# Patient Record
Sex: MALE | Race: WHITE | NOT HISPANIC OR LATINO
[De-identification: names, ages, dates, MRNs, and addresses within clinical notes are randomized per-mention and may not be internally consistent; named-entity substitution may affect disease eponyms.]

---

## 2022-04-18 ENCOUNTER — APPOINTMENT (OUTPATIENT)
Dept: NEPHROLOGY | Facility: CLINIC | Age: 67
End: 2022-04-18
Payer: COMMERCIAL

## 2022-04-18 ENCOUNTER — APPOINTMENT (OUTPATIENT)
Dept: TRANSPLANT | Facility: CLINIC | Age: 67
End: 2022-04-18
Payer: COMMERCIAL

## 2022-04-18 ENCOUNTER — NON-APPOINTMENT (OUTPATIENT)
Age: 67
End: 2022-04-18

## 2022-04-18 VITALS
OXYGEN SATURATION: 95 % | RESPIRATION RATE: 14 BRPM | HEIGHT: 71 IN | BODY MASS INDEX: 23.52 KG/M2 | HEART RATE: 62 BPM | DIASTOLIC BLOOD PRESSURE: 65 MMHG | WEIGHT: 168 LBS | TEMPERATURE: 97.4 F | SYSTOLIC BLOOD PRESSURE: 137 MMHG

## 2022-04-18 DIAGNOSIS — Z00.00 ENCOUNTER FOR GENERAL ADULT MEDICAL EXAMINATION W/OUT ABNORMAL FINDINGS: ICD-10-CM

## 2022-04-18 PROCEDURE — 99213 OFFICE O/P EST LOW 20 MIN: CPT

## 2022-04-18 PROCEDURE — 99072 ADDL SUPL MATRL&STAF TM PHE: CPT

## 2022-04-18 PROCEDURE — 99215 OFFICE O/P EST HI 40 MIN: CPT

## 2022-04-18 NOTE — REVIEW OF SYSTEMS
[Sclera anicteric] : sclera anicteric [Blurred Vision] : blurred vision [Can Walk (___ Blocks)] : can walk [unfilled] blocks [Negative] : Musculoskeletal [Fever] : no fever [Chills] : no chills [Fatigue] : no fatigue [Night Sweats] : no night sweats [Recent Weight Loss (___ Lbs)] : no recent weight loss [Chest Pain] : no chest pain [Palpitations] : no palpitations [SOB] : no shortness of breath [Wheezing] : no wheezing [Cough] : no cough

## 2022-04-18 NOTE — PHYSICAL EXAM
[No Acute Distress] : no acute distress [Sclera Anicteric] : sclera anicteric [Clear to Auscultation] : clear to auscultation [Breathing Comfortably on RA] : breathing comfortably on room air [Normal Rate] : normal rate [Regular Rhythm] : regular rhythm [Systolic Murmur] : systolic murmur [In Right Arm] : fistula/graft in right arm [] : right dorsalis pedis palpable [Responds to Questions Appropriately] : responds to questions appropriately [Oriented] : oriented [Appropriate] : appropriate [FreeTextEntry1] : No oral thrush [TextBox_25] : Soft, nontender, nondistended

## 2022-04-18 NOTE — PLAN
[FreeTextEntry1] : 1. Advanced CKD:  Mr. ELLIOT HARGROVE  Is a  good candidate for kidney transplantation . no potential donors. \par 2.Cardiac risk: needs an echo, stress test and cardiac evaluation \par 3. Cancer screening: colonoscopy, PSA \par 4. ID: Serology for acute and chronic viral infections. Screening for latent TB\par 5. Imaging: obtain CT chest and CT A/P \par 6.Diabetes Mellitus: check HbA1c \par \par \par I have personally discussed the risks and benefits of transplantation and patient attended transplant education class where the following was disclosed:\par  \par Reviewed factors affecting survival and morbidity while on dialysis, the transplant wait list and reviewed danny-operative and long-term risk factors affecting outcome in kidney transplantation. \par  \par One year SRTR outcomes for national and [facility] were discussed in regards to patient survival and graft survival after transplantation. \par  \par Details of transplant surgery, including complications were discussed.\par Immunosuppression and complications including infection including life threatening sepsis and opportunistic infections, malignancy and new onset diabetes were discussed. \par  \par Benefits of live donor transplantation as well as variability in wait times across regions and multiple listing were discussed. \par KDPI >85% and PHS high risk criteria donors were discussed. \par HCV kidney transplantation was discussed.\par  \par Will proceed with completing/ updating work up and listing for transplant/ live donor transplant once work up is reviewed and found to be acceptable by multidisciplinary listing committee.\par

## 2022-04-18 NOTE — ASSESSMENT
[Good candidate] : a good candidate. We should proceed with our protocol for evaluation for kidney transplantation. [FreeTextEntry1] : longstanding DM. Needs cardiac eval, including Echo, stress test\par noncontrast CT chest, abd/pel\par Standard workup for transplantation

## 2022-04-18 NOTE — HISTORY OF PRESENT ILLNESS
[Diabetes Mellitus] : Diabetes Mellitus [Hypertension] : Hypertension [Cardiac History] : cardiac history [Diabetes] : diabetes [Retinopathy] : retinopathy [Previous Kidney Transplant] : no previous kidney transplant [Blood Transfusion] : no prior blood transfusion [Claudication/Angina] : no claudication/angina [Hx of DVT/Thrombosis/Miscarriage] : no history of dvt/thrombosis/miscarriage [Neuropathy] : no neuropathy [Lower Extremity Ulcers] : no lower extremity ulcers [Insulin] : no insulin treatment [TextBox_16] : n/a [TextBox_28] : 1990 [TextBox_30] : 1-2 blocks [de-identified] : 66M with longstanding DM, recently taken off of all antiglycemic meds, here for evaluation for kidney transplantation.\par Patient has not started HD yet. GFR 10 on recent labs, with Cr 6\par He denies any cp, sob, nausea, diarrhea, hematuria or dysuria.\par He makes a normal amount of urine.\par \par PMH: HTN, DM, CAD/MI\par PSH: RUE AVF\par Medications: See list\par Allergies: NKDA\par Social: Quit marijuana last year. Denies tob, etoh, drugs\par Lives with wife and 46yo son. Wife may be interested in donation\par FMH: No reported malignancy. Multiple family members with DM\par Brother d. 2 years after kidney transplant

## 2022-04-18 NOTE — REASON FOR VISIT
[Initial] : an initial visit for [End-Stage Renal Disease] : end-stage renal disease [Kidney Transplant Evaluation] : kidney transplant evaluation [Spouse] : spouse [FreeTextEntry2] : Dr Munguia - nephrology

## 2022-04-18 NOTE — HISTORY OF PRESENT ILLNESS
[TextBox_42] : ELLIOT HARGROVE is a 66 year old male who presents for kidney transplant evaluation. \par Cause of ESRD : Long standing h/o DM ( > 34 years) . Known CKD for ~ 10 years. Rapid decline in eGFR since last year.\par Nephrologist: Dr Carranza. \par Preemptive candidate. Has AVF placed in August 2021, not used so far. \par Makes good amounts of urine\par  \par Other PMH :\par Cardiac -has HTN for ~ 20 years, Had MI  s/p PCI X 3 in 2010. \par Pulmonary-  no h/o COPD, Asthma \par Infections-h/o  TB, HIV, Hepatitis  or covid. vaccinated for covid\par Heme- no h/o malignancy or bleeding disorders.No DVT/PE\par Endo-  diabetes > 34 years, was taken off insulin or OHA 1 year ago.  .Has retinopathy .  no Thyroid disease\par Neuro- no h/o CVA or seizure \par Psych- no suicidal ideation, depression or anxiety. \par Sensitization events-  no previous blood transfusions or previous transplant\par No infections or hospitalizations in the last 6 months \par \par Surgical h/o : none\par Functional status: can walk 2-3 blocks but gets tired easily. \par Social history: lives with wife ( has a son who is 45 years, healthy) . quit smoking 1 year ago. used to smoke marijuana. no alcohol or any illicit drug. retired, used to be a .\par Family history:  His brother has ESRD s/p DDRT in [facility] , lasted only for 2 years. No family h/o malignancy. Many family members have DM.  \par \par \par \par

## 2022-04-21 LAB
ABO + RH PNL BLD: NORMAL
ABO + RH PNL BLD: NORMAL
ALBUMIN SERPL ELPH-MCNC: 4.6 G/DL
ALP BLD-CCNC: 141 U/L
ALT SERPL-CCNC: 35 U/L
ANION GAP SERPL CALC-SCNC: 15 MMOL/L
APPEARANCE: CLEAR
AST SERPL-CCNC: 21 U/L
BACTERIA: NEGATIVE
BASOPHILS # BLD AUTO: 0.04 K/UL
BASOPHILS NFR BLD AUTO: 0.5 %
BILIRUB SERPL-MCNC: 0.2 MG/DL
BILIRUBIN URINE: NEGATIVE
BLOOD URINE: NORMAL
BUN SERPL-MCNC: 81 MG/DL
C PEPTIDE SERPL-MCNC: 6 NG/ML
CALCIUM SERPL-MCNC: 9.9 MG/DL
CHLORIDE SERPL-SCNC: 108 MMOL/L
CMV IGG SERPL QL: >10 U/ML
CMV IGG SERPL-IMP: POSITIVE
CO2 SERPL-SCNC: 17 MMOL/L
COLOR: NORMAL
COVID-19 SPIKE DOMAIN ANTIBODY INTERPRETATION: POSITIVE
CREAT SERPL-MCNC: 6.89 MG/DL
CREAT SPEC-SCNC: 103 MG/DL
CREAT/PROT UR: 2.5 RATIO
EBV DNA SERPL NAA+PROBE-ACNC: NOT DETECTED IU/ML
EBV EA AB SER IA-ACNC: 14 U/ML
EBV EA AB TITR SER IF: POSITIVE
EBV EA IGG SER QL IA: >600 U/ML
EBV EA IGG SER-ACNC: POSITIVE
EBV EA IGM SER IA-ACNC: NEGATIVE
EBV PATRN SPEC IB-IMP: NORMAL
EBV VCA IGG SER IA-ACNC: 261 U/ML
EBV VCA IGM SER QL IA: <10 U/ML
EGFR: 8 ML/MIN/1.73M2
EOSINOPHIL # BLD AUTO: 0.59 K/UL
EOSINOPHIL NFR BLD AUTO: 7.5 %
EPSTEIN-BARR VIRUS CAPSID ANTIGEN IGG: POSITIVE
ESTIMATED AVERAGE GLUCOSE: 117 MG/DL
GLUCOSE QUALITATIVE U: NEGATIVE
GLUCOSE SERPL-MCNC: 189 MG/DL
HAV IGM SER QL: NONREACTIVE
HBA1C MFR BLD HPLC: 5.7 %
HBV CORE IGG+IGM SER QL: NONREACTIVE
HBV SURFACE AB SER QL: NONREACTIVE
HBV SURFACE AB SERPL IA-ACNC: <3 MIU/ML
HBV SURFACE AG SER QL: NONREACTIVE
HCT VFR BLD CALC: 34.7 %
HCV AB SER QL: NONREACTIVE
HCV S/CO RATIO: 0.21 S/CO
HGB BLD-MCNC: 10.5 G/DL
HIV1+2 AB SPEC QL IA.RAPID: NONREACTIVE
HSV 1+2 IGG SER IA-IMP: NEGATIVE
HSV 1+2 IGG SER IA-IMP: POSITIVE
HSV1 IGG SER QL: 34.9 INDEX
HSV2 IGG SER QL: 0.43 INDEX
HYALINE CASTS: 4 /LPF
IMM GRANULOCYTES NFR BLD AUTO: 0.3 %
KETONES URINE: NEGATIVE
LEUKOCYTE ESTERASE URINE: NEGATIVE
LYMPHOCYTES # BLD AUTO: 1.56 K/UL
LYMPHOCYTES NFR BLD AUTO: 19.8 %
M TB IFN-G BLD-IMP: NEGATIVE
MAGNESIUM SERPL-MCNC: 2.3 MG/DL
MAN DIFF?: NORMAL
MCHC RBC-ENTMCNC: 25.7 PG
MCHC RBC-ENTMCNC: 30.3 GM/DL
MCV RBC AUTO: 84.8 FL
MICROSCOPIC-UA: NORMAL
MONOCYTES # BLD AUTO: 0.72 K/UL
MONOCYTES NFR BLD AUTO: 9.1 %
NEUTROPHILS # BLD AUTO: 4.96 K/UL
NEUTROPHILS NFR BLD AUTO: 62.8 %
NITRITE URINE: NEGATIVE
PH URINE: 6
PHOSPHATE SERPL-MCNC: 5.8 MG/DL
PLATELET # BLD AUTO: 182 K/UL
POTASSIUM SERPL-SCNC: 5.2 MMOL/L
PROT SERPL-MCNC: 7.3 G/DL
PROT UR-MCNC: 255 MG/DL
PROTEIN URINE: ABNORMAL
PSA SERPL-MCNC: 0.52 NG/ML
QUANTIFERON TB PLUS MITOGEN MINUS NIL: 10 IU/ML
QUANTIFERON TB PLUS NIL: 0 IU/ML
QUANTIFERON TB PLUS TB1 MINUS NIL: 0 IU/ML
QUANTIFERON TB PLUS TB2 MINUS NIL: 0.01 IU/ML
RBC # BLD: 4.09 M/UL
RBC # FLD: 17.4 %
RED BLOOD CELLS URINE: 3 /HPF
RUBV IGG FLD-ACNC: 13.9 INDEX
RUBV IGG SER-IMP: POSITIVE
SARS-COV-2 AB SERPL IA-ACNC: >250 U/ML
SODIUM SERPL-SCNC: 141 MMOL/L
SPECIFIC GRAVITY URINE: 1.01
SQUAMOUS EPITHELIAL CELLS: 1 /HPF
T GONDII AB SER-IMP: NEGATIVE
T GONDII IGG SER QL: <3 IU/ML
T PALLIDUM AB SER QL IA: NEGATIVE
URATE SERPL-MCNC: 6.6 MG/DL
UROBILINOGEN URINE: NORMAL
VZV AB TITR SER: POSITIVE
VZV IGG SER IF-ACNC: 2014 INDEX
WBC # FLD AUTO: 7.89 K/UL
WHITE BLOOD CELLS URINE: 2 /HPF

## 2022-05-09 ENCOUNTER — APPOINTMENT (OUTPATIENT)
Dept: CT IMAGING | Facility: CLINIC | Age: 67
End: 2022-05-09
Payer: COMMERCIAL

## 2022-05-09 ENCOUNTER — OUTPATIENT (OUTPATIENT)
Dept: OUTPATIENT SERVICES | Facility: HOSPITAL | Age: 67
LOS: 1 days | End: 2022-05-09
Payer: COMMERCIAL

## 2022-05-09 DIAGNOSIS — Z01.818 ENCOUNTER FOR OTHER PREPROCEDURAL EXAMINATION: ICD-10-CM

## 2022-05-09 PROCEDURE — 74176 CT ABD & PELVIS W/O CONTRAST: CPT

## 2022-05-09 PROCEDURE — 74176 CT ABD & PELVIS W/O CONTRAST: CPT | Mod: 26

## 2022-06-06 ENCOUNTER — APPOINTMENT (OUTPATIENT)
Dept: CT IMAGING | Facility: CLINIC | Age: 67
End: 2022-06-06

## 2022-06-14 ENCOUNTER — APPOINTMENT (OUTPATIENT)
Dept: CARDIOLOGY | Facility: CLINIC | Age: 67
End: 2022-06-14
Payer: COMMERCIAL

## 2022-06-14 ENCOUNTER — NON-APPOINTMENT (OUTPATIENT)
Age: 67
End: 2022-06-14

## 2022-06-14 VITALS
HEIGHT: 71 IN | HEART RATE: 82 BPM | WEIGHT: 176 LBS | BODY MASS INDEX: 24.64 KG/M2 | DIASTOLIC BLOOD PRESSURE: 70 MMHG | OXYGEN SATURATION: 100 % | SYSTOLIC BLOOD PRESSURE: 160 MMHG

## 2022-06-14 DIAGNOSIS — Z79.4 TYPE 2 DIABETES MELLITUS W/OUT COMPLICATIONS: ICD-10-CM

## 2022-06-14 DIAGNOSIS — E11.9 TYPE 2 DIABETES MELLITUS W/OUT COMPLICATIONS: ICD-10-CM

## 2022-06-14 DIAGNOSIS — Z95.5 PRESENCE OF CORONARY ANGIOPLASTY IMPLANT AND GRAFT: ICD-10-CM

## 2022-06-14 DIAGNOSIS — Z87.891 PERSONAL HISTORY OF NICOTINE DEPENDENCE: ICD-10-CM

## 2022-06-14 PROCEDURE — 99205 OFFICE O/P NEW HI 60 MIN: CPT

## 2022-06-14 PROCEDURE — 99072 ADDL SUPL MATRL&STAF TM PHE: CPT

## 2022-06-14 PROCEDURE — 93000 ELECTROCARDIOGRAM COMPLETE: CPT | Mod: NC

## 2022-06-18 ENCOUNTER — APPOINTMENT (OUTPATIENT)
Dept: CT IMAGING | Facility: CLINIC | Age: 67
End: 2022-06-18

## 2022-06-23 NOTE — HISTORY OF PRESENT ILLNESS
[FreeTextEntry1] : Patient is a 67 year-old Black gentleman, with known past medical history of hypertension, dyslipidemia, insulin dependent type II diabetes (off insulin after losing weight), coronary artery disease status post MI treated with PCI x3 stents, CKD stage V, who presents as a preemptive candidate for renal transplant. \par He has not been following regularly with a cardiologist, but he reports that his nephrologist (Vish Avitia MD) checked an echocardiogram approximately one month ago.\par \par He used to smoke marijuana, but he has not smoked in over a year, and he never smoked cigarettes. \par He does not exercise because of back discomfort. He does not report any chest pain or shortness of breath.\par \par He has not been sick with Covid-19. He had two doses of Pfizer's Covid-19 vaccine and then a Moderna booster on 1/2/2022.

## 2022-06-23 NOTE — PHYSICAL EXAM
[Well Developed] : well developed [Well Nourished] : well nourished [No Acute Distress] : no acute distress [Normal Conjunctiva] : normal conjunctiva [Normal Venous Pressure] : normal venous pressure [No Carotid Bruit] : no carotid bruit [Normal S1, S2] : normal S1, S2 [No Rub] : no rub [No Gallop] : no gallop [Normal Rate] : normal [Rhythm Regular] : regular [Normal S1] : normal S1 [Normal S2] : normal S2 [I] : a grade 1 [No Pitting Edema] : no pitting edema present [Clear Lung Fields] : clear lung fields [Good Air Entry] : good air entry [No Respiratory Distress] : no respiratory distress  [Soft] : abdomen soft [Non Tender] : non-tender [No Masses/organomegaly] : no masses/organomegaly [Normal Bowel Sounds] : normal bowel sounds [Normal Gait] : normal gait [No Edema] : no edema [No Cyanosis] : no cyanosis [No Clubbing] : no clubbing [No Varicosities] : no varicosities [No Rash] : no rash [No Skin Lesions] : no skin lesions [Moves all extremities] : moves all extremities [No Focal Deficits] : no focal deficits [Normal Speech] : normal speech [Alert and Oriented] : alert and oriented [Normal memory] : normal memory [de-identified] : right brachial AV fistula, +thrill

## 2022-06-23 NOTE — CARDIOLOGY SUMMARY
[de-identified] : 6/14/2022, sinus 73 bpm, LVH with left axis and QRS widening, poor R-wave progression

## 2022-07-07 ENCOUNTER — APPOINTMENT (OUTPATIENT)
Dept: CARDIOLOGY | Facility: CLINIC | Age: 67
End: 2022-07-07

## 2022-07-07 PROCEDURE — 93015 CV STRESS TEST SUPVJ I&R: CPT

## 2022-07-07 PROCEDURE — 78452 HT MUSCLE IMAGE SPECT MULT: CPT

## 2022-07-07 PROCEDURE — A9500: CPT

## 2022-07-07 PROCEDURE — 99072 ADDL SUPL MATRL&STAF TM PHE: CPT

## 2022-07-31 ENCOUNTER — OUTPATIENT (OUTPATIENT)
Dept: OUTPATIENT SERVICES | Facility: HOSPITAL | Age: 67
LOS: 1 days | End: 2022-07-31
Payer: MEDICARE

## 2022-07-31 DIAGNOSIS — Z11.52 ENCOUNTER FOR SCREENING FOR COVID-19: ICD-10-CM

## 2022-07-31 LAB — SARS-COV-2 RNA SPEC QL NAA+PROBE: SIGNIFICANT CHANGE UP

## 2022-07-31 PROCEDURE — U0005: CPT

## 2022-07-31 PROCEDURE — U0003: CPT

## 2022-07-31 PROCEDURE — C9803: CPT

## 2022-08-04 ENCOUNTER — OUTPATIENT (OUTPATIENT)
Dept: OUTPATIENT SERVICES | Facility: HOSPITAL | Age: 67
LOS: 1 days | End: 2022-08-04
Payer: MEDICARE

## 2022-08-04 ENCOUNTER — TRANSCRIPTION ENCOUNTER (OUTPATIENT)
Age: 67
End: 2022-08-04

## 2022-08-04 VITALS
DIASTOLIC BLOOD PRESSURE: 67 MMHG | HEART RATE: 70 BPM | OXYGEN SATURATION: 97 % | RESPIRATION RATE: 16 BRPM | TEMPERATURE: 98 F | WEIGHT: 179.02 LBS | HEIGHT: 71 IN | SYSTOLIC BLOOD PRESSURE: 145 MMHG

## 2022-08-04 VITALS
RESPIRATION RATE: 14 BRPM | SYSTOLIC BLOOD PRESSURE: 128 MMHG | OXYGEN SATURATION: 96 % | DIASTOLIC BLOOD PRESSURE: 60 MMHG | HEART RATE: 68 BPM

## 2022-08-04 DIAGNOSIS — I77.0 ARTERIOVENOUS FISTULA, ACQUIRED: Chronic | ICD-10-CM

## 2022-08-04 DIAGNOSIS — R94.39 ABNORMAL RESULT OF OTHER CARDIOVASCULAR FUNCTION STUDY: ICD-10-CM

## 2022-08-04 LAB
ALBUMIN SERPL ELPH-MCNC: 4.6 G/DL — SIGNIFICANT CHANGE UP (ref 3.3–5)
ALP SERPL-CCNC: 157 U/L — HIGH (ref 40–120)
ALT FLD-CCNC: 18 U/L — SIGNIFICANT CHANGE UP (ref 10–45)
ANION GAP SERPL CALC-SCNC: 14 MMOL/L — SIGNIFICANT CHANGE UP (ref 5–17)
AST SERPL-CCNC: 25 U/L — SIGNIFICANT CHANGE UP (ref 10–40)
BILIRUB SERPL-MCNC: 0.3 MG/DL — SIGNIFICANT CHANGE UP (ref 0.2–1.2)
BUN SERPL-MCNC: 38 MG/DL — HIGH (ref 7–23)
CALCIUM SERPL-MCNC: 10.5 MG/DL — SIGNIFICANT CHANGE UP (ref 8.4–10.5)
CHLORIDE SERPL-SCNC: 95 MMOL/L — LOW (ref 96–108)
CO2 SERPL-SCNC: 27 MMOL/L — SIGNIFICANT CHANGE UP (ref 22–31)
CREAT SERPL-MCNC: 6.28 MG/DL — HIGH (ref 0.5–1.3)
EGFR: 9 ML/MIN/1.73M2 — LOW
GLUCOSE SERPL-MCNC: 229 MG/DL — HIGH (ref 70–99)
HCT VFR BLD CALC: 37.7 % — LOW (ref 39–50)
HGB BLD-MCNC: 11.8 G/DL — LOW (ref 13–17)
MAGNESIUM SERPL-MCNC: 2.3 MG/DL — SIGNIFICANT CHANGE UP (ref 1.6–2.6)
MCHC RBC-ENTMCNC: 27.3 PG — SIGNIFICANT CHANGE UP (ref 27–34)
MCHC RBC-ENTMCNC: 31.3 GM/DL — LOW (ref 32–36)
MCV RBC AUTO: 87.3 FL — SIGNIFICANT CHANGE UP (ref 80–100)
NRBC # BLD: 0 /100 WBCS — SIGNIFICANT CHANGE UP (ref 0–0)
PLATELET # BLD AUTO: 220 K/UL — SIGNIFICANT CHANGE UP (ref 150–400)
POTASSIUM SERPL-MCNC: 4.2 MMOL/L — SIGNIFICANT CHANGE UP (ref 3.5–5.3)
POTASSIUM SERPL-SCNC: 4.2 MMOL/L — SIGNIFICANT CHANGE UP (ref 3.5–5.3)
PROT SERPL-MCNC: 8 G/DL — SIGNIFICANT CHANGE UP (ref 6–8.3)
RBC # BLD: 4.32 M/UL — SIGNIFICANT CHANGE UP (ref 4.2–5.8)
RBC # FLD: 16.4 % — HIGH (ref 10.3–14.5)
SODIUM SERPL-SCNC: 136 MMOL/L — SIGNIFICANT CHANGE UP (ref 135–145)
WBC # BLD: 8.92 K/UL — SIGNIFICANT CHANGE UP (ref 3.8–10.5)
WBC # FLD AUTO: 8.92 K/UL — SIGNIFICANT CHANGE UP (ref 3.8–10.5)

## 2022-08-04 PROCEDURE — 93010 ELECTROCARDIOGRAM REPORT: CPT

## 2022-08-04 PROCEDURE — 93458 L HRT ARTERY/VENTRICLE ANGIO: CPT

## 2022-08-04 PROCEDURE — 93458 L HRT ARTERY/VENTRICLE ANGIO: CPT | Mod: 26

## 2022-08-04 PROCEDURE — 80053 COMPREHEN METABOLIC PANEL: CPT

## 2022-08-04 PROCEDURE — C1887: CPT

## 2022-08-04 PROCEDURE — 83735 ASSAY OF MAGNESIUM: CPT

## 2022-08-04 PROCEDURE — 99223 1ST HOSP IP/OBS HIGH 75: CPT

## 2022-08-04 PROCEDURE — 85027 COMPLETE CBC AUTOMATED: CPT

## 2022-08-04 PROCEDURE — C1894: CPT

## 2022-08-04 PROCEDURE — 99152 MOD SED SAME PHYS/QHP 5/>YRS: CPT

## 2022-08-04 PROCEDURE — 93005 ELECTROCARDIOGRAM TRACING: CPT

## 2022-08-04 PROCEDURE — C1769: CPT

## 2022-08-04 PROCEDURE — 36415 COLL VENOUS BLD VENIPUNCTURE: CPT

## 2022-08-04 NOTE — ASU DISCHARGE PLAN (ADULT/PEDIATRIC) - ASU DC SPECIAL INSTRUCTIONSFT
Wound Care:   the day AFTER your procedure remove bandage GENTLY, and clean using  mild soap and gentle warm, water stream, pat dry. leave OPEN to air. YOU MAY SHOWER   DO NOT apply lotions, creams, ointments, powder, perfumes to your incision site  DO NOT SOAK your site for 1 week ( no baths, no pools, no tubs, etc...)  Check  your groin and /or wrist daily.A small amount of bruising, and soarness are normal    ACTIVITY: for 24 hours   - DO NOT DRIVE  - DO NOT make any important decisions or sign legal documents   - DO NOT operate heavy machineries   - you may resume sexual activity in 48 hours, unless otherwise instructed by your cardiologist     If your procedure was done through the WRIST: for the NEXT 3DAYS:  - avoid pushing, pulling, with that affected wrist   - avoid repeated movement of that hand and wrist ( eg: typing, hammering)  - DO NOT LIFT anything more than 5 lbs     If your procedure was done through the GROIN: for the NEXT 5 DAYS  - Limit climbing stairs, DO NOT soak in bathtub or pool  - no strenuous activities, pushing, pulling, straining  - Do not lift anything 10lbs or heavier     MEDICATION:   take your medications as explained ( see discharge paperwork)   If you received a STENT, you will be taking antiplatelet medications to KEEP YOUR STENT OPEN ( eg: Aspirin, Plavix, Brilinta, Effient, etc).  Take as prescribed DO NOT STOP taking them without consulting with your cardiologist first.     Follow heart healthy diet recommended by your doctor, , if you smoke STOP SMOKING ( may call [phone] for center of tobacco control if you need assistance)     CALL your doctor to make appointment in 2 WEEKS     ***CALL YOUR DOCTOR***  if you experience: fever, chills, body aches, or severe pain, swelling, redness, heat or yellow discharge at incision site  If you experience Bleeding or excruciating pain at the procedural site, swelling ( golf ball size) at your procedural site  If you experience CHEST PAIN  If you experience extremity numbness, tingling, temperature change ( of your procedural site)   If you are unable to reach your doctor, you may contact:   -Cardiology Office at [facility] at [phone] or   - [facility] [phone]  - [facility] [phone]

## 2022-08-04 NOTE — ASU DISCHARGE PLAN (ADULT/PEDIATRIC) - NS MD DC FALL RISK RISK
For information on Fall & Injury Prevention, visit: [email or web address] OR  [email or web address] OR  https://www.cdc.gov/steadi/patient.html

## 2022-08-04 NOTE — ASU PATIENT PROFILE, ADULT - FALL HARM RISK - UNIVERSAL INTERVENTIONS
Bed in lowest position, wheels locked, appropriate side rails in place/Call bell, personal items and telephone in reach/Instruct patient to call for assistance before getting out of bed or chair/Non-slip footwear when patient is out of bed/Scott to call system/Physically safe environment - no spills, clutter or unnecessary equipment/Purposeful Proactive Rounding/Room/bathroom lighting operational, light cord in reach

## 2022-08-04 NOTE — CONSULT NOTE ADULT - ASSESSMENT
67 year-old gentleman with known coronary artery disease presents today for left heart catheterization prior to consideration for renal transplant.  Cath shows LM, LAD, and RCA lesions that are each amenable to PCI.  After discussion about the risks/benefits/alternatives, including CABG vs. multivessel PCI, the decision is made to proceed with multivessel PCI.    Carroll Garza MD will schedule patient for cath and PCI next week.  Continue ASA 81 mg daily and atorvastatin 80 mg daily. Also continue carvedilol 25 mg BID.

## 2022-08-04 NOTE — CONSULT NOTE ADULT - SUBJECTIVE AND OBJECTIVE BOX
Patient seen and evaluated @ 9:30 am in cath recovery suite  Chief Complaint: coronary artery disease    HPI:  This is a 68 yo male PMH HTN, HLD, DM2 A1C 5.7 (), CAD s/p SCOTT x 3, former smoker, ESRD on HD x 2 months via Right AV fistula (managed by Dr. Vish Munguia-[facility] last session 8/3/22) who presented to cardiology, Dr. AMADOR Rain, for evaluation to proceed with listing for renal transplant.  Denies chest pain/pressure, SOB/BETANCOURT, dizziness, diaphoresis, palpitations, nausea, vomiting, peripheral edema, recent weight gain, or syncope.  No implanted monitoring devices.  NST 22 EF 53% hypokinesis inferoseptal, distal anterior and ateroapical, distal anteroseptal , and inferior walls. Pt. presents asymptomatic for further evaluation and cardiac cath.  (04 Aug 2022 07:03)    PMH:   HTN (hypertension)    HLD (hyperlipidemia)    CAD (coronary artery disease)    Diabetes mellitus    Former smoker    ESRD on dialysis    Gout      PSH:   AV fistula      Medications:     Allergies:  No Known Allergies    FAMILY HISTORY:  No pertinent family history in first degree relatives     Social History: , lives with his wife  Smoking: nonsmoker  Alcohol: no EtOH abuse  Drugs: no illicit drug use    Review of Systems:    Constitutional: No fever, chills, fatigue, or changes in weight  HEENT: No blurry vision, eye pain, headache, runny nose, or sore throat  Respiratory: No shortness of breath, cough, or wheezing  Cardiovascular: No chest pain or palpitations  Gastrointestinal: No nausea, vomiting, diarrhea, or abdominal pain  Genitourinary: No dysuria or incontinence  Extremities: No lower extremity swelling  Neurologic: No focal findings  Lymphatic: No lymphadenopathy   Skin: No rash  Psychiatry: No anxiety or depression  10 point review of systems is otherwise negative except as mentioned above            Physical Exam:  T(C): 36.8 (22 @ 07:03), Max: 36.8 (22 @ 06:43)  HR: 68 (22 @ 10:50) (62 - 72)  BP: 128/60 (22 @ 10:50) (114/56 - 145/67)  RR: 14 (22 @ 10:50) (12 - 18)  SpO2: 96% (22 @ 10:50) (92% - 98%)  Wt(kg): --    Daily Height in cm: 180.34 (04 Aug 2022 07:03)    Daily Weight in k.2 (04 Aug 2022 07:03)    Appearance: Normal, well groomed, NAD  Eyes: PERRLA, EOMI, pink conjunctiva, no scleral icterus   HENT: Normal oral mucosa  Cardiovascular: RRR, S1, S2, no murmur, rub, or gallop; no edema; no JVD  Procedural Access Site: Clean, dry, intact, without hematoma  Respiratory: Clear to auscultation bilaterally  Gastrointestinal: Soft, non-tender, non-distended, BS+  Musculoskeletal: No clubbing or joint deformity   Neurologic: No focal weakness  Lymphatic: No lymphadenopathy  Psychiatry: AAOx3 with appropriate mood and affect  Skin: No rashes, ecchymoses, or cyanosis    Cardiovascular Diagnostic Testing:  ECG:    Echo:    Stress Testing:    Cath: LM, LAD, and RCA disease    Interpretation of Telemetry:    Imaging:    Labs:                        11.8   8.92  )-----------( 220      ( 04 Aug 2022 07:27 )             37.7     08-    136  |  95<L>  |  38<H>  ----------------------------<  229<H>  4.2   |  27  |  6.28<H>    Ca    10.5      04 Aug 2022 07:27  Mg     2.3     -    TPro  8.0  /  Alb  4.6  /  TBili  0.3  /  DBili  x   /  AST  25  /  ALT  18  /  AlkPhos  157<H>  08-04

## 2022-08-04 NOTE — ASU DISCHARGE PLAN (ADULT/PEDIATRIC) - CARE PROVIDER_API CALL
Carroll Garza)  Cardiology; Internal Medicine; Interventional Cardiology  [facility]- Dept of Cardiology, [address] [place] [address]  Phone: [phone]  Fax: [phone]  Follow Up Time:

## 2022-08-04 NOTE — H&P CARDIOLOGY - NSICDXPASTMEDICALHX_GEN_ALL_CORE_FT
PAST MEDICAL HISTORY:  CAD (coronary artery disease)     Diabetes mellitus     ESRD on dialysis     Former smoker     Gout     HLD (hyperlipidemia)     HTN (hypertension)

## 2022-08-04 NOTE — H&P CARDIOLOGY - HISTORY OF PRESENT ILLNESS
This is a 68 yo male PMH HTN, HLD, DM2 A1C 5.7 (4/22), CAD s/p SCOTT x 3, former smoker, ESRD on HD x 2 months via Right AV fistula (managed by Dr. Vish Munguia[facility] last session 8/3/22) who presented to cardiology, Dr. AMADOR Rain, for evaluation to proceed with listing for renal transplant.  Denies chest pain/pressure, SOB/BETANCOURT, dizziness, diaphoresis, palpitations, nausea, vomiting, peripheral edema, recent weight gain, or syncope.  No implanted monitoring devices.  NST 7/7/22 EF 53% hypokinesis inferoseptal, distal anterior and ateroapical, distal anteroseptal , and inferior walls. Pt. presents asymptomatic for further evaluation and cardiac cath.

## 2022-08-09 ENCOUNTER — OUTPATIENT (OUTPATIENT)
Dept: OUTPATIENT SERVICES | Facility: HOSPITAL | Age: 67
LOS: 1 days | End: 2022-08-09
Payer: MEDICARE

## 2022-08-09 DIAGNOSIS — I77.0 ARTERIOVENOUS FISTULA, ACQUIRED: Chronic | ICD-10-CM

## 2022-08-09 DIAGNOSIS — Z11.52 ENCOUNTER FOR SCREENING FOR COVID-19: ICD-10-CM

## 2022-08-09 PROBLEM — E11.9 TYPE 2 DIABETES MELLITUS WITHOUT COMPLICATIONS: Chronic | Status: ACTIVE | Noted: 2022-08-04

## 2022-08-09 PROBLEM — E78.5 HYPERLIPIDEMIA, UNSPECIFIED: Chronic | Status: ACTIVE | Noted: 2022-08-04

## 2022-08-09 PROBLEM — I10 ESSENTIAL (PRIMARY) HYPERTENSION: Chronic | Status: ACTIVE | Noted: 2022-08-04

## 2022-08-09 PROBLEM — I25.10 ATHEROSCLEROTIC HEART DISEASE OF NATIVE CORONARY ARTERY WITHOUT ANGINA PECTORIS: Chronic | Status: ACTIVE | Noted: 2022-08-04

## 2022-08-09 PROBLEM — M10.9 GOUT, UNSPECIFIED: Chronic | Status: ACTIVE | Noted: 2022-08-04

## 2022-08-09 PROBLEM — Z87.891 PERSONAL HISTORY OF NICOTINE DEPENDENCE: Chronic | Status: ACTIVE | Noted: 2022-08-04

## 2022-08-09 PROBLEM — N18.6 END STAGE RENAL DISEASE: Chronic | Status: ACTIVE | Noted: 2022-08-04

## 2022-08-09 LAB — SARS-COV-2 RNA SPEC QL NAA+PROBE: SIGNIFICANT CHANGE UP

## 2022-08-09 PROCEDURE — U0003: CPT

## 2022-08-09 PROCEDURE — U0005: CPT

## 2022-08-09 PROCEDURE — C9803: CPT

## 2022-08-12 ENCOUNTER — TRANSCRIPTION ENCOUNTER (OUTPATIENT)
Age: 67
End: 2022-08-12

## 2022-08-12 ENCOUNTER — INPATIENT (INPATIENT)
Facility: HOSPITAL | Age: 67
LOS: 0 days | Discharge: HOME | DRG: 215 | End: 2022-08-13
Attending: INTERNAL MEDICINE | Admitting: INTERNAL MEDICINE
Payer: MEDICARE

## 2022-08-12 VITALS
HEART RATE: 80 BPM | SYSTOLIC BLOOD PRESSURE: 141 MMHG | HEIGHT: 71 IN | TEMPERATURE: 98 F | OXYGEN SATURATION: 96 % | RESPIRATION RATE: 18 BRPM | WEIGHT: 169.98 LBS | DIASTOLIC BLOOD PRESSURE: 68 MMHG

## 2022-08-12 DIAGNOSIS — I77.0 ARTERIOVENOUS FISTULA, ACQUIRED: Chronic | ICD-10-CM

## 2022-08-12 DIAGNOSIS — I25.119 ATHEROSCLEROTIC HEART DISEASE OF NATIVE CORONARY ARTERY WITH UNSPECIFIED ANGINA PECTORIS: ICD-10-CM

## 2022-08-12 LAB
ANION GAP SERPL CALC-SCNC: 16 MMOL/L — SIGNIFICANT CHANGE UP (ref 5–17)
BUN SERPL-MCNC: 54 MG/DL — HIGH (ref 7–23)
CALCIUM SERPL-MCNC: 10.3 MG/DL — SIGNIFICANT CHANGE UP (ref 8.4–10.5)
CHLORIDE SERPL-SCNC: 94 MMOL/L — LOW (ref 96–108)
CO2 SERPL-SCNC: 27 MMOL/L — SIGNIFICANT CHANGE UP (ref 22–31)
CREAT SERPL-MCNC: 7.85 MG/DL — HIGH (ref 0.5–1.3)
EGFR: 7 ML/MIN/1.73M2 — LOW
GLUCOSE BLDC GLUCOMTR-MCNC: 226 MG/DL — HIGH (ref 70–99)
GLUCOSE BLDC GLUCOMTR-MCNC: 228 MG/DL — HIGH (ref 70–99)
GLUCOSE BLDC GLUCOMTR-MCNC: 237 MG/DL — HIGH (ref 70–99)
GLUCOSE BLDC GLUCOMTR-MCNC: 299 MG/DL — HIGH (ref 70–99)
GLUCOSE BLDC GLUCOMTR-MCNC: 308 MG/DL — HIGH (ref 70–99)
GLUCOSE SERPL-MCNC: 317 MG/DL — HIGH (ref 70–99)
HCT VFR BLD CALC: 34.8 % — LOW (ref 39–50)
HGB BLD-MCNC: 11 G/DL — LOW (ref 13–17)
MCHC RBC-ENTMCNC: 27 PG — SIGNIFICANT CHANGE UP (ref 27–34)
MCHC RBC-ENTMCNC: 31.6 GM/DL — LOW (ref 32–36)
MCV RBC AUTO: 85.5 FL — SIGNIFICANT CHANGE UP (ref 80–100)
NRBC # BLD: 0 /100 WBCS — SIGNIFICANT CHANGE UP (ref 0–0)
PLATELET # BLD AUTO: 245 K/UL — SIGNIFICANT CHANGE UP (ref 150–400)
POTASSIUM SERPL-MCNC: 3.8 MMOL/L — SIGNIFICANT CHANGE UP (ref 3.5–5.3)
POTASSIUM SERPL-SCNC: 3.8 MMOL/L — SIGNIFICANT CHANGE UP (ref 3.5–5.3)
RBC # BLD: 4.07 M/UL — LOW (ref 4.2–5.8)
RBC # FLD: 15.5 % — HIGH (ref 10.3–14.5)
SODIUM SERPL-SCNC: 137 MMOL/L — SIGNIFICANT CHANGE UP (ref 135–145)
WBC # BLD: 10.02 K/UL — SIGNIFICANT CHANGE UP (ref 3.8–10.5)
WBC # FLD AUTO: 10.02 K/UL — SIGNIFICANT CHANGE UP (ref 3.8–10.5)

## 2022-08-12 PROCEDURE — 93010 ELECTROCARDIOGRAM REPORT: CPT

## 2022-08-12 PROCEDURE — 99152 MOD SED SAME PHYS/QHP 5/>YRS: CPT

## 2022-08-12 PROCEDURE — 92928 PRQ TCAT PLMT NTRAC ST 1 LES: CPT | Mod: LC

## 2022-08-12 PROCEDURE — 92920 PRQ TRLUML C ANGIOP 1ART&/BR: CPT | Mod: LM

## 2022-08-12 PROCEDURE — 92979 ENDOLUMINL IVUS OCT C EA: CPT | Mod: 26,LC

## 2022-08-12 PROCEDURE — 33990 INSJ PERQ VAD L HRT ARTERIAL: CPT

## 2022-08-12 PROCEDURE — 93458 L HRT ARTERY/VENTRICLE ANGIO: CPT | Mod: 26,59

## 2022-08-12 PROCEDURE — 0715T: CPT | Mod: NC

## 2022-08-12 PROCEDURE — 92978 ENDOLUMINL IVUS OCT C 1ST: CPT | Mod: 26,59

## 2022-08-12 RX ORDER — CARVEDILOL PHOSPHATE 80 MG/1
25 CAPSULE, EXTENDED RELEASE ORAL EVERY 12 HOURS
Refills: 0 | Status: DISCONTINUED | OUTPATIENT
Start: 2022-08-12 | End: 2022-08-13

## 2022-08-12 RX ORDER — DEXTROSE 50 % IN WATER 50 %
12.5 SYRINGE (ML) INTRAVENOUS ONCE
Refills: 0 | Status: DISCONTINUED | OUTPATIENT
Start: 2022-08-12 | End: 2022-08-13

## 2022-08-12 RX ORDER — ALLOPURINOL 300 MG
100 TABLET ORAL DAILY
Refills: 0 | Status: DISCONTINUED | OUTPATIENT
Start: 2022-08-12 | End: 2022-08-13

## 2022-08-12 RX ORDER — INSULIN LISPRO 100/ML
VIAL (ML) SUBCUTANEOUS AT BEDTIME
Refills: 0 | Status: DISCONTINUED | OUTPATIENT
Start: 2022-08-12 | End: 2022-08-13

## 2022-08-12 RX ORDER — LOSARTAN POTASSIUM 100 MG/1
50 TABLET, FILM COATED ORAL DAILY
Refills: 0 | Status: DISCONTINUED | OUTPATIENT
Start: 2022-08-12 | End: 2022-08-13

## 2022-08-12 RX ORDER — CLOPIDOGREL BISULFATE 75 MG/1
600 TABLET, FILM COATED ORAL ONCE
Refills: 0 | Status: COMPLETED | OUTPATIENT
Start: 2022-08-12 | End: 2022-08-12

## 2022-08-12 RX ORDER — CLOPIDOGREL BISULFATE 75 MG/1
75 TABLET, FILM COATED ORAL DAILY
Refills: 0 | Status: DISCONTINUED | OUTPATIENT
Start: 2022-08-12 | End: 2022-08-13

## 2022-08-12 RX ORDER — ATORVASTATIN CALCIUM 80 MG/1
80 TABLET, FILM COATED ORAL AT BEDTIME
Refills: 0 | Status: DISCONTINUED | OUTPATIENT
Start: 2022-08-12 | End: 2022-08-13

## 2022-08-12 RX ORDER — INSULIN LISPRO 100/ML
VIAL (ML) SUBCUTANEOUS
Refills: 0 | Status: DISCONTINUED | OUTPATIENT
Start: 2022-08-12 | End: 2022-08-13

## 2022-08-12 RX ORDER — DEXTROSE 50 % IN WATER 50 %
15 SYRINGE (ML) INTRAVENOUS ONCE
Refills: 0 | Status: DISCONTINUED | OUTPATIENT
Start: 2022-08-12 | End: 2022-08-13

## 2022-08-12 RX ORDER — CLOPIDOGREL BISULFATE 75 MG/1
1 TABLET, FILM COATED ORAL
Qty: 90 | Refills: 3
Start: 2022-08-12 | End: 2023-08-06

## 2022-08-12 RX ORDER — GLUCAGON INJECTION, SOLUTION 0.5 MG/.1ML
1 INJECTION, SOLUTION SUBCUTANEOUS ONCE
Refills: 0 | Status: DISCONTINUED | OUTPATIENT
Start: 2022-08-12 | End: 2022-08-13

## 2022-08-12 RX ORDER — FERROUS SULFATE 325(65) MG
325 TABLET ORAL DAILY
Refills: 0 | Status: DISCONTINUED | OUTPATIENT
Start: 2022-08-12 | End: 2022-08-13

## 2022-08-12 RX ORDER — TAMSULOSIN HYDROCHLORIDE 0.4 MG/1
0.4 CAPSULE ORAL AT BEDTIME
Refills: 0 | Status: DISCONTINUED | OUTPATIENT
Start: 2022-08-12 | End: 2022-08-13

## 2022-08-12 RX ORDER — DEXTROSE 50 % IN WATER 50 %
25 SYRINGE (ML) INTRAVENOUS ONCE
Refills: 0 | Status: DISCONTINUED | OUTPATIENT
Start: 2022-08-12 | End: 2022-08-13

## 2022-08-12 RX ORDER — SODIUM CHLORIDE 9 MG/ML
1000 INJECTION, SOLUTION INTRAVENOUS
Refills: 0 | Status: DISCONTINUED | OUTPATIENT
Start: 2022-08-12 | End: 2022-08-13

## 2022-08-12 RX ORDER — INSULIN LISPRO 100/ML
6 VIAL (ML) SUBCUTANEOUS ONCE
Refills: 0 | Status: COMPLETED | OUTPATIENT
Start: 2022-08-12 | End: 2022-08-12

## 2022-08-12 RX ORDER — NIFEDIPINE 30 MG
60 TABLET, EXTENDED RELEASE 24 HR ORAL DAILY
Refills: 0 | Status: DISCONTINUED | OUTPATIENT
Start: 2022-08-12 | End: 2022-08-13

## 2022-08-12 RX ORDER — ASPIRIN/CALCIUM CARB/MAGNESIUM 324 MG
81 TABLET ORAL DAILY
Refills: 0 | Status: DISCONTINUED | OUTPATIENT
Start: 2022-08-12 | End: 2022-08-13

## 2022-08-12 RX ADMIN — CARVEDILOL PHOSPHATE 25 MILLIGRAM(S): 80 CAPSULE, EXTENDED RELEASE ORAL at 21:57

## 2022-08-12 RX ADMIN — LOSARTAN POTASSIUM 50 MILLIGRAM(S): 100 TABLET, FILM COATED ORAL at 21:57

## 2022-08-12 RX ADMIN — Medication 6 UNIT(S): at 06:58

## 2022-08-12 RX ADMIN — Medication 6: at 16:19

## 2022-08-12 RX ADMIN — Medication 4: at 12:15

## 2022-08-12 RX ADMIN — ATORVASTATIN CALCIUM 80 MILLIGRAM(S): 80 TABLET, FILM COATED ORAL at 21:56

## 2022-08-12 RX ADMIN — CLOPIDOGREL BISULFATE 600 MILLIGRAM(S): 75 TABLET, FILM COATED ORAL at 09:55

## 2022-08-12 RX ADMIN — TAMSULOSIN HYDROCHLORIDE 0.4 MILLIGRAM(S): 0.4 CAPSULE ORAL at 21:57

## 2022-08-12 NOTE — DISCHARGE NOTE PROVIDER - NSDCFUADDINST_GEN_ALL_CORE_FT
Wound Care:   the day AFTER your procedure remove bandage GENTLY, and clean using  mild soap and gentle warm, water stream, pat dry. leave OPEN to air. YOU MAY SHOWER   DO NOT apply lotions, creams, ointments, powder, perfumes to your incision site  DO NOT SOAK your site for 1 week (no baths, no pools, no tubs, etc...)  Check  your groin and /or wrist daily. A small amount of bruising, and soreness are normal    ACTIVITY: for 24 hours   - DO NOT DRIVE  - DO NOT make any important decisions or sign legal documents   - DO NOT operate heavy machineries  - you may resume sexual activity in 48 hours, unless otherwise instructed by your cardiologist     If your procedure was done through the WRIST: for the NEXT 3 DAYS:  - avoid pushing, pulling, with that affected wrist   - avoid repeated movement of that hand and wrist (eg: typing, hammering)  - DO NOT LIFT anything more than 5 lbs     If your procedure was done through the GROIN: for the NEXT 5 DAYS  - Limit climbing stairs, DO NOT soak in bathtub or pool  - no strenous activities, pushing, pulling, straining  - Do not lift anything 10lbs or heavier     MEDICATION:   take your medications as explained (see discharge paperwork)   If you received a STENT, you will be taking antiplatelet medications to KEEP YOUR STENT OPEN ( eg: Aspirin, Plavix, Brilinta, Effient, etc).  Take as prescribed DO NOT STOP taking them without consulting with your cardiologist first.     Follow heart healthy diet reccomended by your doctor, , if you smoke STOP SMOKING ( may call [phone] for center of tobacco control if you need assistance)     CALL your doctor to make appointment in 2 WEEKS     ***CALL YOUR DOCTOR***  if you experience: fever, chills, body aches, or severe pain, swelling, redness, heat or yellow discharge at incision site  If you experience Bleeding or excruciating pain at the procedural site, swelling ( golf ball size) at your procedural site  If you experience CHEST PAIN  If you experience extremity numbness, tingling, temperature change ( of your procedural site)   If you are unable to reach your doctor, you may contact:   -Cardiology Office at [facility] at [phone] or   - [facility] [phone]  - [facility] [phone]

## 2022-08-12 NOTE — DISCHARGE NOTE PROVIDER - NSDCMRMEDTOKEN_GEN_ALL_CORE_FT
allopurinol 100 mg oral tablet: orally once a day  Aspirin Enteric Coated 81 mg oral delayed release tablet: 1 tab(s) orally once a day  atorvastatin 80 mg oral tablet: 1 tab(s) orally once a day  carvedilol 25 mg oral tablet: 1 tab(s) orally 2 times a day  clopidogrel 75 mg oral tablet: 1 tab(s) orally once a day  ezetimibe 10 mg oral tablet: 1 tab(s) orally once a day  ferrous sulfate 325 mg (65 mg elemental iron) oral tablet: 1 tab(s) orally once a day  losartan 50 mg oral tablet: 1 tab(s) orally once a day  Nifedical XL 60 mg oral tablet, extended release: 1 tab(s) orally once a day  Lauren-Alek oral tablet: 1 tab(s) orally once a day  tamsulosin 0.4 mg oral capsule: 1 cap(s) orally once a day

## 2022-08-12 NOTE — DISCHARGE NOTE PROVIDER - NSDCCPCAREPLAN_GEN_ALL_CORE_FT
PRINCIPAL DISCHARGE DIAGNOSIS  Diagnosis: CAD (coronary artery disease)  Assessment and Plan of Treatment: Low salt, low fat diet.   Weight management.   Take medications as prescribed.    No smoking.  Follow up appointments with your doctor(s)  as instructed.  Continue your medications. Do not stop Aspirin or Plavix unless instructed by your cardiologist.  No heavy lifting, strenuous activity, bending, straining or unnecessary stair climbing for 2 weeks. No sex for 1 week.  No driving for 2 days. You may shower 24 hours following procedure but avoid baths and swimming for 1 week. Check groin site for bleeding and/or swelling daily following procedure. Call your doctor/cardiologist immediately for bleeding or swelling or if you have increased/persistent pain or drainage at the site. Follow up with your cardiologist in 1- 2 weeks. You may call [facility] at [phone] or [phone] after office hours and weekends with any questions or concerns following your procedure.      SECONDARY DISCHARGE DIAGNOSES  Diagnosis: HLD (hyperlipidemia)  Assessment and Plan of Treatment: Your LDL cholesterol will be less than 70mg/dL   Continue with your cholesterol medications. Eat a heart healthy diet that is low in saturated fats and salt, and includes whole grains, fruits, vegetables and lean protein; exercise regularly (consult with your physician or cardiologist first); maintain a heart healthy weight; if you smoke - quit (A resource to help you stop smoking is the [facility] – phone number [phone].). Continue to follow with your primary physician or cardiologist.

## 2022-08-12 NOTE — DISCHARGE NOTE PROVIDER - CARE PROVIDERS DIRECT ADDRESSES
[email or web address]

## 2022-08-12 NOTE — CONSULT NOTE ADULT - SUBJECTIVE AND OBJECTIVE BOX
Patient is a 67y old  Male who presents with a chief complaint of     HPI:  This is a 66 yo male PMH HTN, HLD, DM2 A1C 5.7 (4/22 not on prescriptions >1yr discontinued by MD in [place]), CAD s/p SCOTT x 3, former smoker, ESRD on HD x 2 months via Right AV fistula (managed by Dr. Vish Munguia-[facility] last session 8/10/22) who presented to cardiology, Dr. AMADOR Rain, for evaluation to proceed with listing for renal transplant.  Denied chest pain/pressure, SOB/BETANCOURT, dizziness, diaphoresis, palpitations, nausea, vomiting, peripheral edema, recent weight gain, or syncope.  No implanted monitoring devices.  NST 7/7/22 EF 53% hypokinesis inferoseptal, distal anterior and ateroapical, distal anteroseptal , and inferior walls. [facility] 8/4/22 see results below. Pt. presents asymptomatic for further evaluation and Impella assisted PCI.    [facility] 8/4/22 revealed   LM   Left main artery: Large caliber vessel. There is eccentric, calcified  30-40% ostial stenosis. There is eccentric, calcified 80%  distal stenosis extending into proximal LAD.    LAD   Left anterior descending artery: Large caliber vessel, wraps around  left ventricular apex. There is discrete, calcified 80%  stenosis in proximal vessel at proximal stent edge. There is a stent  in mid vessel with moderate diffuse in-stent restenosis.  Mild diffuse disease in distal vessel. First diagonal: Moderate  caliber vessel. There is eccentric 30-40% stenosis in mid vessel.  CX   Circumflex: Large caliber vessel. There is calcified ostial 50%  stenosis. Mild diffuse disease in proximal vessel. There is patent  stent in mid vessel with mild diffuse in-stent restenosis. There is  eccentric 50-60% stenosis at distal stent edge. First obtuse  marginal: Moderate caliber vessel. Mild diffuse disease. Second obtuse  marginal: Moderate caliber vessel. Mild diffuse disease.  RCA   Right coronary artery: Small, non-dominant vessel. There is discrete,  calcified 90% stenosis in mid vessel.. (12 Aug 2022 06:31)      PAST MEDICAL & SURGICAL HISTORY:  HTN (hypertension)  HLD (hyperlipidemia)  CAD (coronary artery disease)  Diabetes mellitus  Former smoker  ESRD on dialysis  Gout  AV fistula          MEDICATIONS  (STANDING):  allopurinol 100 milliGRAM(s) Oral daily  aspirin enteric coated 81 milliGRAM(s) Oral daily  atorvastatin 80 milliGRAM(s) Oral at bedtime  carvedilol 25 milliGRAM(s) Oral every 12 hours  clopidogrel Tablet 75 milliGRAM(s) Oral daily  dextrose 5%. 1000 milliLiter(s) (50 mL/Hr) IV Continuous <Continuous>  dextrose 5%. 1000 milliLiter(s) (100 mL/Hr) IV Continuous <Continuous>  dextrose 50% Injectable 25 Gram(s) IV Push once  dextrose 50% Injectable 12.5 Gram(s) IV Push once  dextrose 50% Injectable 25 Gram(s) IV Push once  ferrous    sulfate 325 milliGRAM(s) Oral daily  glucagon  Injectable 1 milliGRAM(s) IntraMuscular once  insulin lispro (ADMELOG) corrective regimen sliding scale   SubCutaneous three times a day before meals  insulin lispro (ADMELOG) corrective regimen sliding scale   SubCutaneous at bedtime  losartan 50 milliGRAM(s) Oral daily  Nephro-elliott 1 Tablet(s) Oral daily  NIFEdipine XL 60 milliGRAM(s) Oral daily  tamsulosin 0.4 milliGRAM(s) Oral at bedtime      Allergies    No Known Allergies            SOCIAL HISTORY:  Denies ETOh,Smoking,     FAMILY HISTORY:  No pertinent family history in first degree relatives        REVIEW OF SYSTEMS:  CONSTITUTIONAL: No weakness, fevers or chills  EYES/ENT: No visual changes;  No vertigo or throat pain   NECK: No pain or stiffness  RESPIRATORY: No cough, wheezing, hemoptysis; No shortness of breath  CARDIOVASCULAR: No chest pain or palpitations  GASTROINTESTINAL: No abdominal or epigastric pain. No nausea, vomiting, or hematemesis; No diarrhea or constipation. No melena or hematochezia.  GENITOURINARY: No dysuria, frequency or hematuria  NEUROLOGICAL: No numbness or weakness  SKIN: No itching, burning, rashes, or lesions   All other review of systems is negative unless indicated above.    VITAL:  T(C): , Max: 36.9 (08-12-22 @ 06:26)  T(F): , Max: 98.5 (08-12-22 @ 06:26)  HR: 74 (08-12-22 @ 13:25)  BP: 139/56 (08-12-22 @ 12:25)  BP(mean): 81 (08-12-22 @ 12:25)  RR: 16 (08-12-22 @ 13:25)  SpO2: 98% (08-12-22 @ 13:25)  Wt(kg): --    PHYSICAL EXAM:  Constitutional: NAD, Alert  HEENT: NCAT, MMM  Neck: Supple, No JVD  Respiratory: CTA-b/l  Cardiovascular: RRR s1s2, no m/r/g  Gastrointestinal: BS+, soft, NT/ND  Extremities: No peripheral edema b/l  Neurological: no focal deficits; strength grossly intact  Back: no CVAT b/l  Skin: No rashes, no nevi  ACCESS ; AVF     LABS:                        11.0   10.02 )-----------( 245      ( 12 Aug 2022 06:52 )             34.8     Na(137)/K(3.8)/Cl(94)/HCO3(27)/BUN(54)/Cr(7.85)Glu(317)/Ca(10.3)/Mg(--)/PO4(--)    08-12 @ 06:52            IMAGING:    ASSESSMENT:  This is a 66 yo male PMH HTN, HLD, DM2 A1C 5.7 (4/22 not on prescriptions >1yr discontinued by MD in [place]), CAD s/p SCOTT x 3, former smoker, ESRD on HD x 2 months via Right AV fistula (managed by Dr. Vish Munguia[facility] last session 8/10/22) who presented to cardiology, Dr. AMADOR Rain, for evaluation to proceed with listing for renal transplant.       (1)Renal - newly ESRD-HD -   MWF plan for HD today   (2)CV - BP stable  sp cath   (3)Anemia - hb 11.0 likely esrd       RECOMMENDATIONS  (1)HD  MWF --- HD  today  - 2kg UF as able -  (2) BP stable  SP cath         Thank you for involving [facility] in this patient's care.    With warm regards    [facility]  Office: [phone]

## 2022-08-12 NOTE — DISCHARGE NOTE PROVIDER - HOSPITAL COURSE
66 yo male PMH HTN, HLD, DM2 A1C 5.7 (4/22 not on prescriptions >1yr discontinued by MD in [place]), CAD s/p SCOTT x 3, former smoker, ESRD on HD x 2 months via [facility] AV [facility] who presented to cardiology, Dr. AMADOR Rain, for evaluation to proceed with listing for renal transplant.  Denied chest pain/pressure, SOB/BETANCOURT, dizziness, diaphoresis, palpitations, nausea, vomiting, peripheral edema, recent weight gain, or syncope.  No implanted monitoring devices.  NST 7/7/22 EF 53% hypokinesis inferoseptal, distal anterior and ateroapical, distal anteroseptal , and inferior walls. [facility] 8/4/22 see results below. Pt. presents asymptomatic for further evaluation and Impella assisted PCI.  8/12 s/p C SCOTT x1 to pLAD, SCOTT x1 to pLCx, POBA to LM via RFA, site without hematoma/bleeding. Patient hemodynamically stable, without complaint overnight. Pending discharge in AM

## 2022-08-12 NOTE — DISCHARGE NOTE PROVIDER - NSDCCPTREATMENT_GEN_ALL_CORE_FT
PRINCIPAL PROCEDURE  Procedure: Left heart cardiac cath  Findings and Treatment: 8/12 s/p LHC SCOTT x1 to pLAD, SCOTT x1 to pLCx, POBA to LM via RFA

## 2022-08-12 NOTE — H&P CARDIOLOGY - HISTORY OF PRESENT ILLNESS
This is a 66 yo male PMH HTN, HLD, DM2 A1C 5.7 (4/22), CAD s/p SCOTT x 3, former smoker, ESRD on HD x 2 months via Right AV fistula (managed by Dr. Vish Munguia-[facility] last session 8/10/22) who presented to cardiology, Dr. AMADOR Rain, for evaluation to proceed with listing for renal transplant.  Denied chest pain/pressure, SOB/BETANCOURT, dizziness, diaphoresis, palpitations, nausea, vomiting, peripheral edema, recent weight gain, or syncope.  No implanted monitoring devices.  NST 7/7/22 EF 53% hypokinesis inferoseptal, distal anterior and ateroapical, distal anteroseptal , and inferior walls. [facility] 8/4/22 see results below. Pt. presents asymptomatic for further evaluation and Impella assisted PCI.    [facility] 8/4/22 revealed   LM   Left main artery: Large caliber vessel. There is eccentric, calcified  30-40% ostial stenosis. There is eccentric, calcified 80%  distal stenosis extending into proximal LAD.    LAD   Left anterior descending artery: Large caliber vessel, wraps around  left ventricular apex. There is discrete, calcified 80%  stenosis in proximal vessel at proximal stent edge. There is a stent  in mid vessel with moderate diffuse in-stent restenosis.  Mild diffuse disease in distal vessel. First diagonal: Moderate  caliber vessel. There is eccentric 30-40% stenosis in mid vessel.  CX   Circumflex: Large caliber vessel. There is calcified ostial 50%  stenosis. Mild diffuse disease in proximal vessel. There is patent  stent in mid vessel with mild diffuse in-stent restenosis. There is  eccentric 50-60% stenosis at distal stent edge. First obtuse  marginal: Moderate caliber vessel. Mild diffuse disease. Second obtuse  marginal: Moderate caliber vessel. Mild diffuse disease.  RCA   Right coronary artery: Small, non-dominant vessel. There is discrete,  calcified 90% stenosis in mid vessel.. This is a 66 yo male PMH HTN, HLD, DM2 A1C 5.7 (4/22 not on prescriptions >1yr discontinued by MD in [place]), CAD s/p SCOTT x 3, former smoker, ESRD on HD x 2 months via Right AV fistula (managed by Dr. Vish Munguia-[facility] last session 8/10/22) who presented to cardiology, Dr. AMADOR Rain, for evaluation to proceed with listing for renal transplant.  Denied chest pain/pressure, SOB/BETANCOURT, dizziness, diaphoresis, palpitations, nausea, vomiting, peripheral edema, recent weight gain, or syncope.  No implanted monitoring devices.  NST 7/7/22 EF 53% hypokinesis inferoseptal, distal anterior and ateroapical, distal anteroseptal , and inferior walls. [facility] 8/4/22 see results below. Pt. presents asymptomatic for further evaluation and Impella assisted PCI.    [facility] 8/4/22 revealed   LM   Left main artery: Large caliber vessel. There is eccentric, calcified  30-40% ostial stenosis. There is eccentric, calcified 80%  distal stenosis extending into proximal LAD.    LAD   Left anterior descending artery: Large caliber vessel, wraps around  left ventricular apex. There is discrete, calcified 80%  stenosis in proximal vessel at proximal stent edge. There is a stent  in mid vessel with moderate diffuse in-stent restenosis.  Mild diffuse disease in distal vessel. First diagonal: Moderate  caliber vessel. There is eccentric 30-40% stenosis in mid vessel.  CX   Circumflex: Large caliber vessel. There is calcified ostial 50%  stenosis. Mild diffuse disease in proximal vessel. There is patent  stent in mid vessel with mild diffuse in-stent restenosis. There is  eccentric 50-60% stenosis at distal stent edge. First obtuse  marginal: Moderate caliber vessel. Mild diffuse disease. Second obtuse  marginal: Moderate caliber vessel. Mild diffuse disease.  RCA   Right coronary artery: Small, non-dominant vessel. There is discrete,  calcified 90% stenosis in mid vessel..

## 2022-08-12 NOTE — DISCHARGE NOTE PROVIDER - CARE PROVIDER_API CALL
Marcio Rain)  Cardiology; Internal Medicine  [address], [address]  [place] [address]  Phone: [phone]  Fax: [phone]  Follow Up Time: 2 weeks

## 2022-08-13 ENCOUNTER — TRANSCRIPTION ENCOUNTER (OUTPATIENT)
Age: 67
End: 2022-08-13

## 2022-08-13 VITALS
RESPIRATION RATE: 17 BRPM | TEMPERATURE: 98 F | SYSTOLIC BLOOD PRESSURE: 136 MMHG | HEART RATE: 79 BPM | OXYGEN SATURATION: 95 % | DIASTOLIC BLOOD PRESSURE: 60 MMHG

## 2022-08-13 LAB
A1C WITH ESTIMATED AVERAGE GLUCOSE RESULT: 8 % — HIGH (ref 4–5.6)
ANION GAP SERPL CALC-SCNC: 13 MMOL/L — SIGNIFICANT CHANGE UP (ref 5–17)
BUN SERPL-MCNC: 33 MG/DL — HIGH (ref 7–23)
CALCIUM SERPL-MCNC: 9.5 MG/DL — SIGNIFICANT CHANGE UP (ref 8.4–10.5)
CHLORIDE SERPL-SCNC: 98 MMOL/L — SIGNIFICANT CHANGE UP (ref 96–108)
CO2 SERPL-SCNC: 26 MMOL/L — SIGNIFICANT CHANGE UP (ref 22–31)
CREAT SERPL-MCNC: 4.95 MG/DL — HIGH (ref 0.5–1.3)
EGFR: 12 ML/MIN/1.73M2 — LOW
ESTIMATED AVERAGE GLUCOSE: 183 MG/DL — HIGH (ref 68–114)
GLUCOSE BLDC GLUCOMTR-MCNC: 243 MG/DL — HIGH (ref 70–99)
GLUCOSE BLDC GLUCOMTR-MCNC: 376 MG/DL — HIGH (ref 70–99)
GLUCOSE SERPL-MCNC: 294 MG/DL — HIGH (ref 70–99)
HCT VFR BLD CALC: 31.6 % — LOW (ref 39–50)
HGB BLD-MCNC: 10 G/DL — LOW (ref 13–17)
MCHC RBC-ENTMCNC: 27 PG — SIGNIFICANT CHANGE UP (ref 27–34)
MCHC RBC-ENTMCNC: 31.6 GM/DL — LOW (ref 32–36)
MCV RBC AUTO: 85.4 FL — SIGNIFICANT CHANGE UP (ref 80–100)
NRBC # BLD: 0 /100 WBCS — SIGNIFICANT CHANGE UP (ref 0–0)
PLATELET # BLD AUTO: 216 K/UL — SIGNIFICANT CHANGE UP (ref 150–400)
POTASSIUM SERPL-MCNC: 3.5 MMOL/L — SIGNIFICANT CHANGE UP (ref 3.5–5.3)
POTASSIUM SERPL-SCNC: 3.5 MMOL/L — SIGNIFICANT CHANGE UP (ref 3.5–5.3)
RBC # BLD: 3.7 M/UL — LOW (ref 4.2–5.8)
RBC # FLD: 15.7 % — HIGH (ref 10.3–14.5)
SODIUM SERPL-SCNC: 137 MMOL/L — SIGNIFICANT CHANGE UP (ref 135–145)
WBC # BLD: 9.71 K/UL — SIGNIFICANT CHANGE UP (ref 3.8–10.5)
WBC # FLD AUTO: 9.71 K/UL — SIGNIFICANT CHANGE UP (ref 3.8–10.5)

## 2022-08-13 PROCEDURE — C1761: CPT

## 2022-08-13 PROCEDURE — 99231 SBSQ HOSP IP/OBS SF/LOW 25: CPT

## 2022-08-13 PROCEDURE — C1760: CPT

## 2022-08-13 PROCEDURE — 83036 HEMOGLOBIN GLYCOSYLATED A1C: CPT

## 2022-08-13 PROCEDURE — C1874: CPT

## 2022-08-13 PROCEDURE — C1887: CPT

## 2022-08-13 PROCEDURE — C1889: CPT

## 2022-08-13 PROCEDURE — 80048 BASIC METABOLIC PNL TOTAL CA: CPT

## 2022-08-13 PROCEDURE — 85027 COMPLETE CBC AUTOMATED: CPT

## 2022-08-13 PROCEDURE — 93005 ELECTROCARDIOGRAM TRACING: CPT

## 2022-08-13 PROCEDURE — C1894: CPT

## 2022-08-13 PROCEDURE — 99261: CPT

## 2022-08-13 PROCEDURE — C1725: CPT

## 2022-08-13 PROCEDURE — C1769: CPT

## 2022-08-13 PROCEDURE — C1753: CPT

## 2022-08-13 PROCEDURE — 82962 GLUCOSE BLOOD TEST: CPT

## 2022-08-13 RX ADMIN — CLOPIDOGREL BISULFATE 75 MILLIGRAM(S): 75 TABLET, FILM COATED ORAL at 04:51

## 2022-08-13 RX ADMIN — Medication 1 TABLET(S): at 10:02

## 2022-08-13 RX ADMIN — Medication 10: at 08:20

## 2022-08-13 RX ADMIN — Medication 100 MILLIGRAM(S): at 09:54

## 2022-08-13 RX ADMIN — Medication 81 MILLIGRAM(S): at 04:51

## 2022-08-13 RX ADMIN — CARVEDILOL PHOSPHATE 25 MILLIGRAM(S): 80 CAPSULE, EXTENDED RELEASE ORAL at 09:54

## 2022-08-13 RX ADMIN — Medication 60 MILLIGRAM(S): at 04:51

## 2022-08-13 RX ADMIN — Medication 325 MILLIGRAM(S): at 09:54

## 2022-08-13 NOTE — PROGRESS NOTE ADULT - SUBJECTIVE AND OBJECTIVE BOX
[facility] INVASIVE CARDIOLOGY- (Isatu, José, Gini, Kayla, Cliff, Corin, Rusty, Richard, Kd)   CARDIAC CATH LAB, [facility] TEAM   [phone]      CHIEF COMPLAINT: Patient is a 67y old  Male who presents with a chief complaint of     HPI:  This is a 66 yo male PMH HTN, HLD, DM2 A1C 5.7 ( not on prescriptions >1yr discontinued by MD in [place]), CAD s/p SCOTT x 3, former smoker, ESRD on HD x 2 months via Right AV fistula (managed by Dr. Vish Mung[facility]ia[facility] last session 8/10/22) who presented to cardiology, Dr. AMADOR Rain, for evaluation to proceed with listing for renal transplant.  Denied chest pain/pressure, SOB/BETANCOURT, dizziness, diaphoresis, palpitations, nausea, vomiting, peripheral edema, recent weight gain, or syncope.  No implanted monitoring devices.  NST 22 EF 53% hypokinesis inferoseptal, distal anterior and ateroapical, distal anteroseptal , and inferior walls. [facility] 22 see results below. Pt. presents asymptomatic for further evaluation and Impella assisted PCI.    [facility] 22 revealed   LM   Left main artery: Large caliber vessel. There is eccentric, calcified  30-40% ostial stenosis. There is eccentric, calcified 80%  distal stenosis extending into proximal LAD.    LAD   Left anterior descending artery: Large caliber vessel, wraps around  left ventricular apex. There is discrete, calcified 80%  stenosis in proximal vessel at proximal stent edge. There is a stent  in mid vessel with moderate diffuse in-stent restenosis.  Mild diffuse disease in distal vessel. First diagonal: Moderate  caliber vessel. There is eccentric 30-40% stenosis in mid vessel.  CX   Circumflex: Large caliber vessel. There is calcified ostial 50%  stenosis. Mild diffuse disease in proximal vessel. There is patent  stent in mid vessel with mild diffuse in-stent restenosis. There is  eccentric 50-60% stenosis at distal stent edge. First obtuse  marginal: Moderate caliber vessel. Mild diffuse disease. Second obtuse  marginal: Moderate caliber vessel. Mild diffuse disease.  RCA   Right coronary artery: Small, non-dominant vessel. There is discrete,  calcified 90% stenosis in mid vessel.. (12 Aug 2022 06:31)      Subjective/Observations: patient seen and examined.  denies chest pain, dyspena, dizziness, palpitations, N&V, HA      Review of Systems all WNL except below indicated:    Constitutional: [ ] Fever [ ] Chills [ ] Fatigue [ ] Weight change   HEENT: [ ] Blurred vision [ ] Eye Pain [ ] Headache [ ] Runny nose [ ] Sore Throat   Respiratory: [ ] Cough [ ] Wheezing [ ] Shortness of breath  Cardiovascular: [ ] Chest Pain [ ] Palpitations [ ] BETANCOURT [ ] PND [ ] Orthopnea  Gastrointestinal: [ ] Abdominal Pain [ ] Diarrhea [ ] Constipation [ ] Hemorrhoids [ ] Nausea [ ] Vomiting  Genitourinary: [ ] Nocturia [ ] Dysuria [ ] Incontinence  Extremities: [ ] Swelling [ ] Joint Pain  Neurologic: [ ] Focal deficit [ ] Paresthesias [ ] Syncope  Lymphatic: [ ] Swelling [ ] Lymphadenopathy   Skin: [ ] Rash [ ] Ecchymoses [ ] Wounds [ ] Lesions  Psychiatry: [ ] Depression [ ] Suicidal/Homicidal Ideation [ ] Anxiety [ ] Sleep Disturbances  [ ] 10 point review of systems is otherwise negative except as mentioned above            [ ]Unable to obtain    PAST MEDICAL & SURGICAL HISTORY:  HTN (hypertension)    HLD (hyperlipidemia)    CAD (coronary artery disease)    Diabetes mellitus    Former smoker    ESRD on dialysis    Gout    AV fistula      MEDICATIONS  (STANDING):  allopurinol 100 milliGRAM(s) Oral daily  aspirin enteric coated 81 milliGRAM(s) Oral daily  atorvastatin 80 milliGRAM(s) Oral at bedtime  carvedilol 25 milliGRAM(s) Oral every 12 hours  clopidogrel Tablet 75 milliGRAM(s) Oral daily  ferrous    sulfate 325 milliGRAM(s) Oral daily  glucagon  Injectable 1 milliGRAM(s) IntraMuscular once  insulin lispro (ADMELOG) corrective regimen sliding scale   SubCutaneous three times a day before meals  insulin lispro (ADMELOG) corrective regimen sliding scale   SubCutaneous at bedtime  losartan 50 milliGRAM(s) Oral daily  Nephro-elliott 1 Tablet(s) Oral daily  NIFEdipine XL 60 milliGRAM(s) Oral daily  tamsulosin 0.4 milliGRAM(s) Oral at bedtime    MEDICATIONS  (PRN):  dextrose Oral Gel 15 Gram(s) Oral once PRN Blood Glucose LESS THAN 70 milliGRAM(s)/deciliter    Allergies    No Known Allergies    Intolerances      Vital Signs Last 24 Hrs  T(C): 36.4 (12 Aug 2022 21:20), Max: 36.9 (12 Aug 2022 06:26)  T(F): 97.5 (12 Aug 2022 21:20), Max: 98.5 (12 Aug 2022 06:26)  HR: 86 (12 Aug 2022 21:55) (60 - 86)  BP: 160/58 (12 Aug 2022 21:55) (127/53 - 164/83)  BP(mean): 89 (12 Aug 2022 21:55) (81 - 92)  RR: 17 (12 Aug 2022 21:55) (16 - 19)  SpO2: 98% (12 Aug 2022 21:55) (96% - 100%)    Parameters below as of 12 Aug 2022 21:55  Patient On (Oxygen Delivery Method): room air      I&O's Summary    12 Aug 2022 07:01  -  13 Aug 2022 00:41  --------------------------------------------------------  IN: 0 mL / OUT: 2000 mL / NET: -2000 mL      Weight (kg): 77.1 (12 @ 06:47)    FOCUSED PHYSICAL EXAM:  Pulmonary: Non-labored, breath sounds are clear bilaterally, No wheezing, rales or rhonchi  Cardiovascular: Regular, S1 and S2, No murmurs, rubs, gallops or clicks  cath site: ( groin/radial)  stable w/o bleeding or hematoma, soft, + pulses     LABS: All Labs Reviewed:                        11.0   10.02 )-----------( 245      ( 12 Aug 2022 06:52 )             34.8     12 Aug 2022 06:52    137    |  94     |  54     ----------------------------<  317    3.8     |  27     |  7.85     Ca    10.3       12 Aug 2022 06:52      RESULTS:    TELE interpretation: SR 70s    ECG:  Ventricular Rate 70 BPM  Atrial Rate 70 BPM  P-R Interval 176 ms  QRS Duration 108 ms  Q-T Interval 414 ms  QTC Calculation(Bazett) 447 ms  P Axis 69 degrees  R Axis -47 degrees  T Axis 8 degrees    Diagnosis Line SINUS RHYTHM WITH FUSION COMPLEXES  LEFT AXIS DEVIATION  ABNORMAL ECG  NO PREVIOUS ECGS AVAILABLE  Confirmed by KENYA Coombs Zlata ([id number]) on 2022 10:14:16 PM    CATH REPORT: :  s/p PCI SCOTT x 1 pLAD, SCOTT x 1 pLCx, POBA to LM via RFA access.    Study Date:     2022     Name:           ELLIOT HARGROVE   :            1955   (67 years)   Gender:         male     Cath Lab Report    Diagnostic Cardiologist:       Carroll Garza MD   Fellow:                        Scar Sandoval, Carla   Referring Physician:           Marcio Rain MD     Procedures Performed   Procedures:               1.    Arterial Access - Right Femoral     2.    Ultrasound Guided Access   3.    Diagnostic Coronary Angiography   4.    Left Heart Cath     Indications:                Coronary artery disease   Abnormal stress test     Lab Visit Indication:       pre-operative evaluation   Diagnostic Conclusions:     1. Severe left main and multivessel coronary artery disease.   2. Left dominant coronary circulation.   3. Normal LVEDP.   4. Plan for staged PCI of distal LM bifurcation disease with Impella  support in 1 week.    Procedure Narrative:   The risks andalternatives of the procedures and conscious sedation  were explained to the patient and informed consent was  obtained. The patient was brought to the cath lab and placed on the  exam table.  Access   Right femoral artery:   The puncture site was infiltrated with 1% Lidocaine. Vascular access  was obtained using Vascular Ultrasound and a 5FR  SHEATH PINNACLE was advanced into the vessel.      Diagnostic Findings:     Coronary Angiography   The coronary circulation is left dominant.      LM   Left main artery: Large caliber vessel. There is eccentric, calcified  30-40% ostial stenosis. There is eccentric, calcified 80%  distal stenosis extending into proximal LAD.      LAD   Left anterior descending artery: Large caliber vessel, wraps around  left ventricular apex. There is discrete, calcified 80%  stenosis in proximal vessel at proximal stent edge. There is a stent  in mid vessel with moderate diffuse in-stent restenosis.  Mild diffuse disease in distal vessel. First diagonal: Moderate  caliber vessel. There is eccentric 30-40% stenosis in mid vessel.    CX   Circumflex: Large caliber vessel. There is calcified ostial 50%  stenosis. Mild diffuse disease in proximal vessel. There is patent  stent in mid vessel with mild diffuse in-stent restenosis. There is  eccentric 50-60% stenosis at distal stent edge. First obtuse  marginal: Moderate caliber vessel. Mild diffuse disease. Second obtuse  marginal: Moderate caliber vessel. Mild diffuse disease.    RCA   Right coronary artery: Small, non-dominant vessel. There is discrete,  calcified 90% stenosis in mid vessel..

## 2022-08-13 NOTE — PROGRESS NOTE ADULT - ASSESSMENT
HPI:  This is a 68 yo male PMH HTN, HLD, DM2 A1C 5.7 (4/22 not on prescriptions >1yr discontinued by MD in [place]), CAD s/p SCOTT x 3, former smoker, ESRD on HD x 2 months via Right AV fistula (managed by Dr. Vish Munguia-[facility] last session 8/10/22) who presented to cardiology, Dr. AMADOR Rain, for evaluation to proceed with listing for renal transplant.  Denied chest pain/pressure, SOB/BETANCOURT, dizziness, diaphoresis, palpitations, nausea, vomiting, peripheral edema, recent weight gain, or syncope.  No implanted monitoring devices.  NST 7/7/22 EF 53% hypokinesis inferoseptal, distal anterior and ateroapical, distal anteroseptal , and inferior walls. [facility] 8/4/22 see results below. Pt. presents asymptomatic for further evaluation and Impella assisted PCI.    [facility] 8/4/22 revealed   LM   Left main artery: Large caliber vessel. There is eccentric, calcified  30-40% ostial stenosis. There is eccentric, calcified 80%  distal stenosis extending into proximal LAD.    LAD   Left anterior descending artery: Large caliber vessel, wraps around  left ventricular apex. There is discrete, calcified 80%  stenosis in proximal vessel at proximal stent edge. There is a stent  in mid vessel with moderate diffuse in-stent restenosis.  Mild diffuse disease in distal vessel. First diagonal: Moderate  caliber vessel. There is eccentric 30-40% stenosis in mid vessel.  CX   Circumflex: Large caliber vessel. There is calcified ostial 50%  stenosis. Mild diffuse disease in proximal vessel. There is patent  stent in mid vessel with mild diffuse in-stent restenosis. There is  eccentric 50-60% stenosis at distal stent edge. First obtuse  marginal: Moderate caliber vessel. Mild diffuse disease. Second obtuse  marginal: Moderate caliber vessel. Mild diffuse disease.  RCA   Right coronary artery: Small, non-dominant vessel. There is discrete,  calcified 90% stenosis in mid vessel.. (12 Aug 2022 06:31)      8/12:  s/p PCI SCOTT x 1 pLAD, SCOTT x 1 pLCx, POBA to LM via RFA access.      SR @ 77 w/o significant ST or T wave changes  tele interpretation SR 70s with no events overnight    Right groin w/o bleeding or hematoma.  soft, non tender.  Pulses in the right lower extremity are palpable.   Denies chest pain, denies groin/leg/foot: pain, numbness or tingling     - Reviewed and reinforced with patient:  wound care instructions, activities dos and donts, medication compliance specifically antiplatelet therapy given stent/s.    - Patient aware to take DAPT as prescribed and DO NOT STOP taking without consulting cardiologist first or STENT/s WILL CLOSE  - Reviewed and reinforced with patient:  site complications (eg: bleeding, excruciating pain at the procedural site, large swelling golf ball size- extremity numbness, tingling, temperature change), or CHEST PAIN; pt aware that if any of those occur he/she must call cardiologist IMMEDIATELY or 911 or go to nearest emergency room   - Reviewed and reinforced a heart healthy diet, Smoking Cessation  - Patient verbalizes understanding of ALL OF THE ABOVE, and gives positive feedback       cont DAPT- aspirin and plavix  cont antihypertensives- losartan, carvedilol  cont statin- atorvastatin  Keep Mg >2 K >4  f/u appt in 2 weeks post dc with oupt cardiologist  for all  general cardiology questions please contact patient's primary cards team   all other care as per primary medicine  team       Chris Horton [facility]  Invasive Cardiology  Ext [phone]

## 2022-08-13 NOTE — DISCHARGE NOTE NURSING/CASE MANAGEMENT/SOCIAL WORK - NSDCPEFALRISK_GEN_ALL_CORE
For information on Fall & Injury Prevention, visit: [email or web address] OR  [email or web address] OR  https://www.cdc.gov/steadi/patient.html

## 2022-08-13 NOTE — DISCHARGE NOTE NURSING/CASE MANAGEMENT/SOCIAL WORK - PATIENT PORTAL LINK FT
You can access the FollowMyHealth Patient Portal offered by [facility] by registering at the following website: [email or web address]. By joining [facility]’s FollowMyHealth portal, you will also be able to view your health information using other applications (apps) compatible with our system.

## 2022-09-06 ENCOUNTER — APPOINTMENT (OUTPATIENT)
Dept: CARDIOLOGY | Facility: CLINIC | Age: 67
End: 2022-09-06

## 2022-09-06 ENCOUNTER — NON-APPOINTMENT (OUTPATIENT)
Age: 67
End: 2022-09-06

## 2022-09-06 VITALS
SYSTOLIC BLOOD PRESSURE: 142 MMHG | OXYGEN SATURATION: 100 % | DIASTOLIC BLOOD PRESSURE: 60 MMHG | BODY MASS INDEX: 24.36 KG/M2 | WEIGHT: 174 LBS | HEIGHT: 71 IN | HEART RATE: 78 BPM

## 2022-09-06 PROCEDURE — 93000 ELECTROCARDIOGRAM COMPLETE: CPT | Mod: NC

## 2022-09-06 PROCEDURE — 99215 OFFICE O/P EST HI 40 MIN: CPT

## 2022-09-06 RX ORDER — ATORVASTATIN CALCIUM 80 MG/1
80 TABLET, FILM COATED ORAL
Qty: 90 | Refills: 3 | Status: ACTIVE | COMMUNITY
Start: 2022-09-06

## 2022-09-06 RX ORDER — EZETIMIBE 10 MG/1
10 TABLET ORAL
Qty: 90 | Refills: 1 | Status: ACTIVE | COMMUNITY
Start: 2022-09-06

## 2022-09-06 RX ORDER — CHLORHEXIDINE GLUCONATE 4 %
325 (65 FE) LIQUID (ML) TOPICAL DAILY
Refills: 0 | Status: ACTIVE | COMMUNITY
Start: 2022-09-06

## 2022-09-06 RX ORDER — LOSARTAN POTASSIUM 100 MG/1
100 TABLET, FILM COATED ORAL
Qty: 90 | Refills: 1 | Status: ACTIVE | COMMUNITY
Start: 2022-09-06 | End: 1900-01-01

## 2022-09-06 RX ORDER — ASPIRIN ENTERIC COATED TABLETS 81 MG 81 MG/1
81 TABLET, DELAYED RELEASE ORAL
Qty: 90 | Refills: 2 | Status: ACTIVE | COMMUNITY
Start: 2022-09-06

## 2022-09-20 ENCOUNTER — APPOINTMENT (OUTPATIENT)
Dept: CARDIOLOGY | Facility: CLINIC | Age: 67
End: 2022-09-20

## 2022-09-23 NOTE — PHYSICAL EXAM
[Well Developed] : well developed [Well Nourished] : well nourished [No Acute Distress] : no acute distress [Normal Conjunctiva] : normal conjunctiva [Normal Venous Pressure] : normal venous pressure [No Carotid Bruit] : no carotid bruit [Normal S1, S2] : normal S1, S2 [No Rub] : no rub [No Gallop] : no gallop [Normal Rate] : normal [Rhythm Regular] : regular [Normal S1] : normal S1 [Normal S2] : normal S2 [I] : a grade 1 [No Pitting Edema] : no pitting edema present [Clear Lung Fields] : clear lung fields [Good Air Entry] : good air entry [No Respiratory Distress] : no respiratory distress  [Soft] : abdomen soft [Non Tender] : non-tender [No Masses/organomegaly] : no masses/organomegaly [Normal Bowel Sounds] : normal bowel sounds [Normal Gait] : normal gait [No Edema] : no edema [No Cyanosis] : no cyanosis [No Clubbing] : no clubbing [No Varicosities] : no varicosities [No Rash] : no rash [No Skin Lesions] : no skin lesions [Moves all extremities] : moves all extremities [No Focal Deficits] : no focal deficits [Normal Speech] : normal speech [Alert and Oriented] : alert and oriented [Normal memory] : normal memory [de-identified] : right brachial AV fistula, +thrill

## 2022-09-23 NOTE — END OF VISIT
[Time Spent: ___ minutes] : I have spent [unfilled] minutes of time on the encounter. [FreeTextEntry3] : I saw the patient with Aminta Brennan NP and I agree with the findings and plan as above.

## 2022-09-23 NOTE — DISCUSSION/SUMMARY
[EKG obtained to assist in diagnosis and management of assessed problem(s)] : EKG obtained to assist in diagnosis and management of assessed problem(s) [FreeTextEntry1] : Patient is a 67 year-old Black gentleman with history as above who presents today for cardiac evaluation prior to possible renal transplant who is now s/p Impella assisted PCI placement of SCOTT to pLAD, SCOTT to pLCx and POBA to LM via RFA on 8/12/2022.\par \par Obtain recent TTE performed at nephrologist.\par Continue DAPT, high intensity statin, Zetia.\par Continue losartan. \par \par Will plan to repeat echocardiogram in 3 months for reassessment.\par Encouraged exercise. \par \par Follow up in 3 months.

## 2022-09-23 NOTE — CARDIOLOGY SUMMARY
[de-identified] : 6/14/2022, sinus 73 bpm, LVH with left axis and QRS widening, poor R-wave progression.\par 9/6/2022. sinus 76 bpm, LVH with left axis- anterior fascicular block, normal R-wave progression.

## 2022-09-23 NOTE — REASON FOR VISIT
[Other: ____] : [unfilled] [Other: _____] : [unfilled] [FreeTextEntry1] : 9/6/2022.\par Mr. Jefferson Cat returns today for follow up. He is now s/p Impella assisted PCI placement of SCOTT to pLAD, SCOTT to pLCx and POBA to LM via RFA on 8/12/2022.\par Since his procedure he has been feeling very well overall and if he were "14 years old again".\par In July, he began hemodialysis on a M-W-F schedule and is being closely followed by Vish Munguia MD.\par For exercise he has been using his stationary bike on occasion and is planning to ramp up his exercise routine now that he has increased energy. At this time, he is not interested in cardiac rehabilitation as he is fairly busy with his dialysis schedule.

## 2022-12-06 ENCOUNTER — NON-APPOINTMENT (OUTPATIENT)
Age: 67
End: 2022-12-06

## 2022-12-06 ENCOUNTER — APPOINTMENT (OUTPATIENT)
Dept: CARDIOLOGY | Facility: CLINIC | Age: 67
End: 2022-12-06

## 2022-12-06 VITALS
BODY MASS INDEX: 24.08 KG/M2 | HEIGHT: 71 IN | OXYGEN SATURATION: 98 % | HEART RATE: 99 BPM | DIASTOLIC BLOOD PRESSURE: 68 MMHG | WEIGHT: 172 LBS | SYSTOLIC BLOOD PRESSURE: 146 MMHG

## 2022-12-06 PROCEDURE — 99215 OFFICE O/P EST HI 40 MIN: CPT

## 2022-12-06 PROCEDURE — 93306 TTE W/DOPPLER COMPLETE: CPT

## 2022-12-06 PROCEDURE — 93000 ELECTROCARDIOGRAM COMPLETE: CPT | Mod: NC

## 2022-12-06 RX ORDER — NIFEDIPINE 60 MG/1
60 TABLET, EXTENDED RELEASE ORAL DAILY
Refills: 0 | Status: DISCONTINUED | COMMUNITY
Start: 2022-09-06 | End: 2022-12-06

## 2022-12-14 NOTE — DISCUSSION/SUMMARY
[EKG obtained to assist in diagnosis and management of assessed problem(s)] : EKG obtained to assist in diagnosis and management of assessed problem(s) [FreeTextEntry1] : Patient is a 67 year-old Black gentleman with history as above who presents today for cardiac evaluation prior to possible renal transplant who is now s/p Impella assisted PCI placement of SCOTT to pLAD, SCOTT to pLCx and POBA to LM via RFA on 8/12/2022.\par \par Echocardiography of today revealed an improvement in left ventricular systolic function. LVEF now 50%.\par Moderate aortic insufficiency is present. \par Continue DAPT, high intensity statin, Zetia.\par At this time, will exchange nifedipine for carvedilol 12.5 mg BID.\par Continue losartan 100 mg daily for now, however, will plan to exchange this for Entresto for medical optimization at next visit and as BP allows. \par \par Follow up in 2 weeks for ECG, BP check and medication adjustments as necessary.

## 2022-12-14 NOTE — HISTORY OF PRESENT ILLNESS
[FreeTextEntry1] : Patient is a 67 year-old Black gentleman, with known past medical history of hypertension, dyslipidemia, insulin dependent type II diabetes (off insulin after losing weight), coronary artery disease status post MI treated with PCI x3 stents, CKD stage V, who presents as a preemptive candidate for renal transplant. \par He has not been following regularly with a cardiologist, but he reports that his nephrologist (Vish Avitia MD) checked an echocardiogram approximately one month ago.\par \par He used to smoke marijuana, but he has not smoked in over a year, and he never smoked cigarettes. \par He does not exercise because of back discomfort. He does not report any chest pain or shortness of breath.\par \par He has not been sick with Covid-19. He had two doses of Pfizer's Covid-19 vaccine and then a Moderna booster on 1/2/2022.\par \par 9/6/2022.\par Mr. Jefferson Cat returns today for follow up. He is now s/p Impella assisted PCI placement of SCOTT to pLAD, SCOTT to pLCx and POBA to LM via RFA on 8/12/2022.\par Since his procedure he has been feeling very well overall and if he were "14 years old again".\par In July, he began hemodialysis on a M-W-F schedule and is being closely followed by Vish Munguia MD.\par For exercise he has been using his stationary bike on occasion and is planning to ramp up his exercise routine now that he has increased energy. At this time, he is not interested in cardiac rehabilitation as he is fairly busy with his dialysis schedule.

## 2022-12-14 NOTE — CARDIOLOGY SUMMARY
[de-identified] : 6/14/2022, sinus 73 bpm, LVH with left axis and QRS widening, poor R-wave progression.\par 9/6/2022. sinus 76 bpm, LVH with left axis- anterior fascicular block, normal R-wave progression.\par 12/6/2022, sinus 94 bpm with occasional APC. left axis-anterior fascicular block. Non specific T-abnormality. Normal R-wave progression. Unchanged from prior.

## 2022-12-14 NOTE — REASON FOR VISIT
[Other: ____] : [unfilled] [FreeTextEntry1] : 12/6/2022.\par Mr. Cat returns today for scheduled follow up and repeat echocardiography.\par He continues to feel excellent and as if he is a "brand new man". He is amazed at how he can now walk and climb stairs without feeling winded.\par Hemodialysis continues on a M-W-F schedule without any complications.\par He is interested in returning to exercise soon.

## 2022-12-14 NOTE — PHYSICAL EXAM
[Well Developed] : well developed [Well Nourished] : well nourished [No Acute Distress] : no acute distress [Normal Conjunctiva] : normal conjunctiva [Normal Venous Pressure] : normal venous pressure [No Carotid Bruit] : no carotid bruit [Normal S1, S2] : normal S1, S2 [No Rub] : no rub [No Gallop] : no gallop [Normal Rate] : normal [Rhythm Regular] : regular [Normal S1] : normal S1 [Normal S2] : normal S2 [I] : a grade 1 [No Pitting Edema] : no pitting edema present [Clear Lung Fields] : clear lung fields [Good Air Entry] : good air entry [No Respiratory Distress] : no respiratory distress  [Soft] : abdomen soft [Non Tender] : non-tender [No Masses/organomegaly] : no masses/organomegaly [Normal Bowel Sounds] : normal bowel sounds [Normal Gait] : normal gait [No Edema] : no edema [No Cyanosis] : no cyanosis [No Clubbing] : no clubbing [No Varicosities] : no varicosities [No Rash] : no rash [No Skin Lesions] : no skin lesions [Moves all extremities] : moves all extremities [No Focal Deficits] : no focal deficits [Normal Speech] : normal speech [Alert and Oriented] : alert and oriented [Normal memory] : normal memory [Murmur] : murmur [de-identified] : diastolic murmur [de-identified] : right brachial AV fistula, +thrill

## 2022-12-20 ENCOUNTER — APPOINTMENT (OUTPATIENT)
Dept: CARDIOLOGY | Facility: CLINIC | Age: 67
End: 2022-12-20

## 2022-12-20 VITALS
DIASTOLIC BLOOD PRESSURE: 58 MMHG | HEART RATE: 79 BPM | SYSTOLIC BLOOD PRESSURE: 140 MMHG | BODY MASS INDEX: 24.5 KG/M2 | WEIGHT: 175 LBS | OXYGEN SATURATION: 98 % | HEIGHT: 71 IN

## 2022-12-20 PROCEDURE — 99213 OFFICE O/P EST LOW 20 MIN: CPT

## 2022-12-22 NOTE — CARDIOLOGY SUMMARY
[de-identified] : 6/14/2022, sinus 73 bpm, LVH with left axis and QRS widening, poor R-wave progression.\par 9/6/2022. sinus 76 bpm, LVH with left axis- anterior fascicular block, normal R-wave progression.\par 12/6/2022, sinus 94 bpm with occasional APC. left axis-anterior fascicular block. Non specific T-abnormality. Normal R-wave progression. Unchanged from prior.

## 2022-12-22 NOTE — REASON FOR VISIT
[Other: ____] : [unfilled] [FreeTextEntry1] : 12/20/2022.\par He returns today for routine scheduled follow up. \par He continues to feel exceedingly well overall, however, he has not yet returned to exercise. \par Blood pressures are improved and he is tolerating carvedilol 12.5 mg BID along with his other medications.\par

## 2022-12-22 NOTE — HISTORY OF PRESENT ILLNESS
[FreeTextEntry1] : Patient is a 67 year-old Black gentleman, with known past medical history of hypertension, dyslipidemia, insulin dependent type II diabetes (off insulin after losing weight), coronary artery disease status post MI treated with PCI x3 stents, CKD stage V, who presents as a preemptive candidate for renal transplant. \par He has not been following regularly with a cardiologist, but he reports that his nephrologist (Vish Avitia MD) checked an echocardiogram approximately one month ago.\par \par He used to smoke marijuana, but he has not smoked in over a year, and he never smoked cigarettes. \par He does not exercise because of back discomfort. He does not report any chest pain or shortness of breath.\par \par He has not been sick with Covid-19. He had two doses of Pfizer's Covid-19 vaccine and then a Moderna booster on 1/2/2022.\par \par 9/6/2022.\par Mr. Jefferson Cat returns today for follow up. He is now s/p Impella assisted PCI placement of SCOTT to pLAD, SCOTT to pLCx and POBA to LM via RFA on 8/12/2022.\par Since his procedure he has been feeling very well overall and if he were "14 years old again".\par In July, he began hemodialysis on a M-W-F schedule and is being closely followed by Vish Munguia MD.\par For exercise he has been using his stationary bike on occasion and is planning to ramp up his exercise routine now that he has increased energy. At this time, he is not interested in cardiac rehabilitation as he is fairly busy with his dialysis schedule.\par \par 12/6/2022.\par Mr. Cat returns today for scheduled follow up and repeat echocardiography.\par He continues to feel excellent and as if he is a "brand new man". He is amazed at how he can now walk and climb stairs without feeling winded.\par Hemodialysis continues on a M-W-F schedule without any complications.\par He is interested in returning to exercise soon.

## 2022-12-22 NOTE — PHYSICAL EXAM
[Well Developed] : well developed [Well Nourished] : well nourished [No Acute Distress] : no acute distress [Normal Conjunctiva] : normal conjunctiva [Normal Venous Pressure] : normal venous pressure [No Carotid Bruit] : no carotid bruit [Normal S1, S2] : normal S1, S2 [No Rub] : no rub [No Gallop] : no gallop [Murmur] : murmur [Normal Rate] : normal [Rhythm Regular] : regular [Normal S1] : normal S1 [Normal S2] : normal S2 [I] : a grade 1 [No Pitting Edema] : no pitting edema present [Clear Lung Fields] : clear lung fields [Good Air Entry] : good air entry [No Respiratory Distress] : no respiratory distress  [Soft] : abdomen soft [Non Tender] : non-tender [No Masses/organomegaly] : no masses/organomegaly [Normal Bowel Sounds] : normal bowel sounds [Normal Gait] : normal gait [No Edema] : no edema [No Cyanosis] : no cyanosis [No Clubbing] : no clubbing [No Varicosities] : no varicosities [No Rash] : no rash [No Skin Lesions] : no skin lesions [Moves all extremities] : moves all extremities [No Focal Deficits] : no focal deficits [Normal Speech] : normal speech [Alert and Oriented] : alert and oriented [Normal memory] : normal memory [de-identified] : diastolic murmur [de-identified] : right brachial AV fistula, +thrill

## 2022-12-22 NOTE — DISCUSSION/SUMMARY
[FreeTextEntry1] : Patient is a 67 year-old Black gentleman with history as above who presents today for cardiac evaluation prior to possible renal transplant who is now s/p Impella assisted PCI placement of SCOTT to pLAD, SCOTT to pLCx and POBA to LM via RFA on 8/12/2022.\par \par Echocardiography of today revealed an improvement in left ventricular systolic function. LVEF now 50%.\par Moderate aortic insufficiency is present. \par Continue DAPT, high intensity statin, Zetia.\par At this time, will exchange nifedipine for carvedilol 12.5 mg BID.\par Will plan to exchange losartan for low dose  Entresto for medical optimization and as BP allows.\par \par Follow up in 4-6 weeks for ECG, BP check and medication adjustments as necessary.\par \par I have seen and examined the patient with Aminta Brennan NP, and I agree with the findings above.\par

## 2022-12-22 NOTE — END OF VISIT
[FreeTextEntry3] : I saw the patient with Aminta Brennan NP and I agree with the findings and plan as above.

## 2023-02-28 ENCOUNTER — APPOINTMENT (OUTPATIENT)
Dept: TRANSPLANT | Facility: CLINIC | Age: 68
End: 2023-02-28
Payer: COMMERCIAL

## 2023-02-28 ENCOUNTER — NON-APPOINTMENT (OUTPATIENT)
Age: 68
End: 2023-02-28

## 2023-02-28 ENCOUNTER — APPOINTMENT (OUTPATIENT)
Dept: NEPHROLOGY | Facility: CLINIC | Age: 68
End: 2023-02-28
Payer: COMMERCIAL

## 2023-02-28 ENCOUNTER — RESULT REVIEW (OUTPATIENT)
Age: 68
End: 2023-02-28

## 2023-02-28 PROCEDURE — 99072 ADDL SUPL MATRL&STAF TM PHE: CPT

## 2023-02-28 PROCEDURE — 99215 OFFICE O/P EST HI 40 MIN: CPT

## 2023-02-28 PROCEDURE — 99204 OFFICE O/P NEW MOD 45 MIN: CPT

## 2023-02-28 NOTE — CONSULT LETTER
[Dear  ___] : Dear  [unfilled], [Courtesy Letter:] : I had the pleasure of seeing your patient, [unfilled], in my office today. [Please see my note below.] : Please see my note below. [Consult Closing:] : Thank you very much for allowing me to participate in the care of this patient.  If you have any questions, please do not hesitate to contact me. [Sincerely,] : Sincerely, [FreeTextEntry3] : Hoang Corey, DO

## 2023-02-28 NOTE — PLAN
[FreeTextEntry1] : 1. Advanced CKD:  Mr. ELLIOT HARGROVE  Is a reasonable candidate for kidney transplantation . no potential donors at this time. \par 2.Cardiac risk: Has been seen by Dr. Rain.  Had cardiac cath and stent placed in August 2022. \par 3. Cancer screening: he has completed colonoscopy and PSA \par 4. ID: Serology for acute and chronic viral infections were recently sent and up to date.  \par 5. Imaging: he has had recent imaging. \par 6.Diabetes Mellitus: check HbA1c.  He is on insulin.  \par \par \par I have personally discussed the risks and benefits of transplantation and patient attended transplant education class where the following was disclosed:\par  \par Reviewed factors affecting survival and morbidity while on dialysis, the transplant wait list and reviewed danny-operative and long-term risk factors affecting outcome in kidney transplantation. \par  \par One year SRTR outcomes for national and [facility] were discussed in regards to patient survival and graft survival after transplantation. \par  \par Details of transplant surgery, including complications were discussed.\par Immunosuppression and complications including infection including life threatening sepsis and opportunistic infections, malignancy and new onset diabetes were discussed. \par  \par Benefits of live donor transplantation as well as variability in wait times across regions and multiple listing were discussed. \par KDPI >85% and PHS high risk criteria donors were discussed. \par HCV kidney transplantation was discussed.\par  \par Will proceed with completing/ updating work up and listing for transplant/ live donor transplant once work up is reviewed and found to be acceptable by multidisciplinary listing committee.\par

## 2023-02-28 NOTE — HISTORY OF PRESENT ILLNESS
[TextBox_42] : ELLIOT HARGROVE is a 67 year old male who presents for kidney transplant re-evaluation.  Previously closed because he had a cardiac cath with stent placement on 8/12/23 and was on aspirin and plavix (PCI placement of SCOTT to pLAD, SCOTT to pLCx and POBA to LM via RFA on 8/12/2022).  Pt seen by Dr. Rain in December.  Had echo in December which revealed EF 50-55%.  \par \par Cause of ESRD : Long standing h/o DM ( > 34 years) . Known CKD for ~ 10 years. Started dialysis in July 2021.  He has retinopathy but vision is preserved.  Denies neuropathy.  Sees a podiatrist.  He is currently on novolog 5 units with meals and Toujeo 10 units at night. He has a cgm.  \par Nephrologist: Dr Vish Carranza. \par Has AVF placed in August 2021.\par \par  \par Other PMH :\par Cardiac -has HTN for ~ 20 years, Had MI  s/p PCI X 3 in 2010. \par Pulmonary-  no h/o COPD, Asthma \par Infections-h/o  TB, HIV, Hepatitis  or covid. vaccinated for covid\par Heme- no h/o malignancy or bleeding disorders.No DVT/PE\par Endo-  diabetes > 34 years, was taken off insulin or OHA 1 year ago.  .Has retinopathy .  no Thyroid disease\par Neuro- no h/o CVA or seizure \par Psych- no suicidal ideation, depression or anxiety. \par Sensitization events-  no previous blood transfusions or previous transplant\par No infections or hospitalizations in the last 6 months \par \par Surgical h/o : none\par Functional status: he can walk about 1/2 mile.  \par Social history: lives with wife ( has a son who is 45 years, healthy) . quit smoking 1 year ago. used to smoke marijuana. no alcohol or any illicit drug. retired, used to be a .\par Family history:  His brother has ESRD s/p DDRT in [facility], lasted only for 2 years in the 80's. No family h/o malignancy. Many family members have DM.   [FreeTextEntry1] : See HPI. [de-identified] : Is a 66-year-old female who presents today for follow-up and disease management.  Patient recently had exploratory laparoscopy which required resection of a small section of small bowel secondary to bowel obstruction it was done at [facility] patient was admitted on July 1 and discharged on July 7.  Patient states that she is feeling much better unfortunately had some unwanted weight loss.  Patient has followed up with surgery and presently being worked up for carcinoid.  Wound has healed well and patient is feeling much better.\par Patient presently being worked up for carcinoid.\par Presently the patient is awake alert and oriented x3 in no acute distress she is calm cooperative well-groomed and nourished I reviewed discharge papers, discharge medications which remain the same, most recent surgical note was reviewed and discussed with patient.\par \par Patient's medical history is significant for abdominal pain secondary to obstruction ischemic heart disease well-controlled recently seen by cardiology, hyperlipidemia history of elevated BMI now weight loss

## 2023-03-03 ENCOUNTER — NON-APPOINTMENT (OUTPATIENT)
Age: 68
End: 2023-03-03

## 2023-03-03 LAB
ABO + RH PNL BLD: NORMAL
ABO + RH PNL BLD: NORMAL
ALBUMIN SERPL ELPH-MCNC: 4.6 G/DL
ALP BLD-CCNC: 107 U/L
ALT SERPL-CCNC: 13 U/L
ANION GAP SERPL CALC-SCNC: 14 MMOL/L
APPEARANCE: CLEAR
AST SERPL-CCNC: 12 U/L
BACTERIA: NEGATIVE
BASOPHILS # BLD AUTO: 0.05 K/UL
BASOPHILS NFR BLD AUTO: 0.6 %
BILIRUB SERPL-MCNC: 0.3 MG/DL
BILIRUBIN URINE: NEGATIVE
BLOOD URINE: ABNORMAL
BUN SERPL-MCNC: 37 MG/DL
C PEPTIDE SERPL-MCNC: 10.8 NG/ML
CALCIUM SERPL-MCNC: 10.4 MG/DL
CHLORIDE SERPL-SCNC: 95 MMOL/L
CHOLEST SERPL-MCNC: 105 MG/DL
CMV IGG SERPL QL: >10 U/ML
CMV IGG SERPL-IMP: POSITIVE
CO2 SERPL-SCNC: 29 MMOL/L
COLOR: YELLOW
CREAT SERPL-MCNC: 9.69 MG/DL
CREAT SPEC-SCNC: 269 MG/DL
CREAT/PROT UR: 1.2 RATIO
EBV DNA SERPL NAA+PROBE-ACNC: NOT DETECTED IU/ML
EBV EA AB SER IA-ACNC: 18.9 U/ML
EBV EA AB TITR SER IF: POSITIVE
EBV EA IGG SER QL IA: >600 U/ML
EBV EA IGG SER-ACNC: POSITIVE
EBV EA IGM SER IA-ACNC: NEGATIVE
EBV PATRN SPEC IB-IMP: NORMAL
EBV VCA IGG SER IA-ACNC: 329 U/ML
EBV VCA IGM SER QL IA: <10 U/ML
EBVPCR LOG: NOT DETECTED LOG10IU/ML
EGFR: 5 ML/MIN/1.73M2
EOSINOPHIL # BLD AUTO: 0.57 K/UL
EOSINOPHIL NFR BLD AUTO: 6.9 %
EPSTEIN-BARR VIRUS CAPSID ANTIGEN IGG: POSITIVE
ESTIMATED AVERAGE GLUCOSE: 163 MG/DL
GLUCOSE QUALITATIVE U: ABNORMAL
GLUCOSE SERPL-MCNC: 172 MG/DL
HAV IGM SER QL: NONREACTIVE
HBA1C MFR BLD HPLC: 7.3 %
HBV CORE IGG+IGM SER QL: NONREACTIVE
HBV SURFACE AB SER QL: NONREACTIVE
HBV SURFACE AB SERPL IA-ACNC: <3 MIU/ML
HBV SURFACE AG SER QL: NONREACTIVE
HCT VFR BLD CALC: 42.3 %
HCV AB SER QL: NONREACTIVE
HCV S/CO RATIO: 0.14 S/CO
HDLC SERPL-MCNC: 39 MG/DL
HGB BLD-MCNC: 12.8 G/DL
HIV1+2 AB SPEC QL IA.RAPID: NONREACTIVE
HSV 1+2 IGG SER IA-IMP: NEGATIVE
HSV 1+2 IGG SER IA-IMP: POSITIVE
HSV1 IGG SER QL: 55.4 INDEX
HSV2 IGG SER QL: 0.64 INDEX
HYALINE CASTS: 0 /LPF
IMM GRANULOCYTES NFR BLD AUTO: 0.2 %
KETONES URINE: NEGATIVE
LDLC SERPL CALC-MCNC: 41 MG/DL
LEUKOCYTE ESTERASE URINE: NEGATIVE
LYMPHOCYTES # BLD AUTO: 2.14 K/UL
LYMPHOCYTES NFR BLD AUTO: 25.9 %
MAGNESIUM SERPL-MCNC: 2.3 MG/DL
MAN DIFF?: NORMAL
MCHC RBC-ENTMCNC: 26.9 PG
MCHC RBC-ENTMCNC: 30.3 GM/DL
MCV RBC AUTO: 89.1 FL
MICROSCOPIC-UA: NORMAL
MONOCYTES # BLD AUTO: 0.96 K/UL
MONOCYTES NFR BLD AUTO: 11.6 %
NEUTROPHILS # BLD AUTO: 4.52 K/UL
NEUTROPHILS NFR BLD AUTO: 54.8 %
NITRITE URINE: NEGATIVE
NONHDLC SERPL-MCNC: 66 MG/DL
PH URINE: 6
PHOSPHATE SERPL-MCNC: 5.9 MG/DL
PLATELET # BLD AUTO: 194 K/UL
POTASSIUM SERPL-SCNC: 4.8 MMOL/L
PROT SERPL-MCNC: 7.4 G/DL
PROT UR-MCNC: 316 MG/DL
PROTEIN URINE: ABNORMAL
PSA SERPL-MCNC: 0.47 NG/ML
RBC # BLD: 4.75 M/UL
RBC # FLD: 16.4 %
RED BLOOD CELLS URINE: 1 /HPF
ROGOSIN: NORMAL
RUBV IGG FLD-ACNC: 16.2 INDEX
RUBV IGG SER-IMP: POSITIVE
SODIUM SERPL-SCNC: 138 MMOL/L
SPECIFIC GRAVITY URINE: 1.02
SQUAMOUS EPITHELIAL CELLS: 4 /HPF
T GONDII AB SER-IMP: NEGATIVE
T GONDII IGG SER QL: <3 IU/ML
T PALLIDUM AB SER QL IA: NEGATIVE
TRIGL SERPL-MCNC: 128 MG/DL
URATE SERPL-MCNC: 4.7 MG/DL
URINE COMMENTS: NORMAL
UROBILINOGEN URINE: ABNORMAL
VZV AB TITR SER: POSITIVE
VZV IGG SER IF-ACNC: 2298 INDEX
WBC # FLD AUTO: 8.26 K/UL
WHITE BLOOD CELLS URINE: 5 /HPF

## 2023-03-07 ENCOUNTER — APPOINTMENT (OUTPATIENT)
Dept: CT IMAGING | Facility: IMAGING CENTER | Age: 68
End: 2023-03-07
Payer: COMMERCIAL

## 2023-03-07 ENCOUNTER — OUTPATIENT (OUTPATIENT)
Dept: OUTPATIENT SERVICES | Facility: HOSPITAL | Age: 68
LOS: 1 days | End: 2023-03-07
Payer: COMMERCIAL

## 2023-03-07 DIAGNOSIS — Z01.818 ENCOUNTER FOR OTHER PREPROCEDURAL EXAMINATION: ICD-10-CM

## 2023-03-07 DIAGNOSIS — I77.0 ARTERIOVENOUS FISTULA, ACQUIRED: Chronic | ICD-10-CM

## 2023-03-07 PROCEDURE — 75635 CT ANGIO ABDOMINAL ARTERIES: CPT | Mod: 26

## 2023-03-07 PROCEDURE — 71260 CT THORAX DX C+: CPT | Mod: 26

## 2023-03-07 PROCEDURE — 75635 CT ANGIO ABDOMINAL ARTERIES: CPT

## 2023-03-07 PROCEDURE — 71260 CT THORAX DX C+: CPT

## 2023-03-08 LAB
COVID-19 SPIKE DOMAIN ANTIBODY INTERPRETATION: POSITIVE
M TB IFN-G BLD-IMP: NEGATIVE
QUANTIFERON TB PLUS MITOGEN MINUS NIL: 8.73 IU/ML
QUANTIFERON TB PLUS NIL: 0.02 IU/ML
QUANTIFERON TB PLUS TB1 MINUS NIL: 0 IU/ML
QUANTIFERON TB PLUS TB2 MINUS NIL: 0 IU/ML
SARS-COV-2 AB SERPL IA-ACNC: >250 U/ML

## 2023-03-13 ENCOUNTER — NON-APPOINTMENT (OUTPATIENT)
Age: 68
End: 2023-03-13

## 2023-03-25 NOTE — HISTORY OF PRESENT ILLNESS
[Cardiac History] : cardiac history [Diabetes Mellitus] : Diabetes Mellitus [Diabetes] : diabetes [Retinopathy] : retinopathy [Annual Foot Exams] : annual foot exams [Insulin] : insulin treatment [Not Working] : Not working [80: Normal activity with effort; some signs or symptoms of disease.] : 80: Normal activity with effort; some signs or symptoms of disease.  [Previous Kidney Transplant] : no previous kidney transplant [Blood Transfusion] : no prior blood transfusion [Hx of DVT/Thrombosis/Miscarriage] : no history of dvt/thrombosis/miscarriage [Neuropathy] : no neuropathy [Lower Extremity Ulcers] : no lower extremity ulcers [TextBox_16] : July 2022 [TextBox_20] : 2012 [TextBox_24] : 2000 [TextBox_28] : 1985 [TextBox_30] : 2-3 blocks [TextBox_39] : 1988 [FreeTextEntry3] : Previously worked in construction [TextBox_42] : Ines (domestic partner) also present\par Stents placement in  and  ( PCI placement of SCOTT to pLAD, SCOTT to pLCx and POBA to LM via RFA on 2022) - echo in December EF 50-55%. \par Right arm AV fistula\par \par Father:  - age 69 - DM, ESKD (did not agree to dialysis)\par Mother:  - age 65 - DM, dementia\par Family history of kidney disease: yes\par - father, as above\par - brother - KTx  - [facility] - transplant lasted for 10 years - \par Domestic partner\par Children: 1 son (healthy)\par Smoking: D/C . Prior to that smoked 5 cigars/day x 20 years\par Drinking: No\par Live donors: None at present

## 2023-03-25 NOTE — ASSESSMENT
[Fair candidate] : a fair candidate. We should proceed with our protocol for evaluation for kidney transplantation. [FreeTextEntry1] : Risk factors include:\par - age\par - diabetes\par - cardiac disease\par - peripheral vascular disease

## 2023-03-25 NOTE — PHYSICAL EXAM
[Well Developed] : well developed [Well Nourished] : well nourished [No Acute Distress] : no acute distress [Normocephalic] : normocephalic [Atraumatic] : atraumatic [Sclera Anicteric] : sclera anicteric [Good Dentition] : good dentition [Neck Supple] : neck supple [Clear to Auscultation] : clear to auscultation [Breathing Comfortably on RA] : breathing comfortably on room air [Normal Rate] : normal rate [Regular Rhythm] : regular rhythm [Systolic Murmur] : systolic murmur [Soft] : soft [Non-tender] : non-tender [In Right Arm] : fistula/graft in right arm [] : right dorsalis pedis non-palpable [Alert] : alert [Responds to Questions Appropriately] : responds to questions appropriately [Oriented] : oriented [Appropriate] : appropriate [TextBox_34] : No ulcers or edema [FreeTextEntry1] : No carotid bruits [TextBox_86] : No adenopathy

## 2023-03-25 NOTE — REASON FOR VISIT
[Follow-Up] : a follow-up visit for [Kidney Transplant Evaluation] : kidney transplant evaluation [FreeTextEntry2] : Vish Avitia MD

## 2023-03-25 NOTE — REVIEW OF SYSTEMS
[Recent Weight Loss (___ Lbs)] : recent [unfilled] ~Ulb weight loss [Sclera anicteric] : sclera anicteric [Can Walk (___ Blocks)] : can walk [unfilled] blocks [Urine Output: ____] : Urine Output: [unfilled] [Anemia] : anemia [Fever] : no fever [Chills] : no chills [Fatigue] : no fatigue [Night Sweats] : no night sweats [Recent Weight Gain (___ Lbs)] : no recent weight gain [Sore throat] : no sore throat [Pain/Stiffness] : no pain/stiffness [Rhinorrhea] : no rhinorrhea [Trauma] : no trauma [Double vision] : no double vision [Blurred Vision] : no blurred vision [Eye Pain] : no eye pain [Wears glasses] : does not wear glasses [Chest Pain] : no chest pain [Palpitations] : no palpitations [SOB] : no shortness of breath [Wheezing] : no wheezing [Cough] : no cough [Dyspnea on Exertion] : no dyspnea on exertion [Pleuritic Chest Pain] : no pleuritic chest pain [Abdominal Pain] : no abdominal pain [Nausea] : no nausea [Constipation] : no constipation [Diarrhea] : diarrhea [Vomiting] : no vomiting [Dysuria] : no dysuria [Frequency] : no frequency [Urgency] : no urinary urgency [Incontinence] : no incontinence [Hematuria] : no hematuria [Joint Pain] : no joint pain [UTI] : no UTI [Joint Stiffness] : no joint stiffness [Muscle Pain] : no muscle pain [Muscle Weakness] : no muscle weakness [Tattoos] : no tattoos [Itching] : no itching [Skin Rash] : no skin rash [Hair Changes] : no hair changes [Headache] : no headache [Dizziness] : no dizziness [Fainting] : no fainting [Confusion] : no confusion [Memory Loss] : no memory loss [Unsteady Gait] : steady gait [Seizures] : no seizures [Suicidal] : not suicidal [Hallucinations] : no hallucinations [Anxiety] : no anxiety [Depression] : no depression [Adenopathy] : no adenopathy [Easy Bleeding] : no easy bleeding [Easy Bruising] : no easy bruising

## 2023-03-25 NOTE — PLAN
[We should proceed with our protocol for kidney transplantation evaluation.] : We should proceed with our protocol for kidney transplantation evaluation. [TextBox_6] : - CT angiogram with runoff of aorta and iliacs\par - chest CT (history of smoking)

## 2023-03-25 NOTE — CONSULT LETTER
[Dear  ___] : Dear  [unfilled], [Consult Letter:] : I had the pleasure of evaluating your patient, [unfilled]. [Please see my note below.] : Please see my note below. [Consult Closing:] : Thank you very much for allowing me to participate in the care of this patient.  If you have any questions, please do not hesitate to contact me. [Sincerely,] : Sincerely, [FreeTextEntry2] : Vish Avitia MD [FreeTextEntry3] : Travon Palencia

## 2023-04-13 ENCOUNTER — APPOINTMENT (OUTPATIENT)
Dept: CARDIOLOGY | Facility: CLINIC | Age: 68
End: 2023-04-13
Payer: COMMERCIAL

## 2023-04-13 ENCOUNTER — NON-APPOINTMENT (OUTPATIENT)
Age: 68
End: 2023-04-13

## 2023-04-13 VITALS
SYSTOLIC BLOOD PRESSURE: 107 MMHG | WEIGHT: 170 LBS | BODY MASS INDEX: 23.8 KG/M2 | OXYGEN SATURATION: 95 % | DIASTOLIC BLOOD PRESSURE: 63 MMHG | HEIGHT: 71 IN | RESPIRATION RATE: 17 BRPM | HEART RATE: 81 BPM

## 2023-04-13 PROCEDURE — 93000 ELECTROCARDIOGRAM COMPLETE: CPT | Mod: NC

## 2023-04-13 PROCEDURE — 99072 ADDL SUPL MATRL&STAF TM PHE: CPT

## 2023-04-13 PROCEDURE — 99215 OFFICE O/P EST HI 40 MIN: CPT

## 2023-05-01 ENCOUNTER — NON-APPOINTMENT (OUTPATIENT)
Age: 68
End: 2023-05-01

## 2023-05-01 NOTE — REASON FOR VISIT
[Other: ____] : [unfilled] [FreeTextEntry1] : April 2023 - Patient returns today for follow-up of his renal transplant list status an coronary artery disease.\par He continues to have ESRD on HD via right brachial AV fistula ([id number]).

## 2023-05-01 NOTE — DISCUSSION/SUMMARY
[EKG obtained to assist in diagnosis and management of assessed problem(s)] : EKG obtained to assist in diagnosis and management of assessed problem(s) [FreeTextEntry1] : Patient is a 67 year-old Black gentleman with history as above who presents today for cardiac evaluation prior to possible renal transplant who is now status post Impella assisted PCI placement of SCOTT to pLAD, SCOTT to pLCx and POBA to LM via RFA on 8/12/2022.\par \par Echocardiography in December 2022 revealed an improvement in left ventricular systolic function. LVEF now 50%.\par Moderate aortic insufficiency is present. \par Continue DAPT, high intensity statin, Zetia.\par Continue carvedilol 12.5 mg BID.\par \par Follow up in 3 months for ECG, BP check and medication adjustments as necessary.\par \par Patient is now more than 6 months status post PCI. His LVEF has improved. \par No further cardiovascular testing is necessary prior to consideration for renal transplant.

## 2023-05-01 NOTE — HISTORY OF PRESENT ILLNESS
[FreeTextEntry1] : Patient is a 67 year-old Black gentleman, with known past medical history of hypertension, dyslipidemia, insulin dependent type II diabetes (off insulin after losing weight), coronary artery disease status post MI treated with PCI x3 stents, CKD stage V, who presents as a preemptive candidate for renal transplant. \par He has not been following regularly with a cardiologist, but he reports that his nephrologist (Vish Avitia MD) checked an echocardiogram approximately one month ago.\par \par He used to smoke marijuana, but he has not smoked in over a year, and he never smoked cigarettes. \par He does not exercise because of back discomfort. He does not report any chest pain or shortness of breath.\par \par He has not been sick with Covid-19. He had two doses of Pfizer's Covid-19 vaccine and then a Moderna booster on 1/2/2022.\par \par 9/6/2022.\par Mr. Jefferson Cat returns today for follow up. He is now s/p Impella assisted PCI placement of SCOTT to pLAD, SCOTT to pLCx and POBA to LM via RFA on 8/12/2022.\par Since his procedure he has been feeling very well overall and if he were "14 years old again".\par In July, he began hemodialysis on a M-W-F schedule and is being closely followed by Vish Munguia MD.\par For exercise he has been using his stationary bike on occasion and is planning to ramp up his exercise routine now that he has increased energy. At this time, he is not interested in cardiac rehabilitation as he is fairly busy with his dialysis schedule.\par \par 12/6/2022.\par Mr. Cat returns today for scheduled follow up and repeat echocardiography.\par He continues to feel excellent and as if he is a "brand new man". He is amazed at how he can now walk and climb stairs without feeling winded.\par Hemodialysis continues on a M-W-F schedule without any complications.\par He is interested in returning to exercise soon. \par \par 12/20/2022.\par He returns today for routine scheduled follow up. \par He continues to feel exceedingly well overall, however, he has not yet returned to exercise. \par Blood pressures are improved and he is tolerating carvedilol 12.5 mg BID along with his other medications.\par

## 2023-05-01 NOTE — PHYSICAL EXAM
[Well Developed] : well developed [Well Nourished] : well nourished [No Acute Distress] : no acute distress [Normal Conjunctiva] : normal conjunctiva [Normal Venous Pressure] : normal venous pressure [No Carotid Bruit] : no carotid bruit [Normal S1, S2] : normal S1, S2 [No Rub] : no rub [No Gallop] : no gallop [Murmur] : murmur [Normal Rate] : normal [Rhythm Regular] : regular [Normal S1] : normal S1 [Normal S2] : normal S2 [I] : a grade 1 [No Pitting Edema] : no pitting edema present [Clear Lung Fields] : clear lung fields [Good Air Entry] : good air entry [No Respiratory Distress] : no respiratory distress  [Soft] : abdomen soft [Non Tender] : non-tender [No Masses/organomegaly] : no masses/organomegaly [Normal Bowel Sounds] : normal bowel sounds [Normal Gait] : normal gait [No Edema] : no edema [No Cyanosis] : no cyanosis [No Clubbing] : no clubbing [No Varicosities] : no varicosities [No Rash] : no rash [No Skin Lesions] : no skin lesions [Moves all extremities] : moves all extremities [No Focal Deficits] : no focal deficits [Normal Speech] : normal speech [Alert and Oriented] : alert and oriented [Normal memory] : normal memory [de-identified] : diastolic murmur [de-identified] : right brachial AV fistula, +thrill

## 2023-05-01 NOTE — CARDIOLOGY SUMMARY
[de-identified] : 6/14/2022, sinus 73 bpm, LVH with left axis and QRS widening, poor R-wave progression.\par 9/6/2022. sinus 76 bpm, LVH with left axis- anterior fascicular block, normal R-wave progression.\par 12/6/2022, sinus 94 bpm with occasional APC. left axis-anterior fascicular block. Non specific T-abnormality. Normal R-wave progression. Unchanged from prior.  [de-identified] : 7/7/2022, pharmacologic nuclear stress test with ischemia in the anterior wall, septum, and basal inferior walls, LVEF 53%, LVEDV 149 mL [de-identified] : 12/6/2022, mildly dilated LA, severe concentric LVH, normal pulmonary pressures, basal-mid inferolateral hypokinesis, LVEF 50-55% [de-identified] : 8/4/2022 with Carroll Garza MD - 80% distal LM disease into LAD\par 8/12/2022 with Angel Kumar MD and Carroll Garza MD at [facility] - Impella assisted PCI to LM, LAD, and LCx

## 2023-05-12 ENCOUNTER — NON-APPOINTMENT (OUTPATIENT)
Age: 68
End: 2023-05-12

## 2023-05-25 ENCOUNTER — APPOINTMENT (OUTPATIENT)
Dept: MRI IMAGING | Facility: CLINIC | Age: 68
End: 2023-05-25
Payer: COMMERCIAL

## 2023-05-25 ENCOUNTER — OUTPATIENT (OUTPATIENT)
Dept: OUTPATIENT SERVICES | Facility: HOSPITAL | Age: 68
LOS: 1 days | End: 2023-05-25
Payer: COMMERCIAL

## 2023-05-25 DIAGNOSIS — Z01.818 ENCOUNTER FOR OTHER PREPROCEDURAL EXAMINATION: ICD-10-CM

## 2023-05-25 DIAGNOSIS — I77.0 ARTERIOVENOUS FISTULA, ACQUIRED: Chronic | ICD-10-CM

## 2023-05-25 PROCEDURE — 74183 MRI ABD W/O CNTR FLWD CNTR: CPT | Mod: 26

## 2023-05-25 PROCEDURE — 74183 MRI ABD W/O CNTR FLWD CNTR: CPT

## 2023-05-25 PROCEDURE — A9585: CPT

## 2023-05-31 ENCOUNTER — NON-APPOINTMENT (OUTPATIENT)
Age: 68
End: 2023-05-31

## 2023-05-31 ENCOUNTER — APPOINTMENT (OUTPATIENT)
Dept: GASTROENTEROLOGY | Facility: CLINIC | Age: 68
End: 2023-05-31
Payer: MEDICARE

## 2023-05-31 VITALS
SYSTOLIC BLOOD PRESSURE: 152 MMHG | WEIGHT: 170 LBS | HEART RATE: 98 BPM | DIASTOLIC BLOOD PRESSURE: 72 MMHG | OXYGEN SATURATION: 99 % | BODY MASS INDEX: 23.8 KG/M2 | HEIGHT: 71 IN

## 2023-05-31 PROCEDURE — 99204 OFFICE O/P NEW MOD 45 MIN: CPT

## 2023-05-31 NOTE — PHYSICAL EXAM

## 2023-05-31 NOTE — ASSESSMENT
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  There are certain characteristics of the cysts that may increase the risk of cancer.  In general, the risk ranges from 3% in low risk lesions to 20% for high risk side branch lesions over 10 years, and up to 70% risk with main duct involvement over a period of 15-20 years.  Thus, cyst surveillance is recommended.\par - The [facility] was explained to the patient - we will enroll him in our program for scheduled ongoing surveillance and multidisciplinary discussion.\par - no contraindication to proceeding with kidney tx evaluation based on pancreatic cyst\par - followup with Pancreas [facility]\par - Can followup with me for routine GI care as well given he has no primary GI

## 2023-05-31 NOTE — CONSULT LETTER
[Dear  ___] : Dear ~REI, [Consult Letter:] : I had the pleasure of evaluating your patient, [unfilled]. [Please see my note below.] : Please see my note below. [Consult Closing:] : Thank you very much for allowing me to participate in the care of this patient.  If you have any questions, please do not hesitate to contact me. [Sincerely,] : Sincerely, [FreeTextEntry3] : Mandy Shukla MD \par Division of Gastroenterology\par [facility]\par  of Medicine\par [facility]\par Phone: [phone]\par Fax: [phone]

## 2023-05-31 NOTE — HISTORY OF PRESENT ILLNESS
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 
Patient

## 2023-06-01 ENCOUNTER — NON-APPOINTMENT (OUTPATIENT)
Age: 68
End: 2023-06-01

## 2023-06-23 ENCOUNTER — NON-APPOINTMENT (OUTPATIENT)
Age: 68
End: 2023-06-23

## 2023-07-03 ENCOUNTER — RX RENEWAL (OUTPATIENT)
Age: 68
End: 2023-07-03

## 2023-07-03 RX ORDER — CARVEDILOL 12.5 MG/1
12.5 TABLET, FILM COATED ORAL TWICE DAILY
Qty: 60 | Refills: 5 | Status: ACTIVE | COMMUNITY
Start: 2022-12-06 | End: 1900-01-01

## 2023-07-06 ENCOUNTER — APPOINTMENT (OUTPATIENT)
Dept: CARDIOLOGY | Facility: CLINIC | Age: 68
End: 2023-07-06
Payer: COMMERCIAL

## 2023-07-06 PROCEDURE — A9500: CPT

## 2023-07-06 PROCEDURE — 78452 HT MUSCLE IMAGE SPECT MULT: CPT

## 2023-07-06 PROCEDURE — 93015 CV STRESS TEST SUPVJ I&R: CPT

## 2023-07-20 ENCOUNTER — NON-APPOINTMENT (OUTPATIENT)
Age: 68
End: 2023-07-20

## 2023-07-20 ENCOUNTER — APPOINTMENT (OUTPATIENT)
Dept: CARDIOLOGY | Facility: CLINIC | Age: 68
End: 2023-07-20
Payer: MEDICARE

## 2023-07-20 VITALS
OXYGEN SATURATION: 99 % | BODY MASS INDEX: 24.5 KG/M2 | DIASTOLIC BLOOD PRESSURE: 65 MMHG | WEIGHT: 175 LBS | HEIGHT: 71 IN | SYSTOLIC BLOOD PRESSURE: 136 MMHG | RESPIRATION RATE: 17 BRPM | HEART RATE: 95 BPM

## 2023-07-20 DIAGNOSIS — R94.39 ABNORMAL RESULT OF OTHER CARDIOVASCULAR FUNCTION STUDY: ICD-10-CM

## 2023-07-20 PROCEDURE — 99215 OFFICE O/P EST HI 40 MIN: CPT

## 2023-07-20 PROCEDURE — 93000 ELECTROCARDIOGRAM COMPLETE: CPT | Mod: NC

## 2023-07-26 NOTE — HISTORY OF PRESENT ILLNESS
[FreeTextEntry1] : Patient is a 67 year-old Black gentleman, with known past medical history of hypertension, dyslipidemia, insulin dependent type II diabetes (off insulin after losing weight), coronary artery disease status post MI treated with PCI x3 stents, CKD stage V, who presents as a preemptive candidate for renal transplant. \par He has not been following regularly with a cardiologist, but he reports that his nephrologist (Vish Avitia MD) checked an echocardiogram approximately one month ago.\par \par He used to smoke marijuana, but he has not smoked in over a year, and he never smoked cigarettes. \par He does not exercise because of back discomfort. He does not report any chest pain or shortness of breath.\par \par He has not been sick with Covid-19. He had two doses of Pfizer's Covid-19 vaccine and then a Moderna booster on 1/2/2022.\par \par 9/6/2022.\par Mr. Jefferson Cat returns today for follow up. He is now s/p Impella assisted PCI placement of SCOTT to pLAD, SCOTT to pLCx and POBA to LM via RFA on 8/12/2022.\par Since his procedure he has been feeling very well overall and if he were "14 years old again".\par In July, he began hemodialysis on a M-W-F schedule and is being closely followed by Vish Munguia MD.\par For exercise he has been using his stationary bike on occasion and is planning to ramp up his exercise routine now that he has increased energy. At this time, he is not interested in cardiac rehabilitation as he is fairly busy with his dialysis schedule.\par \par 12/6/2022.\par Mr. Cat returns today for scheduled follow up and repeat echocardiography.\par He continues to feel excellent and as if he is a "brand new man". He is amazed at how he can now walk and climb stairs without feeling winded.\par Hemodialysis continues on a M-W-F schedule without any complications.\par He is interested in returning to exercise soon. \par \par 12/20/2022.\par He returns today for routine scheduled follow up. \par He continues to feel exceedingly well overall, however, he has not yet returned to exercise. \par Blood pressures are improved and he is tolerating carvedilol 12.5 mg BID along with his other medications.\par \par April 2023 - Patient returns today for follow-up of his renal transplant list status an coronary artery disease.\par He continues to have ESRD on HD via right brachial AV fistula ([id number]).\par

## 2023-07-26 NOTE — PHYSICAL EXAM
[Well Developed] : well developed [Well Nourished] : well nourished [No Acute Distress] : no acute distress [Normal Conjunctiva] : normal conjunctiva [Normal Venous Pressure] : normal venous pressure [No Carotid Bruit] : no carotid bruit [Normal S1, S2] : normal S1, S2 [No Rub] : no rub [No Gallop] : no gallop [Murmur] : murmur [Normal Rate] : normal [Rhythm Regular] : regular [Normal S1] : normal S1 [Normal S2] : normal S2 [I] : a grade 1 [No Pitting Edema] : no pitting edema present [Clear Lung Fields] : clear lung fields [Good Air Entry] : good air entry [No Respiratory Distress] : no respiratory distress  [Soft] : abdomen soft [Non Tender] : non-tender [No Masses/organomegaly] : no masses/organomegaly [Normal Bowel Sounds] : normal bowel sounds [Normal Gait] : normal gait [No Edema] : no edema [No Cyanosis] : no cyanosis [No Clubbing] : no clubbing [No Varicosities] : no varicosities [No Rash] : no rash [No Skin Lesions] : no skin lesions [Moves all extremities] : moves all extremities [No Focal Deficits] : no focal deficits [Normal Speech] : normal speech [Alert and Oriented] : alert and oriented [Normal memory] : normal memory [de-identified] : right brachial AV fistula, +thrill [de-identified] : diastolic murmur

## 2023-07-26 NOTE — CARDIOLOGY SUMMARY
[de-identified] : 6/14/2022, sinus 73 bpm, LVH with left axis and QRS widening, poor R-wave progression.\par 9/6/2022. sinus 76 bpm, LVH with left axis- anterior fascicular block, normal R-wave progression.\par 12/6/2022, sinus 94 bpm with occasional APC. left axis-anterior fascicular block. Non specific T-abnormality. Normal R-wave progression. Unchanged from prior.  [de-identified] : 7/7/2022, pharmacologic nuclear stress test with ischemia in the anterior wall, septum, and basal inferior walls, LVEF 53%, LVEDV 149 mL\par 7/6/2023, pharmacologic nuclear stress test: small sized, moderate defect in the anteroseptal wall that is fixed and consistent with infarct. medium sized, moderate defect in the inferior and inferolateral wall that are partially reversible and consistent with infarct and moderate danny-infarct ischemia. LVEF 59%. LVEDV 101 mL.  [de-identified] : 12/6/2022, mildly dilated LA, severe concentric LVH, normal pulmonary pressures, basal-mid inferolateral hypokinesis, LVEF 50-55% [de-identified] : 8/4/2022 with Carroll Garza MD - 80% distal LM disease into LAD\par 8/12/2022 with Angel Kumar MD and Carroll Garza MD at [facility] - Impella assisted PCI to LM, LAD, and LCx

## 2023-07-26 NOTE — REASON FOR VISIT
[Other: ____] : [unfilled] [FreeTextEntry1] : 7/20/2023\apple Cat returns today for scheduled follow up and to maintain his candidacy for a possible renal transplant. He recently had a repeat nuclear stress test which revealed small sized moderate defects in the anteroseptal wall that is fixed and consistent with infarct along with medium sized moderate defects in the inferior and inferolateral walls that are partially reversible and consistent with infarct and moderate danny-infarct ischemia. Prior to further transplant consideration he will require a left heart catheterization.  Otherwise he is feeling well. He continues on hemodialysis M-W-F without any complications. With his day to day activities he denies any chest discomfort, shortness of breath, palpitations, lightheadedness or syncope. He has been taking his medications as directed.

## 2023-07-26 NOTE — DISCUSSION/SUMMARY
[EKG obtained to assist in diagnosis and management of assessed problem(s)] : EKG obtained to assist in diagnosis and management of assessed problem(s) [FreeTextEntry1] : Patient is a 68 year-old Black gentleman with history as above who presents today for cardiac evaluation prior to possible renal transplant who is now status post Impella assisted PCI placement of SCOTT to pLAD, SCOTT to pLCx and POBA to LM via RFA on 8/12/2022.\par \par Echocardiography in December 2022 revealed an improvement in left ventricular systolic function. LVEF now 50%.\par Moderate aortic insufficiency is present. \par Continue DAPT, high intensity statin, Zetia.\par Continue carvedilol 12.5 mg BID.\par \par Patient is now more than 6 months status post PCI. His LVEF has improved. \par Following recent abnormal nuclear stress test he will now require left heart catheterization.\par

## 2023-08-08 ENCOUNTER — OUTPATIENT (OUTPATIENT)
Dept: OUTPATIENT SERVICES | Facility: HOSPITAL | Age: 68
LOS: 1 days | End: 2023-08-08
Payer: MEDICARE

## 2023-08-08 ENCOUNTER — TRANSCRIPTION ENCOUNTER (OUTPATIENT)
Age: 68
End: 2023-08-08

## 2023-08-08 VITALS
HEART RATE: 100 BPM | DIASTOLIC BLOOD PRESSURE: 66 MMHG | HEIGHT: 71 IN | OXYGEN SATURATION: 98 % | RESPIRATION RATE: 16 BRPM | TEMPERATURE: 98 F | SYSTOLIC BLOOD PRESSURE: 147 MMHG | WEIGHT: 179.02 LBS

## 2023-08-08 VITALS
SYSTOLIC BLOOD PRESSURE: 161 MMHG | HEART RATE: 88 BPM | OXYGEN SATURATION: 98 % | RESPIRATION RATE: 16 BRPM | DIASTOLIC BLOOD PRESSURE: 71 MMHG

## 2023-08-08 DIAGNOSIS — I77.0 ARTERIOVENOUS FISTULA, ACQUIRED: Chronic | ICD-10-CM

## 2023-08-08 DIAGNOSIS — R94.39 ABNORMAL RESULT OF OTHER CARDIOVASCULAR FUNCTION STUDY: ICD-10-CM

## 2023-08-08 LAB
ANION GAP SERPL CALC-SCNC: 16 MMOL/L — SIGNIFICANT CHANGE UP (ref 5–17)
BUN SERPL-MCNC: 34 MG/DL — HIGH (ref 7–23)
CALCIUM SERPL-MCNC: 10.4 MG/DL — SIGNIFICANT CHANGE UP (ref 8.4–10.5)
CHLORIDE SERPL-SCNC: 95 MMOL/L — LOW (ref 96–108)
CO2 SERPL-SCNC: 29 MMOL/L — SIGNIFICANT CHANGE UP (ref 22–31)
CREAT SERPL-MCNC: 7.93 MG/DL — HIGH (ref 0.5–1.3)
EGFR: 7 ML/MIN/1.73M2 — LOW
GLUCOSE BLDC GLUCOMTR-MCNC: 206 MG/DL — HIGH (ref 70–99)
GLUCOSE BLDC GLUCOMTR-MCNC: 237 MG/DL — HIGH (ref 70–99)
GLUCOSE BLDC GLUCOMTR-MCNC: 283 MG/DL — HIGH (ref 70–99)
GLUCOSE SERPL-MCNC: 264 MG/DL — HIGH (ref 70–99)
HCT VFR BLD CALC: 31.7 % — LOW (ref 39–50)
HGB BLD-MCNC: 10.3 G/DL — LOW (ref 13–17)
MCHC RBC-ENTMCNC: 28.9 PG — SIGNIFICANT CHANGE UP (ref 27–34)
MCHC RBC-ENTMCNC: 32.5 GM/DL — SIGNIFICANT CHANGE UP (ref 32–36)
MCV RBC AUTO: 88.8 FL — SIGNIFICANT CHANGE UP (ref 80–100)
NRBC # BLD: 0 /100 WBCS — SIGNIFICANT CHANGE UP (ref 0–0)
PLATELET # BLD AUTO: 144 K/UL — LOW (ref 150–400)
POTASSIUM SERPL-MCNC: 3.6 MMOL/L — SIGNIFICANT CHANGE UP (ref 3.5–5.3)
POTASSIUM SERPL-SCNC: 3.6 MMOL/L — SIGNIFICANT CHANGE UP (ref 3.5–5.3)
RBC # BLD: 3.57 M/UL — LOW (ref 4.2–5.8)
RBC # FLD: 13.6 % — SIGNIFICANT CHANGE UP (ref 10.3–14.5)
SODIUM SERPL-SCNC: 140 MMOL/L — SIGNIFICANT CHANGE UP (ref 135–145)
WBC # BLD: 9.47 K/UL — SIGNIFICANT CHANGE UP (ref 3.8–10.5)
WBC # FLD AUTO: 9.47 K/UL — SIGNIFICANT CHANGE UP (ref 3.8–10.5)

## 2023-08-08 PROCEDURE — 93005 ELECTROCARDIOGRAM TRACING: CPT

## 2023-08-08 PROCEDURE — C1725: CPT

## 2023-08-08 PROCEDURE — 93571 IV DOP VEL&/PRESS C FLO 1ST: CPT | Mod: 26,LC

## 2023-08-08 PROCEDURE — 93010 ELECTROCARDIOGRAM REPORT: CPT | Mod: 59

## 2023-08-08 PROCEDURE — 93010 ELECTROCARDIOGRAM REPORT: CPT | Mod: 77

## 2023-08-08 PROCEDURE — 93799 UNLISTED CV SVC/PROCEDURE: CPT | Mod: LC

## 2023-08-08 PROCEDURE — C1887: CPT

## 2023-08-08 PROCEDURE — 93454 CORONARY ARTERY ANGIO S&I: CPT | Mod: 59

## 2023-08-08 PROCEDURE — C1874: CPT

## 2023-08-08 PROCEDURE — 85027 COMPLETE CBC AUTOMATED: CPT

## 2023-08-08 PROCEDURE — 92928 PRQ TCAT PLMT NTRAC ST 1 LES: CPT | Mod: LC

## 2023-08-08 PROCEDURE — C1769: CPT

## 2023-08-08 PROCEDURE — C1894: CPT

## 2023-08-08 PROCEDURE — 99152 MOD SED SAME PHYS/QHP 5/>YRS: CPT

## 2023-08-08 PROCEDURE — C9600: CPT | Mod: LC

## 2023-08-08 PROCEDURE — 93454 CORONARY ARTERY ANGIO S&I: CPT | Mod: 26,59

## 2023-08-08 PROCEDURE — 80048 BASIC METABOLIC PNL TOTAL CA: CPT

## 2023-08-08 PROCEDURE — 82962 GLUCOSE BLOOD TEST: CPT

## 2023-08-08 PROCEDURE — 36415 COLL VENOUS BLD VENIPUNCTURE: CPT

## 2023-08-08 RX ORDER — CALCIUM ACETATE 667 MG
2 TABLET ORAL
Qty: 0 | Refills: 0 | DISCHARGE
Start: 2023-08-08

## 2023-08-08 RX ORDER — NIFEDIPINE 30 MG
1 TABLET, EXTENDED RELEASE 24 HR ORAL
Refills: 0 | DISCHARGE
Start: 2023-08-08

## 2023-08-08 RX ORDER — INSULIN GLARGINE 100 [IU]/ML
8 INJECTION, SOLUTION SUBCUTANEOUS EVERY MORNING
Refills: 0 | Status: DISCONTINUED | OUTPATIENT
Start: 2023-08-09 | End: 2023-08-22

## 2023-08-08 RX ORDER — SODIUM CHLORIDE 9 MG/ML
1000 INJECTION, SOLUTION INTRAVENOUS
Refills: 0 | Status: DISCONTINUED | OUTPATIENT
Start: 2023-08-08 | End: 2023-08-22

## 2023-08-08 RX ORDER — FERROUS SULFATE 325(65) MG
1 TABLET ORAL
Refills: 0 | DISCHARGE

## 2023-08-08 RX ORDER — DEXTROSE 50 % IN WATER 50 %
15 SYRINGE (ML) INTRAVENOUS ONCE
Refills: 0 | Status: DISCONTINUED | OUTPATIENT
Start: 2023-08-08 | End: 2023-08-22

## 2023-08-08 RX ORDER — INSULIN LISPRO 100/ML
VIAL (ML) SUBCUTANEOUS
Refills: 0 | Status: DISCONTINUED | OUTPATIENT
Start: 2023-08-08 | End: 2023-08-22

## 2023-08-08 RX ORDER — CARVEDILOL PHOSPHATE 80 MG/1
1 CAPSULE, EXTENDED RELEASE ORAL
Qty: 0 | Refills: 0 | DISCHARGE
Start: 2023-08-08

## 2023-08-08 RX ORDER — LOSARTAN POTASSIUM 100 MG/1
1 TABLET, FILM COATED ORAL
Qty: 0 | Refills: 0 | DISCHARGE

## 2023-08-08 RX ORDER — CLOPIDOGREL BISULFATE 75 MG/1
1 TABLET, FILM COATED ORAL
Qty: 90 | Refills: 3
Start: 2023-08-08 | End: 2024-08-01

## 2023-08-08 RX ORDER — INSULIN LISPRO 100/ML
4 VIAL (ML) SUBCUTANEOUS
Refills: 0 | Status: DISCONTINUED | OUTPATIENT
Start: 2023-08-08 | End: 2023-08-22

## 2023-08-08 RX ORDER — CARVEDILOL PHOSPHATE 80 MG/1
1 CAPSULE, EXTENDED RELEASE ORAL
Qty: 0 | Refills: 0 | DISCHARGE

## 2023-08-08 RX ORDER — DEXTROSE 50 % IN WATER 50 %
12.5 SYRINGE (ML) INTRAVENOUS ONCE
Refills: 0 | Status: DISCONTINUED | OUTPATIENT
Start: 2023-08-08 | End: 2023-08-22

## 2023-08-08 RX ORDER — DEXTROSE 50 % IN WATER 50 %
25 SYRINGE (ML) INTRAVENOUS ONCE
Refills: 0 | Status: DISCONTINUED | OUTPATIENT
Start: 2023-08-08 | End: 2023-08-22

## 2023-08-08 RX ORDER — NIFEDIPINE 30 MG
60 TABLET, EXTENDED RELEASE 24 HR ORAL
Refills: 0 | Status: DISCONTINUED | OUTPATIENT
Start: 2023-08-08 | End: 2023-08-22

## 2023-08-08 RX ORDER — NIFEDIPINE 30 MG
1 TABLET, EXTENDED RELEASE 24 HR ORAL
Qty: 0 | Refills: 0 | DISCHARGE

## 2023-08-08 RX ORDER — TAMSULOSIN HYDROCHLORIDE 0.4 MG/1
1 CAPSULE ORAL
Refills: 0 | DISCHARGE

## 2023-08-08 RX ORDER — CARVEDILOL PHOSPHATE 80 MG/1
12.5 CAPSULE, EXTENDED RELEASE ORAL EVERY 12 HOURS
Refills: 0 | Status: DISCONTINUED | OUTPATIENT
Start: 2023-08-08 | End: 2023-08-22

## 2023-08-08 RX ORDER — GLUCAGON INJECTION, SOLUTION 0.5 MG/.1ML
1 INJECTION, SOLUTION SUBCUTANEOUS ONCE
Refills: 0 | Status: DISCONTINUED | OUTPATIENT
Start: 2023-08-08 | End: 2023-08-22

## 2023-08-08 RX ORDER — CALCIUM ACETATE 667 MG
3 TABLET ORAL
Refills: 0 | DISCHARGE

## 2023-08-08 RX ORDER — NIFEDIPINE 30 MG
1 TABLET, EXTENDED RELEASE 24 HR ORAL
Refills: 0 | DISCHARGE

## 2023-08-08 RX ORDER — CALCIUM ACETATE 667 MG
1334 TABLET ORAL
Refills: 0 | Status: DISCONTINUED | OUTPATIENT
Start: 2023-08-08 | End: 2023-08-22

## 2023-08-08 RX ORDER — CARVEDILOL PHOSPHATE 80 MG/1
1 CAPSULE, EXTENDED RELEASE ORAL
Refills: 0 | DISCHARGE

## 2023-08-08 RX ORDER — INSULIN LISPRO 100/ML
VIAL (ML) SUBCUTANEOUS AT BEDTIME
Refills: 0 | Status: DISCONTINUED | OUTPATIENT
Start: 2023-08-08 | End: 2023-08-22

## 2023-08-08 RX ADMIN — Medication 2: at 16:26

## 2023-08-08 RX ADMIN — Medication 1334 MILLIGRAM(S): at 17:23

## 2023-08-08 RX ADMIN — Medication 60 MILLIGRAM(S): at 17:22

## 2023-08-08 RX ADMIN — CARVEDILOL PHOSPHATE 12.5 MILLIGRAM(S): 80 CAPSULE, EXTENDED RELEASE ORAL at 17:22

## 2023-08-08 RX ADMIN — Medication 4 UNIT(S): at 11:11

## 2023-08-08 RX ADMIN — Medication 4 UNIT(S): at 16:26

## 2023-08-08 RX ADMIN — Medication 2: at 11:09

## 2023-08-08 NOTE — ASU PREOP CHECKLIST - BP NONINVASIVE SYSTOLIC (MM HG)
Outpatient Care Management Note:    Care manager called Gifty Chapa and introduced myself and the care management program  He states that he is doing well  He declined ongoing phone calls and services  I offered to assist with his referrals to neurology and physical therapy  Gifty Chapa declined assistance  He states he has no more neck pain, weakness or vertigo  He declined my contact information  I recommended he call Dr Mckinney  office if he needs any assistance 
[phone]

## 2023-08-08 NOTE — CHART NOTE - NSCHARTNOTEFT_GEN_A_CORE
Removal of Femoral Sheath by Cheryl Heaton ACP    Pulses in the right/left lower extremity are palpable/audible by doppler/absent.   The patient was placed in the supine position. The insertion site was identified and the sutures were removed per protocol.    The _6___ Pakistani femoral sheath was then removed.   Direct pressure was applied for  __25____ minutes.   Complications: None, VSS, Good Hemostasis.     Monitoring of the right/left groin and both lower extremities including neuro-vascular checks and vital signs every 15 minutes x 4, then every 30 minutes x 2, then every 1 hour was ordered.    Discharge Instruction discussed with patient: ASA, Plavix/Brilinta, statin, diet, activities, access site care, follow up care, reportable signs and symptoms.     A/P   S/P PCI of Cx x 1 adenike    Plan: continue to monitor, Continue ASA, Plavix, Statin,   discharge home  Pt will follow up with his/her cardiologist in 1-2weeks.

## 2023-08-08 NOTE — ASU DISCHARGE PLAN (ADULT/PEDIATRIC) - CARE PROVIDER_API CALL
Marcio Rain  Cardiology  [address], [address]  [place] [address]  Phone: [phone]  Fax: [phone]  Established Patient  Follow Up Time: 1 week

## 2023-08-08 NOTE — ASU DISCHARGE PLAN (ADULT/PEDIATRIC) - ASU DC SPECIAL INSTRUCTIONSFT

## 2023-08-08 NOTE — H&P CARDIOLOGY - HISTORY OF PRESENT ILLNESS
69 y/o AA male, former cigarette smoker x 40PY with PMHx of HTN, HLD, T2DM on insulin,  ESRD on HD via Right AV fistula (Nephrology- Dr. Vish Munguia-[facility]) and CAD s/p MI and multiple PCI last 8/2022 Impella assisted POBA to LM, SCOTT to pLAD and SCOTT to the pLCx who is now under evaluation for kidney transplant.  Patient presented to his Cardiologist - Dr. AMADOR Rain for evaluation and is s/p Pharm NST in 7/2023 demonstrating small sized moderate defects in the anteroseptal wall that is faxed and consistent with infarct along with medium sized moderate defects in the inferior and inferolateral walls that are partially reversible and consistent with infarct and danny-infarct ischemia.  Patient is now for [facility].

## 2023-08-15 ENCOUNTER — APPOINTMENT (OUTPATIENT)
Dept: CARDIOLOGY | Facility: CLINIC | Age: 68
End: 2023-08-15
Payer: MEDICARE

## 2023-08-15 ENCOUNTER — NON-APPOINTMENT (OUTPATIENT)
Age: 68
End: 2023-08-15

## 2023-08-15 VITALS
OXYGEN SATURATION: 99 % | WEIGHT: 175 LBS | DIASTOLIC BLOOD PRESSURE: 81 MMHG | HEART RATE: 101 BPM | BODY MASS INDEX: 24.41 KG/M2 | SYSTOLIC BLOOD PRESSURE: 129 MMHG

## 2023-08-15 DIAGNOSIS — E11.9 TYPE 2 DIABETES MELLITUS W/OUT COMPLICATIONS: ICD-10-CM

## 2023-08-15 PROCEDURE — 93000 ELECTROCARDIOGRAM COMPLETE: CPT | Mod: NC

## 2023-08-15 PROCEDURE — 99215 OFFICE O/P EST HI 40 MIN: CPT

## 2023-08-15 NOTE — PHYSICAL EXAM
[Well Developed] : well developed [Well Nourished] : well nourished [No Acute Distress] : no acute distress [Normal Conjunctiva] : normal conjunctiva [Normal Venous Pressure] : normal venous pressure [No Carotid Bruit] : no carotid bruit [Normal S1, S2] : normal S1, S2 [No Rub] : no rub [No Gallop] : no gallop [Murmur] : murmur [Normal Rate] : normal [Rhythm Regular] : regular [Normal S1] : normal S1 [Normal S2] : normal S2 [I] : a grade 1 [No Pitting Edema] : no pitting edema present [Clear Lung Fields] : clear lung fields [Good Air Entry] : good air entry [No Respiratory Distress] : no respiratory distress  [Soft] : abdomen soft [Non Tender] : non-tender [No Masses/organomegaly] : no masses/organomegaly [Normal Bowel Sounds] : normal bowel sounds [Normal Gait] : normal gait [No Edema] : no edema [No Cyanosis] : no cyanosis [No Clubbing] : no clubbing [No Varicosities] : no varicosities [No Rash] : no rash [No Skin Lesions] : no skin lesions [Moves all extremities] : moves all extremities [No Focal Deficits] : no focal deficits [Normal Speech] : normal speech [Alert and Oriented] : alert and oriented [Normal memory] : normal memory [de-identified] : diastolic murmur [de-identified] : right brachial AV fistula, +thrill

## 2023-08-15 NOTE — CARDIOLOGY SUMMARY
[de-identified] : 6/14/2022, sinus 73 bpm, LVH with left axis and QRS widening, poor R-wave progression.\par  9/6/2022. sinus 76 bpm, LVH with left axis- anterior fascicular block, normal R-wave progression.\par  12/6/2022, sinus 94 bpm with occasional APC. left axis-anterior fascicular block. Non specific T-abnormality. Normal R-wave progression. Unchanged from prior.  [de-identified] : 7/7/2022, pharmacologic nuclear stress test with ischemia in the anterior wall, septum, and basal inferior walls, LVEF 53%, LVEDV 149 mL\par  7/6/2023, pharmacologic nuclear stress test: small sized, moderate defect in the anteroseptal wall that is fixed and consistent with infarct. medium sized, moderate defect in the inferior and inferolateral wall that are partially reversible and consistent with infarct and moderate danny-infarct ischemia. LVEF 59%. LVEDV 101 mL.  [de-identified] : 12/6/2022, mildly dilated LA, severe concentric LVH, normal pulmonary pressures, basal-mid inferolateral hypokinesis, LVEF 50-55% [de-identified] : 8/4/2022 with Carroll Garza MD - 80% distal LM disease into LAD\par  8/12/2022 with Angel Kumar MD and Carroll Garza MD at [facility] - Impella assisted PCI to LM, LAD, and LCx

## 2023-08-15 NOTE — HISTORY OF PRESENT ILLNESS
[FreeTextEntry1] : Patient is a 68 year-old Black gentleman, with known past medical history of hypertension, dyslipidemia, insulin dependent type II diabetes (off insulin after losing weight), coronary artery disease status post MI treated with PCI x3 stents, CKD stage V, who presents as a preemptive candidate for renal transplant.  He has not been following regularly with a cardiologist, but he reports that his nephrologist (Vish Avitia MD) checked an echocardiogram approximately one month ago.  He used to smoke marijuana, but he has not smoked in over a year, and he never smoked cigarettes.  He does not exercise because of back discomfort. He does not report any chest pain or shortness of breath.  He has not been sick with Covid-19. He had two doses of Pfizer's Covid-19 vaccine and then a Moderna booster on 1/2/2022.  9/6/2022. Mr. Jefferson Cat returns today for follow up. He is now s/p Impella assisted PCI placement of SCOTT to pLAD, SCOTT to pLCx and POBA to LM via RFA on 8/12/2022. Since his procedure he has been feeling very well overall and if he were "14 years old again". In July, he began hemodialysis on a M-W-F schedule and is being closely followed by Vish Munguia MD. For exercise he has been using his stationary bike on occasion and is planning to ramp up his exercise routine now that he has increased energy. At this time, he is not interested in cardiac rehabilitation as he is fairly busy with his dialysis schedule.  12/6/2022. Mr. Cat returns today for scheduled follow up and repeat echocardiography. He continues to feel excellent and as if he is a "brand new man". He is amazed at how he can now walk and climb stairs without feeling winded. Hemodialysis continues on a M-W-F schedule without any complications. He is interested in returning to exercise soon.   12/20/2022. He returns today for routine scheduled follow up.  He continues to feel exceedingly well overall, however, he has not yet returned to exercise.  Blood pressures are improved and he is tolerating carvedilol 12.5 mg BID along with his other medications.  April 2023 - Patient returns today for follow-up of his renal transplant list status an coronary artery disease. He continues to have ESRD on HD via right brachial AV fistula ([id number]).  7/20/2023 Jefferson Cat returns today for scheduled follow up and to maintain his candidacy for a possible renal transplant. He recently had a repeat nuclear stress test which revealed small sized moderate defects in the anteroseptal wall that is fixed and consistent with infarct along with medium sized moderate defects in the inferior and inferolateral walls that are partially reversible and consistent with infarct and moderate danny-infarct ischemia. Prior to further transplant consideration he will require a left heart catheterization.  Otherwise he is feeling well. He continues on hemodialysis M-W-F without any complications. With his day to day activities he denies any chest discomfort, shortness of breath, palpitations, lightheadedness or syncope. He has been taking his medications as directed.

## 2023-08-15 NOTE — REASON FOR VISIT
[Other: ____] : [unfilled] [FreeTextEntry1] : August 2023 - Patient returns today for follow-up. His nuclear stress test was abnormal, so he was referred for repeat angiography. Cath on 8/8/2023 showed ostial LCx disease at the site of a previous stent. He is now status post repeat PCI with SCOTT to that site.  He reports that, since his repeat PCI, he no longer gets shortness of breath while climbing stairs.  He is maintained on dual antiplatelet therapy.

## 2023-08-15 NOTE — DISCUSSION/SUMMARY
[EKG obtained to assist in diagnosis and management of assessed problem(s)] : EKG obtained to assist in diagnosis and management of assessed problem(s) [FreeTextEntry1] : Patient is a 68 year-old Black gentleman with history as above who presents today for cardiac evaluation prior to possible renal transplant who is now status post Impella assisted PCI placement of SCOTT to pLAD, SCOTT to pLCx and POBA to LM via RFA on 8/12/2022. Repeat PCI to ostial LCx on 8/8/2023.  Echocardiography in December 2022 revealed an improvement in left ventricular systolic function. LVEF now 50%. Moderate aortic insufficiency is present.  Continue DAPT, high intensity statin, Zetia. Continue carvedilol 12.5 mg BID.  Patient is now less than 6 months status post PCI. Continue dual antiplatelet therapy until February 2024. After November, clopidogrel could be held for potential transplant. He should continue ASA without interruption.

## 2023-09-05 ENCOUNTER — NON-APPOINTMENT (OUTPATIENT)
Age: 68
End: 2023-09-05

## 2023-09-05 ENCOUNTER — APPOINTMENT (OUTPATIENT)
Dept: NEPHROLOGY | Facility: CLINIC | Age: 68
End: 2023-09-05
Payer: COMMERCIAL

## 2023-09-05 VITALS
HEART RATE: 101 BPM | SYSTOLIC BLOOD PRESSURE: 121 MMHG | TEMPERATURE: 97.2 F | DIASTOLIC BLOOD PRESSURE: 64 MMHG | WEIGHT: 175 LBS | OXYGEN SATURATION: 99 % | HEIGHT: 71 IN | BODY MASS INDEX: 24.5 KG/M2

## 2023-09-05 PROCEDURE — ZZZZZ: CPT

## 2023-11-09 ENCOUNTER — NON-APPOINTMENT (OUTPATIENT)
Age: 68
End: 2023-11-09

## 2023-11-09 ENCOUNTER — APPOINTMENT (OUTPATIENT)
Dept: CARDIOLOGY | Facility: CLINIC | Age: 68
End: 2023-11-09
Payer: MEDICARE

## 2023-11-09 VITALS
BODY MASS INDEX: 24.69 KG/M2 | SYSTOLIC BLOOD PRESSURE: 118 MMHG | DIASTOLIC BLOOD PRESSURE: 50 MMHG | OXYGEN SATURATION: 98 % | HEART RATE: 90 BPM | WEIGHT: 177 LBS

## 2023-11-09 PROCEDURE — 93000 ELECTROCARDIOGRAM COMPLETE: CPT

## 2023-11-09 PROCEDURE — 99215 OFFICE O/P EST HI 40 MIN: CPT

## 2023-12-01 ENCOUNTER — NON-APPOINTMENT (OUTPATIENT)
Age: 68
End: 2023-12-01

## 2024-03-28 ENCOUNTER — APPOINTMENT (OUTPATIENT)
Dept: CARDIOLOGY | Facility: CLINIC | Age: 69
End: 2024-03-28
Payer: MEDICARE

## 2024-03-28 ENCOUNTER — NON-APPOINTMENT (OUTPATIENT)
Age: 69
End: 2024-03-28

## 2024-03-28 ENCOUNTER — APPOINTMENT (OUTPATIENT)
Dept: CARDIOLOGY | Facility: CLINIC | Age: 69
End: 2024-03-28
Payer: COMMERCIAL

## 2024-03-28 VITALS
BODY MASS INDEX: 24.64 KG/M2 | OXYGEN SATURATION: 100 % | WEIGHT: 176 LBS | SYSTOLIC BLOOD PRESSURE: 138 MMHG | HEIGHT: 71 IN | DIASTOLIC BLOOD PRESSURE: 66 MMHG | RESPIRATION RATE: 17 BRPM | HEART RATE: 87 BPM

## 2024-03-28 DIAGNOSIS — E78.5 HYPERLIPIDEMIA, UNSPECIFIED: ICD-10-CM

## 2024-03-28 DIAGNOSIS — I10 ESSENTIAL (PRIMARY) HYPERTENSION: ICD-10-CM

## 2024-03-28 DIAGNOSIS — I25.10 ATHEROSCLEROTIC HEART DISEASE OF NATIVE CORONARY ARTERY W/OUT ANGINA PECTORIS: ICD-10-CM

## 2024-03-28 PROCEDURE — 93000 ELECTROCARDIOGRAM COMPLETE: CPT | Mod: NC

## 2024-03-28 PROCEDURE — G2211 COMPLEX E/M VISIT ADD ON: CPT

## 2024-03-28 PROCEDURE — 99215 OFFICE O/P EST HI 40 MIN: CPT

## 2024-03-28 PROCEDURE — 93306 TTE W/DOPPLER COMPLETE: CPT

## 2024-03-28 RX ORDER — CLOPIDOGREL BISULFATE 75 MG/1
75 TABLET, FILM COATED ORAL
Qty: 90 | Refills: 0 | Status: DISCONTINUED | COMMUNITY
Start: 2022-09-06 | End: 2024-03-28

## 2024-04-02 ENCOUNTER — NON-APPOINTMENT (OUTPATIENT)
Age: 69
End: 2024-04-02

## 2024-04-03 NOTE — REASON FOR VISIT
[Other: ____] : [unfilled] [FreeTextEntry1] : 3/28/2024 Jefferson returns today for routine scheduled follow up and to maintain his candidacy for a potential renal transplant. He continues to feel excellent overall. He did attempt to ride his bicycle outside but quickly discovered that his legs were sore. To address the pain he has been rubbing an apricot pit which has been soaked in rubbing alcohol to the area and he believes that the pain is subsiding. As a result, he has also returned to his stationary bicycle for exercise and he denies any chest discomfort, shortness of breath, palpitations, lightheadedness or syncope.   He reports no issues with his usual M-W-F hemodialysis. We have reviewed his medications and he is taking all as directed, including clopidogrel and low dose aspirin.

## 2024-04-03 NOTE — CARDIOLOGY SUMMARY
[de-identified] : 7/7/2022, pharmacologic nuclear stress test with ischemia in the anterior wall, septum, and basal inferior walls, LVEF 53%, LVEDV 149 mL\par  7/6/2023, pharmacologic nuclear stress test: small sized, moderate defect in the anteroseptal wall that is fixed and consistent with infarct. medium sized, moderate defect in the inferior and inferolateral wall that are partially reversible and consistent with infarct and moderate danny-infarct ischemia. LVEF 59%. LVEDV 101 mL.  [de-identified] : 6/14/2022, sinus 73 bpm, LVH with left axis and QRS widening, poor R-wave progression.\par  9/6/2022. sinus 76 bpm, LVH with left axis- anterior fascicular block, normal R-wave progression.\par  12/6/2022, sinus 94 bpm with occasional APC. left axis-anterior fascicular block. Non specific T-abnormality. Normal R-wave progression. Unchanged from prior.  [de-identified] : 12/6/2022, mildly dilated LA, severe concentric LVH, normal pulmonary pressures, basal-mid inferolateral hypokinesis, LVEF 50-55% 3/28/2024. TTE.  1. Severe left ventricular hypertrophy. 2. Left ventricular systolic function is normal with an ejection fraction visually estimated at 50 to 55 %. 3. The left atrium is mildly dilated. 4. Mild to moderate mitral regurgitation at a blood pressure of 158/80 mmHg. 5. Normal right ventricular cavity size, with normal wall thickness, and normal systolic function. 6. Estimated pulmonary artery systolic pressure is 26 mmHg, consistent with normal pulmonary artery pressure. 7. Compared to the transthoracic echocardiogram performed on 12/6/2022, there have been no significant interval changes. [de-identified] : 8/4/2022 with Carroll Garza MD - 80% distal LM disease into LAD\par  8/12/2022 with Agnel Kumar MD and Carroll Garza MD at [facility] - Impella assisted PCI to LM, LAD, and LCx

## 2024-04-03 NOTE — PHYSICAL EXAM
[Well Nourished] : well nourished [Well Developed] : well developed [Normal Conjunctiva] : normal conjunctiva [No Acute Distress] : no acute distress [Normal Venous Pressure] : normal venous pressure [No Carotid Bruit] : no carotid bruit [No Rub] : no rub [Normal S1, S2] : normal S1, S2 [Murmur] : murmur [No Gallop] : no gallop [Normal Rate] : normal [Rhythm Regular] : regular [Normal S1] : normal S1 [Normal S2] : normal S2 [No Pitting Edema] : no pitting edema present [I] : a grade 1 [Clear Lung Fields] : clear lung fields [No Respiratory Distress] : no respiratory distress  [Good Air Entry] : good air entry [Soft] : abdomen soft [Non Tender] : non-tender [No Masses/organomegaly] : no masses/organomegaly [Normal Bowel Sounds] : normal bowel sounds [Normal Gait] : normal gait [No Edema] : no edema [No Cyanosis] : no cyanosis [No Clubbing] : no clubbing [No Varicosities] : no varicosities [No Rash] : no rash [No Skin Lesions] : no skin lesions [Moves all extremities] : moves all extremities [No Focal Deficits] : no focal deficits [Normal Speech] : normal speech [Alert and Oriented] : alert and oriented [Normal memory] : normal memory [de-identified] : diastolic murmur [de-identified] : right brachial AV fistula, +thrill

## 2024-04-03 NOTE — END OF VISIT
[Time Spent: ___ minutes] : I have spent [unfilled] minutes of time on the encounter. [FreeTextEntry3] : I, Marcio Rain MD, personally performed the evaluation and management (E/M) services for this established patient who presents today with (a) new problem(s)/exacerbation of (an) existing condition(s). That E/M includes conducting the clinically appropriate interval history &/or exam, assessing all new/exacerbated conditions, and establishing a new plan of care. Today, Aminta Brennan NP was here to observe my evaluation and management service for this new problem/exacerbated condition and follow the plan of care established by me going forward.

## 2024-04-03 NOTE — HISTORY OF PRESENT ILLNESS
[FreeTextEntry1] : Patient is a 68 year-old Black gentleman, with known past medical history of hypertension, dyslipidemia, insulin dependent type II diabetes (off insulin after losing weight), coronary artery disease status post MI treated with PCI x3 stents, CKD stage V, who presents as a preemptive candidate for renal transplant.  He has not been following regularly with a cardiologist, but he reports that his nephrologist (Vish Avitia MD) checked an echocardiogram approximately one month ago.  He used to smoke marijuana, but he has not smoked in over a year, and he never smoked cigarettes.  He does not exercise because of back discomfort. He does not report any chest pain or shortness of breath.  He has not been sick with Covid-19. He had two doses of Pfizer's Covid-19 vaccine and then a Moderna booster on 1/2/2022.  9/6/2022. Mr. Jefferson Cat returns today for follow up. He is now s/p Impella assisted PCI placement of SCOTT to pLAD, SCOTT to pLCx and POBA to LM via RFA on 8/12/2022. Since his procedure he has been feeling very well overall and if he were "14 years old again". In July, he began hemodialysis on a M-W-F schedule and is being closely followed by Vish Munguia MD. For exercise he has been using his stationary bike on occasion and is planning to ramp up his exercise routine now that he has increased energy. At this time, he is not interested in cardiac rehabilitation as he is fairly busy with his dialysis schedule.  12/6/2022. Mr. Cat returns today for scheduled follow up and repeat echocardiography. He continues to feel excellent and as if he is a "brand new man". He is amazed at how he can now walk and climb stairs without feeling winded. Hemodialysis continues on a M-W-F schedule without any complications. He is interested in returning to exercise soon.   12/20/2022. He returns today for routine scheduled follow up.  He continues to feel exceedingly well overall, however, he has not yet returned to exercise.  Blood pressures are improved and he is tolerating carvedilol 12.5 mg BID along with his other medications.  April 2023 - Patient returns today for follow-up of his renal transplant list status an coronary artery disease. He continues to have ESRD on HD via right brachial AV fistula ([id number]).  7/20/2023 Jefferson Cat returns today for scheduled follow up and to maintain his candidacy for a possible renal transplant. He recently had a repeat nuclear stress test which revealed small sized moderate defects in the anteroseptal wall that is fixed and consistent with infarct along with medium sized moderate defects in the inferior and inferolateral walls that are partially reversible and consistent with infarct and moderate danny-infarct ischemia. Prior to further transplant consideration he will require a left heart catheterization.  Otherwise he is feeling well. He continues on hemodialysis M-W-F without any complications. With his day to day activities he denies any chest discomfort, shortness of breath, palpitations, lightheadedness or syncope. He has been taking his medications as directed.   August 2023 - Patient returns today for follow-up. His nuclear stress test was abnormal, so he was referred for repeat angiography. Cath on 8/8/2023 showed ostial LCx disease at the site of a previous stent. He is now status post repeat PCI with SCOTT to that site.  He reports that, since his repeat PCI, he no longer gets shortness of breath while climbing stairs.  He is maintained on dual antiplatelet therapy.   November 2023 - Patient returns today for follow-up in his usual state of health.  ESRD on HD via right brachial AV fistula ([id number]). He has a stationary bike at home that he uses for 15-20 minutes at a time.

## 2024-04-03 NOTE — DISCUSSION/SUMMARY
[EKG obtained to assist in diagnosis and management of assessed problem(s)] : EKG obtained to assist in diagnosis and management of assessed problem(s) [FreeTextEntry1] : Patient is a 68 year-old Black gentleman with history as above who presents today for cardiac evaluation prior to possible renal transplant who is now status post Impella assisted PCI placement of SCOTT to pLAD, SCOTT to pLCx and POBA to LM via RFA on 8/12/2022. Repeat PCI to ostial LCx on 8/8/2023.  Echocardiography in December 2022 revealed an improvement in left ventricular systolic function. LVEF now 50%. Echocardiogram from earlier today as above.  Moderate aortic insufficiency is present.   Continue carvedilol 12.5 mg BID. Continue statin and Zetia.   Patient is now greater than 6 months status post PCI he can now safely discontinue clopidogrel. He should continue ASA without interruption.   No further cardiac testing is required prior to transplant consideration.  Follow up in 6 months.

## 2024-04-15 ENCOUNTER — RESULT REVIEW (OUTPATIENT)
Age: 69
End: 2024-04-15

## 2024-04-18 ENCOUNTER — LABORATORY RESULT (OUTPATIENT)
Age: 69
End: 2024-04-18

## 2024-04-18 ENCOUNTER — APPOINTMENT (OUTPATIENT)
Dept: NEPHROLOGY | Facility: CLINIC | Age: 69
End: 2024-04-18
Payer: COMMERCIAL

## 2024-04-18 VITALS
TEMPERATURE: 98 F | WEIGHT: 81 LBS | BODY MASS INDEX: 11.34 KG/M2 | OXYGEN SATURATION: 100 % | HEART RATE: 82 BPM | SYSTOLIC BLOOD PRESSURE: 155 MMHG | HEIGHT: 71 IN | RESPIRATION RATE: 17 BRPM | DIASTOLIC BLOOD PRESSURE: 70 MMHG

## 2024-04-18 DIAGNOSIS — Z01.818 ENCOUNTER FOR OTHER PREPROCEDURAL EXAMINATION: ICD-10-CM

## 2024-04-18 PROCEDURE — 99215 OFFICE O/P EST HI 40 MIN: CPT

## 2024-04-19 ENCOUNTER — NON-APPOINTMENT (OUTPATIENT)
Age: 69
End: 2024-04-19

## 2024-04-19 LAB
ABO + RH PNL BLD: NORMAL
ALBUMIN SERPL ELPH-MCNC: 4.7 G/DL
ALP BLD-CCNC: 93 U/L
ALT SERPL-CCNC: 14 U/L
ANION GAP SERPL CALC-SCNC: 16 MMOL/L
AST SERPL-CCNC: 10 U/L
BILIRUB SERPL-MCNC: 0.2 MG/DL
BUN SERPL-MCNC: 37 MG/DL
C PEPTIDE SERPL-MCNC: 17.2 NG/ML
CALCIUM SERPL-MCNC: 10.5 MG/DL
CHLORIDE SERPL-SCNC: 95 MMOL/L
CHOLEST SERPL-MCNC: 99 MG/DL
CK SERPL-CCNC: 102 U/L
CMV IGG SERPL QL: >10 U/ML
CMV IGG SERPL-IMP: POSITIVE
CO2 SERPL-SCNC: 27 MMOL/L
COVID-19 SPIKE DOMAIN ANTIBODY INTERPRETATION: POSITIVE
CREAT SERPL-MCNC: 8.71 MG/DL
CRP SERPL-MCNC: <3 MG/L
EBV EA AB SER IA-ACNC: 15.8 U/ML
EBV EA AB TITR SER IF: POSITIVE
EBV EA IGG SER QL IA: >600 U/ML
EBV EA IGG SER-ACNC: POSITIVE
EBV EA IGM SER IA-ACNC: NEGATIVE
EBV PATRN SPEC IB-IMP: NORMAL
EBV VCA IGG SER IA-ACNC: 246 U/ML
EBV VCA IGM SER QL IA: <10 U/ML
EGFR: 6 ML/MIN/1.73M2
EPSTEIN-BARR VIRUS CAPSID ANTIGEN IGG: POSITIVE
ERYTHROCYTE [SEDIMENTATION RATE] IN BLOOD BY WESTERGREN METHOD: 15 MM/HR
ESTIMATED AVERAGE GLUCOSE: 148 MG/DL
GLUCOSE SERPL-MCNC: 212 MG/DL
HAV IGM SER QL: NONREACTIVE
HBA1C MFR BLD HPLC: 6.8 %
HBV CORE IGG+IGM SER QL: NONREACTIVE
HBV SURFACE AB SER QL: NONREACTIVE
HBV SURFACE AB SERPL IA-ACNC: <3 MIU/ML
HBV SURFACE AG SER QL: NONREACTIVE
HCT VFR BLD CALC: 36 %
HCV AB SER QL: NONREACTIVE
HCV S/CO RATIO: 0.16 S/CO
HDLC SERPL-MCNC: 40 MG/DL
HEPATITIS A IGG ANTIBODY: NONREACTIVE
HGB BLD-MCNC: 11.1 G/DL
HIV1+2 AB SPEC QL IA.RAPID: NONREACTIVE
HSV 1+2 IGG SER IA-IMP: NEGATIVE
HSV 1+2 IGG SER IA-IMP: POSITIVE
HSV 1+2 IGG SER IA-IMP: POSITIVE
HSV1 IGG SER QL: 38.1 INDEX
HSV1 IGG SER QL: 38.1 INDEX
HSV2 IGG SER QL: 0.23 INDEX
LDLC SERPL CALC-MCNC: 34 MG/DL
MAGNESIUM SERPL-MCNC: 2.4 MG/DL
MCHC RBC-ENTMCNC: 27.4 PG
MCHC RBC-ENTMCNC: 30.8 GM/DL
MCV RBC AUTO: 88.9 FL
NONHDLC SERPL-MCNC: 59 MG/DL
PHOSPHATE SERPL-MCNC: 2.9 MG/DL
PLATELET # BLD AUTO: 222 K/UL
POTASSIUM SERPL-SCNC: 4 MMOL/L
PROT SERPL-MCNC: 7.7 G/DL
PSA SERPL-MCNC: 0.43 NG/ML
RBC # BLD: 4.05 M/UL
RBC # FLD: 16.4 %
RUBV IGG FLD-ACNC: 14.2 INDEX
RUBV IGG SER-IMP: POSITIVE
SARS-COV-2 AB SERPL IA-ACNC: >250 U/ML
SODIUM SERPL-SCNC: 138 MMOL/L
T GONDII AB SER-IMP: NEGATIVE
T GONDII IGG SER QL: <3 IU/ML
T PALLIDUM AB SER QL IA: NEGATIVE
T3 SERPL-MCNC: 89 NG/DL
T4 FREE SERPL-MCNC: 1.1 NG/DL
TRIGL SERPL-MCNC: 141 MG/DL
TSH SERPL-ACNC: 1.16 UIU/ML
URATE SERPL-MCNC: 4.3 MG/DL
VZV AB TITR SER: POSITIVE
VZV IGG SER IF-ACNC: 1791 INDEX
WBC # FLD AUTO: 8.2 K/UL

## 2024-04-23 LAB
M TB IFN-G BLD-IMP: NEGATIVE
QUANTIFERON TB PLUS MITOGEN MINUS NIL: >10 IU/ML
QUANTIFERON TB PLUS NIL: 0.03 IU/ML
QUANTIFERON TB PLUS TB1 MINUS NIL: 0 IU/ML
QUANTIFERON TB PLUS TB2 MINUS NIL: 0 IU/ML

## 2024-05-02 ENCOUNTER — APPOINTMENT (OUTPATIENT)
Dept: CT IMAGING | Facility: CLINIC | Age: 69
End: 2024-05-02
Payer: COMMERCIAL

## 2024-05-02 ENCOUNTER — OUTPATIENT (OUTPATIENT)
Dept: OUTPATIENT SERVICES | Facility: HOSPITAL | Age: 69
LOS: 1 days | End: 2024-05-02
Payer: MEDICARE

## 2024-05-02 ENCOUNTER — APPOINTMENT (OUTPATIENT)
Dept: CT IMAGING | Facility: CLINIC | Age: 69
End: 2024-05-02
Payer: MEDICARE

## 2024-05-02 ENCOUNTER — APPOINTMENT (OUTPATIENT)
Dept: MRI IMAGING | Facility: CLINIC | Age: 69
End: 2024-05-02
Payer: MEDICARE

## 2024-05-02 DIAGNOSIS — Z01.818 ENCOUNTER FOR OTHER PREPROCEDURAL EXAMINATION: ICD-10-CM

## 2024-05-02 DIAGNOSIS — I77.0 ARTERIOVENOUS FISTULA, ACQUIRED: Chronic | ICD-10-CM

## 2024-05-02 DIAGNOSIS — K86.2 CYST OF PANCREAS: ICD-10-CM

## 2024-05-02 PROCEDURE — 74183 MRI ABD W/O CNTR FLWD CNTR: CPT

## 2024-05-02 PROCEDURE — A9585: CPT

## 2024-05-02 PROCEDURE — 74177 CT ABD & PELVIS W/CONTRAST: CPT

## 2024-05-02 PROCEDURE — 74177 CT ABD & PELVIS W/CONTRAST: CPT | Mod: 26

## 2024-05-02 PROCEDURE — 74183 MRI ABD W/O CNTR FLWD CNTR: CPT | Mod: 26

## 2024-05-02 PROCEDURE — 71260 CT THORAX DX C+: CPT

## 2024-05-02 PROCEDURE — 71260 CT THORAX DX C+: CPT | Mod: 26

## 2024-05-09 ENCOUNTER — OUTPATIENT (OUTPATIENT)
Dept: OUTPATIENT SERVICES | Facility: HOSPITAL | Age: 69
LOS: 1 days | End: 2024-05-09
Payer: MEDICARE

## 2024-05-09 VITALS
RESPIRATION RATE: 18 BRPM | DIASTOLIC BLOOD PRESSURE: 72 MMHG | TEMPERATURE: 98 F | HEART RATE: 102 BPM | HEIGHT: 71 IN | SYSTOLIC BLOOD PRESSURE: 151 MMHG | OXYGEN SATURATION: 99 % | WEIGHT: 174.61 LBS

## 2024-05-09 DIAGNOSIS — I25.10 ATHEROSCLEROTIC HEART DISEASE OF NATIVE CORONARY ARTERY WITHOUT ANGINA PECTORIS: Chronic | ICD-10-CM

## 2024-05-09 DIAGNOSIS — E11.9 TYPE 2 DIABETES MELLITUS WITHOUT COMPLICATIONS: ICD-10-CM

## 2024-05-09 DIAGNOSIS — I77.0 ARTERIOVENOUS FISTULA, ACQUIRED: Chronic | ICD-10-CM

## 2024-05-09 DIAGNOSIS — Z98.41 CATARACT EXTRACTION STATUS, RIGHT EYE: Chronic | ICD-10-CM

## 2024-05-09 DIAGNOSIS — T82.898D OTHER SPECIFIED COMPLICATION OF VASCULAR PROSTHETIC DEVICES, IMPLANTS AND GRAFTS, SUBSEQUENT ENCOUNTER: ICD-10-CM

## 2024-05-09 DIAGNOSIS — N18.6 END STAGE RENAL DISEASE: ICD-10-CM

## 2024-05-09 DIAGNOSIS — I25.10 ATHEROSCLEROTIC HEART DISEASE OF NATIVE CORONARY ARTERY WITHOUT ANGINA PECTORIS: ICD-10-CM

## 2024-05-09 DIAGNOSIS — Z01.818 ENCOUNTER FOR OTHER PREPROCEDURAL EXAMINATION: ICD-10-CM

## 2024-05-09 DIAGNOSIS — Z98.890 OTHER SPECIFIED POSTPROCEDURAL STATES: Chronic | ICD-10-CM

## 2024-05-09 DIAGNOSIS — G47.33 OBSTRUCTIVE SLEEP APNEA (ADULT) (PEDIATRIC): ICD-10-CM

## 2024-05-09 DIAGNOSIS — T82.898A OTHER SPECIFIED COMPLICATION OF VASCULAR PROSTHETIC DEVICES, IMPLANTS AND GRAFTS, INITIAL ENCOUNTER: ICD-10-CM

## 2024-05-09 LAB
A1C WITH ESTIMATED AVERAGE GLUCOSE RESULT: 6.4 % — HIGH (ref 4–5.6)
ANION GAP SERPL CALC-SCNC: 16 MMOL/L — SIGNIFICANT CHANGE UP (ref 5–17)
BLD GP AB SCN SERPL QL: NEGATIVE — SIGNIFICANT CHANGE UP
BUN SERPL-MCNC: 35 MG/DL — HIGH (ref 7–23)
CALCIUM SERPL-MCNC: 10.4 MG/DL — SIGNIFICANT CHANGE UP (ref 8.4–10.5)
CHLORIDE SERPL-SCNC: 98 MMOL/L — SIGNIFICANT CHANGE UP (ref 96–108)
CO2 SERPL-SCNC: 28 MMOL/L — SIGNIFICANT CHANGE UP (ref 22–31)
CREAT SERPL-MCNC: 8.13 MG/DL — HIGH (ref 0.5–1.3)
EGFR: 7 ML/MIN/1.73M2 — LOW
ESTIMATED AVERAGE GLUCOSE: 137 MG/DL — HIGH (ref 68–114)
GLUCOSE SERPL-MCNC: 174 MG/DL — HIGH (ref 70–99)
HCT VFR BLD CALC: 35.2 % — LOW (ref 39–50)
HGB BLD-MCNC: 11.3 G/DL — LOW (ref 13–17)
MCHC RBC-ENTMCNC: 27.6 PG — SIGNIFICANT CHANGE UP (ref 27–34)
MCHC RBC-ENTMCNC: 32.1 GM/DL — SIGNIFICANT CHANGE UP (ref 32–36)
MCV RBC AUTO: 86.1 FL — SIGNIFICANT CHANGE UP (ref 80–100)
NRBC # BLD: 0 /100 WBCS — SIGNIFICANT CHANGE UP (ref 0–0)
PLATELET # BLD AUTO: 202 K/UL — SIGNIFICANT CHANGE UP (ref 150–400)
POTASSIUM SERPL-MCNC: 4.1 MMOL/L — SIGNIFICANT CHANGE UP (ref 3.5–5.3)
POTASSIUM SERPL-SCNC: 4.1 MMOL/L — SIGNIFICANT CHANGE UP (ref 3.5–5.3)
RBC # BLD: 4.09 M/UL — LOW (ref 4.2–5.8)
RBC # FLD: 16.4 % — HIGH (ref 10.3–14.5)
RH IG SCN BLD-IMP: POSITIVE — SIGNIFICANT CHANGE UP
SODIUM SERPL-SCNC: 142 MMOL/L — SIGNIFICANT CHANGE UP (ref 135–145)
WBC # BLD: 8.55 K/UL — SIGNIFICANT CHANGE UP (ref 3.8–10.5)
WBC # FLD AUTO: 8.55 K/UL — SIGNIFICANT CHANGE UP (ref 3.8–10.5)

## 2024-05-09 PROCEDURE — 86850 RBC ANTIBODY SCREEN: CPT

## 2024-05-09 PROCEDURE — 83036 HEMOGLOBIN GLYCOSYLATED A1C: CPT

## 2024-05-09 PROCEDURE — 85027 COMPLETE CBC AUTOMATED: CPT

## 2024-05-09 PROCEDURE — 86900 BLOOD TYPING SEROLOGIC ABO: CPT

## 2024-05-09 PROCEDURE — G0463: CPT

## 2024-05-09 PROCEDURE — 86901 BLOOD TYPING SEROLOGIC RH(D): CPT

## 2024-05-09 PROCEDURE — 80048 BASIC METABOLIC PNL TOTAL CA: CPT

## 2024-05-09 NOTE — H&P PST ADULT - NSICDXPASTSURGICALHX_GEN_ALL_CORE_FT
PAST SURGICAL HISTORY:  AV fistula     History of cardiac cath      PAST SURGICAL HISTORY:  AV fistula     History of cardiac cath     S/P cataract surgery, right

## 2024-05-09 NOTE — H&P PST ADULT - ASSESSMENT
DASI score:  DASI activity:  Loose teeth or denture: upper and lower dentures DASI score:6.5  DASI activity: stationary bike/treadmill walk x 20 minutes on non HD days, mild/mod house chores  Loose teeth or denture: upper and lower dentures

## 2024-05-09 NOTE — H&P PST ADULT - NSICDXPASTMEDICALHX_GEN_ALL_CORE_FT
PAST MEDICAL HISTORY:  CAD (coronary artery disease)     Diabetes mellitus     ESRD on dialysis     Former smoker     Gout     HLD (hyperlipidemia)     HTN (hypertension)      PAST MEDICAL HISTORY:  CAD (coronary artery disease)     Diabetes mellitus     ESRD on dialysis     Former smoker     Gout     HLD (hyperlipidemia)     HTN (hypertension)     Moderate aortic insufficiency

## 2024-05-09 NOTE — H&P PST ADULT - OTHER CARE PROVIDERS
Cardiologist - Dr. Marcio Rain, Nephrologist - Dr. Vish Munguia, HD Center at [facility] in [place], Endocrinologist - Dr. Kramer at [facility]

## 2024-05-09 NOTE — H&P PST ADULT - NSANTHOSAYNRD_GEN_A_CORE
No. WILBERTO screening performed.  STOP BANG Legend: 0-2 = LOW Risk; 3-4 = INTERMEDIATE Risk; 5-8 = HIGH Risk

## 2024-05-09 NOTE — H&P PST ADULT - PROBLEM SELECTOR PLAN 1
Revision of AV Fistula on 5/15/24 with Dr. Henrik Morocho.  Pre-op instructions given. Questions answered.

## 2024-05-09 NOTE — H&P PST ADULT - HISTORY OF PRESENT ILLNESS
67 y/o AA male, former cigarette smoker x 40PY with PMHx of HTN, HLD, T2DM on insulin,  ESRD on HD via Right AV fistula (Nephrology- Dr. Vish Munguia-[facility]) and CAD s/p MI and multiple PCI last 8/2022 Impella assisted POBA to LM, SCOTT to pLAD and SCOTT to the pLCx who is now under evaluation for kidney transplant.  Patient presented to his Cardiologist - Dr. AMADOR Rain for evaluation and is s/p Pharm NST in 7/2023 demonstrating small sized moderate defects in the anteroseptal wall that is faxed and consistent with infarct along with medium sized moderate defects in the inferior and inferolateral walls that are partially reversible and consistent with infarct and danny-infarct ischemia.  Patient is now for [facility].     2/19-2/21/24 Covid19 70 y/o male presents to PST prior to scheduled Right arm revision of AV fistula on 5/15/24 with Dr. Henrik Morocho.  PMHx of HTN, HLD, T2DM on insulin,  ESRD on HD via Right AV fistula (Nephrology- Dr. Vish Munguia-[facility] on M/W/F) and CAD s/p MI and multiple stents last 8/2023 (total of 4 stents; off Plavix now, ASA 81mg only). Listed for renal transplant in [place] and [place]. Reports "fistula is getting larger" and had experienced pain last week with bending his right arm. Went to Dr. Morocho for evaluation and reports "they are afraid of AV fistula bleeding". Denies chest pain, palpitations, sob/jay, dizziness, syncope.    2/19-2/21/24 Covid19 - Hospitalized (unsure which hospital). Denies desaturation, chest pain. Received monoclonal antibody treatment.

## 2024-05-09 NOTE — H&P PST ADULT - NSSUBSTANCEUSE_GEN_ALL_CORE_SD
denies drug use - used to smoke marijuana 5 years ago, smoked daily x 50 years denies drug use/street drug/inhalant/medication abuse

## 2024-05-09 NOTE — H&P PST ADULT - NSICDXFAMILYHX_GEN_ALL_CORE_FT
FAMILY HISTORY:  Father  Still living? Unknown  FH: HTN (hypertension), Age at diagnosis: Age Unknown  FH: type 2 diabetes, Age at diagnosis: Age Unknown    Mother  Still living? Unknown  FH: HTN (hypertension), Age at diagnosis: Age Unknown  FH: type 2 diabetes, Age at diagnosis: Age Unknown    Sibling  Still living? Unknown  FH: HTN (hypertension), Age at diagnosis: Age Unknown  FH: renal failure, Age at diagnosis: Age Unknown  FH: type 2 diabetes, Age at diagnosis: Age Unknown

## 2024-05-09 NOTE — H&P PST ADULT - ATTENDING COMMENTS
Full H+ P as above; discussed with surgery resident and vascular fellow.   He has ESRD and gets dialysis roslyn a RT arm AV fistula. 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 He presents to have this fixed before any significant bleeding event occurs. The procedure risks and alternatives were discussed with the pt and informed consent was obtained.

## 2024-05-09 NOTE — H&P PST ADULT - LAST STRESS TEST
7/2023 "small sized moderate defect in the anteroseptal wall that is fixed and consistent w/ infarct, medium sized moderate defect in the inferior and inferolateral wall that are partially reversible and consistent with infarct and moderate danny-infarct ischemia. EF 59%"

## 2024-05-09 NOTE — H&P PST ADULT - PROBLEM SELECTOR PLAN 4
Cardiac evaluation in chart from 3/2024. Pt denies change in activity since.   Continue aspirin 81mg. Continue BP/cholesterol meds as prescribed.

## 2024-05-14 ENCOUNTER — TRANSCRIPTION ENCOUNTER (OUTPATIENT)
Age: 69
End: 2024-05-14

## 2024-05-15 ENCOUNTER — TRANSCRIPTION ENCOUNTER (OUTPATIENT)
Age: 69
End: 2024-05-15

## 2024-05-15 ENCOUNTER — OUTPATIENT (OUTPATIENT)
Dept: INPATIENT UNIT | Facility: HOSPITAL | Age: 69
LOS: 1 days | End: 2024-05-15
Payer: MEDICARE

## 2024-05-15 VITALS
DIASTOLIC BLOOD PRESSURE: 69 MMHG | RESPIRATION RATE: 18 BRPM | SYSTOLIC BLOOD PRESSURE: 148 MMHG | TEMPERATURE: 98 F | HEART RATE: 78 BPM | OXYGEN SATURATION: 97 %

## 2024-05-15 VITALS
RESPIRATION RATE: 17 BRPM | TEMPERATURE: 99 F | HEART RATE: 90 BPM | SYSTOLIC BLOOD PRESSURE: 183 MMHG | HEIGHT: 71 IN | WEIGHT: 174.61 LBS | OXYGEN SATURATION: 100 % | DIASTOLIC BLOOD PRESSURE: 65 MMHG

## 2024-05-15 DIAGNOSIS — Z98.890 OTHER SPECIFIED POSTPROCEDURAL STATES: Chronic | ICD-10-CM

## 2024-05-15 DIAGNOSIS — T82.898D OTHER SPECIFIED COMPLICATION OF VASCULAR PROSTHETIC DEVICES, IMPLANTS AND GRAFTS, SUBSEQUENT ENCOUNTER: ICD-10-CM

## 2024-05-15 DIAGNOSIS — I77.0 ARTERIOVENOUS FISTULA, ACQUIRED: Chronic | ICD-10-CM

## 2024-05-15 DIAGNOSIS — Z98.41 CATARACT EXTRACTION STATUS, RIGHT EYE: Chronic | ICD-10-CM

## 2024-05-15 LAB
GLUCOSE BLDC GLUCOMTR-MCNC: 141 MG/DL — HIGH (ref 70–99)
GLUCOSE BLDC GLUCOMTR-MCNC: 188 MG/DL — HIGH (ref 70–99)
GLUCOSE BLDC GLUCOMTR-MCNC: 221 MG/DL — HIGH (ref 70–99)
POTASSIUM BLDV-SCNC: 3.8 MMOL/L — SIGNIFICANT CHANGE UP (ref 3.5–5.1)

## 2024-05-15 PROCEDURE — C1889: CPT

## 2024-05-15 PROCEDURE — 84132 ASSAY OF SERUM POTASSIUM: CPT

## 2024-05-15 PROCEDURE — 36832 AV FISTULA REVISION OPEN: CPT | Mod: RT

## 2024-05-15 PROCEDURE — 82962 GLUCOSE BLOOD TEST: CPT

## 2024-05-15 DEVICE — SURGICEL POWDER 3 GRAMS: Type: IMPLANTABLE DEVICE | Site: RIGHT | Status: FUNCTIONAL

## 2024-05-15 DEVICE — CLIP APPLIER COVIDIEN SURGICLIP III 9" SM: Type: IMPLANTABLE DEVICE | Site: RIGHT | Status: FUNCTIONAL

## 2024-05-15 DEVICE — CLIP APPLIER COVIDIEN SURGICLIP II 9.75" MEDIUM: Type: IMPLANTABLE DEVICE | Site: RIGHT | Status: FUNCTIONAL

## 2024-05-15 RX ORDER — CHLORHEXIDINE GLUCONATE 213 G/1000ML
1 SOLUTION TOPICAL ONCE
Refills: 0 | Status: DISCONTINUED | OUTPATIENT
Start: 2024-05-15 | End: 2024-05-15

## 2024-05-15 RX ORDER — METOCLOPRAMIDE HCL 10 MG
10 TABLET ORAL ONCE
Refills: 0 | Status: DISCONTINUED | OUTPATIENT
Start: 2024-05-15 | End: 2024-05-15

## 2024-05-15 RX ORDER — OXYCODONE HYDROCHLORIDE 5 MG/1
5 TABLET ORAL ONCE
Refills: 0 | Status: DISCONTINUED | OUTPATIENT
Start: 2024-05-15 | End: 2024-05-15

## 2024-05-15 RX ORDER — CEFAZOLIN SODIUM 1 G
2000 VIAL (EA) INJECTION ONCE
Refills: 0 | Status: COMPLETED | OUTPATIENT
Start: 2024-05-15 | End: 2024-05-15

## 2024-05-15 RX ORDER — ASPIRIN/CALCIUM CARB/MAGNESIUM 324 MG
1 TABLET ORAL
Qty: 0 | Refills: 0 | DISCHARGE

## 2024-05-15 RX ORDER — TAMSULOSIN HYDROCHLORIDE 0.4 MG/1
1 CAPSULE ORAL
Qty: 0 | Refills: 0 | DISCHARGE

## 2024-05-15 RX ORDER — ATORVASTATIN CALCIUM 80 MG/1
1 TABLET, FILM COATED ORAL
Qty: 0 | Refills: 0 | DISCHARGE

## 2024-05-15 RX ORDER — ONDANSETRON 8 MG/1
4 TABLET, FILM COATED ORAL ONCE
Refills: 0 | Status: DISCONTINUED | OUTPATIENT
Start: 2024-05-15 | End: 2024-05-15

## 2024-05-15 RX ORDER — EZETIMIBE 10 MG/1
1 TABLET ORAL
Qty: 0 | Refills: 0 | DISCHARGE

## 2024-05-15 RX ORDER — LOSARTAN POTASSIUM 100 MG/1
1 TABLET, FILM COATED ORAL
Refills: 0 | DISCHARGE

## 2024-05-15 RX ORDER — LIDOCAINE HCL 20 MG/ML
0.2 VIAL (ML) INJECTION ONCE
Refills: 0 | Status: DISCONTINUED | OUTPATIENT
Start: 2024-05-15 | End: 2024-05-15

## 2024-05-15 RX ORDER — SODIUM CHLORIDE 9 MG/ML
3 INJECTION INTRAMUSCULAR; INTRAVENOUS; SUBCUTANEOUS EVERY 8 HOURS
Refills: 0 | Status: DISCONTINUED | OUTPATIENT
Start: 2024-05-15 | End: 2024-05-15

## 2024-05-15 RX ORDER — TRAMADOL HYDROCHLORIDE 50 MG/1
1 TABLET ORAL
Refills: 0 | DISCHARGE

## 2024-05-15 RX ORDER — FERROUS SULFATE 325(65) MG
1 TABLET ORAL
Qty: 0 | Refills: 0 | DISCHARGE

## 2024-05-15 RX ORDER — FENTANYL CITRATE 50 UG/ML
25 INJECTION INTRAVENOUS
Refills: 0 | Status: DISCONTINUED | OUTPATIENT
Start: 2024-05-15 | End: 2024-05-15

## 2024-05-15 RX ORDER — CHOLECALCIFEROL (VITAMIN D3) 125 MCG
1 CAPSULE ORAL
Refills: 0 | DISCHARGE

## 2024-05-15 RX ORDER — ALLOPURINOL 300 MG
0 TABLET ORAL
Qty: 0 | Refills: 0 | DISCHARGE

## 2024-05-15 RX ORDER — INSULIN GLARGINE 100 [IU]/ML
10 INJECTION, SOLUTION SUBCUTANEOUS
Refills: 0 | DISCHARGE

## 2024-05-15 RX ORDER — INSULIN ASPART 100 [IU]/ML
0 INJECTION, SOLUTION SUBCUTANEOUS
Refills: 0 | DISCHARGE

## 2024-05-15 RX ORDER — INSULIN HUMAN 100 [IU]/ML
3 INJECTION, SOLUTION SUBCUTANEOUS ONCE
Refills: 0 | Status: COMPLETED | OUTPATIENT
Start: 2024-05-15 | End: 2024-05-15

## 2024-05-15 RX ADMIN — FENTANYL CITRATE 25 MICROGRAM(S): 50 INJECTION INTRAVENOUS at 14:10

## 2024-05-15 RX ADMIN — FENTANYL CITRATE 25 MICROGRAM(S): 50 INJECTION INTRAVENOUS at 14:25

## 2024-05-15 RX ADMIN — FENTANYL CITRATE 25 MICROGRAM(S): 50 INJECTION INTRAVENOUS at 15:25

## 2024-05-15 RX ADMIN — FENTANYL CITRATE 25 MICROGRAM(S): 50 INJECTION INTRAVENOUS at 15:10

## 2024-05-15 RX ADMIN — INSULIN HUMAN 3 UNIT(S): 100 INJECTION, SOLUTION SUBCUTANEOUS at 11:18

## 2024-05-15 NOTE — ASU DISCHARGE PLAN (ADULT/PEDIATRIC) - NS MD DC FALL RISK RISK
For information on Fall & Injury Prevention, visit: [email or web address] OR  [email or web address] OR  https://www.cdc.gov/steadi/patient.html

## 2024-05-15 NOTE — PRE-ANESTHESIA EVALUATION ADULT - NSANTHPMHFT_GEN_ALL_CORE
Last cardiac stent 8/2023, no longer on plavix  Sedentary lifestyle, METS ~4  Last dialysis was yesterday, normally MWF but had an extra session this week because of his procedure.  Makes urine (urinates ~3x/day)

## 2024-05-15 NOTE — ASU PATIENT PROFILE, ADULT - NSICDXPASTMEDICALHX_GEN_ALL_CORE_FT
PAST MEDICAL HISTORY:  CAD (coronary artery disease)     Diabetes mellitus     ESRD on dialysis     Former smoker     Gout     HLD (hyperlipidemia)     HTN (hypertension)     Moderate aortic insufficiency

## 2024-05-15 NOTE — ASU DISCHARGE PLAN (ADULT/PEDIATRIC) - CARE PROVIDER_API CALL
Henrik Morocho  Vascular Surgery  [address], [address]  [place] [address]  Phone: [phone]  Fax: [phone]  Follow Up Time: 2 weeks

## 2024-05-15 NOTE — PRE-ANESTHESIA EVALUATION ADULT - NSANTHPEFT_GEN_ALL_CORE
Awake and alert, anxious, no focal deficits  Normal rate/rhythm, palpable pulses, no LE edema, normal peripheral perfusion  Nonlabored respirations, on RA, no wheezing  Abd soft, nondistended

## 2024-05-15 NOTE — PRE-ANESTHESIA EVALUATION ADULT - NSANTHADDINFOFT_GEN_ALL_CORE
AVF high up in arm which may make coverage with a block more difficult, discussion held with surgeon and plan for GA with LMA.

## 2024-05-15 NOTE — ASU DISCHARGE PLAN (ADULT/PEDIATRIC) - ASU DC SPECIAL INSTRUCTIONSFT
Continue regular diet.  Dressings : Remove outside dressing in 2 days, OK to shower normally.   Activity : Please avoid heavy lifting (anything over 10 pounds) for at least 6 weeks.   Follow up : Call to schedule a follow up appointment in 2 weeks

## 2024-05-15 NOTE — ASU PREOP CHECKLIST - ISOLATION PRECAUTIONS
At [facility], one important tool we use to improve our patient services is our Patient Survey.  Following your visit you may receive our survey in the mail.    Please take the time to complete the survey.    If your visit with us was great, we want to hear about it.    If we can improve, please let us know how.          none

## 2024-05-15 NOTE — PRE-ANESTHESIA EVALUATION ADULT - NSANTHLABRESULTSFT_GEN_ALL_CORE
, will give 3 units SQ (patient would normally cover this with 3 units) and recheck and monitor intraop.

## 2024-05-22 ENCOUNTER — TRANSCRIPTION ENCOUNTER (OUTPATIENT)
Age: 69
End: 2024-05-22

## 2024-05-22 PROBLEM — K86.2 PANCREATIC CYST: Status: ACTIVE | Noted: 2023-05-31

## 2024-05-23 ENCOUNTER — APPOINTMENT (OUTPATIENT)
Dept: SURGICAL ONCOLOGY | Facility: CLINIC | Age: 69
End: 2024-05-23
Payer: MEDICARE

## 2024-05-23 VITALS
OXYGEN SATURATION: 99 % | DIASTOLIC BLOOD PRESSURE: 73 MMHG | HEIGHT: 71 IN | RESPIRATION RATE: 17 BRPM | WEIGHT: 178 LBS | BODY MASS INDEX: 24.92 KG/M2 | SYSTOLIC BLOOD PRESSURE: 150 MMHG | HEART RATE: 105 BPM

## 2024-05-23 DIAGNOSIS — K86.2 CYST OF PANCREAS: ICD-10-CM

## 2024-05-23 PROCEDURE — 99213 OFFICE O/P EST LOW 20 MIN: CPT

## 2024-05-23 NOTE — HISTORY OF PRESENT ILLNESS
[de-identified] : Mr. ELLIOT HARGROVE is a 69 year old male who presents for a follow-up visit in our pancreas cyst clinic. His PMH includes ESRD 2/2 T2DM on insulin, HTN, h/o CAD s/p 2 stents and balloon angioplasty 8/2022.   He is on renal transplant list for 7 years.    Pancreas cyst found incidentally on CT scan obtained during kidney transplant evaluation in 2022.  Surveillance MRi/MRCP 5/2/24 showing adjacent pancreatic tail cysts one measuring 1.2 cm and the other measuring 0.7 cm. A more proximal pancreatic tail cyst measuring 0.7 cm. Less than 0.3 cm cyst in the mid pancreatic body. These appear unchanged from the prior exam.  Also, several subcentimeter foci of arterial enhancement scattered throughout the liver particularly in the right hepatic lobe which become isointense enhancing on the portal venous and delayed phases likely representing flash filling hemangiomas and/or arterioportal shunting.  No history of pancreatitis.  He offers no GI complaints and is asymptomatic in reference to pancreas.  No fam hx of pancreatic cancer or other cancers  Former smoker, 1ppd x 20-30 years, quit 20-30 years ago Social ETOH Retired [employer]

## 2024-05-23 NOTE — PHYSICAL EXAM
[FreeTextEntry1] : General: No acute distress. Well nourished and well kempt. Head: AT/NC Eyes: PERRL. EOMI. Pulmonary: No respiratory distress. Clear to auscultation bilaterally. No wheezes, rales, or rhonchi. No tachypnea and no retractions.  CV: Regular rate and rhythm. Normal S1, S2. No murmurs, rubs, clicks or gallops. No chest wall tenderness. Distal pulses intact. Good capillary refill. ABD: Soft. NT/ND. No rebound, no guarding, no rigidity. No peritoneal signs. No masses.   Extremities: Warm. No edema. 2+ pulses. Neurological: A&O x 4. Psychiatric: Normal affect and mood.

## 2024-05-23 NOTE — ASSESSMENT
[FreeTextEntry1] : 69 year old male who presents for a follow-up visit in our pancreas cyst clinic. Hx of ESRD 2/2 T2DM on insulin, found to have pancreas cyst during kidney transplant evaluation in 2022.  Surveillance MRI/MRCP 5/2/24 showing adjacent pancreatic tail cysts one measuring 1.2 cm and the other measuring 0.7 cm. A more proximal pancreatic tail cyst measuring 0.7 cm. Less than 0.3 cm cyst in the mid pancreatic body. These appear unchanged from the prior exam.  Briefly reviewed cyst pathophysiology with the patient.  Also reviewed [facility] Pancreas [facility] with the patient.  We will continue to follow him/her in our program for scheduled ongoing surveillance.   Reviewed patient's imaging at our pancreas benign conference on 5/15/24 with radiologist, advanced GI and surgical oncology disciplines present, and confirmed stable findings without worrisome features. The consensus at benign conference is for repeat MRI in 18 months to assess stability and follow-up in cyst clinic after MRI.   I discussed symptoms that would raise my concern for malignant transformation including unintended weight loss, jaundice, abdominal/ back pain, and new onset or worsening diabetes. Should these develop, He should call immediately.   Patient verbalized understanding and agrees to plan.   Today, I personally spent 25 minutes in total time including reviewing imaging and studies, discussing treatment regimens, direct face to face time with the patient, patient education and counseling.

## 2024-06-21 PROBLEM — I35.1 NONRHEUMATIC AORTIC (VALVE) INSUFFICIENCY: Chronic | Status: ACTIVE | Noted: 2024-05-09

## 2024-09-05 ENCOUNTER — APPOINTMENT (OUTPATIENT)
Dept: NEPHROLOGY | Facility: CLINIC | Age: 69
End: 2024-09-05
Payer: COMMERCIAL

## 2024-09-05 ENCOUNTER — NON-APPOINTMENT (OUTPATIENT)
Age: 69
End: 2024-09-05

## 2024-09-05 VITALS
HEART RATE: 48 BPM | BODY MASS INDEX: 24.64 KG/M2 | TEMPERATURE: 98 F | HEIGHT: 71 IN | RESPIRATION RATE: 17 BRPM | DIASTOLIC BLOOD PRESSURE: 52 MMHG | OXYGEN SATURATION: 100 % | SYSTOLIC BLOOD PRESSURE: 150 MMHG | WEIGHT: 176 LBS

## 2024-09-05 DIAGNOSIS — Z01.818 ENCOUNTER FOR OTHER PREPROCEDURAL EXAMINATION: ICD-10-CM

## 2024-09-05 PROCEDURE — 99215 OFFICE O/P EST HI 40 MIN: CPT

## 2024-09-06 PROBLEM — Z01.818 PRE-TRANSPLANT EVALUATION FOR KIDNEY TRANSPLANT: Status: ACTIVE | Noted: 2024-09-06

## 2024-09-06 NOTE — HISTORY OF PRESENT ILLNESS
[[id number]] : INTERVAL EVENTS SINCE LAST VISIT-  was hospitalized in June 2024 for fluid overload and pneumonia  He underwent repeat PCI to ostial LCx on 8/8/2023.  ELLIOT HARGROVE is a 69 year old male who presents for 6 month f/u for kidney transplant evaluation.   Cause of ESRD : Long standing h/o DM ( > 34 years) . Known CKD for ~ 10 years. Started dialysis in July 2021.  He has retinopathy but vision is preserved.  Denies neuropathy.   Nephrologist: Dr Vish Carranza.  Has AVF placed in August 2021.  Other PMH : Cardiac -has HTN for ~ 20 years, MI s/p Impella assisted PCI placement of SCOTT to pLAD, SCOTT to pLCx and POBA to LM via RFA on 8/12/2022. Repeat PCI to ostial LCx on 8/8/2023. Pulmonary-  no h/o COPD, Asthma  Infections-h/o  TB, HIV, Hepatitis  or covid. vaccinated for covid Heme- no h/o malignancy or bleeding disorders. No DVT/PE Endo-  diabetes > 34 years, was taken off insulin or OHA 1 year ago.  .Has retinopathy .  no Thyroid disease Neuro- no h/o CVA or seizure  Sensitization events-  no previous blood transfusions or previous transplant No infections or hospitalizations in the last 6 months   Surgical h/o : none Functional status: he can walk about 1/2 mile.   Social history: lives with wife ( has a son who is 45 years, healthy) . quit smoking 1 year ago. used to smoke marijuana. no alcohol or any illicit drug. retired, used to be a . Family history:  His brother has ESRD s/p DDRT in [facility], lasted only for 2 years in the 80's. No family h/o malignancy. Many family members have DM.

## 2024-09-06 NOTE — PLAN
[FreeTextEntry1] : Mr. ELLIOT HARGROVE remains a reasonable candidate for kidney transplantation.  f/u in 6 months   I have personally discussed the risks and benefits of transplantation and patient attended transplant education class where the following was disclosed:   Reviewed factors affecting survival and morbidity while on dialysis, the transplant wait list and reviewed danny-operative and long-term risk factors affecting outcome in kidney transplantation.    One year SRTR outcomes for national and [facility] were discussed in regards to patient survival and graft survival after transplantation.    Details of transplant surgery, including complications were discussed. Immunosuppression and complications including infection including life threatening sepsis and opportunistic infections, malignancy and new onset diabetes were discussed.    Benefits of live donor transplantation as well as variability in wait times across regions and multiple listing were discussed.  KDPI >85% and PHS high risk criteria donors were discussed.  HCV kidney transplantation was discussed.   Will proceed with completing/ updating work up and listing for transplant/ live donor transplant once work up is reviewed and found to be acceptable by multidisciplinary listing committee.

## 2024-09-19 ENCOUNTER — APPOINTMENT (OUTPATIENT)
Dept: CARDIOLOGY | Facility: CLINIC | Age: 69
End: 2024-09-19

## 2024-09-24 ENCOUNTER — NON-APPOINTMENT (OUTPATIENT)
Age: 69
End: 2024-09-24

## 2024-09-24 ENCOUNTER — APPOINTMENT (OUTPATIENT)
Dept: CARDIOLOGY | Facility: CLINIC | Age: 69
End: 2024-09-24
Payer: MEDICARE

## 2024-09-24 VITALS
WEIGHT: 174 LBS | DIASTOLIC BLOOD PRESSURE: 60 MMHG | OXYGEN SATURATION: 98 % | SYSTOLIC BLOOD PRESSURE: 110 MMHG | HEART RATE: 49 BPM | HEIGHT: 71 IN | BODY MASS INDEX: 24.36 KG/M2

## 2024-09-24 DIAGNOSIS — I10 ESSENTIAL (PRIMARY) HYPERTENSION: ICD-10-CM

## 2024-09-24 DIAGNOSIS — E11.9 TYPE 2 DIABETES MELLITUS W/OUT COMPLICATIONS: ICD-10-CM

## 2024-09-24 DIAGNOSIS — Z01.818 ENCOUNTER FOR OTHER PREPROCEDURAL EXAMINATION: ICD-10-CM

## 2024-09-24 DIAGNOSIS — I25.10 ATHEROSCLEROTIC HEART DISEASE OF NATIVE CORONARY ARTERY W/OUT ANGINA PECTORIS: ICD-10-CM

## 2024-09-24 DIAGNOSIS — E78.5 HYPERLIPIDEMIA, UNSPECIFIED: ICD-10-CM

## 2024-09-24 PROCEDURE — 99215 OFFICE O/P EST HI 40 MIN: CPT

## 2024-09-24 PROCEDURE — G2211 COMPLEX E/M VISIT ADD ON: CPT

## 2024-09-24 PROCEDURE — 93000 ELECTROCARDIOGRAM COMPLETE: CPT | Mod: NC

## 2024-09-24 RX ORDER — CALCIUM ACETATE 667 MG
667 TABLET ORAL 3 TIMES DAILY
Refills: 0 | Status: ACTIVE | COMMUNITY
Start: 2024-09-24

## 2024-09-24 RX ORDER — TAMSULOSIN HYDROCHLORIDE 0.4 MG/1
0.4 CAPSULE ORAL
Qty: 90 | Refills: 2 | Status: ACTIVE | COMMUNITY
Start: 2024-09-24

## 2024-09-24 RX ORDER — PRASUGREL 10 MG/1
10 TABLET, FILM COATED ORAL DAILY
Refills: 0 | Status: ACTIVE | COMMUNITY
Start: 2024-09-24

## 2024-09-24 RX ORDER — ALLOPURINOL 100 MG/1
100 TABLET ORAL
Qty: 90 | Refills: 2 | Status: ACTIVE | COMMUNITY
Start: 2024-09-24

## 2024-09-30 ENCOUNTER — NON-APPOINTMENT (OUTPATIENT)
Age: 69
End: 2024-09-30

## 2024-09-30 NOTE — PHYSICAL EXAM
[Well Developed] : well developed [Well Nourished] : well nourished [No Acute Distress] : no acute distress [Normal Conjunctiva] : normal conjunctiva [Normal Venous Pressure] : normal venous pressure [No Carotid Bruit] : no carotid bruit [Normal S1, S2] : normal S1, S2 [No Rub] : no rub [No Gallop] : no gallop [Murmur] : murmur [Normal Rate] : normal [Rhythm Regular] : regular [Normal S1] : normal S1 [Normal S2] : normal S2 [I] : a grade 1 [No Pitting Edema] : no pitting edema present [Clear Lung Fields] : clear lung fields [Good Air Entry] : good air entry [No Respiratory Distress] : no respiratory distress  [Soft] : abdomen soft [Non Tender] : non-tender [No Masses/organomegaly] : no masses/organomegaly [Normal Bowel Sounds] : normal bowel sounds [Normal Gait] : normal gait [No Edema] : no edema [No Cyanosis] : no cyanosis [No Clubbing] : no clubbing [No Varicosities] : no varicosities [No Rash] : no rash [No Skin Lesions] : no skin lesions [Moves all extremities] : moves all extremities [No Focal Deficits] : no focal deficits [Normal Speech] : normal speech [Alert and Oriented] : alert and oriented [Normal memory] : normal memory [de-identified] : diastolic murmur [de-identified] : right brachial AV fistula, +thrill

## 2024-09-30 NOTE — CARDIOLOGY SUMMARY
[de-identified] : 6/14/2022, sinus 73 bpm, LVH with left axis and QRS widening, poor R-wave progression.\par  9/6/2022. sinus 76 bpm, LVH with left axis- anterior fascicular block, normal R-wave progression.\par  12/6/2022, sinus 94 bpm with occasional APC. left axis-anterior fascicular block. Non specific T-abnormality. Normal R-wave progression. Unchanged from prior.  [de-identified] : 7/7/2022, pharmacologic nuclear stress test with ischemia in the anterior wall, septum, and basal inferior walls, LVEF 53%, LVEDV 149 mL\par  7/6/2023, pharmacologic nuclear stress test: small sized, moderate defect in the anteroseptal wall that is fixed and consistent with infarct. medium sized, moderate defect in the inferior and inferolateral wall that are partially reversible and consistent with infarct and moderate danny-infarct ischemia. LVEF 59%. LVEDV 101 mL.  [de-identified] : 12/6/2022, mildly dilated LA, severe concentric LVH, normal pulmonary pressures, basal-mid inferolateral hypokinesis, LVEF 50-55% 3/28/2024. TTE.  1. Severe left ventricular hypertrophy. 2. Left ventricular systolic function is normal with an ejection fraction visually estimated at 50 to 55 %. 3. The left atrium is mildly dilated. 4. Mild to moderate mitral regurgitation at a blood pressure of 158/80 mmHg. 5. Normal right ventricular cavity size, with normal wall thickness, and normal systolic function. 6. Estimated pulmonary artery systolic pressure is 26 mmHg, consistent with normal pulmonary artery pressure. 7. Compared to the transthoracic echocardiogram performed on 12/6/2022, there have been no significant interval changes. [de-identified] : 8/4/2022 with Carroll Garza MD - 80% distal LM disease into LAD\par  8/12/2022 with Angel Kumar MD and Carroll Garza MD at [facility] - Impella assisted PCI to LM, LAD, and LCx

## 2024-09-30 NOTE — PHYSICAL EXAM
[Well Developed] : well developed [Well Nourished] : well nourished [No Acute Distress] : no acute distress [Normal Conjunctiva] : normal conjunctiva [Normal Venous Pressure] : normal venous pressure [No Carotid Bruit] : no carotid bruit [Normal S1, S2] : normal S1, S2 [No Rub] : no rub [No Gallop] : no gallop [Murmur] : murmur [Normal Rate] : normal [Rhythm Regular] : regular [Normal S1] : normal S1 [Normal S2] : normal S2 [I] : a grade 1 [No Pitting Edema] : no pitting edema present [Clear Lung Fields] : clear lung fields [Good Air Entry] : good air entry [No Respiratory Distress] : no respiratory distress  [Soft] : abdomen soft [Non Tender] : non-tender [No Masses/organomegaly] : no masses/organomegaly [Normal Bowel Sounds] : normal bowel sounds [Normal Gait] : normal gait [No Edema] : no edema [No Cyanosis] : no cyanosis [No Clubbing] : no clubbing [No Varicosities] : no varicosities [No Rash] : no rash [No Skin Lesions] : no skin lesions [Moves all extremities] : moves all extremities [No Focal Deficits] : no focal deficits [Normal Speech] : normal speech [Alert and Oriented] : alert and oriented [Normal memory] : normal memory [de-identified] : diastolic murmur [de-identified] : right brachial AV fistula, +thrill

## 2024-09-30 NOTE — DISCUSSION/SUMMARY
[EKG obtained to assist in diagnosis and management of assessed problem(s)] : EKG obtained to assist in diagnosis and management of assessed problem(s) [FreeTextEntry1] : Patient is a 69-year-old Black gentleman with history as above who presents today for cardiac evaluation prior to possible renal transplant who is now status post Impella assisted PCI placement of SCOTT to pLAD, SCOTT to pLCx and POBA to LM via RFA on 8/12/2022. Repeat PCI to ostial LCx on 8/8/2023.  Echocardiography in December 2022 revealed an improvement in left ventricular systolic function. LVEF now 50%. Echocardiogram from earlier today as above.  Moderate aortic insufficiency is present.   Resume carvedilol at 12.5 mg BID. Continue statin and Zetia.   Patient is now greater than 6 months status post PCI he can now safely discontinue clopidogrel. He should continue ASA without interruption.  Will obtain copies of recent left heart catheterization and other cardiac reports from [facility].  Follow up in 3 months.

## 2024-09-30 NOTE — END OF VISIT
[Time Spent: ___ minutes] : I have spent [unfilled] minutes of time on the encounter which excludes teaching and separately reported services. [FreeTextEntry3] : I, Marcio Rain MD, personally performed the evaluation and management (E/M) services for this established patient who presents today with (a) new problem(s)/exacerbation of (an) existing condition(s). That E/M includes conducting the clinically appropriate interval history &/or exam, assessing all new/exacerbated conditions, and establishing a new plan of care. Today, Aminta Brennan NP was here to observe my evaluation and management service for this new problem/exacerbated condition and follow the plan of care established by me going forward.

## 2024-09-30 NOTE — HISTORY OF PRESENT ILLNESS
[FreeTextEntry1] : Patient is a 68 year-old Black gentleman, with known past medical history of hypertension, dyslipidemia, insulin dependent type II diabetes (off insulin after losing weight), coronary artery disease status post MI treated with PCI x3 stents, CKD stage V, who presents as a preemptive candidate for renal transplant.  He has not been following regularly with a cardiologist, but he reports that his nephrologist (Vish Avitia MD) checked an echocardiogram approximately one month ago.  He used to smoke marijuana, but he has not smoked in over a year, and he never smoked cigarettes.  He does not exercise because of back discomfort. He does not report any chest pain or shortness of breath.  He has not been sick with Covid-19. He had two doses of Pfizer's Covid-19 vaccine and then a Moderna booster on 1/2/2022.  9/6/2022. Mr. Jefferson Cat returns today for follow up. He is now s/p Impella assisted PCI placement of SCOTT to pLAD, SCOTT to pLCx and POBA to LM via RFA on 8/12/2022. Since his procedure he has been feeling very well overall and if he were "14 years old again". In July, he began hemodialysis on a M-W-F schedule and is being closely followed by Vish Munguia MD. For exercise he has been using his stationary bike on occasion and is planning to ramp up his exercise routine now that he has increased energy. At this time, he is not interested in cardiac rehabilitation as he is fairly busy with his dialysis schedule.  12/6/2022. Mr. Cat returns today for scheduled follow up and repeat echocardiography. He continues to feel excellent and as if he is a "brand new man". He is amazed at how he can now walk and climb stairs without feeling winded. Hemodialysis continues on a M-W-F schedule without any complications. He is interested in returning to exercise soon.   12/20/2022. He returns today for routine scheduled follow up.  He continues to feel exceedingly well overall, however, he has not yet returned to exercise.  Blood pressures are improved and he is tolerating carvedilol 12.5 mg BID along with his other medications.  April 2023 - Patient returns today for follow-up of his renal transplant list status an coronary artery disease. He continues to have ESRD on HD via right brachial AV fistula (Avbjaj-Inafreoye-Gdmxko).  7/20/2023 Jefferson Cat returns today for scheduled follow up and to maintain his candidacy for a possible renal transplant. He recently had a repeat nuclear stress test which revealed small sized moderate defects in the anteroseptal wall that is fixed and consistent with infarct along with medium sized moderate defects in the inferior and inferolateral walls that are partially reversible and consistent with infarct and moderate danny-infarct ischemia. Prior to further transplant consideration he will require a left heart catheterization.  Otherwise he is feeling well. He continues on hemodialysis M-W-F without any complications. With his day to day activities he denies any chest discomfort, shortness of breath, palpitations, lightheadedness or syncope. He has been taking his medications as directed.   August 2023 - Patient returns today for follow-up. His nuclear stress test was abnormal, so he was referred for repeat angiography. Cath on 8/8/2023 showed ostial LCx disease at the site of a previous stent. He is now status post repeat PCI with SCOTT to that site.  He reports that, since his repeat PCI, he no longer gets shortness of breath while climbing stairs.  He is maintained on dual antiplatelet therapy.   November 2023 - Patient returns today for follow-up in his usual state of health.  ESRD on HD via right brachial AV fistula (Bnsgqc-Jyqxibpwd-Zykmpu). He has a stationary bike at home that he uses for 15-20 minutes at a time.   3/28/2024 Jefferson returns today for routine scheduled follow up and to maintain his candidacy for a potential renal transplant. He continues to feel excellent overall. He did attempt to ride his bicycle outside but quickly discovered that his legs were sore. To address the pain he has been rubbing an apricot pit which has been soaked in rubbing alcohol to the area and he believes that the pain is subsiding. As a result, he has also returned to his stationary bicycle for exercise and he denies any chest discomfort, shortness of breath, palpitations, lightheadedness or syncope.  He reports no issues with his usual M-W-F hemodialysis. We have reviewed his medications and he is taking all as directed, including clopidogrel and low dose aspirin.

## 2024-09-30 NOTE — CARDIOLOGY SUMMARY
[de-identified] : 6/14/2022, sinus 73 bpm, LVH with left axis and QRS widening, poor R-wave progression.\par  9/6/2022. sinus 76 bpm, LVH with left axis- anterior fascicular block, normal R-wave progression.\par  12/6/2022, sinus 94 bpm with occasional APC. left axis-anterior fascicular block. Non specific T-abnormality. Normal R-wave progression. Unchanged from prior.  [de-identified] : 7/7/2022, pharmacologic nuclear stress test with ischemia in the anterior wall, septum, and basal inferior walls, LVEF 53%, LVEDV 149 mL\par  7/6/2023, pharmacologic nuclear stress test: small sized, moderate defect in the anteroseptal wall that is fixed and consistent with infarct. medium sized, moderate defect in the inferior and inferolateral wall that are partially reversible and consistent with infarct and moderate danny-infarct ischemia. LVEF 59%. LVEDV 101 mL.  [de-identified] : 12/6/2022, mildly dilated LA, severe concentric LVH, normal pulmonary pressures, basal-mid inferolateral hypokinesis, LVEF 50-55% 3/28/2024. TTE.  1. Severe left ventricular hypertrophy. 2. Left ventricular systolic function is normal with an ejection fraction visually estimated at 50 to 55 %. 3. The left atrium is mildly dilated. 4. Mild to moderate mitral regurgitation at a blood pressure of 158/80 mmHg. 5. Normal right ventricular cavity size, with normal wall thickness, and normal systolic function. 6. Estimated pulmonary artery systolic pressure is 26 mmHg, consistent with normal pulmonary artery pressure. 7. Compared to the transthoracic echocardiogram performed on 12/6/2022, there have been no significant interval changes. [de-identified] : 8/4/2022 with Carroll Garza MD - 80% distal LM disease into LAD\par  8/12/2022 with Angel Kumar MD and Carroll Garza MD at [facility] - Impella assisted PCI to LM, LAD, and LCx

## 2024-09-30 NOTE — REASON FOR VISIT
[Other: ____] : [unfilled] [Other: _____] : [unfilled] [FreeTextEntry1] : 9/24/2024 Jefferson Cat returns today for routine scheduled follow up. In May he was hospitalized for pneumonia after his family noticed him to be in a "brain fog". He was admitted at [facility] and diagnosed with Covid. Fourteen days later he returned to the hospital with fluid volume overload. He was aggressively dialyzed and then he was transferred to [facility] and there he underwent left heart catheterization and ultimately received another cardiac stent on 6/15/2024. Afterwards he felt better and today he continues to feel well with no complaints. For exercise he has been walking and denies any chest discomfort, shortness of breath, palpitations, lightheadedness or syncope. He reports no issues with hemodialysis M-W-F at [facility] in [place].    Prasugrel and AIDEN with Dr. REY Munguia at [place] to complete 6 months.

## 2024-09-30 NOTE — HISTORY OF PRESENT ILLNESS
[FreeTextEntry1] : Patient is a 68 year-old Black gentleman, with known past medical history of hypertension, dyslipidemia, insulin dependent type II diabetes (off insulin after losing weight), coronary artery disease status post MI treated with PCI x3 stents, CKD stage V, who presents as a preemptive candidate for renal transplant.  He has not been following regularly with a cardiologist, but he reports that his nephrologist (Vish Avitia MD) checked an echocardiogram approximately one month ago.  He used to smoke marijuana, but he has not smoked in over a year, and he never smoked cigarettes.  He does not exercise because of back discomfort. He does not report any chest pain or shortness of breath.  He has not been sick with Covid-19. He had two doses of Pfizer's Covid-19 vaccine and then a Moderna booster on 1/2/2022.  9/6/2022. Mr. Jefferson Cat returns today for follow up. He is now s/p Impella assisted PCI placement of SCOTT to pLAD, SCOTT to pLCx and POBA to LM via RFA on 8/12/2022. Since his procedure he has been feeling very well overall and if he were "14 years old again". In July, he began hemodialysis on a M-W-F schedule and is being closely followed by Vish Munguia MD. For exercise he has been using his stationary bike on occasion and is planning to ramp up his exercise routine now that he has increased energy. At this time, he is not interested in cardiac rehabilitation as he is fairly busy with his dialysis schedule.  12/6/2022. Mr. Cat returns today for scheduled follow up and repeat echocardiography. He continues to feel excellent and as if he is a "brand new man". He is amazed at how he can now walk and climb stairs without feeling winded. Hemodialysis continues on a M-W-F schedule without any complications. He is interested in returning to exercise soon.   12/20/2022. He returns today for routine scheduled follow up.  He continues to feel exceedingly well overall, however, he has not yet returned to exercise.  Blood pressures are improved and he is tolerating carvedilol 12.5 mg BID along with his other medications.  April 2023 - Patient returns today for follow-up of his renal transplant list status an coronary artery disease. He continues to have ESRD on HD via right brachial AV fistula (Svgfcy-Ndnpnalml-Yshwmf).  7/20/2023 Jefferson Cat returns today for scheduled follow up and to maintain his candidacy for a possible renal transplant. He recently had a repeat nuclear stress test which revealed small sized moderate defects in the anteroseptal wall that is fixed and consistent with infarct along with medium sized moderate defects in the inferior and inferolateral walls that are partially reversible and consistent with infarct and moderate danny-infarct ischemia. Prior to further transplant consideration he will require a left heart catheterization.  Otherwise he is feeling well. He continues on hemodialysis M-W-F without any complications. With his day to day activities he denies any chest discomfort, shortness of breath, palpitations, lightheadedness or syncope. He has been taking his medications as directed.   August 2023 - Patient returns today for follow-up. His nuclear stress test was abnormal, so he was referred for repeat angiography. Cath on 8/8/2023 showed ostial LCx disease at the site of a previous stent. He is now status post repeat PCI with SCOTT to that site.  He reports that, since his repeat PCI, he no longer gets shortness of breath while climbing stairs.  He is maintained on dual antiplatelet therapy.   November 2023 - Patient returns today for follow-up in his usual state of health.  ESRD on HD via right brachial AV fistula (Hbaxla-Xyxokgsdw-Dnpstd). He has a stationary bike at home that he uses for 15-20 minutes at a time.   3/28/2024 Jefferson returns today for routine scheduled follow up and to maintain his candidacy for a potential renal transplant. He continues to feel excellent overall. He did attempt to ride his bicycle outside but quickly discovered that his legs were sore. To address the pain he has been rubbing an apricot pit which has been soaked in rubbing alcohol to the area and he believes that the pain is subsiding. As a result, he has also returned to his stationary bicycle for exercise and he denies any chest discomfort, shortness of breath, palpitations, lightheadedness or syncope.  He reports no issues with his usual M-W-F hemodialysis. We have reviewed his medications and he is taking all as directed, including clopidogrel and low dose aspirin.

## 2024-09-30 NOTE — REASON FOR VISIT
[Other: ____] : [unfilled] [Other: _____] : [unfilled] [FreeTextEntry1] : 9/24/2024 Jefferson Cat returns today for routine scheduled follow up. In May he was hospitalized for pneumonia after his family noticed him to be in a "brain fog". He was admitted at [facility] and diagnosed with Covid. Fourteen days later he returned to the hospital with fluid volume overload. He was aggressively dialyzed and then he was transferred to [facility] and there he underwent left heart catheterization and ultimately received another cardiac stent on 6/15/2024. Afterwards he felt better and today he continues to feel well with no complaints. For exercise he has been walking and denies any chest discomfort, shortness of breath, palpitations, lightheadedness or syncope. He reports no issues with hemodialysis M-W-F at [facility] in [place].    Prasugrel and AIDEN with Dr. REY Munguia at [place] to complete 6 months.

## 2024-11-01 ENCOUNTER — NON-APPOINTMENT (OUTPATIENT)
Age: 69
End: 2024-11-01

## 2024-11-01 DIAGNOSIS — N18.9 CHRONIC KIDNEY DISEASE, UNSPECIFIED: ICD-10-CM

## 2024-11-01 DIAGNOSIS — T82.898D OTHER SPECIFIED COMPLICATION OF VASCULAR PROSTHETIC DEVICES, IMPLANTS AND GRAFTS, SUBSEQUENT ENCOUNTER: ICD-10-CM

## 2024-11-01 DIAGNOSIS — D64.9 ANEMIA, UNSPECIFIED: ICD-10-CM

## 2024-11-01 DIAGNOSIS — Z83.3 FAMILY HISTORY OF DIABETES MELLITUS: ICD-10-CM

## 2024-11-01 DIAGNOSIS — Z82.49 FAMILY HISTORY OF ISCHEMIC HEART DISEASE AND OTHER DISEASES OF THE CIRCULATORY SYSTEM: ICD-10-CM

## 2024-11-01 DIAGNOSIS — I70.213 ATHEROSCLEROSIS OF NATIVE ARTERIES OF EXTREMITIES WITH INTERMITTENT CLAUDICATION, BILATERAL LEGS: ICD-10-CM

## 2024-11-01 DIAGNOSIS — T82.898A OTHER SPECIFIED COMPLICATION OF VASCULAR PROSTHETIC DEVICES, IMPLANTS AND GRAFTS, INITIAL ENCOUNTER: ICD-10-CM

## 2024-11-01 RX ORDER — LOSARTAN POTASSIUM 100 MG/1
100 TABLET, FILM COATED ORAL
Refills: 0 | Status: ACTIVE | COMMUNITY

## 2024-11-01 RX ORDER — NIFEDIPINE 60 MG/1
60 TABLET, EXTENDED RELEASE ORAL TWICE DAILY
Refills: 0 | Status: ACTIVE | COMMUNITY

## 2024-11-01 RX ORDER — EZETIMIBE 10 MG/1
10 TABLET ORAL DAILY
Refills: 0 | Status: ACTIVE | COMMUNITY

## 2024-11-01 RX ORDER — TRAMADOL HYDROCHLORIDE 50 MG/1
50 TABLET, COATED ORAL 3 TIMES DAILY
Refills: 0 | Status: ACTIVE | COMMUNITY

## 2024-11-01 RX ORDER — CARVEDILOL 12.5 MG/1
12.5 TABLET, FILM COATED ORAL TWICE DAILY
Refills: 0 | Status: ACTIVE | COMMUNITY

## 2024-11-01 RX ORDER — FOLIC ACID/VIT B COMPLEX AND C 0.8 MG
TABLET ORAL
Refills: 0 | Status: ACTIVE | COMMUNITY

## 2024-12-04 DIAGNOSIS — Z01.818 ENCOUNTER FOR OTHER PREPROCEDURAL EXAMINATION: ICD-10-CM

## 2024-12-10 ENCOUNTER — APPOINTMENT (OUTPATIENT)
Age: 69
End: 2024-12-10
Payer: MEDICARE

## 2024-12-10 VITALS
BODY MASS INDEX: 24.92 KG/M2 | HEIGHT: 71 IN | WEIGHT: 178 LBS | SYSTOLIC BLOOD PRESSURE: 167 MMHG | HEART RATE: 79 BPM | DIASTOLIC BLOOD PRESSURE: 64 MMHG

## 2024-12-10 DIAGNOSIS — T82.898A OTHER SPECIFIED COMPLICATION OF VASCULAR PROSTHETIC DEVICES, IMPLANTS AND GRAFTS, INITIAL ENCOUNTER: ICD-10-CM

## 2024-12-10 PROCEDURE — 99213 OFFICE O/P EST LOW 20 MIN: CPT

## 2024-12-10 PROCEDURE — 93990 DOPPLER FLOW TESTING: CPT

## 2024-12-10 PROCEDURE — 99203 OFFICE O/P NEW LOW 30 MIN: CPT

## 2024-12-10 PROCEDURE — ZZZZZ: CPT

## 2024-12-17 ENCOUNTER — APPOINTMENT (OUTPATIENT)
Dept: CARDIOLOGY | Facility: CLINIC | Age: 69
End: 2024-12-17

## 2024-12-19 ENCOUNTER — APPOINTMENT (OUTPATIENT)
Dept: CARDIOLOGY | Facility: CLINIC | Age: 69
End: 2024-12-19
Payer: MEDICARE

## 2024-12-19 ENCOUNTER — NON-APPOINTMENT (OUTPATIENT)
Age: 69
End: 2024-12-19

## 2024-12-19 VITALS
HEART RATE: 87 BPM | SYSTOLIC BLOOD PRESSURE: 122 MMHG | HEIGHT: 71 IN | BODY MASS INDEX: 25.71 KG/M2 | WEIGHT: 183.64 LBS | DIASTOLIC BLOOD PRESSURE: 60 MMHG

## 2024-12-19 DIAGNOSIS — I10 ESSENTIAL (PRIMARY) HYPERTENSION: ICD-10-CM

## 2024-12-19 DIAGNOSIS — E78.5 HYPERLIPIDEMIA, UNSPECIFIED: ICD-10-CM

## 2024-12-19 DIAGNOSIS — Z01.818 ENCOUNTER FOR OTHER PREPROCEDURAL EXAMINATION: ICD-10-CM

## 2024-12-19 DIAGNOSIS — I25.10 ATHEROSCLEROTIC HEART DISEASE OF NATIVE CORONARY ARTERY W/OUT ANGINA PECTORIS: ICD-10-CM

## 2024-12-19 DIAGNOSIS — E11.9 TYPE 2 DIABETES MELLITUS W/OUT COMPLICATIONS: ICD-10-CM

## 2024-12-19 PROCEDURE — 99215 OFFICE O/P EST HI 40 MIN: CPT

## 2024-12-19 PROCEDURE — G2211 COMPLEX E/M VISIT ADD ON: CPT

## 2024-12-19 PROCEDURE — 93000 ELECTROCARDIOGRAM COMPLETE: CPT

## 2024-12-20 ENCOUNTER — TRANSCRIPTION ENCOUNTER (OUTPATIENT)
Age: 69
End: 2024-12-20

## 2024-12-20 ENCOUNTER — APPOINTMENT (OUTPATIENT)
Dept: VASCULAR SURGERY | Facility: HOSPITAL | Age: 69
End: 2024-12-20

## 2024-12-20 ENCOUNTER — OUTPATIENT (OUTPATIENT)
Dept: OUTPATIENT SERVICES | Facility: HOSPITAL | Age: 69
LOS: 1 days | End: 2024-12-20
Payer: MEDICARE

## 2024-12-20 VITALS
HEIGHT: 71 IN | TEMPERATURE: 98 F | DIASTOLIC BLOOD PRESSURE: 67 MMHG | WEIGHT: 175.93 LBS | RESPIRATION RATE: 16 BRPM | OXYGEN SATURATION: 100 % | SYSTOLIC BLOOD PRESSURE: 132 MMHG | HEART RATE: 79 BPM

## 2024-12-20 VITALS
OXYGEN SATURATION: 99 % | RESPIRATION RATE: 16 BRPM | DIASTOLIC BLOOD PRESSURE: 65 MMHG | HEART RATE: 79 BPM | SYSTOLIC BLOOD PRESSURE: 140 MMHG

## 2024-12-20 DIAGNOSIS — T82.898A OTHER SPECIFIED COMPLICATION OF VASCULAR PROSTHETIC DEVICES, IMPLANTS AND GRAFTS, INITIAL ENCOUNTER: ICD-10-CM

## 2024-12-20 DIAGNOSIS — I77.0 ARTERIOVENOUS FISTULA, ACQUIRED: Chronic | ICD-10-CM

## 2024-12-20 DIAGNOSIS — Z98.890 OTHER SPECIFIED POSTPROCEDURAL STATES: Chronic | ICD-10-CM

## 2024-12-20 DIAGNOSIS — Z98.41 CATARACT EXTRACTION STATUS, RIGHT EYE: Chronic | ICD-10-CM

## 2024-12-20 LAB
ANION GAP SERPL CALC-SCNC: 20 MMOL/L — HIGH (ref 5–17)
BUN SERPL-MCNC: 47 MG/DL — HIGH (ref 7–23)
CALCIUM SERPL-MCNC: 10.9 MG/DL — HIGH (ref 8.4–10.5)
CHLORIDE SERPL-SCNC: 93 MMOL/L — LOW (ref 96–108)
CO2 SERPL-SCNC: 23 MMOL/L — SIGNIFICANT CHANGE UP (ref 22–31)
CREAT SERPL-MCNC: 7.93 MG/DL — HIGH (ref 0.5–1.3)
EGFR: 7 ML/MIN/1.73M2 — LOW
GLUCOSE BLDC GLUCOMTR-MCNC: 230 MG/DL — HIGH (ref 70–99)
GLUCOSE SERPL-MCNC: 230 MG/DL — HIGH (ref 70–99)
HCT VFR BLD CALC: 38.3 % — LOW (ref 39–50)
HGB BLD-MCNC: 12.1 G/DL — LOW (ref 13–17)
MCHC RBC-ENTMCNC: 29.1 PG — SIGNIFICANT CHANGE UP (ref 27–34)
MCHC RBC-ENTMCNC: 31.6 G/DL — LOW (ref 32–36)
MCV RBC AUTO: 92.1 FL — SIGNIFICANT CHANGE UP (ref 80–100)
NRBC # BLD: 0 /100 WBCS — SIGNIFICANT CHANGE UP (ref 0–0)
PLATELET # BLD AUTO: 175 K/UL — SIGNIFICANT CHANGE UP (ref 150–400)
POTASSIUM SERPL-MCNC: 4 MMOL/L — SIGNIFICANT CHANGE UP (ref 3.5–5.3)
POTASSIUM SERPL-SCNC: 4 MMOL/L — SIGNIFICANT CHANGE UP (ref 3.5–5.3)
RBC # BLD: 4.16 M/UL — LOW (ref 4.2–5.8)
RBC # FLD: 15.6 % — HIGH (ref 10.3–14.5)
SODIUM SERPL-SCNC: 136 MMOL/L — SIGNIFICANT CHANGE UP (ref 135–145)
WBC # BLD: 8.3 K/UL — SIGNIFICANT CHANGE UP (ref 3.8–10.5)
WBC # FLD AUTO: 8.3 K/UL — SIGNIFICANT CHANGE UP (ref 3.8–10.5)

## 2024-12-20 PROCEDURE — 93010 ELECTROCARDIOGRAM REPORT: CPT

## 2024-12-20 PROCEDURE — 82962 GLUCOSE BLOOD TEST: CPT

## 2024-12-20 PROCEDURE — 36902 INTRO CATH DIALYSIS CIRCUIT: CPT

## 2024-12-20 PROCEDURE — C1894: CPT

## 2024-12-20 PROCEDURE — 85027 COMPLETE CBC AUTOMATED: CPT

## 2024-12-20 PROCEDURE — 93005 ELECTROCARDIOGRAM TRACING: CPT

## 2024-12-20 PROCEDURE — 80048 BASIC METABOLIC PNL TOTAL CA: CPT

## 2024-12-20 PROCEDURE — C1725: CPT

## 2024-12-20 PROCEDURE — C1769: CPT

## 2024-12-20 RX ORDER — PRASUGREL HYDROCHLORIDE 10 MG/1
1 TABLET, COATED ORAL
Refills: 0 | DISCHARGE

## 2024-12-20 RX ORDER — CARVEDILOL 25 MG/1
1 TABLET, FILM COATED ORAL
Refills: 0 | DISCHARGE

## 2024-12-20 NOTE — BRIEF OPERATIVE NOTE - OPERATION/FINDINGS
Right brachial to cephalic AV Fistulogram noted proximal stenosis at the level of the cephalic arc. DCB angioplasty completed. Hemostasis achieved.

## 2024-12-20 NOTE — ASU DISCHARGE PLAN (ADULT/PEDIATRIC) - ASU DC SPECIAL INSTRUCTIONSFT
You may cannulate fistula ANYWHERE.   please call your vascular surgeon to schedule a follow up appointment.

## 2024-12-20 NOTE — ASU DISCHARGE PLAN (ADULT/PEDIATRIC) - FINANCIAL ASSISTANCE
[facility] provides services at a reduced cost to those who are determined to be eligible through [facility]’s financial assistance program. Information regarding [facility]’s financial assistance program can be found by going to [email or web address] or by calling [phone].

## 2024-12-20 NOTE — BRIEF OPERATIVE NOTE - NSICDXBRIEFPREOP_GEN_ALL_CORE_FT
PRE-OP DIAGNOSIS:  Stenosis of AV fistula 20-Dec-2024 13:09:55  Jorge Robison   Estlander Flap (Upper To Lower Lip) Text: The defect of the lower lip was assessed and measured.  Given the location and size of the defect, an Estlander flap was deemed most appropriate.  Using a sterile surgical marker, an appropriate Estlander flap was measured and drawn on the upper lip. Local anesthesia was then infiltrated. A scalpel was then used to incise the lateral aspect of the flap, through skin, muscle and mucosa, leaving the flap pedicled medially.  The flap was then rotated and positioned to fill the lower lip defect.  The flap was then sutured into place with a three layer technique, closing the orbicularis oris muscle layer with subcutaneous buried sutures, followed by a mucosal layer and an epidermal layer.

## 2024-12-20 NOTE — ASU DISCHARGE PLAN (ADULT/PEDIATRIC) - NS MD DC FALL RISK RISK
For information on Fall & Injury Prevention, visit: [email or web address] OR  [email or web address] OR  https://www.cdc.gov/steadi/patient.html

## 2024-12-20 NOTE — ASU DISCHARGE PLAN (ADULT/PEDIATRIC) - CARE PROVIDER_API CALL
Henrik Morocho  Vascular Surgery  [address], [address]  [place] [address]  Phone: [phone]  Fax: [phone]  Established Patient  Follow Up Time: 2 weeks

## 2024-12-20 NOTE — ASU PATIENT PROFILE, ADULT - FALL HARM RISK - UNIVERSAL INTERVENTIONS
Bed in lowest position, wheels locked, appropriate side rails in place/Call bell, personal items and telephone in reach/Instruct patient to call for assistance before getting out of bed or chair/Non-slip footwear when patient is out of bed/[place] to call system/Physically safe environment - no spills, clutter or unnecessary equipment/Purposeful Proactive Rounding/Room/bathroom lighting operational, light cord in reach

## 2024-12-20 NOTE — H&P CARDIOLOGY - HISTORY OF PRESENT ILLNESS
This is a 70 yo ESRD on HD, anemia, HTN presents as outpatient for fistulogram.   Outpatient note:  Pt has a right brachial artery to cephalic vein AV fistula, he had 2 aneurysmal areas excised in May 2024. A duplex scan noted a stenosis in the distal upper arm segment or just above the anastomosis. There was a potential stenosis in the proximal upper arm.         This is a 70 yo ESRD on HD, CAD/ s/p PCI  anemia, HTN, Type 2 DM (on insulin)presents as outpatient for fistulogram. Last HD was yesterday   Outpatient note:  Pt has a right brachial artery to cephalic vein AV fistula, he had 2 aneurysmal areas excised in May 2024. A duplex scan noted a stenosis in the distal upper arm segment or just above the anastomosis. There was a potential stenosis in the proximal upper arm.

## 2024-12-20 NOTE — ASU DISCHARGE PLAN (ADULT/PEDIATRIC) - BATHING
Based on the patients symptoms and/or clinical findings the procedure is clinically indicated and should not be delayed due to Covid 19. Shower only/Do not submerge in water

## 2024-12-26 ENCOUNTER — APPOINTMENT (OUTPATIENT)
Dept: CARDIOLOGY | Facility: CLINIC | Age: 69
End: 2024-12-26
Payer: MEDICARE

## 2024-12-26 PROCEDURE — 93306 TTE W/DOPPLER COMPLETE: CPT

## 2025-03-28 NOTE — DISCUSSION/SUMMARY
[EKG obtained to assist in diagnosis and management of assessed problem(s)] : EKG obtained to assist in diagnosis and management of assessed problem(s) [FreeTextEntry1] : Patient is a 69-year-old Black gentleman with history as above who presents today for cardiac evaluation prior to possible renal transplant who is now status post Impella assisted PCI placement of SCOTT to pLAD, SCOTT to pLCx and POBA to LM via RFA on 8/12/2022. Repeat PCI to ostial LCx on 8/8/2023.  Echocardiography in December 2022 revealed an improvement in left ventricular systolic function. LVEF now 50%. Echocardiogram from earlier today as above.  Moderate aortic insufficiency is present.   Resume carvedilol at 12.5 mg BID. Continue statin and Zetia.   Patient is now greater than 6 months status post PCI he can now safely discontinue clopidogrel. He should continue ASA without interruption.  Will obtain copies of recent left heart catheterization and other cardiac reports from [facility].  Follow up in 3 months.  immune

## 2025-04-03 ENCOUNTER — OUTPATIENT (OUTPATIENT)
Dept: OUTPATIENT SERVICES | Facility: HOSPITAL | Age: 70
LOS: 1 days | End: 2025-04-03
Payer: MEDICARE

## 2025-04-03 VITALS
DIASTOLIC BLOOD PRESSURE: 77 MMHG | RESPIRATION RATE: 16 BRPM | HEIGHT: 71 IN | WEIGHT: 179.02 LBS | TEMPERATURE: 98 F | HEART RATE: 81 BPM | SYSTOLIC BLOOD PRESSURE: 143 MMHG | OXYGEN SATURATION: 98 %

## 2025-04-03 DIAGNOSIS — Z86.010 PERSONAL HISTORY OF COLON POLYPS: ICD-10-CM

## 2025-04-03 DIAGNOSIS — Z98.890 OTHER SPECIFIED POSTPROCEDURAL STATES: Chronic | ICD-10-CM

## 2025-04-03 DIAGNOSIS — I77.0 ARTERIOVENOUS FISTULA, ACQUIRED: Chronic | ICD-10-CM

## 2025-04-03 DIAGNOSIS — Z98.41 CATARACT EXTRACTION STATUS, RIGHT EYE: Chronic | ICD-10-CM

## 2025-04-03 DIAGNOSIS — Z01.818 ENCOUNTER FOR OTHER PREPROCEDURAL EXAMINATION: ICD-10-CM

## 2025-04-03 LAB
A1C WITH ESTIMATED AVERAGE GLUCOSE RESULT: 5.8 % — HIGH (ref 4–5.6)
ANION GAP SERPL CALC-SCNC: 16 MMOL/L — SIGNIFICANT CHANGE UP (ref 5–17)
BUN SERPL-MCNC: 25 MG/DL — HIGH (ref 7–23)
CALCIUM SERPL-MCNC: 10.3 MG/DL — SIGNIFICANT CHANGE UP (ref 8.4–10.5)
CHLORIDE SERPL-SCNC: 96 MMOL/L — SIGNIFICANT CHANGE UP (ref 96–108)
CO2 SERPL-SCNC: 27 MMOL/L — SIGNIFICANT CHANGE UP (ref 22–31)
CREAT SERPL-MCNC: 7.65 MG/DL — HIGH (ref 0.5–1.3)
EGFR: 7 ML/MIN/1.73M2 — LOW
EGFR: 7 ML/MIN/1.73M2 — LOW
ESTIMATED AVERAGE GLUCOSE: 120 MG/DL — HIGH (ref 68–114)
GLUCOSE SERPL-MCNC: 265 MG/DL — HIGH (ref 70–99)
HCT VFR BLD CALC: 30.3 % — LOW (ref 39–50)
HGB BLD-MCNC: 9.2 G/DL — LOW (ref 13–17)
MCHC RBC-ENTMCNC: 26.7 PG — LOW (ref 27–34)
MCHC RBC-ENTMCNC: 30.4 G/DL — LOW (ref 32–36)
MCV RBC AUTO: 87.8 FL — SIGNIFICANT CHANGE UP (ref 80–100)
NRBC BLD AUTO-RTO: 0 /100 WBCS — SIGNIFICANT CHANGE UP (ref 0–0)
PLATELET # BLD AUTO: 190 K/UL — SIGNIFICANT CHANGE UP (ref 150–400)
POTASSIUM SERPL-MCNC: 3.8 MMOL/L — SIGNIFICANT CHANGE UP (ref 3.5–5.3)
POTASSIUM SERPL-SCNC: 3.8 MMOL/L — SIGNIFICANT CHANGE UP (ref 3.5–5.3)
RBC # BLD: 3.45 M/UL — LOW (ref 4.2–5.8)
RBC # FLD: 14.7 % — HIGH (ref 10.3–14.5)
SODIUM SERPL-SCNC: 139 MMOL/L — SIGNIFICANT CHANGE UP (ref 135–145)
WBC # BLD: 6.77 K/UL — SIGNIFICANT CHANGE UP (ref 3.8–10.5)
WBC # FLD AUTO: 6.77 K/UL — SIGNIFICANT CHANGE UP (ref 3.8–10.5)

## 2025-04-03 PROCEDURE — 85027 COMPLETE CBC AUTOMATED: CPT

## 2025-04-03 PROCEDURE — G0463: CPT

## 2025-04-03 PROCEDURE — 80048 BASIC METABOLIC PNL TOTAL CA: CPT

## 2025-04-03 PROCEDURE — 83036 HEMOGLOBIN GLYCOSYLATED A1C: CPT

## 2025-04-03 RX ORDER — AMLODIPINE BESYLATE 10 MG/1
1 TABLET ORAL
Refills: 0 | DISCHARGE

## 2025-04-03 RX ORDER — INSULIN GLARGINE-YFGN 100 [IU]/ML
10 INJECTION, SOLUTION SUBCUTANEOUS
Refills: 0 | DISCHARGE

## 2025-04-03 NOTE — H&P PST ADULT - BP NONINVASIVE MEAN (MM HG)
Dr. Ridge Cordon patient for his/her scheduled telephone colon screening. PCP visit on 2/24/2022 and referred to GI for her 1st colonoscopy     CBC from 2/24/2022 show no signs of anemia     Anticoagulants: no   Diabetic Meds:  No   BP meds(Ace inhibitors/ARB's): no   Weight loss meds (phentermine/vyvanse): takes adderall for ADD   Iron supplement (RX/OTC): no   Height & Weight/BMI: 5'6.5\"/ 249lbs/ 39.59   Hx of Cardiac/CVA issues/(MI/Stroke): no   Devices Pacemaker/Defibrillator/Stents: no  Resp. Issues/Oxygen Use/IAM/COPD: no   Issues w/Anesthesia: no     Symptoms (Y/N): no    Family history of colon cancer:  She is unsure     Would like to schedule cln on a Friday     Medications, pharmacy, and allergies verified with patient over the phone. Please advise on orders and prep, thank you. 99

## 2025-04-03 NOTE — H&P PST ADULT - PRIMARY CARE PROVIDER
prior PMD(looking for one due to Med: insurance problem )Dr. Humphrey Pandey [phone] Prior PMD/"looking for one due to Medical insurance problem - Dr. Humphrey Pandey [phone] Prior PMD/ "looking for another PMD due to Medical insurance problem" - Dr. Humphrey Pandey [phone]

## 2025-04-03 NOTE — H&P PST ADULT - HISTORY OF PRESENT ILLNESS
69 year old male PMH of HTN, HLD, DM2 insulin dependent, CAD (total  4 coronary stents, last one PCI in 08/2024 &  S/p status post MI treated with PCI x3 stents in 08/2023)on Asprin 81 mgs only, , CKD stage V on  HD via right brachial AV fistula (Afddfe-Rifdjytcz-Ejvhpi, Nephrology- Dr. Vish Munguia-[facility]/ also s/p angioplasty of the fistula -intact in 12/2024/ & Right arm revision of AV fistula on 5/15/24 with Dr. Henrik Morocho),Listed for renal transplant in [place] and [place]. who presents as a preemptive candidate for renal transplant. Scheduled for Colonoscopy on 04/10/25.  Denies chest pain, palpitations, sob/jay, dizziness, syncope.               69 year old male PMH of HTN, HLD, DM2 insulin dependent, CAD (total  4 coronary stents, last one PCI in 08/2024 &  S/p status post MI treated with PCI x3 stents in 2022 & 08/2023)on Asprin 81 mgs only , moderate MR/ mod AR (last Echo12/2024), Chronic back pain & CKD stage V on  HD via right brachial AV fistula (Ptyjgt-Ahodfjpop-Qzsxod, Nephrology- Dr. Vish Munguia-[facility]/ also s/p angioplasty of the fistula -intact in 12/2024/ & Right arm revision of AV fistula on 5/15/24 with Dr. Henrik Morocho),Listed for renal transplant in [place]. who presents as a preemptive candidate for renal transplant. Scheduled for Colonoscopy on 04/10/25.  Denies fever, chills, loss / gain in weight, chest pain, palpitations, swelling in legs, SOB, BETANCOURT, dizziness or syncope.               69 year old male PMH of HTN, HLD, DM2 insulin dependent, CAD (total  4 coronary stents, last one PCI in 08/2024 &  S/p status post MI treated with PCI x3 stents in 2022 & 08/2023)on Asprin 81 mgs only , moderate MR/ mod AR (last Echo12/2024), Chronic back pain & CKD stage 5 on  HD 3x/week via Right brachial AV fistula (Gzilpg-Atrdsghjs-Iapiqh, Nephrology- Dr.Paul Munguia-[facility], in [place]/ also s/p angioplasty of the AVfistula -intact in 12/2024/ & had revision of AV fistula in  05/2024 with Dr. Henrik Morocho), Listed for renal transplant in [place] and [place]. Who presents as a preemptive candidate for renal transplant,  Scheduled for Colonoscopy on 04/10/25.  Denies fever, chills, loss / gain in weight, chest pain, palpitations, swelling in legs, SOB, BETANCOURT, dizziness or syncope.

## 2025-04-03 NOTE — H&P PST ADULT - PROBLEM SELECTOR PLAN 1
colonoscopy on 04/10/25  Pre-op instructions given. Questions answered.  Continue ASA 81 mgs FOR CORONARY PROTECTION Colonoscopy on 04/10/25  Pre-op instructions given. Questions answered.  Continue ASA 81 mgs FOR CORONARY PROTECTION

## 2025-04-03 NOTE — H&P PST ADULT - OTHER CARE PROVIDERS
Cardiologist - Dr. Marcio Rain  , Nephrologist - Dr. Vish Munguia, HD [facility] at [facility] in [place], Endocrinologist - Dr. Kramer  [phone] Cardiologist - Dr. Marcio Rain  , Nephrologist - Dr. Vish Munguia / D[facility] at [facility] in [place], Endocrinologist - Dr. Kramer  [phone]

## 2025-04-03 NOTE — H&P PST ADULT - CARDIOVASCULAR COMMENTS
RUE, brachial AVF, + bruit , + thrill noted CAD with 4 stents in heart, last one in 06/2024 on Asprin

## 2025-04-03 NOTE — H&P PST ADULT - PROBLEM SELECTOR PLAN 4
Cardiac evaluation in chart from 12/2024. Pt denies change in activity since.   Continue aspirin 81mg. Continue BP/cholesterol meds as prescribed.

## 2025-04-03 NOTE — H&P PST ADULT - ASSESSMENT
DASI score:6.4  DASI activity: stationary bike/treadmill walk x 20 minutes on non HD days, mild/mod house chores  Loose teeth or denture: upper and lower dentures

## 2025-04-08 ENCOUNTER — NON-APPOINTMENT (OUTPATIENT)
Age: 70
End: 2025-04-08

## 2025-04-17 ENCOUNTER — NON-APPOINTMENT (OUTPATIENT)
Age: 70
End: 2025-04-17

## 2025-04-17 ENCOUNTER — APPOINTMENT (OUTPATIENT)
Dept: NEPHROLOGY | Facility: CLINIC | Age: 70
End: 2025-04-17

## 2025-04-17 ENCOUNTER — APPOINTMENT (OUTPATIENT)
Dept: CARDIOLOGY | Facility: CLINIC | Age: 70
End: 2025-04-17
Payer: COMMERCIAL

## 2025-04-17 VITALS
DIASTOLIC BLOOD PRESSURE: 70 MMHG | BODY MASS INDEX: 24.83 KG/M2 | WEIGHT: 178 LBS | SYSTOLIC BLOOD PRESSURE: 140 MMHG | HEART RATE: 81 BPM | OXYGEN SATURATION: 96 %

## 2025-04-17 DIAGNOSIS — E78.5 HYPERLIPIDEMIA, UNSPECIFIED: ICD-10-CM

## 2025-04-17 DIAGNOSIS — I25.10 ATHEROSCLEROTIC HEART DISEASE OF NATIVE CORONARY ARTERY W/OUT ANGINA PECTORIS: ICD-10-CM

## 2025-04-17 DIAGNOSIS — I10 ESSENTIAL (PRIMARY) HYPERTENSION: ICD-10-CM

## 2025-04-17 PROBLEM — I34.0 MITRAL REGURGITATION: Status: ACTIVE | Noted: 2025-04-17

## 2025-04-17 PROCEDURE — 93000 ELECTROCARDIOGRAM COMPLETE: CPT | Mod: NC

## 2025-04-17 PROCEDURE — 93306 TTE W/DOPPLER COMPLETE: CPT

## 2025-04-17 PROCEDURE — 99215 OFFICE O/P EST HI 40 MIN: CPT

## 2025-04-17 RX ORDER — AMLODIPINE BESYLATE 5 MG/1
5 TABLET ORAL
Qty: 30 | Refills: 0 | Status: ACTIVE | COMMUNITY
Start: 2025-04-17

## 2025-04-21 ENCOUNTER — NON-APPOINTMENT (OUTPATIENT)
Age: 70
End: 2025-04-21

## 2025-04-22 ENCOUNTER — LABORATORY RESULT (OUTPATIENT)
Age: 70
End: 2025-04-22

## 2025-04-22 ENCOUNTER — NON-APPOINTMENT (OUTPATIENT)
Age: 70
End: 2025-04-22

## 2025-04-22 ENCOUNTER — APPOINTMENT (OUTPATIENT)
Dept: NEPHROLOGY | Facility: CLINIC | Age: 70
End: 2025-04-22
Payer: COMMERCIAL

## 2025-04-22 VITALS
RESPIRATION RATE: 18 BRPM | WEIGHT: 178 LBS | OXYGEN SATURATION: 98 % | BODY MASS INDEX: 24.92 KG/M2 | HEIGHT: 71 IN | TEMPERATURE: 97.5 F | DIASTOLIC BLOOD PRESSURE: 69 MMHG | HEART RATE: 72 BPM | SYSTOLIC BLOOD PRESSURE: 159 MMHG

## 2025-04-22 DIAGNOSIS — N18.6 END STAGE RENAL DISEASE: ICD-10-CM

## 2025-04-22 DIAGNOSIS — K86.2 CYST OF PANCREAS: ICD-10-CM

## 2025-04-22 DIAGNOSIS — E11.9 TYPE 2 DIABETES MELLITUS W/OUT COMPLICATIONS: ICD-10-CM

## 2025-04-22 DIAGNOSIS — I34.0 NONRHEUMATIC MITRAL (VALVE) INSUFFICIENCY: ICD-10-CM

## 2025-04-22 DIAGNOSIS — Z01.818 ENCOUNTER FOR OTHER PREPROCEDURAL EXAMINATION: ICD-10-CM

## 2025-04-22 PROCEDURE — 99215 OFFICE O/P EST HI 40 MIN: CPT

## 2025-04-23 PROBLEM — N18.6 END STAGE RENAL DISEASE: Status: ACTIVE | Noted: 2025-04-23

## 2025-04-23 LAB
ABORH: NORMAL
ALBUMIN SERPL ELPH-MCNC: 4.2 G/DL
ALP BLD-CCNC: 104 U/L
ALT SERPL-CCNC: 37 U/L
ANION GAP SERPL CALC-SCNC: 21 MMOL/L
AST SERPL-CCNC: 17 U/L
BILIRUB SERPL-MCNC: 0.6 MG/DL
BUN SERPL-MCNC: 52 MG/DL
C PEPTIDE SERPL-MCNC: 6.3 NG/ML
CALCIUM SERPL-MCNC: 9.4 MG/DL
CHLORIDE SERPL-SCNC: 97 MMOL/L
CHOLEST SERPL-MCNC: 90 MG/DL
CK SERPL-CCNC: 60 U/L
CMV IGG SERPL QL: >10 U/ML
CMV IGG SERPL-IMP: POSITIVE
CO2 SERPL-SCNC: 29 MMOL/L
COVID-19 SPIKE DOMAIN ANTIBODY INTERPRETATION: POSITIVE
CREAT SERPL-MCNC: 9.01 MG/DL
CRP SERPL-MCNC: 3 MG/L
EBV DNA SERPL NAA+PROBE-ACNC: NOT DETECTED IU/ML
EBV EA AB SER IA-ACNC: 15.4 U/ML
EBV EA AB TITR SER IF: POSITIVE
EBV EA IGG SER QL IA: >600 U/ML
EBV EA IGG SER-ACNC: POSITIVE
EBV EA IGM SER IA-ACNC: NEGATIVE
EBV PATRN SPEC IB-IMP: NORMAL
EBV VCA IGG SER IA-ACNC: 212 U/ML
EBV VCA IGM SER QL IA: <10 U/ML
EBVPCR LOG: NOT DETECTED LOG10IU/ML
EGFRCR SERPLBLD CKD-EPI 2021: 6 ML/MIN/1.73M2
EPSTEIN-BARR VIRUS CAPSID ANTIGEN IGG: POSITIVE
ERYTHROCYTE [SEDIMENTATION RATE] IN BLOOD BY WESTERGREN METHOD: 11 MM/HR
ESTIMATED AVERAGE GLUCOSE: 140 MG/DL
GLUCOSE SERPL-MCNC: 117 MG/DL
HBA1C MFR BLD HPLC: 6.5 %
HBV CORE IGG+IGM SER QL: NONREACTIVE
HBV SURFACE AB SER QL: NONREACTIVE
HBV SURFACE AB SERPL IA-ACNC: <3.3 MIU/ML
HBV SURFACE AG SER QL: NONREACTIVE
HCT VFR BLD CALC: 34.4 %
HCV AB SER QL: NONREACTIVE
HCV S/CO RATIO: 0.22 S/CO
HDLC SERPL-MCNC: 45 MG/DL
HEPATITIS A IGG ANTIBODY: NONREACTIVE
HGB BLD-MCNC: 10.5 G/DL
HIV1+2 AB SPEC QL IA.RAPID: NONREACTIVE
HSV 1+2 IGG SER IA-IMP: NEGATIVE
HSV 1+2 IGG SER IA-IMP: POSITIVE
HSV 1+2 IGG SER IA-IMP: POSITIVE
HSV1 IGG SER QL: 29.6 INDEX
HSV1 IGG SER QL: 29.6 INDEX
HSV2 IGG SER QL: 0.17 INDEX
LDLC SERPL-MCNC: 25 MG/DL
MAGNESIUM SERPL-MCNC: 2.3 MG/DL
MCHC RBC-ENTMCNC: 27.9 PG
MCHC RBC-ENTMCNC: 30.5 G/DL
MCV RBC AUTO: 91.5 FL
MEV IGG FLD QL IA: 175 AU/ML
MEV IGG FLD QL IA: 175 AU/ML
MEV IGG+IGM SER-IMP: POSITIVE
MEV IGG+IGM SER-IMP: POSITIVE
MUV AB SER-ACNC: POSITIVE
MUV IGG SER QL IA: 139 AU/ML
NONHDLC SERPL-MCNC: 45 MG/DL
PARATHYROID HORMONE INTACT: 591 PG/ML
PHOSPHATE SERPL-MCNC: 3.2 MG/DL
PLATELET # BLD AUTO: 177 K/UL
POTASSIUM SERPL-SCNC: 3.8 MMOL/L
PROT SERPL-MCNC: 7 G/DL
PSA SERPL-MCNC: 0.47 NG/ML
RBC # BLD: 3.76 M/UL
RBC # FLD: 17.5 %
RUBV IGG FLD-ACNC: 9.38 INDEX
RUBV IGG FLD-ACNC: 9.38 INDEX
RUBV IGG SER-IMP: POSITIVE
RUBV IGG SER-IMP: POSITIVE
SARS-COV-2 AB SERPL IA-ACNC: >250 U/ML
SARS-COV-2 N GENE NPH QL NAA+PROBE: NOT DETECTED
SODIUM SERPL-SCNC: 147 MMOL/L
T GONDII AB SER-IMP: NEGATIVE
T GONDII IGG SER QL: <3 IU/ML
T PALLIDUM AB SER QL IA: NEGATIVE
T3 SERPL-MCNC: 83 NG/DL
T4 FREE SERPL-MCNC: 1.5 NG/DL
TRIGL SERPL-MCNC: 109 MG/DL
TSH SERPL-ACNC: 1.64 UIU/ML
URATE SERPL-MCNC: 5.1 MG/DL
VZV AB TITR SER: POSITIVE
VZV AB TITR SER: POSITIVE
VZV IGG SER IF-ACNC: 24.5 S/CO
VZV IGG SER IF-ACNC: 24.5 S/CO
WBC # FLD AUTO: 7.26 K/UL

## 2025-04-24 LAB
M TB IFN-G BLD-IMP: NEGATIVE
QUANTIFERON TB PLUS MITOGEN MINUS NIL: >10 IU/ML
QUANTIFERON TB PLUS NIL: 0.08 IU/ML
QUANTIFERON TB PLUS TB1 MINUS NIL: 0 IU/ML
QUANTIFERON TB PLUS TB2 MINUS NIL: 0 IU/ML
STRONGYLOIDES AB SER IA-ACNC: NEGATIVE

## 2025-04-29 ENCOUNTER — OUTPATIENT (OUTPATIENT)
Dept: OUTPATIENT SERVICES | Facility: HOSPITAL | Age: 70
LOS: 1 days | End: 2025-04-29
Payer: COMMERCIAL

## 2025-04-29 ENCOUNTER — APPOINTMENT (OUTPATIENT)
Dept: CT IMAGING | Facility: CLINIC | Age: 70
End: 2025-04-29
Payer: COMMERCIAL

## 2025-04-29 DIAGNOSIS — Z98.41 CATARACT EXTRACTION STATUS, RIGHT EYE: Chronic | ICD-10-CM

## 2025-04-29 DIAGNOSIS — Z98.890 OTHER SPECIFIED POSTPROCEDURAL STATES: Chronic | ICD-10-CM

## 2025-04-29 DIAGNOSIS — Z01.818 ENCOUNTER FOR OTHER PREPROCEDURAL EXAMINATION: ICD-10-CM

## 2025-04-29 DIAGNOSIS — I77.0 ARTERIOVENOUS FISTULA, ACQUIRED: Chronic | ICD-10-CM

## 2025-04-29 PROCEDURE — 74176 CT ABD & PELVIS W/O CONTRAST: CPT | Mod: 26

## 2025-04-29 PROCEDURE — 74176 CT ABD & PELVIS W/O CONTRAST: CPT

## 2025-05-01 ENCOUNTER — OUTPATIENT (OUTPATIENT)
Dept: OUTPATIENT SERVICES | Facility: HOSPITAL | Age: 70
LOS: 1 days | End: 2025-05-01
Payer: MEDICARE

## 2025-05-01 ENCOUNTER — TRANSCRIPTION ENCOUNTER (OUTPATIENT)
Age: 70
End: 2025-05-01

## 2025-05-01 VITALS
WEIGHT: 177.91 LBS | DIASTOLIC BLOOD PRESSURE: 69 MMHG | RESPIRATION RATE: 16 BRPM | OXYGEN SATURATION: 99 % | HEART RATE: 77 BPM | HEIGHT: 71 IN | SYSTOLIC BLOOD PRESSURE: 155 MMHG | TEMPERATURE: 97 F

## 2025-05-01 VITALS
SYSTOLIC BLOOD PRESSURE: 150 MMHG | HEART RATE: 69 BPM | OXYGEN SATURATION: 97 % | RESPIRATION RATE: 18 BRPM | DIASTOLIC BLOOD PRESSURE: 73 MMHG

## 2025-05-01 DIAGNOSIS — Z86.010 PERSONAL HISTORY OF COLON POLYPS: ICD-10-CM

## 2025-05-01 DIAGNOSIS — Z98.41 CATARACT EXTRACTION STATUS, RIGHT EYE: Chronic | ICD-10-CM

## 2025-05-01 DIAGNOSIS — Z98.890 OTHER SPECIFIED POSTPROCEDURAL STATES: Chronic | ICD-10-CM

## 2025-05-01 DIAGNOSIS — I77.0 ARTERIOVENOUS FISTULA, ACQUIRED: Chronic | ICD-10-CM

## 2025-05-01 LAB — GLUCOSE BLDC GLUCOMTR-MCNC: 122 MG/DL — HIGH (ref 70–99)

## 2025-05-01 PROCEDURE — 82962 GLUCOSE BLOOD TEST: CPT

## 2025-05-01 PROCEDURE — G0104: CPT

## 2025-05-01 RX ADMIN — Medication 75 MILLILITER(S): at 10:16

## 2025-05-01 NOTE — PRE-ANESTHESIA EVALUATION ADULT - NSANTHDIETYNSD_GEN_ALL_CORE
Chief Complaint   Patient presents with     Blood Draw     Port blood draw with citrate flush by lab RN. Vitals taken and appointment arrived     Dara Lyon RN       Yes

## 2025-05-01 NOTE — PRE PROCEDURE NOTE - PRE PROCEDURE EVALUATION
Attending Physician: Parish Triana                    Procedure: Colonoscopy    Indication for Procedure: Personal History of colon Polyps  ________________________________________________________  PAST MEDICAL & SURGICAL HISTORY:  HTN (hypertension)      HLD (hyperlipidemia)      CAD (coronary artery disease)      Diabetes mellitus      Former smoker      ESRD on dialysis      Gout      Moderate aortic insufficiency      AV fistula      History of cardiac cath      S/P cataract surgery, right        ALLERGIES:  No Known Allergies    HOME MEDICATIONS:  allopurinol 100 mg oral tablet: orally once a day  amLODIPine 5 mg oral tablet: 1 tab(s) orally once a day  Aspirin Enteric Coated 81 mg oral delayed release tablet: 1 tab(s) orally once a day  atorvastatin 80 mg oral tablet: 1 tab(s) orally once a day  calcium acetate 667 mg oral tablet: 2 cap(s) orally 3 times a day  Coreg 12.5 mg oral tablet: 1 tab(s) orally 2 times a day  ezetimibe 10 mg oral tablet: 1 tab(s) orally once a day  ferrous sulfate 325 mg (65 mg elemental iron) oral tablet: 1 tab(s) orally once a day  NovoLOG FlexPen 100 units/mL injectable solution: injectable 3 times a day 3-5 units with meals- sliding scale  tamsulosin 0.4 mg oral capsule: 1 cap(s) orally once a day  Toujeo Max SoloStar Prefilled Pen 300 units/mL subcutaneous solution: 10 unit(s) subcutaneous once a day (at bedtime) to take 8 units in PM day before procedure  traMADol 50 mg oral tablet: 1 tab(s) orally 3 times a day as needed for  moderate pain  Vitamin D3 1250 mcg (50,000 intl units) oral capsule: 1 orally once a week    AICD/PPM: [ ] yes   [ ] no    PERTINENT LAB DATA:                      PHYSICAL EXAMINATION:    T(C): --  HR: --  BP: --  RR: --  SpO2: --    Constitutional: NAD  HEENT: PERRLA, EOMI,    Neck:  No JVD  Respiratory: CTAB/L  Cardiovascular: S1 and S2  Gastrointestinal: BS+, soft, NT/ND  Extremities: No peripheral edema  Neurological: A/O x 3, no focal deficits  Psychiatric: Normal mood, normal affect  Skin: No rashes    ASA Class: I [ ]  II [ ]  III [ ]  IV [ ]    COMMENTS:    The patient is a suitable candidate for the planned procedure unless box checked [ ]  No, explain:

## 2025-05-01 NOTE — ASU DISCHARGE PLAN (ADULT/PEDIATRIC) - NS MD DC FALL RISK RISK
For information on Fall & Injury Prevention, visit: [email or web address] OR  [email or web address] OR  https://www.cdc.gov/steadi/patient.html

## 2025-05-01 NOTE — ASU PREOP CHECKLIST - HEIGHT IN FEET
OV2 Prep    Sleeve gastrectomy    Surgery 0730 arrival time 0530    Authorized for surgery    PCP clearance  Preop Optimization  -\" OPTIMIZED for SURGERY: YES patient is medically optimized for surgery pending test results.\"  MD note 3/20/24     Medications to Hold   Medications and Supplements:  - Morning of surgery hold any Vitamins AND for 2 weeks hold any Vitamin E or Herbals   - Morning of surgery hold (SUMAtriptan (IMITREX) 50 MG tablet [[id number]])     Anticoagulation:none on med list Per Dr Billingsley.      Glomerular Filtration Rate  >=60 >90        EGD 4/5/24      
5

## 2025-05-01 NOTE — ASU DISCHARGE PLAN (ADULT/PEDIATRIC) - FINANCIAL ASSISTANCE
[facility] provides services at a reduced cost to those who are determined to be eligible through [facility]’s financial assistance program. Information regarding [facility]’s financial assistance program can be found by going to [email or web address] or by calling [phone].

## 2025-05-01 NOTE — ASU PATIENT PROFILE, ADULT - FALL HARM RISK - UNIVERSAL INTERVENTIONS
Bed in lowest position, wheels locked, appropriate side rails in place/Call bell, personal items and telephone in reach/Instruct patient to call for assistance before getting out of bed or chair/Non-slip footwear when patient is out of bed/[place] to call system/Physically safe environment - no spills, clutter or unnecessary equipment/Purposeful Proactive Rounding/Room/bathroom lighting operational, light cord in reach

## 2025-05-01 NOTE — ASU PATIENT PROFILE, ADULT - NSICDXPASTSURGICALHX_GEN_ALL_CORE_FT
PAST SURGICAL HISTORY:  AV fistula     H/O colonoscopy     History of cardiac cath     S/P cataract surgery, right

## 2025-05-07 ENCOUNTER — APPOINTMENT (OUTPATIENT)
Dept: GASTROENTEROLOGY | Facility: CLINIC | Age: 70
End: 2025-05-07
Payer: MEDICARE

## 2025-05-07 DIAGNOSIS — Z86.0100 PERSONAL HISTORY OF COLON POLYPS, UNSPECIFIED: ICD-10-CM

## 2025-05-07 PROCEDURE — 99212 OFFICE O/P EST SF 10 MIN: CPT | Mod: 93

## 2025-05-15 ENCOUNTER — APPOINTMENT (OUTPATIENT)
Dept: CARDIOTHORACIC SURGERY | Facility: CLINIC | Age: 70
End: 2025-05-15
Payer: MEDICARE

## 2025-05-15 VITALS
OXYGEN SATURATION: 94 % | SYSTOLIC BLOOD PRESSURE: 146 MMHG | HEIGHT: 71 IN | RESPIRATION RATE: 16 BRPM | HEART RATE: 72 BPM | DIASTOLIC BLOOD PRESSURE: 71 MMHG | WEIGHT: 178 LBS | BODY MASS INDEX: 24.92 KG/M2

## 2025-05-15 DIAGNOSIS — I25.10 ATHEROSCLEROTIC HEART DISEASE OF NATIVE CORONARY ARTERY W/OUT ANGINA PECTORIS: ICD-10-CM

## 2025-05-15 DIAGNOSIS — E78.5 HYPERLIPIDEMIA, UNSPECIFIED: ICD-10-CM

## 2025-05-15 DIAGNOSIS — R93.1 ABNORMAL FINDINGS ON DIAGNOSTIC IMAGING OF HEART AND CORONARY CIRCULATION: ICD-10-CM

## 2025-05-15 DIAGNOSIS — I10 ESSENTIAL (PRIMARY) HYPERTENSION: ICD-10-CM

## 2025-05-15 DIAGNOSIS — R01.1 CARDIAC MURMUR, UNSPECIFIED: ICD-10-CM

## 2025-05-15 DIAGNOSIS — I34.0 NONRHEUMATIC MITRAL (VALVE) INSUFFICIENCY: ICD-10-CM

## 2025-05-15 DIAGNOSIS — N18.9 CHRONIC KIDNEY DISEASE, UNSPECIFIED: ICD-10-CM

## 2025-05-15 DIAGNOSIS — I50.20 UNSPECIFIED SYSTOLIC (CONGESTIVE) HEART FAILURE: ICD-10-CM

## 2025-05-15 DIAGNOSIS — R06.00 DYSPNEA, UNSPECIFIED: ICD-10-CM

## 2025-05-15 DIAGNOSIS — I25.9 CHRONIC ISCHEMIC HEART DISEASE, UNSPECIFIED: ICD-10-CM

## 2025-05-15 PROCEDURE — G2211 COMPLEX E/M VISIT ADD ON: CPT

## 2025-05-15 PROCEDURE — 99205 OFFICE O/P NEW HI 60 MIN: CPT

## 2025-05-15 RX ORDER — INSULIN LISPRO-AABC 100 [IU]/ML
100 INJECTION, SOLUTION INTRAVENOUS; SUBCUTANEOUS
Refills: 0 | Status: ACTIVE | COMMUNITY
Start: 2025-05-15

## 2025-05-15 RX ORDER — INSULIN ASPART 100 [IU]/ML
(70-30) 100 INJECTION, SUSPENSION SUBCUTANEOUS
Refills: 0 | Status: ACTIVE | COMMUNITY
Start: 2025-05-15

## 2025-05-16 PROBLEM — I25.9 ISCHEMIC HEART DISEASE: Status: ACTIVE | Noted: 2025-05-16

## 2025-05-16 PROBLEM — R01.1 HEART MURMUR: Status: ACTIVE | Noted: 2025-05-16

## 2025-05-16 PROBLEM — I25.10 CAD (CORONARY ARTERY DISEASE): Status: ACTIVE | Noted: 2025-05-16

## 2025-05-16 PROBLEM — R06.00 DYSPNEA: Status: ACTIVE | Noted: 2025-05-16

## 2025-05-16 PROBLEM — R93.1 ABNORMAL ECHOCARDIOGRAM: Status: ACTIVE | Noted: 2025-05-16

## 2025-05-16 PROBLEM — I50.20 SYSTOLIC CONGESTIVE HEART FAILURE, UNSPECIFIED HF CHRONICITY: Status: ACTIVE | Noted: 2025-05-16

## 2025-05-16 PROBLEM — I10 HYPERTENSION: Status: ACTIVE | Noted: 2025-05-16

## 2025-06-03 ENCOUNTER — TRANSCRIPTION ENCOUNTER (OUTPATIENT)
Age: 70
End: 2025-06-03

## 2025-06-03 ENCOUNTER — OUTPATIENT (OUTPATIENT)
Dept: OUTPATIENT SERVICES | Facility: HOSPITAL | Age: 70
LOS: 1 days | End: 2025-06-03
Payer: MEDICARE

## 2025-06-03 VITALS
WEIGHT: 176.59 LBS | DIASTOLIC BLOOD PRESSURE: 82 MMHG | OXYGEN SATURATION: 98 % | RESPIRATION RATE: 18 BRPM | HEIGHT: 71 IN | SYSTOLIC BLOOD PRESSURE: 184 MMHG | HEART RATE: 73 BPM

## 2025-06-03 VITALS
SYSTOLIC BLOOD PRESSURE: 141 MMHG | RESPIRATION RATE: 18 BRPM | OXYGEN SATURATION: 94 % | HEART RATE: 72 BPM | DIASTOLIC BLOOD PRESSURE: 67 MMHG

## 2025-06-03 DIAGNOSIS — Z98.890 OTHER SPECIFIED POSTPROCEDURAL STATES: Chronic | ICD-10-CM

## 2025-06-03 DIAGNOSIS — I77.0 ARTERIOVENOUS FISTULA, ACQUIRED: Chronic | ICD-10-CM

## 2025-06-03 DIAGNOSIS — Z98.41 CATARACT EXTRACTION STATUS, RIGHT EYE: Chronic | ICD-10-CM

## 2025-06-03 DIAGNOSIS — I34.9 NONRHEUMATIC MITRAL VALVE DISORDER, UNSPECIFIED: ICD-10-CM

## 2025-06-03 LAB
ANION GAP SERPL CALC-SCNC: 20 MMOL/L — HIGH (ref 5–17)
BUN SERPL-MCNC: 39 MG/DL — HIGH (ref 7–23)
CALCIUM SERPL-MCNC: 9.8 MG/DL — SIGNIFICANT CHANGE UP (ref 8.4–10.5)
CHLORIDE SERPL-SCNC: 94 MMOL/L — LOW (ref 96–108)
CO2 SERPL-SCNC: 23 MMOL/L — SIGNIFICANT CHANGE UP (ref 22–31)
CREAT SERPL-MCNC: 9 MG/DL — HIGH (ref 0.5–1.3)
EGFR: 6 ML/MIN/1.73M2 — LOW
EGFR: 6 ML/MIN/1.73M2 — LOW
GLUCOSE BLDC GLUCOMTR-MCNC: 190 MG/DL — HIGH (ref 70–99)
GLUCOSE SERPL-MCNC: 182 MG/DL — HIGH (ref 70–99)
HCT VFR BLD CALC: 37.6 % — LOW (ref 39–50)
HGB BLD-MCNC: 11.6 G/DL — LOW (ref 13–17)
MCHC RBC-ENTMCNC: 26.7 PG — LOW (ref 27–34)
MCHC RBC-ENTMCNC: 30.9 G/DL — LOW (ref 32–36)
MCV RBC AUTO: 86.6 FL — SIGNIFICANT CHANGE UP (ref 80–100)
NRBC BLD AUTO-RTO: 0 /100 WBCS — SIGNIFICANT CHANGE UP (ref 0–0)
PLATELET # BLD AUTO: 197 K/UL — SIGNIFICANT CHANGE UP (ref 150–400)
POTASSIUM SERPL-MCNC: 4 MMOL/L — SIGNIFICANT CHANGE UP (ref 3.5–5.3)
POTASSIUM SERPL-MCNC: 5.4 MMOL/L — HIGH (ref 3.5–5.3)
POTASSIUM SERPL-SCNC: 4 MMOL/L — SIGNIFICANT CHANGE UP (ref 3.5–5.3)
POTASSIUM SERPL-SCNC: 5.4 MMOL/L — HIGH (ref 3.5–5.3)
RBC # BLD: 4.34 M/UL — SIGNIFICANT CHANGE UP (ref 4.2–5.8)
RBC # FLD: 17.4 % — HIGH (ref 10.3–14.5)
SODIUM SERPL-SCNC: 137 MMOL/L — SIGNIFICANT CHANGE UP (ref 135–145)
WBC # BLD: 7.52 K/UL — SIGNIFICANT CHANGE UP (ref 3.8–10.5)
WBC # FLD AUTO: 7.52 K/UL — SIGNIFICANT CHANGE UP (ref 3.8–10.5)

## 2025-06-03 PROCEDURE — C1887: CPT

## 2025-06-03 PROCEDURE — C1769: CPT

## 2025-06-03 PROCEDURE — 99152 MOD SED SAME PHYS/QHP 5/>YRS: CPT

## 2025-06-03 PROCEDURE — 93458 L HRT ARTERY/VENTRICLE ANGIO: CPT | Mod: 26

## 2025-06-03 PROCEDURE — C1894: CPT

## 2025-06-03 PROCEDURE — 80048 BASIC METABOLIC PNL TOTAL CA: CPT

## 2025-06-03 PROCEDURE — 82962 GLUCOSE BLOOD TEST: CPT

## 2025-06-03 PROCEDURE — 85027 COMPLETE CBC AUTOMATED: CPT

## 2025-06-03 PROCEDURE — 93005 ELECTROCARDIOGRAM TRACING: CPT

## 2025-06-03 PROCEDURE — 84132 ASSAY OF SERUM POTASSIUM: CPT

## 2025-06-03 PROCEDURE — 93458 L HRT ARTERY/VENTRICLE ANGIO: CPT

## 2025-06-03 PROCEDURE — 93010 ELECTROCARDIOGRAM REPORT: CPT

## 2025-06-03 PROCEDURE — 36415 COLL VENOUS BLD VENIPUNCTURE: CPT

## 2025-06-03 NOTE — ASU DISCHARGE PLAN (ADULT/PEDIATRIC) - FINANCIAL ASSISTANCE
[facility] provides services at a reduced cost to those who are determined to be eligible through [facility]’s financial assistance program. Information regarding [facility]’s financial assistance program can be found by going to [email or web address] or by calling [phone].

## 2025-06-03 NOTE — ASU DISCHARGE PLAN (ADULT/PEDIATRIC) - CARE PROVIDER_API CALL
Marcio Rain  Cardiology  [address], [address]  [place] [address]  Phone: [phone]  Fax: [phone]  Follow Up Time: 2 weeks

## 2025-06-03 NOTE — H&P CARDIOLOGY - HISTORY OF PRESENT ILLNESS
60M with PMH of CKD with HTN, HLD, DM2 on insulin, CAD s/p PCI, CHF with EF 40-45%, severe MR, ESRD on HD via R AVF (M/W/F) presents for [facility]. Reports worsening SOB on minimal exertion and also at night which causes him to wake up or fall asleep. He raises the head of his bed along with 2 pillows. He notes that he does get fatigued and SOB with activity that gets better when he rests. He does deny any CP or pedal edema. He was hospitalized in October 2024 for pneumonia, then he was admitted 2 months ago for the flu and in the interim, he developed CHF, for which they are managing with HD. Being evaluated for living donor kidney transplant, and is awaiting treatment for his MR prior to proceeding. He was seen by Dr. Rain and Dr. Yi and now here to have ischemic workup. Denies lightheadedness, HA, abdominal pain, N/V, hematuria, BRBPR, melena, syncope, or any other complaints. No implanted cardiac devices. No CP, SOB, palpitation at this time.    Cards: Dr. Marcio Rain  Echo 4/17/25 - Left ventricular systolic function is mildly to moderately decreased with an ejection fraction visually estimated at 40 to 45 %, severe mitral regurgitation at a blood pressure of 153/75 mmHg  [facility] on 6/5/2024 status post successful HD IVUS guided PCI of left circumflex using intravascular lithotripsy and 1 drug-eluting stent for proximal-mid lesions. The ostial lesion was successfully treated with intravascular lithotripsy and a drug-coated balloon.

## 2025-06-03 NOTE — H&P CARDIOLOGY - CONSTITUTIONAL DETAILS
well-groomed I have personally seen and examined this patient.  I have fully participated in the care of this patient. I have reviewed all pertinent clinical information, including history, physical exam, plan and the Resident’s note and agree except as noted.

## 2025-06-04 ENCOUNTER — APPOINTMENT (OUTPATIENT)
Dept: CARDIOTHORACIC SURGERY | Facility: CLINIC | Age: 70
End: 2025-06-04
Payer: MEDICARE

## 2025-06-04 VITALS
DIASTOLIC BLOOD PRESSURE: 69 MMHG | WEIGHT: 176.37 LBS | TEMPERATURE: 98.1 F | OXYGEN SATURATION: 98 % | BODY MASS INDEX: 24.69 KG/M2 | SYSTOLIC BLOOD PRESSURE: 126 MMHG | HEART RATE: 81 BPM | HEIGHT: 71 IN | RESPIRATION RATE: 16 BRPM

## 2025-06-04 DIAGNOSIS — E78.5 HYPERLIPIDEMIA, UNSPECIFIED: ICD-10-CM

## 2025-06-04 DIAGNOSIS — I10 ESSENTIAL (PRIMARY) HYPERTENSION: ICD-10-CM

## 2025-06-04 DIAGNOSIS — I25.10 ATHEROSCLEROTIC HEART DISEASE OF NATIVE CORONARY ARTERY W/OUT ANGINA PECTORIS: ICD-10-CM

## 2025-06-04 DIAGNOSIS — N18.6 END STAGE RENAL DISEASE: ICD-10-CM

## 2025-06-04 PROCEDURE — 99205 OFFICE O/P NEW HI 60 MIN: CPT

## 2025-06-10 ENCOUNTER — OUTPATIENT (OUTPATIENT)
Dept: OUTPATIENT SERVICES | Facility: HOSPITAL | Age: 70
LOS: 1 days | End: 2025-06-10
Payer: MEDICARE

## 2025-06-10 ENCOUNTER — APPOINTMENT (OUTPATIENT)
Dept: CARDIOTHORACIC SURGERY | Facility: CLINIC | Age: 70
End: 2025-06-10

## 2025-06-10 VITALS
RESPIRATION RATE: 16 BRPM | WEIGHT: 177.03 LBS | SYSTOLIC BLOOD PRESSURE: 130 MMHG | DIASTOLIC BLOOD PRESSURE: 73 MMHG | HEART RATE: 67 BPM | HEIGHT: 71 IN | OXYGEN SATURATION: 98 % | TEMPERATURE: 98 F

## 2025-06-10 DIAGNOSIS — Z98.890 OTHER SPECIFIED POSTPROCEDURAL STATES: Chronic | ICD-10-CM

## 2025-06-10 DIAGNOSIS — I25.10 ATHEROSCLEROTIC HEART DISEASE OF NATIVE CORONARY ARTERY WITHOUT ANGINA PECTORIS: ICD-10-CM

## 2025-06-10 DIAGNOSIS — E11.9 TYPE 2 DIABETES MELLITUS WITHOUT COMPLICATIONS: ICD-10-CM

## 2025-06-10 DIAGNOSIS — N18.6 END STAGE RENAL DISEASE: ICD-10-CM

## 2025-06-10 DIAGNOSIS — Z95.5 PRESENCE OF CORONARY ANGIOPLASTY IMPLANT AND GRAFT: Chronic | ICD-10-CM

## 2025-06-10 DIAGNOSIS — Z98.41 CATARACT EXTRACTION STATUS, RIGHT EYE: Chronic | ICD-10-CM

## 2025-06-10 DIAGNOSIS — I77.0 ARTERIOVENOUS FISTULA, ACQUIRED: Chronic | ICD-10-CM

## 2025-06-10 LAB
A1C WITH ESTIMATED AVERAGE GLUCOSE RESULT: 6.8 % — HIGH (ref 4–5.6)
ALBUMIN SERPL ELPH-MCNC: 4.3 G/DL — SIGNIFICANT CHANGE UP (ref 3.3–5)
ALP SERPL-CCNC: 130 U/L — HIGH (ref 40–120)
ALT FLD-CCNC: 16 U/L — SIGNIFICANT CHANGE UP (ref 10–45)
ANION GAP SERPL CALC-SCNC: 14 MMOL/L — SIGNIFICANT CHANGE UP (ref 5–17)
AST SERPL-CCNC: 5 U/L — LOW (ref 10–40)
BASOPHILS # BLD AUTO: 0.03 K/UL — SIGNIFICANT CHANGE UP (ref 0–0.2)
BASOPHILS NFR BLD AUTO: 0.5 % — SIGNIFICANT CHANGE UP (ref 0–2)
BILIRUB SERPL-MCNC: 0.6 MG/DL — SIGNIFICANT CHANGE UP (ref 0.2–1.2)
BLD GP AB SCN SERPL QL: NEGATIVE — SIGNIFICANT CHANGE UP
BUN SERPL-MCNC: 35 MG/DL — HIGH (ref 7–23)
CALCIUM SERPL-MCNC: 10 MG/DL — SIGNIFICANT CHANGE UP (ref 8.4–10.5)
CHLORIDE SERPL-SCNC: 96 MMOL/L — SIGNIFICANT CHANGE UP (ref 96–108)
CO2 SERPL-SCNC: 29 MMOL/L — SIGNIFICANT CHANGE UP (ref 22–31)
CREAT SERPL-MCNC: 8.59 MG/DL — HIGH (ref 0.5–1.3)
CRP SERPL-MCNC: 6 MG/L — HIGH
EGFR: 6 ML/MIN/1.73M2 — LOW
EGFR: 6 ML/MIN/1.73M2 — LOW
EOSINOPHIL # BLD AUTO: 0.23 K/UL — SIGNIFICANT CHANGE UP (ref 0–0.5)
EOSINOPHIL NFR BLD AUTO: 3.6 % — SIGNIFICANT CHANGE UP (ref 0–6)
ESTIMATED AVERAGE GLUCOSE: 148 MG/DL — HIGH (ref 68–114)
FERRITIN SERPL-MCNC: 1503 NG/ML — HIGH (ref 30–400)
FOLATE SERPL-MCNC: >20 NG/ML — SIGNIFICANT CHANGE UP
GLUCOSE SERPL-MCNC: 144 MG/DL — HIGH (ref 70–99)
HCT VFR BLD CALC: 39 % — SIGNIFICANT CHANGE UP (ref 39–50)
HGB BLD-MCNC: 12.2 G/DL — LOW (ref 13–17)
IMM GRANULOCYTES NFR BLD AUTO: 0.5 % — SIGNIFICANT CHANGE UP (ref 0–0.9)
IRON SATN MFR SERPL: 19 % — SIGNIFICANT CHANGE UP (ref 16–55)
IRON SATN MFR SERPL: 45 UG/DL — SIGNIFICANT CHANGE UP (ref 45–165)
LYMPHOCYTES # BLD AUTO: 1.35 K/UL — SIGNIFICANT CHANGE UP (ref 1–3.3)
LYMPHOCYTES # BLD AUTO: 21.2 % — SIGNIFICANT CHANGE UP (ref 13–44)
MCHC RBC-ENTMCNC: 27.3 PG — SIGNIFICANT CHANGE UP (ref 27–34)
MCHC RBC-ENTMCNC: 31.3 G/DL — LOW (ref 32–36)
MCV RBC AUTO: 87.2 FL — SIGNIFICANT CHANGE UP (ref 80–100)
MONOCYTES # BLD AUTO: 0.68 K/UL — SIGNIFICANT CHANGE UP (ref 0–0.9)
MONOCYTES NFR BLD AUTO: 10.7 % — SIGNIFICANT CHANGE UP (ref 2–14)
NEUTROPHILS # BLD AUTO: 4.04 K/UL — SIGNIFICANT CHANGE UP (ref 1.8–7.4)
NEUTROPHILS NFR BLD AUTO: 63.5 % — SIGNIFICANT CHANGE UP (ref 43–77)
NT-PROBNP SERPL-SCNC: HIGH PG/ML (ref 0–300)
PLATELET # BLD AUTO: 176 K/UL — SIGNIFICANT CHANGE UP (ref 150–400)
POTASSIUM SERPL-MCNC: 4.6 MMOL/L — SIGNIFICANT CHANGE UP (ref 3.5–5.3)
POTASSIUM SERPL-SCNC: 4.6 MMOL/L — SIGNIFICANT CHANGE UP (ref 3.5–5.3)
PROT SERPL-MCNC: 7.4 G/DL — SIGNIFICANT CHANGE UP (ref 6–8.3)
RBC # BLD: 4.47 M/UL — SIGNIFICANT CHANGE UP (ref 4.2–5.8)
RBC # FLD: 17.3 % — HIGH (ref 10.3–14.5)
RH IG SCN BLD-IMP: POSITIVE — SIGNIFICANT CHANGE UP
SODIUM SERPL-SCNC: 139 MMOL/L — SIGNIFICANT CHANGE UP (ref 135–145)
TIBC SERPL-MCNC: 240 UG/DL — SIGNIFICANT CHANGE UP (ref 220–430)
UIBC SERPL-MCNC: 195 UG/DL — SIGNIFICANT CHANGE UP (ref 110–370)
VIT B12 SERPL-MCNC: 867 PG/ML — SIGNIFICANT CHANGE UP (ref 232–1245)
WBC # BLD: 6.36 K/UL — SIGNIFICANT CHANGE UP (ref 3.8–10.5)
WBC # FLD AUTO: 6.36 K/UL — SIGNIFICANT CHANGE UP (ref 3.8–10.5)

## 2025-06-10 PROCEDURE — 93880 EXTRACRANIAL BILAT STUDY: CPT | Mod: 26

## 2025-06-10 PROCEDURE — 71046 X-RAY EXAM CHEST 2 VIEWS: CPT | Mod: 26

## 2025-06-10 RX ORDER — ACETAMINOPHEN 500 MG/5ML
1000 LIQUID (ML) ORAL ONCE
Refills: 0 | Status: COMPLETED | OUTPATIENT
Start: 2025-06-17 | End: 2025-06-17

## 2025-06-10 RX ORDER — CEFUROXIME SODIUM 1.5 G
1500 VIAL (EA) INJECTION ONCE
Refills: 0 | Status: DISCONTINUED | OUTPATIENT
Start: 2025-06-17 | End: 2025-06-17

## 2025-06-10 RX ORDER — LIDOCAINE HCL/PF 10 MG/ML
0.2 VIAL (ML) INJECTION ONCE
Refills: 0 | Status: DISCONTINUED | OUTPATIENT
Start: 2025-06-17 | End: 2025-06-17

## 2025-06-10 RX ORDER — GABAPENTIN 400 MG/1
100 CAPSULE ORAL ONCE
Refills: 0 | Status: COMPLETED | OUTPATIENT
Start: 2025-06-17 | End: 2025-06-17

## 2025-06-10 NOTE — H&P PST ADULT - PATIENT'S PREFERRED PRONOUN
[facility] Progress Note Date: 2020 Name: Coley Sever MRN: [id number] : 1968 Ms. Tania Galindo Arrived ambulatory and in no distress for C2D29 of Clinical Trial NSABP B-59 Treatment 2 Regimen. Assessment was completed, no acute issues at this time, no new complaints voiced. Right chest wall port accessed without difficulty, labs drawn & sent for processing. Chemotherapy Flowsheet 2020 Cycle C2D29 Date 2020 Drug / Regimen Clinical Trial NSABP B-59 Treatment 2 Pre Meds given Notes given Patient proceed to appointment with Dr. Diana Kramer. Ms. Leslie Montemayor vitals were reviewed. Visit Vitals /72 (BP 1 Location: Right arm, BP Patient Position: At rest) Pulse 86 Temp 99.1 °F (37.3 °C) Resp 18 Ht 5' 3\" (1.6 m) Wt 89.3 kg (196 lb 12.8 oz) SpO2 98% BMI 34.86 kg/m² Lab results were obtained and reviewed. Recent Results (from the past 8 hour(s)) CBC WITH AUTOMATED DIFF Collection Time: 20  8:50 AM  
Result Value Ref Range WBC 12.5 (H) 3.6 - 11.0 K/uL  
 RBC 2.78 (L) 3.80 - 5.20 M/uL HGB 9.1 (L) 11.5 - 16.0 g/dL HCT 28.4 (L) 35.0 - 47.0 % .2 (H) 80.0 - 99.0 FL  
 MCH 32.7 26.0 - 34.0 PG  
 MCHC 32.0 30.0 - 36.5 g/dL  
 RDW 16.9 (H) 11.5 - 14.5 % PLATELET 946 400 - 473 K/uL MPV 9.9 8.9 - 12.9 FL  
 NRBC 0.6 (H) 0  WBC ABSOLUTE NRBC 0.07 (H) 0.00 - 0.01 K/uL NEUTROPHILS 64 32 - 75 % LYMPHOCYTES 13 12 - 49 % MONOCYTES 12 5 - 13 % EOSINOPHILS 0 0 - 7 % BASOPHILS 1 0 - 1 % IMMATURE GRANULOCYTES 10 (H) 0.0 - 0.5 % ABS. NEUTROPHILS 8.0 1.8 - 8.0 K/UL  
 ABS. LYMPHOCYTES 1.6 0.8 - 3.5 K/UL  
 ABS. MONOCYTES 1.5 (H) 0.0 - 1.0 K/UL  
 ABS. EOSINOPHILS 0.0 0.0 - 0.4 K/UL  
 ABS. BASOPHILS 0.1 0.0 - 0.1 K/UL  
 ABS. IMM. GRANS. 1.3 (H) 0.00 - 0.04 K/UL  
 DF SMEAR SCANNED    
 RBC COMMENTS NORMOCYTIC, NORMOCHROMIC METABOLIC PANEL, COMPREHENSIVE  
 Collection Time: 08/18/20  8:50 AM  
Result Value Ref Range Sodium 141 136 - 145 mmol/L Potassium 3.5 3.5 - 5.1 mmol/L Chloride 108 97 - 108 mmol/L  
 CO2 28 21 - 32 mmol/L Anion gap 5 5 - 15 mmol/L Glucose 86 65 - 100 mg/dL BUN 9 6 - 20 MG/DL Creatinine 1.07 (H) 0.55 - 1.02 MG/DL  
 BUN/Creatinine ratio 8 (L) 12 - 20 GFR est AA >60 >60 ml/min/1.73m2 GFR est non-AA 53 (L) >60 ml/min/1.73m2 Calcium 8.9 8.5 - 10.1 MG/DL Bilirubin, total 0.1 (L) 0.2 - 1.0 MG/DL  
 ALT (SGPT) 37 12 - 78 U/L  
 AST (SGOT) 21 15 - 37 U/L Alk. phosphatase 154 (H) 45 - 117 U/L Protein, total 7.3 6.4 - 8.2 g/dL Albumin 3.6 3.5 - 5.0 g/dL Globulin 3.7 2.0 - 4.0 g/dL A-G Ratio 1.0 (L) 1.1 - 2.2 Ok to treat from [employer] in the notes tab. Medications: 
Medications Administered 0.9% sodium chloride infusion Admin Date 
08/18/2020 Action New Bag Dose 25 mL/hr Rate 25 mL/hr Route IntraVENous Administered By Gertrude Brown RN  
  
  
 cyclophosphamide (CYTOXAN) 1,210 mg in 0.9% sodium chloride 250 mL, overfill volume 25 mL chemo infusion Admin Date 
08/18/2020 Action New Bag Dose 1210 mg Rate 671 mL/hr Route IntraVENous Administered By 
Rebekah Sharp RN  
  
  
 dexamethasone (DECADRON) 12 mg in 0.9% sodium chloride 50 mL IVPB Admin Date 
08/18/2020 Action Given Dose 
12 mg Route IntraVENous Administered By Nini Merlos RN  
  
  
 DOXOrubicin (ADRIAMYCIN) chemo syringe 60 mg (Syringe 1 of 2. Total Dose = 120 mg) Admin Date 
08/18/2020 Action Given Dose 60 mg Route IntraVENous Administered By Nini Merlos RN  
  
  
 DOXOrubicin (ADRIAMYCIN) chemo syringe 60 mg (Syringe 2 of 2. Total Dose = 120 mg) Admin Date 
08/18/2020 Action Given Dose 60 mg Route IntraVENous Administered By Nini Atkinson RN  
  
  
 fosaprepitant (EMEND) 150 mg in 0.9% sodium chloride 150 mL IVPB Admin Date 
08/18/2020 Action Given Dose 
150 mg Rate 450 mL/hr Route IntraVENous Administered By Tonya JOHNSON RN  
  
  
 palonosetron HCl (ALOXI) injection 0.25 mg   
 Admin Date 
08/18/2020 Action Given Dose 0.25 mg Route IntraVENous Administered By Nini Merlos RN  
  
  
 pegfilgrastim (NEULASTA) wearable SQ injector 6 mg Admin Date 
08/18/2020 Action Given Dose 6 mg Route SubCUTAneous Administered By Paco Sosa RN Neulasta OBI placed on the left upper arm, patient provided instruction on the device. Ms. Kayla Hewitt tolerated treatment well and was discharged from [place] [address] in stable condition. Port de-accessed, flushed & heparinized per protocol. She is to return on September 1 2020 for her next appointment. Chayo Land RN August 18, 2020 Him/He

## 2025-06-10 NOTE — H&P PST ADULT - PROBLEM SELECTOR PLAN 1
MVR, CABG x 3  PST labs send  preprocedure surgical scrub instructions discussed   continue aspirin  carvedilol and amlodipine am of surgery

## 2025-06-10 NOTE — H&P PST ADULT - GASTROINTESTINAL
soft/nontender/nondistended/normal active bowel sounds/no guarding/no rigidity/no organomegaly negative soft/nontender/normal active bowel sounds

## 2025-06-10 NOTE — H&P PST ADULT - RESPIRATORY
clear to auscultation bilaterally/no wheezes/no rales/no rhonchi/breath sounds equal clear to auscultation bilaterally/no wheezes/no rales/no rhonchi/airway patent/breath sounds equal/respirations non-labored

## 2025-06-10 NOTE — H&P PST ADULT - HISTORY OF PRESENT ILLNESS
70 year old male PMH of HTN, HLD, DM2, CAD (total  4 coronary stents, last one PCI in 08/2024 &  S/p status post MI treated with PCI x3 stents in 2022 & 08/2023)on Asprin 81 mg, moderate MR/ mod AR (last Echo12/2024), Chronic back pain, ESRD on  HD 3x/week via Right brachial AV fistula (Ihimlj-Djowgiscf-Ajpoqn, Nephrology- Dr.Paul Munguia-[facility], in [place]/ also s/p angioplasty of the AVfistula -intact in 12/2024/ & had revision of AV fistula in  05/2024 with Dr. Henrik Morocho), Listed for renal transplant in [place] and [place]. CHF with EF 40-45%,  pneumonia 10/2024, severe MR/ recent cath 6/3 w/ multivessel CAD planned for Mitral valve replacement, CABG x3 on 6/17/2025 w/ Dr. Mckeon.  ***HD monday/ wed/Friday---surgery on 6/17 Tuesday     cardiac catheterization on 6/3/25 which revealed revealed Left main artery: There was dampening upon engagement of the ostium of the left main coronary artery. There is a 50 % stenosis in the ostium portion of the segment. Proximal left anterior descending: There is a 20 % in-stent restenosis. First diagonal: There is an 80 % stenosis in the ostium portion of the segment. Mid left anterior descending: There is a 35 % stenosis. Distal left anterior descending: There is a 45 % stenosis. Proximal circumflex: There is a 90 % in-stent restenosis. Mid circumflex: There is a 60 % stenosis. Proximal right coronary artery: There is a 99 % stenosis in the ostium portion of the segment. Mid right coronary artery: There is a 90 % stenosis. Mid right coronary artery: There is a 100 % stenosis.   70 year old male PMH of HTN, HLD, DM2, CAD (total  4 coronary stents, last one PCI in 08/2024 &  S/p status post MI treated with PCI x3 stents in 2022 & 08/2023)on Asprin 81 mg, Chronic back pain, ESRD on  HD 3x/week via Right brachial AV fistula (Ucefwe-Vexfyntey-Hejlxm, Nephrology- Dr.Paul Munguia-[facility], in [place]/ also s/p angioplasty of the AVfistula -intact in 12/2024/ & had revision of AV fistula in  05/2024 with Dr. Henrik Morocho), Listed for renal transplant in [place] and [place] ( he has a living donor available), CHF with EF 40-45%,  pneumonia 10/2024, severe MR/ recent cath 6/3 w/ multivessel CAD in stent stenosis planned for Mitral valve replacement, CABG x3 on 6/17/2025 w/ Dr. Mckeon.      ***HD monday/ wed/Friday---surgery on 6/17 Tuesday  ****cardiac catheterization on 6/3/25 which revealed revealed Left main artery: There was dampening upon engagement of the ostium of the left main coronary artery. There is a 50 % stenosis in the ostium portion of the segment. Proximal left anterior descending: There is a 20 % in-stent restenosis. First diagonal: There is an 80 % stenosis in the ostium portion of the segment. Mid left anterior descending: There is a 35 % stenosis. Distal left anterior descending: There is a 45 % stenosis. Proximal circumflex: There is a 90 % in-stent restenosis. Mid circumflex: There is a 60 % stenosis. Proximal right coronary artery: There is a 99 % stenosis in the ostium portion of the segment. Mid right coronary artery: There is a 90 % stenosis. Mid right coronary artery: There is a 100 % stenosis.

## 2025-06-10 NOTE — H&P PST ADULT - ASSESSMENT
DASI score:6.8  DASI activity: stationary bike/treadmill walk x 20 minutes on non HD days, mild/mod house chores  Loose teeth or denture: upper and lower dentures    CAPRINI SCORE    AGE RELATED RISK FACTORS                                                             [ ] Age 41-60 years                                            (1 Point)  [ ] Age: 61-74 years                                           (2 Points)                 [ ] Age= 75 years                                                (3 Points)             DISEASE RELATED RISK FACTORS                                                       [ ] Edema in the lower extremities                 (1 Point)                     [ ] Varicose veins                                               (1 Point)                                 [ ] BMI > 25 Kg/m2                                            (1 Point)                                  [ ] Serious infection (ie PNA)                            (1 Point)                     [ ] Lung disease ( COPD, Emphysema)            (1 Point)                                                                          [ ] Acute myocardial infarction                         (1 Point)                  [ ] Congestive heart failure (in the previous month)  (1 Point)         [ ] Inflammatory bowel disease                            (1 Point)                  [ ] Central venous access, PICC or Port               (2 points)       (within the last month)                                                                [ ] Stroke (in the previous month)                        (5 Points)    [ ] Previous or present malignancy                       (2 points)                                                                                                                                                         HEMATOLOGY RELATED FACTORS                                                         [ ] Prior episodes of VTE                                     (3 Points)                     [ ] Positive family history for VTE                      (3 Points)                  [ ] Prothrombin 00481 A                                     (3 Points)                     [ ] Factor V Leiden                                                (3 Points)                        [ ] Lupus anticoagulants                                      (3 Points)                                                           [ ] Anticardiolipin antibodies                              (3 Points)                                                       [ ] High homocysteine in the blood                   (3 Points)                                             [ ] Other congenital or acquired thrombophilia      (3 Points)                                                [ ] Heparin induced thrombocytopenia                  (3 Points)                                        MOBILITY RELATED FACTORS  [ ] Bed rest                                                         (1 Point)  [ ] Plaster cast                                                    (2 points)  [ ] Bed bound for more than 72 hours           (2 Points)    GENDER SPECIFIC FACTORS  [ ] Pregnancy or had a baby within the last month   (1 Point)  [ ] Post-partum < 6 weeks                                   (1 Point)  [ ] Hormonal therapy  or oral contraception   (1 Point)  [ ] History of pregnancy complications              (1 point)  [ ] Unexplained or recurrent              (1 Point)    OTHER RISK FACTORS                                           (1 Point)  [ ] BMI >40, smoking, diabetes requiring insulin, chemotherapy  blood transfusions and length of surgery over 2 hours    SURGERY RELATED RISK FACTORS  [ ]  Section within the last month     (1 Point)  [ ] Minor surgery                                                  (1 Point)  [ ] Arthroscopic surgery                                       (2 Points)  [ ] Planned major surgery lasting more            (2 Points)      than 45 minutes     [ ] Elective hip or knee joint replacement       (5 points)       surgery                                                TRAUMA RELATED RISK FACTORS  [ ] Fracture of the hip, pelvis, or leg                       (5 Points)  [ ] Spinal cord injury resulting in paralysis             (5 points)       (in the previous month)    [ ] Paralysis  (less than 1 month)                             (5 Points)  [ ] Multiple Trauma within 1 month                        (5 Points)    Total Score [        ]    Caprini Score 0-2: Low Risk, NO VTE prophylaxis required for most patients, encourage ambulation  Caprini Score 3-6: Moderate Risk , pharmacologic VTE prophylaxis is indicated for most patients (in the absence of contraindications)  Caprini Score Greater than or =7: High risk, pharmocologic VTE prophylaxis indicated for most patients (in the absence of contraindications)                               DASI score:6.8  DASI activity: stationary bike/treadmill walk x 20 minutes on non HD days, mild-mod house chores  Loose teeth or denture: upper and lower dentures    CAPRINI SCORE    AGE RELATED RISK FACTORS                                                             [ ] Age 41-60 years                                            (1 Point)  [x ] Age: 61-74 years                                           (2 Points)                 [ ] Age= 75 years                                                (3 Points)             DISEASE RELATED RISK FACTORS                                                       [ ] Edema in the lower extremities                 (1 Point)                     [ ] Varicose veins                                               (1 Point)                                 [ ] BMI > 25 Kg/m2                                            (1 Point)                                  [ ] Serious infection (ie PNA)                            (1 Point)                     [ ] Lung disease ( COPD, Emphysema)            (1 Point)                                                                          [ ] Acute myocardial infarction                         (1 Point)                  [ ] Congestive heart failure (in the previous month)  (1 Point)         [ ] Inflammatory bowel disease                            (1 Point)                  [ ] Central venous access, PICC or Port               (2 points)       (within the last month)                                                                [ ] Stroke (in the previous month)                        (5 Points)    [ ] Previous or present malignancy                       (2 points)                                                                                                                                                         HEMATOLOGY RELATED FACTORS                                                         [ ] Prior episodes of VTE                                     (3 Points)                     [ ] Positive family history for VTE                      (3 Points)                  [ ] Prothrombin 63753 A                                     (3 Points)                     [ ] Factor V Leiden                                                (3 Points)                        [ ] Lupus anticoagulants                                      (3 Points)                                                           [ ] Anticardiolipin antibodies                              (3 Points)                                                       [ ] High homocysteine in the blood                   (3 Points)                                             [ ] Other congenital or acquired thrombophilia      (3 Points)                                                [ ] Heparin induced thrombocytopenia                  (3 Points)                                        MOBILITY RELATED FACTORS  [ ] Bed rest                                                         (1 Point)  [ ] Plaster cast                                                    (2 points)  [ ] Bed bound for more than 72 hours           (2 Points)    GENDER SPECIFIC FACTORS  [ ] Pregnancy or had a baby within the last month   (1 Point)  [ ] Post-partum < 6 weeks                                   (1 Point)  [ ] Hormonal therapy  or oral contraception   (1 Point)  [ ] History of pregnancy complications              (1 point)  [ ] Unexplained or recurrent              (1 Point)    OTHER RISK FACTORS                                           (1 Point)  [ x] BMI >40, smoking, diabetes requiring insulin, chemotherapy  blood transfusions and length of surgery over 2 hours    SURGERY RELATED RISK FACTORS  [ ]  Section within the last month     (1 Point)  [ ] Minor surgery                                                  (1 Point)  [ ] Arthroscopic surgery                                       (2 Points)  [ x] Planned major surgery lasting more            (2 Points)      than 45 minutes     [ ] Elective hip or knee joint replacement       (5 points)       surgery                                                TRAUMA RELATED RISK FACTORS  [ ] Fracture of the hip, pelvis, or leg                       (5 Points)  [ ] Spinal cord injury resulting in paralysis             (5 points)       (in the previous month)    [ ] Paralysis  (less than 1 month)                             (5 Points)  [ ] Multiple Trauma within 1 month                        (5 Points)    Total Score [     5   ]    Caprini Score 0-2: Low Risk, NO VTE prophylaxis required for most patients, encourage ambulation  Caprini Score 3-6: Moderate Risk , pharmacologic VTE prophylaxis is indicated for most patients (in the absence of contraindications)  Caprini Score Greater than or =7: High risk, pharmocologic VTE prophylaxis indicated for most patients (in the absence of contraindications)

## 2025-06-10 NOTE — H&P PST ADULT - PRIMARY CARE PROVIDER
Prior PMD/ "looking for another PMD due to Medical insurance problem" - Dr. Humphrey Pandey [phone]

## 2025-06-10 NOTE — H&P PST ADULT - OTHER CARE PROVIDERS
Cardiologist - Dr. Marcio Rain  , Nephrologist - Dr. Vish Munguia / [facility] in [place], Endocrinologist - Dr. Kramer  [phone]

## 2025-06-10 NOTE — H&P PST ADULT - NSICDXPASTSURGICALHX_GEN_ALL_CORE_FT
PAST SURGICAL HISTORY:  AV fistula     H/O colonoscopy     History of cardiac cath     Stented coronary artery

## 2025-06-10 NOTE — H&P PST ADULT - MUSCULOSKELETAL
details… ROM intact/no joint swelling/no joint erythema/no calf tenderness/normal gait/strength 5/5 bilateral upper extremities/strength 5/5 bilateral lower extremities no calf tenderness/normal gait/back exam

## 2025-06-10 NOTE — H&P PST ADULT - CARDIOVASCULAR
details… regular rate and rhythm/S1 S2 present/murmur regular rate and rhythm/S1 S2 present/no pedal edema/murmur

## 2025-06-10 NOTE — H&P PST ADULT - NEUROLOGICAL
cranial nerves II-XII intact/sensation intact/responds to pain/responds to verbal commands negative sensation intact/responds to pain/responds to verbal commands

## 2025-06-10 NOTE — H&P PST ADULT - NSICDXPASTMEDICALHX_GEN_ALL_CORE_FT
PAST MEDICAL HISTORY:  2019 novel coronavirus disease (COVID-19)     CAD (coronary artery disease)     DM2 (diabetes mellitus, type 2)     ESRD on dialysis     Former smoker     Gout     HLD (hyperlipidemia)     HTN (hypertension)     MI (myocardial infarction)     Moderate aortic insufficiency     Right cataract     Severe mitral regurgitation     Stented coronary artery      PAST MEDICAL HISTORY:  2019 novel coronavirus disease (COVID-19)     CAD (coronary artery disease)     DM2 (diabetes mellitus, type 2)     ESRD on dialysis     Former smoker     Gout     HLD (hyperlipidemia)     HTN (hypertension)     MI (myocardial infarction)     Moderate aortic insufficiency     Pancreatic cyst     Right cataract     Severe mitral regurgitation     Stented coronary artery

## 2025-06-11 LAB
MRSA PCR RESULT.: SIGNIFICANT CHANGE UP
RETICS #: 66.1 K/UL — SIGNIFICANT CHANGE UP (ref 25–125)
RETICS/RBC NFR: 1.5 % — SIGNIFICANT CHANGE UP (ref 0.5–2.5)
S AUREUS DNA NOSE QL NAA+PROBE: SIGNIFICANT CHANGE UP

## 2025-06-16 PROBLEM — U07.1 COVID-19: Chronic | Status: ACTIVE | Noted: 2025-06-10

## 2025-06-16 PROBLEM — K86.2 CYST OF PANCREAS: Chronic | Status: ACTIVE | Noted: 2025-06-10

## 2025-06-16 PROBLEM — H26.9 UNSPECIFIED CATARACT: Chronic | Status: ACTIVE | Noted: 2025-06-10

## 2025-06-16 PROBLEM — Z95.5 PRESENCE OF CORONARY ANGIOPLASTY IMPLANT AND GRAFT: Chronic | Status: ACTIVE | Noted: 2025-06-10

## 2025-06-16 PROBLEM — E11.9 TYPE 2 DIABETES MELLITUS WITHOUT COMPLICATIONS: Chronic | Status: ACTIVE | Noted: 2025-06-10

## 2025-06-16 PROBLEM — I21.9 ACUTE MYOCARDIAL INFARCTION, UNSPECIFIED: Chronic | Status: ACTIVE | Noted: 2025-06-10

## 2025-06-16 PROBLEM — I34.0 NONRHEUMATIC MITRAL (VALVE) INSUFFICIENCY: Chronic | Status: ACTIVE | Noted: 2025-06-10

## 2025-06-17 ENCOUNTER — INPATIENT (INPATIENT)
Facility: HOSPITAL | Age: 70
LOS: 23 days | Discharge: NURSING, REHAB OR LONG-TERM CARE | DRG: 1 | End: 2025-07-11
Attending: THORACIC SURGERY (CARDIOTHORACIC VASCULAR SURGERY) | Admitting: THORACIC SURGERY (CARDIOTHORACIC VASCULAR SURGERY)
Payer: MEDICARE

## 2025-06-17 ENCOUNTER — APPOINTMENT (OUTPATIENT)
Dept: CARDIOTHORACIC SURGERY | Facility: HOSPITAL | Age: 70
End: 2025-06-17

## 2025-06-17 ENCOUNTER — RESULT REVIEW (OUTPATIENT)
Age: 70
End: 2025-06-17

## 2025-06-17 ENCOUNTER — TRANSCRIPTION ENCOUNTER (OUTPATIENT)
Age: 70
End: 2025-06-17

## 2025-06-17 VITALS
HEART RATE: 73 BPM | RESPIRATION RATE: 16 BRPM | WEIGHT: 175.05 LBS | HEIGHT: 70.98 IN | SYSTOLIC BLOOD PRESSURE: 152 MMHG | OXYGEN SATURATION: 100 % | TEMPERATURE: 98 F | DIASTOLIC BLOOD PRESSURE: 70 MMHG

## 2025-06-17 DIAGNOSIS — I25.10 ATHEROSCLEROTIC HEART DISEASE OF NATIVE CORONARY ARTERY WITHOUT ANGINA PECTORIS: ICD-10-CM

## 2025-06-17 DIAGNOSIS — Z95.5 PRESENCE OF CORONARY ANGIOPLASTY IMPLANT AND GRAFT: Chronic | ICD-10-CM

## 2025-06-17 DIAGNOSIS — I77.0 ARTERIOVENOUS FISTULA, ACQUIRED: Chronic | ICD-10-CM

## 2025-06-17 DIAGNOSIS — Z98.890 OTHER SPECIFIED POSTPROCEDURAL STATES: Chronic | ICD-10-CM

## 2025-06-17 LAB
ALBUMIN SERPL ELPH-MCNC: 2.8 G/DL — LOW (ref 3.3–5)
ALP SERPL-CCNC: 64 U/L — SIGNIFICANT CHANGE UP (ref 40–120)
ALT FLD-CCNC: 9 U/L — LOW (ref 10–45)
ANION GAP SERPL CALC-SCNC: 17 MMOL/L — SIGNIFICANT CHANGE UP (ref 5–17)
APTT BLD: 35.5 SEC — SIGNIFICANT CHANGE UP (ref 26.1–36.8)
AST SERPL-CCNC: 20 U/L — SIGNIFICANT CHANGE UP (ref 10–40)
BASE EXCESS BLDA CALC-SCNC: -1 MMOL/L — SIGNIFICANT CHANGE UP (ref -2–3)
BASE EXCESS BLDA CALC-SCNC: -2 MMOL/L — SIGNIFICANT CHANGE UP (ref -2–3)
BASE EXCESS BLDA CALC-SCNC: -2 MMOL/L — SIGNIFICANT CHANGE UP (ref -2–3)
BASE EXCESS BLDA CALC-SCNC: -4 MMOL/L — LOW (ref -2–3)
BASE EXCESS BLDA CALC-SCNC: 0 MMOL/L — SIGNIFICANT CHANGE UP (ref -2–3)
BASE EXCESS BLDA CALC-SCNC: 0 MMOL/L — SIGNIFICANT CHANGE UP (ref -2–3)
BASE EXCESS BLDA CALC-SCNC: 1 MMOL/L — SIGNIFICANT CHANGE UP (ref -2–3)
BASE EXCESS BLDA CALC-SCNC: 2 MMOL/L — SIGNIFICANT CHANGE UP (ref -2–3)
BASE EXCESS BLDA CALC-SCNC: 2 MMOL/L — SIGNIFICANT CHANGE UP (ref -2–3)
BASE EXCESS BLDA CALC-SCNC: 3 MMOL/L — SIGNIFICANT CHANGE UP (ref -2–3)
BASE EXCESS BLDA CALC-SCNC: 4 MMOL/L — HIGH (ref -2–3)
BASE EXCESS BLDA CALC-SCNC: 6 MMOL/L — HIGH (ref -2–3)
BASE EXCESS BLDV CALC-SCNC: -0.2 MMOL/L — SIGNIFICANT CHANGE UP (ref -2–3)
BASE EXCESS BLDV CALC-SCNC: -1 MMOL/L — SIGNIFICANT CHANGE UP (ref -2–3)
BASE EXCESS BLDV CALC-SCNC: 1 MMOL/L — SIGNIFICANT CHANGE UP (ref -2–3)
BASE EXCESS BLDV CALC-SCNC: 3 MMOL/L — SIGNIFICANT CHANGE UP (ref -2–3)
BASE EXCESS BLDV CALC-SCNC: 3 MMOL/L — SIGNIFICANT CHANGE UP (ref -2–3)
BASE EXCESS BLDV CALC-SCNC: 4 MMOL/L — HIGH (ref -2–3)
BASE EXCESS BLDV CALC-SCNC: 4 MMOL/L — HIGH (ref -2–3)
BASOPHILS # BLD AUTO: 0.01 K/UL — SIGNIFICANT CHANGE UP (ref 0–0.2)
BASOPHILS NFR BLD AUTO: 0.1 % — SIGNIFICANT CHANGE UP (ref 0–2)
BILIRUB SERPL-MCNC: 1 MG/DL — SIGNIFICANT CHANGE UP (ref 0.2–1.2)
BLOOD GAS ECMO POST MEMBRANE - ARTERIAL RESULT: SIGNIFICANT CHANGE UP
BLOOD GAS ECMO PRE MEMBRANE - VENOUS RESULT: SIGNIFICANT CHANGE UP
BUN SERPL-MCNC: 35 MG/DL — HIGH (ref 7–23)
CA-I BLD-SCNC: 1.2 MMOL/L — SIGNIFICANT CHANGE UP (ref 1.15–1.33)
CA-I BLDA-SCNC: 0.96 MMOL/L — LOW (ref 1.15–1.33)
CA-I BLDA-SCNC: 0.99 MMOL/L — LOW (ref 1.15–1.33)
CA-I BLDA-SCNC: 0.99 MMOL/L — LOW (ref 1.15–1.33)
CA-I BLDA-SCNC: 1.03 MMOL/L — LOW (ref 1.15–1.33)
CA-I BLDA-SCNC: 1.05 MMOL/L — LOW (ref 1.15–1.33)
CA-I BLDA-SCNC: 1.12 MMOL/L — LOW (ref 1.15–1.33)
CA-I BLDA-SCNC: 1.24 MMOL/L — SIGNIFICANT CHANGE UP (ref 1.15–1.33)
CA-I BLDA-SCNC: 1.29 MMOL/L — SIGNIFICANT CHANGE UP (ref 1.15–1.33)
CA-I BLDA-SCNC: 1.36 MMOL/L — HIGH (ref 1.15–1.33)
CA-I BLDA-SCNC: 1.41 MMOL/L — HIGH (ref 1.15–1.33)
CA-I BLDA-SCNC: 1.65 MMOL/L — CRITICAL HIGH (ref 1.15–1.33)
CA-I SERPL-SCNC: 0.95 MMOL/L — LOW (ref 1.15–1.33)
CA-I SERPL-SCNC: 1 MMOL/L — LOW (ref 1.15–1.33)
CA-I SERPL-SCNC: 1.01 MMOL/L — LOW (ref 1.15–1.33)
CA-I SERPL-SCNC: 1.01 MMOL/L — LOW (ref 1.15–1.33)
CA-I SERPL-SCNC: 1.03 MMOL/L — LOW (ref 1.15–1.33)
CA-I SERPL-SCNC: 1.3 MMOL/L — SIGNIFICANT CHANGE UP (ref 1.15–1.33)
CALCIUM SERPL-MCNC: 9.2 MG/DL — SIGNIFICANT CHANGE UP (ref 8.4–10.5)
CHLORIDE BLDA-SCNC: 100 MMOL/L — SIGNIFICANT CHANGE UP (ref 96–108)
CHLORIDE BLDA-SCNC: 102 MMOL/L — SIGNIFICANT CHANGE UP (ref 96–108)
CHLORIDE BLDA-SCNC: 102 MMOL/L — SIGNIFICANT CHANGE UP (ref 96–108)
CHLORIDE BLDA-SCNC: 105 MMOL/L — SIGNIFICANT CHANGE UP (ref 96–108)
CHLORIDE BLDA-SCNC: 105 MMOL/L — SIGNIFICANT CHANGE UP (ref 96–108)
CHLORIDE BLDA-SCNC: 107 MMOL/L — SIGNIFICANT CHANGE UP (ref 96–108)
CHLORIDE BLDA-SCNC: 107 MMOL/L — SIGNIFICANT CHANGE UP (ref 96–108)
CHLORIDE BLDA-SCNC: 110 MMOL/L — HIGH (ref 96–108)
CHLORIDE BLDA-SCNC: 96 MMOL/L — SIGNIFICANT CHANGE UP (ref 96–108)
CHLORIDE BLDA-SCNC: 97 MMOL/L — SIGNIFICANT CHANGE UP (ref 96–108)
CHLORIDE BLDA-SCNC: 98 MMOL/L — SIGNIFICANT CHANGE UP (ref 96–108)
CHLORIDE BLDV-SCNC: 102 MMOL/L — SIGNIFICANT CHANGE UP (ref 96–108)
CHLORIDE BLDV-SCNC: 104 MMOL/L — SIGNIFICANT CHANGE UP (ref 96–108)
CHLORIDE BLDV-SCNC: 96 MMOL/L — SIGNIFICANT CHANGE UP (ref 96–108)
CHLORIDE BLDV-SCNC: 97 MMOL/L — SIGNIFICANT CHANGE UP (ref 96–108)
CHLORIDE BLDV-SCNC: 98 MMOL/L — SIGNIFICANT CHANGE UP (ref 96–108)
CHLORIDE SERPL-SCNC: 105 MMOL/L — SIGNIFICANT CHANGE UP (ref 96–108)
CK MB BLD-MCNC: 8 % — HIGH (ref 0–3.5)
CK MB CFR SERPL CALC: 19.5 NG/ML — HIGH (ref 0–6.7)
CK SERPL-CCNC: 244 U/L — HIGH (ref 30–200)
CO2 BLDA-SCNC: 21 MMOL/L — LOW (ref 22–30)
CO2 BLDA-SCNC: 24 MMOL/L — SIGNIFICANT CHANGE UP (ref 22–30)
CO2 BLDA-SCNC: 24 MMOL/L — SIGNIFICANT CHANGE UP (ref 22–30)
CO2 BLDA-SCNC: 25 MMOL/L — SIGNIFICANT CHANGE UP (ref 22–30)
CO2 BLDA-SCNC: 26 MMOL/L — SIGNIFICANT CHANGE UP (ref 22–30)
CO2 BLDA-SCNC: 27 MMOL/L — SIGNIFICANT CHANGE UP (ref 22–30)
CO2 BLDA-SCNC: 27 MMOL/L — SIGNIFICANT CHANGE UP (ref 22–30)
CO2 BLDA-SCNC: 28 MMOL/L — SIGNIFICANT CHANGE UP (ref 22–30)
CO2 BLDA-SCNC: 28 MMOL/L — SIGNIFICANT CHANGE UP (ref 22–30)
CO2 BLDA-SCNC: 29 MMOL/L — SIGNIFICANT CHANGE UP (ref 22–30)
CO2 BLDA-SCNC: 29 MMOL/L — SIGNIFICANT CHANGE UP (ref 22–30)
CO2 BLDA-SCNC: 32 MMOL/L — HIGH (ref 22–30)
CO2 BLDV-SCNC: 26 MMOL/L — SIGNIFICANT CHANGE UP (ref 22–30)
CO2 BLDV-SCNC: 27 MMOL/L — SIGNIFICANT CHANGE UP (ref 22–30)
CO2 BLDV-SCNC: 28 MMOL/L — HIGH (ref 22–26)
CO2 BLDV-SCNC: 29 MMOL/L — SIGNIFICANT CHANGE UP (ref 22–30)
CO2 BLDV-SCNC: 30 MMOL/L — SIGNIFICANT CHANGE UP (ref 22–30)
CO2 SERPL-SCNC: 21 MMOL/L — LOW (ref 22–31)
COHGB MFR BLDA: 1.3 % — SIGNIFICANT CHANGE UP
COHGB MFR BLDA: 1.5 % — SIGNIFICANT CHANGE UP
COHGB MFR BLDA: 1.6 % — SIGNIFICANT CHANGE UP
COHGB MFR BLDA: 1.7 % — SIGNIFICANT CHANGE UP
COHGB MFR BLDA: 1.7 % — SIGNIFICANT CHANGE UP
COHGB MFR BLDA: 1.8 % — SIGNIFICANT CHANGE UP
COHGB MFR BLDA: 1.8 % — SIGNIFICANT CHANGE UP
COHGB MFR BLDA: 1.9 % — SIGNIFICANT CHANGE UP
COHGB MFR BLDA: 2.1 % — SIGNIFICANT CHANGE UP
COHGB MFR BLDA: 2.4 % — SIGNIFICANT CHANGE UP
COHGB MFR BLDV: 1.7 % — SIGNIFICANT CHANGE UP
COHGB MFR BLDV: 1.8 % — SIGNIFICANT CHANGE UP
COHGB MFR BLDV: 2 % — SIGNIFICANT CHANGE UP
COHGB MFR BLDV: 2.1 % — SIGNIFICANT CHANGE UP
COHGB MFR BLDV: 2.4 % — SIGNIFICANT CHANGE UP
COHGB MFR BLDV: 2.8 % — SIGNIFICANT CHANGE UP
CREAT SERPL-MCNC: 7.72 MG/DL — HIGH (ref 0.5–1.3)
EGFR: 7 ML/MIN/1.73M2 — LOW
EGFR: 7 ML/MIN/1.73M2 — LOW
EOSINOPHIL # BLD AUTO: 0.06 K/UL — SIGNIFICANT CHANGE UP (ref 0–0.5)
EOSINOPHIL NFR BLD AUTO: 0.5 % — SIGNIFICANT CHANGE UP (ref 0–6)
FIBRINOGEN PPP-MCNC: 263 MG/DL — SIGNIFICANT CHANGE UP (ref 200–445)
GAS PNL BLDA: SIGNIFICANT CHANGE UP
GAS PNL BLDV: 132 MMOL/L — LOW (ref 136–145)
GAS PNL BLDV: 133 MMOL/L — LOW (ref 136–145)
GAS PNL BLDV: 133 MMOL/L — LOW (ref 136–145)
GAS PNL BLDV: 134 MMOL/L — LOW (ref 136–145)
GAS PNL BLDV: 134 MMOL/L — LOW (ref 136–145)
GAS PNL BLDV: 137 MMOL/L — SIGNIFICANT CHANGE UP (ref 136–145)
GAS PNL BLDV: SIGNIFICANT CHANGE UP
GLUCOSE BLDA-MCNC: 105 MG/DL — HIGH (ref 70–99)
GLUCOSE BLDA-MCNC: 114 MG/DL — HIGH (ref 70–99)
GLUCOSE BLDA-MCNC: 116 MG/DL — HIGH (ref 70–99)
GLUCOSE BLDA-MCNC: 125 MG/DL — HIGH (ref 70–99)
GLUCOSE BLDA-MCNC: 139 MG/DL — HIGH (ref 70–99)
GLUCOSE BLDA-MCNC: 142 MG/DL — HIGH (ref 70–99)
GLUCOSE BLDA-MCNC: 142 MG/DL — HIGH (ref 70–99)
GLUCOSE BLDA-MCNC: 145 MG/DL — HIGH (ref 70–99)
GLUCOSE BLDA-MCNC: 149 MG/DL — HIGH (ref 70–99)
GLUCOSE BLDA-MCNC: 156 MG/DL — HIGH (ref 70–99)
GLUCOSE BLDA-MCNC: 160 MG/DL — HIGH (ref 70–99)
GLUCOSE BLDA-MCNC: 169 MG/DL — HIGH (ref 70–99)
GLUCOSE BLDC GLUCOMTR-MCNC: 109 MG/DL — HIGH (ref 70–99)
GLUCOSE BLDC GLUCOMTR-MCNC: 111 MG/DL — HIGH (ref 70–99)
GLUCOSE BLDC GLUCOMTR-MCNC: 148 MG/DL — HIGH (ref 70–99)
GLUCOSE BLDV-MCNC: 106 MG/DL — HIGH (ref 70–99)
GLUCOSE BLDV-MCNC: 114 MG/DL — HIGH (ref 70–99)
GLUCOSE BLDV-MCNC: 139 MG/DL — HIGH (ref 70–99)
GLUCOSE BLDV-MCNC: 141 MG/DL — HIGH (ref 70–99)
GLUCOSE BLDV-MCNC: 154 MG/DL — HIGH (ref 70–99)
GLUCOSE BLDV-MCNC: 154 MG/DL — HIGH (ref 70–99)
GLUCOSE SERPL-MCNC: 116 MG/DL — HIGH (ref 70–99)
HCO3 BLDA-SCNC: 20 MMOL/L — LOW (ref 21–28)
HCO3 BLDA-SCNC: 22 MMOL/L — SIGNIFICANT CHANGE UP (ref 21–28)
HCO3 BLDA-SCNC: 23 MMOL/L — SIGNIFICANT CHANGE UP (ref 21–28)
HCO3 BLDA-SCNC: 24 MMOL/L — SIGNIFICANT CHANGE UP (ref 21–28)
HCO3 BLDA-SCNC: 24 MMOL/L — SIGNIFICANT CHANGE UP (ref 21–28)
HCO3 BLDA-SCNC: 25 MMOL/L — SIGNIFICANT CHANGE UP (ref 21–28)
HCO3 BLDA-SCNC: 26 MMOL/L — SIGNIFICANT CHANGE UP (ref 21–28)
HCO3 BLDA-SCNC: 27 MMOL/L — SIGNIFICANT CHANGE UP (ref 21–28)
HCO3 BLDA-SCNC: 27 MMOL/L — SIGNIFICANT CHANGE UP (ref 21–28)
HCO3 BLDA-SCNC: 28 MMOL/L — SIGNIFICANT CHANGE UP (ref 21–28)
HCO3 BLDA-SCNC: 28 MMOL/L — SIGNIFICANT CHANGE UP (ref 21–28)
HCO3 BLDA-SCNC: 30 MMOL/L — HIGH (ref 21–28)
HCO3 BLDV-SCNC: 25 MMOL/L — SIGNIFICANT CHANGE UP (ref 22–29)
HCO3 BLDV-SCNC: 25 MMOL/L — SIGNIFICANT CHANGE UP (ref 22–29)
HCO3 BLDV-SCNC: 26 MMOL/L — SIGNIFICANT CHANGE UP (ref 22–29)
HCO3 BLDV-SCNC: 28 MMOL/L — SIGNIFICANT CHANGE UP (ref 22–29)
HCT VFR BLD CALC: 22.8 % — LOW (ref 39–50)
HCT VFR BLDA CALC: 22 % — LOW (ref 39–51)
HCT VFR BLDA CALC: 22 % — LOW (ref 39–51)
HCT VFR BLDA CALC: 24 % — LOW (ref 39–51)
HCT VFR BLDA CALC: 24 % — LOW (ref 39–51)
HCT VFR BLDA CALC: 25 % — LOW (ref 39–51)
HCT VFR BLDA CALC: 25 % — LOW (ref 39–51)
HCT VFR BLDA CALC: 26 % — LOW (ref 39–51)
HCT VFR BLDA CALC: 27 % — LOW (ref 39–51)
HCT VFR BLDA CALC: 27 % — LOW (ref 39–51)
HCT VFR BLDA CALC: 28 % — LOW (ref 39–51)
HCT VFR BLDA CALC: 32 % — LOW (ref 39–51)
HCT VFR BLDA CALC: 35 % — LOW (ref 39–51)
HEPARINASE TEG R TIME: 7.8 MIN — SIGNIFICANT CHANGE UP (ref 4.3–8.3)
HEPARINASE TEG R TIME: 9.4 MIN — HIGH (ref 4.3–8.3)
HGB BLD CALC-MCNC: 7.3 G/DL — LOW (ref 12.6–17.4)
HGB BLD CALC-MCNC: 8.3 G/DL — LOW (ref 12.6–17.4)
HGB BLD CALC-MCNC: 8.5 G/DL — LOW (ref 12.6–17.4)
HGB BLD CALC-MCNC: 9 G/DL — LOW (ref 12.6–17.4)
HGB BLD CALC-MCNC: 9.1 G/DL — LOW (ref 12.6–17.4)
HGB BLD CALC-MCNC: 9.3 G/DL — LOW (ref 12.6–17.4)
HGB BLD-MCNC: 7.5 G/DL — LOW (ref 13–17)
HGB BLDA-MCNC: 10.5 G/DL — LOW (ref 12.6–17.4)
HGB BLDA-MCNC: 11.7 G/DL — LOW (ref 12.6–17.4)
HGB BLDA-MCNC: 7.4 G/DL — LOW (ref 12.6–17.4)
HGB BLDA-MCNC: 7.9 G/DL — LOW (ref 12.6–17.4)
HGB BLDA-MCNC: 8.1 G/DL — LOW (ref 12.6–17.4)
HGB BLDA-MCNC: 8.4 G/DL — LOW (ref 12.6–17.4)
HGB BLDA-MCNC: 8.5 G/DL — LOW (ref 12.6–17.4)
HGB BLDA-MCNC: 8.6 G/DL — LOW (ref 12.6–17.4)
HGB BLDA-MCNC: 8.7 G/DL — LOW (ref 12.6–17.4)
HGB BLDA-MCNC: 9.2 G/DL — LOW (ref 12.6–17.4)
HGB BLDA-MCNC: 9.3 G/DL — LOW (ref 12.6–17.4)
HGB BLDA-MCNC: 9.4 G/DL — LOW (ref 12.6–17.4)
HOROWITZ INDEX BLDV+IHG-RTO: 100 — SIGNIFICANT CHANGE UP
IMM GRANULOCYTES # BLD AUTO: 0.09 K/UL — HIGH (ref 0–0.07)
IMM GRANULOCYTES NFR BLD AUTO: 0.8 % — SIGNIFICANT CHANGE UP (ref 0–0.9)
INR BLD: 1.33 RATIO — HIGH (ref 0.85–1.16)
LACTATE BLDA-MCNC: 0.8 MMOL/L — SIGNIFICANT CHANGE UP (ref 0.5–2)
LACTATE BLDA-MCNC: 0.9 MMOL/L — SIGNIFICANT CHANGE UP (ref 0.5–2)
LACTATE BLDA-MCNC: 1 MMOL/L — SIGNIFICANT CHANGE UP (ref 0.5–2)
LACTATE BLDA-MCNC: 1.4 MMOL/L — SIGNIFICANT CHANGE UP (ref 0.5–2)
LACTATE BLDA-MCNC: 2.6 MMOL/L — HIGH (ref 0.5–2)
LACTATE BLDA-MCNC: 2.7 MMOL/L — HIGH (ref 0.5–2)
LACTATE BLDA-MCNC: 3 MMOL/L — HIGH (ref 0.5–2)
LACTATE BLDA-MCNC: 3.1 MMOL/L — HIGH (ref 0.5–2)
LACTATE BLDV-MCNC: 0.8 MMOL/L — SIGNIFICANT CHANGE UP (ref 0.5–2)
LACTATE BLDV-MCNC: 0.9 MMOL/L — SIGNIFICANT CHANGE UP (ref 0.5–2)
LACTATE BLDV-MCNC: 1 MMOL/L — SIGNIFICANT CHANGE UP (ref 0.5–2)
LACTATE BLDV-MCNC: 1 MMOL/L — SIGNIFICANT CHANGE UP (ref 0.5–2)
LACTATE BLDV-MCNC: 1.1 MMOL/L — SIGNIFICANT CHANGE UP (ref 0.5–2)
LACTATE BLDV-MCNC: 2.9 MMOL/L — HIGH (ref 0.5–2)
LDH SERPL L TO P-CCNC: 332 U/L — HIGH (ref 50–242)
LYMPHOCYTES # BLD AUTO: 0.61 K/UL — LOW (ref 1–3.3)
LYMPHOCYTES NFR BLD AUTO: 5.6 % — LOW (ref 13–44)
MAGNESIUM SERPL-MCNC: 2.8 MG/DL — HIGH (ref 1.6–2.6)
MCHC RBC-ENTMCNC: 28.3 PG — SIGNIFICANT CHANGE UP (ref 27–34)
MCHC RBC-ENTMCNC: 32.9 G/DL — SIGNIFICANT CHANGE UP (ref 32–36)
MCV RBC AUTO: 86 FL — SIGNIFICANT CHANGE UP (ref 80–100)
METHGB MFR BLDA: 0.1 % — SIGNIFICANT CHANGE UP
METHGB MFR BLDA: 0.4 % — SIGNIFICANT CHANGE UP
METHGB MFR BLDA: 0.6 % — SIGNIFICANT CHANGE UP
METHGB MFR BLDA: 0.7 % — SIGNIFICANT CHANGE UP
METHGB MFR BLDA: 0.8 % — SIGNIFICANT CHANGE UP
METHGB MFR BLDA: 1 % — SIGNIFICANT CHANGE UP
METHGB MFR BLDA: 1.3 % — SIGNIFICANT CHANGE UP
METHGB MFR BLDA: 4.8 % — HIGH
METHGB MFR BLDV: 0.3 % — SIGNIFICANT CHANGE UP
METHGB MFR BLDV: 0.3 % — SIGNIFICANT CHANGE UP
METHGB MFR BLDV: 0.5 % — SIGNIFICANT CHANGE UP
METHGB MFR BLDV: 0.6 % — SIGNIFICANT CHANGE UP
METHGB MFR BLDV: 0.8 % — SIGNIFICANT CHANGE UP
METHGB MFR BLDV: 0.9 % — SIGNIFICANT CHANGE UP
MONOCYTES # BLD AUTO: 0.84 K/UL — SIGNIFICANT CHANGE UP (ref 0–0.9)
MONOCYTES NFR BLD AUTO: 7.7 % — SIGNIFICANT CHANGE UP (ref 2–14)
NEUTROPHILS # BLD AUTO: 9.37 K/UL — HIGH (ref 1.8–7.4)
NEUTROPHILS NFR BLD AUTO: 85.3 % — HIGH (ref 43–77)
NRBC # BLD AUTO: 0.07 K/UL — HIGH (ref 0–0)
NRBC # FLD: 0.07 K/UL — HIGH (ref 0–0)
NRBC BLD AUTO-RTO: 0 /100 WBCS — SIGNIFICANT CHANGE UP (ref 0–0)
OXYHGB MFR BLDA: 91.1 % — SIGNIFICANT CHANGE UP (ref 90–95)
OXYHGB MFR BLDA: 96.8 % — HIGH (ref 90–95)
OXYHGB MFR BLDA: 96.9 % — HIGH (ref 90–95)
OXYHGB MFR BLDA: 97 % — HIGH (ref 90–95)
OXYHGB MFR BLDA: 97.1 % — HIGH (ref 90–95)
OXYHGB MFR BLDA: 97.1 % — HIGH (ref 90–95)
OXYHGB MFR BLDA: 97.2 % — HIGH (ref 90–95)
OXYHGB MFR BLDA: 97.3 % — HIGH (ref 90–95)
OXYHGB MFR BLDA: 97.4 % — HIGH (ref 90–95)
OXYHGB MFR BLDA: 97.5 % — HIGH (ref 90–95)
OXYHGB MFR BLDA: 97.6 % — HIGH (ref 90–95)
OXYHGB MFR BLDA: 97.8 % — HIGH (ref 90–95)
PCO2 BLDA: 30 MMHG — LOW (ref 35–48)
PCO2 BLDA: 33 MMHG — LOW (ref 35–48)
PCO2 BLDA: 35 MMHG — SIGNIFICANT CHANGE UP (ref 35–48)
PCO2 BLDA: 37 MMHG — SIGNIFICANT CHANGE UP (ref 35–48)
PCO2 BLDA: 37 MMHG — SIGNIFICANT CHANGE UP (ref 35–48)
PCO2 BLDA: 39 MMHG — SIGNIFICANT CHANGE UP (ref 35–48)
PCO2 BLDA: 41 MMHG — SIGNIFICANT CHANGE UP (ref 35–48)
PCO2 BLDA: 41 MMHG — SIGNIFICANT CHANGE UP (ref 35–48)
PCO2 BLDA: 42 MMHG — SIGNIFICANT CHANGE UP (ref 35–48)
PCO2 BLDA: 44 MMHG — SIGNIFICANT CHANGE UP (ref 35–48)
PCO2 BLDA: 45 MMHG — SIGNIFICANT CHANGE UP (ref 35–48)
PCO2 BLDA: 48 MMHG — SIGNIFICANT CHANGE UP (ref 35–48)
PCO2 BLDV: 37 MMHG — LOW (ref 42–55)
PCO2 BLDV: 39 MMHG — LOW (ref 42–55)
PCO2 BLDV: 42 MMHG — SIGNIFICANT CHANGE UP (ref 42–55)
PCO2 BLDV: 45 MMHG — SIGNIFICANT CHANGE UP (ref 42–55)
PCO2 BLDV: 48 MMHG — SIGNIFICANT CHANGE UP (ref 42–55)
PCO2 BLDV: 49 MMHG — SIGNIFICANT CHANGE UP (ref 42–55)
PCO2 BLDV: 50 MMHG — SIGNIFICANT CHANGE UP (ref 42–55)
PH BLDA: 7.33 — LOW (ref 7.35–7.45)
PH BLDA: 7.34 — LOW (ref 7.35–7.45)
PH BLDA: 7.39 — SIGNIFICANT CHANGE UP (ref 7.35–7.45)
PH BLDA: 7.42 — SIGNIFICANT CHANGE UP (ref 7.35–7.45)
PH BLDA: 7.43 — SIGNIFICANT CHANGE UP (ref 7.35–7.45)
PH BLDA: 7.45 — SIGNIFICANT CHANGE UP (ref 7.35–7.45)
PH BLDA: 7.46 — HIGH (ref 7.35–7.45)
PH BLDA: 7.46 — HIGH (ref 7.35–7.45)
PH BLDA: 7.48 — HIGH (ref 7.35–7.45)
PH BLDA: 7.5 — HIGH (ref 7.35–7.45)
PH BLDV: 7.32 — SIGNIFICANT CHANGE UP (ref 7.32–7.43)
PH BLDV: 7.33 — SIGNIFICANT CHANGE UP (ref 7.32–7.43)
PH BLDV: 7.38 — SIGNIFICANT CHANGE UP (ref 7.32–7.43)
PH BLDV: 7.41 — SIGNIFICANT CHANGE UP (ref 7.32–7.43)
PH BLDV: 7.44 — HIGH (ref 7.32–7.43)
PH BLDV: 7.44 — HIGH (ref 7.32–7.43)
PH BLDV: 7.46 — HIGH (ref 7.32–7.43)
PHOSPHATE SERPL-MCNC: 3.6 MG/DL — SIGNIFICANT CHANGE UP (ref 2.5–4.5)
PLATELET # BLD AUTO: 164 K/UL — SIGNIFICANT CHANGE UP (ref 150–400)
PMV BLD: 11.4 FL — SIGNIFICANT CHANGE UP (ref 7–13)
PO2 BLDA: 289 MMHG — HIGH (ref 83–108)
PO2 BLDA: 328 MMHG — HIGH (ref 83–108)
PO2 BLDA: 406 MMHG — HIGH (ref 83–108)
PO2 BLDA: 436 MMHG — HIGH (ref 83–108)
PO2 BLDA: 492 MMHG — HIGH (ref 83–108)
PO2 BLDA: 494 MMHG — HIGH (ref 83–108)
PO2 BLDA: 518 MMHG — HIGH (ref 83–108)
PO2 BLDA: 532 MMHG — HIGH (ref 83–108)
PO2 BLDA: 532 MMHG — HIGH (ref 83–108)
PO2 BLDA: 547 MMHG — HIGH (ref 83–108)
PO2 BLDA: 550 MMHG — HIGH (ref 83–108)
PO2 BLDA: 565 MMHG — HIGH (ref 83–108)
PO2 BLDV: 43 MMHG — SIGNIFICANT CHANGE UP (ref 25–45)
PO2 BLDV: 53 MMHG — HIGH (ref 25–45)
PO2 BLDV: 58 MMHG — HIGH (ref 25–45)
PO2 BLDV: 58 MMHG — HIGH (ref 25–45)
PO2 BLDV: 67 MMHG — HIGH (ref 25–45)
PO2 BLDV: 67 MMHG — HIGH (ref 25–45)
PO2 BLDV: 69 MMHG — HIGH (ref 25–45)
POTASSIUM BLDA-SCNC: 4.1 MMOL/L — SIGNIFICANT CHANGE UP (ref 3.5–5.1)
POTASSIUM BLDA-SCNC: 4.2 MMOL/L — SIGNIFICANT CHANGE UP (ref 3.5–5.1)
POTASSIUM BLDA-SCNC: 4.2 MMOL/L — SIGNIFICANT CHANGE UP (ref 3.5–5.1)
POTASSIUM BLDA-SCNC: 4.5 MMOL/L — SIGNIFICANT CHANGE UP (ref 3.5–5.1)
POTASSIUM BLDA-SCNC: 4.6 MMOL/L — SIGNIFICANT CHANGE UP (ref 3.5–5.1)
POTASSIUM BLDA-SCNC: 5.2 MMOL/L — HIGH (ref 3.5–5.1)
POTASSIUM BLDA-SCNC: 5.5 MMOL/L — HIGH (ref 3.5–5.1)
POTASSIUM BLDA-SCNC: 5.6 MMOL/L — HIGH (ref 3.5–5.1)
POTASSIUM BLDA-SCNC: 5.7 MMOL/L — HIGH (ref 3.5–5.1)
POTASSIUM BLDA-SCNC: 5.8 MMOL/L — HIGH (ref 3.5–5.1)
POTASSIUM BLDA-SCNC: 6.1 MMOL/L — HIGH (ref 3.5–5.1)
POTASSIUM BLDA-SCNC: 6.5 MMOL/L — CRITICAL HIGH (ref 3.5–5.1)
POTASSIUM BLDV-SCNC: 4.5 MMOL/L — SIGNIFICANT CHANGE UP (ref 3.5–5.1)
POTASSIUM BLDV-SCNC: 5.1 MMOL/L — SIGNIFICANT CHANGE UP (ref 3.5–5.1)
POTASSIUM BLDV-SCNC: 5.2 MMOL/L — HIGH (ref 3.5–5.1)
POTASSIUM BLDV-SCNC: 5.4 MMOL/L — HIGH (ref 3.5–5.1)
POTASSIUM BLDV-SCNC: 5.7 MMOL/L — HIGH (ref 3.5–5.1)
POTASSIUM BLDV-SCNC: 6 MMOL/L — HIGH (ref 3.5–5.1)
POTASSIUM BLDV-SCNC: 6.5 MMOL/L — CRITICAL HIGH (ref 3.5–5.1)
POTASSIUM SERPL-MCNC: 4.1 MMOL/L — SIGNIFICANT CHANGE UP (ref 3.5–5.3)
POTASSIUM SERPL-SCNC: 4.1 MMOL/L — SIGNIFICANT CHANGE UP (ref 3.5–5.3)
PROT SERPL-MCNC: 4.6 G/DL — LOW (ref 6–8.3)
PROTHROM AB SERPL-ACNC: 15.3 SEC — HIGH (ref 9.9–13.4)
RAPIDTEG MAXIMUM AMPLITUDE: 55.7 MM — SIGNIFICANT CHANGE UP (ref 52–70)
RAPIDTEG MAXIMUM AMPLITUDE: 63.8 MM — SIGNIFICANT CHANGE UP (ref 52–70)
RBC # BLD: 2.65 M/UL — LOW (ref 4.2–5.8)
RBC # FLD: 18 % — HIGH (ref 10.3–14.5)
SAO2 % BLDA: 100 % — HIGH (ref 94–98)
SAO2 % BLDA: 99.3 % — HIGH (ref 94–98)
SAO2 % BLDA: 99.3 % — HIGH (ref 94–98)
SAO2 % BLDA: 99.5 % — HIGH (ref 94–98)
SAO2 % BLDA: 99.5 % — HIGH (ref 94–98)
SAO2 % BLDA: 99.6 % — HIGH (ref 94–98)
SAO2 % BLDA: 99.6 % — HIGH (ref 94–98)
SAO2 % BLDA: 99.8 % — HIGH (ref 94–98)
SAO2 % BLDA: 99.9 % — HIGH (ref 94–98)
SAO2 % BLDA: >100 % — HIGH (ref 94–98)
SAO2 % BLDV: 83.4 % — SIGNIFICANT CHANGE UP (ref 67–88)
SAO2 % BLDV: 90.8 % — HIGH (ref 67–88)
SAO2 % BLDV: 91.7 % — HIGH (ref 67–88)
SAO2 % BLDV: 95.4 % — HIGH (ref 67–88)
SAO2 % BLDV: 95.7 % — HIGH (ref 67–88)
SAO2 % BLDV: 97.9 % — HIGH (ref 67–88)
SAO2 % BLDV: SIGNIFICANT CHANGE UP % (ref 67–88)
SODIUM BLDA-SCNC: 132 MMOL/L — LOW (ref 136–145)
SODIUM BLDA-SCNC: 133 MMOL/L — LOW (ref 136–145)
SODIUM BLDA-SCNC: 133 MMOL/L — LOW (ref 136–145)
SODIUM BLDA-SCNC: 134 MMOL/L — LOW (ref 136–145)
SODIUM BLDA-SCNC: 134 MMOL/L — LOW (ref 136–145)
SODIUM BLDA-SCNC: 135 MMOL/L — LOW (ref 136–145)
SODIUM BLDA-SCNC: 135 MMOL/L — LOW (ref 136–145)
SODIUM BLDA-SCNC: 136 MMOL/L — SIGNIFICANT CHANGE UP (ref 136–145)
SODIUM BLDA-SCNC: 138 MMOL/L — SIGNIFICANT CHANGE UP (ref 136–145)
SODIUM SERPL-SCNC: 143 MMOL/L — SIGNIFICANT CHANGE UP (ref 135–145)
TEG FUNCTIONAL FIBRINOGEN: 16.7 MM — SIGNIFICANT CHANGE UP (ref 15–32)
TEG FUNCTIONAL FIBRINOGEN: 20.7 MM — SIGNIFICANT CHANGE UP (ref 15–32)
TEG MAXIMUM AMPLITUDE: 55.3 MM — SIGNIFICANT CHANGE UP (ref 52–69)
TEG MAXIMUM AMPLITUDE: 60.7 MM — SIGNIFICANT CHANGE UP (ref 52–69)
TEG REACTION TIME: 10.7 MIN — HIGH (ref 4.6–9.1)
TEG REACTION TIME: 8.5 MIN — SIGNIFICANT CHANGE UP (ref 4.6–9.1)
TROPONIN T, HIGH SENSITIVITY RESULT: 1565 NG/L — HIGH (ref 0–51)
WBC # BLD: 10.98 K/UL — HIGH (ref 3.8–10.5)
WBC # FLD AUTO: 10.98 K/UL — HIGH (ref 3.8–10.5)

## 2025-06-17 PROCEDURE — 80053 COMPREHEN METABOLIC PANEL: CPT

## 2025-06-17 PROCEDURE — G0463: CPT

## 2025-06-17 PROCEDURE — 86850 RBC ANTIBODY SCREEN: CPT

## 2025-06-17 PROCEDURE — 82607 VITAMIN B-12: CPT

## 2025-06-17 PROCEDURE — 87640 STAPH A DNA AMP PROBE: CPT

## 2025-06-17 PROCEDURE — 86923 COMPATIBILITY TEST ELECTRIC: CPT

## 2025-06-17 PROCEDURE — 82728 ASSAY OF FERRITIN: CPT

## 2025-06-17 PROCEDURE — 86901 BLOOD TYPING SEROLOGIC RH(D): CPT

## 2025-06-17 PROCEDURE — 86140 C-REACTIVE PROTEIN: CPT

## 2025-06-17 PROCEDURE — 85025 COMPLETE CBC W/AUTO DIFF WBC: CPT

## 2025-06-17 PROCEDURE — 33533 CABG ARTERIAL SINGLE: CPT

## 2025-06-17 PROCEDURE — 83540 ASSAY OF IRON: CPT

## 2025-06-17 PROCEDURE — 83880 ASSAY OF NATRIURETIC PEPTIDE: CPT

## 2025-06-17 PROCEDURE — 71046 X-RAY EXAM CHEST 2 VIEWS: CPT

## 2025-06-17 PROCEDURE — 83550 IRON BINDING TEST: CPT

## 2025-06-17 PROCEDURE — 99291 CRITICAL CARE FIRST HOUR: CPT

## 2025-06-17 PROCEDURE — 85045 AUTOMATED RETICULOCYTE COUNT: CPT

## 2025-06-17 PROCEDURE — 86965 POOLING BLOOD PLATELETS: CPT

## 2025-06-17 PROCEDURE — 82746 ASSAY OF FOLIC ACID SERUM: CPT

## 2025-06-17 PROCEDURE — P9100: CPT

## 2025-06-17 PROCEDURE — 93880 EXTRACRANIAL BILAT STUDY: CPT

## 2025-06-17 PROCEDURE — 86900 BLOOD TYPING SEROLOGIC ABO: CPT

## 2025-06-17 PROCEDURE — 87641 MR-STAPH DNA AMP PROBE: CPT

## 2025-06-17 PROCEDURE — P9012: CPT

## 2025-06-17 PROCEDURE — 33518 CABG ARTERY-VEIN TWO: CPT

## 2025-06-17 PROCEDURE — 83036 HEMOGLOBIN GLYCOSYLATED A1C: CPT

## 2025-06-17 PROCEDURE — P9037: CPT

## 2025-06-17 PROCEDURE — 33426 REPAIR OF MITRAL VALVE: CPT

## 2025-06-17 PROCEDURE — 71045 X-RAY EXAM CHEST 1 VIEW: CPT | Mod: 26

## 2025-06-17 PROCEDURE — 33975 IMPLANT VENTRICULAR DEVICE: CPT

## 2025-06-17 DEVICE — CANNULA ANTEGRADE W/VENT 12GA: Type: IMPLANTABLE DEVICE | Status: FUNCTIONAL

## 2025-06-17 DEVICE — IMPLANTABLE DEVICE: Type: IMPLANTABLE DEVICE | Status: FUNCTIONAL

## 2025-06-17 DEVICE — SURGICEL 2 X 14": Type: IMPLANTABLE DEVICE | Status: FUNCTIONAL

## 2025-06-17 DEVICE — CATH THERMDIL SWAN GANZ VIP 7.5FR: Type: IMPLANTABLE DEVICE | Status: FUNCTIONAL

## 2025-06-17 DEVICE — PACING WIRE WHITE M-24 BAREWIRE 37MM X 89MM: Type: IMPLANTABLE DEVICE | Status: FUNCTIONAL

## 2025-06-17 DEVICE — PACING WIRE ORANGE M-25 WINGED WIRE 37MM X 89MM: Type: IMPLANTABLE DEVICE | Status: FUNCTIONAL

## 2025-06-17 DEVICE — CHEST DRAIN THORACIC ARGYLE PVC 28FR STRAIGHT: Type: IMPLANTABLE DEVICE | Status: FUNCTIONAL

## 2025-06-17 DEVICE — KIT A-LINE 1LUM 20G X 12CM SAFE KIT: Type: IMPLANTABLE DEVICE | Status: FUNCTIONAL

## 2025-06-17 DEVICE — CANNULA VENOUS HLS 25FR X 3/8": Type: IMPLANTABLE DEVICE | Status: FUNCTIONAL

## 2025-06-17 DEVICE — CANNULA FEMORAL ARTERIAL BIO-MEDICUS 25FR X 3/8" NON-VENTED: Type: IMPLANTABLE DEVICE | Status: FUNCTIONAL

## 2025-06-17 DEVICE — LIGATING CLIPS WECK HORIZON SMALL-WIDE (RED) 24: Type: IMPLANTABLE DEVICE | Status: FUNCTIONAL

## 2025-06-17 DEVICE — CANNULA ARTERIAL OPTISITE 20FR X 3/8" VENTED: Type: IMPLANTABLE DEVICE | Status: FUNCTIONAL

## 2025-06-17 DEVICE — CENTRIMAG BLOOD PUMP: Type: IMPLANTABLE DEVICE | Status: FUNCTIONAL

## 2025-06-17 DEVICE — SURGICEL FIBRILLAR 2 X 4": Type: IMPLANTABLE DEVICE | Status: FUNCTIONAL

## 2025-06-17 DEVICE — SHUNT FLO-THRU INTRALUMINAL 1MM X 18MM: Type: IMPLANTABLE DEVICE | Status: FUNCTIONAL

## 2025-06-17 DEVICE — PACING WIRE WHITE M-22 LOOP 89MM: Type: IMPLANTABLE DEVICE | Status: FUNCTIONAL

## 2025-06-17 DEVICE — CHEST DRAIN THORACIC ARGYLE PVC 32FR STRAIGHT: Type: IMPLANTABLE DEVICE | Status: FUNCTIONAL

## 2025-06-17 DEVICE — LIGATING CLIPS WECK HORIZON MEDIUM (BLUE) 24: Type: IMPLANTABLE DEVICE | Status: FUNCTIONAL

## 2025-06-17 DEVICE — FLOSEAL WITH RECOTHROM THROMBIN 10ML: Type: IMPLANTABLE DEVICE | Status: FUNCTIONAL

## 2025-06-17 DEVICE — CANNULA VENOUS 1 STAGE RIGHT ANGLE METAL TIP 24FR X 3/8": Type: IMPLANTABLE DEVICE | Status: FUNCTIONAL

## 2025-06-17 DEVICE — COR-KNOT MINI DEVICE COMBO KIT: Type: IMPLANTABLE DEVICE | Status: FUNCTIONAL

## 2025-06-17 DEVICE — GUIDEWIRE AMPLATZ SUPER-STIFF STRAIGHT .035" X 180CM: Type: IMPLANTABLE DEVICE | Status: FUNCTIONAL

## 2025-06-17 DEVICE — CANNULA RETROGRADE CARDIOPLEGIA SELF-INFLATING 14FR PRE-SHAPED STYLET/HANDLE: Type: IMPLANTABLE DEVICE | Status: FUNCTIONAL

## 2025-06-17 DEVICE — CANNULA ARTERIAL 17FRX23CM: Type: IMPLANTABLE DEVICE | Status: FUNCTIONAL

## 2025-06-17 DEVICE — SHEATH INTRODUCER TERUMO PINNACLE CORONARY 5FR X 10CM X 0.038" MINI WIRE: Type: IMPLANTABLE DEVICE | Status: FUNCTIONAL

## 2025-06-17 DEVICE — CLIP APPLIER ETHICON LIGACLIP 9 3/8" MEDIUM: Type: IMPLANTABLE DEVICE | Status: FUNCTIONAL

## 2025-06-17 DEVICE — ATRICLIP LAA EXCLUSION DEVICE 40MM FLEX-V: Type: IMPLANTABLE DEVICE | Status: FUNCTIONAL

## 2025-06-17 DEVICE — KIT CVC 2LUM MAC 9FR CHG: Type: IMPLANTABLE DEVICE | Status: FUNCTIONAL

## 2025-06-17 DEVICE — CANNULA ATRASUMP 1/4" X 38CM: Type: IMPLANTABLE DEVICE | Status: FUNCTIONAL

## 2025-06-17 DEVICE — CANNULA VESSEL 3MM BLUNT TIP CLEAR 1-WAY VALVE: Type: IMPLANTABLE DEVICE | Status: FUNCTIONAL

## 2025-06-17 DEVICE — SHUNT CORONARY VESSEL 1.25 MM TAPER TIP 12MM SHAFT: Type: IMPLANTABLE DEVICE | Status: FUNCTIONAL

## 2025-06-17 DEVICE — CATH VENT LEFT HEART SILICONE 20FR NON-VENTED: Type: IMPLANTABLE DEVICE | Status: FUNCTIONAL

## 2025-06-17 RX ORDER — DEXTROSE 50 % IN WATER 50 %
50 SYRINGE (ML) INTRAVENOUS
Refills: 0 | Status: DISCONTINUED | OUTPATIENT
Start: 2025-06-17 | End: 2025-06-22

## 2025-06-17 RX ORDER — POLYETHYLENE GLYCOL 3350 17 G/17G
17 POWDER, FOR SOLUTION ORAL DAILY
Refills: 0 | Status: DISCONTINUED | OUTPATIENT
Start: 2025-06-18 | End: 2025-06-21

## 2025-06-17 RX ORDER — ASPIRIN 325 MG
300 TABLET ORAL ONCE
Refills: 0 | Status: COMPLETED | OUTPATIENT
Start: 2025-06-17

## 2025-06-17 RX ORDER — SENNA 187 MG
2 TABLET ORAL AT BEDTIME
Refills: 0 | Status: DISCONTINUED | OUTPATIENT
Start: 2025-06-18 | End: 2025-06-22

## 2025-06-17 RX ORDER — DOBUTAMINE 250 MG/20ML
5 INJECTION INTRAVENOUS
Qty: 500 | Refills: 0 | Status: DISCONTINUED | OUTPATIENT
Start: 2025-06-17 | End: 2025-06-22

## 2025-06-17 RX ORDER — OXYCODONE HYDROCHLORIDE 30 MG/1
10 TABLET ORAL EVERY 4 HOURS
Refills: 0 | Status: DISCONTINUED | OUTPATIENT
Start: 2025-06-17 | End: 2025-06-22

## 2025-06-17 RX ORDER — ACETAMINOPHEN 500 MG/5ML
650 LIQUID (ML) ORAL EVERY 6 HOURS
Refills: 0 | Status: COMPLETED | OUTPATIENT
Start: 2025-06-17 | End: 2025-06-20

## 2025-06-17 RX ORDER — OXYCODONE HYDROCHLORIDE 30 MG/1
5 TABLET ORAL EVERY 4 HOURS
Refills: 0 | Status: DISCONTINUED | OUTPATIENT
Start: 2025-06-17 | End: 2025-06-22

## 2025-06-17 RX ORDER — BISACODYL 5 MG
10 TABLET, DELAYED RELEASE (ENTERIC COATED) ORAL ONCE
Refills: 0 | Status: DISCONTINUED | OUTPATIENT
Start: 2025-06-19 | End: 2025-06-22

## 2025-06-17 RX ORDER — HYDROMORPHONE/SOD CHLOR,ISO/PF 2 MG/10 ML
0.5 SYRINGE (ML) INJECTION EVERY 6 HOURS
Refills: 0 | Status: DISCONTINUED | OUTPATIENT
Start: 2025-06-17 | End: 2025-06-19

## 2025-06-17 RX ORDER — ACETAMINOPHEN 500 MG/5ML
650 LIQUID (ML) ORAL EVERY 6 HOURS
Refills: 0 | Status: DISCONTINUED | OUTPATIENT
Start: 2025-06-20 | End: 2025-06-22

## 2025-06-17 RX ORDER — ASPIRIN 325 MG
81 TABLET ORAL DAILY
Refills: 0 | Status: DISCONTINUED | OUTPATIENT
Start: 2025-06-17 | End: 2025-06-22

## 2025-06-17 RX ORDER — GABAPENTIN 400 MG/1
100 CAPSULE ORAL EVERY 8 HOURS
Refills: 0 | Status: DISCONTINUED | OUTPATIENT
Start: 2025-06-17 | End: 2025-06-22

## 2025-06-17 RX ORDER — CEFUROXIME SODIUM 1.5 G
1500 VIAL (EA) INJECTION EVERY 24 HOURS
Refills: 0 | Status: COMPLETED | OUTPATIENT
Start: 2025-06-18 | End: 2025-06-19

## 2025-06-17 RX ORDER — CARVEDILOL 3.12 MG/1
1 TABLET, FILM COATED ORAL
Refills: 0 | DISCHARGE

## 2025-06-17 RX ORDER — AMIODARONE HYDROCHLORIDE 50 MG/ML
400 INJECTION, SOLUTION INTRAVENOUS
Refills: 0 | Status: COMPLETED | OUTPATIENT
Start: 2025-06-17 | End: 2025-06-20

## 2025-06-17 RX ORDER — NOREPINEPHRINE BITARTRATE 8 MG
0.05 SOLUTION INTRAVENOUS
Qty: 8 | Refills: 0 | Status: DISCONTINUED | OUTPATIENT
Start: 2025-06-17 | End: 2025-06-22

## 2025-06-17 RX ORDER — DEXTROSE 50 % IN WATER 50 %
25 SYRINGE (ML) INTRAVENOUS
Refills: 0 | Status: DISCONTINUED | OUTPATIENT
Start: 2025-06-17 | End: 2025-06-22

## 2025-06-17 RX ADMIN — GABAPENTIN 100 MILLIGRAM(S): 400 CAPSULE ORAL at 13:30

## 2025-06-17 RX ADMIN — DOBUTAMINE 17.9 MICROGRAM(S)/KG/MIN: 250 INJECTION INTRAVENOUS at 23:54

## 2025-06-17 RX ADMIN — NOREPINEPHRINE BITARTRATE 7.44 MICROGRAM(S)/KG/MIN: 8 SOLUTION at 23:54

## 2025-06-17 RX ADMIN — Medication 1000 MILLIGRAM(S): at 13:30

## 2025-06-17 RX ADMIN — Medication 1 APPLICATION(S): at 08:47

## 2025-06-17 RX ADMIN — Medication 10 MILLILITER(S): at 23:55

## 2025-06-17 RX ADMIN — Medication 1000 MILLIGRAM(S): at 13:59

## 2025-06-17 RX ADMIN — Medication 119 MICROGRAM(S)/KG/MIN: at 23:53

## 2025-06-17 NOTE — BRIEF OPERATIVE NOTE - OPERATION/FINDINGS
Total CPB: 191  |   Aortic XClamp: 119  Procedure: C-2L (LIMA-LAD | SVG-OM | SVG-Diag), MVr (32mm Physio Flex ring), LAAC with AtriClip 40mm, RVAD placement (17Fr cannula in main PA, 25Fr cannula in right femoral vein)  Left greater saphenous vein harvested endoscopically by CAL Downing, PA   Pacing wires: 1A1G, 2V1G Total CPB: 191  |   Aortic XClamp: 119  Procedure: C-3L (LIMA-LAD | SVG-OM | SVG-Diag), MVr (32mm Physio Flex ring), LAAC with AtriClip 40mm, RVAD placement (17Fr cannula in main PA, 25Fr cannula in right femoral vein)  Left greater saphenous vein harvested endoscopically by CAL Downing, PA   Pacing wires: 1A1G, 2V1G

## 2025-06-17 NOTE — PATIENT PROFILE ADULT - FUNCTIONAL ASSESSMENT - DAILY ACTIVITY SECTION LABEL
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  Assessment and Plan: -- Continue Anastrozole daily.   -- Breast exam today did not reveal palpable abnormality.  bilateral mammogram and US 4/2023 did not suspicious finding. She is due for b/l screening mammo. -- S/p right total knee replacement. Continue physical therapy.  -- She had Bone density 7/13/23 which was normal.  Continue calcium and vitamin D supplement and regular exercise.  -- Followup with Dr. Jovel and Dr. Lomeli as scheduled.  -- Follow up with PCP for other healthcare issues. -- RTO for followup in 6 months. She will call if there is any new concern.     
.

## 2025-06-17 NOTE — PATIENT PROFILE ADULT - FALL HARM RISK - UNIVERSAL INTERVENTIONS
Bed in lowest position, wheels locked, appropriate side rails in place/Call bell, personal items and telephone in reach/Instruct patient to call for assistance before getting out of bed or chair/Non-slip footwear when patient is out of bed/[place] to call system/Physically safe environment - no spills, clutter or unnecessary equipment/Purposeful Proactive Rounding/Room/bathroom lighting operational, light cord in reach

## 2025-06-17 NOTE — PROGRESS NOTE ADULT - SUBJECTIVE AND OBJECTIVE BOX
HPI:  70 year old male PMH of HTN, HLD, DM2, CAD (total  4 coronary stents, last one PCI in 08/2024 &  S/p status post MI treated with PCI x3 stents in 2022 & 08/2023)on Asprin 81 mg, Chronic back pain, ESRD on  HD 3x/week via Right brachial AV fistula ([facility], Nephrology- Dr.Paul Munguia-[facility], in [place]/ also s/p angioplasty of the AVfistula -intact in 12/2024/ & had revision of AV fistula in  05/2024 with Dr. Henrik Morocho), Listed for renal transplant in [place] and [place] ( he has a living donor available), CHF with EF 40-45%,  pneumonia 10/2024, severe MR/ recent cath 6/3 w/ multivessel CAD in stent stenosis planned for Mitral valve replacement, CABG x3 on 6/17/2025 w/ Dr. Mckeon.      ***HD monday/ wed/Friday---surgery on 6/17 Tuesday  ****cardiac catheterization on 6/3/25 which revealed revealed Left main artery: There was dampening upon engagement of the ostium of the left main coronary artery. There is a 50 % stenosis in the ostium portion of the segment. Proximal left anterior descending: There is a 20 % in-stent restenosis. First diagonal: There is an 80 % stenosis in the ostium portion of the segment. Mid left anterior descending: There is a 35 % stenosis. Distal left anterior descending: There is a 45 % stenosis. Proximal circumflex: There is a 90 % in-stent restenosis. Mid circumflex: There is a 60 % stenosis. Proximal right coronary artery: There is a 99 % stenosis in the ostium portion of the segment. Mid right coronary artery: There is a 90 % stenosis. Mid right coronary artery: There is a 100 % stenosis.   (10 Pj 2025 11:14)    Patient underwent C3L, LAAC, MV repair for severe MR, and required placement of an RVAD with an oxygenator for RV failure today. Patient now admitted to CTU for further management and recovery.    Patient seen and examined at the bedside.    Remained critically ill on continuous ICU monitoring.    OBJECTIVE:  Vital Signs Last 24 Hrs  T(C): 36.5 (17 Jun 2025 14:23), Max: 36.5 (17 Jun 2025 06:52)  T(F): 97.7 (17 Jun 2025 06:52), Max: 97.7 (17 Jun 2025 06:52)  HR: 73 (17 Jun 2025 14:23) (73 - 73)  BP: 152/70 (17 Jun 2025 14:23) (152/70 - 152/70)  BP(mean): --  RR: 16 (17 Jun 2025 14:23) (16 - 16)  SpO2: 100% (17 Jun 2025 14:23) (100% - 100%)    Parameters below as of 17 Jun 2025 06:52  Patient On (Oxygen Delivery Method): room air          Physical Exam:   General: intubated, breathing in sync with the ventilator  Neurology: sedated   Eyes: bilateral pupils equal and reactive   ENT/Neck: +ETT midline, Neck supple, trachea midline, No JVD   Respiratory: Clear bilaterally   CV: S1S2, no murmurs        [x] Sternal dressing, [x] Mediastinal CT, [x] Pleural CT        [x] Temporary pacing AV paced  Abdominal: Soft, NT, ND +BS,   Extremities: No pedal edema noted, + peripheral pulses by doppler  Skin: No Rashes, Hematoma, Ecchymosis                           Assessment/Plan:    NEURO: Sedated with an IV Dexmedetomidine infusion  RESP: Patient requires full ventilator support, close monitoring of respiratory rate, pulse oximetry, breathing pattern and intermittent blood gas analysis for support and to prevent decompensation.  CVS: Patient has required placement of an RVAD for acute systolic RV failure. In addition he requires IV Dobutamine, IV Epinephrine, and IV Norepinephrine infusions for cardiogenic shock. Cyanokit was given in the OR.  GI: Stress ulcer prophylaxis with protonix.  RENAL: ESRD dependent on HD. Patient will likely require CVVH once further stabilized. Continue close monitoring of pH, fluid balance, and electrolytes with renal dosing of perioperative antibiotics.  ENDOCRINE: Metabolic stability and prevention of infection and stress hyperglycemia/type 2 diabetes with hyperglycemia required an insulin infusion and hourly glucose checks  HEME: Anemia due to acute blood loss. Patient received 2 platelets, 2 units of cryoprecipitate and 500 units of K Centra in the OR.    Patient requires continuous monitoring with EKG, arterial line, central venous line, pulse oximetry, inspiratory and expiratory tidal volumes, peak airway pressures, breathing pattern and intermittent blood gas analysis in order to prevent and respond to cardiopulmonary instability or decompensation.    Care plan discussed with ICU care team. I have spent 35 minutes providing non routine post op care for this critically ill patient.   HPI:  70 year old male PMH of HTN, HLD, DM2, CAD (total  4 coronary stents, last one PCI in 08/2024 &  S/p status post MI treated with PCI x3 stents in 2022 & 08/2023)on Asprin 81 mg, Chronic back pain, ESRD on  HD 3x/week via Right brachial AV fistula ([facility], Nephrology- Dr.Paul Munguia-[facility], in [place]/ also s/p angioplasty of the AVfistula -intact in 12/2024/ & had revision of AV fistula in  05/2024 with Dr. Henrik Morocho), Listed for renal transplant in [place] and [place] ( he has a living donor available), CHF with EF 40-45%,  pneumonia 10/2024, severe MR/ recent cath 6/3 w/ multivessel CAD in stent stenosis planned for Mitral valve replacement, CABG x3 on 6/17/2025 w/ Dr. Mckeon.      ***HD monday/ wed/Friday---surgery on 6/17 Tuesday  ****cardiac catheterization on 6/3/25 which revealed revealed Left main artery: There was dampening upon engagement of the ostium of the left main coronary artery. There is a 50 % stenosis in the ostium portion of the segment. Proximal left anterior descending: There is a 20 % in-stent restenosis. First diagonal: There is an 80 % stenosis in the ostium portion of the segment. Mid left anterior descending: There is a 35 % stenosis. Distal left anterior descending: There is a 45 % stenosis. Proximal circumflex: There is a 90 % in-stent restenosis. Mid circumflex: There is a 60 % stenosis. Proximal right coronary artery: There is a 99 % stenosis in the ostium portion of the segment. Mid right coronary artery: There is a 90 % stenosis. Mid right coronary artery: There is a 100 % stenosis.   (10 Pj 2025 11:14)    Patient underwent C3L, LAAC, MV repair for severe MR, and required placement of an RVAD with an oxygenator for RV failure today. Patient now admitted to CTU for further management and recovery.    Patient seen and examined at the bedside.    Remained critically ill on continuous ICU monitoring.    OBJECTIVE:  Vital Signs Last 24 Hrs  T(C): 36.5 (17 Jun 2025 14:23), Max: 36.5 (17 Jun 2025 06:52)  T(F): 97.7 (17 Jun 2025 06:52), Max: 97.7 (17 Jun 2025 06:52)  HR: 73 (17 Jun 2025 14:23) (73 - 73)  BP: 152/70 (17 Jun 2025 14:23) (152/70 - 152/70)  BP(mean): --  RR: 16 (17 Jun 2025 14:23) (16 - 16)  SpO2: 100% (17 Jun 2025 14:23) (100% - 100%)    Parameters below as of 17 Jun 2025 06:52  Patient On (Oxygen Delivery Method): room air          Physical Exam:   General: intubated, breathing in sync with the ventilator  Neurology: sedated   Eyes: bilateral pupils equal and reactive   ENT/Neck: +ETT midline, Neck supple, trachea midline, No JVD   Respiratory: Clear bilaterally   CV: S1S2, no murmurs        [x] Sternal dressing, [x] Mediastinal CT, [x] Pleural CT        [x] Temporary pacing AV paced  Abdominal: Soft, NT, ND +BS,   Extremities: No pedal edema noted, + peripheral pulses by doppler  Skin: No Rashes, Hematoma, Ecchymosis                           Assessment/Plan:    NEURO: Sedated with an IV Dexmedetomidine infusion  RESP: Patient requires full ventilator support, close monitoring of respiratory rate, pulse oximetry, breathing pattern and intermittent blood gas analysis for support and to prevent decompensation.    Mode: AC/ CMV (Assist Control/ Continuous Mandatory Ventilation)  RR (machine): 550  TV (machine): 12  FiO2: 100  PEEP: 6  ITime: 1  MAP: 9  PIP: 26    CVS: Patient has required placement of an RVAD for acute systolic RV failure. In addition he requires IV Dobutamine, IV Epinephrine, and IV Norepinephrine infusions for cardiogenic shock. Cyanokit was given in the OR.  GI: Stress ulcer prophylaxis with protonix.  RENAL: ESRD dependent on HD. Patient will likely require CVVH once further stabilized. Continue close monitoring of pH, fluid balance, and electrolytes with renal dosing of perioperative antibiotics.  ENDOCRINE: Metabolic stability and prevention of infection and type 2 diabetes required an insulin drip protocol and hourly glucose checks  HEME: Anemia due to acute blood loss. Patient received 2 platelets, 2 units of cryoprecipitate and 500 units of K Centra in the OR.    Patient requires continuous monitoring with EKG, arterial line, central venous line, pulse oximetry, inspiratory and expiratory tidal volumes, peak airway pressures, breathing pattern and intermittent blood gas analysis in order to prevent and respond to cardiopulmonary instability or decompensation.    Care plan discussed with ICU care team. I have spent 35 minutes providing non routine post op care for this critically ill patient.

## 2025-06-17 NOTE — CHART NOTE - NSCHARTNOTEFT_GEN_A_CORE
Patient examined chart review  Agree with H&P documented 6/10/25 without changes  Patient with severe multi-vessel CAD and moderate to severe MR  Plan for CABG with mitral valve repair, possible replacement today.

## 2025-06-17 NOTE — PATIENT PROFILE ADULT - HEALTH LITERACY
Your opinion matters! Thank you for choosing the [facility]. You might receive a survey in the mail about your experience today to evaluate our clinic. Please fill it out and return it in the pre-paid envelope.  It was a pleasure to care for you today!     Test Results:  Our goal is to report all test results ordered by our care provider within 7-14 days. If you do not receive your test results either by phone or by mail within 14 days after your visit with our office please call our office at [phone] and ask to speak with a member of our care team.         no

## 2025-06-18 ENCOUNTER — RESULT REVIEW (OUTPATIENT)
Age: 70
End: 2025-06-18

## 2025-06-18 LAB
ALBUMIN SERPL ELPH-MCNC: 3.2 G/DL — LOW (ref 3.3–5)
ALBUMIN SERPL ELPH-MCNC: 3.3 G/DL — SIGNIFICANT CHANGE UP (ref 3.3–5)
ALBUMIN SERPL ELPH-MCNC: 3.3 G/DL — SIGNIFICANT CHANGE UP (ref 3.3–5)
ALP SERPL-CCNC: 66 U/L — SIGNIFICANT CHANGE UP (ref 40–120)
ALP SERPL-CCNC: 68 U/L — SIGNIFICANT CHANGE UP (ref 40–120)
ALP SERPL-CCNC: 68 U/L — SIGNIFICANT CHANGE UP (ref 40–120)
ALT FLD-CCNC: 11 U/L — SIGNIFICANT CHANGE UP (ref 10–45)
ALT FLD-CCNC: 17 U/L — SIGNIFICANT CHANGE UP (ref 10–45)
ALT FLD-CCNC: 18 U/L — SIGNIFICANT CHANGE UP (ref 10–45)
ANION GAP SERPL CALC-SCNC: 17 MMOL/L — SIGNIFICANT CHANGE UP (ref 5–17)
ANION GAP SERPL CALC-SCNC: 18 MMOL/L — HIGH (ref 5–17)
ANION GAP SERPL CALC-SCNC: 20 MMOL/L — HIGH (ref 5–17)
APTT BLD: 33.4 SEC — SIGNIFICANT CHANGE UP (ref 26.1–36.8)
APTT BLD: 35.9 SEC — SIGNIFICANT CHANGE UP (ref 26.1–36.8)
AST SERPL-CCNC: 29 U/L — SIGNIFICANT CHANGE UP (ref 10–40)
AST SERPL-CCNC: 30 U/L — SIGNIFICANT CHANGE UP (ref 10–40)
AST SERPL-CCNC: 31 U/L — SIGNIFICANT CHANGE UP (ref 10–40)
BASE EXCESS BLDV CALC-SCNC: -0.4 MMOL/L — SIGNIFICANT CHANGE UP (ref -2–3)
BASE EXCESS BLDV CALC-SCNC: -1.1 MMOL/L — SIGNIFICANT CHANGE UP (ref -2–3)
BASE EXCESS BLDV CALC-SCNC: -1.3 MMOL/L — SIGNIFICANT CHANGE UP (ref -2–3)
BASE EXCESS BLDV CALC-SCNC: -1.5 MMOL/L — SIGNIFICANT CHANGE UP (ref -2–3)
BASE EXCESS BLDV CALC-SCNC: -2.2 MMOL/L — LOW (ref -2–3)
BASE EXCESS BLDV CALC-SCNC: -2.2 MMOL/L — LOW (ref -2–3)
BASE EXCESS BLDV CALC-SCNC: -3 MMOL/L — LOW (ref -2–3)
BASOPHILS # BLD AUTO: 0.02 K/UL — SIGNIFICANT CHANGE UP (ref 0–0.2)
BASOPHILS NFR BLD AUTO: 0.2 % — SIGNIFICANT CHANGE UP (ref 0–2)
BILIRUB SERPL-MCNC: 0.5 MG/DL — SIGNIFICANT CHANGE UP (ref 0.2–1.2)
BILIRUB SERPL-MCNC: 0.6 MG/DL — SIGNIFICANT CHANGE UP (ref 0.2–1.2)
BILIRUB SERPL-MCNC: 1 MG/DL — SIGNIFICANT CHANGE UP (ref 0.2–1.2)
BLOOD GAS ECMO POST MEMBRANE - ARTERIAL RESULT: SIGNIFICANT CHANGE UP
BUN SERPL-MCNC: 35 MG/DL — HIGH (ref 7–23)
BUN SERPL-MCNC: 39 MG/DL — HIGH (ref 7–23)
BUN SERPL-MCNC: 41 MG/DL — HIGH (ref 7–23)
CALCIUM SERPL-MCNC: 8.6 MG/DL — SIGNIFICANT CHANGE UP (ref 8.4–10.5)
CALCIUM SERPL-MCNC: 9.2 MG/DL — SIGNIFICANT CHANGE UP (ref 8.4–10.5)
CALCIUM SERPL-MCNC: 9.7 MG/DL — SIGNIFICANT CHANGE UP (ref 8.4–10.5)
CHLORIDE SERPL-SCNC: 102 MMOL/L — SIGNIFICANT CHANGE UP (ref 96–108)
CHLORIDE SERPL-SCNC: 104 MMOL/L — SIGNIFICANT CHANGE UP (ref 96–108)
CHLORIDE SERPL-SCNC: 104 MMOL/L — SIGNIFICANT CHANGE UP (ref 96–108)
CO2 BLDV-SCNC: 24 MMOL/L — SIGNIFICANT CHANGE UP (ref 22–26)
CO2 BLDV-SCNC: 25 MMOL/L — SIGNIFICANT CHANGE UP (ref 22–26)
CO2 BLDV-SCNC: 25 MMOL/L — SIGNIFICANT CHANGE UP (ref 22–26)
CO2 BLDV-SCNC: 26 MMOL/L — SIGNIFICANT CHANGE UP (ref 22–26)
CO2 BLDV-SCNC: 26 MMOL/L — SIGNIFICANT CHANGE UP (ref 22–26)
CO2 BLDV-SCNC: 27 MMOL/L — HIGH (ref 22–26)
CO2 BLDV-SCNC: 27 MMOL/L — HIGH (ref 22–26)
CO2 SERPL-SCNC: 19 MMOL/L — LOW (ref 22–31)
CO2 SERPL-SCNC: 19 MMOL/L — LOW (ref 22–31)
CO2 SERPL-SCNC: 20 MMOL/L — LOW (ref 22–31)
CREAT SERPL-MCNC: 6.53 MG/DL — HIGH (ref 0.5–1.3)
CREAT SERPL-MCNC: 7.45 MG/DL — HIGH (ref 0.5–1.3)
CREAT SERPL-MCNC: 7.8 MG/DL — HIGH (ref 0.5–1.3)
EGFR: 7 ML/MIN/1.73M2 — LOW
EGFR: 8 ML/MIN/1.73M2 — LOW
EGFR: 8 ML/MIN/1.73M2 — LOW
EOSINOPHIL # BLD AUTO: 0.01 K/UL — SIGNIFICANT CHANGE UP (ref 0–0.5)
EOSINOPHIL NFR BLD AUTO: 0.1 % — SIGNIFICANT CHANGE UP (ref 0–6)
FIBRINOGEN PPP-MCNC: 252 MG/DL — SIGNIFICANT CHANGE UP (ref 200–445)
FIBRINOGEN PPP-MCNC: 315 MG/DL — SIGNIFICANT CHANGE UP (ref 200–445)
GAS PNL BLDA: SIGNIFICANT CHANGE UP
GAS PNL BLDV: SIGNIFICANT CHANGE UP
GLUCOSE BLDC GLUCOMTR-MCNC: 104 MG/DL — HIGH (ref 70–99)
GLUCOSE BLDC GLUCOMTR-MCNC: 105 MG/DL — HIGH (ref 70–99)
GLUCOSE BLDC GLUCOMTR-MCNC: 119 MG/DL — HIGH (ref 70–99)
GLUCOSE BLDC GLUCOMTR-MCNC: 122 MG/DL — HIGH (ref 70–99)
GLUCOSE BLDC GLUCOMTR-MCNC: 123 MG/DL — HIGH (ref 70–99)
GLUCOSE BLDC GLUCOMTR-MCNC: 129 MG/DL — HIGH (ref 70–99)
GLUCOSE BLDC GLUCOMTR-MCNC: 130 MG/DL — HIGH (ref 70–99)
GLUCOSE BLDC GLUCOMTR-MCNC: 133 MG/DL — HIGH (ref 70–99)
GLUCOSE BLDC GLUCOMTR-MCNC: 138 MG/DL — HIGH (ref 70–99)
GLUCOSE BLDC GLUCOMTR-MCNC: 139 MG/DL — HIGH (ref 70–99)
GLUCOSE BLDC GLUCOMTR-MCNC: 141 MG/DL — HIGH (ref 70–99)
GLUCOSE BLDC GLUCOMTR-MCNC: 142 MG/DL — HIGH (ref 70–99)
GLUCOSE BLDC GLUCOMTR-MCNC: 147 MG/DL — HIGH (ref 70–99)
GLUCOSE BLDC GLUCOMTR-MCNC: 155 MG/DL — HIGH (ref 70–99)
GLUCOSE BLDC GLUCOMTR-MCNC: 155 MG/DL — HIGH (ref 70–99)
GLUCOSE BLDC GLUCOMTR-MCNC: 158 MG/DL — HIGH (ref 70–99)
GLUCOSE BLDC GLUCOMTR-MCNC: 159 MG/DL — HIGH (ref 70–99)
GLUCOSE BLDC GLUCOMTR-MCNC: 259 MG/DL — HIGH (ref 70–99)
GLUCOSE BLDC GLUCOMTR-MCNC: 96 MG/DL — SIGNIFICANT CHANGE UP (ref 70–99)
GLUCOSE SERPL-MCNC: 127 MG/DL — HIGH (ref 70–99)
GLUCOSE SERPL-MCNC: 153 MG/DL — HIGH (ref 70–99)
GLUCOSE SERPL-MCNC: 153 MG/DL — HIGH (ref 70–99)
HAPTOGLOB SERPL-MCNC: 23 MG/DL — LOW (ref 34–200)
HAPTOGLOB SERPL-MCNC: <20 MG/DL — LOW (ref 34–200)
HAPTOGLOB SERPL-MCNC: <20 MG/DL — LOW (ref 34–200)
HCO3 BLDV-SCNC: 23 MMOL/L — SIGNIFICANT CHANGE UP (ref 22–29)
HCO3 BLDV-SCNC: 24 MMOL/L — SIGNIFICANT CHANGE UP (ref 22–29)
HCO3 BLDV-SCNC: 25 MMOL/L — SIGNIFICANT CHANGE UP (ref 22–29)
HCO3 BLDV-SCNC: 25 MMOL/L — SIGNIFICANT CHANGE UP (ref 22–29)
HCT VFR BLD CALC: 24.1 % — LOW (ref 39–50)
HCT VFR BLD CALC: 24.8 % — LOW (ref 39–50)
HGB BLD-MCNC: 7.7 G/DL — LOW (ref 13–17)
HGB BLD-MCNC: 8 G/DL — LOW (ref 13–17)
HOROWITZ INDEX BLDV+IHG-RTO: 100 — SIGNIFICANT CHANGE UP
HOROWITZ INDEX BLDV+IHG-RTO: 100 — SIGNIFICANT CHANGE UP
HOROWITZ INDEX BLDV+IHG-RTO: 40 — SIGNIFICANT CHANGE UP
HOROWITZ INDEX BLDV+IHG-RTO: 40 — SIGNIFICANT CHANGE UP
IMM GRANULOCYTES # BLD AUTO: 0.11 K/UL — HIGH (ref 0–0.07)
IMM GRANULOCYTES NFR BLD AUTO: 0.8 % — SIGNIFICANT CHANGE UP (ref 0–0.9)
INR BLD: 1.31 RATIO — HIGH (ref 0.85–1.16)
INR BLD: 1.32 RATIO — HIGH (ref 0.85–1.16)
LDH SERPL L TO P-CCNC: 314 U/L — HIGH (ref 50–242)
LDH SERPL L TO P-CCNC: 344 U/L — HIGH (ref 50–242)
LYMPHOCYTES # BLD AUTO: 0.64 K/UL — LOW (ref 1–3.3)
LYMPHOCYTES NFR BLD AUTO: 4.9 % — LOW (ref 13–44)
MAGNESIUM SERPL-MCNC: 2.6 MG/DL — SIGNIFICANT CHANGE UP (ref 1.6–2.6)
MAGNESIUM SERPL-MCNC: 2.7 MG/DL — HIGH (ref 1.6–2.6)
MCHC RBC-ENTMCNC: 27.6 PG — SIGNIFICANT CHANGE UP (ref 27–34)
MCHC RBC-ENTMCNC: 28 PG — SIGNIFICANT CHANGE UP (ref 27–34)
MCHC RBC-ENTMCNC: 32 G/DL — SIGNIFICANT CHANGE UP (ref 32–36)
MCHC RBC-ENTMCNC: 32.3 G/DL — SIGNIFICANT CHANGE UP (ref 32–36)
MCV RBC AUTO: 86.4 FL — SIGNIFICANT CHANGE UP (ref 80–100)
MCV RBC AUTO: 86.7 FL — SIGNIFICANT CHANGE UP (ref 80–100)
MONOCYTES # BLD AUTO: 1.07 K/UL — HIGH (ref 0–0.9)
MONOCYTES NFR BLD AUTO: 8.1 % — SIGNIFICANT CHANGE UP (ref 2–14)
NEUTROPHILS # BLD AUTO: 11.34 K/UL — HIGH (ref 1.8–7.4)
NEUTROPHILS NFR BLD AUTO: 85.9 % — HIGH (ref 43–77)
NRBC # BLD AUTO: 0 K/UL — SIGNIFICANT CHANGE UP (ref 0–0)
NRBC # BLD AUTO: 0.03 K/UL — HIGH (ref 0–0)
NRBC # FLD: 0 K/UL — SIGNIFICANT CHANGE UP (ref 0–0)
NRBC # FLD: 0.03 K/UL — HIGH (ref 0–0)
NRBC BLD AUTO-RTO: 0 /100 WBCS — SIGNIFICANT CHANGE UP (ref 0–0)
NRBC BLD AUTO-RTO: 0 /100 WBCS — SIGNIFICANT CHANGE UP (ref 0–0)
PCO2 BLDV: 41 MMHG — LOW (ref 42–55)
PCO2 BLDV: 42 MMHG — SIGNIFICANT CHANGE UP (ref 42–55)
PCO2 BLDV: 44 MMHG — SIGNIFICANT CHANGE UP (ref 42–55)
PCO2 BLDV: 44 MMHG — SIGNIFICANT CHANGE UP (ref 42–55)
PCO2 BLDV: 45 MMHG — SIGNIFICANT CHANGE UP (ref 42–55)
PCO2 BLDV: 47 MMHG — SIGNIFICANT CHANGE UP (ref 42–55)
PCO2 BLDV: 48 MMHG — SIGNIFICANT CHANGE UP (ref 42–55)
PH BLDV: 7.32 — SIGNIFICANT CHANGE UP (ref 7.32–7.43)
PH BLDV: 7.33 — SIGNIFICANT CHANGE UP (ref 7.32–7.43)
PH BLDV: 7.34 — SIGNIFICANT CHANGE UP (ref 7.32–7.43)
PH BLDV: 7.34 — SIGNIFICANT CHANGE UP (ref 7.32–7.43)
PH BLDV: 7.35 — SIGNIFICANT CHANGE UP (ref 7.32–7.43)
PH BLDV: 7.36 — SIGNIFICANT CHANGE UP (ref 7.32–7.43)
PH BLDV: 7.37 — SIGNIFICANT CHANGE UP (ref 7.32–7.43)
PHOSPHATE SERPL-MCNC: 3.7 MG/DL — SIGNIFICANT CHANGE UP (ref 2.5–4.5)
PHOSPHATE SERPL-MCNC: 3.9 MG/DL — SIGNIFICANT CHANGE UP (ref 2.5–4.5)
PLATELET # BLD AUTO: 160 K/UL — SIGNIFICANT CHANGE UP (ref 150–400)
PLATELET # BLD AUTO: 162 K/UL — SIGNIFICANT CHANGE UP (ref 150–400)
PMV BLD: 11 FL — SIGNIFICANT CHANGE UP (ref 7–13)
PMV BLD: 11.2 FL — SIGNIFICANT CHANGE UP (ref 7–13)
PO2 BLDV: 31 MMHG — SIGNIFICANT CHANGE UP (ref 25–45)
PO2 BLDV: 33 MMHG — SIGNIFICANT CHANGE UP (ref 25–45)
PO2 BLDV: 36 MMHG — SIGNIFICANT CHANGE UP (ref 25–45)
PO2 BLDV: 37 MMHG — SIGNIFICANT CHANGE UP (ref 25–45)
PO2 BLDV: 39 MMHG — SIGNIFICANT CHANGE UP (ref 25–45)
PO2 BLDV: 43 MMHG — SIGNIFICANT CHANGE UP (ref 25–45)
PO2 BLDV: 48 MMHG — HIGH (ref 25–45)
POTASSIUM SERPL-MCNC: 4.8 MMOL/L — SIGNIFICANT CHANGE UP (ref 3.5–5.3)
POTASSIUM SERPL-MCNC: 4.8 MMOL/L — SIGNIFICANT CHANGE UP (ref 3.5–5.3)
POTASSIUM SERPL-MCNC: 6.5 MMOL/L — CRITICAL HIGH (ref 3.5–5.3)
POTASSIUM SERPL-SCNC: 4.8 MMOL/L — SIGNIFICANT CHANGE UP (ref 3.5–5.3)
POTASSIUM SERPL-SCNC: 4.8 MMOL/L — SIGNIFICANT CHANGE UP (ref 3.5–5.3)
POTASSIUM SERPL-SCNC: 6.5 MMOL/L — CRITICAL HIGH (ref 3.5–5.3)
PROT SERPL-MCNC: 4.9 G/DL — LOW (ref 6–8.3)
PROT SERPL-MCNC: 4.9 G/DL — LOW (ref 6–8.3)
PROT SERPL-MCNC: 5.1 G/DL — LOW (ref 6–8.3)
PROTHROM AB SERPL-ACNC: 15 SEC — HIGH (ref 9.9–13.4)
PROTHROM AB SERPL-ACNC: 15 SEC — HIGH (ref 9.9–13.4)
RBC # BLD: 2.79 M/UL — LOW (ref 4.2–5.8)
RBC # BLD: 2.86 M/UL — LOW (ref 4.2–5.8)
RBC # FLD: 17.2 % — HIGH (ref 10.3–14.5)
RBC # FLD: 17.8 % — HIGH (ref 10.3–14.5)
SAO2 % BLDV: 55.9 % — LOW (ref 67–88)
SAO2 % BLDV: 57.1 % — LOW (ref 67–88)
SAO2 % BLDV: 57.5 % — LOW (ref 67–88)
SAO2 % BLDV: 58.8 % — LOW (ref 67–88)
SAO2 % BLDV: 60.8 % — LOW (ref 67–88)
SAO2 % BLDV: 70.3 % — SIGNIFICANT CHANGE UP (ref 67–88)
SAO2 % BLDV: 76.5 % — SIGNIFICANT CHANGE UP (ref 67–88)
SODIUM SERPL-SCNC: 140 MMOL/L — SIGNIFICANT CHANGE UP (ref 135–145)
SODIUM SERPL-SCNC: 141 MMOL/L — SIGNIFICANT CHANGE UP (ref 135–145)
SODIUM SERPL-SCNC: 142 MMOL/L — SIGNIFICANT CHANGE UP (ref 135–145)
WBC # BLD: 13.19 K/UL — HIGH (ref 3.8–10.5)
WBC # BLD: 8.81 K/UL — SIGNIFICANT CHANGE UP (ref 3.8–10.5)
WBC # FLD AUTO: 13.19 K/UL — HIGH (ref 3.8–10.5)
WBC # FLD AUTO: 8.81 K/UL — SIGNIFICANT CHANGE UP (ref 3.8–10.5)

## 2025-06-18 PROCEDURE — 99291 CRITICAL CARE FIRST HOUR: CPT

## 2025-06-18 PROCEDURE — 93306 TTE W/DOPPLER COMPLETE: CPT | Mod: 26

## 2025-06-18 PROCEDURE — 71045 X-RAY EXAM CHEST 1 VIEW: CPT | Mod: 26

## 2025-06-18 PROCEDURE — 99292 CRITICAL CARE ADDL 30 MIN: CPT

## 2025-06-18 RX ORDER — SODIUM CHLORIDE 9 G/1000ML
1000 INJECTION, SOLUTION INTRAVENOUS
Refills: 0 | Status: DISCONTINUED | OUTPATIENT
Start: 2025-06-18 | End: 2025-06-22

## 2025-06-18 RX ORDER — ACETAMINOPHEN 500 MG/5ML
1000 LIQUID (ML) ORAL ONCE
Refills: 0 | Status: COMPLETED | OUTPATIENT
Start: 2025-06-18 | End: 2025-06-18

## 2025-06-18 RX ORDER — DEXTROSE 50 % IN WATER 50 %
50 SYRINGE (ML) INTRAVENOUS ONCE
Refills: 0 | Status: COMPLETED | OUTPATIENT
Start: 2025-06-18 | End: 2025-06-18

## 2025-06-18 RX ORDER — ALBUTEROL SULFATE 2.5 MG/3ML
10 VIAL, NEBULIZER (ML) INHALATION ONCE
Refills: 0 | Status: DISCONTINUED | OUTPATIENT
Start: 2025-06-18 | End: 2025-06-18

## 2025-06-18 RX ORDER — ALBUTEROL SULFATE 2.5 MG/3ML
10 VIAL, NEBULIZER (ML) INHALATION ONCE
Refills: 0 | Status: COMPLETED | OUTPATIENT
Start: 2025-06-18 | End: 2025-06-18

## 2025-06-18 RX ORDER — ALBUMIN (HUMAN) 12.5 G/50ML
250 INJECTION, SOLUTION INTRAVENOUS ONCE
Refills: 0 | Status: COMPLETED | OUTPATIENT
Start: 2025-06-18 | End: 2025-06-18

## 2025-06-18 RX ORDER — PROPOFOL 10 MG/ML
25 INJECTION, EMULSION INTRAVENOUS
Qty: 1000 | Refills: 0 | Status: DISCONTINUED | OUTPATIENT
Start: 2025-06-18 | End: 2025-06-18

## 2025-06-18 RX ORDER — HEPARIN SODIUM 1000 [USP'U]/ML
5000 INJECTION INTRAVENOUS; SUBCUTANEOUS EVERY 8 HOURS
Refills: 0 | Status: DISCONTINUED | OUTPATIENT
Start: 2025-06-18 | End: 2025-06-19

## 2025-06-18 RX ORDER — CALCIUM GLUCONATE 20 MG/ML
2 INJECTION, SOLUTION INTRAVENOUS ONCE
Refills: 0 | Status: COMPLETED | OUTPATIENT
Start: 2025-06-18 | End: 2025-06-18

## 2025-06-18 RX ORDER — VASOPRESSIN 20 [USP'U]/ML
0.04 INJECTION INTRAVENOUS
Qty: 40 | Refills: 0 | Status: DISCONTINUED | OUTPATIENT
Start: 2025-06-18 | End: 2025-06-22

## 2025-06-18 RX ORDER — ASPIRIN 325 MG
300 TABLET ORAL ONCE
Refills: 0 | Status: COMPLETED | OUTPATIENT
Start: 2025-06-18 | End: 2025-06-18

## 2025-06-18 RX ORDER — ALBUMIN (HUMAN) 12.5 G/50ML
100 INJECTION, SOLUTION INTRAVENOUS ONCE
Refills: 0 | Status: COMPLETED | OUTPATIENT
Start: 2025-06-18 | End: 2025-06-18

## 2025-06-18 RX ORDER — SUGAMMADEX 100 MG/ML
200 INJECTION, SOLUTION INTRAVENOUS ONCE
Refills: 0 | Status: COMPLETED | OUTPATIENT
Start: 2025-06-18 | End: 2025-06-18

## 2025-06-18 RX ORDER — DEXMEDETOMIDINE HYDROCHLORIDE IN SODIUM CHLORIDE 4 UG/ML
0.83 INJECTION INTRAVENOUS
Qty: 200 | Refills: 0 | Status: DISCONTINUED | OUTPATIENT
Start: 2025-06-18 | End: 2025-06-19

## 2025-06-18 RX ORDER — NICARDIPINE HCL 30 MG
5 CAPSULE ORAL
Qty: 40 | Refills: 0 | Status: DISCONTINUED | OUTPATIENT
Start: 2025-06-18 | End: 2025-06-20

## 2025-06-18 RX ADMIN — Medication 100 MILLIGRAM(S): at 08:05

## 2025-06-18 RX ADMIN — Medication 1 APPLICATION(S): at 12:13

## 2025-06-18 RX ADMIN — Medication 0.5 MILLIGRAM(S): at 01:45

## 2025-06-18 RX ADMIN — DEXMEDETOMIDINE HYDROCHLORIDE IN SODIUM CHLORIDE 16.5 MICROGRAM(S)/KG/HR: 4 INJECTION INTRAVENOUS at 05:33

## 2025-06-18 RX ADMIN — Medication 11.9 MICROGRAM(S)/KG/MIN: at 07:50

## 2025-06-18 RX ADMIN — VASOPRESSIN 6 UNIT(S)/MIN: 20 INJECTION INTRAVENOUS at 19:29

## 2025-06-18 RX ADMIN — DEXMEDETOMIDINE HYDROCHLORIDE IN SODIUM CHLORIDE 16.5 MICROGRAM(S)/KG/HR: 4 INJECTION INTRAVENOUS at 07:49

## 2025-06-18 RX ADMIN — DOBUTAMINE 11.9 MICROGRAM(S)/KG/MIN: 250 INJECTION INTRAVENOUS at 12:20

## 2025-06-18 RX ADMIN — DEXMEDETOMIDINE HYDROCHLORIDE IN SODIUM CHLORIDE 16.5 MICROGRAM(S)/KG/HR: 4 INJECTION INTRAVENOUS at 19:29

## 2025-06-18 RX ADMIN — Medication 10 MILLILITER(S): at 07:50

## 2025-06-18 RX ADMIN — GABAPENTIN 100 MILLIGRAM(S): 400 CAPSULE ORAL at 05:30

## 2025-06-18 RX ADMIN — Medication 11.9 MICROGRAM(S)/KG/MIN: at 12:19

## 2025-06-18 RX ADMIN — Medication 3 UNIT(S)/HR: at 03:18

## 2025-06-18 RX ADMIN — Medication 25 MG/HR: at 10:25

## 2025-06-18 RX ADMIN — Medication 300 MILLIGRAM(S): at 06:07

## 2025-06-18 RX ADMIN — Medication 3 UNIT(S)/HR: at 07:49

## 2025-06-18 RX ADMIN — SUGAMMADEX 200 MILLIGRAM(S): 100 INJECTION, SOLUTION INTRAVENOUS at 13:00

## 2025-06-18 RX ADMIN — Medication 10 UNIT(S): at 13:00

## 2025-06-18 RX ADMIN — Medication 25 MG/HR: at 19:29

## 2025-06-18 RX ADMIN — Medication 15 MILLILITER(S): at 05:32

## 2025-06-18 RX ADMIN — VASOPRESSIN 6 UNIT(S)/MIN: 20 INJECTION INTRAVENOUS at 17:33

## 2025-06-18 RX ADMIN — Medication 1000 MILLIGRAM(S): at 13:15

## 2025-06-18 RX ADMIN — Medication 50 MILLILITER(S): at 14:00

## 2025-06-18 RX ADMIN — Medication 400 MILLIGRAM(S): at 13:00

## 2025-06-18 RX ADMIN — Medication 11.9 MICROGRAM(S)/KG/MIN: at 03:18

## 2025-06-18 RX ADMIN — PROPOFOL 11.9 MICROGRAM(S)/KG/MIN: 10 INJECTION, EMULSION INTRAVENOUS at 07:26

## 2025-06-18 RX ADMIN — PROPOFOL 11.9 MICROGRAM(S)/KG/MIN: 10 INJECTION, EMULSION INTRAVENOUS at 03:18

## 2025-06-18 RX ADMIN — Medication 81 MILLIGRAM(S): at 12:19

## 2025-06-18 RX ADMIN — Medication 10 MILLILITER(S): at 19:28

## 2025-06-18 RX ADMIN — DOBUTAMINE 11.9 MICROGRAM(S)/KG/MIN: 250 INJECTION INTRAVENOUS at 19:28

## 2025-06-18 RX ADMIN — Medication 25 MG/HR: at 09:23

## 2025-06-18 RX ADMIN — ALBUMIN (HUMAN) 125 MILLILITER(S): 12.5 INJECTION, SOLUTION INTRAVENOUS at 01:00

## 2025-06-18 RX ADMIN — Medication 650 MILLIGRAM(S): at 06:00

## 2025-06-18 RX ADMIN — DOBUTAMINE 11.9 MICROGRAM(S)/KG/MIN: 250 INJECTION INTRAVENOUS at 03:18

## 2025-06-18 RX ADMIN — Medication 10 MILLIGRAM(S): at 13:37

## 2025-06-18 RX ADMIN — Medication 500 MILLIGRAM(S): at 05:32

## 2025-06-18 RX ADMIN — CALCIUM GLUCONATE 200 GRAM(S): 20 INJECTION, SOLUTION INTRAVENOUS at 00:50

## 2025-06-18 RX ADMIN — DOBUTAMINE 11.9 MICROGRAM(S)/KG/MIN: 250 INJECTION INTRAVENOUS at 07:49

## 2025-06-18 RX ADMIN — Medication 0.5 MILLIGRAM(S): at 01:30

## 2025-06-18 RX ADMIN — Medication 8.93 MICROGRAM(S)/KG/MIN: at 19:29

## 2025-06-18 RX ADMIN — Medication 3 UNIT(S)/HR: at 19:28

## 2025-06-18 RX ADMIN — Medication 650 MILLIGRAM(S): at 05:30

## 2025-06-18 RX ADMIN — AMIODARONE HYDROCHLORIDE 400 MILLIGRAM(S): 50 INJECTION, SOLUTION INTRAVENOUS at 05:32

## 2025-06-18 RX ADMIN — ALBUMIN (HUMAN) 50 MILLILITER(S): 12.5 INJECTION, SOLUTION INTRAVENOUS at 20:58

## 2025-06-18 RX ADMIN — POLYETHYLENE GLYCOL 3350 17 GRAM(S): 17 POWDER, FOR SOLUTION ORAL at 12:20

## 2025-06-18 RX ADMIN — Medication 20 MILLIGRAM(S): at 07:52

## 2025-06-18 NOTE — CONSULT NOTE ADULT - SUBJECTIVE AND OBJECTIVE BOX
NEPHROLOGY - NSN    Patient seen and examined.    HPI:  70 year old male PMH of HTN, HLD, DM2, CAD (total  4 coronary stents, last one PCI in 08/2024 &  S/p status post MI treated with PCI x3 stents in 2022 & 08/2023)on Asprin 81 mg, Chronic back pain, ESRD on  HD 3x/week via Right brachial AV fistula (Rtrzvd-Yciqvykya-Ilfqkd, Nephrology- Dr.Paul Munguia-[facility], in [place]/ also s/p angioplasty of the AVfistula -intact in 12/2024/ & had revision of AV fistula in  05/2024 with Dr. Henrik Morocho), Listed for renal transplant in [place] ( he has a living donor available), CHF with EF 40-45%,  pneumonia 10/2024, severe MR/ recent cath 6/3 w/ multivessel CAD in stent stenosis planned for Mitral valve replacement, CABG x3 on 6/17/2025 w/ Dr. Mckeon.      ***HD monday/ wed/Friday---surgery on 6/17 Tuesday  ****cardiac catheterization on 6/3/25 which revealed revealed Left main artery: There was dampening upon engagement of the ostium of the left main coronary artery. There is a 50 % stenosis in the ostium portion of the segment. Proximal left anterior descending: There is a 20 % in-stent restenosis. First diagonal: There is an 80 % stenosis in the ostium portion of the segment. Mid left anterior descending: There is a 35 % stenosis. Distal left anterior descending: There is a 45 % stenosis. Proximal circumflex: There is a 90 % in-stent restenosis. Mid circumflex: There is a 60 % stenosis. Proximal right coronary artery: There is a 99 % stenosis in the ostium portion of the segment. Mid right coronary artery: There is a 90 % stenosis. Mid right coronary artery: There is a 100 % stenosis.   (10 Pj 2025 11:14)      PAST MEDICAL & SURGICAL HISTORY:  HTN (hypertension)      HLD (hyperlipidemia)      CAD (coronary artery disease)      Former smoker      ESRD on dialysis      Gout      Moderate aortic insufficiency      Severe mitral regurgitation      DM2 (diabetes mellitus, type 2)      Right cataract      MI (myocardial infarction)      Stented coronary artery      2019 novel coronavirus disease (COVID-19)      Pancreatic cyst      AV fistula      History of cardiac cath      H/O colonoscopy      Stented coronary artery          MEDICATIONS  (STANDING):  acetaminophen     Tablet .. 650 milliGRAM(s) Oral every 6 hours  aMIOdarone    Tablet 400 milliGRAM(s) Oral two times a day  ascorbic acid 500 milliGRAM(s) Oral two times a day  aspirin enteric coated 81 milliGRAM(s) Oral daily  cefuroxime  IVPB 1500 milliGRAM(s) IV Intermittent every 24 hours  chlorhexidine 0.12% Liquid 15 milliLiter(s) Oral Mucosa every 12 hours  chlorhexidine 0.12% Liquid 15 milliLiter(s) Oral Mucosa every 12 hours  chlorhexidine 2% Cloths 1 Application(s) Topical daily  chlorhexidine 4% Liquid 1 Application(s) Topical daily  dexMEDEtomidine Infusion 0.83 MICROgram(s)/kG/Hr (16.5 mL/Hr) IV Continuous <Continuous>  dextrose 50% Injectable 50 milliLiter(s) IV Push every 15 minutes  dextrose 50% Injectable 25 milliLiter(s) IV Push every 15 minutes  DOBUTamine Infusion 5 MICROgram(s)/kG/Min (11.9 mL/Hr) IV Continuous <Continuous>  EPINEPHrine    Infusion 0.04 MICROgram(s)/kG/Min (11.9 mL/Hr) IV Continuous <Continuous>  famotidine Injectable 20 milliGRAM(s) IV Push every 48 hours  gabapentin 100 milliGRAM(s) Oral every 8 hours  insulin regular Infusion 3 Unit(s)/Hr (3 mL/Hr) IV Continuous <Continuous>  norepinephrine Infusion 0.05 MICROgram(s)/kG/Min (7.44 mL/Hr) IV Continuous <Continuous>  polyethylene glycol 3350 17 Gram(s) Oral daily  propofol Infusion 25 MICROgram(s)/kG/Min (11.9 mL/Hr) IV Continuous <Continuous>  senna 2 Tablet(s) Oral at bedtime  sodium chloride 0.9%. 1000 milliLiter(s) (10 mL/Hr) IV Continuous <Continuous>      Allergies    No Known Allergies    Intolerances        SOCIAL HISTORY:  Denies alcohol abuse, drug abuse or tobacco usage.     FAMILY HISTORY:  FH: type 2 diabetes (Father, Mother, Sibling)    FH: HTN (hypertension) (Father, Mother, Sibling)    FH: renal failure (Sibling)        VITALS:  T(C): 36 (06-18-25 @ 08:00), Max: 37.2 (06-18-25 @ 04:00)  HR: 89 (06-18-25 @ 08:30) (73 - 112)  BP: 152/70 (06-17-25 @ 14:23) (152/70 - 152/70)  RR: 13 (06-18-25 @ 07:45) (11 - 20)  SpO2: 100% (06-18-25 @ 08:30) (100% - 100%)    REVIEW OF SYSTEMS:  Denies any nausea, vomiting, diarrhea, fever or chills. Denies chest pain, SOB, focal weakness, hematuria or dysuria. Good oral intake and denies fatigue or weakness. All other pertinent systems are reviewed and are negative.    PHYSICAL EXAM:  Constitutional: NAD  HEENT: EOMI  Neck:  No JVD, supple   Respiratory: CTA B/L  Cardiovascular: S1 and S2, RRR  Gastrointestinal: + BS, soft, NT, ND  Extremities: No peripheral edema, + peripheral pulses  Neurological: A/O x 3, CN2-12 intact  Psychiatric: Normal mood, normal affect  : No Ag  Skin: No rashes, C/D/I  Access: Not applicable    I and O's:    06-17 @ 07:01  -  06-18 @ 07:00  --------------------------------------------------------  IN: 2036.7 mL / OUT: 635 mL / NET: 1401.7 mL    06-18 @ 07:01  -  06-18 @ 08:49  --------------------------------------------------------  IN: 99.7 mL / OUT: 25 mL / NET: 74.7 mL      Height (cm): 180.3 (06-17 @ 14:23)  Weight (kg): 79.4 (06-17 @ 14:23)  BMI (kg/m2): 24.4 (06-17 @ 14:23)  BSA (m2): 1.99 (06-17 @ 14:23)    LABS:                        8.0    13.19 )-----------( 160      ( 18 Jun 2025 03:34 )             24.8     06-18    142  |  102  |  39[H]  ----------------------------<  153[H]  4.8   |  20[L]  |  7.80[H]    Ca    9.7      18 Jun 2025 03:34  Phos  3.7     06-18  Mg     2.7     06-18    TPro  4.9[L]  /  Alb  3.3  /  TBili  1.0  /  DBili  x   /  AST  29  /  ALT  11  /  AlkPhos  68  06-18      URINE:  Urinalysis Basic - ( 18 Jun 2025 03:34 )    Color: x / Appearance: x / SG: x / pH: x  Gluc: 153 mg/dL / Ketone: x  / Bili: x / Urobili: x   Blood: x / Protein: x / Nitrite: x   Leuk Esterase: x / RBC: x / WBC x   Sq Epi: x / Non Sq Epi: x / Bacteria: x        RADIOLOGY & ADDITIONAL STUDIES:     NEPHROLOGY - NSN    Patient seen and examined.    HPI:  70 year old male PMH of HTN, HLD, DM2, CAD (total  4 coronary stents, last one PCI in 2024 &  S/p status post MI treated with PCI x3 stents in  & 2023)on Asprin 81 mg, Chronic back pain, ESRD on  HD 3x/week via Right brachial AV fistula (Reugtj-Btzfliyjr-Xeoski, Nephrology- Dr.Paul Munguia-[facility], in [place]/ also s/p angioplasty of the AVfistula -intact in 2024/ & had revision of AV fistula in  2024 with Dr. Henrik Morocho), Listed for renal transplant in [place] ( he has a living donor available), CHF with EF 40-45%,  pneumonia 10/2024, severe MR/ recent cath 6/3 w/ multivessel CAD in stent stenosis planned for Mitral valve replacement, CABG x3 on 2025 w/ Dr. Mckeon.      ***HD monday/ wed/Friday---surgery on   ****cardiac catheterization on 6/3/25 which revealed revealed Left main artery: There was dampening upon engagement of the ostium of the left main coronary artery. There is a 50 % stenosis in the ostium portion of the segment. Proximal left anterior descending: There is a 20 % in-stent restenosis. First diagonal: There is an 80 % stenosis in the ostium portion of the segment. Mid left anterior descending: There is a 35 % stenosis. Distal left anterior descending: There is a 45 % stenosis. Proximal circumflex: There is a 90 % in-stent restenosis. Mid circumflex: There is a 60 % stenosis. Proximal right coronary artery: There is a 99 % stenosis in the ostium portion of the segment. Mid right coronary artery: There is a 90 % stenosis. Mid right coronary artery: There is a 100 % stenosis.  Pt is on  and Epi and still intubated at present;  He is on RVAD and decision to start CVV       PAST MEDICAL & SURGICAL HISTORY:  HTN (hypertension)      HLD (hyperlipidemia)      CAD (coronary artery disease)      Former smoker      ESRD on dialysis      Gout      Moderate aortic insufficiency      Severe mitral regurgitation      DM2 (diabetes mellitus, type 2)      Right cataract      MI (myocardial infarction)      Stented coronary artery       novel coronavirus disease (COVID-19)      Pancreatic cyst      AV fistula      History of cardiac cath      H/O colonoscopy      Stented coronary artery          MEDICATIONS  (STANDING):  acetaminophen     Tablet .. 650 milliGRAM(s) Oral every 6 hours  aMIOdarone    Tablet 400 milliGRAM(s) Oral two times a day  ascorbic acid 500 milliGRAM(s) Oral two times a day  aspirin enteric coated 81 milliGRAM(s) Oral daily  cefuroxime  IVPB 1500 milliGRAM(s) IV Intermittent every 24 hours  chlorhexidine 0.12% Liquid 15 milliLiter(s) Oral Mucosa every 12 hours  chlorhexidine 0.12% Liquid 15 milliLiter(s) Oral Mucosa every 12 hours  chlorhexidine 2% Cloths 1 Application(s) Topical daily  chlorhexidine 4% Liquid 1 Application(s) Topical daily  dexMEDEtomidine Infusion 0.83 MICROgram(s)/kG/Hr (16.5 mL/Hr) IV Continuous <Continuous>  dextrose 50% Injectable 50 milliLiter(s) IV Push every 15 minutes  dextrose 50% Injectable 25 milliLiter(s) IV Push every 15 minutes  DOBUTamine Infusion 5 MICROgram(s)/kG/Min (11.9 mL/Hr) IV Continuous <Continuous>  EPINEPHrine    Infusion 0.04 MICROgram(s)/kG/Min (11.9 mL/Hr) IV Continuous <Continuous>  famotidine Injectable 20 milliGRAM(s) IV Push every 48 hours  gabapentin 100 milliGRAM(s) Oral every 8 hours  insulin regular Infusion 3 Unit(s)/Hr (3 mL/Hr) IV Continuous <Continuous>  norepinephrine Infusion 0.05 MICROgram(s)/kG/Min (7.44 mL/Hr) IV Continuous <Continuous>  polyethylene glycol 3350 17 Gram(s) Oral daily  propofol Infusion 25 MICROgram(s)/kG/Min (11.9 mL/Hr) IV Continuous <Continuous>  senna 2 Tablet(s) Oral at bedtime  sodium chloride 0.9%. 1000 milliLiter(s) (10 mL/Hr) IV Continuous <Continuous>      Allergies    No Known Allergies    Intolerances        SOCIAL HISTORY:  Denies alcohol abuse, drug abuse or tobacco usage.     FAMILY HISTORY:  FH: type 2 diabetes (Father, Mother, Sibling)    FH: HTN (hypertension) (Father, Mother, Sibling)    FH: renal failure (Sibling)        VITALS:  T(C): 36 (25 @ 08:00), Max: 37.2 (25 @ 04:00)  HR: 89 (25 @ 08:30) (73 - 112)  BP: 152/70 (25 @ 14:23) (152/70 - 152/70)  RR: 13 (25 @ 07:45) (11 - 20)  SpO2: 100% (25 @ 08:30) (100% - 100%)    REVIEW OF SYSTEMS:  unable     PHYSICAL EXAM:  Constitutional: intubated   HEENT: EOMI  Neck:  No JVD, supple   Respiratory: transmitted   Cardiovascular: S1 and S2, RRR  Gastrointestinal: + BS, soft, NT, ND  Extremities: No peripheral edema, + peripheral pulses  Neurological:   : +  Ancelmo  Skin: No rashes, C/D/I  Access: [facility]     I and O's:     @ 07:01  -   @ 07:00  --------------------------------------------------------  IN: 2036.7 mL / OUT: 635 mL / NET: 1401.7 mL     @ 07:01  -   @ 08:49  --------------------------------------------------------  IN: 99.7 mL / OUT: 25 mL / NET: 74.7 mL      Height (cm): 180.3 ( 14:23)  Weight (kg): 79.4 ( @ 14:23)  BMI (kg/m2): 24.4 ( @ 14:23)  BSA (m2): 1.99 ( 14:23)    LABS:                        8.0    13.19 )-----------( 160      ( 2025 03:34 )             24.8         142  |  102  |  39[H]  ----------------------------<  153[H]  4.8   |  20[L]  |  7.80[H]    Ca    9.7      2025 03:34  Phos  3.7     -  Mg     2.7     -    TPro  4.9[L]  /  Alb  3.3  /  TBili  1.0  /  DBili  x   /  AST  29  /  ALT  11  /  AlkPhos  68  -      URINE:  Urinalysis Basic - ( 2025 03:34 )    Color: x / Appearance: x / SG: x / pH: x  Gluc: 153 mg/dL / Ketone: x  / Bili: x / Urobili: x   Blood: x / Protein: x / Nitrite: x   Leuk Esterase: x / RBC: x / WBC x   Sq Epi: x / Non Sq Epi: x / Bacteria: x        RADIOLOGY & ADDITIONAL STUDIES:    < from: Xray Chest 2 Views PA/Lat (06.10.25 @ 13:18) >    ACC: [id number] EXAM:  XR CHEST PA LAT 2V   ORDERED BY: PARRISH CHRISTINA     PROCEDURE DATE:  06/10/2025          INTERPRETATION:  DATE OF STUDY: 6/10/25    COMPARISON: None.    CLINICAL INDICATION: Preop    TECHNIQUE: PA and lateral chest films.    FINDINGS:  The heart is normal in size.  There is no pulmonary consolidation.  There is no pleural effusion or pneumothorax.  No acute bony finding.    IMPRESSION:  No radiographic evidence of acute cardiopulmonary process.    --- End of Report ---            SAMIRA CORBIN MD; Attending Radiologist  This docum    < end of copied text >

## 2025-06-18 NOTE — PHYSICAL THERAPY INITIAL EVALUATION ADULT - GENERAL OBSERVATIONS, REHAB EVAL
Patient received semi reclined in bed in CTU, NAD, VSS, +Right IJ TLC infusing, +pacing wires, +RVAD/ECMO (central and right femoral cannulation), Patient received semi reclined in bed in CTU, NAD, VSS, +Right IJ TLC infusing, +dagoberto gtt, +heparin gtt, + gtt, +pacing wires, +RVAD/ECMO (central and right femoral vein annulation), +CVVH in ECMO circuit, +CTs x 3 to LWS, +echeverria

## 2025-06-18 NOTE — PROGRESS NOTE ADULT - SUBJECTIVE AND OBJECTIVE BOX
Patient seen and examined at the bedside.    Remained critically ill on continuous ICU monitoring.    OBJECTIVE:  Vital Signs Last 24 Hrs  T(C): 2.7 (18 Jun 2025 20:00), Max: 37.2 (18 Jun 2025 04:00)  T(F): 36.9 (18 Jun 2025 20:00), Max: 99 (18 Jun 2025 04:00)  HR: 107 (18 Jun 2025 20:45) (88 - 117)  BP: --  BP(mean): --  RR: 15 (18 Jun 2025 20:45) (4 - 60)  SpO2: 76% (18 Jun 2025 20:45) (76% - 100%)    Parameters below as of 18 Jun 2025 20:00  Patient On (Oxygen Delivery Method): HFNC 40/40    Physical Exam:  General: NAD  Neurology: Nonfocal  Eyes: Bilateral pupils equal and reactive  ENT/Neck: Neck supple, trachea midline, No JVD  Respiratory: Clear bilaterally  CV: S1S2, no murmurs        [x] Sternal dressing, [x] Mediastinal CT x2, [x] L Pleural CT        [x] Paced rhythm, [x] Temporary pacing - DDD 96  Abdominal: Soft, NT, ND +BS  Extremities: 1-2+ pedal edema noted, + peripheral pulses  Skin: No Rashes, Hematoma, Ecchymosis    Assessment:  70 year old male PMH of HTN, HLD, DM2, CAD (total  4 coronary stents, last one PCI in 08/2024 &  S/p status post MI treated with PCI x3 stents in 2022 & 08/2023)on Asprin 81 mg, Chronic back pain, ESRD on  HD 3x/week via Right brachial AV fistula (Oxbkgw-Wecyvytty-Wtsmwt, Nephrology- Dr.Paul Munguia-[facility], in [place]/ also s/p angioplasty of the AVfistula -intact in 12/2024/ & had revision of AV fistula in  05/2024 with Dr. Henrik Morocho), Listed for renal transplant in [place] ( he has a living donor available), CHF with EF 40-45%,  pneumonia 10/2024, severe MR/ recent cath 6/3 w/ multivessel CAD in stent stenosis planned for Mitral valve replacement, CABG x3 on 6/17/2025 w/ Dr. Mckeon.  Patient underwent C3L, LAAC, MV repair for severe MR, and required placement of an RVAD with an oxygenator for RV failure on 6/17/25.    ESRD  on CRRT  At risk for hemodynamic decompensation  At high risk for cardiac arrhythmias   Cardiogenic shock  hyperglycemia  acute blood loss anemia  Acute postoperative pain  Acute postoperative respiratory insufficieny     Plan:  ***Neuro***  [x] Nonfocal  Postoperative acute pain control with Tylenol, Gabapentin, Dilaudid, Oxycodone  Post operative neuro assessment    ***Cardiovascular***  Invasive hemodynamic monitoring, assess perfusion indices  NJ (88 - 117) / CVP (2 - 307) / MAP (49 - 92) / Hct 24.1% / Lactate 1.4  [x] Dobutamine 5 MICROgram(s)/kG/Min  [x] Epinephrine 0.03 MICROgram(s)/kG/Min  [x] Vasopressin Infusion 0.04 Unit(s)/Min  [x] RVAD - flow 3.74 l/min, 3500 RPMs, Sweep 0.5  Reassessment of hemodynamics post resuscitation  PO Amiodarone for Afib prophylaxis   Monitor chest tube outputs  [x] ASA  Serial EKG and cardiac enzymes  [x] VTE ppx with Heparin SQ    ***Pulmonary***  [x] HFNC 40L/40%  Encourage incentive spirometry, continue pulse ox monitoring, follow ABGs     ***GI***  [x] NPO for now  [x] Pepcid  Bowel regimen with Miralax and Senna    ***Renal***  [x] ESRD on CVVHD   Continue to monitor I/Os, BUN/Creatinine.  Replete lytes PRN  Ancelmo [x] present    ***ID***  Perioperative coverage with Cefuroxime   Monitor for fevers and leukocytosis    ***Endocrine***  [x] DM2 : HbA1c 6.8 %             - [x] Insulin gtt             - Need tight glycemic control to prevent wound infection.        Patient requires continuous monitoring with bedside rhythm monitoring, pulse oximetry monitoring, and continuous central venous and arterial pressure monitoring; and intermittent blood gas analysis. Care plan discussed with the ICU care team.  Patient remained critical, at risk for life threatening decompensation.    I have spent 43 minutes providing critical care management to this patient.    By signing my name below, I, Robert Wilson, attest that this documentation has been prepared under the direction and in the presence of Joanne Abreu NP.  Electronically signed: Maria Luisa Collado, 06-18-25 @ 21:21    I, Joanne Abreu, personally performed the services described in this documentation. All medical record entries made by the jose eduardoibe were at my direction and in my presence. I have reviewed the chart and agree that the record reflects my personal performance and is accurate and complete.  Electronically signed: Joanne Abreu NP.

## 2025-06-18 NOTE — PHYSICAL THERAPY INITIAL EVALUATION ADULT - PERTINENT HX OF CURRENT PROBLEM, REHAB EVAL
70 year old male PMH of HTN, HLD, DM2, CAD (total  4 coronary stents, last one PCI in 08/2024 &  S/p status post MI treated with PCI x3 stents in 2022 & 08/2023)on Asprin 81 mg, Chronic back pain, ESRD on  HD 3x/week via Right brachial AV fistula (Vbeblf-Bgfcbxond-Iyhmif, Nephrology- Dr.Paul Munguia-[facility], in [place]/ also s/p angioplasty of the AVfistula -intact in 12/2024/ & had revision of AV fistula in  05/2024 with Dr. Henrik Morocho), Listed for renal transplant in [place] ( he has a living donor available), CHF with EF 40-45%,  pneumonia 10/2024, severe MR/ recent cath 6/3 w/ multivessel CAD in stent stenosis planned for Mitral valve replacement, CABG x3 on 6/17/2025 w/ Dr. Mckeon. 70 year old male PMH of HTN, HLD, DM2, CAD (total  4 coronary stents, last one PCI in 08/2024 &  S/p status post MI treated with PCI x3 stents in 2022 & 08/2023)on Asprin 81 mg, Chronic back pain, ESRD on  HD 3x/week via Right brachial AV fistula (Vkbdxt-Xvozyjrng-Kkhlcn, Nephrology- Dr.Paul Munguia-[facility], in [place]/ also s/p angioplasty of the AVfistula -intact in 12/2024/ & had revision of AV fistula in  05/2024 with Dr. Henrik Morocho), Listed for renal transplant in [place] ( he has a living donor available), CHF with EF 40-45%,  pneumonia 10/2024, severe MR/ recent cath 6/3 w/ multivessel CAD in stent stenosis. Patient now POD#1 s/p CABGx3, Repair, mitral valve, Insertion of right ventricular assist device, Clipping, left atrial appendage.

## 2025-06-18 NOTE — CONSULT NOTE ADULT - ASSESSMENT
70 year old male PMH of HTN, HLD, DM2, CAD (total  4 coronary stents, last one PCI in 08/2024 &  S/p status post MI treated with PCI x3 stents in 2022 & 08/2023)on Asprin 81 mg, Chronic back pain, ESRD on  HD 3x/week via Right brachial AV fistula (Wzlmlj-Eifdptofq-Hfpcry, Nephrology- Dr.Paul Munguia-[facility], in [facility] CABG x 3 and MVR repair and RVAD for RV dysfunction     1 Renal      70 year old male PMH of HTN, HLD, DM2, CAD (total  4 coronary stents, last one PCI in 2024 &  S/p status post MI treated with PCI x3 stents in  & 2023)on Asprin 81 mg, Chronic back pain, ESRD on  HD 3x/week via Right brachial AV fistula (Shqodz-Picasjsvu-Ljitml, Nephrology- Dr.Paul Munguia-[facility], in [facility] CABG x 3 and MVR repair and RVAD for RV dysfunction   Cardiogenic shock     1 Renal - Still intubated and with inotropes and will need to unload the RV   Start CVV through the RVAD circuit and he will not need a shiley   Consent for HD obtained from wife   Will switch to traditional HD when more stable and out of cardiogenic shock   Start with 50cc/hr of fluid removal   2 CVS-On Cardene gtt and Epi and  gtt for inotropic support   3 Pulm-To remain intubated tonight and optimize his heart   4 -Can dc the echeverria     Critically sick and unstable   > 35 min with critical care     Sayed [facility]   [phone]    70 year old male PMH of HTN, HLD, DM2, CAD (total  4 coronary stents, last one PCI in 2024 &  S/p status post MI treated with PCI x3 stents in  & 2023)on Asprin 81 mg, Chronic back pain, ESRD on  HD 3x/week via Right brachial AV fistula (Vixrlh-Jsnpuehqo-Ysihgq, Nephrology- Dr.Paul Munguia-[facility], in [facility] CABG x 3 and MVR repair and RVAD for RV dysfunction   Cardiogenic shock     1 Renal - Still intubated and with inotropes and will need to unload the RV   Start CVV through the RVAD circuit and he will not need a shiley   Consent for HD obtained from wife   Will switch to traditional HD when more stable and out of cardiogenic shock   Start with 50cc/hr of fluid removal   2 CVS-On Cardene gtt and Epi and  gtt for inotropic support   3 Pulm-To remain intubated tonight and optimize his heart   4 -Can dc the echeverria     Critically sick and unstable   > 35 min with critical care   DW CTICU     Sayed [facility]   [phone]

## 2025-06-18 NOTE — PROGRESS NOTE ADULT - SUBJECTIVE AND OBJECTIVE BOX
Patient seen and examined at the bedside.    Remains critically ill on continuous ICU monitoring, at risk for life threatening decompensation.  All Labs, data reviewed. Plan of care discussed in length during multi-disciplinary ICU rounds.       Brief Summary:  *[age] yo M/F with pre op dx s/p surgery on XX/YY*      24 Hour events:      Objective:  Vital Signs Last 24 Hrs  T(C): 37.2 (18 Jun 2025 04:00), Max: 37.2 (18 Jun 2025 04:00)  T(F): 99 (18 Jun 2025 04:00), Max: 99 (18 Jun 2025 04:00)  HR: 94 (18 Jun 2025 07:00) (73 - 112)  BP: 152/70 (17 Jun 2025 14:23) (152/70 - 152/70)  BP(mean): --  RR: 12 (18 Jun 2025 06:15) (11 - 20)  SpO2: 100% (18 Jun 2025 07:00) (100% - 100%)    Parameters below as of 18 Jun 2025 04:00  Patient On (Oxygen Delivery Method): ventilator    O2 Concentration (%): 40    Mode: AC/ CMV (Assist Control/ Continuous Mandatory Ventilation)  RR (machine): 550  TV (machine): 12  FiO2: 40  PEEP: 6  ITime: 1  MAP: 9  PIP: 20              Physical Exam:   General: Alert and interactive   Neurology: Oriented, following commands  Respiratory: Clear bilaterally   CV: Sinus   *If on ECMO/LVAD Add settings+ AC Therapy*  Abdominal: Soft, Nontender  Extremities: Warm, well-perfused  -------------------------------------------------------------------------------------------------------------------------------    Labs:                        8.0    13.19 )-----------( 160      ( 18 Jun 2025 03:34 )             24.8     06-18    142  |  102  |  39[H]  ----------------------------<  153[H]  4.8   |  20[L]  |  7.80[H]    Ca    9.7      18 Jun 2025 03:34  Phos  3.7     06-18  Mg     2.7     06-18    TPro  4.9[L]  /  Alb  3.3  /  TBili  1.0  /  DBili  x   /  AST  29  /  ALT  11  /  AlkPhos  68  06-18    LIVER FUNCTIONS - ( 18 Jun 2025 03:34 )  Alb: 3.3 g/dL / Pro: 4.9 g/dL / ALK PHOS: 68 U/L / ALT: 11 U/L / AST: 29 U/L / GGT: x           PT/INR - ( 18 Jun 2025 03:39 )   PT: 15.0 sec;   INR: 1.31 ratio         PTT - ( 18 Jun 2025 03:39 )  PTT:33.4 sec  ABG - ( 18 Jun 2025 07:03 )  pH, Arterial: 7.45  pH, Blood: x     /  pCO2: 34    /  pO2: 243   / HCO3: 24    / Base Excess: -0.2  /  SaO2: 97.5                  ALL MEDICATIONS   MEDICATIONS  (STANDING):  acetaminophen     Tablet .. 650 milliGRAM(s) Oral every 6 hours  aMIOdarone    Tablet 400 milliGRAM(s) Oral two times a day  ascorbic acid 500 milliGRAM(s) Oral two times a day  aspirin enteric coated 81 milliGRAM(s) Oral daily  cefuroxime  IVPB 1500 milliGRAM(s) IV Intermittent every 24 hours  chlorhexidine 0.12% Liquid 15 milliLiter(s) Oral Mucosa every 12 hours  chlorhexidine 0.12% Liquid 15 milliLiter(s) Oral Mucosa every 12 hours  chlorhexidine 2% Cloths 1 Application(s) Topical daily  chlorhexidine 4% Liquid 1 Application(s) Topical daily  dexMEDEtomidine Infusion 0.83 MICROgram(s)/kG/Hr (16.5 mL/Hr) IV Continuous <Continuous>  dextrose 50% Injectable 50 milliLiter(s) IV Push every 15 minutes  dextrose 50% Injectable 25 milliLiter(s) IV Push every 15 minutes  DOBUTamine Infusion 5 MICROgram(s)/kG/Min (11.9 mL/Hr) IV Continuous <Continuous>  EPINEPHrine    Infusion 0.04 MICROgram(s)/kG/Min (11.9 mL/Hr) IV Continuous <Continuous>  famotidine Injectable 20 milliGRAM(s) IV Push every 48 hours  gabapentin 100 milliGRAM(s) Oral every 8 hours  insulin regular Infusion 3 Unit(s)/Hr (3 mL/Hr) IV Continuous <Continuous>  norepinephrine Infusion 0.05 MICROgram(s)/kG/Min (7.44 mL/Hr) IV Continuous <Continuous>  polyethylene glycol 3350 17 Gram(s) Oral daily  propofol Infusion 25 MICROgram(s)/kG/Min (11.9 mL/Hr) IV Continuous <Continuous>  senna 2 Tablet(s) Oral at bedtime  sodium chloride 0.9%. 1000 milliLiter(s) (10 mL/Hr) IV Continuous <Continuous>    MEDICATIONS  (PRN):  HYDROmorphone  Injectable 0.5 milliGRAM(s) IV Push every 6 hours PRN Breakthrough Pain  oxyCODONE    IR 5 milliGRAM(s) Oral every 4 hours PRN Moderate Pain (4 - 6)  oxyCODONE    IR 10 milliGRAM(s) Oral every 4 hours PRN Severe Pain (7 - 10)    ------------------------------------------------------------------------------------------------------------------------------  Assessment:    At risk for delirium   At risk for hemodynamic decompensation  At high risk for cardiac arrythmias   At high risk for respiratory complications        Plan:   ***Neuro***  Maintain day/night cycle to prevenct ICU delerium   Postoperative acute pain control with Tylenol, Gabapentin, and prns.  Meds for sleep at bedtime     ***Cardiovascular***  At high risk for hemodynamic instability and cardiac arrhythmias.  Monitor for sign of hypoperfusion, trend lactate  *If on ECMO/LVAD/Impella/IABP* Mechanical circulatory support   Maintain MAPs > 65 mm Hg. Titrate *pressor name*    Titrate *inotrope name* Keep CVP<10.  *Add other cardiac meds*  Monitor chest tube output.      ***Pulmonary***  *Leave black if supplemental O2*  Postoperative acute respiratory insufficiency/failure  Wean ventilator as tolerated. Lung protective strategy  *If extubated* Deep breathing and coughing exercises, IS, Mobilization, nebs, Chest PT  Wean oxygen as able. *Remove if not on vent*      ***GI***  FEES Eval *Only if consulted*  Keep NPO for now.  Continue Tube feeds *if on Tfs*  Protonix/Pepcid for stress ulcer prophylaxis   Bowel regimen.   Trend LFTs    ***Renal***  NIC / CKD/ ESRD on HD ***Remove if not applicable***  Trend Creatinine/BUN  Ag catheter for strict I/O measurements. *remove if not present*  Replete electrolytes as indicated.  Diuresis *Specifiy med*      ***ID***  Leukocytosis *above 11*  Leukopenia *Below 3*  *If no abxs*  Monitor off antibiotics   *summarize abx/indication*  Secondary prophylaxis *Leave blank*     ***Endocrine***  Insulin per protocol for Hyperglycemia     ***Hematology***  Acute blood loss anemia and thrombocytopenia   no current transfusion indication *change if recieved products, or if no thrombocytopenia*  Heparin/Argatroban/Lovenox for anticoagulation/ DVT prophylaxis         I, Lucy Lance MD, personally performed the services described in this documentation. all medical record entries made by the scribe were at my direction and in my presence. I have reviewed the chart and agree that the record reflects my personal performance and is accurate and complete  Electronically signed:   Lucy Lance MD  CT ICU attending     ICU time: ** mins     By signing my name below, I, Mt Bull, attest that this documentation has been prepared under the direction and in the presence of Lucy Lance MD  Electronically signed: Mt Bull, 06-18-25 @ 07:25             Patient seen and examined at the bedside.    Remains critically ill on continuous ICU monitoring, at risk for life threatening decompensation.  All Labs, data reviewed. Plan of care discussed in length during multi-disciplinary ICU rounds.     Brief Summary:  70 year old male PMH of HTN, HLD, DM2, CAD (total  4 coronary stents, last one PCI in 08/2024 ) CHF with EF 40-45%, severe MR  Now s/p  Mitral valve replacement, CABG x3 and RVAD placement on 6/17/2025     24 Hour events:  Required RVAD placement coming off bypass  On dobutamine, and epi gtt  Extubated in afternoon  On CRRT, keep negative balance as able      Objective:  Vital Signs Last 24 Hrs  T(C): 37.2 (18 Jun 2025 04:00), Max: 37.2 (18 Jun 2025 04:00)  T(F): 99 (18 Jun 2025 04:00), Max: 99 (18 Jun 2025 04:00)  HR: 94 (18 Jun 2025 07:00) (73 - 112)  BP: 152/70 (17 Jun 2025 14:23) (152/70 - 152/70)  BP(mean): --  RR: 12 (18 Jun 2025 06:15) (11 - 20)  SpO2: 100% (18 Jun 2025 07:00) (100% - 100%)    Parameters below as of 18 Jun 2025 04:00  Patient On (Oxygen Delivery Method): ventilator    O2 Concentration (%): 40    Mode: AC/ CMV (Assist Control/ Continuous Mandatory Ventilation)  RR (machine): 550  TV (machine): 12  FiO2: 40  PEEP: 6  ITime: 1  MAP: 9  PIP: 20              Physical Exam:   General: NAD  Neurology:  following commands  Respiratory: on HFNC, B/L breath sounds  CV: paced  RVAD pulmonary 17F, Venous R femoral 25F  Abdominal: Soft, Nontender  Extremities: Warm, well-perfused, vital fistula on RUE  -------------------------------------------------------------------------------------------------------------------------------    Labs:                        8.0    13.19 )-----------( 160      ( 18 Jun 2025 03:34 )             24.8     06-18    142  |  102  |  39[H]  ----------------------------<  153[H]  4.8   |  20[L]  |  7.80[H]    Ca    9.7      18 Jun 2025 03:34  Phos  3.7     06-18  Mg     2.7     06-18    TPro  4.9[L]  /  Alb  3.3  /  TBili  1.0  /  DBili  x   /  AST  29  /  ALT  11  /  AlkPhos  68  06-18    LIVER FUNCTIONS - ( 18 Jun 2025 03:34 )  Alb: 3.3 g/dL / Pro: 4.9 g/dL / ALK PHOS: 68 U/L / ALT: 11 U/L / AST: 29 U/L / GGT: x           PT/INR - ( 18 Jun 2025 03:39 )   PT: 15.0 sec;   INR: 1.31 ratio    PTT - ( 18 Jun 2025 03:39 )  PTT:33.4 sec  ABG - ( 18 Jun 2025 07:03 )  pH, Arterial: 7.45  pH, Blood: x     /  pCO2: 34    /  pO2: 243   / HCO3: 24    / Base Excess: -0.2  /  SaO2: 97.5      ALL MEDICATIONS   MEDICATIONS  (STANDING):  acetaminophen     Tablet .. 650 milliGRAM(s) Oral every 6 hours  aMIOdarone    Tablet 400 milliGRAM(s) Oral two times a day  ascorbic acid 500 milliGRAM(s) Oral two times a day  aspirin enteric coated 81 milliGRAM(s) Oral daily  cefuroxime  IVPB 1500 milliGRAM(s) IV Intermittent every 24 hours  chlorhexidine 0.12% Liquid 15 milliLiter(s) Oral Mucosa every 12 hours  chlorhexidine 0.12% Liquid 15 milliLiter(s) Oral Mucosa every 12 hours  chlorhexidine 2% Cloths 1 Application(s) Topical daily  chlorhexidine 4% Liquid 1 Application(s) Topical daily  dexMEDEtomidine Infusion 0.83 MICROgram(s)/kG/Hr (16.5 mL/Hr) IV Continuous <Continuous>  dextrose 50% Injectable 50 milliLiter(s) IV Push every 15 minutes  dextrose 50% Injectable 25 milliLiter(s) IV Push every 15 minutes  DOBUTamine Infusion 5 MICROgram(s)/kG/Min (11.9 mL/Hr) IV Continuous <Continuous>  EPINEPHrine    Infusion 0.04 MICROgram(s)/kG/Min (11.9 mL/Hr) IV Continuous <Continuous>  famotidine Injectable 20 milliGRAM(s) IV Push every 48 hours  gabapentin 100 milliGRAM(s) Oral every 8 hours  insulin regular Infusion 3 Unit(s)/Hr (3 mL/Hr) IV Continuous <Continuous>  norepinephrine Infusion 0.05 MICROgram(s)/kG/Min (7.44 mL/Hr) IV Continuous <Continuous>  polyethylene glycol 3350 17 Gram(s) Oral daily  propofol Infusion 25 MICROgram(s)/kG/Min (11.9 mL/Hr) IV Continuous <Continuous>  senna 2 Tablet(s) Oral at bedtime  sodium chloride 0.9%. 1000 milliLiter(s) (10 mL/Hr) IV Continuous <Continuous>    MEDICATIONS  (PRN):  HYDROmorphone  Injectable 0.5 milliGRAM(s) IV Push every 6 hours PRN Breakthrough Pain  oxyCODONE    IR 5 milliGRAM(s) Oral every 4 hours PRN Moderate Pain (4 - 6)  oxyCODONE    IR 10 milliGRAM(s) Oral every 4 hours PRN Severe Pain (7 - 10)    ------------------------------------------------------------------------------------------------------------------------------  Assessment:  70 you M with cardiogenic shock on RVAD ,DM2, CAD (total  4 coronary stents, last one PCI in 08/2024 ) CHF with EF 40-45%, severe MR  Now s/p  Mitral valve replacement, CABG x3 and RVAD placement on 6/17/2025     ESRD  on CRRT  At risk for hemodynamic decompensation  At high risk for cardiac arrhythmias   Cardiogenic shock  hyperglycemia  acute blood loss anemia  Acute postoperative pain  Acute postoperative respiratory insufficieny       Plan:   ***Neuro***  Maintain day/night cycle to prevent ICU delirium   Postoperative acute pain control with Tylenol, Gabapentin, oxy    ***Cardiovascular***  At high risk for hemodynamic instability and cardiac arrhythmias.  RV failure, s/p CABG, s/p MV repaire  RVAD 3500 RPM, flow 3.6, sweep0.5  On dobutamine, epi gtt , wean as able  paced DDD, at 90th , underling sinus bradycardia at 52, PVCs  Monitor chest tube output.    ***Pulmonary***  acute postoperative respiratory insufficieny   On HFNC  Deep breathing and coughing exercises,   IS, Mobilization, nebs, Chest PT    ***GI***  Swallow test  Ok for PO intake   Protonix  Bowel regimen.   Trend LFTs    ***Renal***  ESRD on HD   Now on CRRT  keep CVP <12  Keep negative as able    ***ID***  Perioperative per protocol  IV Vanc    ***Endocrine***  Insulin per protocol for Hyperglycemia     ***Hematology***  Acute blood loss anemia and thrombocytopenia   cbc coags, monitor for bleeding  DVT ppx with [facility]    I, Lucy Lance MD, personally performed the services described in this documentation. all medical record entries made by the scribe were at my direction and in my presence. I have reviewed the chart and agree that the record reflects my personal performance and is accurate and complete  Electronically signed:   Lucy Lance MD  CT ICU attending     ICU time: 62 mins

## 2025-06-18 NOTE — PHYSICAL THERAPY INITIAL EVALUATION ADULT - DID THE PATIENT HAVE SURGERY?
CABG/yes CABGx3, Repair, mitral valve, Insertion of right ventricular assist device, Clipping, left atrial appendage/yes

## 2025-06-19 LAB
ALBUMIN SERPL ELPH-MCNC: 3.6 G/DL — SIGNIFICANT CHANGE UP (ref 3.3–5)
ALBUMIN SERPL ELPH-MCNC: 3.8 G/DL — SIGNIFICANT CHANGE UP (ref 3.3–5)
ALP SERPL-CCNC: 65 U/L — SIGNIFICANT CHANGE UP (ref 40–120)
ALP SERPL-CCNC: 69 U/L — SIGNIFICANT CHANGE UP (ref 40–120)
ALT FLD-CCNC: 57 U/L — HIGH (ref 10–45)
ALT FLD-CCNC: 81 U/L — HIGH (ref 10–45)
ANION GAP SERPL CALC-SCNC: 14 MMOL/L — SIGNIFICANT CHANGE UP (ref 5–17)
ANION GAP SERPL CALC-SCNC: 17 MMOL/L — SIGNIFICANT CHANGE UP (ref 5–17)
ANISOCYTOSIS BLD QL: SLIGHT — SIGNIFICANT CHANGE UP
APTT BLD: 33.7 SEC — SIGNIFICANT CHANGE UP (ref 26.1–36.8)
APTT BLD: 35.1 SEC — SIGNIFICANT CHANGE UP (ref 26.1–36.8)
APTT BLD: 35.2 SEC — SIGNIFICANT CHANGE UP (ref 26.1–36.8)
APTT BLD: 37.9 SEC — HIGH (ref 26.1–36.8)
AST SERPL-CCNC: 71 U/L — HIGH (ref 10–40)
AST SERPL-CCNC: 72 U/L — HIGH (ref 10–40)
BASE EXCESS BLDV CALC-SCNC: 0.6 MMOL/L — SIGNIFICANT CHANGE UP (ref -2–3)
BASE EXCESS BLDV CALC-SCNC: 2.5 MMOL/L — SIGNIFICANT CHANGE UP (ref -2–3)
BASOPHILS # BLD AUTO: 0.03 K/UL — SIGNIFICANT CHANGE UP (ref 0–0.2)
BASOPHILS # BLD AUTO: 0.04 K/UL — SIGNIFICANT CHANGE UP (ref 0–0.2)
BASOPHILS # BLD MANUAL: 0.19 K/UL — SIGNIFICANT CHANGE UP (ref 0–0.2)
BASOPHILS NFR BLD AUTO: 0.3 % — SIGNIFICANT CHANGE UP (ref 0–2)
BASOPHILS NFR BLD AUTO: 0.4 % — SIGNIFICANT CHANGE UP (ref 0–2)
BASOPHILS NFR BLD MANUAL: 1.7 % — SIGNIFICANT CHANGE UP (ref 0–2)
BILIRUB SERPL-MCNC: 0.7 MG/DL — SIGNIFICANT CHANGE UP (ref 0.2–1.2)
BILIRUB SERPL-MCNC: 0.7 MG/DL — SIGNIFICANT CHANGE UP (ref 0.2–1.2)
BLOOD GAS ECMO POST MEMBRANE - ARTERIAL RESULT: SIGNIFICANT CHANGE UP
BUN SERPL-MCNC: 20 MG/DL — SIGNIFICANT CHANGE UP (ref 7–23)
BUN SERPL-MCNC: 27 MG/DL — HIGH (ref 7–23)
BURR CELLS BLD QL SMEAR: SLIGHT — SIGNIFICANT CHANGE UP
CALCIUM SERPL-MCNC: 8.7 MG/DL — SIGNIFICANT CHANGE UP (ref 8.4–10.5)
CALCIUM SERPL-MCNC: 8.8 MG/DL — SIGNIFICANT CHANGE UP (ref 8.4–10.5)
CHLORIDE SERPL-SCNC: 102 MMOL/L — SIGNIFICANT CHANGE UP (ref 96–108)
CHLORIDE SERPL-SCNC: 103 MMOL/L — SIGNIFICANT CHANGE UP (ref 96–108)
CO2 BLDV-SCNC: 28 MMOL/L — HIGH (ref 22–26)
CO2 BLDV-SCNC: 30 MMOL/L — HIGH (ref 22–26)
CO2 SERPL-SCNC: 20 MMOL/L — LOW (ref 22–31)
CO2 SERPL-SCNC: 22 MMOL/L — SIGNIFICANT CHANGE UP (ref 22–31)
CREAT SERPL-MCNC: 3.42 MG/DL — HIGH (ref 0.5–1.3)
CREAT SERPL-MCNC: 4.71 MG/DL — HIGH (ref 0.5–1.3)
EGFR: 12 ML/MIN/1.73M2 — LOW
EGFR: 12 ML/MIN/1.73M2 — LOW
EGFR: 18 ML/MIN/1.73M2 — LOW
EGFR: 18 ML/MIN/1.73M2 — LOW
ELLIPTOCYTES BLD QL SMEAR: SLIGHT — SIGNIFICANT CHANGE UP
EOSINOPHIL # BLD AUTO: 0.05 K/UL — SIGNIFICANT CHANGE UP (ref 0–0.5)
EOSINOPHIL # BLD AUTO: 0.09 K/UL — SIGNIFICANT CHANGE UP (ref 0–0.5)
EOSINOPHIL # BLD MANUAL: 0.09 K/UL — SIGNIFICANT CHANGE UP (ref 0–0.5)
EOSINOPHIL NFR BLD AUTO: 0.5 % — SIGNIFICANT CHANGE UP (ref 0–6)
EOSINOPHIL NFR BLD AUTO: 0.8 % — SIGNIFICANT CHANGE UP (ref 0–6)
EOSINOPHIL NFR BLD MANUAL: 0.8 % — SIGNIFICANT CHANGE UP (ref 0–6)
FIBRINOGEN PPP-MCNC: 300 MG/DL — SIGNIFICANT CHANGE UP (ref 200–445)
GAS PNL BLDA: SIGNIFICANT CHANGE UP
GAS PNL BLDV: SIGNIFICANT CHANGE UP
GAS PNL BLDV: SIGNIFICANT CHANGE UP
GLUCOSE BLDC GLUCOMTR-MCNC: 104 MG/DL — HIGH (ref 70–99)
GLUCOSE BLDC GLUCOMTR-MCNC: 105 MG/DL — HIGH (ref 70–99)
GLUCOSE BLDC GLUCOMTR-MCNC: 105 MG/DL — HIGH (ref 70–99)
GLUCOSE BLDC GLUCOMTR-MCNC: 106 MG/DL — HIGH (ref 70–99)
GLUCOSE BLDC GLUCOMTR-MCNC: 108 MG/DL — HIGH (ref 70–99)
GLUCOSE BLDC GLUCOMTR-MCNC: 110 MG/DL — HIGH (ref 70–99)
GLUCOSE BLDC GLUCOMTR-MCNC: 111 MG/DL — HIGH (ref 70–99)
GLUCOSE BLDC GLUCOMTR-MCNC: 113 MG/DL — HIGH (ref 70–99)
GLUCOSE BLDC GLUCOMTR-MCNC: 115 MG/DL — HIGH (ref 70–99)
GLUCOSE BLDC GLUCOMTR-MCNC: 116 MG/DL — HIGH (ref 70–99)
GLUCOSE BLDC GLUCOMTR-MCNC: 116 MG/DL — HIGH (ref 70–99)
GLUCOSE BLDC GLUCOMTR-MCNC: 117 MG/DL — HIGH (ref 70–99)
GLUCOSE BLDC GLUCOMTR-MCNC: 117 MG/DL — HIGH (ref 70–99)
GLUCOSE BLDC GLUCOMTR-MCNC: 121 MG/DL — HIGH (ref 70–99)
GLUCOSE BLDC GLUCOMTR-MCNC: 122 MG/DL — HIGH (ref 70–99)
GLUCOSE BLDC GLUCOMTR-MCNC: 136 MG/DL — HIGH (ref 70–99)
GLUCOSE BLDC GLUCOMTR-MCNC: 143 MG/DL — HIGH (ref 70–99)
GLUCOSE BLDC GLUCOMTR-MCNC: 160 MG/DL — HIGH (ref 70–99)
GLUCOSE BLDC GLUCOMTR-MCNC: 166 MG/DL — HIGH (ref 70–99)
GLUCOSE BLDC GLUCOMTR-MCNC: 170 MG/DL — HIGH (ref 70–99)
GLUCOSE BLDC GLUCOMTR-MCNC: 190 MG/DL — HIGH (ref 70–99)
GLUCOSE SERPL-MCNC: 152 MG/DL — HIGH (ref 70–99)
GLUCOSE SERPL-MCNC: 153 MG/DL — HIGH (ref 70–99)
HAPTOGLOB SERPL-MCNC: 45 MG/DL — SIGNIFICANT CHANGE UP (ref 34–200)
HAPTOGLOB SERPL-MCNC: 77 MG/DL — SIGNIFICANT CHANGE UP (ref 34–200)
HCO3 BLDV-SCNC: 27 MMOL/L — SIGNIFICANT CHANGE UP (ref 22–29)
HCO3 BLDV-SCNC: 28 MMOL/L — SIGNIFICANT CHANGE UP (ref 22–29)
HCT VFR BLD CALC: 21.1 % — LOW (ref 39–50)
HCT VFR BLD CALC: 23 % — LOW (ref 39–50)
HCT VFR BLD CALC: 23.8 % — LOW (ref 39–50)
HCT VFR BLD CALC: 26 % — LOW (ref 39–50)
HGB BLD-MCNC: 6.6 G/DL — CRITICAL LOW (ref 13–17)
HGB BLD-MCNC: 7.4 G/DL — LOW (ref 13–17)
HGB BLD-MCNC: 7.5 G/DL — LOW (ref 13–17)
HGB BLD-MCNC: 8.4 G/DL — LOW (ref 13–17)
HOROWITZ INDEX BLDV+IHG-RTO: 100 — SIGNIFICANT CHANGE UP
HOROWITZ INDEX BLDV+IHG-RTO: 40 — SIGNIFICANT CHANGE UP
IMM GRANULOCYTES # BLD AUTO: 0.04 K/UL — SIGNIFICANT CHANGE UP (ref 0–0.07)
IMM GRANULOCYTES # BLD AUTO: 0.06 K/UL — SIGNIFICANT CHANGE UP (ref 0–0.07)
IMM GRANULOCYTES NFR BLD AUTO: 0.4 % — SIGNIFICANT CHANGE UP (ref 0–0.9)
IMM GRANULOCYTES NFR BLD AUTO: 0.6 % — SIGNIFICANT CHANGE UP (ref 0–0.9)
INR BLD: 1.48 RATIO — HIGH (ref 0.85–1.16)
INR BLD: 1.54 RATIO — HIGH (ref 0.85–1.16)
LDH SERPL L TO P-CCNC: 305 U/L — HIGH (ref 50–242)
LDH SERPL L TO P-CCNC: 312 U/L — HIGH (ref 50–242)
LMWH PPP CHRO-ACNC: <0.04 IU/ML (ref 0.5–1.1)
LYMPHOCYTES # BLD AUTO: 0.8 K/UL — LOW (ref 1–3.3)
LYMPHOCYTES # BLD AUTO: 0.84 K/UL — LOW (ref 1–3.3)
LYMPHOCYTES # BLD MANUAL: 0.92 K/UL — LOW (ref 1–3.3)
LYMPHOCYTES NFR BLD AUTO: 7.7 % — LOW (ref 13–44)
LYMPHOCYTES NFR BLD AUTO: 8.7 % — LOW (ref 13–44)
LYMPHOCYTES NFR BLD MANUAL: 8.4 % — LOW (ref 13–44)
MACROCYTES BLD QL: ABNORMAL
MAGNESIUM SERPL-MCNC: 2.7 MG/DL — HIGH (ref 1.6–2.6)
MAGNESIUM SERPL-MCNC: 2.8 MG/DL — HIGH (ref 1.6–2.6)
MANUAL NEUTROPHIL BANDS #: 0.55 K/UL — SIGNIFICANT CHANGE UP (ref 0–0.84)
MANUAL REACTIVE LYMPHOCYTES #: 0.09 K/UL — SIGNIFICANT CHANGE UP (ref 0–0.63)
MCHC RBC-ENTMCNC: 26.2 PG — LOW (ref 27–34)
MCHC RBC-ENTMCNC: 26.6 PG — LOW (ref 27–34)
MCHC RBC-ENTMCNC: 26.9 PG — LOW (ref 27–34)
MCHC RBC-ENTMCNC: 27.5 PG — SIGNIFICANT CHANGE UP (ref 27–34)
MCHC RBC-ENTMCNC: 31.3 G/DL — LOW (ref 32–36)
MCHC RBC-ENTMCNC: 31.5 G/DL — LOW (ref 32–36)
MCHC RBC-ENTMCNC: 32.2 G/DL — SIGNIFICANT CHANGE UP (ref 32–36)
MCHC RBC-ENTMCNC: 32.3 G/DL — SIGNIFICANT CHANGE UP (ref 32–36)
MCV RBC AUTO: 82.7 FL — SIGNIFICANT CHANGE UP (ref 80–100)
MCV RBC AUTO: 83.2 FL — SIGNIFICANT CHANGE UP (ref 80–100)
MCV RBC AUTO: 83.3 FL — SIGNIFICANT CHANGE UP (ref 80–100)
MCV RBC AUTO: 87.9 FL — SIGNIFICANT CHANGE UP (ref 80–100)
MICROCYTES BLD QL: SLIGHT — SIGNIFICANT CHANGE UP
MONOCYTES # BLD AUTO: 1.01 K/UL — HIGH (ref 0–0.9)
MONOCYTES # BLD AUTO: 1.01 K/UL — HIGH (ref 0–0.9)
MONOCYTES # BLD MANUAL: 0.19 K/UL — SIGNIFICANT CHANGE UP (ref 0–0.9)
MONOCYTES NFR BLD AUTO: 11 % — SIGNIFICANT CHANGE UP (ref 2–14)
MONOCYTES NFR BLD AUTO: 9.3 % — SIGNIFICANT CHANGE UP (ref 2–14)
MONOCYTES NFR BLD MANUAL: 1.7 % — LOW (ref 2–14)
NEUTROPHILS # BLD AUTO: 7.26 K/UL — SIGNIFICANT CHANGE UP (ref 1.8–7.4)
NEUTROPHILS # BLD AUTO: 8.87 K/UL — HIGH (ref 1.8–7.4)
NEUTROPHILS # BLD MANUAL: 8.89 K/UL — HIGH (ref 1.8–7.4)
NEUTROPHILS NFR BLD AUTO: 79 % — HIGH (ref 43–77)
NEUTROPHILS NFR BLD AUTO: 81.3 % — HIGH (ref 43–77)
NEUTROPHILS NFR BLD MANUAL: 81.6 % — HIGH (ref 43–77)
NEUTS BAND # BLD: 5 % — SIGNIFICANT CHANGE UP (ref 0–8)
NEUTS BAND NFR BLD: 5 % — SIGNIFICANT CHANGE UP (ref 0–8)
NRBC # BLD AUTO: 0 K/UL — SIGNIFICANT CHANGE UP (ref 0–0)
NRBC # BLD AUTO: 0 K/UL — SIGNIFICANT CHANGE UP (ref 0–0)
NRBC # BLD AUTO: 0.02 K/UL — HIGH (ref 0–0)
NRBC # BLD AUTO: 0.03 K/UL — HIGH (ref 0–0)
NRBC # FLD: 0 K/UL — SIGNIFICANT CHANGE UP (ref 0–0)
NRBC # FLD: 0 K/UL — SIGNIFICANT CHANGE UP (ref 0–0)
NRBC # FLD: 0.02 K/UL — HIGH (ref 0–0)
NRBC # FLD: 0.03 K/UL — HIGH (ref 0–0)
NRBC BLD AUTO-RTO: 0 /100 WBCS — SIGNIFICANT CHANGE UP (ref 0–0)
OVALOCYTES BLD QL SMEAR: SLIGHT — SIGNIFICANT CHANGE UP
PCO2 BLDV: 47 MMHG — SIGNIFICANT CHANGE UP (ref 42–55)
PCO2 BLDV: 48 MMHG — SIGNIFICANT CHANGE UP (ref 42–55)
PH BLDV: 7.36 — SIGNIFICANT CHANGE UP (ref 7.32–7.43)
PH BLDV: 7.38 — SIGNIFICANT CHANGE UP (ref 7.32–7.43)
PHOSPHATE SERPL-MCNC: 3.2 MG/DL — SIGNIFICANT CHANGE UP (ref 2.5–4.5)
PHOSPHATE SERPL-MCNC: 3.6 MG/DL — SIGNIFICANT CHANGE UP (ref 2.5–4.5)
PLAT MORPH BLD: NORMAL — SIGNIFICANT CHANGE UP
PLATELET # BLD AUTO: 117 K/UL — LOW (ref 150–400)
PLATELET # BLD AUTO: 121 K/UL — LOW (ref 150–400)
PLATELET # BLD AUTO: 92 K/UL — LOW (ref 150–400)
PLATELET # BLD AUTO: 94 K/UL — LOW (ref 150–400)
PLATELET COUNT - ESTIMATE: ABNORMAL
PMV BLD: 10.8 FL — SIGNIFICANT CHANGE UP (ref 7–13)
PMV BLD: 11.2 FL — SIGNIFICANT CHANGE UP (ref 7–13)
PMV BLD: 12.8 FL — SIGNIFICANT CHANGE UP (ref 7–13)
PMV BLD: 9.7 FL — SIGNIFICANT CHANGE UP (ref 7–13)
PO2 BLDV: 41 MMHG — SIGNIFICANT CHANGE UP (ref 25–45)
PO2 BLDV: 44 MMHG — SIGNIFICANT CHANGE UP (ref 25–45)
POIKILOCYTOSIS BLD QL AUTO: SLIGHT — SIGNIFICANT CHANGE UP
POLYCHROMASIA BLD QL SMEAR: ABNORMAL
POTASSIUM SERPL-MCNC: 3.8 MMOL/L — SIGNIFICANT CHANGE UP (ref 3.5–5.3)
POTASSIUM SERPL-MCNC: 4 MMOL/L — SIGNIFICANT CHANGE UP (ref 3.5–5.3)
POTASSIUM SERPL-SCNC: 3.8 MMOL/L — SIGNIFICANT CHANGE UP (ref 3.5–5.3)
POTASSIUM SERPL-SCNC: 4 MMOL/L — SIGNIFICANT CHANGE UP (ref 3.5–5.3)
PROT SERPL-MCNC: 5.4 G/DL — LOW (ref 6–8.3)
PROT SERPL-MCNC: 5.9 G/DL — LOW (ref 6–8.3)
PROTHROM AB SERPL-ACNC: 16.8 SEC — HIGH (ref 9.9–13.4)
PROTHROM AB SERPL-ACNC: 17.6 SEC — HIGH (ref 9.9–13.4)
RAPIDTEG MAXIMUM AMPLITUDE: 62.9 MM — SIGNIFICANT CHANGE UP (ref 52–70)
RBC # BLD: 2.4 M/UL — LOW (ref 4.2–5.8)
RBC # BLD: 2.78 M/UL — LOW (ref 4.2–5.8)
RBC # BLD: 2.86 M/UL — LOW (ref 4.2–5.8)
RBC # BLD: 3.12 M/UL — LOW (ref 4.2–5.8)
RBC # FLD: 17.9 % — HIGH (ref 10.3–14.5)
RBC # FLD: 20.5 % — HIGH (ref 10.3–14.5)
RBC # FLD: 20.8 % — HIGH (ref 10.3–14.5)
RBC # FLD: 21.2 % — HIGH (ref 10.3–14.5)
RBC BLD AUTO: ABNORMAL
SAO2 % BLDV: 64.6 % — LOW (ref 67–88)
SAO2 % BLDV: 73.3 % — SIGNIFICANT CHANGE UP (ref 67–88)
SCHISTOCYTES BLD QL AUTO: SLIGHT — SIGNIFICANT CHANGE UP
SODIUM SERPL-SCNC: 138 MMOL/L — SIGNIFICANT CHANGE UP (ref 135–145)
SODIUM SERPL-SCNC: 140 MMOL/L — SIGNIFICANT CHANGE UP (ref 135–145)
TEG FUNCTIONAL FIBRINOGEN: 22.1 MM — SIGNIFICANT CHANGE UP (ref 15–32)
TEG LY30 (LYSIS): 0 % — SIGNIFICANT CHANGE UP (ref 0–2.6)
TEG REACTION TIME: 7.6 MIN — SIGNIFICANT CHANGE UP (ref 4.6–9.1)
VARIANT LYMPHS # BLD: 0.8 % — SIGNIFICANT CHANGE UP (ref 0–6)
VARIANT LYMPHS NFR BLD MANUAL: 0.8 % — SIGNIFICANT CHANGE UP (ref 0–6)
WBC # BLD: 10.9 K/UL — HIGH (ref 3.8–10.5)
WBC # BLD: 11.58 K/UL — HIGH (ref 3.8–10.5)
WBC # BLD: 12.67 K/UL — HIGH (ref 3.8–10.5)
WBC # BLD: 9.2 K/UL — SIGNIFICANT CHANGE UP (ref 3.8–10.5)
WBC # FLD AUTO: 10.9 K/UL — HIGH (ref 3.8–10.5)
WBC # FLD AUTO: 11.58 K/UL — HIGH (ref 3.8–10.5)
WBC # FLD AUTO: 12.67 K/UL — HIGH (ref 3.8–10.5)
WBC # FLD AUTO: 9.2 K/UL — SIGNIFICANT CHANGE UP (ref 3.8–10.5)

## 2025-06-19 PROCEDURE — 93010 ELECTROCARDIOGRAM REPORT: CPT

## 2025-06-19 PROCEDURE — 99291 CRITICAL CARE FIRST HOUR: CPT

## 2025-06-19 PROCEDURE — 99223 1ST HOSP IP/OBS HIGH 75: CPT

## 2025-06-19 PROCEDURE — 71045 X-RAY EXAM CHEST 1 VIEW: CPT | Mod: 26

## 2025-06-19 RX ORDER — HEPARIN SODIUM 1000 [USP'U]/ML
300 INJECTION INTRAVENOUS; SUBCUTANEOUS
Qty: 25000 | Refills: 0 | Status: DISCONTINUED | OUTPATIENT
Start: 2025-06-19 | End: 2025-06-21

## 2025-06-19 RX ORDER — TRAZODONE HCL 100 MG
50 TABLET ORAL AT BEDTIME
Refills: 0 | Status: DISCONTINUED | OUTPATIENT
Start: 2025-06-19 | End: 2025-06-20

## 2025-06-19 RX ADMIN — Medication 0.5 MILLIGRAM(S): at 00:02

## 2025-06-19 RX ADMIN — DOBUTAMINE 11.9 MICROGRAM(S)/KG/MIN: 250 INJECTION INTRAVENOUS at 08:23

## 2025-06-19 RX ADMIN — Medication 40 MILLIGRAM(S): at 11:49

## 2025-06-19 RX ADMIN — GABAPENTIN 100 MILLIGRAM(S): 400 CAPSULE ORAL at 21:12

## 2025-06-19 RX ADMIN — Medication 3 UNIT(S)/HR: at 19:25

## 2025-06-19 RX ADMIN — Medication 650 MILLIGRAM(S): at 06:34

## 2025-06-19 RX ADMIN — Medication 500 MILLIGRAM(S): at 06:20

## 2025-06-19 RX ADMIN — Medication 81 MILLIGRAM(S): at 11:49

## 2025-06-19 RX ADMIN — Medication 0.5 MILLIGRAM(S): at 00:25

## 2025-06-19 RX ADMIN — GABAPENTIN 100 MILLIGRAM(S): 400 CAPSULE ORAL at 06:19

## 2025-06-19 RX ADMIN — AMIODARONE HYDROCHLORIDE 400 MILLIGRAM(S): 50 INJECTION, SOLUTION INTRAVENOUS at 17:59

## 2025-06-19 RX ADMIN — DOBUTAMINE 11.9 MICROGRAM(S)/KG/MIN: 250 INJECTION INTRAVENOUS at 19:26

## 2025-06-19 RX ADMIN — HEPARIN SODIUM 3 UNIT(S)/HR: 1000 INJECTION INTRAVENOUS; SUBCUTANEOUS at 09:47

## 2025-06-19 RX ADMIN — Medication 50 MILLIGRAM(S): at 21:12

## 2025-06-19 RX ADMIN — POLYETHYLENE GLYCOL 3350 17 GRAM(S): 17 POWDER, FOR SOLUTION ORAL at 11:49

## 2025-06-19 RX ADMIN — Medication 8.93 MICROGRAM(S)/KG/MIN: at 08:23

## 2025-06-19 RX ADMIN — Medication 100 MILLIGRAM(S): at 08:22

## 2025-06-19 RX ADMIN — Medication 650 MILLIGRAM(S): at 11:48

## 2025-06-19 RX ADMIN — Medication 650 MILLIGRAM(S): at 12:15

## 2025-06-19 RX ADMIN — Medication 25 MG/HR: at 19:26

## 2025-06-19 RX ADMIN — HEPARIN SODIUM 5000 UNIT(S): 1000 INJECTION INTRAVENOUS; SUBCUTANEOUS at 00:00

## 2025-06-19 RX ADMIN — Medication 500 MILLIGRAM(S): at 17:58

## 2025-06-19 RX ADMIN — AMIODARONE HYDROCHLORIDE 400 MILLIGRAM(S): 50 INJECTION, SOLUTION INTRAVENOUS at 06:19

## 2025-06-19 RX ADMIN — HEPARIN SODIUM 5000 UNIT(S): 1000 INJECTION INTRAVENOUS; SUBCUTANEOUS at 06:20

## 2025-06-19 RX ADMIN — Medication 3 UNIT(S)/HR: at 08:23

## 2025-06-19 RX ADMIN — Medication 8.93 MICROGRAM(S)/KG/MIN: at 19:26

## 2025-06-19 RX ADMIN — Medication 650 MILLIGRAM(S): at 06:19

## 2025-06-19 RX ADMIN — Medication 1 APPLICATION(S): at 14:22

## 2025-06-19 RX ADMIN — Medication 2 TABLET(S): at 21:12

## 2025-06-19 RX ADMIN — VASOPRESSIN 6 UNIT(S)/MIN: 20 INJECTION INTRAVENOUS at 19:25

## 2025-06-19 RX ADMIN — VASOPRESSIN 6 UNIT(S)/MIN: 20 INJECTION INTRAVENOUS at 08:23

## 2025-06-19 NOTE — DIETITIAN INITIAL EVALUATION ADULT - ADD RECOMMEND
1. continue current diet per team as tolerated of: consistent carbohydrate diet  2. encourage PO intake, protein source with each meal as tolerated  3. provide assistance with meals as needed for the patient  4. monitor PO intake, weight trend, electrolytes, blood glucose levels, labs, BMs

## 2025-06-19 NOTE — DIETITIAN INITIAL EVALUATION ADULT - OTHER CALCULATIONS
Defer fluid needs to team. Calculations based on lowest in house BW 85.2kg and accounting for factors of ESRD on HD (currently on CRRT), midsternal surgical incision.

## 2025-06-19 NOTE — CONSULT NOTE ADULT - ASSESSMENT
70 year-old with prior high risk PCI, now with disease progression and severe mitral regurgitation, referred for CABG and MVR.  Patient is status post CABG, MVR, and RVAD placement on 6/17/2025 with Crow Mckeon MD.    Wean RVAD as tolerated.  Continue excellent care per Cardiothoracic ICU.

## 2025-06-19 NOTE — DIETITIAN INITIAL EVALUATION ADULT - PERTINENT MEDS FT
MEDICATIONS  (STANDING):  acetaminophen     Tablet .. 650 milliGRAM(s) Oral every 6 hours  aMIOdarone    Tablet 400 milliGRAM(s) Oral two times a day  ascorbic acid 500 milliGRAM(s) Oral two times a day  aspirin enteric coated 81 milliGRAM(s) Oral daily  bisacodyl Suppository 10 milliGRAM(s) Rectal once  chlorhexidine 2% Cloths 1 Application(s) Topical daily  chlorhexidine 4% Liquid 1 Application(s) Topical daily  CRRT Treatment    <Continuous>  dextrose 5%. 1000 milliLiter(s) (100 mL/Hr) IV Continuous <Continuous>  dextrose 50% Injectable 50 milliLiter(s) IV Push every 15 minutes  dextrose 50% Injectable 25 milliLiter(s) IV Push every 15 minutes  DOBUTamine Infusion 5 MICROgram(s)/kG/Min (11.9 mL/Hr) IV Continuous <Continuous>  EPINEPHrine    Infusion 0.03 MICROgram(s)/kG/Min (8.93 mL/Hr) IV Continuous <Continuous>  gabapentin 100 milliGRAM(s) Oral every 8 hours  heparin  Infusion 300 Unit(s)/Hr (3 mL/Hr) IV Continuous <Continuous>  insulin regular Infusion 3 Unit(s)/Hr (3 mL/Hr) IV Continuous <Continuous>  niCARdipine Infusion 5 mG/Hr (25 mL/Hr) IV Continuous <Continuous>  norepinephrine Infusion 0.05 MICROgram(s)/kG/Min (7.44 mL/Hr) IV Continuous <Continuous>  pantoprazole  Injectable 40 milliGRAM(s) IV Push daily  Phoxillum Filtration BK 4 / 2.5 5000 milliLiter(s) (1000 mL/Hr) CRRT <Continuous>  polyethylene glycol 3350 17 Gram(s) Oral daily  PrismaSOL Filtration BGK 0 / 2.5 5000 milliLiter(s) (1000 mL/Hr) CRRT <Continuous>  PrismaSOL Filtration BGK 4 / 2.5 5000 milliLiter(s) (1000 mL/Hr) CRRT <Continuous>  senna 2 Tablet(s) Oral at bedtime  sodium chloride 0.9%. 1000 milliLiter(s) (10 mL/Hr) IV Continuous <Continuous>  vasopressin Infusion 0.04 Unit(s)/Min (6 mL/Hr) IV Continuous <Continuous>    MEDICATIONS  (PRN):  oxyCODONE    IR 5 milliGRAM(s) Oral every 4 hours PRN Moderate Pain (4 - 6)  oxyCODONE    IR 10 milliGRAM(s) Oral every 4 hours PRN Severe Pain (7 - 10)

## 2025-06-19 NOTE — DIETITIAN INITIAL EVALUATION ADULT - NSFNSGIIOFT_GEN_A_CORE
06-18-25 @ 07:01  -  06-19-25 @ 07:00  --------------------------------------------------------  OUT:    Chest Tube (mL): 20 mL    Chest Tube (mL): 70 mL    Chest Tube (mL): 365 mL  Total OUT: 455 mL    Total NET: -455 mL      06-19-25 @ 07:01  -  06-19-25 @ 12:43  --------------------------------------------------------  OUT:    Chest Tube (mL): 70 mL    Chest Tube (mL): 0 mL    Chest Tube (mL): 0 mL  Total OUT: 70 mL    Total NET: -70 mL

## 2025-06-19 NOTE — DIETITIAN INITIAL EVALUATION ADULT - ORAL INTAKE PTA/DIET HISTORY
Patient reports eating well PTA. Denied chewing/swallowing impairment or nausea/vomiting PTA. Patient reports having very supportive wife at home who does all food shopping and cooking, and helps the patient adhere closely to therapeutic diet restrictions. States he has now been on HD for at least 3 years. Confirms h/o DM and tries to be mindful of carbohydrate intake.

## 2025-06-19 NOTE — CONSULT NOTE ADULT - SUBJECTIVE AND OBJECTIVE BOX
Patient seen and evaluated @ 4 pm in CTU  Chief Complaint: CABG, MVR, RVAD placement    HPI:  70 year old male PMH of HTN, HLD, DM2, CAD (total  4 coronary stents, last one PCI in 2024 &  S/p status post MI treated with PCI x3 stents in  & 2023)on Asprin 81 mg, Chronic back pain, ESRD on  HD 3x/week via Right brachial AV fistula ([id number]-Bxaijrrla-[id number], Nephrology- Dr.Paul Munguia-[facility], in [place]/ also s/p angioplasty of the AVfistula -intact in 2024/ & had revision of AV fistula in  2024 with Dr. Henrik Morocho), Listed for renal transplant in [place] and [place] ( he has a living donor available), CHF with EF 40-45%,  pneumonia 10/2024, severe MR/ recent cath 6/3 w/ multivessel CAD in stent stenosis planned for Mitral valve replacement, CABG x3 on 2025 w/ Dr. Mckeon.      ***HD monday/ wed/Friday---surgery on   ****cardiac catheterization on 6/3/25 which revealed revealed Left main artery: There was dampening upon engagement of the ostium of the left main coronary artery. There is a 50 % stenosis in the ostium portion of the segment. Proximal left anterior descending: There is a 20 % in-stent restenosis. First diagonal: There is an 80 % stenosis in the ostium portion of the segment. Mid left anterior descending: There is a 35 % stenosis. Distal left anterior descending: There is a 45 % stenosis. Proximal circumflex: There is a 90 % in-stent restenosis. Mid circumflex: There is a 60 % stenosis. Proximal right coronary artery: There is a 99 % stenosis in the ostium portion of the segment. Mid right coronary artery: There is a 90 % stenosis. Mid right coronary artery: There is a 100 % stenosis.   (10 Pj 2025 11:14)    PMH:   HTN (hypertension)    HLD (hyperlipidemia)    CAD (coronary artery disease)    Diabetes mellitus    Former smoker    ESRD on dialysis    Gout    Moderate aortic insufficiency    Severe mitral regurgitation    DM2 (diabetes mellitus, type 2)    Right cataract    MI (myocardial infarction)    Stented coronary artery    2019 novel coronavirus disease (COVID-19)    Pancreatic cyst      PSH:   AV fistula    CAD (coronary artery disease)    History of cardiac cath    S/P cataract surgery, right    H/O colonoscopy    Stented coronary artery      Medications:   acetaminophen     Tablet .. 650 milliGRAM(s) Oral every 6 hours  acetaminophen     Tablet .. 650 milliGRAM(s) Oral every 6 hours PRN  aMIOdarone    Tablet 400 milliGRAM(s) Oral two times a day  ascorbic acid 500 milliGRAM(s) Oral two times a day  aspirin enteric coated 81 milliGRAM(s) Oral daily  atorvastatin 40 milliGRAM(s) Oral at bedtime  bisacodyl Suppository 10 milliGRAM(s) Rectal once  chlorhexidine 2% Cloths 1 Application(s) Topical daily  chlorhexidine 4% Liquid 1 Application(s) Topical daily  CRRT Treatment    <Continuous>  dextrose 5%. 1000 milliLiter(s) IV Continuous <Continuous>  dextrose 50% Injectable 50 milliLiter(s) IV Push every 15 minutes  dextrose 50% Injectable 25 milliLiter(s) IV Push every 15 minutes  DOBUTamine Infusion 5 MICROgram(s)/kG/Min IV Continuous <Continuous>  EPINEPHrine    Infusion 0.02 MICROgram(s)/kG/Min IV Continuous <Continuous>  gabapentin 100 milliGRAM(s) Oral every 8 hours  heparin  Infusion 300 Unit(s)/Hr IV Continuous <Continuous>  insulin regular Infusion 3 Unit(s)/Hr IV Continuous <Continuous>  norepinephrine Infusion 0.05 MICROgram(s)/kG/Min IV Continuous <Continuous>  oxyCODONE    IR 5 milliGRAM(s) Oral every 4 hours PRN  oxyCODONE    IR 10 milliGRAM(s) Oral every 4 hours PRN  pantoprazole  Injectable 40 milliGRAM(s) IV Push daily  Phoxillum Filtration BK 4 / 2.5 5000 milliLiter(s) CRRT <Continuous>  polyethylene glycol 3350 17 Gram(s) Oral daily  PrismaSOL Filtration BGK 4 / 2.5 5000 milliLiter(s) CRRT <Continuous>  PrismaSOL Filtration BGK 4 / 2.5 5000 milliLiter(s) CRRT <Continuous>  senna 2 Tablet(s) Oral at bedtime  sodium chloride 0.9%. 1000 milliLiter(s) IV Continuous <Continuous>  traZODone 25 milliGRAM(s) Oral at bedtime  vasopressin Infusion 0.04 Unit(s)/Min IV Continuous <Continuous>    Allergies:  No Known Allergies    FAMILY HISTORY:  FH: type 2 diabetes (Father, Mother, Sibling)    FH: HTN (hypertension) (Father, Mother, Sibling)    FH: renal failure (Sibling)      Social History:  Smoking:  Alcohol:  Drugs:    Review of Systems:  [ ]Unable to obtain  Constitutional: No fever, chills, fatigue, or changes in weight  HEENT: No blurry vision, eye pain, headache, runny nose, or sore throat  Respiratory: No shortness of breath, cough, or wheezing  Cardiovascular: No chest pain or palpitations  Gastrointestinal: No nausea, vomiting, diarrhea, or abdominal pain  Genitourinary: No dysuria or incontinence  Extremities: No lower extremity swelling  Neurologic: No focal findings  Lymphatic: No lymphadenopathy   Skin: No rash  Psychiatry: No anxiety or depression  10 point review of systems is otherwise negative except as mentioned above            Physical Exam:  T(C): 36.9 (25 @ 08:00), Max: 37 (25 @ 12:00)  HR: 94 (25 @ 11:00) (94 - 112)  BP: --  RR: 23 (25 @ 11:00) (10 - 31)  SpO2: 100% (25 @ 11:00) (94% - 100%)  Wt(kg): --     @ 07:  -   @ 07:00  --------------------------------------------------------  IN: 1565.9 mL / OUT: 3910 mL / NET: -2344.1 mL     @ 07:01  -   @ 11:27  --------------------------------------------------------  IN: 132.6 mL / OUT: 406 mL / NET: -273.4 mL      Daily     Daily Weight in k.2 (2025 00:00)    Appearance: Normal, well groomed, NAD  Eyes: PERRLA, EOMI, pink conjunctiva, no scleral icterus   HENT: Normal oral mucosa  Cardiovascular: RRR, S1, S2, no murmur, rub, or gallop; no edema; no JVD  Procedural Access Site: Clean, dry, intact, without hematoma  Respiratory: Clear to auscultation bilaterally  Gastrointestinal: Soft, non-tender, non-distended, BS+  Musculoskeletal: No clubbing or joint deformity   Neurologic: No focal weakness  Lymphatic: No lymphadenopathy  Psychiatry: AAOx3 with appropriate mood and affect  Skin: No rashes, ecchymoses, or cyanosis    Cardiovascular Diagnostic Testing:  ECG:    Echo:  < from: TTE W or WO Ultrasound Enhancing Agent (25 @ 07:51) >  _______________________________________________________________________________________     CONCLUSIONS:      1. Technically difficult image quality.   2. Vert limited visualization, unable to evaluate valves accurately.   3. This is an ECMO (wean/surveillence/decannulation) study.   4. ECMO is visualized in IVC   5. Left ventricular cavity is normal in size. There is poor visualization of the endocardial borders to determine the presence of wall motion abnormalities.   6. Left ventricular endocardium is not well visualized; however, the left ventricular systolic function appears severely reduced.   7. The right ventricle is not well visualized. Normal right ventricular cavity size and reduced right ventricular systolic function.   8. Trace pericardial effusion.    ________________________________________________________________________________________    < end of copied text >      Stress Testing:    Cath:  < from: Cardiac Catheterization (25 @ 08:05) >  Diagnostic Findings:     Coronary Angiography   The coronary circulation is right dominant.      LM   Left main artery: There was dampening upon engagement of the ostium of  the left main coronary artery.. There is a 50 %  stenosis in the ostium portion of the segment.      LAD   Proximal left anterior descending: There is a 20 % in-stent  restenosis. First diagonal: There is an 80 % stenosis in the ostium  portion of the segment. Mid left anterior descending: There is a 35 %  stenosis. Distal left anterior descending: There is a 45 %  stenosis.      CX   Proximal circumflex: There is a 90 % in-stent restenosis. Mid  circumflex: There is a 60 % stenosis.    RCA   Proximal right coronary artery: There is a 99 % stenosis in the ostium  portion of the segment. Mid right coronary artery: There  is a 90 % stenosis. Mid right coronary artery: There is a 100 %  stenosis.    < end of copied text >      Interpretation of Telemetry:    Imaging:    Labs:                        8.0    12.28 )-----------( 83       ( 2025 00:22 )             25.1     06-20    138  |  102  |  17  ----------------------------<  116[H]  3.7   |  21[L]  |  2.79[H]    Ca    8.9      2025 00:22  Phos  3.5     06-20  Mg     2.5     06-20    TPro  6.2  /  Alb  3.7  /  TBili  0.7  /  DBili  x   /  AST  56[H]  /  ALT  74[H]  /  AlkPhos  78  06-20    PT/INR - ( 2025 00:22 )   PT: 15.0 sec;   INR: 1.31 ratio         PTT - ( 2025 00:22 )  PTT:32.7 sec

## 2025-06-19 NOTE — PROGRESS NOTE ADULT - ASSESSMENT
70 year old male PMH of HTN, HLD, DM2, CAD (total  4 coronary stents, last one PCI in 2024 &  S/p status post MI treated with PCI x3 stents in  & 2023)on Asprin 81 mg, Chronic back pain, ESRD on  HD 3x/week via Right brachial AV fistula ([id number]-[id number], Nephrology- Dr.Paul Munguia-[facility], in [facility] CABG x 3 and MVR repair and RVAD for RV dysfunction   Cardiogenic shock     1 Renal -  CVV through the RVAD circuit and he will not need a shiley   Will switch to traditional HD when more stable and out of cardiogenic shock and when the RVAD is taken out   Start with 50cc/hr of fluid removal and can increase when Vaso is off   2 CVS-On   Epi and  gtt for inotropic support   3 Pulm-Nasal canula    4 -Can dc the echeverria     Critically sick and unstable   > 35 min with critical care   DW CTICU     Sayed [facility]   [phone]

## 2025-06-19 NOTE — DIETITIAN INITIAL EVALUATION ADULT - NUTRITIONGOAL OUTCOME1
- patient will consume >80% of meals during hospital admission to help adequately meet nutritional needs and help aid in incisional healing

## 2025-06-19 NOTE — DIETITIAN INITIAL EVALUATION ADULT - REASON
Patient eating well PTA w/ no recent weight fluctuations, oral diet advanced today, will follow parameters

## 2025-06-19 NOTE — PROGRESS NOTE ADULT - SUBJECTIVE AND OBJECTIVE BOX
NEPHROLOGY-[facility] [phone]        Patient seen and examined in bed.  He was extubated at present   On inotropes and on Vaso   SP Blood xfusion     ROS-+weakness + fatigue; all other ros were reviewed and were negative         MEDICATIONS  (STANDING):  acetaminophen     Tablet .. 650 milliGRAM(s) Oral every 6 hours  aMIOdarone    Tablet 400 milliGRAM(s) Oral two times a day  ascorbic acid 500 milliGRAM(s) Oral two times a day  aspirin enteric coated 81 milliGRAM(s) Oral daily  bisacodyl Suppository 10 milliGRAM(s) Rectal once  chlorhexidine 2% Cloths 1 Application(s) Topical daily  chlorhexidine 4% Liquid 1 Application(s) Topical daily  CRRT Treatment    <Continuous>  dextrose 5%. 1000 milliLiter(s) (100 mL/Hr) IV Continuous <Continuous>  dextrose 50% Injectable 50 milliLiter(s) IV Push every 15 minutes  dextrose 50% Injectable 25 milliLiter(s) IV Push every 15 minutes  DOBUTamine Infusion 5 MICROgram(s)/kG/Min (11.9 mL/Hr) IV Continuous <Continuous>  EPINEPHrine    Infusion 0.03 MICROgram(s)/kG/Min (8.93 mL/Hr) IV Continuous <Continuous>  famotidine Injectable 20 milliGRAM(s) IV Push every 48 hours  gabapentin 100 milliGRAM(s) Oral every 8 hours  heparin  Infusion 300 Unit(s)/Hr (3 mL/Hr) IV Continuous <Continuous>  insulin regular Infusion 3 Unit(s)/Hr (3 mL/Hr) IV Continuous <Continuous>  niCARdipine Infusion 5 mG/Hr (25 mL/Hr) IV Continuous <Continuous>  norepinephrine Infusion 0.05 MICROgram(s)/kG/Min (7.44 mL/Hr) IV Continuous <Continuous>  polyethylene glycol 3350 17 Gram(s) Oral daily  PrismaSATE Dialysate BGK 4 / 2.5 5000 milliLiter(s) (1000 mL/Hr) CRRT <Continuous>  PrismaSOL Filtration BGK 0 / 2.5 5000 milliLiter(s) (1000 mL/Hr) CRRT <Continuous>  PrismaSOL Filtration BGK 0 / 2.5 5000 milliLiter(s) (1000 mL/Hr) CRRT <Continuous>  senna 2 Tablet(s) Oral at bedtime  sodium chloride 0.9%. 1000 milliLiter(s) (10 mL/Hr) IV Continuous <Continuous>  vasopressin Infusion 0.04 Unit(s)/Min (6 mL/Hr) IV Continuous <Continuous>      VITAL:  T(C): , Max: 37.1 (06-18-25 @ 12:00)  T(F): , Max: 98.8 (06-18-25 @ 12:00)  HR: 94 (06-19-25 @ 08:45)  BP: --  BP(mean): --  RR: 16 (06-19-25 @ 08:45)  SpO2: 100% (06-19-25 @ 08:45)  Wt(kg): --    I and O's:    06-18 @ 07:01  -  06-19 @ 07:00  --------------------------------------------------------  IN: 1375.5 mL / OUT: 1912 mL / NET: -536.5 mL    06-19 @ 07:01  -  06-19 @ 09:12  --------------------------------------------------------  IN: 122.6 mL / OUT: 118 mL / NET: 4.6 mL          PHYSICAL EXAM:    Constitutional: NAD  Neck:  No JVD  Respiratory: reduced   Cardiovascular: S1 and S2  Gastrointestinal: BS+, soft, NT/ND  Extremities: No peripheral edema  Neurological: A/O x 3, no focal deficits  Psychiatric: Normal mood, normal affect  : No Ag  Skin: No rashes  Access: avf    LABS:                        7.5    10.90 )-----------( 117      ( 19 Jun 2025 04:53 )             23.8     06-19    140  |  103  |  27[H]  ----------------------------<  152[H]  4.0   |  20[L]  |  4.71[H]    Ca    8.7      19 Jun 2025 00:19  Phos  3.2     06-19  Mg     2.8     06-19    TPro  5.4[L]  /  Alb  3.6  /  TBili  0.7  /  DBili  x   /  AST  71[H]  /  ALT  57[H]  /  AlkPhos  65  06-19          Urine Studies:  Urinalysis Basic - ( 19 Jun 2025 00:19 )    Color: x / Appearance: x / SG: x / pH: x  Gluc: 152 mg/dL / Ketone: x  / Bili: x / Urobili: x   Blood: x / Protein: x / Nitrite: x   Leuk Esterase: x / RBC: x / WBC x   Sq Epi: x / Non Sq Epi: x / Bacteria: x            RADIOLOGY & ADDITIONAL STUDIES:        < from: Xray Chest 1 View- PORTABLE-Routine (06.18.25 @ 04:18) >    ACC: [id number] EXAM:  XR CHEST PORTABLE ROUTINE 1V   ORDERED BY: AJ COLLINS III     ACC: [id number] EXAM:  XR CHEST PORTABLE ROUTINE 1V   ORDERED BY: MELI REDDY     ACC: [id number] EXAM:  XR CHEST PORTABLE URGENT 1V   ORDERED BY: MELI REDDY     PROCEDURE DATE:  06/17/2025          INTERPRETATION:  EXAMINATION: XR CHEST URGENT, XR CHEST, XR CHEST    CLINICAL INDICATION: ECMO and recent cardiac surgery.    TECHNIQUE: Single frontal, portable view of the chest was obtained.    COMPARISON: Chest x-ray 6/10/2025.    FINDINGS:    Chest x-ray 6/17/2025 9:58 PM  Status post interval median sternotomy. Endotracheal tube terminates in   the mid trachea. Enteric tube terminates in the stomach. Left internal   jugular central venous catheter with tip terminating in the left   brachiocephalic vein.  Left-sided chest tube and mediastinal drains. Cardiac valve replacement.   Left atrial appendage clip. Inferior approach ECMO cannulas overlying the   right atrium and thoracic descending aorta.  The cardiac silhouette is not well evaluated on AP film. Aortic knob   calcifications.  No focal consolidations. Mild pulmonary vascular congestion.  There is no pneumothorax or pleural effusion.  No acute osseous abnormalities    Chest x-ray 6/17/2025 11:17 PM  No significant interval changes.    Chest x-ray 6/18/2025 at 3:10 AM  No significant interval changes.    IMPRESSION:  Status post interval median sternotomy.  Left internal jugular central venous catheter with tip terminating in the   left brachiocephalic vein. Recommend advancement. Remaining tubes and   lines as outlined above.  No focal consolidations.    --- End of Report ---          MONTANA LIZ MD; Resident Radiologist  This document has been electronically signed.  MARK GARCIA MD; Attending Radiologist  This document ha    < end of copied text >

## 2025-06-19 NOTE — DIETITIAN INITIAL EVALUATION ADULT - NSICDXPASTMEDICALHX_GEN_ALL_CORE_FT
PAST MEDICAL HISTORY:  2019 novel coronavirus disease (COVID-19)     CAD (coronary artery disease)     DM2 (diabetes mellitus, type 2)     ESRD on dialysis     Former smoker     Gout     HLD (hyperlipidemia)     HTN (hypertension)     MI (myocardial infarction)     Moderate aortic insufficiency     Pancreatic cyst     Right cataract     Severe mitral regurgitation     Stented coronary artery

## 2025-06-19 NOTE — DIETITIAN INITIAL EVALUATION ADULT - OTHER INFO
NKFA per patient. Patient reports his BW is consistently between 80-84kg/176-184.8lbs w/ no significant fluctuations reported. [facility] growth chart unable to load when attempting to review long term anthropometric data.    - HgbA1C 6.8% (6/10); patient w/ h/o DM PTA per chart. Recent BG trend from lower 100s to upper 100s. Ordered for insulin gtt.  - BUN/Cr noted; patient w/ ESRD and receives HD PTA. Currently on CRRT.  - Inotropic support w/ dobutamine. Vasopressor support w/ epinephrine, vasopressin.  - Hypokalemic today, WNL on most updated labs today.

## 2025-06-19 NOTE — DIETITIAN INITIAL EVALUATION ADULT - PERTINENT LABORATORY DATA
06-19    140  |  103  |  27[H]  ----------------------------<  152[H]  4.0   |  20[L]  |  4.71[H]    Ca    8.7      19 Jun 2025 00:19  Phos  3.2     06-19  Mg     2.8     06-19    TPro  5.4[L]  /  Alb  3.6  /  TBili  0.7  /  DBili  x   /  AST  71[H]  /  ALT  57[H]  /  AlkPhos  65  06-19  POCT Blood Glucose.: 170 mg/dL (06-19-25 @ 12:05)  A1C with Estimated Average Glucose Result: 6.8 % (06-10-25 @ 12:25)  A1C with Estimated Average Glucose Result: 5.8 % (04-03-25 @ 09:03)

## 2025-06-19 NOTE — DIETITIAN INITIAL EVALUATION ADULT - REASON FOR ADMISSION
Per chart, patient is a 71 y/o male with PMH including HTN, HLD, T2DM, CAD (s/p 4 stents), chronic back pain, ESRD on HD (listed for renal transplant in [place] and [place], has living donor available), CHF, PNA, severe MR. Patient presents to [facility] for scheduled surgery. S/p CABGx3, PVR, RVAD placement 6/17.

## 2025-06-19 NOTE — DIETITIAN INITIAL EVALUATION ADULT - PERSON TAUGHT/METHOD
adequate caloric/protein intake w/ food sources reviewed, current diet order, food preferences, all questions were answered/verbal instruction/written material/teach back - (Patient repeats in own words)/patient instructed

## 2025-06-19 NOTE — PROGRESS NOTE ADULT - SUBJECTIVE AND OBJECTIVE BOX
Patient seen and examined at the bedside.    Remains critically ill on continuous ICU monitoring, at risk for life threatening decompensation.  All Labs, data reviewed. Plan of care discussed in length during multi-disciplinary ICU rounds.     Brief Summary:  70 year old male PMH of HTN, HLD, DM2, CAD (total  4 coronary stents, last one PCI in 08/2024 ) CHF with EF 40-45%, severe MR  Now s/p  Mitral valve replacement, CABG x3 and RVAD placement on 6/17/2025     24 Hour events:  On dobutamine, and epi gtt,   RVAD support decreased to 3300 RPM  started on amio PO for PVC  Able to do PT, stood up and did steps  On CRRT, keep negative balance     Objective:  Vital Signs Last 24 Hrs  T(C): 37 (19 Jun 2025 12:00), Max: 37 (19 Jun 2025 08:00)  T(F): 98.6 (19 Jun 2025 12:00), Max: 98.6 (19 Jun 2025 08:00)  HR: 94 (19 Jun 2025 12:15) (93 - 117)  BP: --  BP(mean): --  RR: 22 (19 Jun 2025 12:15) (4 - 25)  SpO2: 99% (19 Jun 2025 12:15) (76% - 100%)    Parameters below as of 19 Jun 2025 12:00  Patient On (Oxygen Delivery Method): nasal cannula  O2 Flow (L/min): 5  O2 Concentration (%): 40    Mode: CPAP with PS  FiO2: 40  PEEP: 6  PS: 6  MAP: 8  PIP: 12            Physical Exam:   General: NAD  Neurology:  following commands  Respiratory: B/L  breath sounds, on NC  CV: paced  RVAD pulmonary 17F, Venous R femoral 25F  Abdominal: Soft, Nontender  Extremities: Warm, well-perfused, vital fistula on RUE  -------------------------------------------------------------------------------------------------------------------------------    Labs:                        7.4    11.58 )-----------( 94       ( 19 Jun 2025 12:24 )             23.0     06-19    140  |  103  |  27[H]  ----------------------------<  152[H]  4.0   |  20[L]  |  4.71[H]    Ca    8.7      19 Jun 2025 00:19  Phos  3.2     06-19  Mg     2.8     06-19    TPro  5.4[L]  /  Alb  3.6  /  TBili  0.7  /  DBili  x   /  AST  71[H]  /  ALT  57[H]  /  AlkPhos  65  06-19    LIVER FUNCTIONS - ( 19 Jun 2025 00:19 )  Alb: 3.6 g/dL / Pro: 5.4 g/dL / ALK PHOS: 65 U/L / ALT: 57 U/L / AST: 71 U/L / GGT: x           PT/INR - ( 19 Jun 2025 00:19 )   PT: 16.8 sec;   INR: 1.48 ratio      PTT - ( 19 Jun 2025 09:01 )  PTT:33.7 sec  ABG - ( 19 Jun 2025 12:08 )  pH, Arterial: 7.42  pH, Blood: x     /  pCO2: 38    /  pO2: 186   / HCO3: 25    / Base Excess: 0.2   /  SaO2: 98.4      ALL MEDICATIONS   MEDICATIONS  (STANDING):  acetaminophen     Tablet .. 650 milliGRAM(s) Oral every 6 hours  aMIOdarone    Tablet 400 milliGRAM(s) Oral two times a day  ascorbic acid 500 milliGRAM(s) Oral two times a day  aspirin enteric coated 81 milliGRAM(s) Oral daily  bisacodyl Suppository 10 milliGRAM(s) Rectal once  chlorhexidine 2% Cloths 1 Application(s) Topical daily  chlorhexidine 4% Liquid 1 Application(s) Topical daily  CRRT Treatment    <Continuous>  dextrose 5%. 1000 milliLiter(s) (100 mL/Hr) IV Continuous <Continuous>  dextrose 50% Injectable 50 milliLiter(s) IV Push every 15 minutes  dextrose 50% Injectable 25 milliLiter(s) IV Push every 15 minutes  DOBUTamine Infusion 5 MICROgram(s)/kG/Min (11.9 mL/Hr) IV Continuous <Continuous>  EPINEPHrine    Infusion 0.03 MICROgram(s)/kG/Min (8.93 mL/Hr) IV Continuous <Continuous>  gabapentin 100 milliGRAM(s) Oral every 8 hours  heparin  Infusion 300 Unit(s)/Hr (3 mL/Hr) IV Continuous <Continuous>  insulin regular Infusion 3 Unit(s)/Hr (3 mL/Hr) IV Continuous <Continuous>  niCARdipine Infusion 5 mG/Hr (25 mL/Hr) IV Continuous <Continuous>  norepinephrine Infusion 0.05 MICROgram(s)/kG/Min (7.44 mL/Hr) IV Continuous <Continuous>  pantoprazole  Injectable 40 milliGRAM(s) IV Push daily  Phoxillum Filtration BK 4 / 2.5 5000 milliLiter(s) (1000 mL/Hr) CRRT <Continuous>  polyethylene glycol 3350 17 Gram(s) Oral daily  PrismaSOL Filtration BGK 0 / 2.5 5000 milliLiter(s) (1000 mL/Hr) CRRT <Continuous>  PrismaSOL Filtration BGK 4 / 2.5 5000 milliLiter(s) (1000 mL/Hr) CRRT <Continuous>  senna 2 Tablet(s) Oral at bedtime  sodium chloride 0.9%. 1000 milliLiter(s) (10 mL/Hr) IV Continuous <Continuous>  vasopressin Infusion 0.04 Unit(s)/Min (6 mL/Hr) IV Continuous <Continuous>    MEDICATIONS  (PRN):  oxyCODONE    IR 5 milliGRAM(s) Oral every 4 hours PRN Moderate Pain (4 - 6)  oxyCODONE    IR 10 milliGRAM(s) Oral every 4 hours PRN Severe Pain (7 - 10)    ------------------------------------------------------------------------------------------------------------------------------    Assessment:  70 you M with cardiogenic shock on RVAD ,DM2, CAD (total  4 coronary stents, last one PCI in 08/2024 ) CHF with EF 40-45%, severe MR  Now s/p  Mitral valve replacement, CABG x3 and RVAD placement on 6/17/2025     ESRD  on CRRT  At risk for hemodynamic decompensation  At high risk for cardiac arrhythmias   Cardiogenic shock  hyperglycemia  acute blood loss anemia  Acute postoperative pain  Acute postoperative respiratory insufficieny       Plan:   ***Neuro***  Maintain day/night cycle to prevent ICU delirium   Postoperative acute pain control with Tylenol, Gabapentin, oxy    ***Cardiovascular***  At high risk for hemodynamic instability and cardiac arrhythmias.  RV failure, s/p CABG, s/p MV repair  RVAD 3300 RPM, flow 3.2, sweep0.5  On dobutamine, epi gtt , wean as able  paced DDD, at 90th , underling sinus bradycardia at 52, PVCs  started on amio  Monitor chest tube output, M2 min<20    ***Pulmonary***  acute postoperative respiratory insufficieny   on NC  Deep breathing and coughing exercises   IS, Mobilization, nebs, Chest PT    ***GI***  tolerates diet  Protonix  Bowel regimen.   Trend LFTs    ***Renal***  ESRD on HD   Now on CRRT  keep CVP <12, negative balance    ***ID***  Perioperative per protocol  IV Vanc, zinacef    ***Endocrine***  Insulin per protocol for Hyperglycemia     ***Hematology***  Acute blood loss anemia and thrombocytopenia   cbc coags, monitor for bleeding  1 u prbc  AC with heparin 300 U /m      I, Lucy Lance MD, personally performed the services described in this documentation. all medical record entries made by the scribe were at my direction and in my presence. I have reviewed the chart and agree that the record reflects my personal performance and is accurate and complete  Electronically signed:   Lucy Lance MD  CT ICU attending     ICU time: 55 mins

## 2025-06-19 NOTE — DIETITIAN INITIAL EVALUATION ADULT - ENERGY INTAKE
Fair (50-75%) Current fair PO intake reported post-OP so far with diet advanced today, able to take both eggs and one banana this morning per RN and the patient. No chewing/swallowing difficulty reported w/ current consistency diet. No N/V.

## 2025-06-20 LAB
ALBUMIN SERPL ELPH-MCNC: 3.6 G/DL — SIGNIFICANT CHANGE UP (ref 3.3–5)
ALBUMIN SERPL ELPH-MCNC: 3.7 G/DL — SIGNIFICANT CHANGE UP (ref 3.3–5)
ALP SERPL-CCNC: 122 U/L — HIGH (ref 40–120)
ALP SERPL-CCNC: 78 U/L — SIGNIFICANT CHANGE UP (ref 40–120)
ALT FLD-CCNC: 66 U/L — HIGH (ref 10–45)
ALT FLD-CCNC: 74 U/L — HIGH (ref 10–45)
ANION GAP SERPL CALC-SCNC: 15 MMOL/L — SIGNIFICANT CHANGE UP (ref 5–17)
ANION GAP SERPL CALC-SCNC: 15 MMOL/L — SIGNIFICANT CHANGE UP (ref 5–17)
APTT BLD: 32.7 SEC — SIGNIFICANT CHANGE UP (ref 26.1–36.8)
APTT BLD: 35.9 SEC — SIGNIFICANT CHANGE UP (ref 26.1–36.8)
APTT BLD: 38.5 SEC — HIGH (ref 26.1–36.8)
APTT BLD: 42.5 SEC — HIGH (ref 26.1–36.8)
AST SERPL-CCNC: 49 U/L — HIGH (ref 10–40)
AST SERPL-CCNC: 56 U/L — HIGH (ref 10–40)
BASE EXCESS BLDV CALC-SCNC: -1.1 MMOL/L — SIGNIFICANT CHANGE UP (ref -2–3)
BASE EXCESS BLDV CALC-SCNC: -1.9 MMOL/L — SIGNIFICANT CHANGE UP (ref -2–3)
BASE EXCESS BLDV CALC-SCNC: -2.2 MMOL/L — LOW (ref -2–3)
BASE EXCESS BLDV CALC-SCNC: -2.2 MMOL/L — LOW (ref -2–3)
BASE EXCESS BLDV CALC-SCNC: -2.6 MMOL/L — LOW (ref -2–3)
BASE EXCESS BLDV CALC-SCNC: -3.7 MMOL/L — LOW (ref -2–3)
BASE EXCESS BLDV CALC-SCNC: 0 MMOL/L — SIGNIFICANT CHANGE UP (ref -2–3)
BASE EXCESS BLDV CALC-SCNC: 0.3 MMOL/L — SIGNIFICANT CHANGE UP (ref -2–3)
BASE EXCESS BLDV CALC-SCNC: 1.5 MMOL/L — SIGNIFICANT CHANGE UP (ref -2–3)
BASOPHILS # BLD AUTO: 0.02 K/UL — SIGNIFICANT CHANGE UP (ref 0–0.2)
BASOPHILS NFR BLD AUTO: 0.2 % — SIGNIFICANT CHANGE UP (ref 0–2)
BILIRUB SERPL-MCNC: 0.7 MG/DL — SIGNIFICANT CHANGE UP (ref 0.2–1.2)
BILIRUB SERPL-MCNC: 0.8 MG/DL — SIGNIFICANT CHANGE UP (ref 0.2–1.2)
BLD GP AB SCN SERPL QL: NEGATIVE — SIGNIFICANT CHANGE UP
BLOOD GAS ECMO POST MEMBRANE - ARTERIAL RESULT: SIGNIFICANT CHANGE UP
BLOOD GAS ECMO PRE MEMBRANE - VENOUS RESULT: SIGNIFICANT CHANGE UP
BUN SERPL-MCNC: 15 MG/DL — SIGNIFICANT CHANGE UP (ref 7–23)
BUN SERPL-MCNC: 17 MG/DL — SIGNIFICANT CHANGE UP (ref 7–23)
CALCIUM SERPL-MCNC: 8.7 MG/DL — SIGNIFICANT CHANGE UP (ref 8.4–10.5)
CALCIUM SERPL-MCNC: 8.9 MG/DL — SIGNIFICANT CHANGE UP (ref 8.4–10.5)
CHLORIDE SERPL-SCNC: 100 MMOL/L — SIGNIFICANT CHANGE UP (ref 96–108)
CHLORIDE SERPL-SCNC: 102 MMOL/L — SIGNIFICANT CHANGE UP (ref 96–108)
CO2 BLDV-SCNC: 24 MMOL/L — SIGNIFICANT CHANGE UP (ref 22–26)
CO2 BLDV-SCNC: 25 MMOL/L — SIGNIFICANT CHANGE UP (ref 22–26)
CO2 BLDV-SCNC: 26 MMOL/L — SIGNIFICANT CHANGE UP (ref 22–26)
CO2 BLDV-SCNC: 27 MMOL/L — HIGH (ref 22–26)
CO2 BLDV-SCNC: 28 MMOL/L — HIGH (ref 22–26)
CO2 BLDV-SCNC: 29 MMOL/L — HIGH (ref 22–26)
CO2 SERPL-SCNC: 19 MMOL/L — LOW (ref 22–31)
CO2 SERPL-SCNC: 21 MMOL/L — LOW (ref 22–31)
CREAT SERPL-MCNC: 2.25 MG/DL — HIGH (ref 0.5–1.3)
CREAT SERPL-MCNC: 2.79 MG/DL — HIGH (ref 0.5–1.3)
EGFR: 23 ML/MIN/1.73M2 — LOW
EGFR: 23 ML/MIN/1.73M2 — LOW
EGFR: 30 ML/MIN/1.73M2 — LOW
EGFR: 30 ML/MIN/1.73M2 — LOW
EOSINOPHIL # BLD AUTO: 0.05 K/UL — SIGNIFICANT CHANGE UP (ref 0–0.5)
EOSINOPHIL NFR BLD AUTO: 0.4 % — SIGNIFICANT CHANGE UP (ref 0–6)
FIBRINOGEN PPP-MCNC: 477 MG/DL — HIGH (ref 200–445)
GAS PNL BLDA: SIGNIFICANT CHANGE UP
GAS PNL BLDV: SIGNIFICANT CHANGE UP
GLUCOSE BLDC GLUCOMTR-MCNC: 104 MG/DL — HIGH (ref 70–99)
GLUCOSE BLDC GLUCOMTR-MCNC: 106 MG/DL — HIGH (ref 70–99)
GLUCOSE BLDC GLUCOMTR-MCNC: 109 MG/DL — HIGH (ref 70–99)
GLUCOSE BLDC GLUCOMTR-MCNC: 116 MG/DL — HIGH (ref 70–99)
GLUCOSE BLDC GLUCOMTR-MCNC: 117 MG/DL — HIGH (ref 70–99)
GLUCOSE BLDC GLUCOMTR-MCNC: 128 MG/DL — HIGH (ref 70–99)
GLUCOSE BLDC GLUCOMTR-MCNC: 128 MG/DL — HIGH (ref 70–99)
GLUCOSE BLDC GLUCOMTR-MCNC: 136 MG/DL — HIGH (ref 70–99)
GLUCOSE BLDC GLUCOMTR-MCNC: 140 MG/DL — HIGH (ref 70–99)
GLUCOSE BLDC GLUCOMTR-MCNC: 145 MG/DL — HIGH (ref 70–99)
GLUCOSE BLDC GLUCOMTR-MCNC: 162 MG/DL — HIGH (ref 70–99)
GLUCOSE BLDC GLUCOMTR-MCNC: 182 MG/DL — HIGH (ref 70–99)
GLUCOSE BLDC GLUCOMTR-MCNC: 92 MG/DL — SIGNIFICANT CHANGE UP (ref 70–99)
GLUCOSE BLDC GLUCOMTR-MCNC: 94 MG/DL — SIGNIFICANT CHANGE UP (ref 70–99)
GLUCOSE BLDC GLUCOMTR-MCNC: 98 MG/DL — SIGNIFICANT CHANGE UP (ref 70–99)
GLUCOSE SERPL-MCNC: 116 MG/DL — HIGH (ref 70–99)
GLUCOSE SERPL-MCNC: 92 MG/DL — SIGNIFICANT CHANGE UP (ref 70–99)
HAPTOGLOB SERPL-MCNC: 106 MG/DL — SIGNIFICANT CHANGE UP (ref 34–200)
HCO3 BLDV-SCNC: 23 MMOL/L — SIGNIFICANT CHANGE UP (ref 22–29)
HCO3 BLDV-SCNC: 24 MMOL/L — SIGNIFICANT CHANGE UP (ref 22–29)
HCO3 BLDV-SCNC: 25 MMOL/L — SIGNIFICANT CHANGE UP (ref 22–29)
HCO3 BLDV-SCNC: 26 MMOL/L — SIGNIFICANT CHANGE UP (ref 22–29)
HCO3 BLDV-SCNC: 26 MMOL/L — SIGNIFICANT CHANGE UP (ref 22–29)
HCO3 BLDV-SCNC: 28 MMOL/L — SIGNIFICANT CHANGE UP (ref 22–29)
HCT VFR BLD CALC: 25.1 % — LOW (ref 39–50)
HCT VFR BLD CALC: 27.1 % — LOW (ref 39–50)
HGB BLD-MCNC: 8 G/DL — LOW (ref 13–17)
HGB BLD-MCNC: 8.7 G/DL — LOW (ref 13–17)
HOROWITZ INDEX BLDV+IHG-RTO: 100 — SIGNIFICANT CHANGE UP
HOROWITZ INDEX BLDV+IHG-RTO: 32 — SIGNIFICANT CHANGE UP
HOROWITZ INDEX BLDV+IHG-RTO: 44 — SIGNIFICANT CHANGE UP
IMM GRANULOCYTES # BLD AUTO: 0.1 K/UL — HIGH (ref 0–0.07)
IMM GRANULOCYTES NFR BLD AUTO: 0.8 % — SIGNIFICANT CHANGE UP (ref 0–0.9)
IMMATURE PLATELET FRACTION #: 4.3 K/UL — SIGNIFICANT CHANGE UP (ref 3.9–12.5)
IMMATURE PLATELET FRACTION #: 4.4 K/UL — SIGNIFICANT CHANGE UP (ref 3.9–12.5)
IMMATURE PLATELET FRACTION %: 5.3 % — SIGNIFICANT CHANGE UP (ref 1.6–7.1)
IMMATURE PLATELET FRACTION %: 5.6 % — SIGNIFICANT CHANGE UP (ref 1.6–7.1)
INR BLD: 1.2 RATIO — HIGH (ref 0.85–1.16)
INR BLD: 1.31 RATIO — HIGH (ref 0.85–1.16)
LDH SERPL L TO P-CCNC: 307 U/L — HIGH (ref 50–242)
LYMPHOCYTES # BLD AUTO: 0.73 K/UL — LOW (ref 1–3.3)
LYMPHOCYTES NFR BLD AUTO: 5.9 % — LOW (ref 13–44)
MAGNESIUM SERPL-MCNC: 2.5 MG/DL — SIGNIFICANT CHANGE UP (ref 1.6–2.6)
MAGNESIUM SERPL-MCNC: 2.5 MG/DL — SIGNIFICANT CHANGE UP (ref 1.6–2.6)
MCHC RBC-ENTMCNC: 26.3 PG — LOW (ref 27–34)
MCHC RBC-ENTMCNC: 27 PG — SIGNIFICANT CHANGE UP (ref 27–34)
MCHC RBC-ENTMCNC: 31.9 G/DL — LOW (ref 32–36)
MCHC RBC-ENTMCNC: 32.1 G/DL — SIGNIFICANT CHANGE UP (ref 32–36)
MCV RBC AUTO: 82.6 FL — SIGNIFICANT CHANGE UP (ref 80–100)
MCV RBC AUTO: 84.2 FL — SIGNIFICANT CHANGE UP (ref 80–100)
MONOCYTES # BLD AUTO: 1.38 K/UL — HIGH (ref 0–0.9)
MONOCYTES NFR BLD AUTO: 11.2 % — SIGNIFICANT CHANGE UP (ref 2–14)
NEUTROPHILS # BLD AUTO: 10 K/UL — HIGH (ref 1.8–7.4)
NEUTROPHILS NFR BLD AUTO: 81.5 % — HIGH (ref 43–77)
NRBC # BLD AUTO: 0 K/UL — SIGNIFICANT CHANGE UP (ref 0–0)
NRBC # BLD AUTO: 0.04 K/UL — HIGH (ref 0–0)
NRBC # FLD: 0 K/UL — SIGNIFICANT CHANGE UP (ref 0–0)
NRBC # FLD: 0.04 K/UL — HIGH (ref 0–0)
NRBC BLD AUTO-RTO: 0 /100 WBCS — SIGNIFICANT CHANGE UP (ref 0–0)
PCO2 BLDV: 45 MMHG — SIGNIFICANT CHANGE UP (ref 42–55)
PCO2 BLDV: 45 MMHG — SIGNIFICANT CHANGE UP (ref 42–55)
PCO2 BLDV: 46 MMHG — SIGNIFICANT CHANGE UP (ref 42–55)
PCO2 BLDV: 46 MMHG — SIGNIFICANT CHANGE UP (ref 42–55)
PCO2 BLDV: 47 MMHG — SIGNIFICANT CHANGE UP (ref 42–55)
PCO2 BLDV: 47 MMHG — SIGNIFICANT CHANGE UP (ref 42–55)
PCO2 BLDV: 48 MMHG — SIGNIFICANT CHANGE UP (ref 42–55)
PCO2 BLDV: 48 MMHG — SIGNIFICANT CHANGE UP (ref 42–55)
PCO2 BLDV: 49 MMHG — SIGNIFICANT CHANGE UP (ref 42–55)
PH BLDV: 7.31 — LOW (ref 7.32–7.43)
PH BLDV: 7.32 — SIGNIFICANT CHANGE UP (ref 7.32–7.43)
PH BLDV: 7.33 — SIGNIFICANT CHANGE UP (ref 7.32–7.43)
PH BLDV: 7.34 — SIGNIFICANT CHANGE UP (ref 7.32–7.43)
PH BLDV: 7.35 — SIGNIFICANT CHANGE UP (ref 7.32–7.43)
PH BLDV: 7.35 — SIGNIFICANT CHANGE UP (ref 7.32–7.43)
PH BLDV: 7.36 — SIGNIFICANT CHANGE UP (ref 7.32–7.43)
PHOSPHATE SERPL-MCNC: 3.5 MG/DL — SIGNIFICANT CHANGE UP (ref 2.5–4.5)
PHOSPHATE SERPL-MCNC: 3.6 MG/DL — SIGNIFICANT CHANGE UP (ref 2.5–4.5)
PLATELET # BLD AUTO: 77 K/UL — LOW (ref 150–400)
PLATELET # BLD AUTO: 83 K/UL — LOW (ref 150–400)
PMV BLD: 11.9 FL — SIGNIFICANT CHANGE UP (ref 7–13)
PMV BLD: SIGNIFICANT CHANGE UP FL (ref 7–13)
PO2 BLDV: 25 MMHG — SIGNIFICANT CHANGE UP (ref 25–45)
PO2 BLDV: 26 MMHG — SIGNIFICANT CHANGE UP (ref 25–45)
PO2 BLDV: 28 MMHG — SIGNIFICANT CHANGE UP (ref 25–45)
PO2 BLDV: 32 MMHG — SIGNIFICANT CHANGE UP (ref 25–45)
PO2 BLDV: 38 MMHG — SIGNIFICANT CHANGE UP (ref 25–45)
PO2 BLDV: 38 MMHG — SIGNIFICANT CHANGE UP (ref 25–45)
PO2 BLDV: 40 MMHG — SIGNIFICANT CHANGE UP (ref 25–45)
PO2 BLDV: 42 MMHG — SIGNIFICANT CHANGE UP (ref 25–45)
PO2 BLDV: 45 MMHG — SIGNIFICANT CHANGE UP (ref 25–45)
POTASSIUM SERPL-MCNC: 3.7 MMOL/L — SIGNIFICANT CHANGE UP (ref 3.5–5.3)
POTASSIUM SERPL-MCNC: 3.9 MMOL/L — SIGNIFICANT CHANGE UP (ref 3.5–5.3)
POTASSIUM SERPL-SCNC: 3.7 MMOL/L — SIGNIFICANT CHANGE UP (ref 3.5–5.3)
POTASSIUM SERPL-SCNC: 3.9 MMOL/L — SIGNIFICANT CHANGE UP (ref 3.5–5.3)
PROT SERPL-MCNC: 5.9 G/DL — LOW (ref 6–8.3)
PROT SERPL-MCNC: 6.2 G/DL — SIGNIFICANT CHANGE UP (ref 6–8.3)
PROTHROM AB SERPL-ACNC: 13.6 SEC — HIGH (ref 9.9–13.4)
PROTHROM AB SERPL-ACNC: 15 SEC — HIGH (ref 9.9–13.4)
RBC # BLD: 3.04 M/UL — LOW (ref 4.2–5.8)
RBC # BLD: 3.22 M/UL — LOW (ref 4.2–5.8)
RBC # FLD: 19.4 % — HIGH (ref 10.3–14.5)
RBC # FLD: 20.5 % — HIGH (ref 10.3–14.5)
RH IG SCN BLD-IMP: POSITIVE — SIGNIFICANT CHANGE UP
SAO2 % BLDV: 43 % — LOW (ref 67–88)
SAO2 % BLDV: 44 % — LOW (ref 67–88)
SAO2 % BLDV: 49 % — LOW (ref 67–88)
SAO2 % BLDV: 51 % — LOW (ref 67–88)
SAO2 % BLDV: 66 % — LOW (ref 67–88)
SAO2 % BLDV: 66.6 % — LOW (ref 67–88)
SAO2 % BLDV: 68.6 % — SIGNIFICANT CHANGE UP (ref 67–88)
SAO2 % BLDV: 70.7 % — SIGNIFICANT CHANGE UP (ref 67–88)
SAO2 % BLDV: 75.6 % — SIGNIFICANT CHANGE UP (ref 67–88)
SODIUM SERPL-SCNC: 134 MMOL/L — LOW (ref 135–145)
SODIUM SERPL-SCNC: 138 MMOL/L — SIGNIFICANT CHANGE UP (ref 135–145)
WBC # BLD: 12.14 K/UL — HIGH (ref 3.8–10.5)
WBC # BLD: 12.28 K/UL — HIGH (ref 3.8–10.5)
WBC # FLD AUTO: 12.14 K/UL — HIGH (ref 3.8–10.5)
WBC # FLD AUTO: 12.28 K/UL — HIGH (ref 3.8–10.5)

## 2025-06-20 PROCEDURE — 99223 1ST HOSP IP/OBS HIGH 75: CPT

## 2025-06-20 PROCEDURE — 99291 CRITICAL CARE FIRST HOUR: CPT

## 2025-06-20 PROCEDURE — 71045 X-RAY EXAM CHEST 1 VIEW: CPT | Mod: 26

## 2025-06-20 RX ORDER — SODIUM CHLORIDE 9 G/1000ML
1000 INJECTION, SOLUTION INTRAVENOUS
Refills: 0 | Status: DISCONTINUED | OUTPATIENT
Start: 2025-06-20 | End: 2025-06-21

## 2025-06-20 RX ORDER — ATORVASTATIN CALCIUM 80 MG/1
40 TABLET, FILM COATED ORAL AT BEDTIME
Refills: 0 | Status: DISCONTINUED | OUTPATIENT
Start: 2025-06-20 | End: 2025-06-21

## 2025-06-20 RX ORDER — AMIODARONE HYDROCHLORIDE 50 MG/ML
INJECTION, SOLUTION INTRAVENOUS
Refills: 0 | Status: DISCONTINUED | OUTPATIENT
Start: 2025-06-20 | End: 2025-06-22

## 2025-06-20 RX ORDER — AMIODARONE HYDROCHLORIDE 50 MG/ML
INJECTION, SOLUTION INTRAVENOUS
Refills: 0 | Status: DISCONTINUED | OUTPATIENT
Start: 2025-06-20 | End: 2025-06-20

## 2025-06-20 RX ORDER — TRAZODONE HCL 100 MG
25 TABLET ORAL AT BEDTIME
Refills: 0 | Status: DISCONTINUED | OUTPATIENT
Start: 2025-06-20 | End: 2025-06-22

## 2025-06-20 RX ORDER — AMIODARONE HYDROCHLORIDE 50 MG/ML
200 INJECTION, SOLUTION INTRAVENOUS
Refills: 0 | Status: DISCONTINUED | OUTPATIENT
Start: 2025-06-20 | End: 2025-06-22

## 2025-06-20 RX ORDER — SODIUM BICARBONATE 1 MEQ/ML
650 SYRINGE (ML) INTRAVENOUS
Refills: 0 | Status: DISCONTINUED | OUTPATIENT
Start: 2025-06-20 | End: 2025-06-21

## 2025-06-20 RX ORDER — DEXMEDETOMIDINE HYDROCHLORIDE IN SODIUM CHLORIDE 4 UG/ML
0.2 INJECTION INTRAVENOUS
Qty: 200 | Refills: 0 | Status: DISCONTINUED | OUTPATIENT
Start: 2025-06-20 | End: 2025-06-20

## 2025-06-20 RX ADMIN — VASOPRESSIN 6 UNIT(S)/MIN: 20 INJECTION INTRAVENOUS at 19:07

## 2025-06-20 RX ADMIN — Medication 650 MILLIGRAM(S): at 11:49

## 2025-06-20 RX ADMIN — GABAPENTIN 100 MILLIGRAM(S): 400 CAPSULE ORAL at 05:54

## 2025-06-20 RX ADMIN — Medication 2 TABLET(S): at 21:09

## 2025-06-20 RX ADMIN — DOBUTAMINE 11.9 MICROGRAM(S)/KG/MIN: 250 INJECTION INTRAVENOUS at 19:07

## 2025-06-20 RX ADMIN — Medication 500 MILLIGRAM(S): at 17:03

## 2025-06-20 RX ADMIN — Medication 81 MILLIGRAM(S): at 11:49

## 2025-06-20 RX ADMIN — ATORVASTATIN CALCIUM 40 MILLIGRAM(S): 80 TABLET, FILM COATED ORAL at 21:08

## 2025-06-20 RX ADMIN — Medication 650 MILLIGRAM(S): at 11:50

## 2025-06-20 RX ADMIN — HEPARIN SODIUM 3 UNIT(S)/HR: 1000 INJECTION INTRAVENOUS; SUBCUTANEOUS at 07:26

## 2025-06-20 RX ADMIN — Medication 650 MILLIGRAM(S): at 17:54

## 2025-06-20 RX ADMIN — Medication 25 MILLIGRAM(S): at 21:09

## 2025-06-20 RX ADMIN — Medication 500 MILLIGRAM(S): at 05:54

## 2025-06-20 RX ADMIN — AMIODARONE HYDROCHLORIDE 400 MILLIGRAM(S): 50 INJECTION, SOLUTION INTRAVENOUS at 17:03

## 2025-06-20 RX ADMIN — Medication 1 APPLICATION(S): at 12:52

## 2025-06-20 RX ADMIN — Medication 1 APPLICATION(S): at 21:09

## 2025-06-20 RX ADMIN — VASOPRESSIN 6 UNIT(S)/MIN: 20 INJECTION INTRAVENOUS at 07:26

## 2025-06-20 RX ADMIN — Medication 650 MILLIGRAM(S): at 06:00

## 2025-06-20 RX ADMIN — Medication 40 MILLIGRAM(S): at 11:49

## 2025-06-20 RX ADMIN — Medication 8.93 MICROGRAM(S)/KG/MIN: at 16:18

## 2025-06-20 RX ADMIN — Medication 5.96 MICROGRAM(S)/KG/MIN: at 07:26

## 2025-06-20 RX ADMIN — AMIODARONE HYDROCHLORIDE 400 MILLIGRAM(S): 50 INJECTION, SOLUTION INTRAVENOUS at 05:54

## 2025-06-20 RX ADMIN — POLYETHYLENE GLYCOL 3350 17 GRAM(S): 17 POWDER, FOR SOLUTION ORAL at 11:49

## 2025-06-20 RX ADMIN — Medication 3 UNIT(S)/HR: at 19:07

## 2025-06-20 RX ADMIN — DOBUTAMINE 11.9 MICROGRAM(S)/KG/MIN: 250 INJECTION INTRAVENOUS at 07:26

## 2025-06-20 RX ADMIN — HEPARIN SODIUM 6 UNIT(S)/HR: 1000 INJECTION INTRAVENOUS; SUBCUTANEOUS at 19:07

## 2025-06-20 RX ADMIN — Medication 650 MILLIGRAM(S): at 17:03

## 2025-06-20 RX ADMIN — DEXMEDETOMIDINE HYDROCHLORIDE IN SODIUM CHLORIDE 3.97 MICROGRAM(S)/KG/HR: 4 INJECTION INTRAVENOUS at 07:25

## 2025-06-20 RX ADMIN — Medication 650 MILLIGRAM(S): at 05:54

## 2025-06-20 RX ADMIN — GABAPENTIN 100 MILLIGRAM(S): 400 CAPSULE ORAL at 21:09

## 2025-06-20 RX ADMIN — Medication 3 UNIT(S)/HR: at 07:25

## 2025-06-20 RX ADMIN — Medication 8.93 MICROGRAM(S)/KG/MIN: at 19:07

## 2025-06-20 NOTE — PROGRESS NOTE ADULT - SUBJECTIVE AND OBJECTIVE BOX
[facility] [phone]        Patient seen and examined in bed.  He was the same   On small dose of vaso and on Epi and  gtt    ROS-+weakness + fatigue; all other ros were reviewed and were negative         MEDICATIONS  (STANDING):  acetaminophen     Tablet .. 650 milliGRAM(s) Oral every 6 hours  aMIOdarone    Tablet 400 milliGRAM(s) Oral two times a day  ascorbic acid 500 milliGRAM(s) Oral two times a day  aspirin enteric coated 81 milliGRAM(s) Oral daily  atorvastatin 40 milliGRAM(s) Oral at bedtime  bisacodyl Suppository 10 milliGRAM(s) Rectal once  chlorhexidine 2% Cloths 1 Application(s) Topical daily  chlorhexidine 4% Liquid 1 Application(s) Topical daily  CRRT Treatment    <Continuous>  dextrose 5%. 1000 milliLiter(s) (100 mL/Hr) IV Continuous <Continuous>  dextrose 50% Injectable 50 milliLiter(s) IV Push every 15 minutes  dextrose 50% Injectable 25 milliLiter(s) IV Push every 15 minutes  DOBUTamine Infusion 5 MICROgram(s)/kG/Min (11.9 mL/Hr) IV Continuous <Continuous>  EPINEPHrine    Infusion 0.02 MICROgram(s)/kG/Min (5.96 mL/Hr) IV Continuous <Continuous>  gabapentin 100 milliGRAM(s) Oral every 8 hours  heparin  Infusion 300 Unit(s)/Hr (3 mL/Hr) IV Continuous <Continuous>  insulin regular Infusion 3 Unit(s)/Hr (3 mL/Hr) IV Continuous <Continuous>  norepinephrine Infusion 0.05 MICROgram(s)/kG/Min (7.44 mL/Hr) IV Continuous <Continuous>  pantoprazole  Injectable 40 milliGRAM(s) IV Push daily  Phoxillum Filtration BK 4 / 2.5 5000 milliLiter(s) (1000 mL/Hr) CRRT <Continuous>  polyethylene glycol 3350 17 Gram(s) Oral daily  PrismaSOL Filtration BGK 0 / 2.5 5000 milliLiter(s) (1000 mL/Hr) CRRT <Continuous>  PrismaSOL Filtration BGK 4 / 2.5 5000 milliLiter(s) (1000 mL/Hr) CRRT <Continuous>  senna 2 Tablet(s) Oral at bedtime  sodium chloride 0.9%. 1000 milliLiter(s) (10 mL/Hr) IV Continuous <Continuous>  traZODone 25 milliGRAM(s) Oral at bedtime  vasopressin Infusion 0.04 Unit(s)/Min (6 mL/Hr) IV Continuous <Continuous>      VITAL:  T(C): , Max: 37 (25 @ 12:00)  T(F): , Max: 98.6 (25 @ 12:00)  HR: 94 (25 @ 10:45)  BP: --  BP(mean): --  RR: 18 (25 @ 10:45)  SpO2: 97% (25 @ 10:45)  Wt(kg): --    I and O's:     @ 07:01  -   @ 07:00  --------------------------------------------------------  IN: 1565.9 mL / OUT: 3910 mL / NET: -2344.1 mL     @ 07:01  -   @ 10:50  --------------------------------------------------------  IN: 97.7 mL / OUT: 287 mL / NET: -189.3 mL          PHYSICAL EXAM:    Constitutional: NAD  Neck:  No JVD  Respiratory: reduced   Cardiovascular: S1 and S2  Gastrointestinal: BS+, soft, NT/ND  Extremities: No peripheral edema  Neurological: A/O x 3, no focal deficits  Psychiatric: Normal mood, normal affect  : No Ag  Skin: No rashes  Access:avf    LABS:                        8.0    12.28 )-----------( 83       ( 2025 00:22 )             25.1     06-    138  |  102  |  17  ----------------------------<  116[H]  3.7   |  21[L]  |  2.79[H]    Ca    8.9      2025 00:22  Phos  3.5     06-20  Mg     2.5     -    TPro  6.2  /  Alb  3.7  /  TBili  0.7  /  DBili  x   /  AST  56[H]  /  ALT  74[H]  /  AlkPhos  78            Urine Studies:  Urinalysis Basic - ( 2025 00:22 )    Color: x / Appearance: x / SG: x / pH: x  Gluc: 116 mg/dL / Ketone: x  / Bili: x / Urobili: x   Blood: x / Protein: x / Nitrite: x   Leuk Esterase: x / RBC: x / WBC x   Sq Epi: x / Non Sq Epi: x / Bacteria: x            RADIOLOGY & ADDITIONAL STUDIES:          < from: Xray Chest 1 View- PORTABLE-Routine (25 @ 01:32) >    ACC: [id number] EXAM:  XR CHEST PORTABLE ROUTINE 1V   ORDERED BY: MELI REDDY     PROCEDURE DATE:  2025          INTERPRETATION:  DATE OF STUDY: 25    PRIOR: 25    CLINICAL INDICATION: Postop    TECHNIQUE: AP radiograph of the chest.    FINDINGS:  S/P removal of ET and NG tubes.  Sternal wires, left IJ CVC, left chest tube, heart valve ring, ECMO   cannulas and left atrial appendage clips unchanged - in good position.  Heart size is not accurately assessed on this AP projection.  Bibasilar atelectatic changes noted. No sizable pleural effusion. Mild   pulmonary vascular congestive changes are stable. No pneumothorax.    IMPRESSION:  Stable mild pulmonary vascular congestion and bibasilar atelectatic   changes.  No focalconsolidation.    --- End of Report ---            SAMIRA CORBIN MD; Attending Radiologist  This docu    < end of copied text >

## 2025-06-20 NOTE — OCCUPATIONAL THERAPY INITIAL EVALUATION ADULT - ADDITIONAL COMMENTS
Pt lives in a private home, few steps to enter and main level set up. As per patient, independent with ADLs prior to admission, ambulated independently with no assistive devices.

## 2025-06-20 NOTE — OCCUPATIONAL THERAPY INITIAL EVALUATION ADULT - GENERAL OBSERVATIONS, REHAB EVAL
Upon entry, patient semi-supine in bed, family member present at bedside, patient agreeable to OT eval, cleared for OT evaluation as per VALERIY Porter/Zelalem Upon entry, patient semi-supine in bed, +RVAD (R groin cannula, central cannula) +CVVHD via central cannula, +tele +pulse ox +CTx3 suction +ex pacer +IVs +Ag +02 via NC, patient agreeable to OT eval, cleared for OT evaluation as per VALERIY Porter/Zelalem

## 2025-06-20 NOTE — PROGRESS NOTE ADULT - SUBJECTIVE AND OBJECTIVE BOX
Patient seen and examined at the bedside.    Remains critically ill on continuous ICU monitoring, at risk for life threatening decompensation.  All Labs, data reviewed. Plan of care discussed in length during multi-disciplinary ICU rounds.     Brief Summary:  70 year old male PMH of HTN, HLD, DM2, CAD (total  4 coronary stents, last one PCI in 08/2024 ) CHF with EF 40-45%, severe MR  Now s/p  Mitral valve replacement, CABG x3 and RVAD placement on 6/17/2025     24 Hour events:  On dobutamine, and epi gtt,   RVAD support decreased to 3300 RPM  started on amio PO for PVC  Able to do PT, stood up and did steps  On CRRT, keep negative balance     Objective:  Vital Signs Last 24 Hrs  T(C): 36.8 (20 Jun 2025 04:00), Max: 37 (19 Jun 2025 08:00)  T(F): 98.2 (20 Jun 2025 04:00), Max: 98.6 (19 Jun 2025 08:00)  HR: 94 (20 Jun 2025 07:45) (94 - 112)  BP: --  BP(mean): --  RR: 15 (20 Jun 2025 07:45) (13 - 31)  SpO2: 100% (20 Jun 2025 07:45) (94% - 100%)    Parameters below as of 20 Jun 2025 08:00  Patient On (Oxygen Delivery Method): nasal cannula  O2 Flow (L/min): 3    Physical Exam:   General: NAD  Neurology:  following commands  Respiratory: B/L  breath sounds, on NC  CV: paced  RVAD pulmonary 17F, Venous R femoral 25F  Abdominal: Soft, Nontender  Extremities: Warm, well-perfused, vital fistula on RUE              -------------------------------------------------------------------------------------------------------------------------------    Labs:                        8.0    12.28 )-----------( 83       ( 20 Jun 2025 00:22 )             25.1     06-20    138  |  102  |  17  ----------------------------<  116[H]  3.7   |  21[L]  |  2.79[H]    Ca    8.9      20 Jun 2025 00:22  Phos  3.5     06-20  Mg     2.5     06-20    TPro  6.2  /  Alb  3.7  /  TBili  0.7  /  DBili  x   /  AST  56[H]  /  ALT  74[H]  /  AlkPhos  78  06-20    LIVER FUNCTIONS - ( 20 Jun 2025 00:22 )  Alb: 3.7 g/dL / Pro: 6.2 g/dL / ALK PHOS: 78 U/L / ALT: 74 U/L / AST: 56 U/L / GGT: x           PT/INR - ( 20 Jun 2025 00:22 )   PT: 15.0 sec;   INR: 1.31 ratio         PTT - ( 20 Jun 2025 00:22 )  PTT:32.7 sec  ABG - ( 20 Jun 2025 04:26 )  pH, Arterial: 7.45  pH, Blood: x     /  pCO2: 40    /  pO2: 204   / HCO3: 28    / Base Excess: 3.5   /  SaO2: 98.6      ALL MEDICATIONS   MEDICATIONS  (STANDING):  acetaminophen     Tablet .. 650 milliGRAM(s) Oral every 6 hours  aMIOdarone    Tablet 400 milliGRAM(s) Oral two times a day  ascorbic acid 500 milliGRAM(s) Oral two times a day  aspirin enteric coated 81 milliGRAM(s) Oral daily  bisacodyl Suppository 10 milliGRAM(s) Rectal once  chlorhexidine 2% Cloths 1 Application(s) Topical daily  chlorhexidine 4% Liquid 1 Application(s) Topical daily  CRRT Treatment    <Continuous>  dexMEDEtomidine Infusion 0.2 MICROgram(s)/kG/Hr (3.97 mL/Hr) IV Continuous <Continuous>  dextrose 5%. 1000 milliLiter(s) (100 mL/Hr) IV Continuous <Continuous>  dextrose 50% Injectable 50 milliLiter(s) IV Push every 15 minutes  dextrose 50% Injectable 25 milliLiter(s) IV Push every 15 minutes  DOBUTamine Infusion 5 MICROgram(s)/kG/Min (11.9 mL/Hr) IV Continuous <Continuous>  EPINEPHrine    Infusion 0.02 MICROgram(s)/kG/Min (5.96 mL/Hr) IV Continuous <Continuous>  gabapentin 100 milliGRAM(s) Oral every 8 hours  heparin  Infusion 300 Unit(s)/Hr (3 mL/Hr) IV Continuous <Continuous>  insulin regular Infusion 3 Unit(s)/Hr (3 mL/Hr) IV Continuous <Continuous>  niCARdipine Infusion 5 mG/Hr (25 mL/Hr) IV Continuous <Continuous>  norepinephrine Infusion 0.05 MICROgram(s)/kG/Min (7.44 mL/Hr) IV Continuous <Continuous>  pantoprazole  Injectable 40 milliGRAM(s) IV Push daily  Phoxillum Filtration BK 4 / 2.5 5000 milliLiter(s) (1000 mL/Hr) CRRT <Continuous>  polyethylene glycol 3350 17 Gram(s) Oral daily  PrismaSOL Filtration BGK 0 / 2.5 5000 milliLiter(s) (1000 mL/Hr) CRRT <Continuous>  PrismaSOL Filtration BGK 4 / 2.5 5000 milliLiter(s) (1000 mL/Hr) CRRT <Continuous>  senna 2 Tablet(s) Oral at bedtime  sodium chloride 0.9%. 1000 milliLiter(s) (10 mL/Hr) IV Continuous <Continuous>  traZODone 50 milliGRAM(s) Oral at bedtime  vasopressin Infusion 0.04 Unit(s)/Min (6 mL/Hr) IV Continuous <Continuous>    MEDICATIONS  (PRN):  acetaminophen     Tablet .. 650 milliGRAM(s) Oral every 6 hours PRN Mild Pain (1 - 3)  oxyCODONE    IR 5 milliGRAM(s) Oral every 4 hours PRN Moderate Pain (4 - 6)  oxyCODONE    IR 10 milliGRAM(s) Oral every 4 hours PRN Severe Pain (7 - 10)    ------------------------------------------------------------------------------------------------------------------------------    Assessment:  70 you M with cardiogenic shock on RVAD ,DM2, CAD (total  4 coronary stents, last one PCI in 08/2024 ) CHF with EF 40-45%, severe MR  Now s/p  Mitral valve replacement, CABG x3 and RVAD placement on 6/17/2025     ESRD  on CRRT  At risk for hemodynamic decompensation  At high risk for cardiac arrhythmias   Cardiogenic shock  hyperglycemia  acute blood loss anemia  Acute postoperative pain  Acute postoperative respiratory insufficieny       Plan:   ***Neuro***  Maintain day/night cycle to prevent ICU delirium   Postoperative acute pain control with Tylenol, Gabapentin, oxy    ***Cardiovascular***  At high risk for hemodynamic instability and cardiac arrhythmias.  RV failure, s/p CABG, s/p MV repair  RVAD 3300 RPM, flow 3.2, sweep0.5  On dobutamine, epi gtt , wean as able  paced DDD, at 90th , underling sinus bradycardia at 52, PVCs  started on amio  Monitor chest tube output, M2 min<20    ***Pulmonary***  acute postoperative respiratory insufficieny   on NC  Deep breathing and coughing exercises   IS, Mobilization, nebs, Chest PT    ***GI***  tolerates diet  Protonix  Bowel regimen.   Trend LFTs    ***Renal***  ESRD on HD   Now on CRRT  keep CVP <12, negative balance    ***ID***  Perioperative per protocol  IV Vanc, zinacef    ***Endocrine***  Insulin per protocol for Hyperglycemia     ***Hematology***  Acute blood loss anemia and thrombocytopenia   cbc coags, monitor for bleeding  1 u prbc  AC with heparin 300 U /m      ILucy MD, personally performed the services described in this documentation. all medical record entries made by the scribe were at my direction and in my presence. I have reviewed the chart and agree that the record reflects my personal performance and is accurate and complete  Electronically signed:   Lucy Lance MD  CT ICU attending     ICU time: 65 mins            Patient seen and examined at the bedside.    Remains critically ill on continuous ICU monitoring, at risk for life threatening decompensation.  All Labs, data reviewed. Plan of care discussed in length during multi-disciplinary ICU rounds.     Brief Summary:  70 year old male PMH of HTN, HLD, DM2, CAD (total  4 coronary stents, last one PCI in 08/2024 ) CHF with EF 40-45%, severe MR  Now s/p  Mitral valve replacement, CABG x3 and RVAD placement on 6/17/2025     24 Hour events:  On dobutamine, and epi gtt   RVAD support decreased, started on sweep trail  ambulated with PT  On CRRT, keep negative balance     Objective:  Vital Signs Last 24 Hrs  T(C): 36.8 (20 Jun 2025 04:00), Max: 37 (19 Jun 2025 08:00)  T(F): 98.2 (20 Jun 2025 04:00), Max: 98.6 (19 Jun 2025 08:00)  HR: 94 (20 Jun 2025 07:45) (94 - 112)  BP: --  BP(mean): --  RR: 15 (20 Jun 2025 07:45) (13 - 31)  SpO2: 100% (20 Jun 2025 07:45) (94% - 100%)    Parameters below as of 20 Jun 2025 08:00  Patient On (Oxygen Delivery Method): nasal cannula  O2 Flow (L/min): 3    Physical Exam:   General: NAD  Neurology:  following commands  Respiratory: B/L  breath sounds, on NC  CV: paced  RVAD pulmonary 17F, Venous R femoral 25F  Abdominal: Soft, Nontender  Extremities: Warm, well-perfused, vital fistula on RUE              -------------------------------------------------------------------------------------------------------------------------------    Labs:                        8.0    12.28 )-----------( 83       ( 20 Jun 2025 00:22 )             25.1     06-20    138  |  102  |  17  ----------------------------<  116[H]  3.7   |  21[L]  |  2.79[H]    Ca    8.9      20 Jun 2025 00:22  Phos  3.5     06-20  Mg     2.5     06-20    TPro  6.2  /  Alb  3.7  /  TBili  0.7  /  DBili  x   /  AST  56[H]  /  ALT  74[H]  /  AlkPhos  78  06-20    LIVER FUNCTIONS - ( 20 Jun 2025 00:22 )  Alb: 3.7 g/dL / Pro: 6.2 g/dL / ALK PHOS: 78 U/L / ALT: 74 U/L / AST: 56 U/L / GGT: x           PT/INR - ( 20 Jun 2025 00:22 )   PT: 15.0 sec;   INR: 1.31 ratio         PTT - ( 20 Jun 2025 00:22 )  PTT:32.7 sec  ABG - ( 20 Jun 2025 04:26 )  pH, Arterial: 7.45  pH, Blood: x     /  pCO2: 40    /  pO2: 204   / HCO3: 28    / Base Excess: 3.5   /  SaO2: 98.6      ALL MEDICATIONS   MEDICATIONS  (STANDING):  acetaminophen     Tablet .. 650 milliGRAM(s) Oral every 6 hours  aMIOdarone    Tablet 400 milliGRAM(s) Oral two times a day  ascorbic acid 500 milliGRAM(s) Oral two times a day  aspirin enteric coated 81 milliGRAM(s) Oral daily  bisacodyl Suppository 10 milliGRAM(s) Rectal once  chlorhexidine 2% Cloths 1 Application(s) Topical daily  chlorhexidine 4% Liquid 1 Application(s) Topical daily  CRRT Treatment    <Continuous>  dexMEDEtomidine Infusion 0.2 MICROgram(s)/kG/Hr (3.97 mL/Hr) IV Continuous <Continuous>  dextrose 5%. 1000 milliLiter(s) (100 mL/Hr) IV Continuous <Continuous>  dextrose 50% Injectable 50 milliLiter(s) IV Push every 15 minutes  dextrose 50% Injectable 25 milliLiter(s) IV Push every 15 minutes  DOBUTamine Infusion 5 MICROgram(s)/kG/Min (11.9 mL/Hr) IV Continuous <Continuous>  EPINEPHrine    Infusion 0.02 MICROgram(s)/kG/Min (5.96 mL/Hr) IV Continuous <Continuous>  gabapentin 100 milliGRAM(s) Oral every 8 hours  heparin  Infusion 300 Unit(s)/Hr (3 mL/Hr) IV Continuous <Continuous>  insulin regular Infusion 3 Unit(s)/Hr (3 mL/Hr) IV Continuous <Continuous>  niCARdipine Infusion 5 mG/Hr (25 mL/Hr) IV Continuous <Continuous>  norepinephrine Infusion 0.05 MICROgram(s)/kG/Min (7.44 mL/Hr) IV Continuous <Continuous>  pantoprazole  Injectable 40 milliGRAM(s) IV Push daily  Phoxillum Filtration BK 4 / 2.5 5000 milliLiter(s) (1000 mL/Hr) CRRT <Continuous>  polyethylene glycol 3350 17 Gram(s) Oral daily  PrismaSOL Filtration BGK 0 / 2.5 5000 milliLiter(s) (1000 mL/Hr) CRRT <Continuous>  PrismaSOL Filtration BGK 4 / 2.5 5000 milliLiter(s) (1000 mL/Hr) CRRT <Continuous>  senna 2 Tablet(s) Oral at bedtime  sodium chloride 0.9%. 1000 milliLiter(s) (10 mL/Hr) IV Continuous <Continuous>  traZODone 50 milliGRAM(s) Oral at bedtime  vasopressin Infusion 0.04 Unit(s)/Min (6 mL/Hr) IV Continuous <Continuous>    MEDICATIONS  (PRN):  acetaminophen     Tablet .. 650 milliGRAM(s) Oral every 6 hours PRN Mild Pain (1 - 3)  oxyCODONE    IR 5 milliGRAM(s) Oral every 4 hours PRN Moderate Pain (4 - 6)  oxyCODONE    IR 10 milliGRAM(s) Oral every 4 hours PRN Severe Pain (7 - 10)    ------------------------------------------------------------------------------------------------------------------------------    Assessment:  70 you M with cardiogenic shock on RVAD ,DM2, CAD (total  4 coronary stents, last one PCI in 08/2024 ) CHF with EF 40-45%, severe MR  Now s/p  Mitral valve replacement, CABG x3 and RVAD placement on 6/17/2025     ESRD  on CRRT  At risk for hemodynamic decompensation  At high risk for cardiac arrhythmias   Cardiogenic shock  hyperglycemia  acute blood loss anemia  Acute postoperative pain  Acute postoperative respiratory insufficieny       Plan:   ***Neuro***  Maintain day/night cycle to prevent ICU delirium   Postoperative acute pain control with Tylenol, Gabapentin, oxy    ***Cardiovascular***  At high risk for hemodynamic instability and cardiac arrhythmias.  RV failure, s/p CABG, s/p MV repair  RVAD 3300 RPM, flow 3.2, sweep trail  On dobutamine, epi gtt , wean as able  paced DDD, at 90th , underling sinus bradycardia  /sinus with PVC   On  amio    ***Pulmonary***  acute postoperative respiratory insufficieny   on NC  Deep breathing and coughing exercises   IS, Mobilization, nebs, Chest PT    ***GI***  tolerates diet  Protonix  Bowel regimen.   Trend LFTs    ***Renal***  ESRD on HD   Now on CRRT  keep CVP <12, negative balance    ***ID***  Perioperative per protocol  IV Vanc, zinacef    ***Endocrine***  Insulin per protocol for Hyperglycemia     ***Hematology***  Acute blood loss anemia and thrombocytopenia   cbc coags, monitor for bleeding  1 u prbc  AC with heparin, increased to 400 U  Goal PTT 40-50      I, Lucy Lance MD, personally performed the services described in this documentation. all medical record entries made by the scribe were at my direction and in my presence. I have reviewed the chart and agree that the record reflects my personal performance and is accurate and complete  Electronically signed:   Lucy Lance MD  CT ICU attending     ICU time: 65 mins

## 2025-06-20 NOTE — OCCUPATIONAL THERAPY INITIAL EVALUATION ADULT - DIAGNOSIS, OT EVAL
decreased functional activity endurance, decreased strength, Impaired balance, impacting ability to perform ADL and functional mobility. Now s/p  Mitral valve replacement, CABG x3 and RVAD placement on 6/17/2025 , decreased functional activity endurance, decreased strength, Impaired balance, impacting ability to perform ADL and functional mobility.

## 2025-06-20 NOTE — OCCUPATIONAL THERAPY INITIAL EVALUATION ADULT - PERTINENT HX OF CURRENT PROBLEM, REHAB EVAL
70 year old male PMH of HTN, HLD, DM2, CAD (total  4 coronary stents, last one PCI in 08/2024 &  S/p status post MI treated with PCI x3 stents in 2022 & 08/2023)on Asprin 81 mg, Chronic back pain, ESRD on  HD 3x/week via Right brachial AV fistula ([id number], Nephrology- Dr.Paul Munguia-[facility], in [place]/ also s/p angioplasty of the AVfistula -intact in 12/2024/ & had revision of AV fistula in  05/2024 with Dr. Henrik Morocho), Listed for renal transplant in [place] and [place] ( he has a living donor available), CHF with EF 40-45%,  pneumonia 10/2024, severe MR/ recent cath 6/3 w/ multivessel CAD in stent stenosis planned for Mitral valve replacement, CABG x3 on 6/17/2025 w/ Dr. Mckeon.  Now s/p  Mitral valve replacement, CABG x3 and RVAD placement on 6/17/2025   XRay Chest: Stable mild pulmonary vascular congestion and bibasilar atelectatic   changes.

## 2025-06-20 NOTE — CONSULT NOTE ADULT - ASSESSMENT
70 year-old male with PMH of HTN, HLD, DM2, CAD, s/p MI treated with PCI x 3 stents in 2022, 2023, last PCI 8/2024, Chronic back pain, ESRD on HD 3x per week, Right AV Fistula s/p Revision 5/2024,  CHF with EF 40-45%, severe MR, recent cath  on 6/3 with multivessel CAD, in stent stenosis, Patient now s/p CABG x 3 (LIMA- LAD, SVG- OM, SVG- Diag), MV repair, LAAC, RVAD placement on 6/17/25. On tele is having frequent PVCs for which EP is consulted.    1. PVCs  2. s/p CABG x3, MV Repair, LAAC, RVAD 6/17/25  3. Cardiogenic Shock  4. ESRD, now on CRRT    - Tele with frequent PVCs, temp wire overdrive pacing DDD @ 96, A/V threshold 2.5; underlying SB/NSR @ 59 bpm  - Echo 6/18 with LVEF that appears severely reduced  - Remains with RVAD, difficult to wean  - On inotropic support with Dobutamine and Vasopressin  - Levophed drip for BP support  - Started on Amio 400mg bid x 3days since 6/17, can continue Amio load 400mg bid x 12 doses then 200mg qd for PVC suppression  - LFTs downtrending, most recent AST/ALT 72/81 -> 56/74, continue to monitor and trend LFTs  - Nephrology following, CRRT in progress  - CTU Care    #May reach me via teams 70 year-old male with PMH of HTN, HLD, DM2, CAD, s/p MI treated with PCI x 3 stents in 2022, 2023, last PCI 8/2024, Chronic back pain, ESRD on HD 3x per week, Right AV Fistula s/p Revision 5/2024,  CHF with EF 40-45%, severe MR, recent cath  on 6/3 with multivessel CAD, in stent stenosis, Patient now s/p CABG x 3 (LIMA- LAD, SVG- OM, SVG- Diag), MV repair, LAAC, RVAD placement on 6/17/25. On tele is having frequent PVCs for which EP is consulted.    1. PVCs  2. s/p CABG x3, MV Repair, LAAC, RVAD 6/17/25  3. Cardiogenic Shock  4. ESRD, now on CRRT    - Tele with frequent PVCs, temp wire overdrive pacing DDD @ 96, A/V threshold 2.5; underlying SB/NSR @ 59 bpm  - Echo 6/18 with LVEF that appears severely reduced  - Remains with RVAD, difficult to wean  - On inotropic support with Dobutamine and Vasopressin, wean when feasible  - Betablocker when feasible  - Levophed drip for BP support  - Started on Amio 400mg bid x 3days since 6/17, can continue Amio load 400mg bid x 12 doses then 200mg qd for PVC suppression  - LFTs downtrending, most recent AST/ALT 72/81 -> 56/74, continue to monitor and trend LFTs  - Nephrology following, CRRT in progress  - CTU Care  - Will sign off, please reconsult as needed      #May reach me via teams

## 2025-06-20 NOTE — CONSULT NOTE ADULT - SUBJECTIVE AND OBJECTIVE BOX
CHIEF COMPLAINT:  open heart surgery    HISTORY OF PRESENT ILLNESS:  70 year old male PMH of HTN, HLD, DM2, CAD (total  4 coronary stents, last one PCI in 08/2024 &  S/p status post MI treated with PCI x3 stents in 2022 & 08/2023)on Asprin 81 mg, Chronic back pain, ESRD on  HD 3x/week via Right brachial AV fistula ([id number]-Hkgsmrxxf-Mgclau, Nephrology- Dr.Paul Munguia-[facility], in [place]/ also s/p angioplasty of the AVfistula -intact in 12/2024/ & had revision of AV fistula in  05/2024 with Dr. Henrik Morocho), Listed for renal transplant in [place] ( he has a living donor available), CHF with EF 40-45%,  pneumonia 10/2024, severe MR/ recent cath 6/3 w/ multivessel CAD in stent stenosis planned for Mitral valve replacement, CABG x3 on 6/17/2025 w/ Dr. Mckeon.    ***HD monday/ wed/Friday---surgery on 6/17 Tuesday  ****cardiac catheterization on 6/3/25 which revealed revealed Left main artery: There was dampening upon engagement of the ostium of the left main coronary artery. There is a 50 % stenosis in the ostium portion of the segment. Proximal left anterior descending: There is a 20 % in-stent restenosis. First diagonal: There is an 80 % stenosis in the ostium portion of the segment. Mid left anterior descending: There is a 35 % stenosis. Distal left anterior descending: There is a 45 % stenosis. Proximal circumflex: There is a 90 % in-stent restenosis. Mid circumflex: There is a 60 % stenosis. Proximal right coronary artery: There is a 99 % stenosis in the ostium portion of the segment. Mid right coronary artery: There is a 90 % stenosis. Mid right coronary artery: There is a 100 % stenosis.      Allergies    No Known Allergies    Intolerances    	  MEDICATIONS:  aMIOdarone    Tablet 400 milliGRAM(s) Oral two times a day  aspirin enteric coated 81 milliGRAM(s) Oral daily  DOBUTamine Infusion 5 MICROgram(s)/kG/Min IV Continuous <Continuous>  EPINEPHrine    Infusion 0.02 MICROgram(s)/kG/Min IV Continuous <Continuous>  heparin  Infusion 300 Unit(s)/Hr IV Continuous <Continuous>  norepinephrine Infusion 0.05 MICROgram(s)/kG/Min IV Continuous <Continuous>  acetaminophen    Tablet .. 650 milliGRAM(s) Oral every 6 hours  acetaminophen    Tablet .. 650 milliGRAM(s) Oral every 6 hours PRN  gabapentin 100 milliGRAM(s) Oral every 8 hours  oxyCODONE   IR 5 milliGRAM(s) Oral every 4 hours PRN  oxyCODONE   IR 10 milliGRAM(s) Oral every 4 hours PRN  traZODone 25 milliGRAM(s) Oral at bedtime  bisacodyl Suppository 10 milliGRAM(s) Rectal once  pantoprazole  Injectable 40 milliGRAM(s) IV Push daily  polyethylene glycol 3350 17 Gram(s) Oral daily  senna 2 Tablet(s) Oral at bedtime  atorvastatin 40 milliGRAM(s) Oral at bedtime  dextrose 50% Injectable 50 milliLiter(s) IV Push every 15 minutes  dextrose 50% Injectable 25 milliLiter(s) IV Push every 15 minutes  insulin regular Infusion 3 Unit(s)/Hr IV Continuous <Continuous>  vasopressin Infusion 0.04 Unit(s)/Min IV Continuous <Continuous>  ascorbic acid 500 milliGRAM(s) Oral two times a day  chlorhexidine 2% Cloths 1 Application(s) Topical daily  chlorhexidine 4% Liquid 1 Application(s) Topical daily  dextrose 5%. 1000 milliLiter(s) IV Continuous <Continuous>  sodium chloride 0.9%. 1000 milliLiter(s) IV Continuous <Continuous>      PAST MEDICAL & SURGICAL HISTORY:  HTN (hypertension)    HLD (hyperlipidemia)    CAD (coronary artery disease)    Former smoker    ESRD on dialysis    Gout    Moderate aortic insufficiency    Severe mitral regurgitation    DM2 (diabetes mellitus, type 2)    Right cataract    MI (myocardial infarction)    Stented coronary artery    2019 novel coronavirus disease (COVID-19)    Pancreatic cyst    AV fistula    History of cardiac cath    H/O colonoscopy    Stented coronary artery      FAMILY HISTORY:  FH: type 2 diabetes (Father, Mother, Sibling)    FH: HTN (hypertension) (Father, Mother, Sibling)    FH: renal failure (Sibling)      SOCIAL HISTORY:    Former Smoker      REVIEW OF SYSTEMS:  Constitutional: critically ill male  Head: no headache  Respiratory: no sob  Neuro: no dizziness  Cardiac: no chest pain (+) tenderness at mid chest surgical site  GI: no nausea; c/o "feeling food stuck in his chest after eating"  : no dysuria  Ext: no numbness or tingling       PHYSICAL EXAM:  T(C): 36.9 (06-20-25 @ 12:00), Max: 37 (06-19-25 @ 20:00)  HR: 94 (06-20-25 @ 13:45) (94 - 110)  BP: --  RR: 21 (06-20-25 @ 13:45) (10 - 34)  SpO2: 100% (06-20-25 @ 13:45) (97% - 100%)  Wt(kg): --  I&O's Summary    19 Jun 2025 07:01  -  20 Jun 2025 07:00  --------------------------------------------------------  IN: 1565.9 mL / OUT: 3910 mL / NET: -2344.1 mL    20 Jun 2025 07:01  -  20 Jun 2025 14:00  --------------------------------------------------------  IN: 789.3 mL / OUT: 558 mL / NET: 231.3 mL      Appearance: critically ill male  Head: normocephalic  Cardiovascular: RRR S1 S2,   Respiratory: Lungs clear to auscultation	  Psychiatry: A & O x 3, Mood & affect appropriate  Gastrointestinal:  Soft, Non-tender, + BS	  Skin: No rashes, No ecchymoses, No cyanosis	  Neurologic: Non-focal  Extremities: Normal range of motion, No clubbing, cyanosis or edema  Vascular: Peripheral pulses palpable 2+ bilaterally        LABS:	 	    CBC Full  -  ( 20 Jun 2025 00:22 )  WBC Count : 12.28 K/uL  Hemoglobin : 8.0 g/dL  Hematocrit : 25.1 %  Platelet Count - Automated : 83 K/uL  Mean Cell Volume : 82.6 fl  Mean Cell Hemoglobin : 26.3 pg  Mean Cell Hemoglobin Concentration : 31.9 g/dL  Auto Neutrophil # : 10.00 K/uL  Auto Lymphocyte # : 0.73 K/uL  Auto Monocyte # : 1.38 K/uL  Auto Eosinophil # : 0.05 K/uL  Auto Basophil # : 0.02 K/uL  Auto Neutrophil % : 81.5 %  Auto Lymphocyte % : 5.9 %  Auto Monocyte % : 11.2 %  Auto Eosinophil % : 0.4 %  Auto Basophil % : 0.2 %    06-20    138  |  102  |  17  ----------------------------<  116[H]  3.7   |  21[L]  |  2.79[H]  06-19    138  |  102  |  20  ----------------------------<  153[H]  3.8   |  22  |  3.42[H]    Ca    8.9      20 Jun 2025 00:22  Ca    8.8      19 Jun 2025 12:24  Phos  3.5     06-20  Phos  3.6     06-19  Mg     2.5     06-20  Mg     2.7     06-19    TPro  6.2  /  Alb  3.7  /  TBili  0.7  /  DBili  x   /  AST  56[H]  /  ALT  74[H]  /  AlkPhos  78  06-20  TPro  5.9[L]  /  Alb  3.8  /  TBili  0.7  /  DBili  x   /  AST  72[H]  /  ALT  81[H]  /  AlkPhos  69  06-19      proBNP:   Lipid Profile:   HgA1c:   TSH:       CARDIAC MARKERS:      TELEMETRY: 	    ECG:  	  RADIOLOGY:  OTHER: 	    PREVIOUS DIAGNOSTIC TESTING:    [ ] Echocardiogram:  [ ]  Catheterization:  [ ] Stress Test:  	  	  ASSESSMENT/PLAN: 	     CHIEF COMPLAINT:  open heart surgery    HISTORY OF PRESENT ILLNESS:  70 year old male PMH of HTN, HLD, DM2, CAD (total  4 coronary stents, last one PCI in 08/2024 &  S/p status post MI treated with PCI x3 stents in 2022 & 08/2023)on Asprin 81 mg, Chronic back pain, ESRD on  HD 3x/week via Right brachial AV fistula ([facility]ooyq-Bsvgkrtau-Lcgucz, Nephrology- Dr.Paul Munguia-[facility], in [place]/ also s/p angioplasty of the AVfistula -intact in 12/2024/ & had revision of AV fistula in  05/2024 with Dr. Henrik Morocho), Listed for renal transplant in [place] and [place] ( he has a living donor available), CHF with EF 40-45%,  pneumonia 10/2024, severe MR/ recent cath 6/3 w/ multivessel CAD in stent stenosis planned for Mitral valve replacement, CABG x3 on 6/17/2025 w/ Dr. Mckeon.    ***HD monday/ wed/Friday---surgery on 6/17 Tuesday  ****cardiac catheterization on 6/3/25 which revealed revealed Left main artery: There was dampening upon engagement of the ostium of the left main coronary artery. There is a 50 % stenosis in the ostium portion of the segment. Proximal left anterior descending: There is a 20 % in-stent restenosis. First diagonal: There is an 80 % stenosis in the ostium portion of the segment. Mid left anterior descending: There is a 35 % stenosis. Distal left anterior descending: There is a 45 % stenosis. Proximal circumflex: There is a 90 % in-stent restenosis. Mid circumflex: There is a 60 % stenosis. Proximal right coronary artery: There is a 99 % stenosis in the ostium portion of the segment. Mid right coronary artery: There is a 90 % stenosis. Mid right coronary artery: There is a 100 % stenosis.      Allergies    No Known Allergies    Intolerances    	  MEDICATIONS:  aMIOdarone    Tablet 400 milliGRAM(s) Oral two times a day  aspirin enteric coated 81 milliGRAM(s) Oral daily  DOBUTamine Infusion 5 MICROgram(s)/kG/Min IV Continuous <Continuous>  EPINEPHrine    Infusion 0.02 MICROgram(s)/kG/Min IV Continuous <Continuous>  heparin  Infusion 300 Unit(s)/Hr IV Continuous <Continuous>  norepinephrine Infusion 0.05 MICROgram(s)/kG/Min IV Continuous <Continuous>  acetaminophen    Tablet .. 650 milliGRAM(s) Oral every 6 hours  acetaminophen    Tablet .. 650 milliGRAM(s) Oral every 6 hours PRN  gabapentin 100 milliGRAM(s) Oral every 8 hours  oxyCODONE   IR 5 milliGRAM(s) Oral every 4 hours PRN  oxyCODONE   IR 10 milliGRAM(s) Oral every 4 hours PRN  traZODone 25 milliGRAM(s) Oral at bedtime  bisacodyl Suppository 10 milliGRAM(s) Rectal once  pantoprazole  Injectable 40 milliGRAM(s) IV Push daily  polyethylene glycol 3350 17 Gram(s) Oral daily  senna 2 Tablet(s) Oral at bedtime  atorvastatin 40 milliGRAM(s) Oral at bedtime  dextrose 50% Injectable 50 milliLiter(s) IV Push every 15 minutes  dextrose 50% Injectable 25 milliLiter(s) IV Push every 15 minutes  insulin regular Infusion 3 Unit(s)/Hr IV Continuous <Continuous>  vasopressin Infusion 0.04 Unit(s)/Min IV Continuous <Continuous>  ascorbic acid 500 milliGRAM(s) Oral two times a day  chlorhexidine 2% Cloths 1 Application(s) Topical daily  chlorhexidine 4% Liquid 1 Application(s) Topical daily  dextrose 5%. 1000 milliLiter(s) IV Continuous <Continuous>  sodium chloride 0.9%. 1000 milliLiter(s) IV Continuous <Continuous>      PAST MEDICAL & SURGICAL HISTORY:  HTN (hypertension)    HLD (hyperlipidemia)    CAD (coronary artery disease)    Former smoker    ESRD on dialysis    Gout    Moderate aortic insufficiency    Severe mitral regurgitation    DM2 (diabetes mellitus, type 2)    Right cataract    MI (myocardial infarction)    Stented coronary artery    2019 novel coronavirus disease (COVID-19)    Pancreatic cyst    AV fistula    History of cardiac cath    H/O colonoscopy    Stented coronary artery      FAMILY HISTORY:  FH: type 2 diabetes (Father, Mother, Sibling)    FH: HTN (hypertension) (Father, Mother, Sibling)    FH: renal failure (Sibling)      SOCIAL HISTORY:    Former Smoker      REVIEW OF SYSTEMS:  Constitutional: critically ill male  Head: no headache  Respiratory: no sob  Neuro: no dizziness  Cardiac: no chest pain (+) tenderness at mid chest surgical site  GI: no nausea; c/o "feeling food stuck in his chest after eating"  : no dysuria  Ext: no numbness or tingling       PHYSICAL EXAM:  T(C): 36.9 (06-20-25 @ 12:00), Max: 37 (06-19-25 @ 20:00)  HR: 94 (06-20-25 @ 13:45) (94 - 110)  BP: --  RR: 21 (06-20-25 @ 13:45) (10 - 34)  SpO2: 100% (06-20-25 @ 13:45) (97% - 100%)  Wt(kg): --  I&O's Summary    19 Jun 2025 07:01  -  20 Jun 2025 07:00  --------------------------------------------------------  IN: 1565.9 mL / OUT: 3910 mL / NET: -2344.1 mL    20 Jun 2025 07:01  -  20 Jun 2025 14:00  --------------------------------------------------------  IN: 789.3 mL / OUT: 558 mL / NET: 231.3 mL      Appearance: critically ill male  Head: normocephalic  Cardiovascular: RRR S1 S2, LIJ Central line in place; A and V temporary wire DDD @ 96, mA 5 on both A & V  wires; RVAD in place  Respiratory: Lungs clear to auscultation, on nasal cannula; mediastinal dina drain x2, left pleural chest tube   Psychiatry: A & O x 3, Mood & affect appropriate  Gastrointestinal:  Soft, Non-tender, + BS	  : CRRT in progress  Skin: No rashes, no cyanosis  Neurologic: Non-focal  Extremities: warm to touch  Vascular: Peripheral pulses palpable 2+ bilaterally; left radial [place]        LABS:	 	    CBC Full  -  ( 20 Jun 2025 00:22 )  WBC Count : 12.28 K/uL  Hemoglobin : 8.0 g/dL  Hematocrit : 25.1 %  Platelet Count - Automated : 83 K/uL  Mean Cell Volume : 82.6 fl  Mean Cell Hemoglobin : 26.3 pg  Mean Cell Hemoglobin Concentration : 31.9 g/dL  Auto Neutrophil # : 10.00 K/uL  Auto Lymphocyte # : 0.73 K/uL  Auto Monocyte # : 1.38 K/uL  Auto Eosinophil # : 0.05 K/uL  Auto Basophil # : 0.02 K/uL  Auto Neutrophil % : 81.5 %  Auto Lymphocyte % : 5.9 %  Auto Monocyte % : 11.2 %  Auto Eosinophil % : 0.4 %  Auto Basophil % : 0.2 %    06-20    138  |  102  |  17  ----------------------------<  116[H]  3.7   |  21[L]  |  2.79[H]  06-19    138  |  102  |  20  ----------------------------<  153[H]  3.8   |  22  |  3.42[H]    Ca    8.9      20 Jun 2025 00:22  Ca    8.8      19 Jun 2025 12:24  Phos  3.5     06-20  Phos  3.6     06-19  Mg     2.5     06-20  Mg     2.7     06-19    TPro  6.2  /  Alb  3.7  /  TBili  0.7  /  DBili  x   /  AST  56[H]  /  ALT  74[H]  /  AlkPhos  78  06-20  TPro  5.9[L]  /  Alb  3.8  /  TBili  0.7  /  DBili  x   /  AST  72[H]  /  ALT  81[H]  /  AlkPhos  69  06-19      proBNP:   Lipid Profile:   HgA1c:   TSH:       CARDIAC MARKERS:      TELEMETRY: AV Paced @ 96bpm      ECG Pre OP 6/3/25: NSR @ 89, LAD, normal BRIGITTE, PVC    ECG Post OP 6/20/25: NSR with PVC    Echo	  RADIOLOGY:  OTHER: 	    PREVIOUS DIAGNOSTIC TESTING:    [ ] Echocardiogram:  [ ]  Catheterization:  [ ] Stress Test:  	  	  ASSESSMENT/PLAN: 	     CHIEF COMPLAINT:  open heart surgery    HISTORY OF PRESENT ILLNESS:  70 year old male PMH of HTN, HLD, DM2, CAD (total  4 coronary stents, last one PCI in 08/2024 &  S/p status post MI treated with PCI x3 stents in 2022 & 08/2023)on Asprin 81 mg, Chronic back pain, ESRD on  HD 3x/week via Right brachial AV fistula (Gpfmje-Izhhkzgid-Lzwbhq, Nephrology- Dr.Paul Munguia-[facility], in [place]/ also s/p angioplasty of the AVfistula -intact in 12/2024/ & had revision of AV fistula in  05/2024 with Dr. Henrik Morocho), Listed for renal transplant in [place] ( he has a living donor available), CHF with EF 40-45%,  pneumonia 10/2024, severe MR/ recent cath 6/3 w/ multivessel CAD in stent stenosis planned for Mitral valve replacement, CABG x3 on 6/17/2025 w/ Dr. Mckeon.    ***HD monday/ wed/Friday---surgery on 6/17 Tuesday  ****cardiac catheterization on 6/3/25 which revealed revealed Left main artery: There was dampening upon engagement of the ostium of the left main coronary artery. There is a 50 % stenosis in the ostium portion of the segment. Proximal left anterior descending: There is a 20 % in-stent restenosis. First diagonal: There is an 80 % stenosis in the ostium portion of the segment. Mid left anterior descending: There is a 35 % stenosis. Distal left anterior descending: There is a 45 % stenosis. Proximal circumflex: There is a 90 % in-stent restenosis. Mid circumflex: There is a 60 % stenosis. Proximal right coronary artery: There is a 99 % stenosis in the ostium portion of the segment. Mid right coronary artery: There is a 90 % stenosis. Mid right coronary artery: There is a 100 % stenosis.      Allergies    No Known Allergies    Intolerances    	  MEDICATIONS:  aMIOdarone    Tablet 400 milliGRAM(s) Oral two times a day  aspirin enteric coated 81 milliGRAM(s) Oral daily  DOBUTamine Infusion 5 MICROgram(s)/kG/Min IV Continuous <Continuous>  EPINEPHrine    Infusion 0.02 MICROgram(s)/kG/Min IV Continuous <Continuous>  heparin  Infusion 300 Unit(s)/Hr IV Continuous <Continuous>  norepinephrine Infusion 0.05 MICROgram(s)/kG/Min IV Continuous <Continuous>  acetaminophen    Tablet .. 650 milliGRAM(s) Oral every 6 hours  acetaminophen    Tablet .. 650 milliGRAM(s) Oral every 6 hours PRN  gabapentin 100 milliGRAM(s) Oral every 8 hours  oxyCODONE   IR 5 milliGRAM(s) Oral every 4 hours PRN  oxyCODONE   IR 10 milliGRAM(s) Oral every 4 hours PRN  traZODone 25 milliGRAM(s) Oral at bedtime  bisacodyl Suppository 10 milliGRAM(s) Rectal once  pantoprazole  Injectable 40 milliGRAM(s) IV Push daily  polyethylene glycol 3350 17 Gram(s) Oral daily  senna 2 Tablet(s) Oral at bedtime  atorvastatin 40 milliGRAM(s) Oral at bedtime  dextrose 50% Injectable 50 milliLiter(s) IV Push every 15 minutes  dextrose 50% Injectable 25 milliLiter(s) IV Push every 15 minutes  insulin regular Infusion 3 Unit(s)/Hr IV Continuous <Continuous>  vasopressin Infusion 0.04 Unit(s)/Min IV Continuous <Continuous>  ascorbic acid 500 milliGRAM(s) Oral two times a day  chlorhexidine 2% Cloths 1 Application(s) Topical daily  chlorhexidine 4% Liquid 1 Application(s) Topical daily  dextrose 5%. 1000 milliLiter(s) IV Continuous <Continuous>  sodium chloride 0.9%. 1000 milliLiter(s) IV Continuous <Continuous>      PAST MEDICAL & SURGICAL HISTORY:  HTN (hypertension)    HLD (hyperlipidemia)    CAD (coronary artery disease)    Former smoker    ESRD on dialysis    Gout    Moderate aortic insufficiency    Severe mitral regurgitation    DM2 (diabetes mellitus, type 2)    Right cataract    MI (myocardial infarction)    Stented coronary artery    2019 novel coronavirus disease (COVID-19)    Pancreatic cyst    AV fistula    History of cardiac cath    H/O colonoscopy    Stented coronary artery      FAMILY HISTORY:  FH: type 2 diabetes (Father, Mother, Sibling)    FH: HTN (hypertension) (Father, Mother, Sibling)    FH: renal failure (Sibling)      SOCIAL HISTORY:    Former Smoker      REVIEW OF SYSTEMS:  Constitutional: critically ill male  Head: no headache  Respiratory: no sob  Neuro: no dizziness  Cardiac: no chest pain (+) tenderness at mid chest surgical site  GI: no nausea; c/o "feeling food stuck in his chest after eating"  : no dysuria  Ext: no numbness or tingling       PHYSICAL EXAM:  T(C): 36.9 (06-20-25 @ 12:00), Max: 37 (06-19-25 @ 20:00)  HR: 94 (06-20-25 @ 13:45) (94 - 110)  BP: --  RR: 21 (06-20-25 @ 13:45) (10 - 34)  SpO2: 100% (06-20-25 @ 13:45) (97% - 100%)  Wt(kg): --  I&O's Summary    19 Jun 2025 07:01  -  20 Jun 2025 07:00  --------------------------------------------------------  IN: 1565.9 mL / OUT: 3910 mL / NET: -2344.1 mL    20 Jun 2025 07:01  -  20 Jun 2025 14:00  --------------------------------------------------------  IN: 789.3 mL / OUT: 558 mL / NET: 231.3 mL      Appearance: critically ill male  Head: normocephalic  Cardiovascular: RRR S1 S2, LIJ Central line in place; A and V temporary wire DDD @ 96, mA 5 on both A & V  wires; RVAD in place  Respiratory: Lungs clear to auscultation, on nasal cannula; mediastinal dina drain x2, left pleural chest tube   Psychiatry: A & O x 3, Mood & affect appropriate  Gastrointestinal:  Soft, Non-tender, + BS	  : CRRT in progress  Skin: No rashes, no cyanosis  Neurologic: Non-focal  Extremities: warm to touch  Vascular: Peripheral pulses palpable 2+ bilaterally; left radial [place]        LABS:	 	    CBC Full  -  ( 20 Jun 2025 00:22 )  WBC Count : 12.28 K/uL  Hemoglobin : 8.0 g/dL  Hematocrit : 25.1 %  Platelet Count - Automated : 83 K/uL  Mean Cell Volume : 82.6 fl  Mean Cell Hemoglobin : 26.3 pg  Mean Cell Hemoglobin Concentration : 31.9 g/dL  Auto Neutrophil # : 10.00 K/uL  Auto Lymphocyte # : 0.73 K/uL  Auto Monocyte # : 1.38 K/uL  Auto Eosinophil # : 0.05 K/uL  Auto Basophil # : 0.02 K/uL  Auto Neutrophil % : 81.5 %  Auto Lymphocyte % : 5.9 %  Auto Monocyte % : 11.2 %  Auto Eosinophil % : 0.4 %  Auto Basophil % : 0.2 %    06-20    138  |  102  |  17  ----------------------------<  116[H]  3.7   |  21[L]  |  2.79[H]  06-19    138  |  102  |  20  ----------------------------<  153[H]  3.8   |  22  |  3.42[H]    Ca    8.9      20 Jun 2025 00:22  Ca    8.8      19 Jun 2025 12:24  Phos  3.5     06-20  Phos  3.6     06-19  Mg     2.5     06-20  Mg     2.7     06-19    TPro  6.2  /  Alb  3.7  /  TBili  0.7  /  DBili  x   /  AST  56[H]  /  ALT  74[H]  /  AlkPhos  78  06-20  TPro  5.9[L]  /  Alb  3.8  /  TBili  0.7  /  DBili  x   /  AST  72[H]  /  ALT  81[H]  /  AlkPhos  69  06-19      proBNP:   Lipid Profile:   HgA1c:   TSH:       CARDIAC MARKERS:      TELEMETRY: AV Paced @ 96bpm      ECG Pre OP 6/3/25: NSR @ 89, LAD, normal BRIGITTE, PVC    ECG Post OP 6/20/25: NSR with PVC      Echo 6/18/25:    CONCLUSIONS:      1. Technically difficult image quality.   2. Vert limited visualization, unable to evaluate valves accurately.   3. This is an ECMO (wean/surveillence/decannulation) study.   4. ECMO is visualized in IVC   5. Left ventricular cavity is normal in size. There is poor visualization of the endocardial borders to determine the presence of wall motion abnormalities.   6. Left ventricular endocardium is not well visualized; however, the left ventricular systolic function appears severely reduced.   7. The right ventricle is not well visualized. Normal right ventricular cavity size and reduced right ventricular systolic function.   8. Trace pericardial effusion.  ________________________________________________________________________________________  FINDINGS:     Left Ventricle:  The left ventricle was not well visualized. The left ventricular cavity is normal in size. There is poor visualization of the endocardial borders to determine the presence of wall motion abnormalities. Left ventricular endocardium is not well visualized; however, the left ventricular systolic function appears severely reduced.  ____________________________________________________________________  CHF / Hemodynamics / Heart Transplant / MAD:  This is an ECMO (wean/surveillence/decannulation) study.     Right Ventricle:  The right ventricle is not well visualized. The right ventricular cavity is normal in size and right ventricular systolic function is reduced. ECMO is visualized.     Aortic Valve:  The aortic valve was not well visualized.     Mitral Valve:  The mitral valve was not well visualized. There is calcification of the mitral valve annulus. There is symmetric leaflet tethering.     Tricuspid Valve:  The tricuspid valve was not well visualized. There is insufficient tricuspid regurgitation detected to calculate pulmonary artery systolic pressure. Unable to determine right atrial pressure; patient is on mechanical ventilation.     Pulmonic Valve:  The pulmonic valve was not well visualized.     Pericardium:  There is a trace pericardial effusion.  ____________________________________________________________________  QUANTITATIVE DATA:  Left Ventricle Measurements: (Indexed to BSA)     MV E Vmax: 1.05 m/s  MV A Vmax: 0.79 m/s  MV E/A:    1.33  MV DT:     176 msec    Mitral Valve Measurements:     MV E Vmax: 1.0 m/s  MV A Vmax: 0.8 m/s  MV E/A:    1.3      ALBAN 6/17/25:    Post-Bypass:  S/p CABG and mitral annuloplasty on dobutamine 5, epi 0.04 and RVAD placement  1. Moderately decreased LV function, EF of 35%  2. Severely reduced RV function before and after placement of RVAD  3. Mild-moderate residual MR  4. Other valves unchanged from prior to bypass  5. No dissection visualized after decannulation  6. No effusions after chest closure. Probe removed atraumatically, no blood. The patient tolerated the procedure well and without complications. Permanent recorded images are stored in the medical record.

## 2025-06-20 NOTE — PROGRESS NOTE ADULT - ASSESSMENT
70 year old male PMH of HTN, HLD, DM2, CAD (total  4 coronary stents, last one PCI in 2024 &  S/p status post MI treated with PCI x3 stents in  & 2023)on Asprin 81 mg, Chronic back pain, ESRD on  HD 3x/week via Right brachial AV fistula ([id number]-Catfmnklr-Xdbbzt, Nephrology- Dr.Paul Munguia-[facility], in [facility] CABG x 3 and MVR repair and RVAD for RV dysfunction   Cardiogenic shock     1 Renal -  CVV through the RVAD circuit and he will not need a shiley   Will switch to traditional HD when more stable and out of cardiogenic shock and when the RVAD is taken out   50cc/hr of fluid removal and can increase when Vaso is off   Add more potassium to the bath   2 CVS-On   Epi and  gtt for inotropic support   3 Pulm-Nasal canula         Critically sick and unstable   > 35 min with critical care   DW CTICU     Sayed [facility]   [phone]

## 2025-06-21 LAB
ADD ON TEST-SPECIMEN IN LAB: SIGNIFICANT CHANGE UP
ALBUMIN SERPL ELPH-MCNC: 3.4 G/DL — SIGNIFICANT CHANGE UP (ref 3.3–5)
ALP SERPL-CCNC: 140 U/L — HIGH (ref 40–120)
ALT FLD-CCNC: 69 U/L — HIGH (ref 10–45)
ANION GAP SERPL CALC-SCNC: 14 MMOL/L — SIGNIFICANT CHANGE UP (ref 5–17)
APTT BLD: 41.1 SEC — HIGH (ref 26.1–36.8)
APTT BLD: 41.4 SEC — HIGH (ref 26.1–36.8)
APTT BLD: 42 SEC — HIGH (ref 26.1–36.8)
AST SERPL-CCNC: 53 U/L — HIGH (ref 10–40)
BASE EXCESS BLDV CALC-SCNC: -1.2 MMOL/L — SIGNIFICANT CHANGE UP (ref -2–3)
BASE EXCESS BLDV CALC-SCNC: -1.8 MMOL/L — SIGNIFICANT CHANGE UP (ref -2–3)
BASE EXCESS BLDV CALC-SCNC: -1.8 MMOL/L — SIGNIFICANT CHANGE UP (ref -2–3)
BASE EXCESS BLDV CALC-SCNC: -4.2 MMOL/L — LOW (ref -2–3)
BASE EXCESS BLDV CALC-SCNC: -4.2 MMOL/L — LOW (ref -2–3)
BASE EXCESS BLDV CALC-SCNC: -5.2 MMOL/L — LOW (ref -2–3)
BILIRUB SERPL-MCNC: 0.6 MG/DL — SIGNIFICANT CHANGE UP (ref 0.2–1.2)
BLOOD GAS ECMO POST MEMBRANE - ARTERIAL RESULT: SIGNIFICANT CHANGE UP
BLOOD GAS ECMO POST MEMBRANE - ARTERIAL RESULT: SIGNIFICANT CHANGE UP
BLOOD GAS ECMO PRE MEMBRANE - VENOUS RESULT: SIGNIFICANT CHANGE UP
BUN SERPL-MCNC: 16 MG/DL — SIGNIFICANT CHANGE UP (ref 7–23)
CALCIUM SERPL-MCNC: 9 MG/DL — SIGNIFICANT CHANGE UP (ref 8.4–10.5)
CHLORIDE SERPL-SCNC: 101 MMOL/L — SIGNIFICANT CHANGE UP (ref 96–108)
CO2 BLDV-SCNC: 23 MMOL/L — SIGNIFICANT CHANGE UP (ref 22–26)
CO2 BLDV-SCNC: 24 MMOL/L — SIGNIFICANT CHANGE UP (ref 22–26)
CO2 BLDV-SCNC: 24 MMOL/L — SIGNIFICANT CHANGE UP (ref 22–26)
CO2 BLDV-SCNC: 26 MMOL/L — SIGNIFICANT CHANGE UP (ref 22–26)
CO2 BLDV-SCNC: 26 MMOL/L — SIGNIFICANT CHANGE UP (ref 22–26)
CO2 BLDV-SCNC: 27 MMOL/L — HIGH (ref 22–26)
CO2 SERPL-SCNC: 20 MMOL/L — LOW (ref 22–31)
CREAT SERPL-MCNC: 2.17 MG/DL — HIGH (ref 0.5–1.3)
EGFR: 32 ML/MIN/1.73M2 — LOW
EGFR: 32 ML/MIN/1.73M2 — LOW
FIBRINOGEN PPP-MCNC: 519 MG/DL — HIGH (ref 200–445)
FIBRINOGEN PPP-MCNC: 600 MG/DL — HIGH (ref 200–445)
GAS PNL BLDA: SIGNIFICANT CHANGE UP
GAS PNL BLDV: SIGNIFICANT CHANGE UP
GLUCOSE BLDC GLUCOMTR-MCNC: 103 MG/DL — HIGH (ref 70–99)
GLUCOSE BLDC GLUCOMTR-MCNC: 106 MG/DL — HIGH (ref 70–99)
GLUCOSE BLDC GLUCOMTR-MCNC: 117 MG/DL — HIGH (ref 70–99)
GLUCOSE BLDC GLUCOMTR-MCNC: 119 MG/DL — HIGH (ref 70–99)
GLUCOSE BLDC GLUCOMTR-MCNC: 129 MG/DL — HIGH (ref 70–99)
GLUCOSE BLDC GLUCOMTR-MCNC: 132 MG/DL — HIGH (ref 70–99)
GLUCOSE BLDC GLUCOMTR-MCNC: 142 MG/DL — HIGH (ref 70–99)
GLUCOSE BLDC GLUCOMTR-MCNC: 146 MG/DL — HIGH (ref 70–99)
GLUCOSE BLDC GLUCOMTR-MCNC: 161 MG/DL — HIGH (ref 70–99)
GLUCOSE BLDC GLUCOMTR-MCNC: 161 MG/DL — HIGH (ref 70–99)
GLUCOSE BLDC GLUCOMTR-MCNC: 176 MG/DL — HIGH (ref 70–99)
GLUCOSE BLDC GLUCOMTR-MCNC: 76 MG/DL — SIGNIFICANT CHANGE UP (ref 70–99)
GLUCOSE BLDC GLUCOMTR-MCNC: 80 MG/DL — SIGNIFICANT CHANGE UP (ref 70–99)
GLUCOSE BLDC GLUCOMTR-MCNC: 86 MG/DL — SIGNIFICANT CHANGE UP (ref 70–99)
GLUCOSE BLDC GLUCOMTR-MCNC: 87 MG/DL — SIGNIFICANT CHANGE UP (ref 70–99)
GLUCOSE BLDC GLUCOMTR-MCNC: 91 MG/DL — SIGNIFICANT CHANGE UP (ref 70–99)
GLUCOSE BLDC GLUCOMTR-MCNC: 93 MG/DL — SIGNIFICANT CHANGE UP (ref 70–99)
GLUCOSE SERPL-MCNC: 112 MG/DL — HIGH (ref 70–99)
HAPTOGLOB SERPL-MCNC: 154 MG/DL — SIGNIFICANT CHANGE UP (ref 34–200)
HCO3 BLDV-SCNC: 22 MMOL/L — SIGNIFICANT CHANGE UP (ref 22–29)
HCO3 BLDV-SCNC: 23 MMOL/L — SIGNIFICANT CHANGE UP (ref 22–29)
HCO3 BLDV-SCNC: 23 MMOL/L — SIGNIFICANT CHANGE UP (ref 22–29)
HCO3 BLDV-SCNC: 25 MMOL/L — SIGNIFICANT CHANGE UP (ref 22–29)
HCO3 BLDV-SCNC: 25 MMOL/L — SIGNIFICANT CHANGE UP (ref 22–29)
HCO3 BLDV-SCNC: 26 MMOL/L — SIGNIFICANT CHANGE UP (ref 22–29)
HCT VFR BLD CALC: 26.2 % — LOW (ref 39–50)
HGB BLD-MCNC: 8.3 G/DL — LOW (ref 13–17)
HOROWITZ INDEX BLDV+IHG-RTO: 100 — SIGNIFICANT CHANGE UP
HOROWITZ INDEX BLDV+IHG-RTO: 40 — SIGNIFICANT CHANGE UP
IMMATURE PLATELET FRACTION #: 5.3 K/UL — SIGNIFICANT CHANGE UP (ref 3.9–12.5)
IMMATURE PLATELET FRACTION %: 7.1 % — SIGNIFICANT CHANGE UP (ref 1.6–7.1)
INR BLD: 1.07 RATIO — SIGNIFICANT CHANGE UP (ref 0.85–1.16)
INR BLD: 1.09 RATIO — SIGNIFICANT CHANGE UP (ref 0.85–1.16)
INR BLD: 1.15 RATIO — SIGNIFICANT CHANGE UP (ref 0.85–1.16)
LDH SERPL L TO P-CCNC: 342 U/L — HIGH (ref 50–242)
MAGNESIUM SERPL-MCNC: 2.6 MG/DL — SIGNIFICANT CHANGE UP (ref 1.6–2.6)
MCHC RBC-ENTMCNC: 26.8 PG — LOW (ref 27–34)
MCHC RBC-ENTMCNC: 31.7 G/DL — LOW (ref 32–36)
MCV RBC AUTO: 84.5 FL — SIGNIFICANT CHANGE UP (ref 80–100)
NRBC # BLD AUTO: 0 K/UL — SIGNIFICANT CHANGE UP (ref 0–0)
NRBC # FLD: 0 K/UL — SIGNIFICANT CHANGE UP (ref 0–0)
NRBC BLD AUTO-RTO: 0 /100 WBCS — SIGNIFICANT CHANGE UP (ref 0–0)
PCO2 BLDV: 46 MMHG — SIGNIFICANT CHANGE UP (ref 42–55)
PCO2 BLDV: 47 MMHG — SIGNIFICANT CHANGE UP (ref 42–55)
PCO2 BLDV: 47 MMHG — SIGNIFICANT CHANGE UP (ref 42–55)
PCO2 BLDV: 48 MMHG — SIGNIFICANT CHANGE UP (ref 42–55)
PCO2 BLDV: 48 MMHG — SIGNIFICANT CHANGE UP (ref 42–55)
PCO2 BLDV: 51 MMHG — SIGNIFICANT CHANGE UP (ref 42–55)
PH BLDV: 7.28 — LOW (ref 7.32–7.43)
PH BLDV: 7.29 — LOW (ref 7.32–7.43)
PH BLDV: 7.29 — LOW (ref 7.32–7.43)
PH BLDV: 7.31 — LOW (ref 7.32–7.43)
PH BLDV: 7.32 — SIGNIFICANT CHANGE UP (ref 7.32–7.43)
PH BLDV: 7.32 — SIGNIFICANT CHANGE UP (ref 7.32–7.43)
PHOSPHATE SERPL-MCNC: 3.3 MG/DL — SIGNIFICANT CHANGE UP (ref 2.5–4.5)
PLATELET # BLD AUTO: 74 K/UL — LOW (ref 150–400)
PMV BLD: 10.8 FL — SIGNIFICANT CHANGE UP (ref 7–13)
PO2 BLDV: 33 MMHG — SIGNIFICANT CHANGE UP (ref 25–45)
PO2 BLDV: 34 MMHG — SIGNIFICANT CHANGE UP (ref 25–45)
PO2 BLDV: 39 MMHG — SIGNIFICANT CHANGE UP (ref 25–45)
PO2 BLDV: 42 MMHG — SIGNIFICANT CHANGE UP (ref 25–45)
PO2 BLDV: 43 MMHG — SIGNIFICANT CHANGE UP (ref 25–45)
PO2 BLDV: 46 MMHG — HIGH (ref 25–45)
POTASSIUM SERPL-MCNC: 4 MMOL/L — SIGNIFICANT CHANGE UP (ref 3.5–5.3)
POTASSIUM SERPL-SCNC: 4 MMOL/L — SIGNIFICANT CHANGE UP (ref 3.5–5.3)
PROT SERPL-MCNC: 6.1 G/DL — SIGNIFICANT CHANGE UP (ref 6–8.3)
PROTHROM AB SERPL-ACNC: 12.3 SEC — SIGNIFICANT CHANGE UP (ref 9.9–13.4)
PROTHROM AB SERPL-ACNC: 12.5 SEC — SIGNIFICANT CHANGE UP (ref 9.9–13.4)
PROTHROM AB SERPL-ACNC: 13.1 SEC — SIGNIFICANT CHANGE UP (ref 9.9–13.4)
RBC # BLD: 3.1 M/UL — LOW (ref 4.2–5.8)
RBC # FLD: 19.5 % — HIGH (ref 10.3–14.5)
SAO2 % BLDV: 54.6 % — LOW (ref 67–88)
SAO2 % BLDV: 58.3 % — LOW (ref 67–88)
SAO2 % BLDV: 65.5 % — LOW (ref 67–88)
SAO2 % BLDV: 69.3 % — SIGNIFICANT CHANGE UP (ref 67–88)
SAO2 % BLDV: 70.4 % — SIGNIFICANT CHANGE UP (ref 67–88)
SAO2 % BLDV: 77 % — SIGNIFICANT CHANGE UP (ref 67–88)
SODIUM SERPL-SCNC: 135 MMOL/L — SIGNIFICANT CHANGE UP (ref 135–145)
WBC # BLD: 10.54 K/UL — HIGH (ref 3.8–10.5)
WBC # FLD AUTO: 10.54 K/UL — HIGH (ref 3.8–10.5)

## 2025-06-21 PROCEDURE — 99291 CRITICAL CARE FIRST HOUR: CPT

## 2025-06-21 PROCEDURE — 71045 X-RAY EXAM CHEST 1 VIEW: CPT | Mod: 26

## 2025-06-21 PROCEDURE — 33948 ECMO/ECLS DAILY MGMT-VENOUS: CPT

## 2025-06-21 RX ORDER — POLYETHYLENE GLYCOL 3350 17 G/17G
17 POWDER, FOR SOLUTION ORAL EVERY 12 HOURS
Refills: 0 | Status: DISCONTINUED | OUTPATIENT
Start: 2025-06-21 | End: 2025-06-22

## 2025-06-21 RX ORDER — CEFUROXIME SODIUM 1.5 G
1500 VIAL (EA) INJECTION ONCE
Refills: 0 | Status: DISCONTINUED | OUTPATIENT
Start: 2025-06-21 | End: 2025-06-22

## 2025-06-21 RX ORDER — SODIUM BICARBONATE 1 MEQ/ML
650 SYRINGE (ML) INTRAVENOUS EVERY 8 HOURS
Refills: 0 | Status: DISCONTINUED | OUTPATIENT
Start: 2025-06-21 | End: 2025-06-22

## 2025-06-21 RX ORDER — AMIODARONE HYDROCHLORIDE 50 MG/ML
200 INJECTION, SOLUTION INTRAVENOUS DAILY
Refills: 0 | Status: CANCELLED | OUTPATIENT
Start: 2025-06-27 | End: 2025-06-22

## 2025-06-21 RX ORDER — BISACODYL 5 MG
10 TABLET, DELAYED RELEASE (ENTERIC COATED) ORAL ONCE
Refills: 0 | Status: COMPLETED | OUTPATIENT
Start: 2025-06-21 | End: 2025-06-21

## 2025-06-21 RX ORDER — ATORVASTATIN CALCIUM 80 MG/1
80 TABLET, FILM COATED ORAL AT BEDTIME
Refills: 0 | Status: DISCONTINUED | OUTPATIENT
Start: 2025-06-21 | End: 2025-06-22

## 2025-06-21 RX ORDER — HEPARIN SODIUM 1000 [USP'U]/ML
800 INJECTION INTRAVENOUS; SUBCUTANEOUS
Qty: 25000 | Refills: 0 | Status: DISCONTINUED | OUTPATIENT
Start: 2025-06-21 | End: 2025-06-22

## 2025-06-21 RX ADMIN — VASOPRESSIN 6 UNIT(S)/MIN: 20 INJECTION INTRAVENOUS at 07:31

## 2025-06-21 RX ADMIN — GABAPENTIN 100 MILLIGRAM(S): 400 CAPSULE ORAL at 05:01

## 2025-06-21 RX ADMIN — Medication 5.96 MICROGRAM(S)/KG/MIN: at 21:11

## 2025-06-21 RX ADMIN — GABAPENTIN 100 MILLIGRAM(S): 400 CAPSULE ORAL at 21:14

## 2025-06-21 RX ADMIN — Medication 40 MILLIGRAM(S): at 11:36

## 2025-06-21 RX ADMIN — Medication 500 MILLIGRAM(S): at 17:51

## 2025-06-21 RX ADMIN — HEPARIN SODIUM 7 UNIT(S)/HR: 1000 INJECTION INTRAVENOUS; SUBCUTANEOUS at 07:31

## 2025-06-21 RX ADMIN — Medication 10 MILLIGRAM(S): at 11:36

## 2025-06-21 RX ADMIN — Medication 81 MILLIGRAM(S): at 11:36

## 2025-06-21 RX ADMIN — Medication 10 MILLILITER(S): at 21:10

## 2025-06-21 RX ADMIN — GABAPENTIN 100 MILLIGRAM(S): 400 CAPSULE ORAL at 13:26

## 2025-06-21 RX ADMIN — POLYETHYLENE GLYCOL 3350 17 GRAM(S): 17 POWDER, FOR SOLUTION ORAL at 11:36

## 2025-06-21 RX ADMIN — Medication 3 UNIT(S)/HR: at 07:30

## 2025-06-21 RX ADMIN — Medication 3 UNIT(S)/HR: at 21:11

## 2025-06-21 RX ADMIN — DOBUTAMINE 11.9 MICROGRAM(S)/KG/MIN: 250 INJECTION INTRAVENOUS at 07:31

## 2025-06-21 RX ADMIN — Medication 25 MILLIGRAM(S): at 21:13

## 2025-06-21 RX ADMIN — Medication 1500 MILLILITER(S): at 07:31

## 2025-06-21 RX ADMIN — Medication 10 MILLILITER(S): at 07:32

## 2025-06-21 RX ADMIN — POLYETHYLENE GLYCOL 3350 17 GRAM(S): 17 POWDER, FOR SOLUTION ORAL at 17:51

## 2025-06-21 RX ADMIN — Medication 8.93 MICROGRAM(S)/KG/MIN: at 07:31

## 2025-06-21 RX ADMIN — AMIODARONE HYDROCHLORIDE 200 MILLIGRAM(S): 50 INJECTION, SOLUTION INTRAVENOUS at 17:51

## 2025-06-21 RX ADMIN — AMIODARONE HYDROCHLORIDE 200 MILLIGRAM(S): 50 INJECTION, SOLUTION INTRAVENOUS at 05:00

## 2025-06-21 RX ADMIN — DOBUTAMINE 11.9 MICROGRAM(S)/KG/MIN: 250 INJECTION INTRAVENOUS at 21:10

## 2025-06-21 RX ADMIN — HEPARIN SODIUM 9 UNIT(S)/HR: 1000 INJECTION INTRAVENOUS; SUBCUTANEOUS at 21:11

## 2025-06-21 RX ADMIN — ATORVASTATIN CALCIUM 80 MILLIGRAM(S): 80 TABLET, FILM COATED ORAL at 21:13

## 2025-06-21 RX ADMIN — Medication 650 MILLIGRAM(S): at 13:59

## 2025-06-21 RX ADMIN — VASOPRESSIN 6 UNIT(S)/MIN: 20 INJECTION INTRAVENOUS at 21:11

## 2025-06-21 RX ADMIN — Medication 650 MILLIGRAM(S): at 21:13

## 2025-06-21 RX ADMIN — Medication 650 MILLIGRAM(S): at 05:01

## 2025-06-21 RX ADMIN — Medication 500 MILLIGRAM(S): at 05:00

## 2025-06-21 NOTE — PRE PROCEDURE NOTE - PRE PROCEDURE EVALUATION
Cardiac Surgery Pre-op Note:    Surgeon: Dr. Mckeon    Procedure: 25 RVAD Reconfiguration, placement of RIJ cannula, removal of central PA cannula    HPI:  70 year old male PMH of HTN, HLD, DM2, CAD (total  4 coronary stents, last one PCI in 2024 &  S/p status post MI treated with PCI x3 stents in  & 2023)on Asprin 81 mg, Chronic back pain, ESRD on  HD 3x/week via Right brachial AV fistula ([facility], Nephrology- Dr.Paul Munguia-[facility], in [place]/ also s/p angioplasty of the AVfistula -intact in 2024/ & had revision of AV fistula in  2024 with Dr. Henrik Morocho), Listed for renal transplant in [place] and [place] ( he has a living donor available), CHF with EF 40-45%,  pneumonia 10/2024, severe MR/ recent cath 6/3 w/ multivessel CAD in stent stenosis planned for Mitral valve replacement, CABG x3 on 2025 w/ Dr. Mckeon.      ***HD monday/ wed/Friday---surgery on   ****cardiac catheterization on 6/3/25 which revealed revealed Left main artery: There was dampening upon engagement of the ostium of the left main coronary artery. There is a 50 % stenosis in the ostium portion of the segment. Proximal left anterior descending: There is a 20 % in-stent restenosis. First diagonal: There is an 80 % stenosis in the ostium portion of the segment. Mid left anterior descending: There is a 35 % stenosis. Distal left anterior descending: There is a 45 % stenosis. Proximal circumflex: There is a 90 % in-stent restenosis. Mid circumflex: There is a 60 % stenosis. Proximal right coronary artery: There is a 99 % stenosis in the ostium portion of the segment. Mid right coronary artery: There is a 90 % stenosis. Mid right coronary artery: There is a 100 % stenosis.   (10 Pj 2025 11:14)      PAST MEDICAL & SURGICAL HISTORY:  HTN (hypertension)      HLD (hyperlipidemia)      CAD (coronary artery disease)      Former smoker      ESRD on dialysis      Gout      Moderate aortic insufficiency      Severe mitral regurgitation      DM2 (diabetes mellitus, type 2)      Right cataract      MI (myocardial infarction)      Stented coronary artery      2019 novel coronavirus disease (COVID-19)      Pancreatic cyst      AV fistula      History of cardiac cath      H/O colonoscopy      Stented coronary artery          MEDICATIONS  (STANDING):  aMIOdarone    Tablet   Oral   aMIOdarone    Tablet 200 milliGRAM(s) Oral two times a day  ascorbic acid 500 milliGRAM(s) Oral two times a day  aspirin enteric coated 81 milliGRAM(s) Oral daily  atorvastatin 80 milliGRAM(s) Oral at bedtime  bisacodyl Suppository 10 milliGRAM(s) Rectal once  chlorhexidine 2% Cloths 1 Application(s) Topical daily  chlorhexidine 4% Liquid 1 Application(s) Topical daily  CRRT Treatment    <Continuous>  dextrose 5%. 1000 milliLiter(s) (100 mL/Hr) IV Continuous <Continuous>  dextrose 50% Injectable 50 milliLiter(s) IV Push every 15 minutes  dextrose 50% Injectable 25 milliLiter(s) IV Push every 15 minutes  DOBUTamine Infusion 5 MICROgram(s)/kG/Min (11.9 mL/Hr) IV Continuous <Continuous>  EPINEPHrine    Infusion 0.02 MICROgram(s)/kG/Min (5.96 mL/Hr) IV Continuous <Continuous>  gabapentin 100 milliGRAM(s) Oral every 8 hours  heparin  Infusion 800 Unit(s)/Hr (9 mL/Hr) IV Continuous <Continuous>  insulin regular Infusion 3 Unit(s)/Hr (3 mL/Hr) IV Continuous <Continuous>  norepinephrine Infusion 0.05 MICROgram(s)/kG/Min (7.44 mL/Hr) IV Continuous <Continuous>  pantoprazole  Injectable 40 milliGRAM(s) IV Push daily  Phoxillum Filtration BK 4 / 2.5 5000 milliLiter(s) (500 mL/Hr) CRRT <Continuous>  Phoxillum Filtration BK 4 / 2.5 5000 milliLiter(s) (500 mL/Hr) CRRT <Continuous>  Phoxillum Filtration BK 4 / 2.5 5000 milliLiter(s) (1000 mL/Hr) CRRT <Continuous>  polyethylene glycol 3350 17 Gram(s) Oral every 12 hours  senna 2 Tablet(s) Oral at bedtime  sodium bicarbonate 650 milliGRAM(s) Oral every 8 hours  sodium chloride 0.9%. 1000 milliLiter(s) (10 mL/Hr) IV Continuous <Continuous>  traZODone 25 milliGRAM(s) Oral at bedtime  vasopressin Infusion 0.04 Unit(s)/Min (6 mL/Hr) IV Continuous <Continuous>    MEDICATIONS  (PRN):  acetaminophen     Tablet .. 650 milliGRAM(s) Oral every 6 hours PRN Mild Pain (1 - 3)  oxyCODONE    IR 5 milliGRAM(s) Oral every 4 hours PRN Moderate Pain (4 - 6)  oxyCODONE    IR 10 milliGRAM(s) Oral every 4 hours PRN Severe Pain (7 - 10)      Vital Signs Last 24 Hrs  T(C): 37 (25 @ 12:00), Max: 37.1 (25 @ 04:00)  T(F): 98.6 (25 @ 12:00), Max: 98.8 (25 @ 04:00)  HR: 94 (25 @ 18:00) (93 - 95)  BP: 90/54 (25 @ 15:00) (90/54 - 109/53)  RR: 20 (25 @ 18:00) (9 - 31)  SpO2: 100% (25 @ 18:00) (92% - 100%)          ABO Interpretation: B ( @ 01:23)     Daily     Daily Weight in k.7 (2025 00:00)  Admit Wt: Drug Dosing Weight  Height (cm): 180.3 (2025 14:23)  Weight (kg): 79.4 (2025 14:23)  BMI (kg/m2): 24.4 (2025 14:23)  BSA (m2): 1.99 (2025 14:23)    Labs:                        8.3    10.54 )-----------( 74       ( 2025 00:54 )             26.2         135  |  101  |  16  ----------------------------<  112[H]  4.0   |  20[L]  |  2.17[H]    Ca    9.0      2025 00:53  Phos  3.3       Mg     2.6         TPro  6.1  /  Alb  3.4  /  TBili  0.6  /  DBili  x   /  AST  53[H]  /  ALT  69[H]  /  AlkPhos  140[H]  06-    PT/INR - ( 2025 17:08 )   PT: 12.5 sec;   INR: 1.09 ratio         PTT - ( 2025 17:08 )  PTT:41.4 sec    ABO Interpretation: B ( @ 01:23)        Thyroid Panel: pending  MRSA:  / MSSA: negative  Urinalysis Basic - pt is anuric    P2Y12- N/A    CXR:     FINDINGS:  Left jugular catheter, transfemoral ECMO catheter, aortic catheter,   cardiac valve ring, mediastinal drain and left atrial appendage clip   again noted.  The heart is similar in size.  Interval improvement in lower right lung infiltrate.  No sizable pleural effusions.  No pneumothorax    IMPRESSION:    Interval improvement in lower right lung infiltrate.      Echocardiogram:    Cardiac catheterization:    PHYSICAL EXAM:  Neuro: A&Ox3, NAD  Pulm: B/L BS CTA  CV: RRR,+S1S2  Abd: Soft, NT/ND +BSX4Q  Ext: B/L LE no edema, +PP, no calf tenderness    Pt has AICD/PPM [ ] Yes  [x ] No             Brand Name:  Pre-op Beta Blocker ordered within 24 hrs of surgery (CABG ONLY)?  [x ] Yes  [ ] No  If not, Why?  Type & Cross  [ ] Yes  [ ] No  NPO after Midnight [x ] Yes  [ ] No  Pre-op ABX ordered, to be taped on chart:  [ x] Yes  [ ] No     Hibiclens/Peridex ordered [x ] Yes  [ ] No  Intraop on Hold: PRBCs, CXR, ALBAN [x ]   Consent obtained  [x ] Yes  [ ] No Cardiac Surgery Pre-op Note:    Surgeon: Dr. Mckeon    Procedure: 25 RVAD Reconfiguration, placement of RIJ cannula, removal of central PA cannula    HPI:  70 year old male PMH of HTN, HLD, DM2, CAD (total  4 coronary stents, last one PCI in 2024 &  S/p status post MI treated with PCI x3 stents in  & 2023)on Asprin 81 mg, Chronic back pain, ESRD on  HD 3x/week via Right brachial AV fistula ([facility], Nephrology- Dr.Paul Munguia-[facility], in [place]/ also s/p angioplasty of the AVfistula -intact in 2024/ & had revision of AV fistula in  2024 with Dr. Henrik Morocho), Listed for renal transplant in [place] and [place] ( he has a living donor available), CHF with EF 40-45%,  pneumonia 10/2024, severe MR/ recent cath 6/3 w/ multivessel CAD in stent stenosis planned for Mitral valve replacement, CABG x3 on 2025 w/ Dr. Mckeon.      ***HD monday/ wed/Friday---surgery on   ****cardiac catheterization on 6/3/25 which revealed revealed Left main artery: There was dampening upon engagement of the ostium of the left main coronary artery. There is a 50 % stenosis in the ostium portion of the segment. Proximal left anterior descending: There is a 20 % in-stent restenosis. First diagonal: There is an 80 % stenosis in the ostium portion of the segment. Mid left anterior descending: There is a 35 % stenosis. Distal left anterior descending: There is a 45 % stenosis. Proximal circumflex: There is a 90 % in-stent restenosis. Mid circumflex: There is a 60 % stenosis. Proximal right coronary artery: There is a 99 % stenosis in the ostium portion of the segment. Mid right coronary artery: There is a 90 % stenosis. Mid right coronary artery: There is a 100 % stenosis.   (10 Pj 2025 11:14)      PAST MEDICAL & SURGICAL HISTORY:  HTN (hypertension)      HLD (hyperlipidemia)      CAD (coronary artery disease)      Former smoker      ESRD on dialysis      Gout      Moderate aortic insufficiency      Severe mitral regurgitation      DM2 (diabetes mellitus, type 2)      Right cataract      MI (myocardial infarction)      Stented coronary artery      2019 novel coronavirus disease (COVID-19)      Pancreatic cyst      AV fistula      History of cardiac cath      H/O colonoscopy      Stented coronary artery          MEDICATIONS  (STANDING):  aMIOdarone    Tablet   Oral   aMIOdarone    Tablet 200 milliGRAM(s) Oral two times a day  ascorbic acid 500 milliGRAM(s) Oral two times a day  aspirin enteric coated 81 milliGRAM(s) Oral daily  atorvastatin 80 milliGRAM(s) Oral at bedtime  bisacodyl Suppository 10 milliGRAM(s) Rectal once  chlorhexidine 2% Cloths 1 Application(s) Topical daily  chlorhexidine 4% Liquid 1 Application(s) Topical daily  CRRT Treatment    <Continuous>  dextrose 5%. 1000 milliLiter(s) (100 mL/Hr) IV Continuous <Continuous>  dextrose 50% Injectable 50 milliLiter(s) IV Push every 15 minutes  dextrose 50% Injectable 25 milliLiter(s) IV Push every 15 minutes  DOBUTamine Infusion 5 MICROgram(s)/kG/Min (11.9 mL/Hr) IV Continuous <Continuous>  EPINEPHrine    Infusion 0.02 MICROgram(s)/kG/Min (5.96 mL/Hr) IV Continuous <Continuous>  gabapentin 100 milliGRAM(s) Oral every 8 hours  heparin  Infusion 800 Unit(s)/Hr (9 mL/Hr) IV Continuous <Continuous>  insulin regular Infusion 3 Unit(s)/Hr (3 mL/Hr) IV Continuous <Continuous>  norepinephrine Infusion 0.05 MICROgram(s)/kG/Min (7.44 mL/Hr) IV Continuous <Continuous>  pantoprazole  Injectable 40 milliGRAM(s) IV Push daily  Phoxillum Filtration BK 4 / 2.5 5000 milliLiter(s) (500 mL/Hr) CRRT <Continuous>  Phoxillum Filtration BK 4 / 2.5 5000 milliLiter(s) (500 mL/Hr) CRRT <Continuous>  Phoxillum Filtration BK 4 / 2.5 5000 milliLiter(s) (1000 mL/Hr) CRRT <Continuous>  polyethylene glycol 3350 17 Gram(s) Oral every 12 hours  senna 2 Tablet(s) Oral at bedtime  sodium bicarbonate 650 milliGRAM(s) Oral every 8 hours  sodium chloride 0.9%. 1000 milliLiter(s) (10 mL/Hr) IV Continuous <Continuous>  traZODone 25 milliGRAM(s) Oral at bedtime  vasopressin Infusion 0.04 Unit(s)/Min (6 mL/Hr) IV Continuous <Continuous>    MEDICATIONS  (PRN):  acetaminophen     Tablet .. 650 milliGRAM(s) Oral every 6 hours PRN Mild Pain (1 - 3)  oxyCODONE    IR 5 milliGRAM(s) Oral every 4 hours PRN Moderate Pain (4 - 6)  oxyCODONE    IR 10 milliGRAM(s) Oral every 4 hours PRN Severe Pain (7 - 10)      Vital Signs Last 24 Hrs  T(C): 37 (25 @ 12:00), Max: 37.1 (25 @ 04:00)  T(F): 98.6 (25 @ 12:00), Max: 98.8 (25 @ 04:00)  HR: 94 (25 @ 18:00) (93 - 95)  BP: 90/54 (25 @ 15:00) (90/54 - 109/53)  RR: 20 (25 @ 18:00) (9 - 31)  SpO2: 100% (25 @ 18:00) (92% - 100%)          ABO Interpretation: B ( @ 01:23)     Daily     Daily Weight in k.7 (2025 00:00)  Admit Wt: Drug Dosing Weight  Height (cm): 180.3 (2025 14:23)  Weight (kg): 79.4 (2025 14:23)  BMI (kg/m2): 24.4 (2025 14:23)  BSA (m2): 1.99 (2025 14:23)    Labs:                        8.3    10.54 )-----------( 74       ( 2025 00:54 )             26.2         135  |  101  |  16  ----------------------------<  112[H]  4.0   |  20[L]  |  2.17[H]    Ca    9.0      2025 00:53  Phos  3.3       Mg     2.6         TPro  6.1  /  Alb  3.4  /  TBili  0.6  /  DBili  x   /  AST  53[H]  /  ALT  69[H]  /  AlkPhos  140[H]  06-21    PT/INR - ( 2025 17:08 )   PT: 12.5 sec;   INR: 1.09 ratio         PTT - ( 2025 17:08 )  PTT:41.4 sec    ABO Interpretation: B ( @ 01:23)        Thyroid Panel: pending  MRSA:  / MSSA: negative  Urinalysis Basic - pt is anuric    P2Y12- N/A    CXR:     FINDINGS:  Left jugular catheter, transfemoral ECMO catheter, aortic catheter,   cardiac valve ring, mediastinal drain and left atrial appendage clip   again noted.  The heart is similar in size.  Interval improvement in lower right lung infiltrate.  No sizable pleural effusions.  No pneumothorax    IMPRESSION:    Interval improvement in lower right lung infiltrate.      Echocardiogram: CONCLUSIONS:      1. Technically difficult image quality.   2. Vert limited visualization, unable to evaluate valves accurately.   3. This is an ECMO (wean/surveillence/decannulation) study.   4. ECMO is visualized in IVC   5. Left ventricular cavity is normal in size. There is poor visualization of the endocardial borders to determine the presence of wall motion abnormalities.   6. Left ventricular endocardium is not well visualized; however, the left ventricular systolic function appears severely reduced.   7. The right ventricle is not well visualized. Normal right ventricular cavity size and reduced right ventricular systolic function.   8. Trace pericardial effusion.      Cardiac catheterization:    PHYSICAL EXAM:  Neuro: A&Ox4, NAD, resting in bed  Pulm: B/L BS CTA, poor inspiritory effort, lung more clear  CV: paced rhythm, RVAD central cannula in place, LP CT in place  Abd: Soft, NT/ND +BSX4Q  Ext: B/L LE trace edema, +PP, no calf tenderness    Pt has AICD/PPM [ ] Yes  [x ] No > temp PM  Pre-op Beta Blocker ordered within 24 hrs of surgery (CABG ONLY)?  [ ] Yes  [x] No  If not, Why?  Type & Cross  [x] Yes  [ ] No  NPO after Midnight [x ] Yes  [ ] No  Pre-op ABX ordered, to be taped on chart:  [ x] Yes  [ ] No     Hibiclens/Peridex ordered [x ] Yes  [ ] No  Intraop on Hold: PRBCs, CXR, ALBAN [x ]   Consent obtained  [x ] Yes  [ ] No

## 2025-06-21 NOTE — PRE-ANESTHESIA EVALUATION ADULT - NSANTHPEFT_GEN_ALL_CORE
Gen: elderly in NAD  CV: RRR  Resp: CTAB
GENERAL: NAD  HEAD:  Atraumatic, Normocephalic  CHEST/LUNG: Clear to auscultation bilaterally  HEART: Normal S1/S2  PSYCH: AAOx3  NEUROLOGY: non-focal  SKIN: No obvious rashes or lesions  left IJ, left radial, ECMO

## 2025-06-21 NOTE — PRE-ANESTHESIA EVALUATION ADULT - NSANTHAIRWAYFT_ENT_ALL_CORE
edentulous, [facility]3 FB[facility]
Good mouth opening, 3 FB TM distance, full neck ROM

## 2025-06-21 NOTE — PROGRESS NOTE ADULT - SUBJECTIVE AND OBJECTIVE BOX
Patient seen and examined at the bedside.    Remains critically ill on continuous ICU monitoring.      Brief Summary:  70 year old male PMH of HTN, HLD, DM2, CAD (total  4 coronary stents, last one PCI in 08/2024 ) CHF with EF 40-45%, severe MR  Now s/p  Mitral valve replacement, CABG x3 and RVAD placement on 6/17/2025     24 Hour events:  On dobutamine, and epi gtt   RVAD support decreased, started on sweep trail  ambulated with PT  On CRRT, keep negative balance       Objective:  Vital Signs Last 24 Hrs  T(C): 37.1 (21 Jun 2025 08:00), Max: 37.1 (20 Jun 2025 16:00)  T(F): 98.8 (21 Jun 2025 08:00), Max: 98.8 (20 Jun 2025 16:00)  HR: 94 (21 Jun 2025 08:00) (94 - 105)  BP: --  BP(mean): --  RR: 13 (21 Jun 2025 08:00) (9 - 34)  SpO2: 100% (21 Jun 2025 08:00) (97% - 100%)    Parameters below as of 21 Jun 2025 08:00  Patient On (Oxygen Delivery Method): nasal cannula  O2 Flow (L/min): 4    Physical Exam:   General: NAD  Neurology:  following commands  Respiratory: B/L  breath sounds, on NC  CV: paced  RVAD pulmonary 17F, Venous R femoral 25F  Abdominal: Soft, Nontender  Extremities: Warm, well-perfused, vital fistula on RUE  -------------------------------------------------------------------------------------------------------------------------------    Labs:                        8.3    10.54 )-----------( 74       ( 21 Jun 2025 00:54 )             26.2     06-21    135  |  101  |  16  ----------------------------<  112[H]  4.0   |  20[L]  |  2.17[H]    Ca    9.0      21 Jun 2025 00:53  Phos  3.3     06-21  Mg     2.6     06-21    TPro  6.1  /  Alb  3.4  /  TBili  0.6  /  DBili  x   /  AST  53[H]  /  ALT  69[H]  /  AlkPhos  140[H]  06-21    LIVER FUNCTIONS - ( 21 Jun 2025 00:53 )  Alb: 3.4 g/dL / Pro: 6.1 g/dL / ALK PHOS: 140 U/L / ALT: 69 U/L / AST: 53 U/L / GGT: x           PT/INR - ( 21 Jun 2025 00:54 )   PT: 13.1 sec;   INR: 1.15 ratio         PTT - ( 21 Jun 2025 00:54 )  PTT:41.1 sec  ABG - ( 21 Jun 2025 08:25 )  pH, Arterial: 7.35  pH, Blood: x     /  pCO2: 39    /  pO2: 177   / HCO3: 22    / Base Excess: -3.8  /  SaO2: 99.0    ------------------------------------------------------------------------------------------------------------------------------  Assessment:  70 you M with cardiogenic shock on RVAD ,DM2, CAD (total  4 coronary stents, last one PCI in 08/2024 ) CHF with EF 40-45%, severe MR  Now s/p  Mitral valve replacement, CABG x3 and RVAD placement on 6/17/2025     ESRD  on CRRT  At risk for hemodynamic decompensation  At high risk for cardiac arrhythmias   Cardiogenic shock  hyperglycemia  acute blood loss anemia  Acute postoperative pain  Acute postoperative respiratory insufficieny       Plan:   ***Neuro***  Maintain day/night cycle to prevent ICU delirium   Postoperative acute pain control with Tylenol, Gabapentin, oxy    ***Cardiovascular***  At high risk for hemodynamic instability and cardiac arrhythmias.  RV failure, s/p CABG, s/p MV repair  RVAD 3000 RPM, flow 2.91, sweep 0.5  On dobutamine, epi gtt , wean as able  Maintain MAPs 60-75. Titrate Vaso and Norepinephrine   paced DDD, at 90th , underling sinus bradycardia  /sinus with PVC   ASA/Statin daily   On  amio for afib prophylaxis     ***Pulmonary***  acute postoperative respiratory insufficieny   on NC  Deep breathing and coughing exercises   IS, Mobilization, nebs, Chest PT    ***GI***  tolerates diet  Protonix  Bowel regimen.   Trend LFTs    ***Renal***  ESRD on HD   Now on CRRT  keep CVP <12, negative balance    ***ID***  Perioperative per protocol  IV Vanc, zinacef- completed    ***Endocrine***  Insulin per protocol for Hyperglycemia     ***Hematology***  Acute blood loss anemia and thrombocytopenia   cbc coags, monitor for bleeding  1 u prbc  AC with heparin, increased to 300 U  Goal PTT 40-50      Care plan discussed with the ICU care team.   Patient remains critical, at risk for life threatening decompensation.    I have spent 30 minutes providing critical care management to this patient.    By signing my name below, I, Willis Edge, attest that this documentation has been prepared under the direction and in the presence of Lacey Nair MD  Electronically signed: Willis Edge.    I, Lacey Nair MD, personally performed the services described in this documentation. I have reviewed the chart and agree that the record reflects my personal performance and is accurate and complete.  Electronically signed: Lacey Nair MD Patient seen and examined at the bedside.    Remains critically ill on continuous ICU monitoring.      Brief Summary:  71 yo M PMH of HTN, HLD, DM2, CAD (total  4 coronary stents, last one PCI in 08/2024), CHF with EF 40-45%, severe MR  Now s/p  Mitral valve replacement, CABG x3 and RVAD placement on 6/17/2025     24 Hour events:  Sweep trial yesterday resulted in decreased PaO2 and venous saturation.  Now on Sweep 0.5  Transfused 1 unit prbcs yesterday.  Remains on Dobutamine and Epinephrine as well as low dose Vasopressin.  Tolerating volume removal - negative 1.7 liters.      Objective:  Vital Signs Last 24 Hrs  T(C): 37.1 (21 Jun 2025 08:00), Max: 37.1 (20 Jun 2025 16:00)  T(F): 98.8 (21 Jun 2025 08:00), Max: 98.8 (20 Jun 2025 16:00)  HR: 94 (21 Jun 2025 08:00) (94 - 105)  BP: --  BP(mean): --  RR: 13 (21 Jun 2025 08:00) (9 - 34)  SpO2: 100% (21 Jun 2025 08:00) (97% - 100%)    Parameters below as of 21 Jun 2025 08:00  Patient On (Oxygen Delivery Method): nasal cannula  O2 Flow (L/min): 4      Physical Exam:   General: Alert, interactive  Neurology: Following commands - Ambulating well.  Respiratory: Bilateral breath sounds, coarse  CV: Paced (sinus ivon under pacer)  RVAD pulmonary 17F, Venous R femoral 25F  Abdominal: Soft, Nontender  Extremities: Warm, well-perfused, AVF on RUE    -------------------------------------------------------------------------------------------------------------------------------    Labs:                        8.3    10.54 )-----------( 74       ( 21 Jun 2025 00:54 )             26.2     06-21    135  |  101  |  16  ----------------------------<  112[H]  4.0   |  20[L]  |  2.17[H]    Ca    9.0      21 Jun 2025 00:53  Phos  3.3     06-21  Mg     2.6     06-21    TPro  6.1  /  Alb  3.4  /  TBili  0.6  /  DBili  x   /  AST  53[H]  /  ALT  69[H]  /  AlkPhos  140[H]  06-21    LIVER FUNCTIONS - ( 21 Jun 2025 00:53 )  Alb: 3.4 g/dL / Pro: 6.1 g/dL / ALK PHOS: 140 U/L / ALT: 69 U/L / AST: 53 U/L / GGT: x           PT/INR - ( 21 Jun 2025 00:54 )   PT: 13.1 sec;   INR: 1.15 ratio         PTT - ( 21 Jun 2025 00:54 )  PTT:41.1 sec  ABG - ( 21 Jun 2025 08:25 )  pH, Arterial: 7.35  pH, Blood: x     /  pCO2: 39    /  pO2: 177   / HCO3: 22    / Base Excess: -3.8  /  SaO2: 99.0      ------------------------------------------------------------------------------------------------------------------------------  Assessment:  71 yo M with cardiogenic shock on RVAD ,DM2, CAD (total  4 coronary stents, last one PCI in 08/2024 ), CHF with EF 40-45%, severe MR  Now s/p Mitral valve replacement, CABG x3 and RVAD placement on 6/17/2025     Cardiogenic shock  Acute postop pulmonary insufficiency  Acute blood loss anemia  Thrombocytopenia  ESRD  Metabolic acidosis.  Hyperglycemia    Plan:   ***Neuro***  Maintain day/night cycle to prevent ICU delirium   Postoperative acute pain control with Tylenol, Gabapentin and prns    ***Cardiovascular***  At high risk for hemodynamic instability and cardiac arrhythmias.  RV failure, s/p CABG, s/p MV repair  RVAD decreased to 2700 RPM, flow 2.5, sweep 0.5  Remains on Dobutamine and Epinephrine.  Trend central venous saturation and lactate.  Wean pressors to maintain MAP > 65 mm Hg  Remains paced, monitor for rhythm recovery  ASA/Statin daily   Amiodarone load in process.    ***Pulmonary***  Postop acute pulmonary insufficiency  Deep breathing and coughing exercises   IS, Mobilization, nebs, Chest PT    ***GI***  Tolerating diet.  Protonix for GI prophylaxis.  Bowel regimen.   Trend LFTs.    ***Renal***  ESRD on CRRT  Goal negative balance.  Metabolic acidosis - Bicarb tabs (increased)    ***ID***  Off antibiotics currently    ***Endocrine***  Insulin per protocol for Hyperglycemia     ***Hematology***  Acute blood loss anemia and thrombocytopenia   No transfusion indication currently, will trend.  Heparin infusion, goal [id number], anti Xa > 0.1      Care plan discussed with the ICU care team.   Patient remains critical, at risk for life threatening decompensation.    I have spent 55 minutes providing critical care management to this patient.    By signing my name below, I, Willis Minesh, attest that this documentation has been prepared under the direction and in the presence of Lacey Nair MD  Electronically signed: Willis Edge.    I, Lacey Nair MD, personally performed the services described in this documentation. I have reviewed the chart and agree that the record reflects my personal performance and is accurate and complete.  Electronically signed: Lacey Nair MD

## 2025-06-21 NOTE — PROGRESS NOTE ADULT - SUBJECTIVE AND OBJECTIVE BOX
----------------------------------------------------------------------------------------------------  ECMO Daily Management Note   ----------------------------------------------------------------------------------------------------  Recent events:  Sweep trial yesterday - sweep resumed at 0.5.  Remains on Dobutamine and Epinephrine.  Transfused 1 unit prbcs yesterday.  Ambulating well.  ----------------------------------------------------------------------------------------------------  69 yo M s/p MVr, CABG x 3 with postop RV failure and cardiogenic shock, cannulated for RVAD with oxygenator    Date of initiation: 6/17/2025       Cannulae:  17Fr in main pulmonary artery, 25Fr R Fem Vein  Cannula sites examined, well-secured.    Continue current support today.  Weaned to Flow 2.5 liters. Remains on sweep 0.5.  Tentative plan for OR tomorrow for reconfiguration.  Anticoagulation with Heparin infusion, goal PTT 45-60, Anti-Xa > 0.1    =============================================================  Weight (kg): 79.4 (06-17-25)        Height (cm): 180.3 (06-17-25)   BSA (m2): 1.99 (06-17-25)        BMI (kg/m2): 24.4 (06-17-25)    ECMO  RPM:  2700 (21 Jun 2025 13:00)      Pump Flow (Lpm):  2.57 (21 Jun 2025 13:00)              Sweep  (L/min):   0.5 (21 Jun 2025 13:00)       FiO2 (%):  1 (21 Jun 2025 13:00)  Pre-membrane -  Pressure (mm/Hg):   104 (21 Jun 2025 13:00)      Post-membrane - Pressure (mm/Hg):  93 (21 Jun 2025 13:00)   ( 21 Jun 2025 09:21 )  pH: 7.28  /  pCO2: 46    / pO2: 411   /  HCO3: 22    /  Base Excess: -5.2  /  SaO2: 98.6           aMIOdarone    Tablet   Oral   aMIOdarone    Tablet 200 milliGRAM(s) Oral two times a day  DOBUTamine Infusion 5 MICROgram(s)/kG/Min IV Continuous <Continuous>  EPINEPHrine    Infusion 0.03 MICROgram(s)/kG/Min IV Continuous <Continuous>  norepinephrine Infusion 0.05 MICROgram(s)/kG/Min IV Continuous <Continuous>  vasopressin Infusion 0.04 Unit(s)/Min IV Continuous <Continuous>      (21 Jun 2025 11:54) pH, Art: 7.36/pCO2: 40/pO2: 182/HCO3: 23/BE: -2.6/SaO2: 99.0/LAC: 1.2, --   (21 Jun 2025 10:37) pH, Art: 7.34/pCO2: 37/pO2: 196/HCO3: 20/BE: -5.3/SaO2: 98.8/LAC: 1.1, --   (21 Jun 2025 08:25) pH, Art: 7.35/pCO2: 39/pO2: 177/HCO3: 22/BE: -3.8/SaO2: 99.0/LAC: 1.1, --   (21 Jun 2025 00:15) pH, Art: 7.39/pCO2: 38/pO2: 212/HCO3: 23/BE: -1.8/SaO2: 98.7/LAC: 1.3, --   (20 Jun 2025 21:04) pH, Art: 7.35/pCO2: 37/pO2: 198/HCO3: 20/BE: -4.7/SaO2: 99.6/LAC: 1.2, --     (21 Jun 2025 11:54) pH, Bart: 7.29/pCO2: 47/pO2: 42 /HCO3: 23/BE: -4.2/MVO2: /SvO2: 69.3/LAC: ,   (21 Jun 2025 10:37) pH, Bart: 7.28/pCO2: 46/pO2: 46 /HCO3: 22/BE: -5.2/MVO2: /SvO2: 77.0/LAC: ,   (21 Jun 2025 08:25) pH, Bart: 7.29/pCO2: 47/pO2: 43 /HCO3: 23/BE: -4.2/MVO2: /SvO2: 70.4/LAC: ,   (21 Jun 2025 00:16) pH, Bart: 7.31/pCO2: 51/pO2: 39 /HCO3: 26/BE: -1.2/MVO2: /SvO2: 65.5/LAC: ,   (20 Jun 2025 21:04) pH, Bart: 7.31/pCO2: 45/pO2: 38 /HCO3: 23/BE: -3.7/MVO2: /SvO2: 70.7/LAC: ,     ----------------------------------------------------------------------------------------------------  ANTICOAGULATION  heparin  Infusion 800 Unit(s)/Hr IV Continuous <Continuous>  aspirin enteric coated 81 milliGRAM(s) Oral daily    (21 Jun 2025 00:54)  aPTT: 41.1  Xa: 0.09  PT: 13.1  INR: 1.15   Fibrinogen: 519   Platelets: 74    (20 Jun 2025 21:11)  aPTT: 42.5  Xa: 0.09  PT: 13.6  INR: 1.20   Fibrinogen: -----  Platelets: -----  (20 Jun 2025 16:55)  aPTT: 38.5  Xa: 0.09  PT: -----  INR: -----   Fibrinogen: -----  Platelets: -----  (20 Jun 2025 16:33)  aPTT: -----  Xa: -----  PT: -----  INR: -----   Fibrinogen: -----  Platelets: 77      (21 Jun 2025 00:54)  Hemoglobin: 8.3     LDH: -----    Haptoglobin: -----   PFH:  -----  (21 Jun 2025 00:53)  Hemoglobin: -----    LDH: 342     Haptoglobin: 154    PFH:  -----  (20 Jun 2025 16:33)  Hemoglobin: 8.7     LDH: -----    Haptoglobin: -----   PFH:  -----  (20 Jun 2025 00:23)  Hemoglobin: -----    LDH: -----    Haptoglobin: 106    PFH:  -----    ----------------------------------------------------------------------------------------------------  Daily ECMO Checklist:   Progress report received from perfusionist   Circuit checked/inspected   Emergency equipment and supplies checked   Cannulation site inspection     I have reviewed all of the above information as well as pertinent labs/imaging/testing and care plan with the bedside nurse, perfusionist and care team members and provided my recommendations for support.   ECMO Management was separate from critical care billing time.

## 2025-06-21 NOTE — PRE-ANESTHESIA EVALUATION ADULT - NSANTHPROCED_GEN_ALL_CORE
Arterial Catheter/Central Venous Catheter/Pulmonary Artery Catheter/Transesophageal Echocardiogram
Transesophageal Echocardiogram

## 2025-06-21 NOTE — PRE-ANESTHESIA EVALUATION ADULT - NSANTHPMHFT_GEN_ALL_CORE
70 year old male PMH of HTN, HLD, DM2, CAD (total  4 coronary stents, last one PCI in 08/2024 &  S/p status post MI treated with PCI x3 stents in 2022 & 08/2023)on Asprin 81 mg, Chronic back pain, ESRD on  HD 3x/week via Right brachial AV fistula   SP CABG x 3 and MVR repair and RVAD for RV failure  on CVVH
Dialyzed yesterday, no esoph pathology, severe CAD and severe MVR

## 2025-06-21 NOTE — PRE-ANESTHESIA EVALUATION ADULT - NSANTHOSAYNRD_GEN_A_CORE
No. WILBERTO screening performed.  STOP BANG Legend: 0-2 = LOW Risk; 3-4 = INTERMEDIATE Risk; 5-8 = HIGH Risk
No. WILBERTO screening performed.  STOP BANG Legend: 0-2 = LOW Risk; 3-4 = INTERMEDIATE Risk; 5-8 = HIGH Risk

## 2025-06-22 LAB
ALBUMIN SERPL ELPH-MCNC: 3.3 G/DL — SIGNIFICANT CHANGE UP (ref 3.3–5)
ALBUMIN SERPL ELPH-MCNC: 3.6 G/DL — SIGNIFICANT CHANGE UP (ref 3.3–5)
ALP SERPL-CCNC: 103 U/L — SIGNIFICANT CHANGE UP (ref 40–120)
ALP SERPL-CCNC: 118 U/L — SIGNIFICANT CHANGE UP (ref 40–120)
ALT FLD-CCNC: 44 U/L — SIGNIFICANT CHANGE UP (ref 10–45)
ALT FLD-CCNC: 53 U/L — HIGH (ref 10–45)
ANION GAP SERPL CALC-SCNC: 17 MMOL/L — SIGNIFICANT CHANGE UP (ref 5–17)
ANION GAP SERPL CALC-SCNC: 20 MMOL/L — HIGH (ref 5–17)
APTT BLD: 38.5 SEC — HIGH (ref 26.1–36.8)
APTT BLD: 41 SEC — HIGH (ref 26.1–36.8)
AST SERPL-CCNC: 28 U/L — SIGNIFICANT CHANGE UP (ref 10–40)
AST SERPL-CCNC: 36 U/L — SIGNIFICANT CHANGE UP (ref 10–40)
BASE EXCESS BLDV CALC-SCNC: -2.2 MMOL/L — LOW (ref -2–3)
BASE EXCESS BLDV CALC-SCNC: -6.2 MMOL/L — LOW (ref -2–3)
BASOPHILS # BLD AUTO: 0.02 K/UL — SIGNIFICANT CHANGE UP (ref 0–0.2)
BASOPHILS NFR BLD AUTO: 0.2 % — SIGNIFICANT CHANGE UP (ref 0–2)
BILIRUB SERPL-MCNC: 0.5 MG/DL — SIGNIFICANT CHANGE UP (ref 0.2–1.2)
BILIRUB SERPL-MCNC: 0.7 MG/DL — SIGNIFICANT CHANGE UP (ref 0.2–1.2)
BUN SERPL-MCNC: 19 MG/DL — SIGNIFICANT CHANGE UP (ref 7–23)
BUN SERPL-MCNC: 25 MG/DL — HIGH (ref 7–23)
CALCIUM SERPL-MCNC: 8.9 MG/DL — SIGNIFICANT CHANGE UP (ref 8.4–10.5)
CALCIUM SERPL-MCNC: 9.1 MG/DL — SIGNIFICANT CHANGE UP (ref 8.4–10.5)
CHLORIDE SERPL-SCNC: 101 MMOL/L — SIGNIFICANT CHANGE UP (ref 96–108)
CHLORIDE SERPL-SCNC: 101 MMOL/L — SIGNIFICANT CHANGE UP (ref 96–108)
CO2 BLDV-SCNC: 22 MMOL/L — SIGNIFICANT CHANGE UP (ref 22–26)
CO2 BLDV-SCNC: 24 MMOL/L — SIGNIFICANT CHANGE UP (ref 22–26)
CO2 SERPL-SCNC: 17 MMOL/L — LOW (ref 22–31)
CO2 SERPL-SCNC: 18 MMOL/L — LOW (ref 22–31)
CREAT SERPL-MCNC: 2.39 MG/DL — HIGH (ref 0.5–1.3)
CREAT SERPL-MCNC: 2.95 MG/DL — HIGH (ref 0.5–1.3)
EGFR: 22 ML/MIN/1.73M2 — LOW
EGFR: 22 ML/MIN/1.73M2 — LOW
EGFR: 28 ML/MIN/1.73M2 — LOW
EGFR: 28 ML/MIN/1.73M2 — LOW
EOSINOPHIL # BLD AUTO: 0.06 K/UL — SIGNIFICANT CHANGE UP (ref 0–0.5)
EOSINOPHIL NFR BLD AUTO: 0.7 % — SIGNIFICANT CHANGE UP (ref 0–6)
GAS PNL BLDA: SIGNIFICANT CHANGE UP
GAS PNL BLDV: SIGNIFICANT CHANGE UP
GAS PNL BLDV: SIGNIFICANT CHANGE UP
GLUCOSE BLDC GLUCOMTR-MCNC: 103 MG/DL — HIGH (ref 70–99)
GLUCOSE BLDC GLUCOMTR-MCNC: 109 MG/DL — HIGH (ref 70–99)
GLUCOSE BLDC GLUCOMTR-MCNC: 110 MG/DL — HIGH (ref 70–99)
GLUCOSE BLDC GLUCOMTR-MCNC: 112 MG/DL — HIGH (ref 70–99)
GLUCOSE BLDC GLUCOMTR-MCNC: 114 MG/DL — HIGH (ref 70–99)
GLUCOSE BLDC GLUCOMTR-MCNC: 115 MG/DL — HIGH (ref 70–99)
GLUCOSE BLDC GLUCOMTR-MCNC: 119 MG/DL — HIGH (ref 70–99)
GLUCOSE BLDC GLUCOMTR-MCNC: 120 MG/DL — HIGH (ref 70–99)
GLUCOSE BLDC GLUCOMTR-MCNC: 123 MG/DL — HIGH (ref 70–99)
GLUCOSE BLDC GLUCOMTR-MCNC: 126 MG/DL — HIGH (ref 70–99)
GLUCOSE BLDC GLUCOMTR-MCNC: 127 MG/DL — HIGH (ref 70–99)
GLUCOSE BLDC GLUCOMTR-MCNC: 138 MG/DL — HIGH (ref 70–99)
GLUCOSE BLDC GLUCOMTR-MCNC: 143 MG/DL — HIGH (ref 70–99)
GLUCOSE BLDC GLUCOMTR-MCNC: 161 MG/DL — HIGH (ref 70–99)
GLUCOSE BLDC GLUCOMTR-MCNC: 181 MG/DL — HIGH (ref 70–99)
GLUCOSE BLDC GLUCOMTR-MCNC: 95 MG/DL — SIGNIFICANT CHANGE UP (ref 70–99)
GLUCOSE SERPL-MCNC: 116 MG/DL — HIGH (ref 70–99)
GLUCOSE SERPL-MCNC: 180 MG/DL — HIGH (ref 70–99)
HAPTOGLOB SERPL-MCNC: 178 MG/DL — SIGNIFICANT CHANGE UP (ref 34–200)
HAPTOGLOB SERPL-MCNC: 183 MG/DL — SIGNIFICANT CHANGE UP (ref 34–200)
HCO3 BLDV-SCNC: 21 MMOL/L — LOW (ref 22–29)
HCO3 BLDV-SCNC: 22 MMOL/L — SIGNIFICANT CHANGE UP (ref 22–29)
HCT VFR BLD CALC: 25.7 % — LOW (ref 39–50)
HCT VFR BLD CALC: 26.5 % — LOW (ref 39–50)
HEPARINASE TEG R TIME: 9 MIN — HIGH (ref 4.3–8.3)
HEPARINASE TEG R TIME: 9.6 MIN — HIGH (ref 4.3–8.3)
HGB BLD-MCNC: 8.1 G/DL — LOW (ref 13–17)
HGB BLD-MCNC: 8.1 G/DL — LOW (ref 13–17)
HOROWITZ INDEX BLDV+IHG-RTO: 100 — SIGNIFICANT CHANGE UP
HOROWITZ INDEX BLDV+IHG-RTO: 28 — SIGNIFICANT CHANGE UP
IMM GRANULOCYTES # BLD AUTO: 0.08 K/UL — HIGH (ref 0–0.07)
IMM GRANULOCYTES NFR BLD AUTO: 0.9 % — SIGNIFICANT CHANGE UP (ref 0–0.9)
INR BLD: 1.06 RATIO — SIGNIFICANT CHANGE UP (ref 0.85–1.16)
LDH SERPL L TO P-CCNC: 277 U/L — HIGH (ref 50–242)
LDH SERPL L TO P-CCNC: 278 U/L — HIGH (ref 50–242)
LYMPHOCYTES # BLD AUTO: 0.71 K/UL — LOW (ref 1–3.3)
LYMPHOCYTES NFR BLD AUTO: 7.8 % — LOW (ref 13–44)
MAGNESIUM SERPL-MCNC: 2.7 MG/DL — HIGH (ref 1.6–2.6)
MAGNESIUM SERPL-MCNC: 2.9 MG/DL — HIGH (ref 1.6–2.6)
MCHC RBC-ENTMCNC: 27 PG — SIGNIFICANT CHANGE UP (ref 27–34)
MCHC RBC-ENTMCNC: 27.1 PG — SIGNIFICANT CHANGE UP (ref 27–34)
MCHC RBC-ENTMCNC: 30.6 G/DL — LOW (ref 32–36)
MCHC RBC-ENTMCNC: 31.5 G/DL — LOW (ref 32–36)
MCV RBC AUTO: 86 FL — SIGNIFICANT CHANGE UP (ref 80–100)
MCV RBC AUTO: 88.3 FL — SIGNIFICANT CHANGE UP (ref 80–100)
MONOCYTES # BLD AUTO: 0.95 K/UL — HIGH (ref 0–0.9)
MONOCYTES NFR BLD AUTO: 10.5 % — SIGNIFICANT CHANGE UP (ref 2–14)
NEUTROPHILS # BLD AUTO: 7.25 K/UL — SIGNIFICANT CHANGE UP (ref 1.8–7.4)
NEUTROPHILS NFR BLD AUTO: 79.9 % — HIGH (ref 43–77)
NRBC # BLD AUTO: 0 K/UL — SIGNIFICANT CHANGE UP (ref 0–0)
NRBC # BLD AUTO: 0 K/UL — SIGNIFICANT CHANGE UP (ref 0–0)
NRBC # FLD: 0 K/UL — SIGNIFICANT CHANGE UP (ref 0–0)
NRBC # FLD: 0 K/UL — SIGNIFICANT CHANGE UP (ref 0–0)
NRBC BLD AUTO-RTO: 0 /100 WBCS — SIGNIFICANT CHANGE UP (ref 0–0)
NRBC BLD AUTO-RTO: 0 /100 WBCS — SIGNIFICANT CHANGE UP (ref 0–0)
PCO2 BLDV: 37 MMHG — LOW (ref 42–55)
PCO2 BLDV: 45 MMHG — SIGNIFICANT CHANGE UP (ref 42–55)
PH BLDV: 7.27 — LOW (ref 7.32–7.43)
PH BLDV: 7.39 — SIGNIFICANT CHANGE UP (ref 7.32–7.43)
PHOSPHATE SERPL-MCNC: 4.2 MG/DL — SIGNIFICANT CHANGE UP (ref 2.5–4.5)
PHOSPHATE SERPL-MCNC: 5.5 MG/DL — HIGH (ref 2.5–4.5)
PLATELET # BLD AUTO: 109 K/UL — LOW (ref 150–400)
PLATELET # BLD AUTO: 133 K/UL — LOW (ref 150–400)
PLATELET MAPPING ACTF MAX AMPLITUDE: 26 MM — HIGH (ref 2–19)
PLATELET MAPPING ADP MAXIMUM AMPLITUDE: 50.3 MM — SIGNIFICANT CHANGE UP (ref 45–69)
PLATELET MAPPING ADP PERCENT INHIBITION: 45.1 % — HIGH (ref 0–17)
PLATELET MAPPING ARACHIDONIC ACID INHIBITION: 19.4 % — HIGH (ref 0–11)
PLATELET MAPPING HKH MAXIMUM AMPLITUDE: 70.3 MM — HIGH (ref 53–68)
PMV BLD: 10.8 FL — SIGNIFICANT CHANGE UP (ref 7–13)
PMV BLD: 13 FL — SIGNIFICANT CHANGE UP (ref 7–13)
PO2 BLDV: 37 MMHG — SIGNIFICANT CHANGE UP (ref 25–45)
PO2 BLDV: 44 MMHG — SIGNIFICANT CHANGE UP (ref 25–45)
POTASSIUM SERPL-MCNC: 4.2 MMOL/L — SIGNIFICANT CHANGE UP (ref 3.5–5.3)
POTASSIUM SERPL-MCNC: 4.3 MMOL/L — SIGNIFICANT CHANGE UP (ref 3.5–5.3)
POTASSIUM SERPL-SCNC: 4.2 MMOL/L — SIGNIFICANT CHANGE UP (ref 3.5–5.3)
POTASSIUM SERPL-SCNC: 4.3 MMOL/L — SIGNIFICANT CHANGE UP (ref 3.5–5.3)
PROT SERPL-MCNC: 5.8 G/DL — LOW (ref 6–8.3)
PROT SERPL-MCNC: 6 G/DL — SIGNIFICANT CHANGE UP (ref 6–8.3)
PROTHROM AB SERPL-ACNC: 12.1 SEC — SIGNIFICANT CHANGE UP (ref 9.9–13.4)
RAPIDTEG MAXIMUM AMPLITUDE: 67.2 MM — SIGNIFICANT CHANGE UP (ref 52–70)
RAPIDTEG MAXIMUM AMPLITUDE: 68.3 MM — SIGNIFICANT CHANGE UP (ref 52–70)
RBC # BLD: 2.99 M/UL — LOW (ref 4.2–5.8)
RBC # BLD: 3 M/UL — LOW (ref 4.2–5.8)
RBC # FLD: 18.9 % — HIGH (ref 10.3–14.5)
RBC # FLD: 19.9 % — HIGH (ref 10.3–14.5)
SAO2 % BLDV: 68.8 % — SIGNIFICANT CHANGE UP (ref 67–88)
SAO2 % BLDV: 75.9 % — SIGNIFICANT CHANGE UP (ref 67–88)
SODIUM SERPL-SCNC: 136 MMOL/L — SIGNIFICANT CHANGE UP (ref 135–145)
SODIUM SERPL-SCNC: 138 MMOL/L — SIGNIFICANT CHANGE UP (ref 135–145)
T3 SERPL-MCNC: 55 NG/DL — LOW (ref 80–200)
T4 AB SER-ACNC: 7.4 UG/DL — SIGNIFICANT CHANGE UP (ref 4.6–12)
T4 FREE SERPL-MCNC: 1.3 NG/DL — SIGNIFICANT CHANGE UP (ref 0.9–1.8)
TEG FUNCTIONAL FIBRINOGEN: 33 MM — HIGH (ref 15–32)
TEG FUNCTIONAL FIBRINOGEN: 35.8 MM — HIGH (ref 15–32)
TEG MAXIMUM AMPLITUDE: 63.6 MM — SIGNIFICANT CHANGE UP (ref 52–69)
TEG MAXIMUM AMPLITUDE: 65.7 MM — SIGNIFICANT CHANGE UP (ref 52–69)
TEG REACTION TIME: 14.4 MIN — HIGH (ref 4.6–9.1)
TEG REACTION TIME: >17 MIN — HIGH (ref 4.6–9.1)
TSH SERPL-MCNC: 2.52 UIU/ML — SIGNIFICANT CHANGE UP (ref 0.27–4.2)
WBC # BLD: 8.71 K/UL — SIGNIFICANT CHANGE UP (ref 3.8–10.5)
WBC # BLD: 9.07 K/UL — SIGNIFICANT CHANGE UP (ref 3.8–10.5)
WBC # FLD AUTO: 8.71 K/UL — SIGNIFICANT CHANGE UP (ref 3.8–10.5)
WBC # FLD AUTO: 9.07 K/UL — SIGNIFICANT CHANGE UP (ref 3.8–10.5)

## 2025-06-22 PROCEDURE — 99292 CRITICAL CARE ADDL 30 MIN: CPT

## 2025-06-22 PROCEDURE — 93010 ELECTROCARDIOGRAM REPORT: CPT

## 2025-06-22 PROCEDURE — 33995 INSJ PERQ VAD R HRT VENOUS: CPT | Mod: 78

## 2025-06-22 PROCEDURE — 71045 X-RAY EXAM CHEST 1 VIEW: CPT | Mod: 26,77

## 2025-06-22 PROCEDURE — 88300 SURGICAL PATH GROSS: CPT | Mod: 26

## 2025-06-22 PROCEDURE — 31622 DX BRONCHOSCOPE/WASH: CPT

## 2025-06-22 PROCEDURE — 37799 UNLISTED PX VASCULAR SURGERY: CPT | Mod: 78

## 2025-06-22 PROCEDURE — 71045 X-RAY EXAM CHEST 1 VIEW: CPT | Mod: 26

## 2025-06-22 PROCEDURE — 99291 CRITICAL CARE FIRST HOUR: CPT

## 2025-06-22 RX ORDER — TRAZODONE HCL 100 MG
25 TABLET ORAL AT BEDTIME
Refills: 0 | Status: DISCONTINUED | OUTPATIENT
Start: 2025-06-22 | End: 2025-06-22

## 2025-06-22 RX ORDER — SENNA 187 MG
2 TABLET ORAL AT BEDTIME
Refills: 0 | Status: DISCONTINUED | OUTPATIENT
Start: 2025-06-22 | End: 2025-07-11

## 2025-06-22 RX ORDER — SODIUM BICARBONATE 1 MEQ/ML
650 SYRINGE (ML) INTRAVENOUS EVERY 8 HOURS
Refills: 0 | Status: DISCONTINUED | OUTPATIENT
Start: 2025-06-22 | End: 2025-06-24

## 2025-06-22 RX ORDER — SUGAMMADEX 100 MG/ML
200 INJECTION, SOLUTION INTRAVENOUS ONCE
Refills: 0 | Status: COMPLETED | OUTPATIENT
Start: 2025-06-22 | End: 2025-06-22

## 2025-06-22 RX ORDER — PROPOFOL 10 MG/ML
30 INJECTION, EMULSION INTRAVENOUS
Qty: 1000 | Refills: 0 | Status: DISCONTINUED | OUTPATIENT
Start: 2025-06-22 | End: 2025-06-23

## 2025-06-22 RX ORDER — ACETAMINOPHEN 500 MG/5ML
650 LIQUID (ML) ORAL EVERY 6 HOURS
Refills: 0 | Status: DISCONTINUED | OUTPATIENT
Start: 2025-06-22 | End: 2025-07-11

## 2025-06-22 RX ORDER — AMIODARONE HYDROCHLORIDE 50 MG/ML
200 INJECTION, SOLUTION INTRAVENOUS DAILY
Refills: 0 | Status: DISCONTINUED | OUTPATIENT
Start: 2025-06-28 | End: 2025-07-11

## 2025-06-22 RX ORDER — DEXTROSE 50 % IN WATER 50 %
25 SYRINGE (ML) INTRAVENOUS
Refills: 0 | Status: DISCONTINUED | OUTPATIENT
Start: 2025-06-22 | End: 2025-07-11

## 2025-06-22 RX ORDER — ASPIRIN 325 MG
81 TABLET ORAL DAILY
Refills: 0 | Status: DISCONTINUED | OUTPATIENT
Start: 2025-06-23 | End: 2025-07-11

## 2025-06-22 RX ORDER — DEXTROSE 50 % IN WATER 50 %
50 SYRINGE (ML) INTRAVENOUS
Refills: 0 | Status: DISCONTINUED | OUTPATIENT
Start: 2025-06-22 | End: 2025-07-11

## 2025-06-22 RX ORDER — GABAPENTIN 400 MG/1
100 CAPSULE ORAL EVERY 8 HOURS
Refills: 0 | Status: COMPLETED | OUTPATIENT
Start: 2025-06-23 | End: 2025-06-27

## 2025-06-22 RX ORDER — FENTANYL CITRATE-0.9 % NACL/PF 100MCG/2ML
25 SYRINGE (ML) INTRAVENOUS ONCE
Refills: 0 | Status: DISCONTINUED | OUTPATIENT
Start: 2025-06-22 | End: 2025-06-22

## 2025-06-22 RX ORDER — POLYETHYLENE GLYCOL 3350 17 G/17G
17 POWDER, FOR SOLUTION ORAL EVERY 12 HOURS
Refills: 0 | Status: DISCONTINUED | OUTPATIENT
Start: 2025-06-22 | End: 2025-07-11

## 2025-06-22 RX ORDER — DOBUTAMINE 250 MG/20ML
1 INJECTION INTRAVENOUS
Qty: 500 | Refills: 0 | Status: DISCONTINUED | OUTPATIENT
Start: 2025-06-22 | End: 2025-07-02

## 2025-06-22 RX ORDER — BISACODYL 5 MG
10 TABLET, DELAYED RELEASE (ENTERIC COATED) ORAL ONCE
Refills: 0 | Status: DISCONTINUED | OUTPATIENT
Start: 2025-06-22 | End: 2025-07-11

## 2025-06-22 RX ORDER — SODIUM NITROPRUSSIDE INJECTION 50 MG/2ML
0.5 INJECTION, SOLUTION, CONCENTRATE INTRAVENOUS
Qty: 100 | Refills: 0 | Status: DISCONTINUED | OUTPATIENT
Start: 2025-06-22 | End: 2025-06-29

## 2025-06-22 RX ORDER — AMIODARONE HYDROCHLORIDE 50 MG/ML
200 INJECTION, SOLUTION INTRAVENOUS
Refills: 0 | Status: COMPLETED | OUTPATIENT
Start: 2025-06-22 | End: 2025-06-27

## 2025-06-22 RX ORDER — CEFUROXIME SODIUM 1.5 G
1500 VIAL (EA) INJECTION EVERY 8 HOURS
Refills: 0 | Status: DISCONTINUED | OUTPATIENT
Start: 2025-06-22 | End: 2025-06-24

## 2025-06-22 RX ORDER — SODIUM CHLORIDE 9 G/1000ML
1000 INJECTION, SOLUTION INTRAVENOUS
Refills: 0 | Status: DISCONTINUED | OUTPATIENT
Start: 2025-06-22 | End: 2025-07-11

## 2025-06-22 RX ORDER — ONDANSETRON HCL/PF 4 MG/2 ML
4 VIAL (ML) INJECTION ONCE
Refills: 0 | Status: COMPLETED | OUTPATIENT
Start: 2025-06-22 | End: 2025-06-22

## 2025-06-22 RX ORDER — TRAZODONE HCL 100 MG
50 TABLET ORAL AT BEDTIME
Refills: 0 | Status: DISCONTINUED | OUTPATIENT
Start: 2025-06-22 | End: 2025-06-23

## 2025-06-22 RX ORDER — DEXMEDETOMIDINE HYDROCHLORIDE IN SODIUM CHLORIDE 4 UG/ML
0.6 INJECTION INTRAVENOUS
Qty: 200 | Refills: 0 | Status: DISCONTINUED | OUTPATIENT
Start: 2025-06-22 | End: 2025-06-23

## 2025-06-22 RX ORDER — ATORVASTATIN CALCIUM 80 MG/1
80 TABLET, FILM COATED ORAL AT BEDTIME
Refills: 0 | Status: DISCONTINUED | OUTPATIENT
Start: 2025-06-22 | End: 2025-07-11

## 2025-06-22 RX ORDER — HEPARIN SODIUM 1000 [USP'U]/ML
950 INJECTION INTRAVENOUS; SUBCUTANEOUS
Qty: 25000 | Refills: 0 | Status: DISCONTINUED | OUTPATIENT
Start: 2025-06-22 | End: 2025-06-22

## 2025-06-22 RX ORDER — OXYCODONE HYDROCHLORIDE 30 MG/1
10 TABLET ORAL EVERY 4 HOURS
Refills: 0 | Status: DISCONTINUED | OUTPATIENT
Start: 2025-06-22 | End: 2025-06-22

## 2025-06-22 RX ORDER — VASOPRESSIN 20 [USP'U]/ML
0.03 INJECTION INTRAVENOUS
Qty: 40 | Refills: 0 | Status: DISCONTINUED | OUTPATIENT
Start: 2025-06-22 | End: 2025-06-26

## 2025-06-22 RX ORDER — OXYCODONE HYDROCHLORIDE 30 MG/1
5 TABLET ORAL EVERY 4 HOURS
Refills: 0 | Status: DISCONTINUED | OUTPATIENT
Start: 2025-06-22 | End: 2025-06-29

## 2025-06-22 RX ORDER — SODIUM BICARBONATE 1 MEQ/ML
50 SYRINGE (ML) INTRAVENOUS ONCE
Refills: 0 | Status: COMPLETED | OUTPATIENT
Start: 2025-06-22 | End: 2025-06-22

## 2025-06-22 RX ORDER — HYDROMORPHONE/SOD CHLOR,ISO/PF 2 MG/10 ML
0.5 SYRINGE (ML) INJECTION ONCE
Refills: 0 | Status: DISCONTINUED | OUTPATIENT
Start: 2025-06-22 | End: 2025-06-22

## 2025-06-22 RX ADMIN — ATORVASTATIN CALCIUM 80 MILLIGRAM(S): 80 TABLET, FILM COATED ORAL at 21:00

## 2025-06-22 RX ADMIN — DEXMEDETOMIDINE HYDROCHLORIDE IN SODIUM CHLORIDE 11.9 MICROGRAM(S)/KG/HR: 4 INJECTION INTRAVENOUS at 12:38

## 2025-06-22 RX ADMIN — Medication 3 UNIT(S)/HR: at 12:38

## 2025-06-22 RX ADMIN — Medication 100 MILLIGRAM(S): at 23:42

## 2025-06-22 RX ADMIN — DOBUTAMINE 11.9 MICROGRAM(S)/KG/MIN: 250 INJECTION INTRAVENOUS at 19:22

## 2025-06-22 RX ADMIN — Medication 10 MILLILITER(S): at 19:23

## 2025-06-22 RX ADMIN — Medication 5.96 MICROGRAM(S)/KG/MIN: at 19:23

## 2025-06-22 RX ADMIN — Medication 25 MICROGRAM(S): at 14:30

## 2025-06-22 RX ADMIN — DEXMEDETOMIDINE HYDROCHLORIDE IN SODIUM CHLORIDE 11.9 MICROGRAM(S)/KG/HR: 4 INJECTION INTRAVENOUS at 19:22

## 2025-06-22 RX ADMIN — SODIUM NITROPRUSSIDE INJECTION 5.96 MICROGRAM(S)/KG/MIN: 50 INJECTION, SOLUTION, CONCENTRATE INTRAVENOUS at 21:12

## 2025-06-22 RX ADMIN — Medication 0.5 MILLIGRAM(S): at 18:40

## 2025-06-22 RX ADMIN — SUGAMMADEX 200 MILLIGRAM(S): 100 INJECTION, SOLUTION INTRAVENOUS at 13:54

## 2025-06-22 RX ADMIN — AMIODARONE HYDROCHLORIDE 200 MILLIGRAM(S): 50 INJECTION, SOLUTION INTRAVENOUS at 17:39

## 2025-06-22 RX ADMIN — PROPOFOL 14.3 MICROGRAM(S)/KG/MIN: 10 INJECTION, EMULSION INTRAVENOUS at 19:22

## 2025-06-22 RX ADMIN — DOBUTAMINE 11.9 MICROGRAM(S)/KG/MIN: 250 INJECTION INTRAVENOUS at 17:02

## 2025-06-22 RX ADMIN — DOBUTAMINE 11.9 MICROGRAM(S)/KG/MIN: 250 INJECTION INTRAVENOUS at 12:38

## 2025-06-22 RX ADMIN — Medication 650 MILLIGRAM(S): at 17:39

## 2025-06-22 RX ADMIN — Medication 1 APPLICATION(S): at 06:46

## 2025-06-22 RX ADMIN — Medication 40 MILLIGRAM(S): at 12:42

## 2025-06-22 RX ADMIN — Medication 650 MILLIGRAM(S): at 05:15

## 2025-06-22 RX ADMIN — Medication 100 MILLIGRAM(S): at 16:58

## 2025-06-22 RX ADMIN — Medication 50 MILLIEQUIVALENT(S): at 12:35

## 2025-06-22 RX ADMIN — VASOPRESSIN 4.5 UNIT(S)/MIN: 20 INJECTION INTRAVENOUS at 19:23

## 2025-06-22 RX ADMIN — Medication 4 MILLIGRAM(S): at 14:30

## 2025-06-22 RX ADMIN — Medication 50 MILLIGRAM(S): at 21:00

## 2025-06-22 RX ADMIN — Medication 3 UNIT(S)/HR: at 19:22

## 2025-06-22 RX ADMIN — Medication 15 MILLILITER(S): at 17:40

## 2025-06-22 RX ADMIN — Medication 0.5 MILLIGRAM(S): at 18:26

## 2025-06-22 RX ADMIN — Medication 5.96 MICROGRAM(S)/KG/MIN: at 12:39

## 2025-06-22 RX ADMIN — PROPOFOL 14.3 MICROGRAM(S)/KG/MIN: 10 INJECTION, EMULSION INTRAVENOUS at 23:49

## 2025-06-22 RX ADMIN — AMIODARONE HYDROCHLORIDE 200 MILLIGRAM(S): 50 INJECTION, SOLUTION INTRAVENOUS at 05:15

## 2025-06-22 RX ADMIN — Medication 25 MICROGRAM(S): at 14:17

## 2025-06-22 NOTE — PROGRESS NOTE ADULT - SUBJECTIVE AND OBJECTIVE BOX
Patient seen and examined  Extubated  Awaiting RTOR  + generalized weakness    REVIEW OF SYSTEMS:  As per HPI, otherwise 8 full 10 ROS were unremarkable    MEDICATIONS  (STANDING):  aMIOdarone    Tablet   Oral   aMIOdarone    Tablet 200 milliGRAM(s) Oral two times a day  ascorbic acid 500 milliGRAM(s) Oral two times a day  aspirin enteric coated 81 milliGRAM(s) Oral daily  atorvastatin 80 milliGRAM(s) Oral at bedtime  bisacodyl Suppository 10 milliGRAM(s) Rectal once  cefuroxime  IVPB 1500 milliGRAM(s) IV Intermittent once  chlorhexidine 2% Cloths 1 Application(s) Topical daily  chlorhexidine 4% Liquid 1 Application(s) Topical daily  CRRT Treatment    <Continuous>  dextrose 5%. 1000 milliLiter(s) (100 mL/Hr) IV Continuous <Continuous>  dextrose 50% Injectable 50 milliLiter(s) IV Push every 15 minutes  dextrose 50% Injectable 25 milliLiter(s) IV Push every 15 minutes  DOBUTamine Infusion 5 MICROgram(s)/kG/Min (11.9 mL/Hr) IV Continuous <Continuous>  EPINEPHrine    Infusion 0.02 MICROgram(s)/kG/Min (5.96 mL/Hr) IV Continuous <Continuous>  gabapentin 100 milliGRAM(s) Oral every 8 hours  heparin  Infusion 800 Unit(s)/Hr (9.5 mL/Hr) IV Continuous <Continuous>  insulin regular Infusion 3 Unit(s)/Hr (3 mL/Hr) IV Continuous <Continuous>  pantoprazole  Injectable 40 milliGRAM(s) IV Push daily  Phoxillum Filtration BK 4 / 2.5 5000 milliLiter(s) (500 mL/Hr) CRRT <Continuous>  Phoxillum Filtration BK 4 / 2.5 5000 milliLiter(s) (500 mL/Hr) CRRT <Continuous>  Phoxillum Filtration BK 4 / 2.5 5000 milliLiter(s) (1000 mL/Hr) CRRT <Continuous>  polyethylene glycol 3350 17 Gram(s) Oral every 12 hours  senna 2 Tablet(s) Oral at bedtime  sodium bicarbonate 650 milliGRAM(s) Oral every 8 hours  sodium chloride 0.9%. 1000 milliLiter(s) (10 mL/Hr) IV Continuous <Continuous>  traZODone 25 milliGRAM(s) Oral at bedtime  vasopressin Infusion 0.04 Unit(s)/Min (6 mL/Hr) IV Continuous <Continuous>      VITAL:  T(C): , Max: 37.1 (25 @ 08:00)  T(F): , Max: 98.8 (25 @ 08:00)  HR: 94 (25 @ 07:30)  BP: 125/39 (25 @ 04:20)  BP(mean): 68 (25 @ 22:03)  RR: 14 (25 @ 07:30)  SpO2: 100% (25 @ 07:30)  Wt(kg): --    I and O's:     @ 07:01  -   07:00  --------------------------------------------------------  IN: 1141.1 mL / OUT: 3292 mL / NET: -2150.9 mL      Height (cm): 180.3 ( 22:03)  Weight (kg): 79.4 ( 22:03)  BMI (kg/m2): 24.4 ( 22:03)  BSA (m2): 1.99 (:03)    PHYSICAL EXAM:    Constitutional: critically ill   HEENT: PERRLA, EOMI,  MMM  Neck: No LAD, No JVD  Respiratory: CTAB  Cardiovascular: S1 and S2  Gastrointestinal: BS+, soft, NT/ND  Extremities: No peripheral edema  Neurological: A/O x 3, no focal deficits  : + Ag    LABS:                        8.1    9.07  )-----------( 109      ( 2025 00:36 )             25.7         136  |  101  |  19  ----------------------------<  116[H]  4.2   |  18[L]  |  2.39[H]    Ca    9.1      2025 00:36  Phos  4.2       Mg     2.7         TPro  6.0  /  Alb  3.6  /  TBili  0.7  /  DBili  x   /  AST  36  /  ALT  53[H]  /  AlkPhos  118        Urine Studies:  Urinalysis Basic - ( 2025 00:36 )    Color: x / Appearance: x / SG: x / pH: x  Gluc: 116 mg/dL / Ketone: x  / Bili: x / Urobili: x   Blood: x / Protein: x / Nitrite: x   Leuk Esterase: x / RBC: x / WBC x   Sq Epi: x / Non Sq Epi: x / Bacteria: x              · Assessment	  70 year old male PMH of HTN, HLD, DM2, CAD (total  4 coronary stents, last one PCI in 2024 &  S/p status post MI treated with PCI x3 stents in  & 2023)on Asprin 81 mg, Chronic back pain, ESRD on  HD 3x/week via Right brachial AV fistula ([facility], Nephrology- Dr.Paul Munguia[facility], in [facility] CABG x 3 and MVR repair and RVAD for RV dysfunction   Cardiogenic shock     1 Renal -  CVV through the RVAD circuit and he will not need a shiley   Will switch to traditional HD when more stable and out of cardiogenic shock and when the RVAD is taken out   UF was net negative 100 ml/hr- now going back to the OR- Will re-order when he comes out of the OR  2 CVS-On   Epi and  gtt for inotropic support + Vaso gtt       D/w CTU team     Critical care time was 35 mins     Anastasia White MD  [facility]- Nephrology  Cell: [phone]

## 2025-06-22 NOTE — BRIEF OPERATIVE NOTE - SECOND ASSIST PARTICIPATION DETAILS - (COMPONENTS OF THE PROCEDURE THAT ASSISTANT PARTICIPATED IN)
.
I acted within this role throughout the entirety of the procedure performed by the primary surgeon

## 2025-06-22 NOTE — BRIEF OPERATIVE NOTE - NSICDXBRIEFPOSTOP_GEN_ALL_CORE_FT
POST-OP DIAGNOSIS:  Right heart failure 22-Jun-2025 12:49:48  Jacob Lua  
POST-OP DIAGNOSIS:  CAD (coronary artery disease) 17-Jun-2025 22:16:22  Mandy Dela Cruz  Mitral regurgitation 17-Jun-2025 22:16:35  Mandy Dela Cruz

## 2025-06-22 NOTE — PROGRESS NOTE ADULT - SUBJECTIVE AND OBJECTIVE BOX
Patient seen and examined at the bedside.    Remains critically ill on continuous ICU monitoring.      Brief Summary:  69 yo M PMH of HTN, HLD, DM2, CAD (total  4 coronary stents, last one PCI in 08/2024), CHF with EF 40-45%, severe MR  Now s/p  Mitral valve replacement, CABG x3 and RVAD placement on 6/17/2025     24 Hour events:  Now on Sweep 0.5  Remains on Dobutamine and Epinephrine  Tolerating volume removal - negative 1.7 liters.      Objective:  Vital Signs Last 24 Hrs  T(C): 37.4 (22 Jun 2025 16:00), Max: 37.4 (22 Jun 2025 16:00)  T(F): 99.3 (22 Jun 2025 16:00), Max: 99.3 (22 Jun 2025 16:00)  HR: 70 (22 Jun 2025 16:01) (65 - 95)  BP: 125/39 (22 Jun 2025 04:20) (90/54 - 125/39)  BP(mean): 68 (21 Jun 2025 22:03) (68 - 68)  RR: 5 (22 Jun 2025 15:45) (5 - 26)  SpO2: 100% (22 Jun 2025 16:01) (92% - 100%)    Parameters below as of 22 Jun 2025 16:00  Patient On (Oxygen Delivery Method): ventilator    O2 Concentration (%): 40    Mode: AC/ CMV (Assist Control/ Continuous Mandatory Ventilation)  RR (machine): 10  FiO2: 40  PEEP: 8  ITime: 1  MAP: 15  PC: 16  PIP: 22              Physical Exam:   General: Alert, interactive  Neurology: Following commands - Ambulating well.  Respiratory: Bilateral breath sounds, coarse  CV: Paced (sinus ivon under pacer)  RVAD pulmonary 17F, Venous R femoral 25F  Abdominal: Soft, Nontender  Extremities: Warm, well-perfused, AVF on RUE  -------------------------------------------------------------------------------------------------------------------------------    Labs:                        8.1    8.71  )-----------( 133      ( 22 Jun 2025 12:03 )             26.5     06-22    138  |  101  |  25[H]  ----------------------------<  180[H]  4.3   |  17[L]  |  2.95[H]    Ca    8.9      22 Jun 2025 12:04  Phos  5.5     06-22  Mg     2.9     06-22    TPro  5.8[L]  /  Alb  3.3  /  TBili  0.5  /  DBili  x   /  AST  28  /  ALT  44  /  AlkPhos  103  06-22    LIVER FUNCTIONS - ( 22 Jun 2025 12:04 )  Alb: 3.3 g/dL / Pro: 5.8 g/dL / ALK PHOS: 103 U/L / ALT: 44 U/L / AST: 28 U/L / GGT: x           PT/INR - ( 22 Jun 2025 12:04 )   PT: 12.1 sec;   INR: 1.06 ratio         PTT - ( 22 Jun 2025 12:04 )  PTT:41.0 sec  ABG - ( 22 Jun 2025 15:01 )  pH, Arterial: 7.35  pH, Blood: x     /  pCO2: 40    /  pO2: 145   / HCO3: 22    / Base Excess: -3.3  /  SaO2: 98.7    ------------------------------------------------------------------------------------------------------------------------------  Assessment:  69 yo M with cardiogenic shock on RVAD ,DM2, CAD (total  4 coronary stents, last one PCI in 08/2024 ), CHF with EF 40-45%, severe MR  Now s/p Mitral valve replacement, CABG x3 and RVAD placement on 6/17/2025     Right heart failure s/p RVAD insertion 6/22/25   Cardiogenic shock  Acute postop pulmonary insufficiency  Acute blood loss anemia  Thrombocytopenia  ESRD  Metabolic acidosis.  Hyperglycemia      Plan:   ***Neuro***  Sedated with Precedex   Maintain day/night cycle to prevent ICU delirium   Postoperative acute pain control with Tylenol, Gabapentin and prns    ***Cardiovascular***  At high risk for hemodynamic instability and cardiac arrhythmias.  RV failure, s/p CABG, s/p MV repair  RVAD decreased to 2500 RPM, flow 2.51, sweep 0.5  Remains on Dobutamine and Epinephrine.  Trend central venous saturation and lactate.  Wean pressors to maintain MAP > 65 mm Hg  Remains paced, monitor for rhythm recovery  ASA/Statin daily   PO Amiodarone load in process.    ***Pulmonary***  Postop acute pulmonary insufficiency  Deep breathing and coughing exercises   IS, Mobilization, nebs, Chest PT    ***GI***  Tolerating diet.  Protonix for GI prophylaxis.  Bowel regimen.   Trend LFTs.    ***Renal***  ESRD on CRRT  Goal negative balance.  Metabolic acidosis - Bicarb tabs (increased)    ***ID***  Perioperative coverage with Cefuroxime     ***Endocrine***  Insulin per protocol for Hyperglycemia     ***Hematology***  Acute blood loss anemia and thrombocytopenia   No transfusion indication currently, will trend.  Heparin infusion, goal [id number], anti Xa > 0.1      ***Hematology***  Acute blood loss anemia and thrombocytopenia - 2 plts, 1 prbc in OR         Care plan discussed with the ICU care team.   Patient remains critical, at risk for life threatening decompensation.    I have spent 40 minutes providing critical care management to this patient.    By signing my name below, I, Yamile Rico, attest that this documentation has been prepared under the direction and in the presence of Dylan Holcomb MD.  Electronically signed: Yamile Rico, 06-22-25 @ 16:44    I, Dylan Holcomb  personally performed the services described in this documentation. all medical record entries made by the scribe were at my direction and in my presence. I have reviewed the chart and agree that the record reflects my personal performance and is accurate and complete  Electronically signed: Dylan Holcomb MD.     Patient seen and examined at the bedside.    Remains critically ill on continuous ICU monitoring.      Brief Summary:    71 yo M PMH of HTN, HLD, DM2, CAD (total  4 coronary stents, last one PCI in 08/2024), CHF with EF 40-45%, severe MR  Now s/p  Mitral valve replacement, CABG x3 and RVAD placement on 6/17/2025     24 Hour events:    Now on Sweep 0.5  Remains on Dobutamine@5 and Epinephrine@0.02mcg/kg/min  Tolerating volume removal - negative 600ml.  Bronchoscopy with bloody secretions that were suctioned, still with clot in right lung for which IP was consulted for, planned for bronch with interventional pulmonary in AM for clot retrieval.      Objective:  Vital Signs Last 24 Hrs  T(C): 37.4 (22 Jun 2025 16:00), Max: 37.4 (22 Jun 2025 16:00)  T(F): 99.3 (22 Jun 2025 16:00), Max: 99.3 (22 Jun 2025 16:00)  HR: 70 (22 Jun 2025 16:01) (65 - 95)  BP: 125/39 (22 Jun 2025 04:20) (90/54 - 125/39)  BP(mean): 68 (21 Jun 2025 22:03) (68 - 68)  RR: 5 (22 Jun 2025 15:45) (5 - 26)  SpO2: 100% (22 Jun 2025 16:01) (92% - 100%)    Parameters below as of 22 Jun 2025 16:00  Patient On (Oxygen Delivery Method): ventilator    O2 Concentration (%): 40    Mode: AC/ CMV (Assist Control/ Continuous Mandatory Ventilation)  RR (machine): 10  FiO2: 40  PEEP: 8  ITime: 1  MAP: 15  PC: 16  PIP: 22              Physical Exam:   General: Intubated   Neurology: Following commands - Ambulating well.  Respiratory: Bilateral breath sounds, coarse  CV: Paced (sinus ivon under pacer)  RVAD pulmonary 17F, Venous R femoral 25F  Abdominal: Soft, Nontender  Extremities: Warm, well-perfused, AVF on RUE  -------------------------------------------------------------------------------------------------------------------------------    Labs:                        8.1    8.71  )-----------( 133      ( 22 Jun 2025 12:03 )             26.5     06-22    138  |  101  |  25[H]  ----------------------------<  180[H]  4.3   |  17[L]  |  2.95[H]    Ca    8.9      22 Jun 2025 12:04  Phos  5.5     06-22  Mg     2.9     06-22    TPro  5.8[L]  /  Alb  3.3  /  TBili  0.5  /  DBili  x   /  AST  28  /  ALT  44  /  AlkPhos  103  06-22    LIVER FUNCTIONS - ( 22 Jun 2025 12:04 )  Alb: 3.3 g/dL / Pro: 5.8 g/dL / ALK PHOS: 103 U/L / ALT: 44 U/L / AST: 28 U/L / GGT: x           PT/INR - ( 22 Jun 2025 12:04 )   PT: 12.1 sec;   INR: 1.06 ratio         PTT - ( 22 Jun 2025 12:04 )  PTT:41.0 sec  ABG - ( 22 Jun 2025 15:01 )  pH, Arterial: 7.35  pH, Blood: x     /  pCO2: 40    /  pO2: 145   / HCO3: 22    / Base Excess: -3.3  /  SaO2: 98.7    ------------------------------------------------------------------------------------------------------------------------------  Assessment:  71 yo M with cardiogenic shock on RVAD ,DM2, CAD (total  4 coronary stents, last one PCI in 08/2024 ), CHF with EF 40-45%, severe MR  Now s/p Mitral valve replacement, CABG x3 and RVAD placement on 6/17/2025     Right heart failure s/p RVAD insertion 6/22/25   Cardiogenic shock  Acute postop pulmonary insufficiency  Hemoptysis  Acute blood loss anemia  Thrombocytopenia  ESRD  Metabolic acidosis.  Hyperglycemia      Plan:   ***Neuro***  Sedated with Precedex   Maintain day/night cycle to prevent ICU delirium   Postoperative acute pain control with Tylenol, Gabapentin and prns    ***Cardiovascular***  At high risk for hemodynamic instability and cardiac arrhythmias.  RV failure, s/p CABG, s/p MV repair  RVAD decreased to 2500 RPM, flow 2.51, sweep 0.5  Remains on Dobutamine and Epinephrine.  Trend central venous saturation and lactate.  Wean pressors to maintain MAP > 65 mm Hg  Remains paced, monitor for rhythm recovery  ASA/Statin daily   PO Amiodarone load in process.    ***Pulmonary***  Postop acute pulmonary insufficiency- keep intubated with hemoptysis  Deep breathing and coughing exercises   IS, Mobilization, nebs, Chest PT    ***GI***  Tolerating diet.  Protonix for GI prophylaxis.  Bowel regimen.   Trend LFTs.    ***Renal***  ESRD on CRRT  Goal negative balance.  Metabolic acidosis - Bicarb tabs (increased)    ***ID***  Perioperative coverage with Cefuroxime     ***Endocrine***  Insulin per protocol for Hyperglycemia     ***Hematology***  Acute blood loss anemia and thrombocytopenia   No transfusion indication currently, will trend.  Heparin infusion to be held in setting of hemoptysis.      ***Hematology***  Acute blood loss anemia and thrombocytopenia - 2 plts, 1 prbc in OR   Keep of heparin drip,. monitor for bloody secretions     Care plan discussed with the ICU care team.   Patient remains critical, at risk for life threatening decompensation.    I have spent 40 minutes providing critical care management to this patient.    By signing my name below, I, Yamile Rico, attest that this documentation has been prepared under the direction and in the presence of Dylan Holcomb MD.  Electronically signed: Yamile Rico, 06-22-25 @ 16:44    I, Dylan Holcomb  personally performed the services described in this documentation. all medical record entries made by the scribe were at my direction and in my presence. I have reviewed the chart and agree that the record reflects my personal performance and is accurate and complete  Electronically signed: Dylan Holcomb MD.

## 2025-06-22 NOTE — BRIEF OPERATIVE NOTE - COMMENTS
CXR reviewed by Attending Radiologist, no unanticipated foreign bodies in the chest   Previously retained snares removed  
*** estimated blood loss not applicable due to use of cardiotomy and cell saver suction ***  No unexpected foreign bodies were apparent on preliminary review of post-op x-ray. Reviewed by Dr. Mckeon

## 2025-06-22 NOTE — CHART NOTE - NSCHARTNOTEFT_GEN_A_CORE
Patient planned for bedside clot retrieval by Interventional Pulmonary tomorrow am, 7:30am tentatively.  Please keep NPO and hold feeds after midnight.  Please hold anticoagulation as able prior to procedure.  Obtain am CXR  Obtain am CBC, CMP, Mg, Phos, coags with results available for review by 6am    Please ensure the following are at the bedside to ensure timely start:  1mp epinephrine vial  1000mg tranexamic acid vial  at least 500cc of sterile saline  bronchoscopy traps  10cc syringes  30 or 50cc syringe

## 2025-06-22 NOTE — BRIEF OPERATIVE NOTE - NSICDXBRIEFPREOP_GEN_ALL_CORE_FT
PRE-OP DIAGNOSIS:  Right heart failure 22-Jun-2025 12:49:35  Jacob Lua  
PRE-OP DIAGNOSIS:  CAD (coronary artery disease) 17-Jun-2025 22:16:12  Mandy Dela Cruz  Mitral regurgitation 17-Jun-2025 22:16:16  Mandy Dela Cruz

## 2025-06-22 NOTE — PROCEDURE NOTE - NSBRONCHPROCDETAILS_GEN_A_CORE_FT
Findings:  Bronchoscope inserted through ETT. ETT noted to be in good position. Airway evaluation revealed: small amount of blood clot at gema, suctioned out. TATIANNA and LLL evaluation revealed somewhat erythematous tissues, some areas of suction trauma, otherwise clear. RUL clear. Large blood clot which appeared cast-like to bronchus seen in bronchus intermedius. Clot suctioned out with some difficulty, noted to be extremely large and had to be withdrawn from ETT itself. Approximately 75% of clot was removed, small amount remains. Clot appears very adherent to the airway walls and cast-like in it's shape on removal. Slight amount of bleeding, mostly seemed like old blood however unable to see fully past obstructed area.  Dr. Nair and Dr. Baer both assisted with passes with the scope to attempt to dislodge clot and suction out.   Plan: Interventional pulm consult for potential cryo removal  Plan to keep patient intubated until airways completely clear.     CXR ordered

## 2025-06-22 NOTE — BRIEF OPERATIVE NOTE - NSICDXBRIEFPROCEDURE_GEN_ALL_CORE_FT
PROCEDURES:  CABG, 1 arterial and 2 venous 17-Jun-2025 22:14:58  Mandy Dela Cruz  Repair, mitral valve, with ALBAN 17-Jun-2025 22:15:27  Mandy Dela Cruz  Insertion of right ventricular assist device 17-Jun-2025 22:15:55  Mandy Dela Cruz  Clipping, left atrial appendage 17-Jun-2025 22:16:03  Mandy Dela Cruz  
PROCEDURES:  Insertion, RVAD, percutaneous 22-Jun-2025 12:48:48  Jacob Lua

## 2025-06-23 LAB
ALBUMIN SERPL ELPH-MCNC: 3.1 G/DL — LOW (ref 3.3–5)
ALP SERPL-CCNC: 99 U/L — SIGNIFICANT CHANGE UP (ref 40–120)
ALT FLD-CCNC: 36 U/L — SIGNIFICANT CHANGE UP (ref 10–45)
ANION GAP SERPL CALC-SCNC: 18 MMOL/L — HIGH (ref 5–17)
APTT BLD: 28 SEC — SIGNIFICANT CHANGE UP (ref 26.1–36.8)
AST SERPL-CCNC: 25 U/L — SIGNIFICANT CHANGE UP (ref 10–40)
BASE EXCESS BLDV CALC-SCNC: -3.9 MMOL/L — LOW (ref -2–3)
BASE EXCESS BLDV CALC-SCNC: -6.7 MMOL/L — LOW (ref -2–3)
BASOPHILS # BLD AUTO: 0.03 K/UL — SIGNIFICANT CHANGE UP (ref 0–0.2)
BASOPHILS NFR BLD AUTO: 0.4 % — SIGNIFICANT CHANGE UP (ref 0–2)
BILIRUB SERPL-MCNC: 0.6 MG/DL — SIGNIFICANT CHANGE UP (ref 0.2–1.2)
BLOOD GAS ECMO PRE MEMBRANE - VENOUS RESULT: SIGNIFICANT CHANGE UP
BUN SERPL-MCNC: 22 MG/DL — SIGNIFICANT CHANGE UP (ref 7–23)
CALCIUM SERPL-MCNC: 9 MG/DL — SIGNIFICANT CHANGE UP (ref 8.4–10.5)
CHLORIDE SERPL-SCNC: 103 MMOL/L — SIGNIFICANT CHANGE UP (ref 96–108)
CO2 BLDV-SCNC: 22 MMOL/L — SIGNIFICANT CHANGE UP (ref 22–26)
CO2 BLDV-SCNC: 22 MMOL/L — SIGNIFICANT CHANGE UP (ref 22–26)
CO2 SERPL-SCNC: 16 MMOL/L — LOW (ref 22–31)
CREAT SERPL-MCNC: 2.59 MG/DL — HIGH (ref 0.5–1.3)
EGFR: 26 ML/MIN/1.73M2 — LOW
EGFR: 26 ML/MIN/1.73M2 — LOW
EOSINOPHIL # BLD AUTO: 0.1 K/UL — SIGNIFICANT CHANGE UP (ref 0–0.5)
EOSINOPHIL NFR BLD AUTO: 1.3 % — SIGNIFICANT CHANGE UP (ref 0–6)
FIBRINOGEN PPP-MCNC: 416 MG/DL — SIGNIFICANT CHANGE UP (ref 200–445)
GAS PNL BLDA: SIGNIFICANT CHANGE UP
GAS PNL BLDV: SIGNIFICANT CHANGE UP
GAS PNL BLDV: SIGNIFICANT CHANGE UP
GLUCOSE BLDC GLUCOMTR-MCNC: 112 MG/DL — HIGH (ref 70–99)
GLUCOSE BLDC GLUCOMTR-MCNC: 117 MG/DL — HIGH (ref 70–99)
GLUCOSE BLDC GLUCOMTR-MCNC: 122 MG/DL — HIGH (ref 70–99)
GLUCOSE BLDC GLUCOMTR-MCNC: 128 MG/DL — HIGH (ref 70–99)
GLUCOSE BLDC GLUCOMTR-MCNC: 128 MG/DL — HIGH (ref 70–99)
GLUCOSE BLDC GLUCOMTR-MCNC: 131 MG/DL — HIGH (ref 70–99)
GLUCOSE BLDC GLUCOMTR-MCNC: 134 MG/DL — HIGH (ref 70–99)
GLUCOSE BLDC GLUCOMTR-MCNC: 143 MG/DL — HIGH (ref 70–99)
GLUCOSE BLDC GLUCOMTR-MCNC: 144 MG/DL — HIGH (ref 70–99)
GLUCOSE BLDC GLUCOMTR-MCNC: 145 MG/DL — HIGH (ref 70–99)
GLUCOSE BLDC GLUCOMTR-MCNC: 146 MG/DL — HIGH (ref 70–99)
GLUCOSE BLDC GLUCOMTR-MCNC: 148 MG/DL — HIGH (ref 70–99)
GLUCOSE BLDC GLUCOMTR-MCNC: 149 MG/DL — HIGH (ref 70–99)
GLUCOSE BLDC GLUCOMTR-MCNC: 150 MG/DL — HIGH (ref 70–99)
GLUCOSE SERPL-MCNC: 108 MG/DL — HIGH (ref 70–99)
GRAM STN FLD: ABNORMAL
HAPTOGLOB SERPL-MCNC: 184 MG/DL — SIGNIFICANT CHANGE UP (ref 34–200)
HCO3 BLDV-SCNC: 21 MMOL/L — LOW (ref 22–29)
HCO3 BLDV-SCNC: 21 MMOL/L — LOW (ref 22–29)
HCT VFR BLD CALC: 25.5 % — LOW (ref 39–50)
HGB BLD-MCNC: 8.2 G/DL — LOW (ref 13–17)
HOROWITZ INDEX BLDV+IHG-RTO: 100 — SIGNIFICANT CHANGE UP
HOROWITZ INDEX BLDV+IHG-RTO: 32 — SIGNIFICANT CHANGE UP
IMM GRANULOCYTES # BLD AUTO: 0.07 K/UL — SIGNIFICANT CHANGE UP (ref 0–0.07)
IMM GRANULOCYTES NFR BLD AUTO: 0.9 % — SIGNIFICANT CHANGE UP (ref 0–0.9)
INR BLD: 1.06 RATIO — SIGNIFICANT CHANGE UP (ref 0.85–1.16)
LDH SERPL L TO P-CCNC: 265 U/L — HIGH (ref 50–242)
LYMPHOCYTES # BLD AUTO: 0.84 K/UL — LOW (ref 1–3.3)
LYMPHOCYTES NFR BLD AUTO: 10.9 % — LOW (ref 13–44)
MAGNESIUM SERPL-MCNC: 2.7 MG/DL — HIGH (ref 1.6–2.6)
MCHC RBC-ENTMCNC: 27.2 PG — SIGNIFICANT CHANGE UP (ref 27–34)
MCHC RBC-ENTMCNC: 32.2 G/DL — SIGNIFICANT CHANGE UP (ref 32–36)
MCV RBC AUTO: 84.7 FL — SIGNIFICANT CHANGE UP (ref 80–100)
MONOCYTES # BLD AUTO: 0.97 K/UL — HIGH (ref 0–0.9)
MONOCYTES NFR BLD AUTO: 12.6 % — SIGNIFICANT CHANGE UP (ref 2–14)
NEUTROPHILS # BLD AUTO: 5.69 K/UL — SIGNIFICANT CHANGE UP (ref 1.8–7.4)
NEUTROPHILS NFR BLD AUTO: 73.9 % — SIGNIFICANT CHANGE UP (ref 43–77)
NRBC # BLD AUTO: 0 K/UL — SIGNIFICANT CHANGE UP (ref 0–0)
NRBC # FLD: 0 K/UL — SIGNIFICANT CHANGE UP (ref 0–0)
NRBC BLD AUTO-RTO: 0 /100 WBCS — SIGNIFICANT CHANGE UP (ref 0–0)
PCO2 BLDV: 36 MMHG — LOW (ref 42–55)
PCO2 BLDV: 47 MMHG — SIGNIFICANT CHANGE UP (ref 42–55)
PH BLDV: 7.25 — LOW (ref 7.32–7.43)
PH BLDV: 7.37 — SIGNIFICANT CHANGE UP (ref 7.32–7.43)
PHOSPHATE SERPL-MCNC: 4.4 MG/DL — SIGNIFICANT CHANGE UP (ref 2.5–4.5)
PLATELET # BLD AUTO: 132 K/UL — LOW (ref 150–400)
PMV BLD: 12 FL — SIGNIFICANT CHANGE UP (ref 7–13)
PO2 BLDV: 38 MMHG — SIGNIFICANT CHANGE UP (ref 25–45)
PO2 BLDV: 43 MMHG — SIGNIFICANT CHANGE UP (ref 25–45)
POTASSIUM SERPL-MCNC: 4 MMOL/L — SIGNIFICANT CHANGE UP (ref 3.5–5.3)
POTASSIUM SERPL-SCNC: 4 MMOL/L — SIGNIFICANT CHANGE UP (ref 3.5–5.3)
PROT SERPL-MCNC: 5.6 G/DL — LOW (ref 6–8.3)
PROTHROM AB SERPL-ACNC: 12.1 SEC — SIGNIFICANT CHANGE UP (ref 9.9–13.4)
RBC # BLD: 3.01 M/UL — LOW (ref 4.2–5.8)
RBC # FLD: 19.1 % — HIGH (ref 10.3–14.5)
SAO2 % BLDV: 62.1 % — LOW (ref 67–88)
SAO2 % BLDV: 75.8 % — SIGNIFICANT CHANGE UP (ref 67–88)
SODIUM SERPL-SCNC: 137 MMOL/L — SIGNIFICANT CHANGE UP (ref 135–145)
SPECIMEN SOURCE: SIGNIFICANT CHANGE UP
WBC # BLD: 7.7 K/UL — SIGNIFICANT CHANGE UP (ref 3.8–10.5)
WBC # FLD AUTO: 7.7 K/UL — SIGNIFICANT CHANGE UP (ref 3.8–10.5)

## 2025-06-23 PROCEDURE — 71045 X-RAY EXAM CHEST 1 VIEW: CPT | Mod: 26

## 2025-06-23 PROCEDURE — 31624 DX BRONCHOSCOPE/LAVAGE: CPT | Mod: GC

## 2025-06-23 PROCEDURE — 31635 BRONCHOSCOPY W/FB REMOVAL: CPT | Mod: GC

## 2025-06-23 PROCEDURE — 99291 CRITICAL CARE FIRST HOUR: CPT

## 2025-06-23 PROCEDURE — 99292 CRITICAL CARE ADDL 30 MIN: CPT

## 2025-06-23 PROCEDURE — 99291 CRITICAL CARE FIRST HOUR: CPT | Mod: GC,25

## 2025-06-23 PROCEDURE — 99233 SBSQ HOSP IP/OBS HIGH 50: CPT | Mod: 25

## 2025-06-23 PROCEDURE — 33948 ECMO/ECLS DAILY MGMT-VENOUS: CPT

## 2025-06-23 PROCEDURE — 31645 BRNCHSC W/THER ASPIR 1ST: CPT | Mod: GC

## 2025-06-23 RX ORDER — HEPARIN SODIUM 1000 [USP'U]/ML
300 INJECTION INTRAVENOUS; SUBCUTANEOUS
Qty: 25000 | Refills: 0 | Status: DISCONTINUED | OUTPATIENT
Start: 2025-06-23 | End: 2025-06-24

## 2025-06-23 RX ORDER — SODIUM BICARBONATE 1 MEQ/ML
50 SYRINGE (ML) INTRAVENOUS ONCE
Refills: 0 | Status: COMPLETED | OUTPATIENT
Start: 2025-06-23 | End: 2025-06-23

## 2025-06-23 RX ORDER — SODIUM BICARBONATE 1 MEQ/ML
50 SYRINGE (ML) INTRAVENOUS
Refills: 0 | Status: COMPLETED | OUTPATIENT
Start: 2025-06-23 | End: 2025-06-23

## 2025-06-23 RX ORDER — EPOETIN ALFA 10000 [IU]/ML
10000 SOLUTION INTRAVENOUS; SUBCUTANEOUS
Refills: 0 | Status: DISCONTINUED | OUTPATIENT
Start: 2025-06-23 | End: 2025-06-29

## 2025-06-23 RX ORDER — IPRATROPIUM BROMIDE AND ALBUTEROL SULFATE .5; 2.5 MG/3ML; MG/3ML
3 SOLUTION RESPIRATORY (INHALATION) EVERY 6 HOURS
Refills: 0 | Status: COMPLETED | OUTPATIENT
Start: 2025-06-23 | End: 2025-06-24

## 2025-06-23 RX ORDER — TRANEXAMIC ACID 1000 MG/10
5 AMPUL (ML) INTRAVENOUS ONCE
Refills: 0 | Status: COMPLETED | OUTPATIENT
Start: 2025-06-23 | End: 2025-06-23

## 2025-06-23 RX ADMIN — Medication 3 UNIT(S)/HR: at 19:12

## 2025-06-23 RX ADMIN — POLYETHYLENE GLYCOL 3350 17 GRAM(S): 17 POWDER, FOR SOLUTION ORAL at 17:53

## 2025-06-23 RX ADMIN — DOBUTAMINE 11.9 MICROGRAM(S)/KG/MIN: 250 INJECTION INTRAVENOUS at 07:30

## 2025-06-23 RX ADMIN — Medication 500 MILLIGRAM(S): at 17:52

## 2025-06-23 RX ADMIN — HEPARIN SODIUM 3 UNIT(S)/HR: 1000 INJECTION INTRAVENOUS; SUBCUTANEOUS at 19:11

## 2025-06-23 RX ADMIN — Medication 3 UNIT(S)/HR: at 07:30

## 2025-06-23 RX ADMIN — PROPOFOL 14.3 MICROGRAM(S)/KG/MIN: 10 INJECTION, EMULSION INTRAVENOUS at 07:08

## 2025-06-23 RX ADMIN — Medication 10 MILLILITER(S): at 07:29

## 2025-06-23 RX ADMIN — Medication 1 APPLICATION(S): at 05:02

## 2025-06-23 RX ADMIN — IPRATROPIUM BROMIDE AND ALBUTEROL SULFATE 3 MILLILITER(S): .5; 2.5 SOLUTION RESPIRATORY (INHALATION) at 13:14

## 2025-06-23 RX ADMIN — SODIUM NITROPRUSSIDE INJECTION 5.96 MICROGRAM(S)/KG/MIN: 50 INJECTION, SOLUTION, CONCENTRATE INTRAVENOUS at 19:13

## 2025-06-23 RX ADMIN — AMIODARONE HYDROCHLORIDE 200 MILLIGRAM(S): 50 INJECTION, SOLUTION INTRAVENOUS at 05:02

## 2025-06-23 RX ADMIN — DEXMEDETOMIDINE HYDROCHLORIDE IN SODIUM CHLORIDE 11.9 MICROGRAM(S)/KG/HR: 4 INJECTION INTRAVENOUS at 07:31

## 2025-06-23 RX ADMIN — SODIUM NITROPRUSSIDE INJECTION 5.96 MICROGRAM(S)/KG/MIN: 50 INJECTION, SOLUTION, CONCENTRATE INTRAVENOUS at 07:31

## 2025-06-23 RX ADMIN — AMIODARONE HYDROCHLORIDE 200 MILLIGRAM(S): 50 INJECTION, SOLUTION INTRAVENOUS at 18:41

## 2025-06-23 RX ADMIN — GABAPENTIN 100 MILLIGRAM(S): 400 CAPSULE ORAL at 05:02

## 2025-06-23 RX ADMIN — GABAPENTIN 100 MILLIGRAM(S): 400 CAPSULE ORAL at 21:07

## 2025-06-23 RX ADMIN — ATORVASTATIN CALCIUM 80 MILLIGRAM(S): 80 TABLET, FILM COATED ORAL at 21:07

## 2025-06-23 RX ADMIN — Medication 81 MILLIGRAM(S): at 11:09

## 2025-06-23 RX ADMIN — Medication 50 MILLIEQUIVALENT(S): at 03:31

## 2025-06-23 RX ADMIN — EPOETIN ALFA 10000 UNIT(S): 10000 SOLUTION INTRAVENOUS; SUBCUTANEOUS at 11:51

## 2025-06-23 RX ADMIN — Medication 10 MILLILITER(S): at 19:12

## 2025-06-23 RX ADMIN — Medication 650 MILLIGRAM(S): at 10:54

## 2025-06-23 RX ADMIN — Medication 5.96 MICROGRAM(S)/KG/MIN: at 07:31

## 2025-06-23 RX ADMIN — Medication 650 MILLIGRAM(S): at 17:53

## 2025-06-23 RX ADMIN — Medication 100 MILLIGRAM(S): at 08:10

## 2025-06-23 RX ADMIN — VASOPRESSIN 4.5 UNIT(S)/MIN: 20 INJECTION INTRAVENOUS at 19:12

## 2025-06-23 RX ADMIN — Medication 40 MILLIGRAM(S): at 11:09

## 2025-06-23 RX ADMIN — GABAPENTIN 100 MILLIGRAM(S): 400 CAPSULE ORAL at 13:42

## 2025-06-23 RX ADMIN — DOBUTAMINE 11.9 MICROGRAM(S)/KG/MIN: 250 INJECTION INTRAVENOUS at 19:12

## 2025-06-23 RX ADMIN — Medication 15 MILLILITER(S): at 05:02

## 2025-06-23 RX ADMIN — Medication 650 MILLIGRAM(S): at 01:16

## 2025-06-23 RX ADMIN — IPRATROPIUM BROMIDE AND ALBUTEROL SULFATE 3 MILLILITER(S): .5; 2.5 SOLUTION RESPIRATORY (INHALATION) at 23:02

## 2025-06-23 RX ADMIN — HEPARIN SODIUM 3 UNIT(S)/HR: 1000 INJECTION INTRAVENOUS; SUBCUTANEOUS at 13:43

## 2025-06-23 RX ADMIN — Medication 500 MILLIGRAM(S): at 05:02

## 2025-06-23 RX ADMIN — Medication 1 APPLICATION(S): at 05:03

## 2025-06-23 RX ADMIN — IPRATROPIUM BROMIDE AND ALBUTEROL SULFATE 3 MILLILITER(S): .5; 2.5 SOLUTION RESPIRATORY (INHALATION) at 17:26

## 2025-06-23 RX ADMIN — Medication 50 MILLIEQUIVALENT(S): at 02:58

## 2025-06-23 RX ADMIN — PROPOFOL 14.3 MICROGRAM(S)/KG/MIN: 10 INJECTION, EMULSION INTRAVENOUS at 03:39

## 2025-06-23 RX ADMIN — Medication 50 MILLIEQUIVALENT(S): at 01:22

## 2025-06-23 RX ADMIN — Medication 100 MILLIGRAM(S): at 17:11

## 2025-06-23 RX ADMIN — Medication 5.96 MICROGRAM(S)/KG/MIN: at 19:11

## 2025-06-23 RX ADMIN — PROPOFOL 14.3 MICROGRAM(S)/KG/MIN: 10 INJECTION, EMULSION INTRAVENOUS at 07:30

## 2025-06-23 NOTE — PROGRESS NOTE ADULT - SUBJECTIVE AND OBJECTIVE BOX
Patient seen and examined at the bedside.    Remained critically ill on continuous ICU monitoring.      Brief Summary:  71 yo M PMH of HTN, HLD, DM2, CAD (total  4 coronary stents, last one PCI in 08/2024), CHF with EF 40-45%, severe MR  Now s/p  Mitral valve replacement, CABG x3 and RVAD placement on 6/17/2025       24 Hour events:  Central RVAD converted to peripheral   Clots on bronch yesterday - IP eval pending today   Remains on  and Epi for inotropic support       OBJECTIVE:  Vital Signs Last 24 Hrs  T(C): 36.4 (23 Jun 2025 16:00), Max: 37.5 (22 Jun 2025 17:00)  T(F): 97.6 (23 Jun 2025 16:00), Max: 99.5 (22 Jun 2025 17:00)  HR: 54 (23 Jun 2025 16:15) (51 - 86)  BP: 102/52 (23 Jun 2025 16:00) (102/52 - 137/63)  BP(mean): 75 (23 Jun 2025 16:00) (75 - 90)  RR: 19 (23 Jun 2025 16:15) (4 - 28)  SpO2: 100% (23 Jun 2025 16:15) (100% - 100%)    Parameters below as of 23 Jun 2025 16:00  Patient On (Oxygen Delivery Method): nasal cannula  O2 Flow (L/min): 3  O2 Concentration (%): 32    Mode: standby  FiO2: 40              Physical Exam:   General: Intubated   Neurology: Following commands - Ambulating well.  Respiratory: Bilateral breath sounds, coarse  CV: Sinus ivon   RVAD pulmonary 17F, Venous R femoral 25F  Abdominal: Soft, Nontender  Extremities: Warm, well-perfused, AVF on RUE    -------------------------------------------------------------------------------------------------------------------------------    Labs:                        8.2    7.70  )-----------( 132      ( 23 Jun 2025 00:25 )             25.5     06-23    137  |  103  |  22  ----------------------------<  108[H]  4.0   |  16[L]  |  2.59[H]    Ca    9.0      23 Jun 2025 00:25  Phos  4.4     06-23  Mg     2.7     06-23    TPro  5.6[L]  /  Alb  3.1[L]  /  TBili  0.6  /  DBili  x   /  AST  25  /  ALT  36  /  AlkPhos  99  06-23    LIVER FUNCTIONS - ( 23 Jun 2025 00:25 )  Alb: 3.1 g/dL / Pro: 5.6 g/dL / ALK PHOS: 99 U/L / ALT: 36 U/L / AST: 25 U/L / GGT: x           PT/INR - ( 23 Jun 2025 00:25 )   PT: 12.1 sec;   INR: 1.06 ratio         PTT - ( 23 Jun 2025 00:25 )  PTT:28.0 sec  ABG - ( 23 Jun 2025 16:13 )  pH, Arterial: 7.31  pH, Blood: x     /  pCO2: 36    /  pO2: 199   / HCO3: 18    / Base Excess: -7.5  /  SaO2: 100.0             ------------------------------------------------------------------------------------------------------------------------------  Assessment:  71 yo M with cardiogenic shock on RVAD ,DM2, CAD (total  4 coronary stents, last one PCI in 08/2024 ), CHF with EF 40-45%, severe MR  Now s/p Mitral valve replacement, CABG x3 and RVAD placement on 6/17/2025     Right heart failure s/p RVAD insertion 6/22/25   Cardiogenic shock  Acute postop pulmonary insufficiency  Hemoptysis  Acute blood loss anemia  Thrombocytopenia  ESRD  Metabolic acidosis.  Hyperglycemia      Plan:   ***Neuro***  Sedated with Precedex - wean for possible extubation   Maintain day/night cycle to prevent ICU delirium   Postoperative acute pain control with Tylenol, Gabapentin and prns    ***Cardiovascular***  At high risk for hemodynamic instability and cardiac arrhythmias.  RV failure, s/p CABG, s/p MV repair  RVAD decreased to 2500 RPM, flow 2.51, sweep 0.5  Remains on Dobutamine and Epinephrine.  Trend central venous saturation and lactate.  Wean pressors to maintain MAP > 65 mm Hg  Remains paced, monitor for rhythm recovery  ASA/Statin daily   PO Amiodarone load in process.    ***Pulmonary***  Postop acute pulmonary insufficiency  Hemoptysis - IP evaluation/bronch today   Plan for Assessment of extubation s/p IP eval    ***GI***  Tolerating diet.  Protonix for GI prophylaxis.  Bowel regimen.   Trend LFTs.    ***Renal***  ESRD on CRRT  Goal negative balance.  Metabolic acidosis - Bicarb tabs     ***ID***  Perioperative coverage with Cefuroxime     ***Endocrine***  Insulin per protocol for Hyperglycemia     ***Hematology***  Acute blood loss anemia and thrombocytopenia - 2 plts, 1 prbc in OR   Start heparin 300, do not titrate     Care plan discussed with the ICU care team.   Patient remains critical, at risk for life threatening decompensation.    I have spent 50 minutes providing critical care management to this patient.    Disposition: CTU    I, Dylon Gonzalez MD, personally performed the services described in this documentation. I have reviewed the chart and agree that the record reflects my personal performance and is accurate and complete.

## 2025-06-23 NOTE — PROGRESS NOTE ADULT - ASSESSMENT
70 year old male PMH of HTN, HLD, DM2, CAD (total  4 coronary stents, last one PCI in 2024 &  S/p status post MI treated with PCI x3 stents in  & 2023)on Asprin 81 mg, Chronic back pain, ESRD on  HD 3x/week via Right brachial AV fistula ([id number], Nephrology- Dr[facility], in [facility] CABG x 3 and MVR repair and RVAD for RV dysfunction   Cardiogenic shock     1 Renal -  CVV through the RVAD circuit and he will not need a shiley   Will switch to traditional HD when more stable and out of cardiogenic shock and when the RVAD is taken out;  At present on inotropes   UF was net negative 75 ml/hr-    2 CVS-On   Epi and  gtt for inotropic support    3 Anemia-Start Retacrit       D/w CTU team     Critical care time was 35 mins     [facility]   [phone]

## 2025-06-23 NOTE — PROGRESS NOTE ADULT - SUBJECTIVE AND OBJECTIVE BOX
----------------------------------------------------------------------------------------------------  ECMO Daily Management Note   ----------------------------------------------------------------------------------------------------  Recent events:  converted to peripheral RVAD   ----------------------------------------------------------------------------------------------------  71 yo M s/p MVr, CABG x 3 with postop RV failure and cardiogenic shock, cannulated for RVAD with oxygenator    Date of initiation: 6/17/2025       Cannulae:   17Fr in Right IJ, 25Fr R Fem Vein  Cannula sites examined, well-secured.    Continue current support   Wean trial as tolerated   Anticoagulation with Argatroban infusion, goal PTT 50-60    =============================================================  Weight (kg): 79.4 (06-21-25)        Height (cm): 180.3 (06-21-25)   BSA (m2): 1.99 (06-21-25)        BMI (kg/m2): 24.4 (06-21-25)    ECMO  RPM:  2500 (23 Jun 2025 16:00)      Pump Flow (Lpm):  2.47 (23 Jun 2025 16:00)              Sweep  (L/min):   0.5 (23 Jun 2025 16:00)       FiO2 (%):  1 (23 Jun 2025 16:00)  Pre-membrane -  Pressure (mm/Hg):   82 (23 Jun 2025 16:00)  23 Jun 2025 06:00  pH: 7.34  / pCO2: 37    /  pO2: 357   /  HCO3: 20    /   Base Excess: -5.3  / SvO2: 99.5       Post-membrane - Pressure (mm/Hg):  70 (23 Jun 2025 16:00)           aMIOdarone    Tablet 200 milliGRAM(s) Oral two times a day  DOBUTamine Infusion 5 MICROgram(s)/kG/Min IV Continuous <Continuous>  EPINEPHrine    Infusion 0.02 MICROgram(s)/kG/Min IV Continuous <Continuous>  nitroprusside Infusion 0.5 MICROgram(s)/kG/Min IV Continuous <Continuous>  vasopressin Infusion 0.03 Unit(s)/Min IV Continuous <Continuous>    Vent Mode: standby  FiO2: 40    (23 Jun 2025 16:13) pH, Art: 7.31/pCO2: 36/pO2: 199/HCO3: 18/BE: -7.5/SaO2: 100.0/LAC: 1.2, --   (23 Jun 2025 13:20) pH, Art: 7.33/pCO2: 35/pO2: 179/HCO3: 18/BE: -6.8/SaO2: 98.7/LAC: 1.2, --   (23 Jun 2025 12:15) pH, Art: 7.33/pCO2: 33/pO2: 217/HCO3: 17/BE: -7.7/SaO2: 99.4/LAC: 1.1, --   (23 Jun 2025 09:08) pH, Art: 7.37/pCO2: 30/pO2: 232/HCO3: 17/BE: -7.1/SaO2: 99.4/LAC: 1.3, --   (23 Jun 2025 04:46) pH, Art: 7.44/pCO2: 30/pO2: 223/HCO3: 20/BE: -3.2/SaO2: 99.6/LAC: 1.1, --     (23 Jun 2025 00:00) pH, Bart: 7.37/pCO2: 36/pO2: 43 /HCO3: 21/BE: -3.9/MVO2: /SvO2: 75.8/LAC: ,   (22 Jun 2025 19:47) pH, Bart: 7.39/pCO2: 37/pO2: 37 /HCO3: 22/BE: -2.2/MVO2: /SvO2: 68.8/LAC: ,     ----------------------------------------------------------------------------------------------------  ANTICOAGULATION  aspirin enteric coated 81 milliGRAM(s) Oral daily  heparin  Infusion 300 Unit(s)/Hr IV Continuous <Continuous>  aspirin enteric coated 81 milliGRAM(s) Oral daily    (23 Jun 2025 00:25)  aPTT: 28.0  Xa: <0.04  PT: 12.1  INR: 1.06   Fibrinogen: 416   Platelets: 132   (22 Jun 2025 12:04)  aPTT: 41.0  Xa: 0.20  PT: 12.1  INR: 1.06   Fibrinogen: -----  Platelets: -----  (22 Jun 2025 12:03)  aPTT: -----  Xa: -----  PT: -----  INR: -----   Fibrinogen: -----  Platelets: 133   (22 Jun 2025 06:04)  aPTT: 38.5  Xa: 0.17  PT: -----  INR: -----   Fibrinogen: -----  Platelets: -----    (23 Jun 2025 00:25)  Hemoglobin: 8.2     LDH: 265     Haptoglobin: 184    PFH:  -----  (22 Jun 2025 12:04)  Hemoglobin: -----    LDH: 277     Haptoglobin: 178    PFH:  -----  (22 Jun 2025 12:03)  Hemoglobin: 8.1     LDH: -----    Haptoglobin: -----   PFH:  -----  (22 Jun 2025 00:36)  Hemoglobin: 8.1     LDH: 278     Haptoglobin: 183    PFH:  -----    ----------------------------------------------------------------------------------------------------  Daily ECMO Checklist:   Progress report received from perfusionist   Circuit checked/inspected   Emergency equipment and supplies checked   Cannulation site inspection     I have reviewed all of the above information as well as pertinent labs/imaging/testing and care plan with the bedside nurse, perfusionist and care team members and provided my recommendations for support.   ECMO Management was separate from critical care billing time.     ----------------------------------------------------------------------------------------------------  ECMO Daily Management Note   ----------------------------------------------------------------------------------------------------  Recent events:  converted to peripheral RVAD   ----------------------------------------------------------------------------------------------------  69 yo M s/p MVr, CABG x 3 with postop RV failure and cardiogenic shock, cannulated for RVAD with oxygenator    Date of initiation: 6/17/2025       Cannulae:   21Fr in Right IJ, 25Fr R Fem Vein  Cannula sites examined, well-secured.    Continue current support   Wean trial as tolerated   Anticoagulation with Argatroban infusion, goal PTT 50-60    =============================================================  Weight (kg): 79.4 (06-21-25)        Height (cm): 180.3 (06-21-25)   BSA (m2): 1.99 (06-21-25)        BMI (kg/m2): 24.4 (06-21-25)    ECMO  RPM:  2500 (23 Jun 2025 16:00)      Pump Flow (Lpm):  2.47 (23 Jun 2025 16:00)              Sweep  (L/min):   0.5 (23 Jun 2025 16:00)       FiO2 (%):  1 (23 Jun 2025 16:00)  Pre-membrane -  Pressure (mm/Hg):   82 (23 Jun 2025 16:00)  23 Jun 2025 06:00  pH: 7.34  / pCO2: 37    /  pO2: 357   /  HCO3: 20    /   Base Excess: -5.3  / SvO2: 99.5       Post-membrane - Pressure (mm/Hg):  70 (23 Jun 2025 16:00)           aMIOdarone    Tablet 200 milliGRAM(s) Oral two times a day  DOBUTamine Infusion 5 MICROgram(s)/kG/Min IV Continuous <Continuous>  EPINEPHrine    Infusion 0.02 MICROgram(s)/kG/Min IV Continuous <Continuous>  nitroprusside Infusion 0.5 MICROgram(s)/kG/Min IV Continuous <Continuous>  vasopressin Infusion 0.03 Unit(s)/Min IV Continuous <Continuous>    Vent Mode: standby  FiO2: 40    (23 Jun 2025 16:13) pH, Art: 7.31/pCO2: 36/pO2: 199/HCO3: 18/BE: -7.5/SaO2: 100.0/LAC: 1.2, --   (23 Jun 2025 13:20) pH, Art: 7.33/pCO2: 35/pO2: 179/HCO3: 18/BE: -6.8/SaO2: 98.7/LAC: 1.2, --   (23 Jun 2025 12:15) pH, Art: 7.33/pCO2: 33/pO2: 217/HCO3: 17/BE: -7.7/SaO2: 99.4/LAC: 1.1, --   (23 Jun 2025 09:08) pH, Art: 7.37/pCO2: 30/pO2: 232/HCO3: 17/BE: -7.1/SaO2: 99.4/LAC: 1.3, --   (23 Jun 2025 04:46) pH, Art: 7.44/pCO2: 30/pO2: 223/HCO3: 20/BE: -3.2/SaO2: 99.6/LAC: 1.1, --     (23 Jun 2025 00:00) pH, Bart: 7.37/pCO2: 36/pO2: 43 /HCO3: 21/BE: -3.9/MVO2: /SvO2: 75.8/LAC: ,   (22 Jun 2025 19:47) pH, Bart: 7.39/pCO2: 37/pO2: 37 /HCO3: 22/BE: -2.2/MVO2: /SvO2: 68.8/LAC: ,     ----------------------------------------------------------------------------------------------------  ANTICOAGULATION  aspirin enteric coated 81 milliGRAM(s) Oral daily  heparin  Infusion 300 Unit(s)/Hr IV Continuous <Continuous>  aspirin enteric coated 81 milliGRAM(s) Oral daily    (23 Jun 2025 00:25)  aPTT: 28.0  Xa: <0.04  PT: 12.1  INR: 1.06   Fibrinogen: 416   Platelets: 132   (22 Jun 2025 12:04)  aPTT: 41.0  Xa: 0.20  PT: 12.1  INR: 1.06   Fibrinogen: -----  Platelets: -----  (22 Jun 2025 12:03)  aPTT: -----  Xa: -----  PT: -----  INR: -----   Fibrinogen: -----  Platelets: 133   (22 Jun 2025 06:04)  aPTT: 38.5  Xa: 0.17  PT: -----  INR: -----   Fibrinogen: -----  Platelets: -----    (23 Jun 2025 00:25)  Hemoglobin: 8.2     LDH: 265     Haptoglobin: 184    PFH:  -----  (22 Jun 2025 12:04)  Hemoglobin: -----    LDH: 277     Haptoglobin: 178    PFH:  -----  (22 Jun 2025 12:03)  Hemoglobin: 8.1     LDH: -----    Haptoglobin: -----   PFH:  -----  (22 Jun 2025 00:36)  Hemoglobin: 8.1     LDH: 278     Haptoglobin: 183    PFH:  -----    ----------------------------------------------------------------------------------------------------  Daily ECMO Checklist:   Progress report received from perfusionist   Circuit checked/inspected   Emergency equipment and supplies checked   Cannulation site inspection     I have reviewed all of the above information as well as pertinent labs/imaging/testing and care plan with the bedside nurse, perfusionist and care team members and provided my recommendations for support.   ECMO Management was separate from critical care billing time.

## 2025-06-23 NOTE — PROGRESS NOTE ADULT - SUBJECTIVE AND OBJECTIVE BOX
Patient seen and examined at the bedside.    Remained critically ill on continuous ICU monitoring.    OBJECTIVE:  Vital Signs Last 24 Hrs  T(C): 36.4 (23 Jun 2025 20:00), Max: 36.7 (23 Jun 2025 12:00)  T(F): 97.5 (23 Jun 2025 20:00), Max: 98.1 (23 Jun 2025 12:00)  HR: 53 (23 Jun 2025 19:30) (50 - 86)  BP: 102/50 (23 Jun 2025 18:00) (102/50 - 137/63)  BP(mean): 71 (23 Jun 2025 18:00) (71 - 90)  RR: 17 (23 Jun 2025 19:30) (12 - 28)  SpO2: 100% (23 Jun 2025 19:30) (100% - 100%)    Parameters below as of 23 Jun 2025 20:00  Patient On (Oxygen Delivery Method): nasal cannula  O2 Flow (L/min): 3      Physical Exam:   General: NAD   Neurology: Awake, alert  Eyes: bilateral pupils equal and reactive   ENT/Neck: Neck supple, trachea midline, No JVD   Respiratory: Clear bilaterally   CV: S1S2, no murmurs        [x] Sternal dressing, [x] Mediastinal CTx2, [x] L Pleural CT          [x] Sinus bardycardia, [x] Temporary pacing VVI 40  Abdominal: Soft, NT, ND +BS   Extremities: 1-2+ pedal edema noted, + peripheral pulses   Skin: No Rashes, Hematoma, Ecchymosis                           Assessment:  71 yo M with cardiogenic shock on RVAD ,DM2, CAD (total  4 coronary stents, last one PCI in 08/2024 ), CHF with EF 40-45%, severe MR  Now s/p Mitral valve replacement, CABG x3 and RVAD placement on 6/17/2025     Right heart failure s/p RVAD insertion 6/22/25   Cardiogenic shock  Acute postop pulmonary insufficiency  Hemoptysis  Acute blood loss anemia  Thrombocytopenia  ESRD  Hyperglycemia        Plan:   ***Neuro***  [x] Nonfocal  - off sedation  Post operative neuro assessment   Pain management with Tylenol, Gabapentin, Oxycodone and prns   Trazadone - on hold    ***Cardiovascular***  Invasive hemodynamic monitoring, assess perfusion indices   SB / CVP 10 / MAP 49 /  Hct 25.5 / Lactate 1.2   RV failure, s/p CABG, s/p MV repair  [x] Dobutamine 5 mcgs/kG/Min   [x] Epinephrine 0.02 mcgs/kG/Min   [x] Nipride 0.5 mcgs/kG/Min - off  [x] Vasopressin 0.03 Units/Min - off  [x] RVAD with oxygenator  2500 RPMs, flow of 2.52, sweep of 0.5    PO Amiodarone for rate control  Monitor chest tube outputs  [x]  ASA [x] Statin   Serial EKG and cardiac enzymes     ***Pulmonary***  Extubated today 6/23  Hemoptysis - IP evaluation/bronch today   Titration of FiO2 and PEEP, follow SpO2, CXR, blood gasses   Continue on Duonebs    Mode: standby  FiO2: 40              ***GI***  [x] CC Diet   [x] Protonix for stress ulcer ppx  Bowel regimen with Miralax and Senna     ***Renal***  [x] ESRD on CRRT  Goal negative balance.  Metabolic acidosis - Bicarb tabs   Continue to monitor I/Os, BUN/Creatinine.   Replete fern Ag present   Retacrit for renal support    ***Heme***  [x] VTE ppx with Heparin gtt - off  Reassessment of hemodynamics post resuscitation    ***ID***  Perioperative coverage with Cefuroxime     ***Endocrine***  [x] Stress Hyperglycemia: HbA1c 6.8%                - [x] Insulin gtt             - Need tight glycemic control to prevent wound infection.            Patient requires continuous monitoring with bedside rhythm monitoring, pulse oximetry monitoring, and continuous central venous and arterial pressure monitoring; and intermittent blood gas analysis. Care plan discussed with the ICU care team.   Patient remained critical, at risk for life threatening decompensation.    I have spent 55 minutes providing critical care management to this patient.    By signing my name below, I, Scot Pelaez, attest that this documentation has been prepared under the direction and in the presence of Joanne Abreu NP   Electronically signed: Maria Luisa Berman, 06-23-25 @ 20:07    I, Joanne Abreu, personally performed the services described in this documentation. all medical record entries made by the scribe were at my direction and in my presence. I have reviewed the chart and agree that the record reflects my personal performance and is accurate and complete  Electronically signed: Joanne Abreu NP

## 2025-06-23 NOTE — CONSULT NOTE ADULT - ASSESSMENT
70M w/ PMH of CAD s/p SCOTT, T2DM, HTN, prior MI, ESRD on HD, HFrEF 35%, severe MR.  Admitted for planned mitral valve replacement on 6/10. Patient underwent MVR, CABG, DREAD clipping and RVAD insertion due to post-op RV failure on 6/11.  Remains on inotropes. Bronchoscopy performed by CTICU team on 6/22 with old clot in RMB, RBI, RML. Unable to remove totality of clot burden. No active bleeding noted.    # acute hypoxemic respiratory failure  # airway thrombus  Patient with concern for prior airway bleeding, unclear etiology, through patient on ASA and heparin gtt.  On bronchoscopy today, no active bleeding noted. An organized thrombus was partially obstructing the lateral branch of the RML bronchus, a majority of which was removed using forceps and suction. No active bleeding noted.  At this point, no pulmonary contraindication to extubation. The small amount of residual thrombus left should not impair patient's respiration and more aggressive measures to remove it risk more harm than benefit.  Please reach out should the patient develop new symptoms, repeat bleeding or similar.

## 2025-06-23 NOTE — PROGRESS NOTE ADULT - SUBJECTIVE AND OBJECTIVE BOX
HPI:  Patient seen and examined at bedside in CTU.  Remains intubated and sedated after bronchoscopy.     Review Of Systems:           Respiratory: No shortness of breath, cough, or wheezing  Cardiovascular: No chest pain or palpitations  10 point review of systems is otherwise negative except as mentioned above        Medications:  acetaminophen     Tablet .. 650 milliGRAM(s) Oral every 6 hours PRN  albuterol/ipratropium for Nebulization 3 milliLiter(s) Nebulizer every 6 hours  aMIOdarone    Tablet 200 milliGRAM(s) Oral two times a day  ascorbic acid 500 milliGRAM(s) Oral two times a day  aspirin enteric coated 81 milliGRAM(s) Oral daily  atorvastatin 80 milliGRAM(s) Oral at bedtime  bisacodyl Suppository 10 milliGRAM(s) Rectal once  cefuroxime  IVPB 1500 milliGRAM(s) IV Intermittent every 8 hours  chlorhexidine 2% Cloths 1 Application(s) Topical daily  chlorhexidine 4% Liquid 1 Application(s) Topical daily  CRRT Treatment    <Continuous>  dextrose 5%. 1000 milliLiter(s) IV Continuous <Continuous>  dextrose 50% Injectable 50 milliLiter(s) IV Push every 15 minutes  dextrose 50% Injectable 25 milliLiter(s) IV Push every 15 minutes  DOBUTamine Infusion 5 MICROgram(s)/kG/Min IV Continuous <Continuous>  EPINEPHrine    Infusion 0.02 MICROgram(s)/kG/Min IV Continuous <Continuous>  epoetin douglas-epbx (RETACRIT) Injectable 84306 Unit(s) SubCutaneous <User Schedule>  gabapentin 100 milliGRAM(s) Oral every 8 hours  heparin  Infusion 300 Unit(s)/Hr IV Continuous <Continuous>  insulin regular Infusion 3 Unit(s)/Hr IV Continuous <Continuous>  melatonin 5 milliGRAM(s) Oral at bedtime  nitroprusside Infusion 0.5 MICROgram(s)/kG/Min IV Continuous <Continuous>  oxyCODONE    IR 5 milliGRAM(s) Oral every 4 hours PRN  oxyCODONE    IR 10 milliGRAM(s) Oral every 4 hours PRN  pantoprazole    Tablet 40 milliGRAM(s) Oral before breakfast  Phoxillum Filtration BK 4 / 2.5 5000 milliLiter(s) CRRT <Continuous>  Phoxillum Filtration BK 4 / 2.5 5000 milliLiter(s) CRRT <Continuous>  Phoxillum Filtration BK 4 / 2.5 5000 milliLiter(s) CRRT <Continuous>  polyethylene glycol 3350 17 Gram(s) Oral every 12 hours  senna 2 Tablet(s) Oral at bedtime  sodium bicarbonate 650 milliGRAM(s) Oral every 8 hours  sodium chloride 0.9%. 1000 milliLiter(s) IV Continuous <Continuous>  vasopressin Infusion 0.03 Unit(s)/Min IV Continuous <Continuous>    PAST MEDICAL & SURGICAL HISTORY:  HTN (hypertension)      HLD (hyperlipidemia)      CAD (coronary artery disease)      Former smoker      ESRD on dialysis      Gout      Moderate aortic insufficiency      Severe mitral regurgitation      DM2 (diabetes mellitus, type 2)      Right cataract      MI (myocardial infarction)      Stented coronary artery      2019 novel coronavirus disease (COVID-19)      Pancreatic cyst      AV fistula      History of cardiac cath      H/O colonoscopy      Stented coronary artery        Vitals:  T(C): 36.4 (25 @ 00:00), Max: 36.7 (25 @ 12:00)  HR: 56 (25 @ 01:00) (50 - 80)  BP: 112/53 (25 @ 22:20) (102/50 - 114/57)  BP(mean): 76 (25 @ 22:20) (71 - 82)  RR: 15 (25 @ 01:00) (12 - 28)  SpO2: 100% (25 @ 01:00) (100% - 100%)  Wt(kg): --  Daily     Daily Weight in k.1 (2025 00:00)  I&O's Summary      -  2025 07:00  --------------------------------------------------------  IN: 1414.2 mL / OUT: 2644 mL / NET: -1229.8 mL    :  -  2025 01:26  --------------------------------------------------------  IN: 1194.8 mL / OUT: 2343 mL / NET: -1148.2 mL        Physical Exam:  Appearance: Intubated and sedated  Eyes: PERRL, EOMI, pink conjunctiva  HENT: Normal oral mucosa  Cardiovascular: RRR, S1, S2, no murmurs, rubs, or gallops; no edema; no JVD  Respiratory: Clear to auscultation bilaterally  Gastrointestinal: soft, non-tender, non-distended with normal bowel sounds  Musculoskeletal: No clubbing; no joint deformity   Neurologic: Non-focal  Lymphatic: No lymphadenopathy  Psychiatry: AAOx3, mood & affect appropriate  Skin: No rashes, ecchymoses, or cyanosis                          7.5    10.25 )-----------( 134      ( 2025 00:14 )             24.5     06-24    137  |  102  |  24[H]  ----------------------------<  126[H]  4.6   |  17[L]  |  2.39[H]    Ca    9.1      2025 00:14  Phos  6.1     06-24  Mg     2.7     06-24    TPro  6.1  /  Alb  3.1[L]  /  TBili  0.5  /  DBili  x   /  AST  24  /  ALT  31  /  AlkPhos  92  06-24    PT/INR - ( 2025 00:14 )   PT: 13.6 sec;   INR: 1.18 ratio         PTT - ( 2025 00:14 )  PTT:30.2 sec          Cardiovascular Diagnostic Testing:  ECG:    Echo:  < from: TTE W or WO Ultrasound Enhancing Agent (25 @ 07:51) >  _______________________________________________________________________________________     CONCLUSIONS:      1. Technically difficult image quality.   2. Vert limited visualization, unable to evaluate valves accurately.   3. This is an ECMO (wean/surveillence/decannulation) study.   4. ECMO is visualized in IVC   5. Left ventricular cavity is normal in size. There is poor visualization of the endocardial borders to determine the presence of wall motion abnormalities.   6. Left ventricular endocardium is not well visualized; however, the left ventricular systolic function appears severely reduced.   7. The right ventricle is not well visualized. Normal right ventricular cavity size and reduced right ventricular systolic function.   8. Trace pericardial effusion.    ________________________________________________________________________________________    < end of copied text >      Stress Testing:    Cath:  < from: Cardiac Catheterization (25 @ 08:05) >  Diagnostic Findings:     Coronary Angiography   The coronary circulation is right dominant.      LM   Left main artery: There was dampening upon engagement of the ostium of  the left main coronary artery.. There is a 50 %  stenosis in the ostium portion of the segment.      LAD   Proximal left anterior descending: There is a 20 % in-stent  restenosis. First diagonal: There is an 80 % stenosis in the ostium  portion of the segment. Mid left anterior descending: There is a 35 %  stenosis. Distal left anterior descending: There is a 45 %  stenosis.      CX   Proximal circumflex: There is a 90 % in-stent restenosis. Mid  circumflex: There is a 60 % stenosis.    RCA   Proximal right coronary artery: There is a 99 % stenosis in the ostium  portion of the segment. Mid right coronary artery: There  is a 90 % stenosis. Mid right coronary artery: There is a 100 %  stenosis.    < end of copied text >      Interpretation of Telemetry:

## 2025-06-23 NOTE — PROGRESS NOTE ADULT - SUBJECTIVE AND OBJECTIVE BOX
[facility] [phone]        Patient seen and examined in bed. Afebrile    Intubated and on  and Epi gtt         MEDICATIONS  (STANDING):  aMIOdarone    Tablet 200 milliGRAM(s) Oral two times a day  ascorbic acid 500 milliGRAM(s) Oral two times a day  aspirin enteric coated 81 milliGRAM(s) Oral daily  atorvastatin 80 milliGRAM(s) Oral at bedtime  bisacodyl Suppository 10 milliGRAM(s) Rectal once  cefuroxime  IVPB 1500 milliGRAM(s) IV Intermittent every 8 hours  chlorhexidine 0.12% Liquid 15 milliLiter(s) Oral Mucosa every 12 hours  chlorhexidine 2% Cloths 1 Application(s) Topical daily  chlorhexidine 4% Liquid 1 Application(s) Topical daily  CRRT Treatment    <Continuous>  dexMEDEtomidine Infusion 0.6 MICROgram(s)/kG/Hr (11.9 mL/Hr) IV Continuous <Continuous>  dextrose 5%. 1000 milliLiter(s) (100 mL/Hr) IV Continuous <Continuous>  dextrose 50% Injectable 50 milliLiter(s) IV Push every 15 minutes  dextrose 50% Injectable 25 milliLiter(s) IV Push every 15 minutes  DOBUTamine Infusion 5 MICROgram(s)/kG/Min (11.9 mL/Hr) IV Continuous <Continuous>  EPINEPHrine    Infusion 0.02 MICROgram(s)/kG/Min (5.96 mL/Hr) IV Continuous <Continuous>  gabapentin 100 milliGRAM(s) Oral every 8 hours  insulin regular Infusion 3 Unit(s)/Hr (3 mL/Hr) IV Continuous <Continuous>  nitroprusside Infusion 0.5 MICROgram(s)/kG/Min (5.96 mL/Hr) IV Continuous <Continuous>  pantoprazole  Injectable 40 milliGRAM(s) IV Push daily  Phoxillum Filtration BK 4 / 2.5 5000 milliLiter(s) (500 mL/Hr) CRRT <Continuous>  Phoxillum Filtration BK 4 / 2.5 5000 milliLiter(s) (500 mL/Hr) CRRT <Continuous>  Phoxillum Filtration BK 4 / 2.5 5000 milliLiter(s) (1000 mL/Hr) CRRT <Continuous>  polyethylene glycol 3350 17 Gram(s) Oral every 12 hours  propofol Infusion 30 MICROgram(s)/kG/Min (14.3 mL/Hr) IV Continuous <Continuous>  senna 2 Tablet(s) Oral at bedtime  sodium bicarbonate 650 milliGRAM(s) Oral every 8 hours  sodium chloride 0.9%. 1000 milliLiter(s) (10 mL/Hr) IV Continuous <Continuous>  traZODone 50 milliGRAM(s) Oral at bedtime  vasopressin Infusion 0.03 Unit(s)/Min (4.5 mL/Hr) IV Continuous <Continuous>      VITAL:  T(C): , Max: 37.5 (25 @ 17:00)  T(F): , Max: 99.5 (25 @ 17:00)  HR: 62 (25 @ 09:45)  BP: 137/63 (25 @ 21:00)  BP(mean): 90 (25 @ 21:00)  RR: 15 (25 @ 09:45)  SpO2: 100% (25 @ 09:45)  Wt(kg): --    I and O's:     @ 07:01  -   @ 07:00  --------------------------------------------------------  IN: 1414.2 mL / OUT: 2644 mL / NET: -1229.8 mL     @ 07:01  -   @ 09:47  --------------------------------------------------------  IN: 161.1 mL / OUT: 270 mL / NET: -108.9 mL          PHYSICAL EXAM:    Constitutional: intubated   Neck:  No JVD  Respiratory: transmitted   Cardiovascular: S1 and S2  Gastrointestinal: BS+, soft, NT/ND  Extremities: No peripheral edema  Neurological:   :  Ag  Skin: No rashes  Access: AVF     LABS:                        8.2    7.70  )-----------( 132      ( 2025 00:25 )             25.5         137  |  103  |  22  ----------------------------<  108[H]  4.0   |  16[L]  |  2.59[H]    Ca    9.0      2025 00:25  Phos  4.4       Mg     2.7         TPro  5.6[L]  /  Alb  3.1[L]  /  TBili  0.6  /  DBili  x   /  AST  25  /  ALT  36  /  AlkPhos  99            Urine Studies:  Urinalysis Basic - ( 2025 00:25 )    Color: x / Appearance: x / SG: x / pH: x  Gluc: 108 mg/dL / Ketone: x  / Bili: x / Urobili: x   Blood: x / Protein: x / Nitrite: x   Leuk Esterase: x / RBC: x / WBC x   Sq Epi: x / Non Sq Epi: x / Bacteria: x            RADIOLOGY & ADDITIONAL STUDIES:          < from: Xray Chest 1 View- PORTABLE-Routine (25 @ 11:25) >    ACC: [id number] EXAM:  XR CHEST PORTABLE ROUTINE 1V   ORDERED BY: AJ COLLINS III     PROCEDURE DATE:  2025          INTERPRETATION:  DATE OF STUDY: 25 at 11:16AM    PRIOR: Earlier 25 exam    CLINICAL INDICATION: Missing count in OR    TECHNIQUE: AP radiograph of the chest.    FINDINGS:  No radiopaque foreign bodies or instruments present.  Left jugular catheter, ET and NG tube, MIcra pacemaker, ECMO catheters,   sternal wires, mediastinal drain, left chest tube and left IJ central   venous catheter unchanged -  in good position.  Heart is similar in size.  No focal consolidation. No sizable pleural effusion. No pneumothorax.    IMPRESSION:    No radiopaque foreign bodies or instruments present.  Dr. AMIRA Salas discussed with Team Member in OR 6 at 11:32AM on 25    --- End of Rep    < end of copied text >

## 2025-06-23 NOTE — AIRWAY REMOVAL NOTE  ADULT & PEDS - REASON ARTIFICAL AIRWAY REMOVED:
patient remained on ventilator overnight. no RT changes were made. will continue to wean FiO2 as tolerated. will continue to monitor.      11/26/21 0324   Vent Information   $ RT Standby Charge (per 15 min) 1   Ventilator Initiation 11/16/21   Ventilation D reason for artificial airway has resolved

## 2025-06-23 NOTE — AIRWAY REMOVAL NOTE  ADULT & PEDS - ARTIFICAL AIRWAY REMOVAL COMMENTS
Written order for extubation verified. The patient was identified by full name and birth date compared to the identification band. Present during the procedure was VALERIY Chandra

## 2025-06-23 NOTE — CONSULT NOTE ADULT - SUBJECTIVE AND OBJECTIVE BOX
CHIEF COMPLAINT:    HPI:    70M w/ PMH of CAD s/p SCOTT, T2DM, HTN, prior MI, ESRD on HD, HFrEF 35%, severe MR.  Admitted for planned mitral valve replacement on 6/10. Patient underwent MVR, CABG, DREAD clipping and RVAD insertion due to post-op RV failure on 6/11.  Remains on inotropes. Bronchoscopy performed by CTICU team on 6/22 with old clot in RMB, RBI, RML. Unable to remove totality of clot burden. No active bleeding noted.  Interventional pulmonary consulted for assessment.    PAST MEDICAL & SURGICAL HISTORY:  HTN (hypertension)      HLD (hyperlipidemia)      CAD (coronary artery disease)      Former smoker      ESRD on dialysis      Gout      Moderate aortic insufficiency      Severe mitral regurgitation      DM2 (diabetes mellitus, type 2)      Right cataract      MI (myocardial infarction)      Stented coronary artery      2019 novel coronavirus disease (COVID-19)      Pancreatic cyst      AV fistula      History of cardiac cath      H/O colonoscopy      Stented coronary artery          FAMILY HISTORY:  FH: type 2 diabetes (Father, Mother, Sibling)    FH: HTN (hypertension) (Father, Mother, Sibling)    FH: renal failure (Sibling)        SOCIAL HISTORY:  Smoking: [ ] Never Smoked [ ] Former Smoker (__ packs x ___ years) [ ] Current Smoker  (__ packs x ___ years)  Substance Use: [ ] Never Used [ ] Used ____  EtOH Use:  Marital Status: [ ] Single [ ]  [ ]  [ ]   Sexual History:   Occupation:  Recent Travel:  Country of Birth:  Advance Directives:    Allergies    No Known Allergies    Intolerances        HOME MEDICATIONS:  Home Medications:  allopurinol 100 mg oral tablet: 1 tab(s) orally once a day (17 Jun 2025 06:51)  amLODIPine 5 mg oral tablet: 1 tab(s) orally once a day (17 Jun 2025 06:51)  Aspirin Enteric Coated 81 mg oral delayed release tablet: 1 tab(s) orally once a day (17 Jun 2025 06:51)  atorvastatin 80 mg oral tablet: 1 tab(s) orally once a day (17 Jun 2025 06:51)  calcium acetate 667 mg oral tablet: 3 cap(s) orally 3 times a day (17 Jun 2025 06:51)  Coreg 25 mg oral tablet: 1 tab(s) orally 2 times a day (17 Jun 2025 06:51)  ezetimibe 10 mg oral tablet: 1 tab(s) orally once a day (17 Jun 2025 06:51)  ferrous sulfate 325 mg (65 mg elemental iron) oral tablet: 1 tab(s) orally once a day (17 Jun 2025 06:51)  NovoLOG FlexPen 100 units/mL injectable solution: injectable 3 times a day (with meals) 7-10 units sliding scale with meals (17 Jun 2025 06:51)  tamsulosin 0.4 mg oral capsule: 1 cap(s) orally once a day (at bedtime) (17 Jun 2025 06:51)  Toujeo Max SoloStar Prefilled Pen 300 units/mL subcutaneous solution: 10 unit(s) subcutaneous once a day (at bedtime) (17 Jun 2025 06:51)      REVIEW OF SYSTEMS:  [ x ] All other systems negative  [ ] Unable to assess ROS because ________    OBJECTIVE:  ICU Vital Signs Last 24 Hrs  T(C): 36.2 (23 Jun 2025 08:00), Max: 37.5 (22 Jun 2025 17:00)  T(F): 97.2 (23 Jun 2025 08:00), Max: 99.5 (22 Jun 2025 17:00)  HR: 63 (23 Jun 2025 09:00) (58 - 94)  BP: 137/63 (22 Jun 2025 21:00) (137/63 - 137/63)  BP(mean): 90 (22 Jun 2025 21:00) (90 - 90)  ABP: 164/38 (23 Jun 2025 09:00) (119/30 - 181/50)  ABP(mean): 66 (23 Jun 2025 09:00) (46 - 84)  RR: 15 (23 Jun 2025 09:00) (4 - 21)  SpO2: 100% (23 Jun 2025 09:00) (99% - 100%)    O2 Parameters below as of 23 Jun 2025 08:30      O2 Concentration (%): 40      Mode: AC/ CMV (Assist Control/ Continuous Mandatory Ventilation), RR (machine): 10, FiO2: 40, PEEP: 8, ITime: 1, MAP: 12, PC: 14, PIP: 22    06-22 @ 07:01  -  06-23 @ 07:00  --------------------------------------------------------  IN: 1414.2 mL / OUT: 2644 mL / NET: -1229.8 mL    06-23 @ 07:01  - 06-23 @ 09:27  --------------------------------------------------------  IN: 161.1 mL / OUT: 270 mL / NET: -108.9 mL      CAPILLARY BLOOD GLUCOSE  134 (23 Jun 2025 07:00)      POCT Blood Glucose.: 149 mg/dL (23 Jun 2025 09:03)      PHYSICAL EXAM:  General: NAD  HEENT: Normal sclera  Lymph Nodes: No LAD  Respiratory: CTABL  Cardiovascular: Regular rate and rhythm no m/r/g  Abdomen: Nontender nondistended  Extremities: No peripheral edema  Skin: No rashes  Neurological: sedated  Psychiatry: sedated    LINES:     HOSPITAL MEDICATIONS:  Standing Meds:  aMIOdarone    Tablet 200 milliGRAM(s) Oral two times a day  ascorbic acid 500 milliGRAM(s) Oral two times a day  aspirin enteric coated 81 milliGRAM(s) Oral daily  atorvastatin 80 milliGRAM(s) Oral at bedtime  bisacodyl Suppository 10 milliGRAM(s) Rectal once  cefuroxime  IVPB 1500 milliGRAM(s) IV Intermittent every 8 hours  chlorhexidine 0.12% Liquid 15 milliLiter(s) Oral Mucosa every 12 hours  chlorhexidine 2% Cloths 1 Application(s) Topical daily  chlorhexidine 4% Liquid 1 Application(s) Topical daily  CRRT Treatment    <Continuous>  dexMEDEtomidine Infusion 0.6 MICROgram(s)/kG/Hr IV Continuous <Continuous>  dextrose 5%. 1000 milliLiter(s) IV Continuous <Continuous>  dextrose 50% Injectable 50 milliLiter(s) IV Push every 15 minutes  dextrose 50% Injectable 25 milliLiter(s) IV Push every 15 minutes  DOBUTamine Infusion 5 MICROgram(s)/kG/Min IV Continuous <Continuous>  EPINEPHrine    Infusion 0.02 MICROgram(s)/kG/Min IV Continuous <Continuous>  gabapentin 100 milliGRAM(s) Oral every 8 hours  insulin regular Infusion 3 Unit(s)/Hr IV Continuous <Continuous>  nitroprusside Infusion 0.5 MICROgram(s)/kG/Min IV Continuous <Continuous>  pantoprazole  Injectable 40 milliGRAM(s) IV Push daily  Phoxillum Filtration BK 4 / 2.5 5000 milliLiter(s) CRRT <Continuous>  Phoxillum Filtration BK 4 / 2.5 5000 milliLiter(s) CRRT <Continuous>  Phoxillum Filtration BK 4 / 2.5 5000 milliLiter(s) CRRT <Continuous>  polyethylene glycol 3350 17 Gram(s) Oral every 12 hours  propofol Infusion 30 MICROgram(s)/kG/Min IV Continuous <Continuous>  senna 2 Tablet(s) Oral at bedtime  sodium bicarbonate 650 milliGRAM(s) Oral every 8 hours  sodium chloride 0.9%. 1000 milliLiter(s) IV Continuous <Continuous>  traZODone 50 milliGRAM(s) Oral at bedtime  vasopressin Infusion 0.03 Unit(s)/Min IV Continuous <Continuous>      PRN Meds:  acetaminophen     Tablet .. 650 milliGRAM(s) Oral every 6 hours PRN  oxyCODONE    IR 5 milliGRAM(s) Oral every 4 hours PRN  oxyCODONE    IR 10 milliGRAM(s) Oral every 4 hours PRN      LABS:                        8.2    7.70  )-----------( 132      ( 23 Jun 2025 00:25 )             25.5     Hgb Trend: 8.2<--, 8.1<--, 8.1<--, 8.3<--, 8.7<--  06-23    137  |  103  |  22  ----------------------------<  108[H]  4.0   |  16[L]  |  2.59[H]    Ca    9.0      23 Jun 2025 00:25  Phos  4.4     06-23  Mg     2.7     06-23    TPro  5.6[L]  /  Alb  3.1[L]  /  TBili  0.6  /  DBili  x   /  AST  25  /  ALT  36  /  AlkPhos  99  06-23    Creatinine Trend: 2.59<--, 2.95<--, 2.39<--, 2.17<--, 2.25<--, 2.79<--  PT/INR - ( 23 Jun 2025 00:25 )   PT: 12.1 sec;   INR: 1.06 ratio         PTT - ( 23 Jun 2025 00:25 )  PTT:28.0 sec  Urinalysis Basic - ( 23 Jun 2025 00:25 )    Color: x / Appearance: x / SG: x / pH: x  Gluc: 108 mg/dL / Ketone: x  / Bili: x / Urobili: x   Blood: x / Protein: x / Nitrite: x   Leuk Esterase: x / RBC: x / WBC x   Sq Epi: x / Non Sq Epi: x / Bacteria: x      Arterial Blood Gas:  06-23 @ 09:08  7.37/30/232/17/99.4/-7.1  ABG lactate: --  Arterial Blood Gas:  06-23 @ 04:46  7.44/30/223/20/99.6/-3.2  ABG lactate: --  Arterial Blood Gas:  06-23 @ 02:30  7.45/26/208/18/99.9/-5.0  ABG lactate: --  Arterial Blood Gas:  06-23 @ 00:00  7.41/28/211/18/99.7/-6.0  ABG lactate: --  Arterial Blood Gas:  06-22 @ 19:47  7.45/29/218/20/99.3/-3.2  ABG lactate: --  Arterial Blood Gas:  06-22 @ 18:03  7.41/34/206/22/99.9/-2.7  ABG lactate: --  Arterial Blood Gas:  06-22 @ 17:37  7.29/47/427/23/100.0/-3.9  ABG lactate: --  Arterial Blood Gas:  06-22 @ 15:01  7.35/40/145/22/98.7/-3.3  ABG lactate: --  Arterial Blood Gas:  06-22 @ 13:10  7.26/52/116/23/99.7/-3.8  ABG lactate: --  Arterial Blood Gas:  06-22 @ 11:56  7.27/41/379/19/99.9/-7.6  ABG lactate: --  Arterial Blood Gas:  06-21 @ 23:53  7.34/38/146/20/99.0/-4.8  ABG lactate: --  Arterial Blood Gas:  06-21 @ 19:46  7.39/36/140/22/99.6/-2.8  ABG lactate: --  Arterial Blood Gas:  06-21 @ 17:51  7.40/39/154/24/98.4/-0.5  ABG lactate: --  Arterial Blood Gas:  06-21 @ 13:45  7.39/38/171/23/99.8/-1.8  ABG lactate: --  Arterial Blood Gas:  06-21 @ 11:54  7.36/40/182/23/99.0/-2.6  ABG lactate: --  Arterial Blood Gas:  06-21 @ 10:37  7.34/37/196/20/98.8/-5.3  ABG lactate: --    Venous Blood Gas:  06-23 @ 00:00  7.37/36/43/21/75.8  VBG Lactate: --  Venous Blood Gas:  06-22 @ 19:47  7.39/37/37/22/68.8  VBG Lactate: --  Venous Blood Gas:  06-22 @ 11:56  7.24/50/55/21/88.6  VBG Lactate: 1.4  Venous Blood Gas:  06-21 @ 23:53  7.27/45/44/21/75.9  VBG Lactate: --  Venous Blood Gas:  06-21 @ 17:51  7.32/48/33/25/54.6  VBG Lactate: --  Venous Blood Gas:  06-21 @ 13:45  7.32/48/34/25/58.3  VBG Lactate: --  Venous Blood Gas:  06-21 @ 11:54  7.29/47/42/23/69.3  VBG Lactate: --  Venous Blood Gas:  06-21 @ 10:37  7.28/46/46/22/77.0  VBG Lactate: --      MICROBIOLOGY:       RADIOLOGY:  [ x ] Reviewed and interpreted by me    PULMONARY FUNCTION TESTS:    EKG:

## 2025-06-23 NOTE — CONSULT NOTE ADULT - ATTENDING COMMENTS
Patient seen and examined with ICU team at bedside during rounds after lab data, medical records and radiology reports reviewed. I have read and agreeable in general with the documented note, assessment, and management plan which reflects my opinions from bedside rounds.      ELLIOT HARGROVE is a 70y Male with PMH of  CDA SCOTT ESRD HFrEF (35%) sMR admitted for MVR on 6/10 s/p  MVR, CABG, DREAD climping requiring RVAD on 6/11, IP consulted for airway obstruction.    Evaluation of imaging and previous bronchosocopy reports demonstrates adherent obstruction in the RML/RLL. Suspect clott vs infection given intubation and AC. Plan for bronchoscopy with extraction mechanical vs cryo. Risks and benefits discussed with family.           This patient is critically ill and required frequent bedside visits with therapy change. I have personally provided 39 minutes of critical care time concurrently with the resident/fellow/NP/PA. This time excludes time spent on separate procedures and time spent teaching. I have reviewed theresident/fellow/NP/PA’s documentation and I agree with the assessment and plan of care.    Dylon Pace MD  Interventional Pulmonology & Critical Care Medicine

## 2025-06-24 LAB
ALBUMIN SERPL ELPH-MCNC: 3.1 G/DL — LOW (ref 3.3–5)
ALP SERPL-CCNC: 92 U/L — SIGNIFICANT CHANGE UP (ref 40–120)
ALT FLD-CCNC: 31 U/L — SIGNIFICANT CHANGE UP (ref 10–45)
ANION GAP SERPL CALC-SCNC: 18 MMOL/L — HIGH (ref 5–17)
APTT BLD: 30.2 SEC — SIGNIFICANT CHANGE UP (ref 26.1–36.8)
APTT BLD: 31.4 SEC — SIGNIFICANT CHANGE UP (ref 26.1–36.8)
APTT BLD: 31.9 SEC — SIGNIFICANT CHANGE UP (ref 26.1–36.8)
AST SERPL-CCNC: 24 U/L — SIGNIFICANT CHANGE UP (ref 10–40)
BASE EXCESS BLDV CALC-SCNC: -5.7 MMOL/L — LOW (ref -2–3)
BASE EXCESS BLDV CALC-SCNC: -7 MMOL/L — LOW (ref -2–3)
BASOPHILS # BLD AUTO: 0.04 K/UL — SIGNIFICANT CHANGE UP (ref 0–0.2)
BASOPHILS NFR BLD AUTO: 0.4 % — SIGNIFICANT CHANGE UP (ref 0–2)
BILIRUB SERPL-MCNC: 0.5 MG/DL — SIGNIFICANT CHANGE UP (ref 0.2–1.2)
BLOOD GAS ECMO POST MEMBRANE - ARTERIAL RESULT: SIGNIFICANT CHANGE UP
BUN SERPL-MCNC: 24 MG/DL — HIGH (ref 7–23)
CALCIUM SERPL-MCNC: 9.1 MG/DL — SIGNIFICANT CHANGE UP (ref 8.4–10.5)
CHLORIDE SERPL-SCNC: 102 MMOL/L — SIGNIFICANT CHANGE UP (ref 96–108)
CO2 BLDV-SCNC: 22 MMOL/L — SIGNIFICANT CHANGE UP (ref 22–26)
CO2 BLDV-SCNC: 23 MMOL/L — SIGNIFICANT CHANGE UP (ref 22–26)
CO2 SERPL-SCNC: 17 MMOL/L — LOW (ref 22–31)
CREAT SERPL-MCNC: 2.39 MG/DL — HIGH (ref 0.5–1.3)
EGFR: 28 ML/MIN/1.73M2 — LOW
EGFR: 28 ML/MIN/1.73M2 — LOW
EOSINOPHIL # BLD AUTO: 0.17 K/UL — SIGNIFICANT CHANGE UP (ref 0–0.5)
EOSINOPHIL NFR BLD AUTO: 1.7 % — SIGNIFICANT CHANGE UP (ref 0–6)
FIBRINOGEN PPP-MCNC: 600 MG/DL — HIGH (ref 200–445)
GAS PNL BLDA: SIGNIFICANT CHANGE UP
GAS PNL BLDV: SIGNIFICANT CHANGE UP
GAS PNL BLDV: SIGNIFICANT CHANGE UP
GLUCOSE BLDC GLUCOMTR-MCNC: 108 MG/DL — HIGH (ref 70–99)
GLUCOSE BLDC GLUCOMTR-MCNC: 111 MG/DL — HIGH (ref 70–99)
GLUCOSE BLDC GLUCOMTR-MCNC: 119 MG/DL — HIGH (ref 70–99)
GLUCOSE BLDC GLUCOMTR-MCNC: 125 MG/DL — HIGH (ref 70–99)
GLUCOSE BLDC GLUCOMTR-MCNC: 125 MG/DL — HIGH (ref 70–99)
GLUCOSE BLDC GLUCOMTR-MCNC: 128 MG/DL — HIGH (ref 70–99)
GLUCOSE BLDC GLUCOMTR-MCNC: 133 MG/DL — HIGH (ref 70–99)
GLUCOSE BLDC GLUCOMTR-MCNC: 137 MG/DL — HIGH (ref 70–99)
GLUCOSE BLDC GLUCOMTR-MCNC: 137 MG/DL — HIGH (ref 70–99)
GLUCOSE BLDC GLUCOMTR-MCNC: 143 MG/DL — HIGH (ref 70–99)
GLUCOSE BLDC GLUCOMTR-MCNC: 144 MG/DL — HIGH (ref 70–99)
GLUCOSE BLDC GLUCOMTR-MCNC: 147 MG/DL — HIGH (ref 70–99)
GLUCOSE BLDC GLUCOMTR-MCNC: 152 MG/DL — HIGH (ref 70–99)
GLUCOSE BLDC GLUCOMTR-MCNC: 158 MG/DL — HIGH (ref 70–99)
GLUCOSE BLDC GLUCOMTR-MCNC: 159 MG/DL — HIGH (ref 70–99)
GLUCOSE BLDC GLUCOMTR-MCNC: 172 MG/DL — HIGH (ref 70–99)
GLUCOSE BLDC GLUCOMTR-MCNC: 204 MG/DL — HIGH (ref 70–99)
GLUCOSE BLDC GLUCOMTR-MCNC: 88 MG/DL — SIGNIFICANT CHANGE UP (ref 70–99)
GLUCOSE SERPL-MCNC: 126 MG/DL — HIGH (ref 70–99)
HAPTOGLOB SERPL-MCNC: 208 MG/DL — HIGH (ref 34–200)
HCO3 BLDV-SCNC: 20 MMOL/L — LOW (ref 22–29)
HCO3 BLDV-SCNC: 21 MMOL/L — LOW (ref 22–29)
HCT VFR BLD CALC: 24.5 % — LOW (ref 39–50)
HGB BLD-MCNC: 7.5 G/DL — LOW (ref 13–17)
HOROWITZ INDEX BLDV+IHG-RTO: 100 — SIGNIFICANT CHANGE UP
HOROWITZ INDEX BLDV+IHG-RTO: 32 — SIGNIFICANT CHANGE UP
IMM GRANULOCYTES # BLD AUTO: 0.14 K/UL — HIGH (ref 0–0.07)
IMM GRANULOCYTES NFR BLD AUTO: 1.4 % — HIGH (ref 0–0.9)
INR BLD: 1.18 RATIO — HIGH (ref 0.85–1.16)
LDH SERPL L TO P-CCNC: 282 U/L — HIGH (ref 50–242)
LYMPHOCYTES # BLD AUTO: 0.77 K/UL — LOW (ref 1–3.3)
LYMPHOCYTES NFR BLD AUTO: 7.5 % — LOW (ref 13–44)
MAGNESIUM SERPL-MCNC: 2.7 MG/DL — HIGH (ref 1.6–2.6)
MCHC RBC-ENTMCNC: 26.4 PG — LOW (ref 27–34)
MCHC RBC-ENTMCNC: 30.6 G/DL — LOW (ref 32–36)
MCV RBC AUTO: 86.3 FL — SIGNIFICANT CHANGE UP (ref 80–100)
MONOCYTES # BLD AUTO: 1.5 K/UL — HIGH (ref 0–0.9)
MONOCYTES NFR BLD AUTO: 14.6 % — HIGH (ref 2–14)
NEUTROPHILS # BLD AUTO: 7.63 K/UL — HIGH (ref 1.8–7.4)
NEUTROPHILS NFR BLD AUTO: 74.4 % — SIGNIFICANT CHANGE UP (ref 43–77)
NRBC # BLD AUTO: 0 K/UL — SIGNIFICANT CHANGE UP (ref 0–0)
NRBC # FLD: 0 K/UL — SIGNIFICANT CHANGE UP (ref 0–0)
NRBC BLD AUTO-RTO: 0 /100 WBCS — SIGNIFICANT CHANGE UP (ref 0–0)
PCO2 BLDV: 46 MMHG — SIGNIFICANT CHANGE UP (ref 42–55)
PCO2 BLDV: 47 MMHG — SIGNIFICANT CHANGE UP (ref 42–55)
PH BLDV: 7.25 — LOW (ref 7.32–7.43)
PH BLDV: 7.26 — LOW (ref 7.32–7.43)
PHOSPHATE SERPL-MCNC: 6.1 MG/DL — HIGH (ref 2.5–4.5)
PLATELET # BLD AUTO: 134 K/UL — LOW (ref 150–400)
PMV BLD: 12.7 FL — SIGNIFICANT CHANGE UP (ref 7–13)
PO2 BLDV: 42 MMHG — SIGNIFICANT CHANGE UP (ref 25–45)
PO2 BLDV: 47 MMHG — HIGH (ref 25–45)
POTASSIUM SERPL-MCNC: 4.6 MMOL/L — SIGNIFICANT CHANGE UP (ref 3.5–5.3)
POTASSIUM SERPL-SCNC: 4.6 MMOL/L — SIGNIFICANT CHANGE UP (ref 3.5–5.3)
PROT SERPL-MCNC: 6.1 G/DL — SIGNIFICANT CHANGE UP (ref 6–8.3)
PROTHROM AB SERPL-ACNC: 13.6 SEC — HIGH (ref 9.9–13.4)
RBC # BLD: 2.84 M/UL — LOW (ref 4.2–5.8)
RBC # FLD: 19.4 % — HIGH (ref 10.3–14.5)
SAO2 % BLDV: 68.4 % — SIGNIFICANT CHANGE UP (ref 67–88)
SAO2 % BLDV: 75.2 % — SIGNIFICANT CHANGE UP (ref 67–88)
SODIUM SERPL-SCNC: 137 MMOL/L — SIGNIFICANT CHANGE UP (ref 135–145)
WBC # BLD: 10.25 K/UL — SIGNIFICANT CHANGE UP (ref 3.8–10.5)
WBC # FLD AUTO: 10.25 K/UL — SIGNIFICANT CHANGE UP (ref 3.8–10.5)

## 2025-06-24 PROCEDURE — 71045 X-RAY EXAM CHEST 1 VIEW: CPT | Mod: 26

## 2025-06-24 PROCEDURE — 99292 CRITICAL CARE ADDL 30 MIN: CPT

## 2025-06-24 PROCEDURE — 33948 ECMO/ECLS DAILY MGMT-VENOUS: CPT

## 2025-06-24 PROCEDURE — 99291 CRITICAL CARE FIRST HOUR: CPT

## 2025-06-24 RX ORDER — CEFEPIME 2 G/20ML
2000 INJECTION, POWDER, FOR SOLUTION INTRAVENOUS ONCE
Refills: 0 | Status: COMPLETED | OUTPATIENT
Start: 2025-06-24 | End: 2025-06-24

## 2025-06-24 RX ORDER — HEPARIN SODIUM 1000 [USP'U]/ML
400 INJECTION INTRAVENOUS; SUBCUTANEOUS
Qty: 25000 | Refills: 0 | Status: DISCONTINUED | OUTPATIENT
Start: 2025-06-24 | End: 2025-06-28

## 2025-06-24 RX ORDER — MELATONIN 5 MG
5 TABLET ORAL AT BEDTIME
Refills: 0 | Status: DISCONTINUED | OUTPATIENT
Start: 2025-06-24 | End: 2025-07-11

## 2025-06-24 RX ORDER — CEFEPIME 2 G/20ML
2000 INJECTION, POWDER, FOR SOLUTION INTRAVENOUS EVERY 8 HOURS
Refills: 0 | Status: DISCONTINUED | OUTPATIENT
Start: 2025-06-24 | End: 2025-06-27

## 2025-06-24 RX ORDER — SODIUM BICARBONATE 1 MEQ/ML
1300 SYRINGE (ML) INTRAVENOUS EVERY 8 HOURS
Refills: 0 | Status: DISCONTINUED | OUTPATIENT
Start: 2025-06-24 | End: 2025-06-28

## 2025-06-24 RX ORDER — INSULIN LISPRO 100 U/ML
INJECTION, SOLUTION INTRAVENOUS; SUBCUTANEOUS
Refills: 0 | Status: DISCONTINUED | OUTPATIENT
Start: 2025-06-24 | End: 2025-06-29

## 2025-06-24 RX ORDER — SODIUM BICARBONATE 1 MEQ/ML
50 SYRINGE (ML) INTRAVENOUS
Refills: 0 | Status: COMPLETED | OUTPATIENT
Start: 2025-06-24 | End: 2025-06-24

## 2025-06-24 RX ORDER — SODIUM BICARBONATE 1 MEQ/ML
1500 SYRINGE (ML) INTRAVENOUS EVERY 8 HOURS
Refills: 0 | Status: DISCONTINUED | OUTPATIENT
Start: 2025-06-24 | End: 2025-06-24

## 2025-06-24 RX ORDER — CYST/ALA/Q10/PHOS.SER/DHA/BROC 100-20-50
1 POWDER (GRAM) ORAL DAILY
Refills: 0 | Status: DISCONTINUED | OUTPATIENT
Start: 2025-06-24 | End: 2025-07-11

## 2025-06-24 RX ORDER — ALBUMIN (HUMAN) 12.5 G/50ML
100 INJECTION, SOLUTION INTRAVENOUS ONCE
Refills: 0 | Status: COMPLETED | OUTPATIENT
Start: 2025-06-24 | End: 2025-06-24

## 2025-06-24 RX ORDER — CEFEPIME 2 G/20ML
INJECTION, POWDER, FOR SOLUTION INTRAVENOUS
Refills: 0 | Status: DISCONTINUED | OUTPATIENT
Start: 2025-06-24 | End: 2025-06-27

## 2025-06-24 RX ADMIN — GABAPENTIN 100 MILLIGRAM(S): 400 CAPSULE ORAL at 05:01

## 2025-06-24 RX ADMIN — AMIODARONE HYDROCHLORIDE 200 MILLIGRAM(S): 50 INJECTION, SOLUTION INTRAVENOUS at 05:01

## 2025-06-24 RX ADMIN — ATORVASTATIN CALCIUM 80 MILLIGRAM(S): 80 TABLET, FILM COATED ORAL at 21:13

## 2025-06-24 RX ADMIN — Medication 10 MILLILITER(S): at 07:26

## 2025-06-24 RX ADMIN — Medication 2 TABLET(S): at 21:14

## 2025-06-24 RX ADMIN — GABAPENTIN 100 MILLIGRAM(S): 400 CAPSULE ORAL at 13:54

## 2025-06-24 RX ADMIN — CEFEPIME 100 MILLIGRAM(S): 2 INJECTION, POWDER, FOR SOLUTION INTRAVENOUS at 21:16

## 2025-06-24 RX ADMIN — Medication 650 MILLIGRAM(S): at 00:51

## 2025-06-24 RX ADMIN — HEPARIN SODIUM 3 UNIT(S)/HR: 1000 INJECTION INTRAVENOUS; SUBCUTANEOUS at 07:26

## 2025-06-24 RX ADMIN — Medication 1300 MILLIGRAM(S): at 13:54

## 2025-06-24 RX ADMIN — HEPARIN SODIUM 5 UNIT(S)/HR: 1000 INJECTION INTRAVENOUS; SUBCUTANEOUS at 15:55

## 2025-06-24 RX ADMIN — Medication 81 MILLIGRAM(S): at 11:22

## 2025-06-24 RX ADMIN — Medication 5 MILLIGRAM(S): at 21:15

## 2025-06-24 RX ADMIN — Medication 1 APPLICATION(S): at 05:01

## 2025-06-24 RX ADMIN — Medication 3 UNIT(S)/HR: at 19:31

## 2025-06-24 RX ADMIN — Medication 100 MILLIGRAM(S): at 07:45

## 2025-06-24 RX ADMIN — CEFEPIME 100 MILLIGRAM(S): 2 INJECTION, POWDER, FOR SOLUTION INTRAVENOUS at 18:19

## 2025-06-24 RX ADMIN — DOBUTAMINE 11.9 MICROGRAM(S)/KG/MIN: 250 INJECTION INTRAVENOUS at 07:26

## 2025-06-24 RX ADMIN — Medication 40 MILLIGRAM(S): at 05:01

## 2025-06-24 RX ADMIN — Medication 1300 MILLIGRAM(S): at 21:14

## 2025-06-24 RX ADMIN — Medication 500 MILLIGRAM(S): at 05:01

## 2025-06-24 RX ADMIN — Medication 5.96 MICROGRAM(S)/KG/MIN: at 07:26

## 2025-06-24 RX ADMIN — ALBUMIN (HUMAN) 50 MILLILITER(S): 12.5 INJECTION, SOLUTION INTRAVENOUS at 05:16

## 2025-06-24 RX ADMIN — DOBUTAMINE 11.9 MICROGRAM(S)/KG/MIN: 250 INJECTION INTRAVENOUS at 19:31

## 2025-06-24 RX ADMIN — AMIODARONE HYDROCHLORIDE 200 MILLIGRAM(S): 50 INJECTION, SOLUTION INTRAVENOUS at 17:18

## 2025-06-24 RX ADMIN — SODIUM NITROPRUSSIDE INJECTION 5.96 MICROGRAM(S)/KG/MIN: 50 INJECTION, SOLUTION, CONCENTRATE INTRAVENOUS at 19:33

## 2025-06-24 RX ADMIN — POLYETHYLENE GLYCOL 3350 17 GRAM(S): 17 POWDER, FOR SOLUTION ORAL at 17:18

## 2025-06-24 RX ADMIN — GABAPENTIN 100 MILLIGRAM(S): 400 CAPSULE ORAL at 21:13

## 2025-06-24 RX ADMIN — IPRATROPIUM BROMIDE AND ALBUTEROL SULFATE 3 MILLILITER(S): .5; 2.5 SOLUTION RESPIRATORY (INHALATION) at 05:12

## 2025-06-24 RX ADMIN — VASOPRESSIN 4.5 UNIT(S)/MIN: 20 INJECTION INTRAVENOUS at 19:33

## 2025-06-24 RX ADMIN — Medication 5.96 MICROGRAM(S)/KG/MIN: at 19:32

## 2025-06-24 RX ADMIN — Medication 3 UNIT(S)/HR: at 11:15

## 2025-06-24 RX ADMIN — Medication 100 MILLIGRAM(S): at 15:47

## 2025-06-24 RX ADMIN — Medication 50 MILLIEQUIVALENT(S): at 06:39

## 2025-06-24 RX ADMIN — Medication 100 MILLIGRAM(S): at 00:46

## 2025-06-24 RX ADMIN — Medication 1 APPLICATION(S): at 05:02

## 2025-06-24 RX ADMIN — HEPARIN SODIUM 4 UNIT(S)/HR: 1000 INJECTION INTRAVENOUS; SUBCUTANEOUS at 09:00

## 2025-06-24 RX ADMIN — Medication 500 MILLIGRAM(S): at 17:17

## 2025-06-24 RX ADMIN — Medication 50 MILLIEQUIVALENT(S): at 06:40

## 2025-06-24 RX ADMIN — HEPARIN SODIUM 5 UNIT(S)/HR: 1000 INJECTION INTRAVENOUS; SUBCUTANEOUS at 19:32

## 2025-06-24 NOTE — PHYSICAL THERAPY INITIAL EVALUATION ADULT - TRANSFER TRAINING, PT EVAL
GOAL: Patient will transfer independently in 2 weeks
GOAL: Patient will transfer independently in 2 weeks

## 2025-06-24 NOTE — PHYSICAL THERAPY INITIAL EVALUATION ADULT - ADDITIONAL COMMENTS
PAtient lives in private home with spouse, 2 steps to enter, first floor set up. Patient independent with ADLs/IADLs, no DME, +drives
PAtient lives in private home with spouse, 2 steps to enter, first floor set up. Patient independent with ADLs/IADLs, no DME, +drives

## 2025-06-24 NOTE — PHYSICAL THERAPY INITIAL EVALUATION ADULT - BED MOBILITY TRAINING, PT EVAL
GOAL: Patient will perform bed mobility independently in 2 weeks
GOAL: PAtient will perform bed mobility independently in 2 weeks

## 2025-06-24 NOTE — CHART NOTE - NSCHARTNOTEFT_GEN_A_CORE
NUTRITION FOLLOW UP NOTE    PATIENT SEEN FOR: nutrition follow up.     SOURCE: [x] Patient  [x] Current Medical Record  [x] RN  [] Family/support person at bedside  [] Patient unavailable/inappropriate  [x] Other: interdisciplinary team    CHART REVIEWED/EVENTS NOTED.  [] No changes to nutrition care plan to note  [x] Nutrition Status:  - S/p Mitral valve replacement, CABG x3 and RVAD placement on 2025   - Hx ESRD on HD, currently on CVVHD     DIET ORDER:   Diet, Regular:   Consistent Carbohydrate {No Snacks} (CSTCHO)  For patients receiving Renal Replacement - No Protein Restr, No Conc K, No Conc Phos, Low Sodium (RENAL) (25)    CURRENT DIET ORDER IS:  [] Appropriate:  [] Inadequate:  [x] Other: See recommendations below.     NUTRITION INTAKE/PROVISION:  [x] PO: Pt reports fair appetite/PO intake - per RN pt eating well but has some menu fatigue specifically with chicken. Pt amenable to oral nutrition supplements.   [] Enteral Nutrition:  [] Parenteral Nutrition:    ANTHROPOMETRICS:  Drug Dosing Weight  Height (cm): 180.3 (2025 22:03)  Weight (kg): 79.4 (2025 22:03)  BMI (kg/m2): 24.4 (2025 22:03)  BSA (m2): 1.99 (2025 22:03)    Weights:   Daily Weight in k.1 (-24), Weight in k (-23), Weight in k.4 (-), Weight in k.7 (-), Weight in k.2 (-20), Weight in k.9 (-19), Weight in k.2 (-18)     MEDICATIONS:  MEDICATIONS  (STANDING):  aMIOdarone    Tablet 200 milliGRAM(s) Oral two times a day  ascorbic acid 500 milliGRAM(s) Oral two times a day  aspirin enteric coated 81 milliGRAM(s) Oral daily  atorvastatin 80 milliGRAM(s) Oral at bedtime  bisacodyl Suppository 10 milliGRAM(s) Rectal once  cefuroxime  IVPB 1500 milliGRAM(s) IV Intermittent every 8 hours  CRRT Treatment    <Continuous>  dextrose 5%. 1000 milliLiter(s) (100 mL/Hr) IV Continuous <Continuous>  DOBUTamine Infusion 5 MICROgram(s)/kG/Min (11.9 mL/Hr) IV Continuous <Continuous>  EPINEPHrine    Infusion 0.02 MICROgram(s)/kG/Min (5.96 mL/Hr) IV Continuous <Continuous>  epoetin douglas-epbx (RETACRIT) Injectable 16212 Unit(s) SubCutaneous <User Schedule>  gabapentin 100 milliGRAM(s) Oral every 8 hours  heparin  Infusion 400 Unit(s)/Hr (4 mL/Hr) IV Continuous <Continuous>  insulin lispro (ADMELOG) corrective regimen sliding scale   SubCutaneous Before meals and at bedtime  insulin regular Infusion 3 Unit(s)/Hr (3 mL/Hr) IV Continuous <Continuous>  melatonin 5 milliGRAM(s) Oral at bedtime  nitroprusside Infusion 0.5 MICROgram(s)/kG/Min (5.96 mL/Hr) IV Continuous <Continuous>  pantoprazole    Tablet 40 milliGRAM(s) Oral before breakfast  Phoxillum Filtration BK 4 / 2.5 5000 milliLiter(s) (1000 mL/Hr) CRRT <Continuous>  Phoxillum Filtration BK 4 / 2.5 5000 milliLiter(s) (500 mL/Hr) CRRT <Continuous>  polyethylene glycol 3350 17 Gram(s) Oral every 12 hours  PrismaSATE Dialysate BGK 4 / 2.5 5000 milliLiter(s) (1000 mL/Hr) CRRT <Continuous>  senna 2 Tablet(s) Oral at bedtime  sodium bicarbonate 1300 milliGRAM(s) Oral every 8 hours  sodium chloride 0.9%. 1000 milliLiter(s) (10 mL/Hr) IV Continuous <Continuous>  vasopressin Infusion 0.03 Unit(s)/Min (4.5 mL/Hr) IV Continuous <Continuous>    MEDICATIONS  (PRN):  acetaminophen     Tablet .. 650 milliGRAM(s) Oral every 6 hours PRN Mild Pain (1 - 3)  oxyCODONE    IR 5 milliGRAM(s) Oral every 4 hours PRN Moderate Pain (4 - 6)  oxyCODONE    IR 10 milliGRAM(s) Oral every 4 hours PRN Severe Pain (7 - 10)    NUTRITIONALLY PERTINENT LABS:  06-24 Na137 mmol/L Glu 126 mg/dL[H] K+ 4.6 mmol/L Cr  2.39 mg/dL[H] BUN 24 mg/dL[H]  Phos 6.1 mg/dL[H]  Alb 3.1 g/dL[L] ALT 31 U/L AST 24 U/L Alkaline Phosphatase 92 U/L  06-10-25 @ 12:25 a1c 6.8    A1C with Estimated Average Glucose Result: 6.8 % (06-10-25 @ 12:25)  A1C with Estimated Average Glucose Result: 5.8 % (25 @ 09:03)    Finger Sticks:  POCT Blood Glucose.: 152 mg/dL ( @ 14:50)  POCT Blood Glucose.: 158 mg/dL ( @ 14:07)  POCT Blood Glucose.: 159 mg/dL ( @ 12:50)  POCT Blood Glucose.: 172 mg/dL ( @ 12:01)  POCT Blood Glucose.: 204 mg/dL ( @ 11:02)  POCT Blood Glucose.: 137 mg/dL ( @ 08:05)  POCT Blood Glucose.: 133 mg/dL ( @ 06:00)  POCT Blood Glucose.: 125 mg/dL ( @ 04:00)  POCT Blood Glucose.: 125 mg/dL ( @ 01:57)  POCT Blood Glucose.: 128 mg/dL ( @ 23:52)  POCT Blood Glucose.: 143 mg/dL ( @ 21:42)  POCT Blood Glucose.: 150 mg/dL ( @ 20:07)  POCT Blood Glucose.: 131 mg/dL ( @ 17:57)  POCT Blood Glucose.: 144 mg/dL ( @ 16:12)    NUTRITIONALLY PERTINENT MEDICATIONS/LABS:  [x] Reviewed  [x] Relevant notes on medications/labs:  - BG management with insulin drip   - Elevated Phos noted; K+ WNL  - Dobutamine gtt  - Vasopressin off, Epinephrine @ 0.02mcgs/kg/min     EDEMA:  [x] Reviewed  [] Relevant notes:    GI/ I&O:  [x] Reviewed  [x] Relevant notes: Last BM  noted as loose.   [] Other:    SKIN:   [x] No pressure injuries documented, per nursing flowsheet  [] Pressure injury previously noted  [] Change in pressure injury documentation:  [x] Other: Midline sternal incision     ESTIMATED NEEDS:  [] No change:  [x] Updated:  Energy: 2144-2541kcal/day (27-32 kcal/kg)  Protein: 119-135  g/day (1.5-1.7 g/kg)  Fluid:   ml/day or [x] defer to team  Based on: dosing weight 79.4kg    NUTRITION DIAGNOSIS:  [x] Prior Dx: Increased Nutrient Needs (Protein/Energy)   [] New Dx:    EDUCATION:  [x] Yes: Provided recommendations to optimize PO and protein intake, recommended small frequent meals by ordering nutrient-dense snacks and leaving non-perishable food away from tray for later consumption during the day or between meals, to start with protein, and sips of supplement throughout the day; reviewed foods with protein and menu order procedures in hospital.   [] Not appropriate/warranted    NUTRITION CARE PLAN:  1. Recommend diet liberalization to consistent carbohydrate, no concentrated K+/Phos to allow more food options for patient.  2. Nepro 2x daily (850 kcal, 38 g protein).   3. Multivitamin if not otherwise contraindicated.   4: Provide encouragement with PO intake, menu selections, and assistance with meals as needed.     [] Achieved - Continue current nutrition intervention(s)  [] Current medical condition precludes nutrition intervention at this time.    MONITORING AND EVALUATION:   RD remains available upon request and will follow up per protocol.    Varsha Moyer MS, RD, CDN, CNSC  Available on MS TEAMS

## 2025-06-24 NOTE — PHYSICAL THERAPY INITIAL EVALUATION ADULT - PLANNED THERAPY INTERVENTIONS, PT EVAL
GOAL: Pt will negotiate 1 flight of stairs using handrail independently to ensure safe home entry, within 2 weeks./balance training/bed mobility training/gait training/strengthening/transfer training
balance training/bed mobility training/gait training/transfer training

## 2025-06-24 NOTE — PHYSICAL THERAPY INITIAL EVALUATION ADULT - GAIT DEVIATIONS NOTED, PT EVAL
fwd flexed, verbal and tactile cues for upright posture with good carry over/decreased josé luis/decreased weight-shifting ability
decreased josé luis

## 2025-06-24 NOTE — PROGRESS NOTE ADULT - SUBJECTIVE AND OBJECTIVE BOX
On Dobutamine, Epinephrine, and Heparin gtts      VITAL:  T(C): , Max: 36.7 (06-23-25 @ 12:00)  T(F): , Max: 98.1 (06-23-25 @ 12:00)  HR: 54 (06-24-25 @ 06:00)  BP: 103/53 (06-24-25 @ 02:00)  BP(mean): 76 (06-24-25 @ 02:00)  RR: 24 (06-24-25 @ 06:00)  SpO2: 100% (06-24-25 @ 06:00)  I/O - net negative 1160cc/12h      PHYSICAL EXAM:  Constitutional: intubated   Neck:  No JVD  Respiratory: coarse BS, (+)chest tube x 3  Cardiovascular: ivon s1s2  Gastrointestinal: BS+, soft, NT/ND  Extremities: No peripheral edema  : No echeverria  Skin: No rashes  Access: AVF     LABS:                        7.5    10.25 )-----------( 134      ( 24 Jun 2025 00:14 )             24.5     Na(137)/K(4.6)/Cl(102)/HCO3(17)/BUN(24)/Cr(2.39)Glu(126)/Ca(9.1)/Mg(2.7)/PO4(6.1)    06-24 @ 00:14  Na(137)/K(4.0)/Cl(103)/HCO3(16)/BUN(22)/Cr(2.59)Glu(108)/Ca(9.0)/Mg(2.7)/PO4(4.4)    06-23 @ 00:25  Na(138)/K(4.3)/Cl(101)/HCO3(17)/BUN(25)/Cr(2.95)Glu(180)/Ca(8.9)/Mg(2.9)/PO4(5.5)    06-22 @ 12:04  Na(136)/K(4.2)/Cl(101)/HCO3(18)/BUN(19)/Cr(2.39)Glu(116)/Ca(9.1)/Mg(2.7)/PO4(4.2)    06-22 @ 00:36      IMPRESSION: 70M w/ HTN, DM2, CAD, and ESRD-HD, s/p CABGx3/MV repair/RVAD 6/17/25    (1)Renal - ESRD - HD MWF - of late on CRRT via RVAD circuit  (2)Hyperphosphatemia - worsening as of this a.m. - we should switch the dialysate from Phox to non-Phox  (3)Metabolic acidosis - in setting of ESRD/high rate of lactate production - we can increase the effluent rate for the CRRT  (4)CV - cardiogenic shock - dependent on RVAD + multiple inotropes      RECOMMEND:  (1)Continue CVVHDF - increase effluent rate from 2L/h to 2.5L/h; switch off Phox for dialysate - goal UF 75-100cc/h  (2)Meds for GFR<10/CVVHDF        Christiano Marie MD  [facility]  Office/on call physician: [phone]  Cell (7a-7p): [phone]       On Dobutamine, Epinephrine, and Heparin gtts  Extubated to NCO2  Ambulating with PT    VITAL:  T(C): , Max: 36.7 (06-23-25 @ 12:00)  T(F): , Max: 98.1 (06-23-25 @ 12:00)  HR: 54 (06-24-25 @ 06:00)  BP: 103/53 (06-24-25 @ 02:00)  BP(mean): 76 (06-24-25 @ 02:00)  RR: 24 (06-24-25 @ 06:00)  SpO2: 100% (06-24-25 @ 06:00)  I/O - net negative 1160cc/12h      PHYSICAL EXAM:  Constitutional: alert, pleasant, NAD  HEENT: NCAT, DMM  Neck:  No JVD  Respiratory: coarse BS, (+)chest tube x 3  Cardiovascular: ivon s1s2  Gastrointestinal: BS+, soft, NT/ND  Extremities: No peripheral edema  : No echeverria  Skin: No rashes  Access: AVF     LABS:                        7.5    10.25 )-----------( 134      ( 24 Jun 2025 00:14 )             24.5     Na(137)/K(4.6)/Cl(102)/HCO3(17)/BUN(24)/Cr(2.39)Glu(126)/Ca(9.1)/Mg(2.7)/PO4(6.1)    06-24 @ 00:14  Na(137)/K(4.0)/Cl(103)/HCO3(16)/BUN(22)/Cr(2.59)Glu(108)/Ca(9.0)/Mg(2.7)/PO4(4.4)    06-23 @ 00:25  Na(138)/K(4.3)/Cl(101)/HCO3(17)/BUN(25)/Cr(2.95)Glu(180)/Ca(8.9)/Mg(2.9)/PO4(5.5)    06-22 @ 12:04  Na(136)/K(4.2)/Cl(101)/HCO3(18)/BUN(19)/Cr(2.39)Glu(116)/Ca(9.1)/Mg(2.7)/PO4(4.2)    06-22 @ 00:36      IMPRESSION: 70M w/ HTN, DM2, CAD, and ESRD-HD, s/p CABGx3/MV repair/RVAD 6/17/25    (1)Renal - ESRD - HD MWF - of late on CRRT via RVAD circuit  (2)Hyperphosphatemia - worsening as of this a.m. - we should switch the dialysate from Phox to non-Phox  (3)Metabolic acidosis - in setting of ESRD/high rate of lactate production - we can increase the effluent rate for the CRRT  (4)CV - cardiogenic shock - dependent on RVAD + multiple inotropes      RECOMMEND:  (1)Continue CVVHDF - increase effluent rate from 2L/h to 2.5L/h; switch off Phox for dialysate - goal UF 75-100cc/h  (2)Meds for GFR<10/CVVHDF        Christiano Marie MD  [facility]  Office/on call physician: [phone]  Cell (7a-7p): [phone]

## 2025-06-24 NOTE — PHYSICAL THERAPY INITIAL EVALUATION ADULT - RANGE OF MOTION EXAMINATION, REHAB EVAL
right hip flexion NT 2/2 ECMO/no ROM deficits were identified
right hip flexion NT 2/2 ECMO/no ROM deficits were identified

## 2025-06-24 NOTE — PHYSICAL THERAPY INITIAL EVALUATION ADULT - PHYSICAL ASSIST/NONPHYSICAL ASSIST: STAND/SIT, REHAB EVAL
1 person + 1 person to manage equipment/2 person assist
1 person + 1 person to manage equipment/2 person assist

## 2025-06-24 NOTE — PHYSICAL THERAPY INITIAL EVALUATION ADULT - GAIT TRAINING, PT EVAL
GOAL: Patient will ambulate 300 feet independently without assistive device in 2 weeks
GOAL: Pt will independently ambulate x1000 ft using least restrictive device without loss of balance, within 2 weeks.

## 2025-06-24 NOTE — PHYSICAL THERAPY INITIAL EVALUATION ADULT - GENERAL OBSERVATIONS, REHAB EVAL
Patient received semi reclined in bed in CTU, NAD, VSS, +Right IJ TLC +RVAD RIJ and L fem oral in ECMO circuit, +CTs x 3 to LWS, +echeverria

## 2025-06-24 NOTE — PHYSICAL THERAPY INITIAL EVALUATION ADULT - BALANCE TRAINING, PT EVAL
GOAL: Patient will improve standing balance to good in 2 weeks
GOAL: Patient will improve standing balance to good in 2 weeks

## 2025-06-24 NOTE — PHYSICAL THERAPY INITIAL EVALUATION ADULT - PERTINENT HX OF CURRENT PROBLEM, REHAB EVAL
70 year old male PMH of HTN, HLD, DM2, CAD (total  4 coronary stents, last one PCI in 08/2024 &  S/p status post MI treated with PCI x3 stents in 2022 & 08/2023)on Asprin 81 mg, Chronic back pain, ESRD on  HD 3x/week via Right brachial AV fistula ([facility], Nephrology- Dr.Paul Munguia-[facility], in [place]/ also s/p angioplasty of the AVfistula -intact in 12/2024/ & had revision of AV fistula in  05/2024 with Dr. Henrik Morocho), Listed for renal transplant in [place] and [place] ( he has a living donor available), CHF with EF 40-45%,  pneumonia 10/2024, severe MR/ recent cath 6/3 w/ multivessel CAD in stent stenosis. Patient now POD#1 s/p CABGx3, Repair, mitral valve, Insertion of right ventricular assist device, Clipping, left atrial appendage.

## 2025-06-24 NOTE — PROGRESS NOTE ADULT - SUBJECTIVE AND OBJECTIVE BOX
Patient seen and examined at the bedside.    Remained critically ill on continuous ICU monitoring.      Brief Summary:  69 yo M PMH of HTN, HLD, DM2, CAD (total  4 coronary stents, last one PCI in 08/2024), CHF with EF 40-45%, severe MR  Now s/p  Mitral valve replacement, CABG x3 and RVAD placement on 6/17/2025     24 Hour events:  Extubated  Bronchoscopy with IP  Heparin at 300 - no lbeeding  Ivon in the 50s   CVVH - 1.2 L   1u PRBC  +BM      OBJECTIVE:  Vital Signs Last 24 Hrs  T(C): 37 (24 Jun 2025 12:00), Max: 37.1 (24 Jun 2025 08:00)  T(F): 98.6 (24 Jun 2025 12:00), Max: 98.7 (24 Jun 2025 08:00)  HR: 60 (24 Jun 2025 13:00) (50 - 62)  BP: 103/53 (24 Jun 2025 02:00) (102/50 - 112/53)  BP(mean): 76 (24 Jun 2025 02:00) (71 - 76)  RR: 19 (24 Jun 2025 13:00) (12 - 29)  SpO2: 98% (24 Jun 2025 13:00) (98% - 100%)    Parameters below as of 24 Jun 2025 11:00  Patient On (Oxygen Delivery Method): room air                    Physical Exam:   General: Intubated   Neurology: Following commands - Ambulating well.  Respiratory: Bilateral breath sounds, coarse  CV: Sinus ivon   RVAD pulmonary 17F, Venous R femoral 25F  Abdominal: Soft, Nontender  Extremities: Warm, well-perfused, AVF on RUE        -------------------------------------------------------------------------------------------------------------------------------    Labs:                        7.5    10.25 )-----------( 134      ( 24 Jun 2025 00:14 )             24.5     06-24    137  |  102  |  24[H]  ----------------------------<  126[H]  4.6   |  17[L]  |  2.39[H]    Ca    9.1      24 Jun 2025 00:14  Phos  6.1     06-24  Mg     2.7     06-24    TPro  6.1  /  Alb  3.1[L]  /  TBili  0.5  /  DBili  x   /  AST  24  /  ALT  31  /  AlkPhos  92  06-24    LIVER FUNCTIONS - ( 24 Jun 2025 00:14 )  Alb: 3.1 g/dL / Pro: 6.1 g/dL / ALK PHOS: 92 U/L / ALT: 31 U/L / AST: 24 U/L / GGT: x           PT/INR - ( 24 Jun 2025 00:14 )   PT: 13.6 sec;   INR: 1.18 ratio         PTT - ( 24 Jun 2025 00:14 )  PTT:30.2 sec  ABG - ( 24 Jun 2025 12:54 )  pH, Arterial: 7.34  pH, Blood: x     /  pCO2: 38    /  pO2: 127   / HCO3: 20    / Base Excess: -4.8  /  SaO2: 98.6              ------------------------------------------------------------------------------------------------------------------------------  Assessment:  69 yo M with cardiogenic shock on RVAD ,DM2, CAD (total  4 coronary stents, last one PCI in 08/2024 ), CHF with EF 40-45%, severe MR  Now s/p Mitral valve replacement, CABG x3 and RVAD placement on 6/17/2025     Right heart failure s/p RVAD insertion 6/22/25   Cardiogenic shock  Acute postop pulmonary insufficiency  Hemoptysis  Acute blood loss anemia  Thrombocytopenia  ESRD  Metabolic acidosis.  Hyperglycemia      Plan:   ***Neuro***  Sedated with Precedex - wean for possible extubation   Maintain day/night cycle to prevent ICU delirium   Postoperative acute pain control with Tylenol, Gabapentin and prns    ***Cardiovascular***  At high risk for hemodynamic instability and cardiac arrhythmias.  RV failure, s/p CABG, s/p MV repair  RVAD decreased to 2500 RPM, flow 2.51, sweep 0.5  Remains on Dobutamine and Epinephrine.  Trend central venous saturation and lactate.  Wean pressors to maintain MAP > 65 mm Hg  Remains paced, monitor for rhythm recovery  ASA/Statin daily   PO Amiodarone load in process.    ***Pulmonary***  Postop acute pulmonary insufficiency  Hemoptysis - IP evaluation/bronch today   Plan for Assessment of extubation s/p IP eval    ***GI***  Tolerating diet.  Protonix for GI prophylaxis.  Bowel regimen.   Trend LFTs.    ***Renal***  ESRD on CRRT  Goal negative balance.  Metabolic acidosis - Bicarb tabs     ***ID***  Perioperative coverage with Cefuroxime   6/23 BAL - GNR/Moderate Serratia     ***Endocrine***  Insulin per protocol for Hyperglycemia     ***Hematology***  Acute blood loss anemia and thrombocytopenia - 2 plts, 1 prbc in OR   Heparin for PTT 40-50      Care plan discussed with the ICU care team.   Patient remains critical, at risk for life threatening decompensation.    I have spent 50 minutes providing critical care management to this patient.    Disposition: CTU      I, Dylon Gonzalez MD, personally performed the services described in this documentation. I have reviewed the chart and agree that the record reflects my personal performance and is accurate and complete.

## 2025-06-24 NOTE — PHYSICAL THERAPY INITIAL EVALUATION ADULT - GAIT DISTANCE, PT EVAL
2 x 200 ft + few standing and 1 sitting rest +chair follow
side steps along EOB with hand held assist/5 feet

## 2025-06-24 NOTE — PROGRESS NOTE ADULT - SUBJECTIVE AND OBJECTIVE BOX
Patient seen and examined at the bedside.    Remained critically ill on continuous ICU monitoring.    OBJECTIVE:  Vital Signs Last 24 Hrs  T(C): 36.7 (24 Jun 2025 20:00), Max: 37.1 (24 Jun 2025 08:00)  T(F): 98 (24 Jun 2025 20:00), Max: 98.7 (24 Jun 2025 08:00)  HR: 82 (24 Jun 2025 21:00) (54 - 82)  BP: 131/63 (24 Jun 2025 21:00) (103/53 - 151/66)  BP(mean): 91 (24 Jun 2025 21:00) (76 - 96)  RR: 22 (24 Jun 2025 21:00) (15 - 29)  SpO2: 97% (24 Jun 2025 21:00) (93% - 100%)    Parameters below as of 24 Jun 2025 20:00  Patient On (Oxygen Delivery Method): room air      Physical Exam:   General: NAD   Neurology: Awake, alert  Eyes: bilateral pupils equal and reactive   ENT/Neck: Neck supple, trachea midline, No JVD   Respiratory: Clear bilaterally   CV: S1S2, no murmurs        [x] Sternal dressing, [x] Mediastinal CTx2, [x] L Pleural CT          [x] Sinus rhythm [x] Temporary pacing VVI 40  Abdominal: Soft, NT, ND +BS   Extremities: 1-2+ pedal edema noted, + peripheral pulses   Skin: No Rashes, Hematoma, Ecchymosis                  Assessment:  69 yo M with cardiogenic shock on RVAD ,DM2, CAD (total  4 coronary stents, last one PCI in 08/2024 ), CHF with EF 40-45%, severe MR  Now s/p Mitral valve replacement, CABG x3 and RVAD placement on 6/17/2025     Right heart failure s/p RVAD insertion 6/22/25   Cardiogenic shock  Acute postop pulmonary insufficiency  Hemoptysis  Acute blood loss anemia  Thrombocytopenia  ESRD  Hyperglycemia      Plan:   ***Neuro***  [x] Nonfocal  - off sedation  Post operative neuro assessment   Pain management with Gabapentin and prns   Trazadone - on hold  Melatonin at bedtime    ***Cardiovascular***  Invasive hemodynamic monitoring, assess perfusion indices   SR / CVP 16 / MAP 66 /  Hct 24.5 / Lactate 0.8  RV failure, s/p CABG, s/p MV repair  [x] Dobutamine 5 mcgs/kG/Min   [x] Epinephrine 0.02 mcgs/kG/Min   [x] Nipride 0.5 mcgs/kG/Min - off  [x] Vasopressin 0.03 Units/Min - off  [x] RVAD with oxygenator  2500 RPMs, flow of 2.46, sweep of 0.5    PO Amiodarone for rate control  Monitor chest tube outputs  [x]  ASA [x] Statin   Serial EKG and cardiac enzymes     ***Pulmonary***  Room air  Encourage incentive spirometry, continue pulse ox monitoring, follow ABGs     ***GI***  [x] CC Diet   [x] Protonix for stress ulcer ppx  Bowel regimen with Miralax and Senna     ***Renal***  [x] ESRD on CRRT  Goal negative balance.  Metabolic acidosis - Bicarb tabs   Continue to monitor I/Os, BUN/Creatinine.   Replete lytes PRABHU Ag present   Retacrit for renal support    ***Heme***  [x] AC Therapy with Heparin gtt  Reassessment of hemodynamics post resuscitation    ***ID***  Cefepime    ***Endocrine***  [x] Stress Hyperglycemia: HbA1c 6.8%                - [x] Insulin gtt [x] ISS             - Need tight glycemic control to prevent wound infection.      Patient requires continuous monitoring with bedside rhythm monitoring, pulse oximetry monitoring, and continuous central venous and arterial pressure monitoring; and intermittent blood gas analysis. Care plan discussed with the ICU care team.   Patient remained critical, at risk for life threatening decompensation.    I have spent 55 minutes providing critical care management to this patient.    By signing my name below, I, Corby Shelley, attest that this documentation has been prepared under the direction and in the presence of Joanne Abreu NP  Electronically signed: Aamir Ceron, 06-24-25 @ 21:57    I, Joanne Abreu NP, personally performed the services described in this documentation. all medical record entries made by the aamir were at my direction and in my presence. I have reviewed the chart and agree that the record reflects my personal performance and is accurate and complete  Electronically signed: Joanne Abreu NP

## 2025-06-24 NOTE — PHYSICAL THERAPY INITIAL EVALUATION ADULT - PRECAUTIONS/LIMITATIONS, REHAB EVAL
cardiac precautions/fall precautions/sternal precautions
cardiac precautions/fall precautions/sternal precautions

## 2025-06-24 NOTE — PROGRESS NOTE ADULT - SUBJECTIVE AND OBJECTIVE BOX
----------------------------------------------------------------------------------------------------  ECMO Daily Management Note   ----------------------------------------------------------------------------------------------------  Recent events:  Extubated  Walking in ICU  Progressing   ----------------------------------------------------------------------------------------------------  69 yo M s/p MVr, CABG x 3 with postop RV failure and cardiogenic shock, cannulated for RVAD with oxygenator    Date of initiation: 6/17/2025       Cannulae:   21Fr in Right IJ, 25Fr R Fem Vein  Cannula sites examined, well-secured.    Continue current support   Wean trial as tolerated   Anticoagulation with Argatroban infusion, goal PTT 50-60    =============================================================  Weight (kg): 79.4 (06-21-25)        Height (cm): 180.3 (06-21-25)   BSA (m2): 1.99 (06-21-25)        BMI (kg/m2): 24.4 (06-21-25)    ECMO  RPM:  2500 (24 Jun 2025 13:00)      Pump Flow (Lpm):  2.5 (24 Jun 2025 13:00)              Sweep  (L/min):   0.5 (24 Jun 2025 13:00)       FiO2 (%):  1 (24 Jun 2025 13:00)  Pre-membrane -  Pressure (mm/Hg):   85 (24 Jun 2025 13:00)      Post-membrane - Pressure (mm/Hg):  70 (24 Jun 2025 13:00)   ( 24 Jun 2025 06:06 )  pH: 7.23  /  pCO2: 40    / pO2: 296   /  HCO3: 17    /  Base Excess: -10.2 /  SaO2: 99.0           aMIOdarone    Tablet 200 milliGRAM(s) Oral two times a day  DOBUTamine Infusion 5 MICROgram(s)/kG/Min IV Continuous <Continuous>  EPINEPHrine    Infusion 0.02 MICROgram(s)/kG/Min IV Continuous <Continuous>  nitroprusside Infusion 0.5 MICROgram(s)/kG/Min IV Continuous <Continuous>  vasopressin Infusion 0.03 Unit(s)/Min IV Continuous <Continuous>    Vent Mode: standby      (24 Jun 2025 12:54) pH, Art: 7.34/pCO2: 38/pO2: 127/HCO3: 20/BE: -4.8/SaO2: 98.6/LAC: 1.2, --   (24 Jun 2025 08:40) pH, Art: 7.33/pCO2: 35/pO2: 117/HCO3: 18/BE: -6.8/SaO2: 100.0/LAC: 1.0, --   (24 Jun 2025 05:49) pH, Art: 7.29/pCO2: 34/pO2: 149/HCO3: 16/BE: -9.4/SaO2: 99.6/LAC: 1.0, --   (24 Jun 2025 04:07) pH, Art: 7.30/pCO2: 36/pO2: 201/HCO3: 18/BE: -8.0/SaO2: 99.9/LAC: 0.9, --   (23 Jun 2025 23:57) pH, Art: 7.34/pCO2: 37/pO2: 197/HCO3: 20/BE: -5.3/SaO2: 99.4/LAC: 1.0, --     (24 Jun 2025 04:07) pH, Bart: 7.25/pCO2: 46/pO2: 42 /HCO3: 20/BE: -7.0/MVO2: /SvO2: 68.4/LAC: ,   (23 Jun 2025 23:57) pH, Bart: 7.26/pCO2: 47/pO2: 47 /HCO3: 21/BE: -5.7/MVO2: /SvO2: 75.2/LAC: ,   (23 Jun 2025 20:10) pH, Bart: 7.25/pCO2: 47/pO2: 38 /HCO3: 21/BE: -6.7/MVO2: /SvO2: 62.1/LAC: ,     ----------------------------------------------------------------------------------------------------  ANTICOAGULATION  aspirin enteric coated 81 milliGRAM(s) Oral daily  heparin  Infusion 400 Unit(s)/Hr IV Continuous <Continuous>  aspirin enteric coated 81 milliGRAM(s) Oral daily    (24 Jun 2025 00:14)  aPTT: 30.2  Xa: 0.04  PT: 13.6  INR: 1.18   Fibrinogen: 600   Platelets: 134   (23 Jun 2025 00:25)  aPTT: 28.0  Xa: <0.04  PT: 12.1  INR: 1.06   Fibrinogen: 416   Platelets: 132     (24 Jun 2025 00:14)  Hemoglobin: 7.5     LDH: 282     Haptoglobin: 208    PFH:  -----  (23 Jun 2025 00:25)  Hemoglobin: 8.2     LDH: 265     Haptoglobin: 184    PFH:  -----    ----------------------------------------------------------------------------------------------------  Daily ECMO Checklist:   Progress report received from perfusionist   Circuit checked/inspected   Emergency equipment and supplies checked   Cannulation site inspection     I have reviewed all of the above information as well as pertinent labs/imaging/testing and care plan with the bedside nurse, perfusionist and care team members and provided my recommendations for support.   ECMO Management was separate from critical care billing time.

## 2025-06-25 LAB
-  AMOXICILLIN/CLAVULANIC ACID: SIGNIFICANT CHANGE UP
-  AMPICILLIN/SULBACTAM: SIGNIFICANT CHANGE UP
-  AMPICILLIN: SIGNIFICANT CHANGE UP
-  AZTREONAM: SIGNIFICANT CHANGE UP
-  CEFAZOLIN: SIGNIFICANT CHANGE UP
-  CEFEPIME: SIGNIFICANT CHANGE UP
-  CEFOXITIN: SIGNIFICANT CHANGE UP
-  CEFTRIAXONE: SIGNIFICANT CHANGE UP
-  CIPROFLOXACIN: SIGNIFICANT CHANGE UP
-  ERTAPENEM: SIGNIFICANT CHANGE UP
-  GENTAMICIN: SIGNIFICANT CHANGE UP
-  LEVOFLOXACIN: SIGNIFICANT CHANGE UP
-  MEROPENEM: SIGNIFICANT CHANGE UP
-  PIPERACILLIN/TAZOBACTAM: SIGNIFICANT CHANGE UP
-  TIGECYCLINE: SIGNIFICANT CHANGE UP
-  TOBRAMYCIN: SIGNIFICANT CHANGE UP
-  TRIMETHOPRIM/SULFAMETHOXAZOLE: SIGNIFICANT CHANGE UP
ALBUMIN SERPL ELPH-MCNC: 3.3 G/DL — SIGNIFICANT CHANGE UP (ref 3.3–5)
ALP SERPL-CCNC: 113 U/L — SIGNIFICANT CHANGE UP (ref 40–120)
ALT FLD-CCNC: 30 U/L — SIGNIFICANT CHANGE UP (ref 10–45)
ANION GAP SERPL CALC-SCNC: 15 MMOL/L — SIGNIFICANT CHANGE UP (ref 5–17)
APTT BLD: 33.5 SEC — SIGNIFICANT CHANGE UP (ref 26.1–36.8)
APTT BLD: 39.1 SEC — HIGH (ref 26.1–36.8)
APTT BLD: 40 SEC — HIGH (ref 26.1–36.8)
AST SERPL-CCNC: 34 U/L — SIGNIFICANT CHANGE UP (ref 10–40)
BASE EXCESS BLDV CALC-SCNC: -0.2 MMOL/L — SIGNIFICANT CHANGE UP (ref -2–3)
BASE EXCESS BLDV CALC-SCNC: -0.4 MMOL/L — SIGNIFICANT CHANGE UP (ref -2–3)
BASE EXCESS BLDV CALC-SCNC: -5.4 MMOL/L — LOW (ref -2–3)
BASOPHILS # BLD AUTO: 0.02 K/UL — SIGNIFICANT CHANGE UP (ref 0–0.2)
BASOPHILS NFR BLD AUTO: 0.2 % — SIGNIFICANT CHANGE UP (ref 0–2)
BILIRUB SERPL-MCNC: 0.5 MG/DL — SIGNIFICANT CHANGE UP (ref 0.2–1.2)
BLOOD GAS ECMO POST MEMBRANE - ARTERIAL RESULT: SIGNIFICANT CHANGE UP
BUN SERPL-MCNC: 22 MG/DL — SIGNIFICANT CHANGE UP (ref 7–23)
CALCIUM SERPL-MCNC: 9.2 MG/DL — SIGNIFICANT CHANGE UP (ref 8.4–10.5)
CHLORIDE SERPL-SCNC: 102 MMOL/L — SIGNIFICANT CHANGE UP (ref 96–108)
CO2 BLDV-SCNC: 22 MMOL/L — SIGNIFICANT CHANGE UP (ref 22–26)
CO2 BLDV-SCNC: 27 MMOL/L — HIGH (ref 22–26)
CO2 BLDV-SCNC: 27 MMOL/L — HIGH (ref 22–26)
CO2 SERPL-SCNC: 20 MMOL/L — LOW (ref 22–31)
CREAT SERPL-MCNC: 2.32 MG/DL — HIGH (ref 0.5–1.3)
CULTURE RESULTS: ABNORMAL
EGFR: 29 ML/MIN/1.73M2 — LOW
EGFR: 29 ML/MIN/1.73M2 — LOW
EOSINOPHIL # BLD AUTO: 0.18 K/UL — SIGNIFICANT CHANGE UP (ref 0–0.5)
EOSINOPHIL NFR BLD AUTO: 1.7 % — SIGNIFICANT CHANGE UP (ref 0–6)
FIBRINOGEN PPP-MCNC: 432 MG/DL — SIGNIFICANT CHANGE UP (ref 200–445)
GAS PNL BLDA: SIGNIFICANT CHANGE UP
GAS PNL BLDV: SIGNIFICANT CHANGE UP
GLUCOSE BLDC GLUCOMTR-MCNC: 104 MG/DL — HIGH (ref 70–99)
GLUCOSE BLDC GLUCOMTR-MCNC: 114 MG/DL — HIGH (ref 70–99)
GLUCOSE BLDC GLUCOMTR-MCNC: 118 MG/DL — HIGH (ref 70–99)
GLUCOSE BLDC GLUCOMTR-MCNC: 119 MG/DL — HIGH (ref 70–99)
GLUCOSE BLDC GLUCOMTR-MCNC: 131 MG/DL — HIGH (ref 70–99)
GLUCOSE BLDC GLUCOMTR-MCNC: 135 MG/DL — HIGH (ref 70–99)
GLUCOSE BLDC GLUCOMTR-MCNC: 139 MG/DL — HIGH (ref 70–99)
GLUCOSE BLDC GLUCOMTR-MCNC: 147 MG/DL — HIGH (ref 70–99)
GLUCOSE BLDC GLUCOMTR-MCNC: 150 MG/DL — HIGH (ref 70–99)
GLUCOSE BLDC GLUCOMTR-MCNC: 152 MG/DL — HIGH (ref 70–99)
GLUCOSE BLDC GLUCOMTR-MCNC: 181 MG/DL — HIGH (ref 70–99)
GLUCOSE BLDC GLUCOMTR-MCNC: 183 MG/DL — HIGH (ref 70–99)
GLUCOSE BLDC GLUCOMTR-MCNC: 212 MG/DL — HIGH (ref 70–99)
GLUCOSE BLDC GLUCOMTR-MCNC: 96 MG/DL — SIGNIFICANT CHANGE UP (ref 70–99)
GLUCOSE SERPL-MCNC: 109 MG/DL — HIGH (ref 70–99)
HAPTOGLOB SERPL-MCNC: 228 MG/DL — HIGH (ref 34–200)
HCO3 BLDV-SCNC: 21 MMOL/L — LOW (ref 22–29)
HCO3 BLDV-SCNC: 25 MMOL/L — SIGNIFICANT CHANGE UP (ref 22–29)
HCO3 BLDV-SCNC: 25 MMOL/L — SIGNIFICANT CHANGE UP (ref 22–29)
HCT VFR BLD CALC: 25.7 % — LOW (ref 39–50)
HGB BLD-MCNC: 8 G/DL — LOW (ref 13–17)
HOROWITZ INDEX BLDV+IHG-RTO: 21 — SIGNIFICANT CHANGE UP
HOROWITZ INDEX BLDV+IHG-RTO: 28 — SIGNIFICANT CHANGE UP
HOROWITZ INDEX BLDV+IHG-RTO: 28 — SIGNIFICANT CHANGE UP
IMM GRANULOCYTES # BLD AUTO: 0.28 K/UL — HIGH (ref 0–0.07)
IMM GRANULOCYTES NFR BLD AUTO: 2.6 % — HIGH (ref 0–0.9)
INR BLD: 1.12 RATIO — SIGNIFICANT CHANGE UP (ref 0.85–1.16)
INR BLD: 1.13 RATIO — SIGNIFICANT CHANGE UP (ref 0.85–1.16)
LDH SERPL L TO P-CCNC: 301 U/L — HIGH (ref 50–242)
LYMPHOCYTES # BLD AUTO: 0.78 K/UL — LOW (ref 1–3.3)
LYMPHOCYTES NFR BLD AUTO: 7.2 % — LOW (ref 13–44)
MAGNESIUM SERPL-MCNC: 2.8 MG/DL — HIGH (ref 1.6–2.6)
MCHC RBC-ENTMCNC: 26.3 PG — LOW (ref 27–34)
MCHC RBC-ENTMCNC: 31.1 G/DL — LOW (ref 32–36)
MCV RBC AUTO: 84.5 FL — SIGNIFICANT CHANGE UP (ref 80–100)
METHOD TYPE: SIGNIFICANT CHANGE UP
MONOCYTES # BLD AUTO: 1.38 K/UL — HIGH (ref 0–0.9)
MONOCYTES NFR BLD AUTO: 12.8 % — SIGNIFICANT CHANGE UP (ref 2–14)
NEUTROPHILS # BLD AUTO: 8.14 K/UL — HIGH (ref 1.8–7.4)
NEUTROPHILS NFR BLD AUTO: 75.5 % — SIGNIFICANT CHANGE UP (ref 43–77)
NRBC # BLD AUTO: 0 K/UL — SIGNIFICANT CHANGE UP (ref 0–0)
NRBC # FLD: 0 K/UL — SIGNIFICANT CHANGE UP (ref 0–0)
NRBC BLD AUTO-RTO: 0 /100 WBCS — SIGNIFICANT CHANGE UP (ref 0–0)
ORGANISM # SPEC MICROSCOPIC CNT: ABNORMAL
ORGANISM # SPEC MICROSCOPIC CNT: ABNORMAL
PCO2 BLDV: 41 MMHG — LOW (ref 42–55)
PCO2 BLDV: 44 MMHG — SIGNIFICANT CHANGE UP (ref 42–55)
PCO2 BLDV: 45 MMHG — SIGNIFICANT CHANGE UP (ref 42–55)
PH BLDV: 7.31 — LOW (ref 7.32–7.43)
PH BLDV: 7.36 — SIGNIFICANT CHANGE UP (ref 7.32–7.43)
PH BLDV: 7.37 — SIGNIFICANT CHANGE UP (ref 7.32–7.43)
PHOSPHATE SERPL-MCNC: 3.4 MG/DL — SIGNIFICANT CHANGE UP (ref 2.5–4.5)
PLATELET # BLD AUTO: 112 K/UL — LOW (ref 150–400)
PMV BLD: 10.9 FL — SIGNIFICANT CHANGE UP (ref 7–13)
PO2 BLDV: 33 MMHG — SIGNIFICANT CHANGE UP (ref 25–45)
PO2 BLDV: 35 MMHG — SIGNIFICANT CHANGE UP (ref 25–45)
PO2 BLDV: 42 MMHG — SIGNIFICANT CHANGE UP (ref 25–45)
POTASSIUM SERPL-MCNC: 4.1 MMOL/L — SIGNIFICANT CHANGE UP (ref 3.5–5.3)
POTASSIUM SERPL-SCNC: 4.1 MMOL/L — SIGNIFICANT CHANGE UP (ref 3.5–5.3)
PROT SERPL-MCNC: 6 G/DL — SIGNIFICANT CHANGE UP (ref 6–8.3)
PROTHROM AB SERPL-ACNC: 12.8 SEC — SIGNIFICANT CHANGE UP (ref 9.9–13.4)
PROTHROM AB SERPL-ACNC: 12.9 SEC — SIGNIFICANT CHANGE UP (ref 9.9–13.4)
RBC # BLD: 3.04 M/UL — LOW (ref 4.2–5.8)
RBC # FLD: 18.6 % — HIGH (ref 10.3–14.5)
SAO2 % BLDV: 56.7 % — LOW (ref 67–88)
SAO2 % BLDV: 61.2 % — LOW (ref 67–88)
SAO2 % BLDV: 70 % — SIGNIFICANT CHANGE UP (ref 67–88)
SODIUM SERPL-SCNC: 137 MMOL/L — SIGNIFICANT CHANGE UP (ref 135–145)
SPECIMEN SOURCE: SIGNIFICANT CHANGE UP
WBC # BLD: 10.78 K/UL — HIGH (ref 3.8–10.5)
WBC # FLD AUTO: 10.78 K/UL — HIGH (ref 3.8–10.5)

## 2025-06-25 PROCEDURE — 71045 X-RAY EXAM CHEST 1 VIEW: CPT | Mod: 26,76

## 2025-06-25 PROCEDURE — 93010 ELECTROCARDIOGRAM REPORT: CPT

## 2025-06-25 PROCEDURE — 33948 ECMO/ECLS DAILY MGMT-VENOUS: CPT

## 2025-06-25 PROCEDURE — 99292 CRITICAL CARE ADDL 30 MIN: CPT

## 2025-06-25 PROCEDURE — 99291 CRITICAL CARE FIRST HOUR: CPT

## 2025-06-25 RX ORDER — TRAZODONE HCL 100 MG
50 TABLET ORAL AT BEDTIME
Refills: 0 | Status: DISCONTINUED | OUTPATIENT
Start: 2025-06-25 | End: 2025-07-11

## 2025-06-25 RX ORDER — INSULIN LISPRO 100 U/ML
9 INJECTION, SOLUTION INTRAVENOUS; SUBCUTANEOUS
Refills: 0 | Status: DISCONTINUED | OUTPATIENT
Start: 2025-06-25 | End: 2025-07-02

## 2025-06-25 RX ORDER — DEXTROSE 50 % IN WATER 50 %
15 SYRINGE (ML) INTRAVENOUS ONCE
Refills: 0 | Status: DISCONTINUED | OUTPATIENT
Start: 2025-06-25 | End: 2025-07-11

## 2025-06-25 RX ORDER — GLUCAGON 3 MG/1
1 POWDER NASAL ONCE
Refills: 0 | Status: DISCONTINUED | OUTPATIENT
Start: 2025-06-25 | End: 2025-07-11

## 2025-06-25 RX ORDER — IPRATROPIUM BROMIDE AND ALBUTEROL SULFATE .5; 2.5 MG/3ML; MG/3ML
3 SOLUTION RESPIRATORY (INHALATION) EVERY 6 HOURS
Refills: 0 | Status: DISCONTINUED | OUTPATIENT
Start: 2025-06-25 | End: 2025-06-27

## 2025-06-25 RX ORDER — INSULIN GLARGINE-YFGN 100 [IU]/ML
28 INJECTION, SOLUTION SUBCUTANEOUS AT BEDTIME
Refills: 0 | Status: DISCONTINUED | OUTPATIENT
Start: 2025-06-25 | End: 2025-07-02

## 2025-06-25 RX ORDER — LANOLIN/MINERAL OIL/PETROLATUM
1 OINTMENT (GRAM) OPHTHALMIC (EYE)
Refills: 0 | Status: DISCONTINUED | OUTPATIENT
Start: 2025-06-25 | End: 2025-07-11

## 2025-06-25 RX ADMIN — Medication 1300 MILLIGRAM(S): at 21:37

## 2025-06-25 RX ADMIN — HEPARIN SODIUM 8 UNIT(S)/HR: 1000 INJECTION INTRAVENOUS; SUBCUTANEOUS at 07:24

## 2025-06-25 RX ADMIN — INSULIN GLARGINE-YFGN 28 UNIT(S): 100 INJECTION, SOLUTION SUBCUTANEOUS at 21:35

## 2025-06-25 RX ADMIN — POLYETHYLENE GLYCOL 3350 17 GRAM(S): 17 POWDER, FOR SOLUTION ORAL at 06:01

## 2025-06-25 RX ADMIN — Medication 1300 MILLIGRAM(S): at 13:17

## 2025-06-25 RX ADMIN — Medication 3 UNIT(S)/HR: at 07:23

## 2025-06-25 RX ADMIN — GABAPENTIN 100 MILLIGRAM(S): 400 CAPSULE ORAL at 13:16

## 2025-06-25 RX ADMIN — Medication 50 MILLIGRAM(S): at 21:37

## 2025-06-25 RX ADMIN — INSULIN LISPRO 9 UNIT(S): 100 INJECTION, SOLUTION INTRAVENOUS; SUBCUTANEOUS at 11:06

## 2025-06-25 RX ADMIN — POLYETHYLENE GLYCOL 3350 17 GRAM(S): 17 POWDER, FOR SOLUTION ORAL at 17:09

## 2025-06-25 RX ADMIN — Medication 10 MILLILITER(S): at 07:27

## 2025-06-25 RX ADMIN — Medication 5.96 MICROGRAM(S)/KG/MIN: at 07:27

## 2025-06-25 RX ADMIN — GABAPENTIN 100 MILLIGRAM(S): 400 CAPSULE ORAL at 21:38

## 2025-06-25 RX ADMIN — Medication 1 APPLICATION(S): at 06:01

## 2025-06-25 RX ADMIN — INSULIN LISPRO 2: 100 INJECTION, SOLUTION INTRAVENOUS; SUBCUTANEOUS at 17:10

## 2025-06-25 RX ADMIN — DOBUTAMINE 11.9 MICROGRAM(S)/KG/MIN: 250 INJECTION INTRAVENOUS at 19:50

## 2025-06-25 RX ADMIN — Medication 500 MILLIGRAM(S): at 06:00

## 2025-06-25 RX ADMIN — Medication 10 MILLILITER(S): at 19:44

## 2025-06-25 RX ADMIN — INSULIN LISPRO 8: 100 INJECTION, SOLUTION INTRAVENOUS; SUBCUTANEOUS at 11:06

## 2025-06-25 RX ADMIN — Medication 1 DROP(S): at 10:33

## 2025-06-25 RX ADMIN — IPRATROPIUM BROMIDE AND ALBUTEROL SULFATE 3 MILLILITER(S): .5; 2.5 SOLUTION RESPIRATORY (INHALATION) at 17:15

## 2025-06-25 RX ADMIN — IPRATROPIUM BROMIDE AND ALBUTEROL SULFATE 3 MILLILITER(S): .5; 2.5 SOLUTION RESPIRATORY (INHALATION) at 23:02

## 2025-06-25 RX ADMIN — CEFEPIME 100 MILLIGRAM(S): 2 INJECTION, POWDER, FOR SOLUTION INTRAVENOUS at 13:31

## 2025-06-25 RX ADMIN — GABAPENTIN 100 MILLIGRAM(S): 400 CAPSULE ORAL at 06:01

## 2025-06-25 RX ADMIN — SODIUM NITROPRUSSIDE INJECTION 5.96 MICROGRAM(S)/KG/MIN: 50 INJECTION, SOLUTION, CONCENTRATE INTRAVENOUS at 07:26

## 2025-06-25 RX ADMIN — Medication 1 TABLET(S): at 11:14

## 2025-06-25 RX ADMIN — Medication 4 MILLILITER(S): at 23:02

## 2025-06-25 RX ADMIN — Medication 4 MILLILITER(S): at 10:07

## 2025-06-25 RX ADMIN — Medication 2 TABLET(S): at 21:38

## 2025-06-25 RX ADMIN — DOBUTAMINE 11.9 MICROGRAM(S)/KG/MIN: 250 INJECTION INTRAVENOUS at 07:24

## 2025-06-25 RX ADMIN — AMIODARONE HYDROCHLORIDE 200 MILLIGRAM(S): 50 INJECTION, SOLUTION INTRAVENOUS at 17:10

## 2025-06-25 RX ADMIN — HEPARIN SODIUM 8.5 UNIT(S)/HR: 1000 INJECTION INTRAVENOUS; SUBCUTANEOUS at 19:49

## 2025-06-25 RX ADMIN — Medication 40 MILLIGRAM(S): at 06:04

## 2025-06-25 RX ADMIN — Medication 5 MILLILITER(S): at 17:53

## 2025-06-25 RX ADMIN — CEFEPIME 100 MILLIGRAM(S): 2 INJECTION, POWDER, FOR SOLUTION INTRAVENOUS at 21:34

## 2025-06-25 RX ADMIN — VASOPRESSIN 4.5 UNIT(S)/MIN: 20 INJECTION INTRAVENOUS at 19:50

## 2025-06-25 RX ADMIN — IPRATROPIUM BROMIDE AND ALBUTEROL SULFATE 3 MILLILITER(S): .5; 2.5 SOLUTION RESPIRATORY (INHALATION) at 10:07

## 2025-06-25 RX ADMIN — VASOPRESSIN 4.5 UNIT(S)/MIN: 20 INJECTION INTRAVENOUS at 07:24

## 2025-06-25 RX ADMIN — AMIODARONE HYDROCHLORIDE 600 MILLIGRAM(S): 50 INJECTION, SOLUTION INTRAVENOUS at 23:00

## 2025-06-25 RX ADMIN — AMIODARONE HYDROCHLORIDE 200 MILLIGRAM(S): 50 INJECTION, SOLUTION INTRAVENOUS at 06:00

## 2025-06-25 RX ADMIN — Medication 5 MILLIGRAM(S): at 21:37

## 2025-06-25 RX ADMIN — Medication 1300 MILLIGRAM(S): at 06:00

## 2025-06-25 RX ADMIN — Medication 81 MILLIGRAM(S): at 11:05

## 2025-06-25 RX ADMIN — SODIUM NITROPRUSSIDE INJECTION 5.96 MICROGRAM(S)/KG/MIN: 50 INJECTION, SOLUTION, CONCENTRATE INTRAVENOUS at 19:50

## 2025-06-25 RX ADMIN — Medication 500 MILLIGRAM(S): at 17:10

## 2025-06-25 RX ADMIN — INSULIN LISPRO 9 UNIT(S): 100 INJECTION, SOLUTION INTRAVENOUS; SUBCUTANEOUS at 17:09

## 2025-06-25 RX ADMIN — CEFEPIME 100 MILLIGRAM(S): 2 INJECTION, POWDER, FOR SOLUTION INTRAVENOUS at 06:00

## 2025-06-25 RX ADMIN — Medication 4 MILLILITER(S): at 17:15

## 2025-06-25 RX ADMIN — ATORVASTATIN CALCIUM 80 MILLIGRAM(S): 80 TABLET, FILM COATED ORAL at 21:36

## 2025-06-25 RX ADMIN — EPOETIN ALFA 10000 UNIT(S): 10000 SOLUTION INTRAVENOUS; SUBCUTANEOUS at 11:29

## 2025-06-25 RX ADMIN — Medication 15 MILLILITER(S): at 17:53

## 2025-06-25 NOTE — PROGRESS NOTE ADULT - SUBJECTIVE AND OBJECTIVE BOX
Patient seen and examined at the bedside.    Remained critically ill on continuous ICU monitoring.    OBJECTIVE:  Vital Signs Last 24 Hrs  T(C): 36.7 (25 Jun 2025 20:00), Max: 36.8 (25 Jun 2025 00:00)  T(F): 98 (25 Jun 2025 20:00), Max: 98.3 (25 Jun 2025 04:00)  HR: 86 (25 Jun 2025 21:00) (63 - 88)  BP: 158/69 (25 Jun 2025 21:00) (123/57 - 158/69)  BP(mean): 99 (25 Jun 2025 21:00) (82 - 100)  RR: 20 (25 Jun 2025 21:00) (16 - 35)  SpO2: 100% (25 Jun 2025 21:00) (97% - 100%)    Parameters below as of 25 Jun 2025 20:00  Patient On (Oxygen Delivery Method): nasal cannula  O2 Flow (L/min): 2    Physical Exam:  General: NAD   Neurology: Awake, alert  Eyes: bilateral pupils equal and reactive   ENT/Neck: Neck supple, trachea midline, No JVD   Respiratory: Clear bilaterally   CV: S1S2, no murmurs        [x] Sternal dressing, [x] Mediastinal CT x2, [x] L Pleural CT          [x] Sinus rhythm [x] Temporary pacing VVI 40  Abdominal: Soft, NT, ND +BS   Extremities: 1-2+ pedal edema noted, + peripheral pulses   Skin: No Rashes, Hematoma, Ecchymosis                Assessment:  69 yo M with cardiogenic shock on RVAD ,DM2, CAD (total  4 coronary stents, last one PCI in 08/2024 ), CHF with EF 40-45%, severe MR  Now s/p Mitral valve replacement, CABG x3 and RVAD placement on 6/17/2025     Right heart failure s/p RVAD insertion 6/22/25   Cardiogenic shock  Acute postop pulmonary insufficiency  Hemoptysis  Acute blood loss anemia  Thrombocytopenia  ESRD  Hyperglycemia    Plan:   ***Neuro***  [x] Nonfocal  Post operative neuro assessment   Pain management with Gabapentin and prns   Trazadone - on hold  Melatonin at bedtime    ***Cardiovascular***  Invasive hemodynamic monitoring, assess perfusion indices  SR (63 - 88) / CVP (-3 - 261) / MAP (24 - 87) / Hct 25.7% / Lactate 0.9  RV failure, s/p CABG, s/p MV repair  [x] Dobutamine 5 mcgs/kG/Min   [x] RVAD with oxygenator 2500 RPMs, flow of 2.34, sweep of 0 - Sweep trial  PO Amiodarone for Afib prophylaxis  Monitor chest tube outputs  [x] ASA [x] Statin   Serial EKG and cardiac enzymes     ***Pulmonary***  [x] NC 2 L  Encourage incentive spirometry, continue pulse ox monitoring, follow ABGs, continue Duonebs    ***GI***  [x] CC Diet   [x] Protonix for stress ulcer ppx  Bowel regimen with Miralax and Senna     ***Renal***  [x] ESRD on CRRT  Goal negative balance.  Metabolic acidosis - Bicarb tabs   Continue to monitor I/Os, BUN/Creatinine.   Replete lytes PRN    ***Heme***  [x] AC Therapy with Heparin gtt PTT goal 40-50  Reassessment of hemodynamics post resuscitation    ***ID***  Cefepime for empiric dosing    ***Endocrine***  [x] Stress Hyperglycemia: HbA1c 6.8%                - [x] Insulin gtt [x] ISS [x] Lantus             - Need tight glycemic control to prevent wound infection.        Patient requires continuous monitoring with bedside rhythm monitoring, pulse oximetry monitoring, and continuous central venous and arterial pressure monitoring; and intermittent blood gas analysis. Care plan discussed with the ICU care team.  Patient remained critical, at risk for life threatening decompensation.    I have spent 55 minutes providing critical care management to this patient.    By signing my name below, I, Robert Wilson, attest that this documentation has been prepared under the direction and in the presence of Joanne Abreu NP.  Electronically signed: Maria Luisa Collado, 06-25-25 @ 21:44    I, Joanne Abreu, personally performed the services described in this documentation. All medical record entries made by the jose [email or web address] were at my direction and in my presence. I have reviewed the chart and agree that the record reflects my personal performance and is accurate and complete.  Electronically signed: Joanne Abreu NP.

## 2025-06-25 NOTE — PROGRESS NOTE ADULT - SUBJECTIVE AND OBJECTIVE BOX
Patient seen and examined at the bedside.    Remained critically ill on continuous ICU monitoring.      Brief Summary:    71 yo M PMH of HTN, HLD, DM2, CAD (total  4 coronary stents, last one PCI in 08/2024), CHF with EF 40-45%, severe MR  Now s/p  Mitral valve replacement, CABG x3 and RVAD placement on 6/17/2025     24 Hour events:    Extubated  Walking with PT  Increased heparin PTT goal  sweep trial with some hypoxia   Cefepime started for serratia in BAL     OBJECTIVE:  Vital Signs Last 24 Hrs  T(C): 36.4 (25 Jun 2025 12:00), Max: 37.1 (24 Jun 2025 16:00)  T(F): 97.6 (25 Jun 2025 12:00), Max: 98.7 (24 Jun 2025 16:00)  HR: 75 (25 Jun 2025 14:00) (58 - 87)  BP: 155/70 (25 Jun 2025 14:00) (123/57 - 155/70)  BP(mean): 100 (25 Jun 2025 14:00) (82 - 100)  RR: 19 (25 Jun 2025 14:00) (16 - 35)  SpO2: 100% (25 Jun 2025 12:00) (95% - 100%)    Parameters below as of 25 Jun 2025 12:00  Patient On (Oxygen Delivery Method): room air                    Physical Exam:   General: Intubated   Neurology: Following commands - Ambulating well.  Respiratory: Bilateral breath sounds, coarse  CV: Sinus ivon   RVAD pulmonary 17F, Venous R femoral 25F  Abdominal: Soft, Nontender  Extremities: Warm, well-perfused, AVF on RUE         -------------------------------------------------------------------------------------------------------------------------------    Labs:                        8.0    10.78 )-----------( 112      ( 25 Jun 2025 00:25 )             25.7     06-25    137  |  102  |  22  ----------------------------<  109[H]  4.1   |  20[L]  |  2.32[H]    Ca    9.2      25 Jun 2025 00:25  Phos  3.4     06-25  Mg     2.8     06-25    TPro  6.0  /  Alb  3.3  /  TBili  0.5  /  DBili  x   /  AST  34  /  ALT  30  /  AlkPhos  113  06-25    LIVER FUNCTIONS - ( 25 Jun 2025 00:25 )  Alb: 3.3 g/dL / Pro: 6.0 g/dL / ALK PHOS: 113 U/L / ALT: 30 U/L / AST: 34 U/L / GGT: x           PT/INR - ( 25 Jun 2025 05:09 )   PT: 12.9 sec;   INR: 1.13 ratio         PTT - ( 25 Jun 2025 11:48 )  PTT:40.0 sec  ABG - ( 25 Jun 2025 11:48 )  pH, Arterial: 7.36  pH, Blood: x     /  pCO2: 35    /  pO2: 116   / HCO3: 20    / Base Excess: -5.1  /  SaO2: 98.3              ------------------------------------------------------------------------------------------------------------------------------  Assessment:  71 yo M with cardiogenic shock on RVAD ,DM2, CAD (total  4 coronary stents, last one PCI in 08/2024 ), CHF with EF 40-45%, severe MR  Now s/p Mitral valve replacement, CABG x3 and RVAD placement on 6/17/2025     Right heart failure s/p RVAD insertion 6/22/25   Cardiogenic shock  Acute postop pulmonary insufficiency  Hemoptysis  Acute blood loss anemia  Thrombocytopenia  ESRD  Metabolic acidosis.  Hyperglycemia      Plan:   ***Neuro***  Maintain day/night cycle to prevent ICU delirium   Postoperative acute pain control with Tylenol, Gabapentin and prns    ***Cardiovascular***  At high risk for hemodynamic instability and cardiac arrhythmias.  RV failure, s/p CABG, s/p MV repair  RVAD 2500 RPM, flow 2.51, sweep 0.5  Remains on Dobutamine for inotropic support, wean epinephrine off as tolerated   Trend central venous saturation and lactate.  Wean pressors to maintain MAP > 65 mm Hg  ASA/Statin daily   PO Amiodarone load in process.    ***Pulmonary***  Postop acute pulmonary insufficiency  Hemoptysis - IP evaluation/bronch with clot removal   CXR with RML atelectasis vs effusion vs plug - aggressive chest PT and POCUS     ***GI***  Tolerating diet.  Protonix for GI prophylaxis.  Bowel regimen.   Trend LFTs.    ***Renal***  ESRD on CRRT  Goal negative balance.  Metabolic acidosis - Bicarb tabs     ***ID***  Perioperative coverage with Cefuroxime   6/23 BAL - GNR/Moderate Serratia - Cefepime 6/24-  ID following    ***Endocrine***  Insulin per protocol for Hyperglycemia - transition to ISS     ***Hematology***  Acute blood loss anemia and thrombocytopenia - 2 plts, 1 prbc in OR   Heparin for PTT 40-50      Care plan discussed with the ICU care team.   Patient remains critical, at risk for life threatening decompensation.    I have spent 50 minutes providing critical care management to this patient.    Disposition: CTU  I, Dylon Gonzalez MD, personally performed the services described in this documentation. I have reviewed the chart and agree that the record reflects my personal performance and is accurate and complete.

## 2025-06-25 NOTE — PROGRESS NOTE ADULT - SUBJECTIVE AND OBJECTIVE BOX
----------------------------------------------------------------------------------------------------  ECMO Daily Management Note   ----------------------------------------------------------------------------------------------------  Recent events:  Extubated  Walking in ICU  Progressing   ----------------------------------------------------------------------------------------------------  71 yo M s/p MVr, CABG x 3 with postop RV failure and cardiogenic shock, cannulated for RVAD with oxygenator    Date of initiation: 6/17/2025       Cannulae:   21Fr in Right IJ, 25Fr R Fem Vein  Cannula sites examined, well-secured.    Continue current support   Wean trial as tolerated   Anticoagulation with Argatroban infusion, goal PTT 50-60  =============================================================  Weight (kg): 79.4 (06-21-25)        Height (cm): 180.3 (06-21-25)   BSA (m2): 1.99 (06-21-25)        BMI (kg/m2): 24.4 (06-21-25)    ECMO  RPM:  2500 (25 Jun 2025 14:00)      Pump Flow (Lpm):  2.41 (25 Jun 2025 14:00)              Sweep  (L/min):   0.5 (25 Jun 2025 14:00)       FiO2 (%):  1 (25 Jun 2025 14:00)  Pre-membrane -  Pressure (mm/Hg):   83 (25 Jun 2025 14:00)      Post-membrane - Pressure (mm/Hg):  71 (25 Jun 2025 14:00)   ( 25 Jun 2025 07:00 )  pH: 7.33  /  pCO2: 42    / pO2: 237   /  HCO3: 22    /  Base Excess: -3.7  /  SaO2: 100.0          aMIOdarone    Tablet 200 milliGRAM(s) Oral two times a day  DOBUTamine Infusion 5 MICROgram(s)/kG/Min IV Continuous <Continuous>  nitroprusside Infusion 0.5 MICROgram(s)/kG/Min IV Continuous <Continuous>  vasopressin Infusion 0.03 Unit(s)/Min IV Continuous <Continuous>    Vent       (25 Jun 2025 11:48) pH, Art: 7.36/pCO2: 35/pO2: 116/HCO3: 20/BE: -5.1/SaO2: 98.3/LAC: 0.7, --   (25 Jun 2025 09:37) pH, Art: 7.35/pCO2: 37/pO2: 88/HCO3: 20/BE: -4.7/SaO2: 99.3/LAC: 0.7, --   (25 Jun 2025 07:56) pH, Art: 7.37/pCO2: 38/pO2: 119/HCO3: 22/BE: -3.0/SaO2: 100.0/LAC: 0.6, --   (25 Jun 2025 04:04) pH, Art: 7.35/pCO2: 37/pO2: 102/HCO3: 20/BE: -4.7/SaO2: 98.9/LAC: 0.7, --   (25 Jun 2025 00:05) pH, Art: 7.37/pCO2: 38/pO2: 106/HCO3: 22/BE: -3.0/SaO2: 98.4/LAC: 0.8, --     (25 Jun 2025 11:48) pH, Bart: 7.32/pCO2: 42/pO2: 38 /HCO3: 22/BE: -4.2/MVO2: /SvO2: 65.8/LAC: 0.9,   (25 Jun 2025 09:37) pH, Bart: 7.30/pCO2: 43/pO2: 38 /HCO3: 21/BE: -4.9/MVO2: /SvO2: 65.8/LAC: 0.8,   (25 Jun 2025 04:04) pH, Bart: 7.31/pCO2: 41/pO2: 42 /HCO3: 21/BE: -5.4/MVO2: /SvO2: 70.0/LAC: ,   (25 Jun 2025 00:05) pH, Bart: 7.31/pCO2: 47/pO2: 43 /HCO3: 24/BE: -2.5/MVO2: /SvO2: 74.8/LAC: 0.9,     ----------------------------------------------------------------------------------------------------  ANTICOAGULATION  aspirin enteric coated 81 milliGRAM(s) Oral daily  heparin  Infusion 400 Unit(s)/Hr IV Continuous <Continuous>  aspirin enteric coated 81 milliGRAM(s) Oral daily    (25 Jun 2025 11:48)  aPTT: 40.0  Xa: 0.16  PT: -----  INR: -----   Fibrinogen: -----  Platelets: -----  (25 Jun 2025 05:09)  aPTT: 33.5  Xa: 0.08  PT: 12.9  INR: 1.13   Fibrinogen: -----  Platelets: -----  (25 Jun 2025 00:25)  aPTT: -----  Xa: -----  PT: 12.8  INR: 1.12   Fibrinogen: 432   Platelets: 112   (24 Jun 2025 22:20)  aPTT: 31.9  Xa: 0.10  PT: -----  INR: -----   Fibrinogen: -----  Platelets: -----    (25 Jun 2025 00:25)  Hemoglobin: 8.0     LDH: 301     Haptoglobin: 228    PFH:  -----  (24 Jun 2025 00:14)  Hemoglobin: 7.5     LDH: 282     Haptoglobin: 208    PFH:  -----    ----------------------------------------------------------------------------------------------------  Daily ECMO Checklist:   Progress report received from perfusionist   Circuit checked/inspected   Emergency equipment and supplies checked   Cannulation site inspection     I have reviewed all of the above information as well as pertinent labs/imaging/testing and care plan with the bedside nurse, perfusionist and care team members and provided my recommendations for support.   ECMO Management was separate from critical care billing time.

## 2025-06-25 NOTE — PROGRESS NOTE ADULT - SUBJECTIVE AND OBJECTIVE BOX
On Epinephrine and Heparin gtts  Off Dobutamine/Off Vasopressin      VITAL:  T(C): , Max: 37.1 (06-24-25 @ 08:00)  T(F): , Max: 98.7 (06-24-25 @ 08:00)  HR: 86 (06-25-25 @ 07:00)  BP: 139/63 (06-25-25 @ 06:00)  BP(mean): 91 (06-25-25 @ 06:00)  RR: 18 (06-25-25 @ 07:00)  SpO2: 100% (06-25-25 @ 07:00)  Net negative 3349cc/24h    PHYSICAL EXAM:  Constitutional: alert, pleasant, NAD  HEENT: NCAT, DMM  Neck:  No JVD  Respiratory: coarse BS, (+)chest tube x 3  Cardiovascular: ivon s1s2  Gastrointestinal: BS+, soft, NT/ND  Extremities: No peripheral edema  : No echeverria  Skin: No rashes  Access: AVF     LABS:                        8.0    10.78 )-----------( 112      ( 25 Jun 2025 00:25 )             25.7     Na(137)/K(4.1)/Cl(102)/HCO3(20)/BUN(22)/Cr(2.32)Glu(109)/Ca(9.2)/Mg(2.8)/PO4(3.4)    06-25 @ 00:25  Na(137)/K(4.6)/Cl(102)/HCO3(17)/BUN(24)/Cr(2.39)Glu(126)/Ca(9.1)/Mg(2.7)/PO4(6.1)    06-24 @ 00:14  Na(137)/K(4.0)/Cl(103)/HCO3(16)/BUN(22)/Cr(2.59)Glu(108)/Ca(9.0)/Mg(2.7)/PO4(4.4)    06-23 @ 00:25  Na(138)/K(4.3)/Cl(101)/HCO3(17)/BUN(25)/Cr(2.95)Glu(180)/Ca(8.9)/Mg(2.9)/PO4(5.5)    06-22 @ 12:04      IMPRESSION: 70M w/ HTN, DM2, CAD, and ESRD-HD, s/p CABGx3/MV repair/RVAD 6/17/25    (1)Renal - ESRD - HD MWF - of late on CRRT via RVAD circuit  (2)Hyperphosphatemia - improved, s/p adjustment of dialysate from Phoxillum to Prismasate  (3)Metabolic acidosis - improved, s/p increase in effluent rate; we can switch back from 2.5L/h to 2L/h  (4)CV - cardiogenic shock - dependent on RVAD + inotropes - improving      RECOMMEND:  (1)Continue CVVHDF - will reduce effluent back from 2.5L/h to 2L/h and maintain current solutions; goal UF 150cc/h  (2)Meds for GFR<10/CVVHDF  (3)Weaning off Epinephrine per CTICU            Christiano Marie MD  [facility]  Office/on call physician: [phone]  Cell (7a-7p): [phone]       On Epinephrine and Heparin gtts  Off Dobutamine/Off Vasopressin  No pain, no sob  Complains of insomnia/associated fatigue    VITAL:  T(C): , Max: 37.1 (06-24-25 @ 08:00)  T(F): , Max: 98.7 (06-24-25 @ 08:00)  HR: 86 (06-25-25 @ 07:00)  BP: 139/63 (06-25-25 @ 06:00)  BP(mean): 91 (06-25-25 @ 06:00)  RR: 18 (06-25-25 @ 07:00)  SpO2: 100% (06-25-25 @ 07:00)  Net negative 3349cc/24h    PHYSICAL EXAM:  Constitutional: alert, pleasant, NAD  HEENT: NCAT, DMM  Neck:  No JVD  Respiratory: coarse BS, (+)chest tube x 3  Cardiovascular: ivon s1s2  Gastrointestinal: BS+, soft, NT/ND  Extremities: No peripheral edema  : No echeverria  Skin: No rashes  Access: AVF     LABS:                        8.0    10.78 )-----------( 112      ( 25 Jun 2025 00:25 )             25.7     Na(137)/K(4.1)/Cl(102)/HCO3(20)/BUN(22)/Cr(2.32)Glu(109)/Ca(9.2)/Mg(2.8)/PO4(3.4)    06-25 @ 00:25  Na(137)/K(4.6)/Cl(102)/HCO3(17)/BUN(24)/Cr(2.39)Glu(126)/Ca(9.1)/Mg(2.7)/PO4(6.1)    06-24 @ 00:14  Na(137)/K(4.0)/Cl(103)/HCO3(16)/BUN(22)/Cr(2.59)Glu(108)/Ca(9.0)/Mg(2.7)/PO4(4.4)    06-23 @ 00:25  Na(138)/K(4.3)/Cl(101)/HCO3(17)/BUN(25)/Cr(2.95)Glu(180)/Ca(8.9)/Mg(2.9)/PO4(5.5)    06-22 @ 12:04      IMPRESSION: 70M w/ HTN, DM2, CAD, and ESRD-HD, s/p CABGx3/MV repair/RVAD 6/17/25    (1)Renal - ESRD - HD MWF - of late on CRRT via RVAD circuit  (2)Hyperphosphatemia - improved, s/p adjustment of dialysate from Phoxillum to Prismasate  (3)Metabolic acidosis - improved, s/p increase in effluent rate; we can switch back from 2.5L/h to 2L/h  (4)CV - cardiogenic shock - dependent on RVAD + inotropes - improving      RECOMMEND:  (1)Continue CVVHDF - will reduce effluent back from 2.5L/h to 2L/h and maintain current solutions; goal UF 150cc/h  (2)Meds for GFR<10/CVVHDF  (3)Weaning off Epinephrine per CTICU  (4)Treatment of insomnia per CTICU          Christiano Marie MD  [facility]  Office/on call physician: [phone]  Cell (7a-7p): [phone]

## 2025-06-26 LAB
ALBUMIN SERPL ELPH-MCNC: 3.5 G/DL — SIGNIFICANT CHANGE UP (ref 3.3–5)
ALP SERPL-CCNC: 110 U/L — SIGNIFICANT CHANGE UP (ref 40–120)
ALT FLD-CCNC: 33 U/L — SIGNIFICANT CHANGE UP (ref 10–45)
ANION GAP SERPL CALC-SCNC: 13 MMOL/L — SIGNIFICANT CHANGE UP (ref 5–17)
APTT BLD: 39.6 SEC — HIGH (ref 26.1–36.8)
APTT BLD: 40.6 SEC — HIGH (ref 26.1–36.8)
APTT BLD: 43.2 SEC — HIGH (ref 26.1–36.8)
APTT BLD: 51 SEC — HIGH (ref 26.1–36.8)
AST SERPL-CCNC: 39 U/L — SIGNIFICANT CHANGE UP (ref 10–40)
BASE EXCESS BLDV CALC-SCNC: -0.6 MMOL/L — SIGNIFICANT CHANGE UP (ref -2–3)
BASE EXCESS BLDV CALC-SCNC: -0.7 MMOL/L — SIGNIFICANT CHANGE UP (ref -2–3)
BASE EXCESS BLDV CALC-SCNC: -0.9 MMOL/L — SIGNIFICANT CHANGE UP (ref -2–3)
BASE EXCESS BLDV CALC-SCNC: -1.3 MMOL/L — SIGNIFICANT CHANGE UP (ref -2–3)
BASE EXCESS BLDV CALC-SCNC: -1.3 MMOL/L — SIGNIFICANT CHANGE UP (ref -2–3)
BASE EXCESS BLDV CALC-SCNC: -1.9 MMOL/L — SIGNIFICANT CHANGE UP (ref -2–3)
BASE EXCESS BLDV CALC-SCNC: -3.3 MMOL/L — LOW (ref -2–3)
BASOPHILS # BLD AUTO: 0.03 K/UL — SIGNIFICANT CHANGE UP (ref 0–0.2)
BASOPHILS NFR BLD AUTO: 0.3 % — SIGNIFICANT CHANGE UP (ref 0–2)
BILIRUB SERPL-MCNC: 0.5 MG/DL — SIGNIFICANT CHANGE UP (ref 0.2–1.2)
BUN SERPL-MCNC: 20 MG/DL — SIGNIFICANT CHANGE UP (ref 7–23)
CALCIUM SERPL-MCNC: 9.7 MG/DL — SIGNIFICANT CHANGE UP (ref 8.4–10.5)
CHLORIDE SERPL-SCNC: 102 MMOL/L — SIGNIFICANT CHANGE UP (ref 96–108)
CO2 BLDV-SCNC: 23 MMOL/L — SIGNIFICANT CHANGE UP (ref 22–26)
CO2 BLDV-SCNC: 26 MMOL/L — SIGNIFICANT CHANGE UP (ref 22–26)
CO2 BLDV-SCNC: 27 MMOL/L — HIGH (ref 22–26)
CO2 SERPL-SCNC: 22 MMOL/L — SIGNIFICANT CHANGE UP (ref 22–31)
CREAT SERPL-MCNC: 2.25 MG/DL — HIGH (ref 0.5–1.3)
EGFR: 30 ML/MIN/1.73M2 — LOW
EGFR: 30 ML/MIN/1.73M2 — LOW
EOSINOPHIL # BLD AUTO: 0.14 K/UL — SIGNIFICANT CHANGE UP (ref 0–0.5)
EOSINOPHIL NFR BLD AUTO: 1.2 % — SIGNIFICANT CHANGE UP (ref 0–6)
FIBRINOGEN PPP-MCNC: 395 MG/DL — SIGNIFICANT CHANGE UP (ref 200–445)
GAS PNL BLDA: SIGNIFICANT CHANGE UP
GAS PNL BLDV: SIGNIFICANT CHANGE UP
GLUCOSE BLDC GLUCOMTR-MCNC: 110 MG/DL — HIGH (ref 70–99)
GLUCOSE BLDC GLUCOMTR-MCNC: 127 MG/DL — HIGH (ref 70–99)
GLUCOSE BLDC GLUCOMTR-MCNC: 142 MG/DL — HIGH (ref 70–99)
GLUCOSE SERPL-MCNC: 130 MG/DL — HIGH (ref 70–99)
HAPTOGLOB SERPL-MCNC: 225 MG/DL — HIGH (ref 34–200)
HCO3 BLDV-SCNC: 22 MMOL/L — SIGNIFICANT CHANGE UP (ref 22–29)
HCO3 BLDV-SCNC: 24 MMOL/L — SIGNIFICANT CHANGE UP (ref 22–29)
HCO3 BLDV-SCNC: 25 MMOL/L — SIGNIFICANT CHANGE UP (ref 22–29)
HCO3 BLDV-SCNC: 25 MMOL/L — SIGNIFICANT CHANGE UP (ref 22–29)
HCO3 BLDV-SCNC: 26 MMOL/L — SIGNIFICANT CHANGE UP (ref 22–29)
HCT VFR BLD CALC: 25.9 % — LOW (ref 39–50)
HGB BLD-MCNC: 8.2 G/DL — LOW (ref 13–17)
HOROWITZ INDEX BLDV+IHG-RTO: 21 — SIGNIFICANT CHANGE UP
HOROWITZ INDEX BLDV+IHG-RTO: 28 — SIGNIFICANT CHANGE UP
IMM GRANULOCYTES # BLD AUTO: 0.65 K/UL — HIGH (ref 0–0.07)
IMM GRANULOCYTES NFR BLD AUTO: 5.6 % — HIGH (ref 0–0.9)
INR BLD: 1.17 RATIO — HIGH (ref 0.85–1.16)
LDH SERPL L TO P-CCNC: 325 U/L — HIGH (ref 50–242)
LYMPHOCYTES # BLD AUTO: 0.83 K/UL — LOW (ref 1–3.3)
LYMPHOCYTES NFR BLD AUTO: 7.1 % — LOW (ref 13–44)
MAGNESIUM SERPL-MCNC: 2.7 MG/DL — HIGH (ref 1.6–2.6)
MCHC RBC-ENTMCNC: 26.8 PG — LOW (ref 27–34)
MCHC RBC-ENTMCNC: 31.7 G/DL — LOW (ref 32–36)
MCV RBC AUTO: 84.6 FL — SIGNIFICANT CHANGE UP (ref 80–100)
MONOCYTES # BLD AUTO: 1.17 K/UL — HIGH (ref 0–0.9)
MONOCYTES NFR BLD AUTO: 10 % — SIGNIFICANT CHANGE UP (ref 2–14)
NEUTROPHILS # BLD AUTO: 8.83 K/UL — HIGH (ref 1.8–7.4)
NEUTROPHILS NFR BLD AUTO: 75.8 % — SIGNIFICANT CHANGE UP (ref 43–77)
NRBC # BLD AUTO: 0.04 K/UL — HIGH (ref 0–0)
NRBC # FLD: 0.04 K/UL — HIGH (ref 0–0)
NRBC BLD AUTO-RTO: 0 /100 WBCS — SIGNIFICANT CHANGE UP (ref 0–0)
PCO2 BLDV: 40 MMHG — LOW (ref 42–55)
PCO2 BLDV: 43 MMHG — SIGNIFICANT CHANGE UP (ref 42–55)
PCO2 BLDV: 43 MMHG — SIGNIFICANT CHANGE UP (ref 42–55)
PCO2 BLDV: 44 MMHG — SIGNIFICANT CHANGE UP (ref 42–55)
PCO2 BLDV: 45 MMHG — SIGNIFICANT CHANGE UP (ref 42–55)
PCO2 BLDV: 46 MMHG — SIGNIFICANT CHANGE UP (ref 42–55)
PCO2 BLDV: 49 MMHG — SIGNIFICANT CHANGE UP (ref 42–55)
PH BLDV: 7.33 — SIGNIFICANT CHANGE UP (ref 7.32–7.43)
PH BLDV: 7.33 — SIGNIFICANT CHANGE UP (ref 7.32–7.43)
PH BLDV: 7.35 — SIGNIFICANT CHANGE UP (ref 7.32–7.43)
PH BLDV: 7.36 — SIGNIFICANT CHANGE UP (ref 7.32–7.43)
PH BLDV: 7.37 — SIGNIFICANT CHANGE UP (ref 7.32–7.43)
PHOSPHATE SERPL-MCNC: 2.7 MG/DL — SIGNIFICANT CHANGE UP (ref 2.5–4.5)
PLATELET # BLD AUTO: 134 K/UL — LOW (ref 150–400)
PMV BLD: 11.8 FL — SIGNIFICANT CHANGE UP (ref 7–13)
PO2 BLDV: 29 MMHG — SIGNIFICANT CHANGE UP (ref 25–45)
PO2 BLDV: 30 MMHG — SIGNIFICANT CHANGE UP (ref 25–45)
PO2 BLDV: 34 MMHG — SIGNIFICANT CHANGE UP (ref 25–45)
PO2 BLDV: 40 MMHG — SIGNIFICANT CHANGE UP (ref 25–45)
PO2 BLDV: 40 MMHG — SIGNIFICANT CHANGE UP (ref 25–45)
PO2 BLDV: 41 MMHG — SIGNIFICANT CHANGE UP (ref 25–45)
PO2 BLDV: 41 MMHG — SIGNIFICANT CHANGE UP (ref 25–45)
POTASSIUM SERPL-MCNC: 3.9 MMOL/L — SIGNIFICANT CHANGE UP (ref 3.5–5.3)
POTASSIUM SERPL-SCNC: 3.9 MMOL/L — SIGNIFICANT CHANGE UP (ref 3.5–5.3)
PROT SERPL-MCNC: 6.4 G/DL — SIGNIFICANT CHANGE UP (ref 6–8.3)
PROTHROM AB SERPL-ACNC: 13.4 SEC — SIGNIFICANT CHANGE UP (ref 9.9–13.4)
RBC # BLD: 3.06 M/UL — LOW (ref 4.2–5.8)
RBC # FLD: 18.6 % — HIGH (ref 10.3–14.5)
SAO2 % BLDV: 44.5 % — LOW (ref 67–88)
SAO2 % BLDV: 52.9 % — LOW (ref 67–88)
SAO2 % BLDV: 57.9 % — LOW (ref 67–88)
SAO2 % BLDV: 67.7 % — SIGNIFICANT CHANGE UP (ref 67–88)
SAO2 % BLDV: 69.8 % — SIGNIFICANT CHANGE UP (ref 67–88)
SAO2 % BLDV: 71.9 % — SIGNIFICANT CHANGE UP (ref 67–88)
SAO2 % BLDV: 73.5 % — SIGNIFICANT CHANGE UP (ref 67–88)
SODIUM SERPL-SCNC: 137 MMOL/L — SIGNIFICANT CHANGE UP (ref 135–145)
SURGICAL PATHOLOGY STUDY: SIGNIFICANT CHANGE UP
WBC # BLD: 11.65 K/UL — HIGH (ref 3.8–10.5)
WBC # FLD AUTO: 11.65 K/UL — HIGH (ref 3.8–10.5)

## 2025-06-26 PROCEDURE — 93010 ELECTROCARDIOGRAM REPORT: CPT

## 2025-06-26 PROCEDURE — 33948 ECMO/ECLS DAILY MGMT-VENOUS: CPT

## 2025-06-26 PROCEDURE — 71045 X-RAY EXAM CHEST 1 VIEW: CPT | Mod: 26,77

## 2025-06-26 PROCEDURE — 71045 X-RAY EXAM CHEST 1 VIEW: CPT | Mod: 26

## 2025-06-26 PROCEDURE — 99291 CRITICAL CARE FIRST HOUR: CPT

## 2025-06-26 RX ORDER — AMIODARONE HYDROCHLORIDE 50 MG/ML
150 INJECTION, SOLUTION INTRAVENOUS ONCE
Refills: 0 | Status: COMPLETED | OUTPATIENT
Start: 2025-06-26 | End: 2025-06-25

## 2025-06-26 RX ADMIN — Medication 5 MILLILITER(S): at 11:51

## 2025-06-26 RX ADMIN — Medication 4 MILLILITER(S): at 05:11

## 2025-06-26 RX ADMIN — Medication 10 MILLILITER(S): at 07:16

## 2025-06-26 RX ADMIN — IPRATROPIUM BROMIDE AND ALBUTEROL SULFATE 3 MILLILITER(S): .5; 2.5 SOLUTION RESPIRATORY (INHALATION) at 17:54

## 2025-06-26 RX ADMIN — Medication 1 APPLICATION(S): at 05:20

## 2025-06-26 RX ADMIN — IPRATROPIUM BROMIDE AND ALBUTEROL SULFATE 3 MILLILITER(S): .5; 2.5 SOLUTION RESPIRATORY (INHALATION) at 23:04

## 2025-06-26 RX ADMIN — INSULIN GLARGINE-YFGN 28 UNIT(S): 100 INJECTION, SOLUTION SUBCUTANEOUS at 22:07

## 2025-06-26 RX ADMIN — POLYETHYLENE GLYCOL 3350 17 GRAM(S): 17 POWDER, FOR SOLUTION ORAL at 17:06

## 2025-06-26 RX ADMIN — CEFEPIME 100 MILLIGRAM(S): 2 INJECTION, POWDER, FOR SOLUTION INTRAVENOUS at 05:20

## 2025-06-26 RX ADMIN — Medication 4 MILLILITER(S): at 17:53

## 2025-06-26 RX ADMIN — AMIODARONE HYDROCHLORIDE 200 MILLIGRAM(S): 50 INJECTION, SOLUTION INTRAVENOUS at 17:06

## 2025-06-26 RX ADMIN — INSULIN LISPRO 9 UNIT(S): 100 INJECTION, SOLUTION INTRAVENOUS; SUBCUTANEOUS at 07:15

## 2025-06-26 RX ADMIN — Medication 50 MILLIGRAM(S): at 21:14

## 2025-06-26 RX ADMIN — GABAPENTIN 100 MILLIGRAM(S): 400 CAPSULE ORAL at 14:04

## 2025-06-26 RX ADMIN — Medication 1300 MILLIGRAM(S): at 05:20

## 2025-06-26 RX ADMIN — CEFEPIME 100 MILLIGRAM(S): 2 INJECTION, POWDER, FOR SOLUTION INTRAVENOUS at 21:13

## 2025-06-26 RX ADMIN — HEPARIN SODIUM 9 UNIT(S)/HR: 1000 INJECTION INTRAVENOUS; SUBCUTANEOUS at 16:11

## 2025-06-26 RX ADMIN — Medication 1 TABLET(S): at 11:51

## 2025-06-26 RX ADMIN — Medication 81 MILLIGRAM(S): at 11:52

## 2025-06-26 RX ADMIN — Medication 1300 MILLIGRAM(S): at 14:04

## 2025-06-26 RX ADMIN — DOBUTAMINE 11.9 MICROGRAM(S)/KG/MIN: 250 INJECTION INTRAVENOUS at 19:38

## 2025-06-26 RX ADMIN — Medication 4 MILLILITER(S): at 23:05

## 2025-06-26 RX ADMIN — INSULIN LISPRO 2: 100 INJECTION, SOLUTION INTRAVENOUS; SUBCUTANEOUS at 22:05

## 2025-06-26 RX ADMIN — INSULIN LISPRO 9 UNIT(S): 100 INJECTION, SOLUTION INTRAVENOUS; SUBCUTANEOUS at 16:29

## 2025-06-26 RX ADMIN — HEPARIN SODIUM 9.5 UNIT(S)/HR: 1000 INJECTION INTRAVENOUS; SUBCUTANEOUS at 07:15

## 2025-06-26 RX ADMIN — IPRATROPIUM BROMIDE AND ALBUTEROL SULFATE 3 MILLILITER(S): .5; 2.5 SOLUTION RESPIRATORY (INHALATION) at 05:11

## 2025-06-26 RX ADMIN — POLYETHYLENE GLYCOL 3350 17 GRAM(S): 17 POWDER, FOR SOLUTION ORAL at 05:20

## 2025-06-26 RX ADMIN — Medication 40 MILLIGRAM(S): at 06:42

## 2025-06-26 RX ADMIN — HEPARIN SODIUM 9 UNIT(S)/HR: 1000 INJECTION INTRAVENOUS; SUBCUTANEOUS at 19:40

## 2025-06-26 RX ADMIN — Medication 10 MILLILITER(S): at 19:41

## 2025-06-26 RX ADMIN — GABAPENTIN 100 MILLIGRAM(S): 400 CAPSULE ORAL at 05:19

## 2025-06-26 RX ADMIN — Medication 500 MILLIGRAM(S): at 05:19

## 2025-06-26 RX ADMIN — AMIODARONE HYDROCHLORIDE 200 MILLIGRAM(S): 50 INJECTION, SOLUTION INTRAVENOUS at 05:19

## 2025-06-26 RX ADMIN — Medication 5 MILLILITER(S): at 17:18

## 2025-06-26 RX ADMIN — DOBUTAMINE 5.96 MICROGRAM(S)/KG/MIN: 250 INJECTION INTRAVENOUS at 10:00

## 2025-06-26 RX ADMIN — Medication 15 MILLILITER(S): at 17:06

## 2025-06-26 RX ADMIN — DOBUTAMINE 11.9 MICROGRAM(S)/KG/MIN: 250 INJECTION INTRAVENOUS at 15:58

## 2025-06-26 RX ADMIN — GABAPENTIN 100 MILLIGRAM(S): 400 CAPSULE ORAL at 21:14

## 2025-06-26 RX ADMIN — Medication 500 MILLIGRAM(S): at 17:06

## 2025-06-26 RX ADMIN — CEFEPIME 100 MILLIGRAM(S): 2 INJECTION, POWDER, FOR SOLUTION INTRAVENOUS at 14:04

## 2025-06-26 RX ADMIN — Medication 5 MILLIGRAM(S): at 21:14

## 2025-06-26 RX ADMIN — Medication 1300 MILLIGRAM(S): at 21:14

## 2025-06-26 RX ADMIN — ATORVASTATIN CALCIUM 80 MILLIGRAM(S): 80 TABLET, FILM COATED ORAL at 21:13

## 2025-06-26 RX ADMIN — INSULIN LISPRO 9 UNIT(S): 100 INJECTION, SOLUTION INTRAVENOUS; SUBCUTANEOUS at 11:50

## 2025-06-26 RX ADMIN — Medication 5 MILLILITER(S): at 05:19

## 2025-06-26 RX ADMIN — DOBUTAMINE 11.9 MICROGRAM(S)/KG/MIN: 250 INJECTION INTRAVENOUS at 07:15

## 2025-06-26 RX ADMIN — INSULIN LISPRO 2: 100 INJECTION, SOLUTION INTRAVENOUS; SUBCUTANEOUS at 07:14

## 2025-06-26 RX ADMIN — INSULIN LISPRO 5: 100 INJECTION, SOLUTION INTRAVENOUS; SUBCUTANEOUS at 11:49

## 2025-06-26 RX ADMIN — Medication 15 MILLILITER(S): at 05:18

## 2025-06-26 NOTE — PROGRESS NOTE ADULT - SUBJECTIVE AND OBJECTIVE BOX
On RVAD  On Dobutamine and Heparin gtts  On CVVHDF      VITAL:  T(C): , Max: 36.7 (06-25-25 @ 20:00)  T(F): , Max: 98 (06-25-25 @ 20:00)  HR: 92 (06-26-25 @ 07:00)  BP: 154/70 (06-26-25 @ 07:00)  BP(mean): 101 (06-26-25 @ 07:00)  RR: 16 (06-26-25 @ 07:00)  SpO2: 100% (06-26-25 @ 07:00)  Net UF 4819cc/24h      PHYSICAL EXAM:  Constitutional: alert, pleasant, NAD  HEENT: NCAT, DMM  Neck:  No JVD  Respiratory: coarse BS, (+)chest tube x 3  Cardiovascular: reg s1s2  Gastrointestinal: BS+, soft, NT/ND  Extremities: No peripheral edema  : No echeverria  Skin: No rashes  Access: AVF     LABS:                        8.2    11.65 )-----------( 134      ( 26 Jun 2025 00:33 )             25.9     Na(137)/K(3.9)/Cl(102)/HCO3(22)/BUN(20)/Cr(2.25)Glu(130)/Ca(9.7)/Mg(2.7)/PO4(2.7)    06-26 @ 00:33  Na(137)/K(4.1)/Cl(102)/HCO3(20)/BUN(22)/Cr(2.32)Glu(109)/Ca(9.2)/Mg(2.8)/PO4(3.4)    06-25 @ 00:25  Na(137)/K(4.6)/Cl(102)/HCO3(17)/BUN(24)/Cr(2.39)Glu(126)/Ca(9.1)/Mg(2.7)/PO4(6.1)    06-24 @ 00:14      IMPRESSION: 70M w/ HTN, DM2, CAD, and ESRD-HD, s/p CABGx3/MV repair/RVAD 6/17/25    (1)Renal - ESRD - HD MWF - of late on CRRT via RVAD circuit  (2)Lytes - grossly acceptable, on CVVHDF  (3)CV - cardiogenic shock - dependent on RVAD + inotropes - tolerating aggressive UF      RECOMMEND:  (1)Continue CVVHDF - will reduce effluent back from 2.5L/h to 2L/h and maintain current solutions; goal UF 200cc/h  (2)Meds for GFR<10/CVVHDF            Christiano Marie MD  [facility]  Office/on call physician: [phone]  Cell (7a-7p): [phone]       On RVAD  On Dobutamine and Heparin gtts  On CVVHDF      VITAL:  T(C): , Max: 36.7 (06-25-25 @ 20:00)  T(F): , Max: 98 (06-25-25 @ 20:00)  HR: 92 (06-26-25 @ 07:00)  BP: 154/70 (06-26-25 @ 07:00)  BP(mean): 101 (06-26-25 @ 07:00)  RR: 16 (06-26-25 @ 07:00)  SpO2: 100% (06-26-25 @ 07:00)  Net UF 4819cc/24h      PHYSICAL EXAM:  Constitutional: alert, pleasant, NAD  HEENT: NCAT, DMM  Neck:  No JVD  Respiratory: coarse BS, (+)chest tube x 2  Cardiovascular: reg s1s2  Gastrointestinal: BS+, soft, NT/ND  Extremities: No peripheral edema  : No echeverria  Skin: No rashes  Access: AVF     LABS:                        8.2    11.65 )-----------( 134      ( 26 Jun 2025 00:33 )             25.9     Na(137)/K(3.9)/Cl(102)/HCO3(22)/BUN(20)/Cr(2.25)Glu(130)/Ca(9.7)/Mg(2.7)/PO4(2.7)    06-26 @ 00:33  Na(137)/K(4.1)/Cl(102)/HCO3(20)/BUN(22)/Cr(2.32)Glu(109)/Ca(9.2)/Mg(2.8)/PO4(3.4)    06-25 @ 00:25  Na(137)/K(4.6)/Cl(102)/HCO3(17)/BUN(24)/Cr(2.39)Glu(126)/Ca(9.1)/Mg(2.7)/PO4(6.1)    06-24 @ 00:14      IMPRESSION: 70M w/ HTN, DM2, CAD, and ESRD-HD, s/p CABGx3/MV repair/RVAD 6/17/25    (1)Renal - ESRD - HD MWF - of late on CRRT via RVAD circuit  (2)Lytes - grossly acceptable, on CVVHDF  (3)CV - cardiogenic shock - dependent on RVAD + inotropes - tolerating aggressive UF      RECOMMEND:  (1)Continue CVVHDF - will reduce effluent back from 2.5L/h to 2L/h and maintain current solutions; goal UF 200cc/h  (2)Meds for GFR<10/CVVHDF            Christiano Marie MD  [facility]  Office/on call physician: [phone]  Cell (7a-7p): [phone]       On RVAD  On Dobutamine and Heparin gtts  On CVVHDF  No pain, no sob    VITAL:  T(C): , Max: 36.7 (06-25-25 @ 20:00)  T(F): , Max: 98 (06-25-25 @ 20:00)  HR: 92 (06-26-25 @ 07:00)  BP: 154/70 (06-26-25 @ 07:00)  BP(mean): 101 (06-26-25 @ 07:00)  RR: 16 (06-26-25 @ 07:00)  SpO2: 100% (06-26-25 @ 07:00)  Net UF 4819cc/24h      PHYSICAL EXAM:  Constitutional: alert, pleasant, NAD  HEENT: NCAT, DMM  Neck:  No JVD  Respiratory: coarse BS, (+)chest tube x 2  Cardiovascular: reg s1s2  Gastrointestinal: BS+, soft, NT/ND  Extremities: No peripheral edema  : No echeverria  Skin: No rashes  Access: AVF     LABS:                        8.2    11.65 )-----------( 134      ( 26 Jun 2025 00:33 )             25.9     Na(137)/K(3.9)/Cl(102)/HCO3(22)/BUN(20)/Cr(2.25)Glu(130)/Ca(9.7)/Mg(2.7)/PO4(2.7)    06-26 @ 00:33  Na(137)/K(4.1)/Cl(102)/HCO3(20)/BUN(22)/Cr(2.32)Glu(109)/Ca(9.2)/Mg(2.8)/PO4(3.4)    06-25 @ 00:25  Na(137)/K(4.6)/Cl(102)/HCO3(17)/BUN(24)/Cr(2.39)Glu(126)/Ca(9.1)/Mg(2.7)/PO4(6.1)    06-24 @ 00:14      IMPRESSION: 70M w/ HTN, DM2, CAD, and ESRD-HD, s/p CABGx3/MV repair/RVAD 6/17/25    (1)Renal - ESRD - HD MWF - of late on CRRT via RVAD circuit  (2)Lytes - grossly acceptable, on CVVHDF  (3)CV - cardiogenic shock - dependent on RVAD + inotropes - tolerating aggressive UF      RECOMMEND:  (1)Continue CVVHDF - will reduce effluent back from 2.5L/h to 2L/h and maintain current solutions; goal UF 200cc/h  (2)Meds for GFR<10/CVVHDF            Christiano Marie MD  [facility]  Office/on call physician: [phone]  Cell (7a-7d): [phone]

## 2025-06-26 NOTE — PROGRESS NOTE ADULT - SUBJECTIVE AND OBJECTIVE BOX
----------------------------------------------------------------------------------------------------  ECMO Daily Management Note   ----------------------------------------------------------------------------------------------------  Recent events:  Extubated  Walking in ICU  Progressing   ----------------------------------------------------------------------------------------------------  69 yo M s/p MVr, CABG x 3 with postop RV failure and cardiogenic shock, cannulated for RVAD with oxygenator    Date of initiation: 6/17/2025       Cannulae:   21Fr in Right IJ, 25Fr R Fem Vein  Cannula sites examined, well-secured.    Continue current support   Wean trial as tolerated   Anticoagulation with Argatroban infusion, goal PTT 50-60    =============================================================  Weight (kg): 79.4 (06-21-25)        Height (cm): 180.3 (06-21-25)   BSA (m2): 1.99 (06-21-25)        BMI (kg/m2): 24.4 (06-21-25)    ECMO  RPM:  2500 (26 Jun 2025 15:00)      Pump Flow (Lpm):  2.5 (26 Jun 2025 15:00)              Sweep  (L/min):   0 (26 Jun 2025 15:00)       FiO2 (%):  0 (26 Jun 2025 15:00)  Pre-membrane -  Pressure (mm/Hg):   80 (26 Jun 2025 11:00)      Post-membrane - Pressure (mm/Hg):  70 (26 Jun 2025 11:00)           aMIOdarone    Tablet 200 milliGRAM(s) Oral two times a day  DOBUTamine Infusion 5 MICROgram(s)/kG/Min IV Continuous <Continuous>  nitroprusside Infusion 0.5 MICROgram(s)/kG/Min IV Continuous <Continuous>  vasopressin Infusion 0.03 Unit(s)/Min IV Continuous <Continuous>    Vent       (26 Jun 2025 15:00) pH, Art: 7.38/pCO2: 39/pO2: 99/HCO3: 23/BE: -1.8/SaO2: 98.5/LAC: 1.4, --   (26 Jun 2025 13:30) pH, Art: 7.40/pCO2: 36/pO2: 94/HCO3: 22/BE: -2.2/SaO2: 98.8/LAC: 1.5, --   (26 Jun 2025 11:12) pH, Art: 7.42/pCO2: 37/pO2: 65/HCO3: 24/BE: -0.3/SaO2: 94.6/LAC: 0.8, --   (26 Jun 2025 06:50) pH, Art: 7.40/pCO2: 37/pO2: 105/HCO3: 23/BE: -1.7/SaO2: 98.5/LAC: 0.5, --   (26 Jun 2025 02:02) pH, Art: 7.41/pCO2: 37/pO2: 110/HCO3: 24/BE: -1.0/SaO2: 99.9/LAC: 0.6, --     (26 Jun 2025 15:00) pH, Bart: 7.33/pCO2: 49/pO2: 29 /HCO3: 26/BE: -0.7/MVO2: /SvO2: 44.5/LAC: ,   (26 Jun 2025 13:30) pH, Bart: 7.32/pCO2: 48/pO2: 28 /HCO3: 25/BE: -1.5/MVO2: /SvO2: 44.5/LAC: 1.6,   (26 Jun 2025 11:12) pH, Bart: 7.35/pCO2: 45/pO2: 30 /HCO3: 25/BE: -0.9/MVO2: /SvO2: 52.9/LAC: ,   (26 Jun 2025 06:50) pH, Bart: 7.37/pCO2: 43/pO2: 41 /HCO3: 25/BE: -0.6/MVO2: /SvO2: 71.9/LAC: ,   (26 Jun 2025 02:02) pH, Bart: 7.36/pCO2: 43/pO2: 41 /HCO3: 24/BE: -1.3/MVO2: /SvO2: 73.5/LAC: ,     ----------------------------------------------------------------------------------------------------  ANTICOAGULATION  aspirin enteric coated 81 milliGRAM(s) Oral daily  heparin  Infusion 400 Unit(s)/Hr IV Continuous <Continuous>  aspirin enteric coated 81 milliGRAM(s) Oral daily    (26 Jun 2025 09:16)  aPTT: 43.2  Xa: 0.26  PT: -----  INR: -----   Fibrinogen: -----  Platelets: -----  (26 Jun 2025 01:38)  aPTT: 39.6  Xa: 0.18  PT: 13.4  INR: 1.17   Fibrinogen: 395   Platelets: -----  (26 Jun 2025 00:33)  aPTT: -----  Xa: -----  PT: -----  INR: -----   Fibrinogen: -----  Platelets: 134   (25 Jun 2025 18:29)  aPTT: 39.1  Xa: 0.20  PT: -----  INR: -----   Fibrinogen: -----  Platelets: -----    (26 Jun 2025 00:33)  Hemoglobin: 8.2     LDH: 325     Haptoglobin: 225    PFH:  -----  (25 Jun 2025 00:25)  Hemoglobin: 8.0     LDH: 301     Haptoglobin: 228    PFH:  -----    ----------------------------------------------------------------------------------------------------  Daily ECMO Checklist:   Progress report received from perfusionist   Circuit checked/inspected   Emergency equipment and supplies checked   Cannulation site inspection     I have reviewed all of the above information as well as pertinent labs/imaging/testing and care plan with the bedside nurse, perfusionist and care team members and provided my recommendations for support.   ECMO Management was separate from critical care billing time.

## 2025-06-26 NOTE — PROGRESS NOTE ADULT - SUBJECTIVE AND OBJECTIVE BOX
Patient seen and examined at the bedside.    Remained critically ill on continuous ICU monitoring.      Brief Summary:  71 yo M PMH of HTN, HLD, DM2, CAD (total  4 coronary stents, last one PCI in 08/2024), CHF with EF 40-45%, severe MR  Now s/p  Mitral valve replacement, CABG x3 and RVAD placement on 6/17/2025     24 Hour events:    Extubated  Walking with PT  Increased heparin PTT goal  Tolerating sweep off  Negative fluid balance on CRRT       OBJECTIVE:  Vital Signs Last 24 Hrs  T(C): 36.4 (26 Jun 2025 12:00), Max: 36.7 (25 Jun 2025 20:00)  T(F): 97.5 (26 Jun 2025 12:00), Max: 98 (25 Jun 2025 20:00)  HR: 56 (26 Jun 2025 15:00) (34 - 94)  BP: 98/55 (26 Jun 2025 15:00) (95/49 - 158/69)  BP(mean): 73 (26 Jun 2025 15:00) (70 - 101)  RR: 15 (26 Jun 2025 15:00) (11 - 36)  SpO2: 94% (26 Jun 2025 15:00) (94% - 100%)    Parameters below as of 26 Jun 2025 12:00  Patient On (Oxygen Delivery Method): nasal cannula  O2 Flow (L/min): 2                  Physical Exam:   General: Intubated   Neurology: Following commands - Ambulating well.  Respiratory: Bilateral breath sounds, coarse  CV: Sinus   RVAD pulmonary 17F, Venous R femoral 25F  Abdominal: Soft, Nontender  Extremities: Warm, well-perfused, AVF on RUE              -------------------------------------------------------------------------------------------------------------------------------    Labs:                        8.2    11.65 )-----------( 134      ( 26 Jun 2025 00:33 )             25.9     06-26    137  |  102  |  20  ----------------------------<  130[H]  3.9   |  22  |  2.25[H]    Ca    9.7      26 Jun 2025 00:33  Phos  2.7     06-26  Mg     2.7     06-26    TPro  6.4  /  Alb  3.5  /  TBili  0.5  /  DBili  x   /  AST  39  /  ALT  33  /  AlkPhos  110  06-26    LIVER FUNCTIONS - ( 26 Jun 2025 00:33 )  Alb: 3.5 g/dL / Pro: 6.4 g/dL / ALK PHOS: 110 U/L / ALT: 33 U/L / AST: 39 U/L / GGT: x           PT/INR - ( 26 Jun 2025 01:38 )   PT: 13.4 sec;   INR: 1.17 ratio         PTT - ( 26 Jun 2025 09:16 )  PTT:43.2 sec  ABG - ( 26 Jun 2025 15:00 )  pH, Arterial: 7.38  pH, Blood: x     /  pCO2: 39    /  pO2: 99    / HCO3: 23    / Base Excess: -1.8  /  SaO2: 98.5              ------------------------------------------------------------------------------------------------------------------------------  Assessment:    71 yo M with cardiogenic shock on RVAD ,DM2, CAD (total  4 coronary stents, last one PCI in 08/2024 ), CHF with EF 40-45%, severe MR  Now s/p Mitral valve replacement, CABG x3 and RVAD placement on 6/17/2025     Right heart failure s/p RVAD insertion 6/22/25   Cardiogenic shock  Acute postop pulmonary insufficiency  Hemoptysis  Acute blood loss anemia  Thrombocytopenia  ESRD  Metabolic acidosis.  Hyperglycemia      Plan:   ***Neuro***  Maintain day/night cycle to prevent ICU delirium   Postoperative acute pain control with Tylenol, Gabapentin and prns    ***Cardiovascular***  At high risk for hemodynamic instability and cardiac arrhythmias.  RV failure, s/p CABG, s/p MV repair  RVAD 2500 RPM, flow 2.51 sweep 0 - remove oxygenator today   Remains on Dobutamine for inotropic support - wean as tolerated   Trend central venous saturation and lactate.  Off vasopressors with MAP > 65 mm Hg  ASA/Statin daily   PO Amiodarone load.    ***Pulmonary***  Postop acute pulmonary insufficiency  Hemoptysis - IP evaluation/bronch with clot removal     ***GI***  Tolerating diet.  Protonix for GI prophylaxis.  Bowel regimen.   Trend LFTs.    ***Renal***  ESRD on CRRT  Goal negative balance.  Metabolic acidosis - Bicarb tabs     ***ID***  Perioperative coverage with Cefuroxime   6/23 BAL - GNR/Moderate Serratia - Cefepime 6/24-  ID following    ***Endocrine***  Insulin per protocol for Hyperglycemia - transition to ISS     ***Hematology***  Acute blood loss anemia and thrombocytopenia - 2 plts, 1 prbc in OR   Heparin for PTT 40-50    Walking with physical therapy  OOB in chair       Care plan discussed with the ICU care team.   Patient remains critical, at risk for life threatening decompensation.    I have spent 50 minutes providing critical care management to this patient.    I, Dylon Gonzalez MD, personally performed the services described in this documentation. I have reviewed the chart and agree that the record reflects my personal performance and is accurate and complete.

## 2025-06-27 LAB
ALBUMIN SERPL ELPH-MCNC: 3.2 G/DL — LOW (ref 3.3–5)
ALBUMIN SERPL ELPH-MCNC: 3.2 G/DL — LOW (ref 3.3–5)
ALP SERPL-CCNC: 100 U/L — SIGNIFICANT CHANGE UP (ref 40–120)
ALP SERPL-CCNC: 112 U/L — SIGNIFICANT CHANGE UP (ref 40–120)
ALT FLD-CCNC: 27 U/L — SIGNIFICANT CHANGE UP (ref 10–45)
ALT FLD-CCNC: 29 U/L — SIGNIFICANT CHANGE UP (ref 10–45)
ANION GAP SERPL CALC-SCNC: 11 MMOL/L — SIGNIFICANT CHANGE UP (ref 5–17)
ANION GAP SERPL CALC-SCNC: 13 MMOL/L — SIGNIFICANT CHANGE UP (ref 5–17)
APTT BLD: 27.4 SEC — SIGNIFICANT CHANGE UP (ref 26.1–36.8)
APTT BLD: 29.9 SEC — SIGNIFICANT CHANGE UP (ref 26.1–36.8)
APTT BLD: 40.8 SEC — HIGH (ref 26.1–36.8)
APTT BLD: 52 SEC — HIGH (ref 26.1–36.8)
AST SERPL-CCNC: 31 U/L — SIGNIFICANT CHANGE UP (ref 10–40)
AST SERPL-CCNC: 33 U/L — SIGNIFICANT CHANGE UP (ref 10–40)
BASE EXCESS BLDV CALC-SCNC: -0.7 MMOL/L — SIGNIFICANT CHANGE UP (ref -2–3)
BASE EXCESS BLDV CALC-SCNC: -1.3 MMOL/L — SIGNIFICANT CHANGE UP (ref -2–3)
BASOPHILS # BLD AUTO: 0.03 K/UL — SIGNIFICANT CHANGE UP (ref 0–0.2)
BASOPHILS NFR BLD AUTO: 0.3 % — SIGNIFICANT CHANGE UP (ref 0–2)
BILIRUB SERPL-MCNC: 0.3 MG/DL — SIGNIFICANT CHANGE UP (ref 0.2–1.2)
BILIRUB SERPL-MCNC: 0.4 MG/DL — SIGNIFICANT CHANGE UP (ref 0.2–1.2)
BUN SERPL-MCNC: 21 MG/DL — SIGNIFICANT CHANGE UP (ref 7–23)
BUN SERPL-MCNC: 31 MG/DL — HIGH (ref 7–23)
CALCIUM SERPL-MCNC: 9.5 MG/DL — SIGNIFICANT CHANGE UP (ref 8.4–10.5)
CALCIUM SERPL-MCNC: 9.5 MG/DL — SIGNIFICANT CHANGE UP (ref 8.4–10.5)
CHLORIDE SERPL-SCNC: 102 MMOL/L — SIGNIFICANT CHANGE UP (ref 96–108)
CHLORIDE SERPL-SCNC: 103 MMOL/L — SIGNIFICANT CHANGE UP (ref 96–108)
CO2 BLDV-SCNC: 25 MMOL/L — SIGNIFICANT CHANGE UP (ref 22–26)
CO2 BLDV-SCNC: 26 MMOL/L — SIGNIFICANT CHANGE UP (ref 22–26)
CO2 SERPL-SCNC: 20 MMOL/L — LOW (ref 22–31)
CO2 SERPL-SCNC: 22 MMOL/L — SIGNIFICANT CHANGE UP (ref 22–31)
CREAT SERPL-MCNC: 2.43 MG/DL — HIGH (ref 0.5–1.3)
CREAT SERPL-MCNC: 3.55 MG/DL — HIGH (ref 0.5–1.3)
EGFR: 17 ML/MIN/1.73M2 — LOW
EGFR: 17 ML/MIN/1.73M2 — LOW
EGFR: 28 ML/MIN/1.73M2 — LOW
EGFR: 28 ML/MIN/1.73M2 — LOW
EOSINOPHIL # BLD AUTO: 0.19 K/UL — SIGNIFICANT CHANGE UP (ref 0–0.5)
EOSINOPHIL NFR BLD AUTO: 1.6 % — SIGNIFICANT CHANGE UP (ref 0–6)
FIBRINOGEN PPP-MCNC: 324 MG/DL — SIGNIFICANT CHANGE UP (ref 200–445)
GAS PNL BLDA: SIGNIFICANT CHANGE UP
GLUCOSE BLDC GLUCOMTR-MCNC: 114 MG/DL — HIGH (ref 70–99)
GLUCOSE BLDC GLUCOMTR-MCNC: 207 MG/DL — HIGH (ref 70–99)
GLUCOSE BLDC GLUCOMTR-MCNC: 210 MG/DL — HIGH (ref 70–99)
GLUCOSE BLDC GLUCOMTR-MCNC: 214 MG/DL — HIGH (ref 70–99)
GLUCOSE SERPL-MCNC: 127 MG/DL — HIGH (ref 70–99)
GLUCOSE SERPL-MCNC: 220 MG/DL — HIGH (ref 70–99)
HAPTOGLOB SERPL-MCNC: 194 MG/DL — SIGNIFICANT CHANGE UP (ref 34–200)
HCO3 BLDV-SCNC: 24 MMOL/L — SIGNIFICANT CHANGE UP (ref 22–29)
HCO3 BLDV-SCNC: 24 MMOL/L — SIGNIFICANT CHANGE UP (ref 22–29)
HCT VFR BLD CALC: 24.2 % — LOW (ref 39–50)
HGB BLD-MCNC: 7.7 G/DL — LOW (ref 13–17)
HOROWITZ INDEX BLDV+IHG-RTO: 28 — SIGNIFICANT CHANGE UP
HOROWITZ INDEX BLDV+IHG-RTO: 28 — SIGNIFICANT CHANGE UP
IMM GRANULOCYTES # BLD AUTO: 0.56 K/UL — HIGH (ref 0–0.07)
IMM GRANULOCYTES NFR BLD AUTO: 4.8 % — HIGH (ref 0–0.9)
INR BLD: 1.19 RATIO — HIGH (ref 0.85–1.16)
INR BLD: 1.25 RATIO — HIGH (ref 0.85–1.16)
LDH SERPL L TO P-CCNC: 299 U/L — HIGH (ref 50–242)
LYMPHOCYTES # BLD AUTO: 1.09 K/UL — SIGNIFICANT CHANGE UP (ref 1–3.3)
LYMPHOCYTES NFR BLD AUTO: 9.3 % — LOW (ref 13–44)
MAGNESIUM SERPL-MCNC: 2.6 MG/DL — SIGNIFICANT CHANGE UP (ref 1.6–2.6)
MAGNESIUM SERPL-MCNC: 3.2 MG/DL — HIGH (ref 1.6–2.6)
MCHC RBC-ENTMCNC: 26.8 PG — LOW (ref 27–34)
MCHC RBC-ENTMCNC: 31.8 G/DL — LOW (ref 32–36)
MCV RBC AUTO: 84.3 FL — SIGNIFICANT CHANGE UP (ref 80–100)
MONOCYTES # BLD AUTO: 1.14 K/UL — HIGH (ref 0–0.9)
MONOCYTES NFR BLD AUTO: 9.7 % — SIGNIFICANT CHANGE UP (ref 2–14)
NEUTROPHILS # BLD AUTO: 8.77 K/UL — HIGH (ref 1.8–7.4)
NEUTROPHILS NFR BLD AUTO: 74.3 % — SIGNIFICANT CHANGE UP (ref 43–77)
NRBC # BLD AUTO: 0.05 K/UL — HIGH (ref 0–0)
NRBC # FLD: 0.05 K/UL — HIGH (ref 0–0)
PCO2 BLDV: 40 MMHG — LOW (ref 42–55)
PCO2 BLDV: 44 MMHG — SIGNIFICANT CHANGE UP (ref 42–55)
PH BLDV: 7.35 — SIGNIFICANT CHANGE UP (ref 7.32–7.43)
PH BLDV: 7.39 — SIGNIFICANT CHANGE UP (ref 7.32–7.43)
PHOSPHATE SERPL-MCNC: 2.5 MG/DL — SIGNIFICANT CHANGE UP (ref 2.5–4.5)
PHOSPHATE SERPL-MCNC: 3.1 MG/DL — SIGNIFICANT CHANGE UP (ref 2.5–4.5)
PLATELET # BLD AUTO: 94 K/UL — LOW (ref 150–400)
PMV BLD: SIGNIFICANT CHANGE UP FL (ref 7–13)
PO2 BLDV: 37 MMHG — SIGNIFICANT CHANGE UP (ref 25–45)
PO2 BLDV: 47 MMHG — HIGH (ref 25–45)
POTASSIUM SERPL-MCNC: 3.9 MMOL/L — SIGNIFICANT CHANGE UP (ref 3.5–5.3)
POTASSIUM SERPL-MCNC: 4 MMOL/L — SIGNIFICANT CHANGE UP (ref 3.5–5.3)
POTASSIUM SERPL-SCNC: 3.9 MMOL/L — SIGNIFICANT CHANGE UP (ref 3.5–5.3)
POTASSIUM SERPL-SCNC: 4 MMOL/L — SIGNIFICANT CHANGE UP (ref 3.5–5.3)
PROCALCITONIN SERPL-MCNC: 4.15 NG/ML — HIGH (ref 0.02–0.1)
PROT SERPL-MCNC: 5.8 G/DL — LOW (ref 6–8.3)
PROT SERPL-MCNC: 5.9 G/DL — LOW (ref 6–8.3)
PROTHROM AB SERPL-ACNC: 13.5 SEC — HIGH (ref 9.9–13.4)
PROTHROM AB SERPL-ACNC: 14.3 SEC — HIGH (ref 9.9–13.4)
RBC # BLD: 2.87 M/UL — LOW (ref 4.2–5.8)
RBC # FLD: SIGNIFICANT CHANGE UP % (ref 10.3–14.5)
SAO2 % BLDV: 67.5 % — SIGNIFICANT CHANGE UP (ref 67–88)
SAO2 % BLDV: 76.3 % — SIGNIFICANT CHANGE UP (ref 67–88)
SODIUM SERPL-SCNC: 135 MMOL/L — SIGNIFICANT CHANGE UP (ref 135–145)
SODIUM SERPL-SCNC: 136 MMOL/L — SIGNIFICANT CHANGE UP (ref 135–145)
WBC # BLD: 11.78 K/UL — HIGH (ref 3.8–10.5)
WBC # FLD AUTO: 11.78 K/UL — HIGH (ref 3.8–10.5)

## 2025-06-27 PROCEDURE — 71045 X-RAY EXAM CHEST 1 VIEW: CPT | Mod: 26

## 2025-06-27 PROCEDURE — 99292 CRITICAL CARE ADDL 30 MIN: CPT

## 2025-06-27 PROCEDURE — G0545: CPT

## 2025-06-27 PROCEDURE — 99223 1ST HOSP IP/OBS HIGH 75: CPT

## 2025-06-27 PROCEDURE — 99291 CRITICAL CARE FIRST HOUR: CPT

## 2025-06-27 RX ORDER — VANCOMYCIN HCL IN 5 % DEXTROSE 1.5G/250ML
750 PLASTIC BAG, INJECTION (ML) INTRAVENOUS EVERY 12 HOURS
Refills: 0 | Status: DISCONTINUED | OUTPATIENT
Start: 2025-06-27 | End: 2025-06-27

## 2025-06-27 RX ORDER — VANCOMYCIN HCL IN 5 % DEXTROSE 1.5G/250ML
1000 PLASTIC BAG, INJECTION (ML) INTRAVENOUS EVERY 24 HOURS
Refills: 0 | Status: DISCONTINUED | OUTPATIENT
Start: 2025-06-27 | End: 2025-06-27

## 2025-06-27 RX ORDER — CEFEPIME 2 G/20ML
1000 INJECTION, POWDER, FOR SOLUTION INTRAVENOUS EVERY 24 HOURS
Refills: 0 | Status: COMPLETED | OUTPATIENT
Start: 2025-06-28 | End: 2025-07-04

## 2025-06-27 RX ORDER — MAGNESIUM SULFATE 500 MG/ML
2 SYRINGE (ML) INJECTION ONCE
Refills: 0 | Status: COMPLETED | OUTPATIENT
Start: 2025-06-27 | End: 2025-06-27

## 2025-06-27 RX ADMIN — INSULIN GLARGINE-YFGN 28 UNIT(S): 100 INJECTION, SOLUTION SUBCUTANEOUS at 21:05

## 2025-06-27 RX ADMIN — INSULIN LISPRO 10: 100 INJECTION, SOLUTION INTRAVENOUS; SUBCUTANEOUS at 12:00

## 2025-06-27 RX ADMIN — Medication 5 MILLILITER(S): at 13:32

## 2025-06-27 RX ADMIN — Medication 81 MILLIGRAM(S): at 01:00

## 2025-06-27 RX ADMIN — Medication 500 MILLIGRAM(S): at 17:49

## 2025-06-27 RX ADMIN — Medication 1300 MILLIGRAM(S): at 21:05

## 2025-06-27 RX ADMIN — Medication 1 APPLICATION(S): at 05:25

## 2025-06-27 RX ADMIN — GABAPENTIN 100 MILLIGRAM(S): 400 CAPSULE ORAL at 21:04

## 2025-06-27 RX ADMIN — Medication 10 MILLILITER(S): at 19:29

## 2025-06-27 RX ADMIN — POLYETHYLENE GLYCOL 3350 17 GRAM(S): 17 POWDER, FOR SOLUTION ORAL at 17:49

## 2025-06-27 RX ADMIN — INSULIN LISPRO 10: 100 INJECTION, SOLUTION INTRAVENOUS; SUBCUTANEOUS at 07:50

## 2025-06-27 RX ADMIN — Medication 25 GRAM(S): at 09:15

## 2025-06-27 RX ADMIN — Medication 500 MILLIGRAM(S): at 05:25

## 2025-06-27 RX ADMIN — AMIODARONE HYDROCHLORIDE 200 MILLIGRAM(S): 50 INJECTION, SOLUTION INTRAVENOUS at 05:25

## 2025-06-27 RX ADMIN — Medication 5 MILLILITER(S): at 05:24

## 2025-06-27 RX ADMIN — INSULIN LISPRO 9 UNIT(S): 100 INJECTION, SOLUTION INTRAVENOUS; SUBCUTANEOUS at 07:51

## 2025-06-27 RX ADMIN — DOBUTAMINE 9.53 MICROGRAM(S)/KG/MIN: 250 INJECTION INTRAVENOUS at 19:29

## 2025-06-27 RX ADMIN — INSULIN LISPRO 9 UNIT(S): 100 INJECTION, SOLUTION INTRAVENOUS; SUBCUTANEOUS at 12:04

## 2025-06-27 RX ADMIN — GABAPENTIN 100 MILLIGRAM(S): 400 CAPSULE ORAL at 05:25

## 2025-06-27 RX ADMIN — Medication 1300 MILLIGRAM(S): at 13:33

## 2025-06-27 RX ADMIN — Medication 2 TABLET(S): at 21:04

## 2025-06-27 RX ADMIN — Medication 15 MILLILITER(S): at 17:52

## 2025-06-27 RX ADMIN — Medication 500 MICROGRAM(S): at 05:30

## 2025-06-27 RX ADMIN — INSULIN LISPRO 9 UNIT(S): 100 INJECTION, SOLUTION INTRAVENOUS; SUBCUTANEOUS at 16:40

## 2025-06-27 RX ADMIN — AMIODARONE HYDROCHLORIDE 200 MILLIGRAM(S): 50 INJECTION, SOLUTION INTRAVENOUS at 17:50

## 2025-06-27 RX ADMIN — Medication 1300 MILLIGRAM(S): at 05:25

## 2025-06-27 RX ADMIN — Medication 500 MICROGRAM(S): at 17:46

## 2025-06-27 RX ADMIN — CEFEPIME 100 MILLIGRAM(S): 2 INJECTION, POWDER, FOR SOLUTION INTRAVENOUS at 05:24

## 2025-06-27 RX ADMIN — ATORVASTATIN CALCIUM 80 MILLIGRAM(S): 80 TABLET, FILM COATED ORAL at 21:05

## 2025-06-27 RX ADMIN — INSULIN LISPRO 10: 100 INJECTION, SOLUTION INTRAVENOUS; SUBCUTANEOUS at 16:39

## 2025-06-27 RX ADMIN — GABAPENTIN 100 MILLIGRAM(S): 400 CAPSULE ORAL at 13:33

## 2025-06-27 RX ADMIN — Medication 5 MILLIGRAM(S): at 21:05

## 2025-06-27 RX ADMIN — Medication 40 MILLIGRAM(S): at 06:04

## 2025-06-27 RX ADMIN — HEPARIN SODIUM 10.5 UNIT(S)/HR: 1000 INJECTION INTRAVENOUS; SUBCUTANEOUS at 19:29

## 2025-06-27 RX ADMIN — Medication 500 MICROGRAM(S): at 23:08

## 2025-06-27 RX ADMIN — DOBUTAMINE 11.9 MICROGRAM(S)/KG/MIN: 250 INJECTION INTRAVENOUS at 08:00

## 2025-06-27 RX ADMIN — Medication 1 TABLET(S): at 13:33

## 2025-06-27 RX ADMIN — Medication 50 MILLIGRAM(S): at 21:05

## 2025-06-27 RX ADMIN — HEPARIN SODIUM 9 UNIT(S)/HR: 1000 INJECTION INTRAVENOUS; SUBCUTANEOUS at 08:00

## 2025-06-27 RX ADMIN — Medication 15 MILLILITER(S): at 05:25

## 2025-06-27 RX ADMIN — Medication 250 MILLIGRAM(S): at 03:20

## 2025-06-27 RX ADMIN — Medication 5 MILLILITER(S): at 17:49

## 2025-06-27 RX ADMIN — Medication 10 MILLILITER(S): at 08:00

## 2025-06-27 NOTE — CONSULT NOTE ADULT - SUBJECTIVE AND OBJECTIVE BOX
Patient is a 70y old  Male who presents with a chief complaint of CABG, MVR (23 Jun 2025 09:25)      HPI:  70 year old male PMH of HTN, HLD, DM2, CAD (total  4 coronary stents, last one PCI in 08/2024 &  S/p status post MI treated with PCI x3 stents in 2022 & 08/2023)on Asprin 81 mg, Chronic back pain, ESRD on  HD 3x/week via Right brachial AV fistula (Ieckrd-Eiszohsjd-Txbpyz, Nephrology- Dr.Paul Munguia-[facility], in [place]/ also s/p angioplasty of the AVfistula -intact in 12/2024/ & had revision of AV fistula in  05/2024 with Dr. Henrik Morocho), Listed for renal transplant in [place] and [place] ( he has a living donor available), CHF with EF 40-45%,  pneumonia 10/2024, severe MR/ recent cath 6/3 w/ multivessel CAD in stent stenosis planned for Mitral valve replacement, CABG x3 on 6/17/2025 w/ Dr. Mckeon.      ***HD monday/ wed/Friday---surgery on 6/17 Tuesday  ****cardiac catheterization on 6/3/25 which revealed revealed Left main artery: There was dampening upon engagement of the ostium of the left main coronary artery. There is a 50 % stenosis in the ostium portion of the segment. Proximal left anterior descending: There is a 20 % in-stent restenosis. First diagonal: There is an 80 % stenosis in the ostium portion of the segment. Mid left anterior descending: There is a 35 % stenosis. Distal left anterior descending: There is a 45 % stenosis. Proximal circumflex: There is a 90 % in-stent restenosis. Mid circumflex: There is a 60 % stenosis. Proximal right coronary artery: There is a 99 % stenosis in the ostium portion of the segment. Mid right coronary artery: There is a 90 % stenosis. Mid right coronary artery: There is a 100 % stenosis.   (10 Pj 2025 11:14)     prior hospital charts reviewed [  ]  primary team notes reviewed [  ]  other consultant notes reviewed [  ]    PAST MEDICAL & SURGICAL HISTORY:  HTN (hypertension)      HLD (hyperlipidemia)      CAD (coronary artery disease)      Former smoker      ESRD on dialysis      Gout      Moderate aortic insufficiency      Severe mitral regurgitation      DM2 (diabetes mellitus, type 2)      Right cataract      MI (myocardial infarction)      Stented coronary artery      2019 novel coronavirus disease (COVID-19)      Pancreatic cyst      AV fistula      History of cardiac cath      H/O colonoscopy      Stented coronary artery          Allergies  No Known Allergies    ANTIMICROBIALS (past 90 days)  MEDICATIONS  (STANDING):  cefepime   IVPB   100 mL/Hr IV Intermittent (06-27-25 @ 05:24)   100 mL/Hr IV Intermittent (06-26-25 @ 21:13)   100 mL/Hr IV Intermittent (06-26-25 @ 14:04)   100 mL/Hr IV Intermittent (06-26-25 @ 05:20)   100 mL/Hr IV Intermittent (06-25-25 @ 21:34)   100 mL/Hr IV Intermittent (06-25-25 @ 13:31)   100 mL/Hr IV Intermittent (06-25-25 @ 06:00)   100 mL/Hr IV Intermittent (06-24-25 @ 21:16)    cefepime   IVPB   100 mL/Hr IV Intermittent (06-24-25 @ 18:19)    cefuroxime  IVPB   100 mL/Hr IV Intermittent (06-24-25 @ 15:47)   100 mL/Hr IV Intermittent (06-24-25 @ 07:45)   100 mL/Hr IV Intermittent (06-24-25 @ 00:46)   100 mL/Hr IV Intermittent (06-23-25 @ 17:11)   100 mL/Hr IV Intermittent (06-23-25 @ 08:10)   100 mL/Hr IV Intermittent (06-22-25 @ 23:42)   100 mL/Hr IV Intermittent (06-22-25 @ 16:58)    cefuroxime  IVPB   100 mL/Hr IV Intermittent (06-19-25 @ 08:22)   100 mL/Hr IV Intermittent (06-18-25 @ 08:05)    vancomycin  IVPB   250 mL/Hr IV Intermittent (06-27-25 @ 03:20)      ANTIMICROBIALS:      OTHER MEDS: MEDICATIONS  (STANDING):  acetaminophen     Tablet .. 650 every 6 hours PRN  aMIOdarone    Tablet 200 two times a day  aspirin enteric coated 81 daily  atorvastatin 80 at bedtime  bisacodyl Suppository 10 once  dextrose 50% Injectable 50 every 15 minutes  dextrose 50% Injectable 25 every 15 minutes  dextrose Oral Gel 15 once PRN  DOBUTamine Infusion 4 <Continuous>  epoetin douglas-epbx (RETACRIT) Injectable 42074 <User Schedule>  gabapentin 100 every 8 hours  glucagon  Injectable 1 once  heparin  Infusion 400 <Continuous>  insulin glargine Injectable (LANTUS) 28 at bedtime  insulin lispro (ADMELOG) corrective regimen sliding scale  Before meals and at bedtime  insulin lispro Injectable (ADMELOG) 9 three times a day before meals  ipratropium    for Nebulization 500 every 6 hours  melatonin 5 at bedtime  nitroprusside Infusion 0.5 <Continuous>  oxyCODONE    IR 5 every 4 hours PRN  oxyCODONE    IR 10 every 4 hours PRN  pantoprazole    Tablet 40 before breakfast  polyethylene glycol 3350 17 every 12 hours  senna 2 at bedtime  traZODone 50 at bedtime    SOCIAL HISTORY:   hx smoking  non-smoker    FAMILY HISTORY:  FH: type 2 diabetes (Father, Mother, Sibling)    FH: HTN (hypertension) (Father, Mother, Sibling)    FH: renal failure (Sibling)      REVIEW OF SYSTEMS  [  ] ROS unobtainable because:    [  ] All other systems negative except as noted below:	    Constitutional:  [ ] fever [ ] chills  [ ] weight loss  [ ] weakness  Skin:  [ ] rash [ ] phlebitis	  Eyes: [ ] icterus [ ] pain  [ ] discharge	  ENMT: [ ] sore throat  [ ] thrush [ ] ulcers [ ] exudates  Respiratory: [ ] dyspnea [ ] hemoptysis [ ] cough [ ] sputum	  Cardiovascular:  [ ] chest pain [ ] palpitations [ ] edema	  Gastrointestinal:  [ ] nausea [ ] vomiting [ ] diarrhea [ ] constipation [ ] pain	  Genitourinary:  [ ] dysuria [ ] frequency [ ] hematuria [ ] discharge [ ] flank pain  [ ] incontinence  Musculoskeletal:  [ ] myalgias [ ] arthralgias [ ] arthritis  [ ] back pain  Neurological:  [ ] headache [ ] seizures  [ ] confusion/altered mental status  Psychiatric:  [ ] anxiety [ ] depression	  Hematology/Lymphatics:  [ ] lymphadenopathy  Endocrine:  [ ] adrenal [ ] thyroid  Allergic/Immunologic:	 [ ] transplant [ ] seasonal    Vital Signs Last 24 Hrs  T(F): 98.5 (06-27-25 @ 08:00), Max: 99.5 (06-22-25 @ 17:00)  Vital Signs Last 24 Hrs  HR: 75 (06-27-25 @ 11:13) (54 - 93)  BP: 102/49 (06-27-25 @ 11:00) (95/49 - 137/65)  RR: 13 (06-27-25 @ 11:00)  SpO2: 99% (06-27-25 @ 11:13) (93% - 100%)  Wt(kg): --    PHYSICAL EXAMINATION:  General: Alert and Awake, NAD  HEENT: PERRL, EOMI, No subconjunctival hemorrhages, Oropharynx Clear, MMM  Neck: Supple, No STEPH  Cardiac: RRR, No M/R/G  Resp: CTAB, No Wh/Rh/Ra  Abdomen: NBS, NT/ND, No HSM, No rigidity or guarding  MSK: No LE edema. No stigmata of IE. No evidence of phlebitis. No evidence of synovitis.  : No echeverria  Skin: No rashes or lesions. Skin is warm and dry to the touch.   Neuro: Alert and Awake. CN 2-12 Grossly intact. Moves all four extremities spontaneously.  Psych: Calm, Pleasant, Cooperative                          7.7    11.78 )-----------( 94       ( 27 Jun 2025 00:31 )             24.2     06-27    136  |  103  |  21  ----------------------------<  127[H]  3.9   |  22  |  2.43[H]    Ca    9.5      27 Jun 2025 00:31  Phos  2.5     06-27  Mg     2.6     06-27    TPro  5.9[L]  /  Alb  3.2[L]  /  TBili  0.4  /  DBili  x   /  AST  33  /  ALT  29  /  AlkPhos  100  06-27    Urinalysis Basic - ( 27 Jun 2025 00:31 )    Color: x / Appearance: x / SG: x / pH: x  Gluc: 127 mg/dL / Ketone: x  / Bili: x / Urobili: x   Blood: x / Protein: x / Nitrite: x   Leuk Esterase: x / RBC: x / WBC x   Sq Epi: x / Non Sq Epi: x / Bacteria: x    MICROBIOLOGY:  Culture - Bronchial (collected 23 Jun 2025 11:55)  Source: Bronchial Bronchial Lavage  Gram Stain (23 Jun 2025 20:02):    Few polymorphonuclear leukocytes per low power field    No squamous epithelial cells per low power field    Rare Gram Negative Rods per oil power field  Final Report (25 Jun 2025 07:07):    Moderate Serratia marcescens  Organism: Serratia marcescens (25 Jun 2025 07:07)  Organism: Serratia marcescens (25 Jun 2025 07:07)      -  Levofloxacin: S <=0.5      -  Tobramycin: I 4      -  Aztreonam: S <=4      -  Gentamicin: S <=2      -  Cefazolin: R >16      -  Cefepime: S <=2      -  Piperacillin/Tazobactam: S <=8      -  Ciprofloxacin: S <=0.25      -  Ceftriaxone: S <=1      -  Ampicillin: R >16 These ampicillin results predict results for amoxicillin      Method Type: MADDIE      -  Meropenem: S <=1      -  Ampicillin/Sulbactam: R >16/8      -  Cefoxitin: R <=8      -  Amoxicillin/Clavulanic Acid: R >16/8      -  Trimethoprim/Sulfamethoxazole: S <=0.5/9.5      -  Tigecycline: S <=2 Interpretations based on FDA breakpoints      -  Ertapenem: S <=0.5                    RADIOLOGY:    <The imaging below has been reviewed and visualized by me independently. Findings as detailed in report below>     Patient is a 70y old  Male who presents with a chief complaint of CABG, MVR (23 Jun 2025 09:25)      HPI:  70 year old male PMH of HTN, HLD, DM2, CAD (total  4 coronary stents, last one PCI in 08/2024 &  S/p status post MI treated with PCI x3 stents in 2022 & 08/2023)on Asprin 81 mg, Chronic back pain, ESRD on  HD 3x/week via Right brachial AV fistula (Cixtgq-Ifnygvgir-Tgiqtc, Nephrology- Dr.Paul Munguia-[facility], in [place]/ also s/p angioplasty of the AVfistula -intact in 12/2024/ & had revision of AV fistula in  05/2024 with Dr. Henrik Morocho), Listed for renal transplant in [place] and [place] ( he has a living donor available), CHF with EF 40-45%,  pneumonia 10/2024, severe MR/ recent cath 6/3 w/ multivessel CAD in stent stenosis planned for Mitral valve replacement, CABG x3 on 6/17/2025 w/ Dr. Mckeon.      ***HD monday/ wed/Friday---surgery on 6/17 Tuesday  ****cardiac catheterization on 6/3/25 which revealed revealed Left main artery: There was dampening upon engagement of the ostium of the left main coronary artery. There is a 50 % stenosis in the ostium portion of the segment. Proximal left anterior descending: There is a 20 % in-stent restenosis. First diagonal: There is an 80 % stenosis in the ostium portion of the segment. Mid left anterior descending: There is a 35 % stenosis. Distal left anterior descending: There is a 45 % stenosis. Proximal circumflex: There is a 90 % in-stent restenosis. Mid circumflex: There is a 60 % stenosis. Proximal right coronary artery: There is a 99 % stenosis in the ostium portion of the segment. Mid right coronary artery: There is a 90 % stenosis. Mid right coronary artery: There is a 100 % stenosis.   (10 Pj 2025 11:14)     prior hospital charts reviewed [  ]  primary team notes reviewed [ x ]  other consultant notes reviewed [ x ]    PAST MEDICAL & SURGICAL HISTORY:  HTN (hypertension)      HLD (hyperlipidemia)      CAD (coronary artery disease)      Former smoker      ESRD on dialysis      Gout      Moderate aortic insufficiency      Severe mitral regurgitation      DM2 (diabetes mellitus, type 2)      Right cataract      MI (myocardial infarction)      Stented coronary artery      2019 novel coronavirus disease (COVID-19)      Pancreatic cyst      AV fistula      History of cardiac cath      H/O colonoscopy      Stented coronary artery          Allergies  No Known Allergies    ANTIMICROBIALS (past 90 days)  MEDICATIONS  (STANDING):  cefepime   IVPB   100 mL/Hr IV Intermittent (06-27-25 @ 05:24)   100 mL/Hr IV Intermittent (06-26-25 @ 21:13)   100 mL/Hr IV Intermittent (06-26-25 @ 14:04)   100 mL/Hr IV Intermittent (06-26-25 @ 05:20)   100 mL/Hr IV Intermittent (06-25-25 @ 21:34)   100 mL/Hr IV Intermittent (06-25-25 @ 13:31)   100 mL/Hr IV Intermittent (06-25-25 @ 06:00)   100 mL/Hr IV Intermittent (06-24-25 @ 21:16)    cefepime   IVPB   100 mL/Hr IV Intermittent (06-24-25 @ 18:19)    cefuroxime  IVPB   100 mL/Hr IV Intermittent (06-24-25 @ 15:47)   100 mL/Hr IV Intermittent (06-24-25 @ 07:45)   100 mL/Hr IV Intermittent (06-24-25 @ 00:46)   100 mL/Hr IV Intermittent (06-23-25 @ 17:11)   100 mL/Hr IV Intermittent (06-23-25 @ 08:10)   100 mL/Hr IV Intermittent (06-22-25 @ 23:42)   100 mL/Hr IV Intermittent (06-22-25 @ 16:58)    cefuroxime  IVPB   100 mL/Hr IV Intermittent (06-19-25 @ 08:22)   100 mL/Hr IV Intermittent (06-18-25 @ 08:05)    vancomycin  IVPB   250 mL/Hr IV Intermittent (06-27-25 @ 03:20)      ANTIMICROBIALS:      OTHER MEDS: MEDICATIONS  (STANDING):  acetaminophen     Tablet .. 650 every 6 hours PRN  aMIOdarone    Tablet 200 two times a day  aspirin enteric coated 81 daily  atorvastatin 80 at bedtime  bisacodyl Suppository 10 once  dextrose 50% Injectable 50 every 15 minutes  dextrose 50% Injectable 25 every 15 minutes  dextrose Oral Gel 15 once PRN  DOBUTamine Infusion 4 <Continuous>  epoetin douglas-epbx (RETACRIT) Injectable 72157 <User Schedule>  gabapentin 100 every 8 hours  glucagon  Injectable 1 once  heparin  Infusion 400 <Continuous>  insulin glargine Injectable (LANTUS) 28 at bedtime  insulin lispro (ADMELOG) corrective regimen sliding scale  Before meals and at bedtime  insulin lispro Injectable (ADMELOG) 9 three times a day before meals  ipratropium    for Nebulization 500 every 6 hours  melatonin 5 at bedtime  nitroprusside Infusion 0.5 <Continuous>  oxyCODONE    IR 5 every 4 hours PRN  oxyCODONE    IR 10 every 4 hours PRN  pantoprazole    Tablet 40 before breakfast  polyethylene glycol 3350 17 every 12 hours  senna 2 at bedtime  traZODone 50 at bedtime    SOCIAL HISTORY:  Born in the . Travel only to [place] and additional [place] travel on a cruise. Worked in construction in the past. No periods of homelessness or incarceration. No pets. No livestock exposure.     FAMILY HISTORY:  FH: type 2 diabetes (Father, Mother, Sibling)    FH: HTN (hypertension) (Father, Mother, Sibling)    FH: renal failure (Sibling)      REVIEW OF SYSTEMS  [  ] ROS unobtainable because:    [ x ] All other systems negative except as noted below:	    Constitutional:  [ ] fever [ ] chills  [ ] weight loss  [ ] weakness  Skin:  [ ] rash [ ] phlebitis	  Eyes: [ ] icterus [ ] pain  [ ] discharge	  ENMT: [ ] sore throat  [ ] thrush [ ] ulcers [ ] exudates  Respiratory: [ ] dyspnea [ ] hemoptysis [ ] cough [ ] sputum	  Cardiovascular:  [ ] chest pain [ ] palpitations [ ] edema	  Gastrointestinal:  [ ] nausea [ ] vomiting [ ] diarrhea [ ] constipation [ ] pain	  Genitourinary:  [ ] dysuria [ ] frequency [ ] hematuria [ ] discharge [ ] flank pain  [ ] incontinence  Musculoskeletal:  [ ] myalgias [ ] arthralgias [ ] arthritis  [ ] back pain  Neurological:  [ ] headache [ ] seizures  [ ] confusion/altered mental status  Psychiatric:  [ ] anxiety [ ] depression	  Hematology/Lymphatics:  [ ] lymphadenopathy  Endocrine:  [ ] adrenal [ ] thyroid  Allergic/Immunologic:	 [ ] transplant [ ] seasonal    Vital Signs Last 24 Hrs  T(F): 98.5 (06-27-25 @ 08:00), Max: 99.5 (06-22-25 @ 17:00)  Vital Signs Last 24 Hrs  HR: 75 (06-27-25 @ 11:13) (54 - 93)  BP: 102/49 (06-27-25 @ 11:00) (95/49 - 137/65)  RR: 13 (06-27-25 @ 11:00)  SpO2: 99% (06-27-25 @ 11:13) (93% - 100%)  Wt(kg): --    PHYSICAL EXAMINATION:  General: Alert and Awake, NAD  HEENT: PERRL, EOMI  Cardiac: RRR, No M/R/G  Resp: CTAB, No Wh/Rh/Ra  Abdomen: NBS, NT/ND, No HSM, No rigidity or guarding  MSK: No LE edema. No stigmata of IE. No evidence of phlebitis. No evidence of synovitis.  Vasc: +RVAD  Skin: No rashes or lesions. Skin is warm and dry to the touch.   Neuro: Alert and Awake. CN 2-12 Grossly intact. Moves all four extremities spontaneously.  Psych: Calm, Pleasant, Cooperative                          7.7    11.78 )-----------( 94       ( 27 Jun 2025 00:31 )             24.2     06-27    136  |  103  |  21  ----------------------------<  127[H]  3.9   |  22  |  2.43[H]    Ca    9.5      27 Jun 2025 00:31  Phos  2.5     06-27  Mg     2.6     06-27    TPro  5.9[L]  /  Alb  3.2[L]  /  TBili  0.4  /  DBili  x   /  AST  33  /  ALT  29  /  AlkPhos  100  06-27    Urinalysis Basic - ( 27 Jun 2025 00:31 )    Color: x / Appearance: x / SG: x / pH: x  Gluc: 127 mg/dL / Ketone: x  / Bili: x / Urobili: x   Blood: x / Protein: x / Nitrite: x   Leuk Esterase: x / RBC: x / WBC x   Sq Epi: x / Non Sq Epi: x / Bacteria: x    MICROBIOLOGY:    Culture - Bronchial (collected 23 Jun 2025 11:55)  Source: Bronchial Bronchial Lavage  Gram Stain (23 Jun 2025 20:02):    Few polymorphonuclear leukocytes per low power field    No squamous epithelial cells per low power field    Rare Gram Negative Rods per oil power field  Final Report (25 Jun 2025 07:07):    Moderate Serratia marcescens  Organism: Serratia marcescens (25 Jun 2025 07:07)  Organism: Serratia marcescens (25 Jun 2025 07:07)      -  Levofloxacin: S <=0.5      -  Tobramycin: I 4      -  Aztreonam: S <=4      -  Gentamicin: S <=2      -  Cefazolin: R >16      -  Cefepime: S <=2      -  Piperacillin/Tazobactam: S <=8      -  Ciprofloxacin: S <=0.25      -  Ceftriaxone: S <=1      -  Ampicillin: R >16 These ampicillin results predict results for amoxicillin      Method Type: MADDIE      -  Meropenem: S <=1      -  Ampicillin/Sulbactam: R >16/8      -  Cefoxitin: R <=8      -  Amoxicillin/Clavulanic Acid: R >16/8      -  Trimethoprim/Sulfamethoxazole: S <=0.5/9.5      -  Tigecycline: S <=2 Interpretations based on FDA breakpoints      -  Ertapenem: S <=0.5    RADIOLOGY:    <The imaging below has been reviewed and visualized by me independently. Findings as detailed in report below>    < from: Xray Chest 1 View- PORTABLE-Routine (Xray Chest 1 View- PORTABLE-Routine in AM.) (06.27.25 @ 03:40) >  IMPRESSION:  Interval removal left-sided chest tube without pneumothorax.  Bibasilar atelectasis.    < end of copied text >

## 2025-06-27 NOTE — PROGRESS NOTE ADULT - SUBJECTIVE AND OBJECTIVE BOX
On RVAD  Remains on Dobutamine and Heparin gtts  On CVVHDF  No pain, no sob      VITAL:  T(C): , Max: 37.1 (06-27-25 @ 00:00)  T(F): , Max: 98.8 (06-27-25 @ 00:00)  HR: 78 (06-27-25 @ 07:00)  BP: 117/59 (06-27-25 @ 06:00)  BP(mean): 85 (06-27-25 @ 06:00)  RR: 13 (06-27-25 @ 07:00)  SpO2: 95% (06-27-25 @ 07:00)  net negative 2413cc/24h      PHYSICAL EXAM:  Constitutional: alert, pleasant, NAD  HEENT: NCAT, DMM  Neck:  No JVD  Respiratory: coarse BS, (+)chest tube x 2  Cardiovascular: reg s1s2  Gastrointestinal: BS+, soft, NT/ND  Extremities: No peripheral edema  : No echeverria  Skin: No rashes  Access: AVF     LABS:                        7.7    11.78 )-----------( 94       ( 27 Jun 2025 00:31 )             24.2     Na(136)/K(3.9)/Cl(103)/HCO3(22)/BUN(21)/Cr(2.43)Glu(127)/Ca(9.5)/Mg(2.6)/PO4(2.5)    06-27 @ 00:31  Na(137)/K(3.9)/Cl(102)/HCO3(22)/BUN(20)/Cr(2.25)Glu(130)/Ca(9.7)/Mg(2.7)/PO4(2.7)    06-26 @ 00:33  Na(137)/K(4.1)/Cl(102)/HCO3(20)/BUN(22)/Cr(2.32)Glu(109)/Ca(9.2)/Mg(2.8)/PO4(3.4)    06-25 @ 00:25      IMPRESSION: 70M w/ HTN, DM2, CAD, and ESRD-HD, s/p CABGx3/MV repair/RVAD 6/17/25    (1)Renal - ESRD - HD MWF - of late on CRRT via RVAD circuit  (2)Hypophosphatemia - warranted that we increase the replacement (Phoxillum) and reduce the dialysate (Prismasate) to prevent further drop in PO4  (3)Anemia - on TIW Retacrit  (4)CV - cardiogenic shock - dependent on RVAD + inotropes - tolerating UF albeit reduced from 1-2 days ago      RECOMMEND:  (1)Continue CVVHDF - 2L/h effluent; increase replacement to 1.5L/h and reduce dialysate to 0.5L/h; goal UF 100cc/h  (2)Retacrit as ordered  (3)Meds for GFR<10/CVVHDF                  Christiano Marie MD  [facility]  Office/on call physician: [phone]  Cell (7a-7s): [phone]       On RVAD  Remains on Dobutamine and Heparin gtts  On CVVHDF  No pain, no sob      VITAL:  T(C): , Max: 37.1 (06-27-25 @ 00:00)  T(F): , Max: 98.8 (06-27-25 @ 00:00)  HR: 78 (06-27-25 @ 07:00)  BP: 117/59 (06-27-25 @ 06:00)  BP(mean): 85 (06-27-25 @ 06:00)  RR: 13 (06-27-25 @ 07:00)  SpO2: 95% (06-27-25 @ 07:00)  net negative 2413cc/24h      PHYSICAL EXAM:  Constitutional: lethargic, NAD  HEENT: NCAT, DMM  Neck:  No JVD  Respiratory: coarse BS, (+)chest tube x 2  Cardiovascular: irreg s1s2  Gastrointestinal: BS+, soft, NT/ND  Extremities: No peripheral edema  : No echeverria  Skin: No rashes  Access: AVF     LABS:                        7.7    11.78 )-----------( 94       ( 27 Jun 2025 00:31 )             24.2     Na(136)/K(3.9)/Cl(103)/HCO3(22)/BUN(21)/Cr(2.43)Glu(127)/Ca(9.5)/Mg(2.6)/PO4(2.5)    06-27 @ 00:31  Na(137)/K(3.9)/Cl(102)/HCO3(22)/BUN(20)/Cr(2.25)Glu(130)/Ca(9.7)/Mg(2.7)/PO4(2.7)    06-26 @ 00:33  Na(137)/K(4.1)/Cl(102)/HCO3(20)/BUN(22)/Cr(2.32)Glu(109)/Ca(9.2)/Mg(2.8)/PO4(3.4)    06-25 @ 00:25      IMPRESSION: 70M w/ HTN, DM2, CAD, and ESRD-HD, s/p CABGx3/MV repair/RVAD 6/17/25    (1)Renal - ESRD - HD MWF - of late on CRRT via RVAD circuit  (2)Hypophosphatemia - warranted that we increase the replacement (Phoxillum) and reduce the dialysate (Prismasate) to prevent further drop in PO4  (3)Anemia - on TIW Retacrit  (4)CV - cardiogenic shock - dependent on RVAD + inotropes - tolerating UF albeit reduced from 1-2 days ago      RECOMMEND:  (1)Continue CVVHDF - 2L/h effluent; increase replacement to 1.5L/h and reduce dialysate to 0.5L/h; goal UF 100cc/h  (2)Retacrit as ordered  (3)Meds for GFR<10/CVVHDF                  Christiano Marie MD  [facility]  Office/on call physician: [phone]  Cell (7a-7v): [phone]       On RVAD  Remains on Dobutamine and Heparin gtts  Off CRRT  No pain, no sob      VITAL:  T(C): , Max: 37.1 (06-27-25 @ 00:00)  T(F): , Max: 98.8 (06-27-25 @ 00:00)  HR: 78 (06-27-25 @ 07:00)  BP: 117/59 (06-27-25 @ 06:00)  BP(mean): 85 (06-27-25 @ 06:00)  RR: 13 (06-27-25 @ 07:00)  SpO2: 95% (06-27-25 @ 07:00)  net negative 2413cc/24h      PHYSICAL EXAM:  Constitutional: lethargic, NAD  HEENT: NCAT, DMM  Neck:  No JVD  Respiratory: coarse BS, (+)chest tube x 2  Cardiovascular: irreg s1s2  Gastrointestinal: BS+, soft, NT/ND  Extremities: No peripheral edema  : No echeverria  Skin: No rashes  Access: AVF     LABS:                        7.7    11.78 )-----------( 94       ( 27 Jun 2025 00:31 )             24.2     Na(136)/K(3.9)/Cl(103)/HCO3(22)/BUN(21)/Cr(2.43)Glu(127)/Ca(9.5)/Mg(2.6)/PO4(2.5)    06-27 @ 00:31  Na(137)/K(3.9)/Cl(102)/HCO3(22)/BUN(20)/Cr(2.25)Glu(130)/Ca(9.7)/Mg(2.7)/PO4(2.7)    06-26 @ 00:33  Na(137)/K(4.1)/Cl(102)/HCO3(20)/BUN(22)/Cr(2.32)Glu(109)/Ca(9.2)/Mg(2.8)/PO4(3.4)    06-25 @ 00:25      IMPRESSION: 70M w/ HTN, DM2, CAD, and ESRD-HD, s/p CABGx3/MV repair/RVAD 6/17/25    (1)Renal - ESRD - HD MWF - of late on CRRT via RVAD circuit - potentially for discontinuation of CRRT this a.m.  (2)Hypophosphatemia - will improve with discontinuation of CRRT  (3)Anemia - on TIW Retacrit  (4)CV - cardiogenic shock - dependent on RVAD + inotropes - tolerating UF albeit reduced from 1-2 days ago      RECOMMEND:  (1)D/C CVVHDF   (2)Attempt standard HD tomorrow via RUE AVF; 1kg UF as able   (3)Retacrit as ordered  (4)Meds for GFR<10/HD:      (a)Vanco levels daily/dose 1g prn level <15      (b)Cefepime 1000mg iv q24h, dose after HD on days of HD            Christiano Marie MD  [facility]  Office/on call physician: [phone]  Cell (7a-7p): [phone]

## 2025-06-27 NOTE — CONSULT NOTE ADULT - ASSESSMENT
70 year old male PMH of HTN, HLD, DM2, CAD (total  4 coronary stents, last one PCI in 08/2024 &  S/p status post MI treated with PCI x3 stents in 2022 & 08/2023)on Asprin 81 mg, Chronic back pain, ESRD on  HD 3x/week via Right brachial AV fistula ([id number]-[id number], Nephrology- Dr.Paul Munguia-[facility], in [place]/ also s/p angioplasty of the AVfistula -intact in 12/2024/ & had revision of AV fistula in  05/2024 with Dr. Henrik Morocho), Listed for renal transplant in [place] and [place] ( he has a living donor available), CHF with EF 40-45%,  pneumonia 10/2024, severe MR/ recent cath 6/3 w/ multivessel CAD in stent stenosis planned for Mitral valve replacement, CABG x3    On 6/17/25 s/p Mitral valve replacement, CABG x3 and RVAD placement  On 6/22 s/p insertion of percutaneous RVAD and removal of PA cannula    MRSA/MSSA Nasal PCR (6/10) Negative  Bronchoscopy (6/23) Moderate Serratia marcescens  Serum Procalcitonin (6/27) 4.15    CXR (6/27) Interval removal left-sided chest tube without pneumothorax. Bibasilar atelectasis.    Antibiotic Course:  PPx Cefuroxime: 6/18 > 6/24  Cefepime: 6/24 >   Vancomycin: 6/27    #Positive Bronchoscopy Culture, Hypothermia, Leukocytosis, Abnormal Lab Test (elevated procalcitonin)  --s/p 1g Dose of Vancomycin 6/27  --Continue Cefepime 1g IV Q24H  --Continue to follow CBC with diff  --Continue to follow temperature curve  --Follow up on preliminary blood cultures    #Pre-Renal Transplant Evaluation  COVID19 Saul Antibody Positive  HAV IgG Negative  HBVs Ab Negative  HBVsAg Negative  HBVc Ab Negative  HCV Ab Negative  HSV 1 IgG Positive  HSV 2 IgG Negative  EBV IgG Positive  CMV IgG Positive  VZV IgG Positive  Measles IgG Positive  Mumps IgG Positive  Rubella IgG Positive  Quantiferon Gold Negative  HIV Ag/Ab by CMIA  Syphilis Screen Negative  Toxoplasma IgG Negative  Strongyloides Ab Negative  Trypanosoma cruzi Ab Negative    #Encounter to Vaccinate Patient  COVID19: Would benefit from COVID19 [id number] Vaccine Dose  Influenza: Will require with next season  Pneumococcal: Would benefit from pneumococcal 21 vaccine (CAPVAXIVE)  HAV: Would benefit from Havrix  HBV: Would benefit from Heplisav  MMR: Immune, will not require further vaccination  Varicella: Immune, will not require further vaccination  Shingles: Will require Shingrix  Tdap: Will require Tdap   70 year old male PMH of HTN, HLD, DM2, CAD (total  4 coronary stents, last one PCI in 08/2024 &  S/p status post MI treated with PCI x3 stents in 2022 & 08/2023)on Asprin 81 mg, Chronic back pain, ESRD on  HD 3x/week via Right brachial AV fistula ([id number]-Ffdtun, Nephrology- Dr.Paul Munguia-[facility], in [place]/ also s/p angioplasty of the AVfistula -intact in 12/2024/ & had revision of AV fistula in  05/2024 with Dr. Henrik Morocho), Listed for renal transplant in [place] ( he has a living donor available), CHF with EF 40-45%,  pneumonia 10/2024, severe MR/ recent cath 6/3 w/ multivessel CAD in stent stenosis planned for Mitral valve replacement, CABG x3    On 6/17/25 s/p Mitral valve replacement, CABG x3 and RVAD placement  On 6/22 s/p insertion of percutaneous RVAD and removal of PA cannula    MRSA/MSSA Nasal PCR (6/10) Negative  Bronchoscopy (6/23) Moderate Serratia marcescens  Serum Procalcitonin (6/27) 4.15    CXR (6/27) Interval removal left-sided chest tube without pneumothorax. Bibasilar atelectasis.    Hypothermic after oxygenator removed. Lower suspicion for new infectious process but cultures sent and will be covered with Vancomycin through tomorrow's HD session.     Antibiotic Course:  PPx Cefuroxime: 6/18 > 6/24  Cefepime: 6/24 >   Vancomycin: 6/27    #Positive Bronchoscopy Culture, Hypothermia, Leukocytosis, Abnormal Lab Test (elevated procalcitonin)  --s/p 1g Dose of Vancomycin 6/27. Check level prior to HD tomorrow   --Continue Cefepime 1g IV Q24H (to complete 7 day course for the Serratia)  --Continue to follow CBC with diff  --Continue to follow temperature curve  --Follow up on preliminary blood cultures    #Pre-Renal Transplant Evaluation  COVID19 Saul Antibody Positive  HAV IgG Negative  HBVs Ab Negative  HBVsAg Negative  HBVc Ab Negative  HCV Ab Negative  HSV 1 IgG Positive  HSV 2 IgG Negative  EBV IgG Positive  CMV IgG Positive  VZV IgG Positive  Measles IgG Positive  Mumps IgG Positive  Rubella IgG Positive  Quantiferon Gold Negative  HIV Ag/Ab by CMIA  Syphilis Screen Negative  Toxoplasma IgG Negative  Strongyloides Ab Negative  Trypanosoma cruzi Ab Negative    #Encounter to Vaccinate Patient  COVID19: Would benefit from COVID19 [id number] Vaccine Dose  Influenza: Will require with next season  Pneumococcal: Would benefit from pneumococcal 21 vaccine (CAPVAXIVE)  HAV: Would benefit from Havrix  HBV: Would benefit from Heplisav  MMR: Immune, will not require further vaccination  Varicella: Immune, will not require further vaccination  Shingles: Will require Shingrix  Tdap: Will require Tdap    Dr. Edwige Ryan will be covering the patient starting from tomorrow. Please reach out to her for further questions and follow up.     Bo Linder M.D.  [facility] Division of Infectious Disease  8AM-5PM Monday - Friday: Available on [employer] Teams  After Hours and Holidays (or if no response on [employer] Teams): Please contact the Infectious Diseases Office at [phone]     The above assessment and plan were discussed with THELMA Ibrahim

## 2025-06-27 NOTE — PROGRESS NOTE ADULT - SUBJECTIVE AND OBJECTIVE BOX
Patient seen and examined at the bedside.    Remained critically ill on continuous ICU monitoring.      Brief Summary:  69 yo M PMH of HTN, HLD, DM2, CAD (total  4 coronary stents, last one PCI in 08/2024), CHF with EF 40-45%, severe MR  Now s/p  Mitral valve replacement, CABG x3 and RVAD placement on 6/17/2025     24 Hour events:    Extubated  Oxygenator removed   walkign wiht PT  failed  wean   Hypothermic after oxygenator removed  Bcx and Vanc, procal       OBJECTIVE:  Vital Signs Last 24 Hrs  T(C): 36.9 (27 Jun 2025 08:00), Max: 37.1 (27 Jun 2025 00:00)  T(F): 98.5 (27 Jun 2025 08:00), Max: 98.8 (27 Jun 2025 00:00)  HR: 75 (27 Jun 2025 11:13) (54 - 93)  BP: 102/49 (27 Jun 2025 11:00) (95/49 - 137/65)  BP(mean): 71 (27 Jun 2025 11:00) (70 - 94)  RR: 13 (27 Jun 2025 11:00) (12 - 21)  SpO2: 99% (27 Jun 2025 11:13) (93% - 100%)    Parameters below as of 27 Jun 2025 11:13  Patient On (Oxygen Delivery Method): nasal cannula                    Physical Exam:   General: awake   Neurology: Following commands - Ambulating well.  Respiratory: Bilateral breath sounds  CV: Sinus   RVAD pulmonary 17F, Venous R femoral 25F  Abdominal: Soft, Nontender  Extremities: Warm, well-perfused, AVF on RUE        -------------------------------------------------------------------------------------------------------------------------------    Labs:                        7.7    11.78 )-----------( 94       ( 27 Jun 2025 00:31 )             24.2     06-27    136  |  103  |  21  ----------------------------<  127[H]  3.9   |  22  |  2.43[H]    Ca    9.5      27 Jun 2025 00:31  Phos  2.5     06-27  Mg     2.6     06-27    TPro  5.9[L]  /  Alb  3.2[L]  /  TBili  0.4  /  DBili  x   /  AST  33  /  ALT  29  /  AlkPhos  100  06-27    LIVER FUNCTIONS - ( 27 Jun 2025 00:31 )  Alb: 3.2 g/dL / Pro: 5.9 g/dL / ALK PHOS: 100 U/L / ALT: 29 U/L / AST: 33 U/L / GGT: x           PT/INR - ( 27 Jun 2025 00:31 )   PT: 13.5 sec;   INR: 1.19 ratio         PTT - ( 27 Jun 2025 04:50 )  PTT:40.8 sec  ABG - ( 27 Jun 2025 11:10 )  pH, Arterial: 7.42  pH, Blood: x     /  pCO2: 37    /  pO2: 143   / HCO3: 24    / Base Excess: -0.4  /  SaO2: 99.6              ------------------------------------------------------------------------------------------------------------------------------  Assessment:  69 yo M with cardiogenic shock on RVAD ,DM2, CAD (total  4 coronary stents, last one PCI in 08/2024 ), CHF with EF 40-45%, severe MR  Now s/p Mitral valve replacement, CABG x3 and RVAD placement on 6/17/2025     Right heart failure s/p RVAD insertion 6/22/25   Cardiogenic shock  Acute postop pulmonary insufficiency  Hemoptysis  Acute blood loss anemia  Thrombocytopenia  ESRD  Metabolic acidosis.  Hyperglycemia      Plan:   ***Neuro***  Maintain day/night cycle to prevent ICU delirium   Postoperative acute pain control with Tylenol, Gabapentin and prns    ***Cardiovascular***  At high risk for hemodynamic instability and cardiac arrhythmias.  RV failure, s/p CABG, s/p MV repair  RVAD 2300RPM, 2.4L  Remains on Dobutamine for inotropic support - wean as tolerated   Trend central venous saturation and lactate.  Off vasopressors with MAP > 65 mm Hg  ASA/Statin daily   PO Amiodarone load.    ***Pulmonary***  Postop acute pulmonary insufficiency - NC  Hemoptysis - IP evaluation/bronch with clot removal     ***GI***  Tolerating diet.  Protonix for GI prophylaxis.  Bowel regimen.   Trend LFTs.    ***Renal***  ESRD off CRRT - iHD today/tomorrow   Goal negative balance.  Metabolic acidosis - Bicarb tabs   Nephrology following    ***ID***  Perioperative coverage with Cefuroxime   6/23 BAL - GNR/Moderate Serratia - Cefepime 6/24-  ID following    ***Endocrine***  Insulin per protocol for Hyperglycemia - transition to ISS     ***Hematology***  Acute blood loss anemia and thrombocytopenia - 2 plts, 1 prbc in OR   Heparin for PTT 40-50    Walking with physical therapy  OOB in chair       Care plan discussed with the ICU care team.   Patient remains critical, at risk for life threatening decompensation.    I have spent 50 minutes providing critical care management to this patient.    I, Dylon Gonzalez MD, personally performed the services described in this documentation. I have reviewed the chart and agree that the record reflects my personal performance and is accurate and complete.

## 2025-06-27 NOTE — PROGRESS NOTE ADULT - SUBJECTIVE AND OBJECTIVE BOX
Patient seen and examined at the bedside.    Remained critically ill on continuous ICU monitoring.    OBJECTIVE:  Vital Signs Last 24 Hrs  T(C): 36.4 (27 Jun 2025 20:00), Max: 37.1 (27 Jun 2025 00:00)  T(F): 97.5 (27 Jun 2025 20:00), Max: 98.8 (27 Jun 2025 00:00)  HR: 68 (27 Jun 2025 18:00) (66 - 93)  BP: 124/60 (27 Jun 2025 18:00) (102/49 - 137/65)  BP(mean): 86 (27 Jun 2025 18:00) (71 - 94)  RR: 11 (27 Jun 2025 18:00) (11 - 34)  SpO2: 99% (27 Jun 2025 18:00) (89% - 100%)    Parameters below as of 27 Jun 2025 20:00  Patient On (Oxygen Delivery Method): nasal cannula    O2 Concentration (%): 2      Physical Exam:   General: NAD   Neurology: Awake, alert  Eyes: bilateral pupils equal and reactive   ENT/Neck: Neck supple, trachea midline, No JVD   Respiratory: Clear bilaterally   CV: S1S2, no murmurs        [x] Sternal dressing, [x] Mediastinal CT x2, [x] L Pleural CT          [x] Sinus rhythm [x] Temporary pacing VVI 40  Abdominal: Soft, NT, ND +BS   Extremities: 1-2+ pedal edema noted, + peripheral pulses   Skin: No Rashes, Hematoma, Ecchymosis                Assessment:  71 yo M with cardiogenic shock on RVAD ,DM2, CAD (total  4 coronary stents, last one PCI in 08/2024 ), CHF with EF 40-45%, severe MR  Now s/p Mitral valve replacement, CABG x3 and RVAD placement on 6/17/2025     Right heart failure s/p RVAD insertion 6/22/25   Cardiogenic shock  Acute postop pulmonary insufficiency  Hemoptysis  Acute blood loss anemia  Thrombocytopenia  ESRD  Hyperglycemia    Plan:   ***Neuro***  [x] Nonfocal  Post operative neuro assessment   Pain management with Gabapentin and prns   Trazadone - on hold  Melatonin at bedtime    ***Cardiovascular***  Invasive hemodynamic monitoring, assess perfusion indices  SR/ CVP 14 / MAP 55 / Hct 24.2% / Lactate 1.1  RV failure, s/p CABG, s/p MV repair  [x] Dobutamine 4 mcgs/kG/Min   [x] Nipride 0.5 mcg/kg/min   [x] RVAD with oxygenator 2300 RPMs, flow of 2.28, sweep of 0 - Sweep trial  Monitor chest tube outputs  [x] ASA [x] Statin   Serial EKG and cardiac enzymes     ***Pulmonary***  [x] NC 2 L  Encourage incentive spirometry, continue pulse ox monitoring, follow ABGs, continue Duonebs    ***GI***  [x] CC Diet   [x] Protonix for stress ulcer ppx  Bowel regimen with Miralax and Senna     ***Renal***  [x] ESRD on CRRT  Goal negative balance.  Metabolic acidosis - Bicarb tabs   Continue to monitor I/Os, BUN/Creatinine.   Replete lytes PRN    ***Heme***  [x] AC Therapy with Heparin gtt PTT goal 40-50  Reassessment of hemodynamics post resuscitation    ***ID***  Cefepime for empiric dosing- completed     ***Endocrine***  [x] Stress Hyperglycemia: HbA1c 6.8%                - [x] ISS             - Need tight glycemic control to prevent wound infection.    Patient requires continuous monitoring with bedside rhythm monitoring, pulse oximetry monitoring, and continuous central venous and arterial pressure monitoring; and intermittent blood gas analysis. Care plan discussed with the ICU care team.   Patient remained critical, at risk for life threatening decompensation.    I have spent 55 minutes providing critical care management to this patient.    By signing my name below, I, Yamile Rico, attest that this documentation has been prepared under the direction and in the presence of YVROSE Rice.  Electronically signed: Aamir Wayne, 06-27-25 @ 20:01    I, Willian Medina , personally performed the services described in this documentation. all medical record entries made by the aamir were at my direction and in my presence. I have reviewed the chart and agree that the record reflects my personal performance and is accurate and complete  Electronically signed: YVROSE Rice.  Patient seen and examined at the bedside.    Remained critically ill on continuous ICU monitoring.    OBJECTIVE:  Vital Signs Last 24 Hrs  T(C): 36.4 (27 Jun 2025 20:00), Max: 37.1 (27 Jun 2025 00:00)  T(F): 97.5 (27 Jun 2025 20:00), Max: 98.8 (27 Jun 2025 00:00)  HR: 68 (27 Jun 2025 18:00) (66 - 93)  BP: 124/60 (27 Jun 2025 18:00) (102/49 - 137/65)  BP(mean): 86 (27 Jun 2025 18:00) (71 - 94)  RR: 11 (27 Jun 2025 18:00) (11 - 34)  SpO2: 99% (27 Jun 2025 18:00) (89% - 100%)    Parameters below as of 27 Jun 2025 20:00  Patient On (Oxygen Delivery Method): nasal cannula 2L      Physical Exam:   General: NAD   Neurology: Awake, alert  Eyes: bilateral pupils equal and reactive   ENT/Neck: Neck supple, trachea midline, No JVD   Respiratory: Clear bilaterally   CV: S1S2, no murmurs        [x] Sternal dressing          [x] Sinus rhythm   Abdominal: Soft, NT, ND +BS   Extremities: 1-2+ pedal edema noted, + peripheral pulses   Skin: No Rashes, Hematoma, Ecchymosis   RIJ Cannula, R femoral cannula all C/D/I                Assessment:  69 yo M with cardiogenic shock on RVAD ,DM2, CAD (total  4 coronary stents, last one PCI in 08/2024 ), CHF with EF 40-45%, severe MR  Now s/p Mitral valve replacement, CABG x3 and RVAD placement on 6/17/2025     Right heart failure s/p RVAD insertion 6/22/25   Cardiogenic shock  Acute postop pulmonary insufficiency  Hemoptysis  Acute blood loss anemia  Thrombocytopenia  ESRD  Hyperglycemia    Plan:   ***Neuro***  [x] Nonfocal  Post operative neuro assessment   Pain management with Gabapentin and prns   Trazadone and Melatonin at bedtime    ***Cardiovascular***  Invasive hemodynamic monitoring, assess perfusion indices  SR/ CVP 14 / MAP 55 / Hct 24.2% / Lactate 1.1  RV failure, s/p CABG, s/p MV repair  [x] Dobutamine 4 mcgs/kG/Min   [x] On and off low dose nitroprusside   [x] RVAD 2300 RPMs, flow of 2.28L, oxygneator removed 6/26   Monitor chest tube outputs  [x] ASA [x] Statin   Serial EKG and cardiac enzymes     ***Pulmonary***  [x] NC 2 L  Encourage incentive spirometry, continue pulse ox monitoring, follow ABGs, continue Duonebs    ***GI***  [x] CC Diet   [x] Protonix for stress ulcer ppx  Bowel regimen with Miralax and Senna     ***Renal***  [x] ESRD  CVVHD held today, planning for HD tomorrow 6/28   Metabolic acidosis - Bicarb tabs   Continue to monitor I/Os, BUN/Creatinine.   Replete lytes PRN    ***Heme***  [x] AC Therapy with Heparin gtt Xa gaol 0.2-0.3   Reassessment of hemodynamics post resuscitation    ***ID***  Hypothermic 6/26  Elevated procalcitonin   Blood cxs sent  Central line removed today  Continue cefepime and vancomycin   - Vancomycin now dosed post HD    ***Endocrine***  [x] Stress Hyperglycemia: HbA1c 6.8%                - [x] ISS             - Need tight glycemic control to prevent wound infection.    Patient requires continuous monitoring with bedside rhythm monitoring, pulse oximetry monitoring, and continuous central venous and arterial pressure monitoring; and intermittent blood gas analysis. Care plan discussed with the ICU care team.   Patient remained critical, at risk for life threatening decompensation.    I have spent 55 minutes providing critical care management to this patient.    By signing my name below, I, Yamile Rico, attest that this documentation has been prepared under the direction and in the presence of YVROSE Rice.  Electronically signed: Aamir Wayne, 06-27-25 @ 20:01    I, Willian Medina , personally performed the services described in this documentation. all medical record entries made by the aamir were at my direction and in my presence. I have reviewed the chart and agree that the record reflects my personal performance and is accurate and complete  Electronically signed: YVROSE Rice.

## 2025-06-28 LAB
ACANTHOCYTES BLD QL SMEAR: SLIGHT — SIGNIFICANT CHANGE UP
ALBUMIN SERPL ELPH-MCNC: 3.1 G/DL — LOW (ref 3.3–5)
ALP SERPL-CCNC: 108 U/L — SIGNIFICANT CHANGE UP (ref 40–120)
ALT FLD-CCNC: 27 U/L — SIGNIFICANT CHANGE UP (ref 10–45)
ANION GAP SERPL CALC-SCNC: 16 MMOL/L — SIGNIFICANT CHANGE UP (ref 5–17)
ANISOCYTOSIS BLD QL: ABNORMAL
APTT BLD: 61.8 SEC — HIGH (ref 26.1–36.8)
AST SERPL-CCNC: 29 U/L — SIGNIFICANT CHANGE UP (ref 10–40)
BASOPHILS # BLD AUTO: 0.04 K/UL — SIGNIFICANT CHANGE UP (ref 0–0.2)
BASOPHILS # BLD MANUAL: 0 K/UL — SIGNIFICANT CHANGE UP (ref 0–0.2)
BASOPHILS NFR BLD AUTO: 0.3 % — SIGNIFICANT CHANGE UP (ref 0–2)
BASOPHILS NFR BLD MANUAL: 0 % — SIGNIFICANT CHANGE UP (ref 0–2)
BILIRUB SERPL-MCNC: 0.3 MG/DL — SIGNIFICANT CHANGE UP (ref 0.2–1.2)
BUN SERPL-MCNC: 34 MG/DL — HIGH (ref 7–23)
BURR CELLS BLD QL SMEAR: SLIGHT — SIGNIFICANT CHANGE UP
CALCIUM SERPL-MCNC: 9.9 MG/DL — SIGNIFICANT CHANGE UP (ref 8.4–10.5)
CHLORIDE SERPL-SCNC: 102 MMOL/L — SIGNIFICANT CHANGE UP (ref 96–108)
CO2 SERPL-SCNC: 18 MMOL/L — LOW (ref 22–31)
CREAT SERPL-MCNC: 4.06 MG/DL — HIGH (ref 0.5–1.3)
EGFR: 15 ML/MIN/1.73M2 — LOW
EGFR: 15 ML/MIN/1.73M2 — LOW
EOSINOPHIL # BLD AUTO: 0.26 K/UL — SIGNIFICANT CHANGE UP (ref 0–0.5)
EOSINOPHIL # BLD MANUAL: 0.26 K/UL — SIGNIFICANT CHANGE UP (ref 0–0.5)
EOSINOPHIL NFR BLD AUTO: 1.7 % — SIGNIFICANT CHANGE UP (ref 0–6)
EOSINOPHIL NFR BLD MANUAL: 1.7 % — SIGNIFICANT CHANGE UP (ref 0–6)
GAS PNL BLDA: SIGNIFICANT CHANGE UP
GAS PNL BLDA: SIGNIFICANT CHANGE UP
GLUCOSE BLDC GLUCOMTR-MCNC: 170 MG/DL — HIGH (ref 70–99)
GLUCOSE BLDC GLUCOMTR-MCNC: 182 MG/DL — HIGH (ref 70–99)
GLUCOSE BLDC GLUCOMTR-MCNC: 265 MG/DL — HIGH (ref 70–99)
GLUCOSE BLDC GLUCOMTR-MCNC: 267 MG/DL — HIGH (ref 70–99)
GLUCOSE SERPL-MCNC: 130 MG/DL — HIGH (ref 70–99)
HAPTOGLOB SERPL-MCNC: 188 MG/DL — SIGNIFICANT CHANGE UP (ref 34–200)
HCT VFR BLD CALC: 23.2 % — LOW (ref 39–50)
HGB BLD-MCNC: 7.1 G/DL — LOW (ref 13–17)
IMM GRANULOCYTES # BLD AUTO: 0.52 K/UL — HIGH (ref 0–0.07)
IMM GRANULOCYTES NFR BLD AUTO: 3.4 % — HIGH (ref 0–0.9)
INR BLD: 1.25 RATIO — HIGH (ref 0.85–1.16)
LYMPHOCYTES # BLD AUTO: 1.46 K/UL — SIGNIFICANT CHANGE UP (ref 1–3.3)
LYMPHOCYTES # BLD MANUAL: 0.93 K/UL — LOW (ref 1–3.3)
LYMPHOCYTES NFR BLD AUTO: 9.4 % — LOW (ref 13–44)
LYMPHOCYTES NFR BLD MANUAL: 6 % — LOW (ref 13–44)
MAGNESIUM SERPL-MCNC: 3.2 MG/DL — HIGH (ref 1.6–2.6)
MANUAL NRBC #: 0.15 K/UL — HIGH (ref 0–0)
MCHC RBC-ENTMCNC: 26.7 PG — LOW (ref 27–34)
MCHC RBC-ENTMCNC: 30.6 G/DL — LOW (ref 32–36)
MCV RBC AUTO: 87.2 FL — SIGNIFICANT CHANGE UP (ref 80–100)
MONOCYTES # BLD AUTO: 1.62 K/UL — HIGH (ref 0–0.9)
MONOCYTES # BLD MANUAL: 0.79 K/UL — SIGNIFICANT CHANGE UP (ref 0–0.9)
MONOCYTES NFR BLD AUTO: 10.5 % — SIGNIFICANT CHANGE UP (ref 2–14)
MONOCYTES NFR BLD MANUAL: 5.1 % — SIGNIFICANT CHANGE UP (ref 2–14)
NEUTROPHILS # BLD AUTO: 11.59 K/UL — HIGH (ref 1.8–7.4)
NEUTROPHILS # BLD MANUAL: 13.51 K/UL — HIGH (ref 1.8–7.4)
NEUTROPHILS NFR BLD AUTO: 74.7 % — SIGNIFICANT CHANGE UP (ref 43–77)
NEUTROPHILS NFR BLD MANUAL: 87.2 % — HIGH (ref 43–77)
NRBC # BLD AUTO: 0.02 K/UL — HIGH (ref 0–0)
NRBC # BLD: 1 /100 WBCS — HIGH (ref 0–0)
NRBC # FLD: 0.02 K/UL — HIGH (ref 0–0)
NRBC BLD AUTO-RTO: 0 /100 WBCS — SIGNIFICANT CHANGE UP (ref 0–0)
NRBC BLD-RTO: 1 /100 WBCS — HIGH (ref 0–0)
PHOSPHATE SERPL-MCNC: 3.3 MG/DL — SIGNIFICANT CHANGE UP (ref 2.5–4.5)
PLAT MORPH BLD: NORMAL — SIGNIFICANT CHANGE UP
PLATELET # BLD AUTO: 116 K/UL — LOW (ref 150–400)
PMV BLD: 12.1 FL — SIGNIFICANT CHANGE UP (ref 7–13)
POIKILOCYTOSIS BLD QL AUTO: ABNORMAL
POTASSIUM BLDA-SCNC: 3.6 MMOL/L — SIGNIFICANT CHANGE UP (ref 3.5–5.1)
POTASSIUM SERPL-MCNC: 4.2 MMOL/L — SIGNIFICANT CHANGE UP (ref 3.5–5.3)
POTASSIUM SERPL-SCNC: 4.2 MMOL/L — SIGNIFICANT CHANGE UP (ref 3.5–5.3)
PROT SERPL-MCNC: 5.8 G/DL — LOW (ref 6–8.3)
PROTHROM AB SERPL-ACNC: 14.4 SEC — HIGH (ref 9.9–13.4)
RBC # BLD: 2.66 M/UL — LOW (ref 4.2–5.8)
RBC # FLD: 19.7 % — HIGH (ref 10.3–14.5)
RBC BLD AUTO: ABNORMAL
SCHISTOCYTES BLD QL AUTO: SLIGHT — SIGNIFICANT CHANGE UP
SODIUM SERPL-SCNC: 136 MMOL/L — SIGNIFICANT CHANGE UP (ref 135–145)
WBC # BLD: 15.49 K/UL — HIGH (ref 3.8–10.5)
WBC # FLD AUTO: 15.49 K/UL — HIGH (ref 3.8–10.5)

## 2025-06-28 PROCEDURE — 99291 CRITICAL CARE FIRST HOUR: CPT

## 2025-06-28 PROCEDURE — 71045 X-RAY EXAM CHEST 1 VIEW: CPT | Mod: 26

## 2025-06-28 PROCEDURE — 99292 CRITICAL CARE ADDL 30 MIN: CPT

## 2025-06-28 PROCEDURE — 93010 ELECTROCARDIOGRAM REPORT: CPT

## 2025-06-28 RX ORDER — MAGNESIUM SULFATE 500 MG/ML
2 SYRINGE (ML) INJECTION ONCE
Refills: 0 | Status: DISCONTINUED | OUTPATIENT
Start: 2025-06-28 | End: 2025-06-28

## 2025-06-28 RX ORDER — INSULIN LISPRO 100 U/ML
10 INJECTION, SOLUTION INTRAVENOUS; SUBCUTANEOUS ONCE
Refills: 0 | Status: COMPLETED | OUTPATIENT
Start: 2025-06-28 | End: 2025-06-28

## 2025-06-28 RX ORDER — HEPARIN SODIUM 1000 [USP'U]/ML
1050 INJECTION INTRAVENOUS; SUBCUTANEOUS
Qty: 25000 | Refills: 0 | Status: DISCONTINUED | OUTPATIENT
Start: 2025-06-28 | End: 2025-06-29

## 2025-06-28 RX ORDER — VANCOMYCIN HCL IN 5 % DEXTROSE 1.5G/250ML
500 PLASTIC BAG, INJECTION (ML) INTRAVENOUS ONCE
Refills: 0 | Status: COMPLETED | OUTPATIENT
Start: 2025-06-28 | End: 2025-06-28

## 2025-06-28 RX ADMIN — Medication 1300 MILLIGRAM(S): at 05:21

## 2025-06-28 RX ADMIN — Medication 10 MILLILITER(S): at 19:14

## 2025-06-28 RX ADMIN — HEPARIN SODIUM 1050 UNIT(S)/HR: 1000 INJECTION INTRAVENOUS; SUBCUTANEOUS at 19:13

## 2025-06-28 RX ADMIN — Medication 50 MILLIGRAM(S): at 21:03

## 2025-06-28 RX ADMIN — INSULIN LISPRO 9 UNIT(S): 100 INJECTION, SOLUTION INTRAVENOUS; SUBCUTANEOUS at 12:35

## 2025-06-28 RX ADMIN — DOBUTAMINE 7.15 MICROGRAM(S)/KG/MIN: 250 INJECTION INTRAVENOUS at 19:11

## 2025-06-28 RX ADMIN — Medication 1 APPLICATION(S): at 05:43

## 2025-06-28 RX ADMIN — Medication 15 MILLILITER(S): at 05:23

## 2025-06-28 RX ADMIN — Medication 500 MICROGRAM(S): at 23:26

## 2025-06-28 RX ADMIN — CEFEPIME 100 MILLIGRAM(S): 2 INJECTION, POWDER, FOR SOLUTION INTRAVENOUS at 05:42

## 2025-06-28 RX ADMIN — Medication 2 TABLET(S): at 21:02

## 2025-06-28 RX ADMIN — HEPARIN SODIUM 10.5 UNIT(S)/HR: 1000 INJECTION INTRAVENOUS; SUBCUTANEOUS at 07:52

## 2025-06-28 RX ADMIN — POLYETHYLENE GLYCOL 3350 17 GRAM(S): 17 POWDER, FOR SOLUTION ORAL at 17:02

## 2025-06-28 RX ADMIN — DOBUTAMINE 9.53 MICROGRAM(S)/KG/MIN: 250 INJECTION INTRAVENOUS at 07:52

## 2025-06-28 RX ADMIN — INSULIN LISPRO 9 UNIT(S): 100 INJECTION, SOLUTION INTRAVENOUS; SUBCUTANEOUS at 19:46

## 2025-06-28 RX ADMIN — Medication 500 MICROGRAM(S): at 05:49

## 2025-06-28 RX ADMIN — INSULIN LISPRO 5: 100 INJECTION, SOLUTION INTRAVENOUS; SUBCUTANEOUS at 12:34

## 2025-06-28 RX ADMIN — Medication 100 MILLIGRAM(S): at 20:25

## 2025-06-28 RX ADMIN — Medication 1 TABLET(S): at 11:41

## 2025-06-28 RX ADMIN — Medication 5 MILLIGRAM(S): at 21:03

## 2025-06-28 RX ADMIN — Medication 500 MICROGRAM(S): at 11:38

## 2025-06-28 RX ADMIN — Medication 5 MILLILITER(S): at 05:23

## 2025-06-28 RX ADMIN — INSULIN GLARGINE-YFGN 28 UNIT(S): 100 INJECTION, SOLUTION SUBCUTANEOUS at 21:06

## 2025-06-28 RX ADMIN — Medication 40 MILLIGRAM(S): at 06:43

## 2025-06-28 RX ADMIN — INSULIN LISPRO 10 UNIT(S): 100 INJECTION, SOLUTION INTRAVENOUS; SUBCUTANEOUS at 21:45

## 2025-06-28 RX ADMIN — Medication 40 MILLIEQUIVALENT(S): at 20:25

## 2025-06-28 RX ADMIN — Medication 5 MILLILITER(S): at 00:58

## 2025-06-28 RX ADMIN — INSULIN LISPRO 9 UNIT(S): 100 INJECTION, SOLUTION INTRAVENOUS; SUBCUTANEOUS at 07:52

## 2025-06-28 RX ADMIN — AMIODARONE HYDROCHLORIDE 200 MILLIGRAM(S): 50 INJECTION, SOLUTION INTRAVENOUS at 05:21

## 2025-06-28 RX ADMIN — Medication 81 MILLIGRAM(S): at 11:41

## 2025-06-28 RX ADMIN — Medication 500 MICROGRAM(S): at 17:32

## 2025-06-28 RX ADMIN — INSULIN LISPRO 8: 100 INJECTION, SOLUTION INTRAVENOUS; SUBCUTANEOUS at 07:51

## 2025-06-28 RX ADMIN — ATORVASTATIN CALCIUM 80 MILLIGRAM(S): 80 TABLET, FILM COATED ORAL at 21:02

## 2025-06-28 NOTE — PROGRESS NOTE ADULT - SUBJECTIVE AND OBJECTIVE BOX
Patient seen and examined at the bedside.    Remained critically ill on continuous ICU monitoring.      Brief Summary:  71 yo M PMH of HTN, HLD, DM2, CAD (total  4 coronary stents, last one PCI in 08/2024), CHF with EF 40-45%, severe MR  Now s/p  Mitral valve replacement, CABG x3 and RVAD placement on 6/17/2025       24 Hour events:  weaned    off CVVH   removed central line       OBJECTIVE:  Vital Signs Last 24 Hrs  T(C): 36.9 (28 Jun 2025 04:00), Max: 37.1 (27 Jun 2025 12:00)  T(F): 98.4 (28 Jun 2025 04:00), Max: 98.7 (27 Jun 2025 12:00)  HR: 87 (28 Jun 2025 07:00) (64 - 93)  BP: 134/60 (28 Jun 2025 07:00) (102/49 - 137/63)  BP(mean): 86 (28 Jun 2025 07:00) (71 - 90)  RR: 11 (28 Jun 2025 07:00) (10 - 34)  SpO2: 100% (28 Jun 2025 07:00) (89% - 100%)    Parameters below as of 28 Jun 2025 06:10  Patient On (Oxygen Delivery Method): nasal cannula                    Physical Exam:   General: awake   Neurology: Following commands - Ambulating well.  Respiratory: Bilateral breath sounds  CV: Sinus   RVAD pulmonary 17F, Venous R femoral 25F  Abdominal: Soft, Nontender  Extremities: Warm, well-perfused, AVF on RUE            -------------------------------------------------------------------------------------------------------------------------------    Labs:                        7.1    15.49 )-----------( 116      ( 28 Jun 2025 00:32 )             23.2     06-28    136  |  102  |  34[H]  ----------------------------<  130[H]  4.2   |  18[L]  |  4.06[H]    Ca    9.9      28 Jun 2025 00:32  Phos  3.3     06-28  Mg     3.2     06-28    TPro  5.8[L]  /  Alb  3.1[L]  /  TBili  0.3  /  DBili  x   /  AST  29  /  ALT  27  /  AlkPhos  108  06-28    LIVER FUNCTIONS - ( 28 Jun 2025 00:32 )  Alb: 3.1 g/dL / Pro: 5.8 g/dL / ALK PHOS: 108 U/L / ALT: 27 U/L / AST: 29 U/L / GGT: x           PT/INR - ( 28 Jun 2025 04:02 )   PT: 14.4 sec;   INR: 1.25 ratio         PTT - ( 28 Jun 2025 04:02 )  PTT:61.8 sec  ABG - ( 28 Jun 2025 03:54 )  pH, Arterial: 7.45  pH, Blood: x     /  pCO2: 36    /  pO2: 142   / HCO3: 25    / Base Excess: 1.0   /  SaO2: 100.0             ------------------------------------------------------------------------------------------------------------------------------  Assessment:  71 yo M with cardiogenic shock on RVAD ,DM2, CAD (total  4 coronary stents, last one PCI in 08/2024 ), CHF with EF 40-45%, severe MR  Now s/p Mitral valve replacement, CABG x3 and RVAD placement on 6/17/2025     Right heart failure s/p RVAD insertion 6/22/25   Cardiogenic shock  Acute postop pulmonary insufficiency  Hemoptysis  Acute blood loss anemia  Thrombocytopenia  ESRD  Metabolic acidosis.  Hyperglycemia      Plan:   ***Neuro***  Maintain day/night cycle to prevent ICU delirium   Postoperative acute pain control with Tylenol, Gabapentin and prns    ***Cardiovascular***  At high risk for hemodynamic instability and cardiac arrhythmias.  RV failure, s/p CABG, s/p MV repair  RVAD 2300RPM, 2.4L  Remains on Dobutamine for inotropic support - wean as tolerated   Trend central venous saturation and lactate.  Off vasopressors with MAP > 65 mm Hg  ASA/Statin daily   PO Amiodarone load.    ***Pulmonary***  Postop acute pulmonary insufficiency - NC  Hemoptysis - IP evaluation/bronch with clot removal     ***GI***  Tolerating diet.  Protonix for GI prophylaxis.  Bowel regimen.   Trend LFTs.    ***Renal***  ESRD off CRRT - iHD today with 1u PRBC  Goal negative balance.  Metabolic acidosis - Bicarb tabs   Nephrology following    ***ID***  Perioperative coverage with Cefuroxime   6/23 BAL - GNR/Moderate Serratia - Cefepime 6/24-  ID following    ***Endocrine***  Insulin per protocol for Hyperglycemia - transition to ISS     ***Hematology***  Acute blood loss anemia and thrombocytopenia - 2 plts, 1 prbc in OR   Heparin for PTT 40-50    Walking with physical therapy  OOB in chair       Care plan discussed with the ICU care team.   Patient remains critical, at risk for life threatening decompensation.    I have spent 53 minutes providing critical care management to this patient.    I, Dylon Gonzalez MD, personally performed the services described in this documentation. I have reviewed the chart and agree that the record reflects my personal performance and is accurate and complete.

## 2025-06-28 NOTE — PROGRESS NOTE ADULT - SUBJECTIVE AND OBJECTIVE BOX
Remains on RVAD, Heparin, and Dobutamine gtts    VITAL:  T(C): , Max: 37.1 (06-27-25 @ 12:00)  T(F): , Max: 98.7 (06-27-25 @ 12:00)  HR: 87 (06-28-25 @ 07:00)  BP: 134/60 (06-28-25 @ 07:00)  BP(mean): 86 (06-28-25 @ 07:00)  RR: 11 (06-28-25 @ 07:00)  SpO2: 100% (06-28-25 @ 07:00)  Wt(kg): --      PHYSICAL EXAM:  Constitutional: lethargic, NAD  HEENT: NCAT, DMM  Neck:  No JVD  Respiratory: coarse BS b/l  Cardiovascular: irreg s1s2  Gastrointestinal: BS+, soft, NT/ND  Extremities: No peripheral edema  : No echeverria  Skin: No rashes  Access: RUE AVF (+)thrill      LABS:                        7.1    15.49 )-----------( 116      ( 28 Jun 2025 00:32 )             23.2     Na(136)/K(4.2)/Cl(102)/HCO3(18)/BUN(34)/Cr(4.06)Glu(130)/Ca(9.9)/Mg(3.2)/PO4(3.3)    06-28 @ 00:32  Na(135)/K(4.0)/Cl(102)/HCO3(20)/BUN(31)/Cr(3.55)Glu(220)/Ca(9.5)/Mg(3.2)/PO4(3.1)    06-27 @ 16:55  Na(136)/K(3.9)/Cl(103)/HCO3(22)/BUN(21)/Cr(2.43)Glu(127)/Ca(9.5)/Mg(2.6)/PO4(2.5)    06-27 @ 00:31  Na(137)/K(3.9)/Cl(102)/HCO3(22)/BUN(20)/Cr(2.25)Glu(130)/Ca(9.7)/Mg(2.7)/PO4(2.7)    06-26 @ 00:33        IMPRESSION: 70M w/ HTN, DM2, CAD, and ESRD-HD, s/p CABGx3/MV repair/RVAD 6/17/25    (1)Renal - ESRD - HD MWF - of late on CRRT via RVAD circuit - potentially for discontinuation of CRRT this a.m.  (2)Metabolic acidosis - acceptable levels; we can d/c the PO NaHCO3  (3)Anemia - on TIW Retacrit  (4)CV - cardiogenic shock - dependent on RVAD + inotropes. Planned for standard HD today, with attempted UF of 1L/24h      RECOMMEND:  (1)Standard HD today via RUE AVF; 1kg UF as able   (2)D/C PO NaHCO3  (3)Retacrit as ordered  (4)Continue meds for GFR<10/HD:        Christiano Marie MD  [facility]  Office/on call physician: [phone]  Cell (7a-7p): [phone]       Remains on RVAD, Heparin, and Dobutamine gtts    VITAL:  T(C): , Max: 37.1 (06-27-25 @ 12:00)  T(F): , Max: 98.7 (06-27-25 @ 12:00)  HR: 87 (06-28-25 @ 07:00)  BP: 134/60 (06-28-25 @ 07:00)  BP(mean): 86 (06-28-25 @ 07:00)  RR: 11 (06-28-25 @ 07:00)  SpO2: 100% (06-28-25 @ 07:00)  Wt(kg): --      PHYSICAL EXAM:  Constitutional: lethargic, NAD  HEENT: NCAT, DMM  Neck:  No JVD  Respiratory: coarse BS b/l  Cardiovascular: irreg s1s2  Gastrointestinal: BS+, soft, NT/ND  Extremities: No peripheral edema  : No echeverria  Skin: No rashes  Access: RUE AVF (+)thrill      LABS:                        7.1    15.49 )-----------( 116      ( 28 Jun 2025 00:32 )             23.2     Na(136)/K(4.2)/Cl(102)/HCO3(18)/BUN(34)/Cr(4.06)Glu(130)/Ca(9.9)/Mg(3.2)/PO4(3.3)    06-28 @ 00:32  Na(135)/K(4.0)/Cl(102)/HCO3(20)/BUN(31)/Cr(3.55)Glu(220)/Ca(9.5)/Mg(3.2)/PO4(3.1)    06-27 @ 16:55  Na(136)/K(3.9)/Cl(103)/HCO3(22)/BUN(21)/Cr(2.43)Glu(127)/Ca(9.5)/Mg(2.6)/PO4(2.5)    06-27 @ 00:31  Na(137)/K(3.9)/Cl(102)/HCO3(22)/BUN(20)/Cr(2.25)Glu(130)/Ca(9.7)/Mg(2.7)/PO4(2.7)    06-26 @ 00:33        IMPRESSION: 70M w/ HTN, DM2, CAD, and ESRD-HD, s/p CABGx3/MV repair/RVAD 6/17/25    (1)Renal - ESRD - HD MWF - of late on CRRT via RVAD circuit - potentially for discontinuation of CRRT this a.m.  (2)Metabolic acidosis - acceptable levels; we can d/c the PO NaHCO3  (3)Anemia - on TIW Retacrit  (4)CV - cardiogenic shock - dependent on RVAD + inotropes. Planned for standard HD today, with attempted UF of 1L/24h      RECOMMEND:  (1)Standard HD today via RUE AVF; 1kg UF as able  (2)Could give 1U PRBCs with HD today  (3)D/C PO NaHCO3  (4)Retacrit as ordered  (5)Continue meds for GFR<10/HD:        Christiano Marie MD  [facility]  Office/on call physician: [phone]  Cell (7a-7p): [phone]       Remains on RVAD, Heparin, and Dobutamine gtts  No pain, no sob    VITAL:  T(C): , Max: 37.1 (06-27-25 @ 12:00)  T(F): , Max: 98.7 (06-27-25 @ 12:00)  HR: 87 (06-28-25 @ 07:00)  BP: 134/60 (06-28-25 @ 07:00)  BP(mean): 86 (06-28-25 @ 07:00)  RR: 11 (06-28-25 @ 07:00)  SpO2: 100% (06-28-25 @ 07:00)  Wt(kg): --      PHYSICAL EXAM:  Constitutional: alert, NAD  HEENT: NCAT, DMM  Neck:  No JVD  Respiratory: coarse BS b/l  Cardiovascular: irreg s1s2  Gastrointestinal: BS+, soft, NT/ND  Extremities: No peripheral edema  : No echeverria  Skin: No rashes  Access: RUE AVF (+)thrill      LABS:                        7.1    15.49 )-----------( 116      ( 28 Jun 2025 00:32 )             23.2     Na(136)/K(4.2)/Cl(102)/HCO3(18)/BUN(34)/Cr(4.06)Glu(130)/Ca(9.9)/Mg(3.2)/PO4(3.3)    06-28 @ 00:32  Na(135)/K(4.0)/Cl(102)/HCO3(20)/BUN(31)/Cr(3.55)Glu(220)/Ca(9.5)/Mg(3.2)/PO4(3.1)    06-27 @ 16:55  Na(136)/K(3.9)/Cl(103)/HCO3(22)/BUN(21)/Cr(2.43)Glu(127)/Ca(9.5)/Mg(2.6)/PO4(2.5)    06-27 @ 00:31  Na(137)/K(3.9)/Cl(102)/HCO3(22)/BUN(20)/Cr(2.25)Glu(130)/Ca(9.7)/Mg(2.7)/PO4(2.7)    06-26 @ 00:33        IMPRESSION: 70M w/ HTN, DM2, CAD, and ESRD-HD, s/p CABGx3/MV repair/RVAD 6/17/25    (1)Renal - ESRD - HD MWF - of late on CRRT via RVAD circuit; CRRT discontinued 6/27  (2)Metabolic acidosis - acceptable levels; we can d/c the PO NaHCO3  (3)Anemia - on TIW Retacrit  (4)CV - cardiogenic shock - dependent on RVAD + inotropes. Planned for standard HD today, with attempted UF of 1L/24h      RECOMMEND:  (1)Standard HD today via RUE AVF; 1kg UF as able  (2)Could give 1U PRBCs with HD today  (3)D/C PO NaHCO3  (4)Retacrit as ordered  (5)Continue meds for GFR<10/HD:        Christiano Marie MD  [facility]  Office/on call physician: [phone]  Cell (7a-7p): [phone]

## 2025-06-28 NOTE — PROGRESS NOTE ADULT - SUBJECTIVE AND OBJECTIVE BOX
Patient seen and examined at the bedside.    Remained critically ill on continuous ICU monitoring.    OBJECTIVE:  Vital Signs Last 24 Hrs  T(C): 37.2 (28 Jun 2025 20:00), Max: 37.2 (28 Jun 2025 08:00)  T(F): 99 (28 Jun 2025 20:00), Max: 99 (28 Jun 2025 08:00)  HR: 98 (28 Jun 2025 20:00) (64 - 98)  BP: 147/66 (28 Jun 2025 20:00) (105/52 - 147/66)  BP(mean): 65 (28 Jun 2025 20:00) (65 - 92)  RR: 23 (28 Jun 2025 20:00) (10 - 30)  SpO2: 98% (28 Jun 2025 20:00) (93% - 100%)    Parameters below as of 28 Jun 2025 20:00  Patient On (Oxygen Delivery Method): nasal cannula  O2 Flow (L/min): 2    Physical Exam:   General: NAD, ambulated  Neurology: Awake, alert  Eyes: bilateral pupils equal and reactive   ENT/Neck: Neck supple, trachea midline, No JVD   Respiratory: Clear bilaterally   CV: S1S2, no murmurs        [x] Sternal dressing          [x] Sinus rhythm [x] TPM VVI - 40  Abdominal: Soft, NT, ND +BS   Extremities: 1-2+ pedal edema noted, + peripheral pulses   Skin: No Rashes, Hematoma, Ecchymosis   RIJ Cannula, R femoral cannula all C/D/I                Assessment:  69 yo M with cardiogenic shock on RVAD ,DM2, CAD (total  4 coronary stents, last one PCI in 08/2024 ), CHF with EF 40-45%, severe MR  Now s/p Mitral valve replacement, CABG x3 and RVAD placement on 6/17/2025     Right heart failure s/p RVAD insertion 6/22/25   Cardiogenic shock  Acute postop pulmonary insufficiency  Hemoptysis  Acute blood loss anemia  Thrombocytopenia  ESRD  Hyperglycemia    Plan:   ***Neuro***  [x] Nonfocal  Post operative neuro assessment   Pain management with Gabapentin, Oxycodone and prns   Trazadone and Melatonin at bedtime    ***Cardiovascular***  Invasive hemodynamic monitoring, assess perfusion indices  SR/ MAP 65 / Hct 23.2% / Lactate 1.0  RV failure, s/p CABG, s/p MV repair  [x] Dobutamine 3 mcgs/kG/Min - slow wean  [x] +/- low dose nitroprusside 0.5 mcgs/kG/Min  [x] RVAD 2300 RPMs, flow of 2.3L, oxygenator removed 6/26   [x] PO Amiodarone for rate control  [x] ASA [x] Statin   Serial EKG and cardiac enzymes     ***Pulmonary***  [x] NC 2 L  Encourage incentive spirometry, continue pulse ox monitoring, follow ABGs, continue Duonebs  Continue on Ipra nebs    ***GI***  [x] CC Diet   [x] Protonix for stress ulcer ppx  Bowel regimen with Miralax and Senna     ***Renal***  [x] ESRD  HD last session today 6/28   Metabolic acidosis - Bicarb tabs   Continue to monitor I/Os, BUN/Creatinine.   Replete lytes PRN  Retacrit for renal support     ***Heme***  [x] AC Therapy with Heparin gtt Xa goal 0.2-0.3   [x] 1u PRBC  Reassessment of hemodynamics post resuscitation    ***ID***  Hypothermic 6/26  Elevated procalcitonin   F/u all cultures  Continue cefepime, vanco d/tyree    ***Endocrine***  [x] Stress Hyperglycemia: HbA1c 6.8%                - [x] ISS [x] Lantus             - Need tight glycemic control to prevent wound infection.            Patient requires continuous monitoring with bedside rhythm monitoring, pulse oximetry monitoring, and continuous central venous and arterial pressure monitoring; and intermittent blood gas analysis. Care plan discussed with the ICU care team.   Patient remained critical, at risk for life threatening decompensation.    I have spent 52 minutes providing critical care management to this patient.    By signing my name below, I, Scot Pelaez, attest that this documentation has been prepared under the direction and in the presence of YVROSE Rice   Electronically signed: Maria Luisa Berman, 06-28-25 @ 20:46    I, Willian Medina, personally performed the services described in this documentation. all medical record entries made by the jose [email or web address] were at my direction and in my presence. I have reviewed the chart and agree that the record reflects my personal performance and is accurate and complete  Electronically signed: YVROSE Rice  Patient seen and examined at the bedside.    Remained critically ill on continuous ICU monitoring.    OBJECTIVE:  Vital Signs Last 24 Hrs  T(C): 37.2 (28 Jun 2025 20:00), Max: 37.2 (28 Jun 2025 08:00)  T(F): 99 (28 Jun 2025 20:00), Max: 99 (28 Jun 2025 08:00)  HR: 98 (28 Jun 2025 20:00) (64 - 98)  BP: 147/66 (28 Jun 2025 20:00) (105/52 - 147/66)  BP(mean): 65 (28 Jun 2025 20:00) (65 - 92)  RR: 23 (28 Jun 2025 20:00) (10 - 30)  SpO2: 98% (28 Jun 2025 20:00) (93% - 100%)    Parameters below as of 28 Jun 2025 20:00  Patient On (Oxygen Delivery Method): nasal cannula  O2 Flow (L/min): 2    Physical Exam:   General: NAD, ambulated  Neurology: Awake, alert  Eyes: bilateral pupils equal and reactive   ENT/Neck: Neck supple, trachea midline, No JVD   Respiratory: Clear bilaterally   CV: S1S2, no murmurs        [x] Sternal dressing          [x] Sinus rhythm [x] TPM VVI - 40 backup   Abdominal: Soft, NT, ND +BS   Extremities: 1-2+ pedal edema noted, + peripheral pulses   Skin: No Rashes, Hematoma, Ecchymosis   RIJ Cannula, R femoral cannula all C/D/I                Assessment:  71 yo M with cardiogenic shock on RVAD ,DM2, CAD (total  4 coronary stents, last one PCI in 08/2024 ), CHF with EF 40-45%, severe MR  Now s/p Mitral valve replacement, CABG x3 and RVAD placement on 6/17/2025     Right heart failure s/p RVAD insertion 6/22/25   Cardiogenic shock  Acute postop pulmonary insufficiency  Hemoptysis  Acute blood loss anemia  Thrombocytopenia  ESRD  Hyperglycemia    Plan:   ***Neuro***  [x] Nonfocal  Post operative neuro assessment   Pain management with Gabapentin, Oxycodone and prns   Trazadone and Melatonin at bedtime    ***Cardiovascular***  Invasive hemodynamic monitoring, assess perfusion indices  SR/ MAP 65 / Hct 23.2% / Lactate 1.0  RV failure, s/p CABG, s/p MV repair  [x] Dobutamine 3 mcgs/kG/Min - slow wean  [x] of nitroprusside gtt   [x] RVAD 2300 RPMs, flow of 2.3L, oxygenator removed 6/26   [x] PO Amiodarone for AF rate control  [x] ASA [x] Statin   Serial EKG and cardiac enzymes     ***Pulmonary***  [x] NC 2 L  Encourage incentive spirometry, continue pulse ox monitoring, follow ABGs, continue Duonebs  Continue on Ipra nebs    ***GI***  [x] CC Diet   [x] Protonix for stress ulcer ppx  Bowel regimen with Miralax and Senna     ***Renal***  [x] ESRD  HD last session today 6/28 - 1kg removed   Metabolic acidosis - Bicarb tabs   Continue to monitor I/Os, BUN/Creatinine.   Replete lytes PRN  Retacrit for renal support     ***Heme***  [x] AC Therapy with Heparin gtt Xa goal 0.2-0.3 - at goal   [x] 1u PRBC w/ HD today   Reassessment of hemodynamics post resuscitation    ***ID***  Hypothermic 6/26  Elevated procalcitonin, trend tonight    F/u all cultures - all negative to date  Continue cefepime, vanco 500 mg given post HD     ***Endocrine***  [x] Stress Hyperglycemia: HbA1c 6.8%                - [x] ISS [x] Lantus and premeal insulin              - Need tight glycemic control to prevent wound infection.        Patient requires continuous monitoring with bedside rhythm monitoring, pulse oximetry monitoring, and continuous central venous and arterial pressure monitoring; and intermittent blood gas analysis. Care plan discussed with the ICU care team.   Patient remained critical, at risk for life threatening decompensation.    I have spent 52 minutes providing critical care management to this patient.    By signing my name below, I, Scot Pelaez, attest that this documentation has been prepared under the direction and in the presence of YVROSE Rice   Electronically signed: Maria Luisa Berman, 06-28-25 @ 20:46    I, Willian Medina, personally performed the services described in this documentation. all medical record entries made by the scribe were at my direction and in my presence. I have reviewed the chart and agree that the record reflects my personal performance and is accurate and complete  Electronically signed: YVROSE Rice

## 2025-06-29 LAB
ALBUMIN SERPL ELPH-MCNC: 3.3 G/DL — SIGNIFICANT CHANGE UP (ref 3.3–5)
ALP SERPL-CCNC: 124 U/L — HIGH (ref 40–120)
ALT FLD-CCNC: 23 U/L — SIGNIFICANT CHANGE UP (ref 10–45)
ANION GAP SERPL CALC-SCNC: 14 MMOL/L — SIGNIFICANT CHANGE UP (ref 5–17)
ANION GAP SERPL CALC-SCNC: 15 MMOL/L — SIGNIFICANT CHANGE UP (ref 5–17)
APTT BLD: 53.6 SEC — HIGH (ref 26.1–36.8)
APTT BLD: 61.4 SEC — HIGH (ref 26.1–36.8)
APTT BLD: 61.5 SEC — HIGH (ref 26.1–36.8)
APTT BLD: 64.2 SEC — HIGH (ref 26.1–36.8)
AST SERPL-CCNC: 22 U/L — SIGNIFICANT CHANGE UP (ref 10–40)
BILIRUB SERPL-MCNC: 0.3 MG/DL — SIGNIFICANT CHANGE UP (ref 0.2–1.2)
BUN SERPL-MCNC: 32 MG/DL — HIGH (ref 7–23)
BUN SERPL-MCNC: 45 MG/DL — HIGH (ref 7–23)
CALCIUM SERPL-MCNC: 10.4 MG/DL — SIGNIFICANT CHANGE UP (ref 8.4–10.5)
CALCIUM SERPL-MCNC: 9.7 MG/DL — SIGNIFICANT CHANGE UP (ref 8.4–10.5)
CHLORIDE SERPL-SCNC: 97 MMOL/L — SIGNIFICANT CHANGE UP (ref 96–108)
CHLORIDE SERPL-SCNC: 98 MMOL/L — SIGNIFICANT CHANGE UP (ref 96–108)
CO2 SERPL-SCNC: 24 MMOL/L — SIGNIFICANT CHANGE UP (ref 22–31)
CO2 SERPL-SCNC: 24 MMOL/L — SIGNIFICANT CHANGE UP (ref 22–31)
CREAT SERPL-MCNC: 4.14 MG/DL — HIGH (ref 0.5–1.3)
CREAT SERPL-MCNC: 5.41 MG/DL — HIGH (ref 0.5–1.3)
EGFR: 11 ML/MIN/1.73M2 — LOW
EGFR: 11 ML/MIN/1.73M2 — LOW
EGFR: 15 ML/MIN/1.73M2 — LOW
EGFR: 15 ML/MIN/1.73M2 — LOW
GAS PNL BLDA: SIGNIFICANT CHANGE UP
GLUCOSE BLDC GLUCOMTR-MCNC: 161 MG/DL — HIGH (ref 70–99)
GLUCOSE BLDC GLUCOMTR-MCNC: 195 MG/DL — HIGH (ref 70–99)
GLUCOSE BLDC GLUCOMTR-MCNC: 242 MG/DL — HIGH (ref 70–99)
GLUCOSE BLDC GLUCOMTR-MCNC: 243 MG/DL — HIGH (ref 70–99)
GLUCOSE BLDC GLUCOMTR-MCNC: 243 MG/DL — HIGH (ref 70–99)
GLUCOSE SERPL-MCNC: 178 MG/DL — HIGH (ref 70–99)
GLUCOSE SERPL-MCNC: 237 MG/DL — HIGH (ref 70–99)
HAPTOGLOB SERPL-MCNC: 187 MG/DL — SIGNIFICANT CHANGE UP (ref 34–200)
HCT VFR BLD CALC: 25.6 % — LOW (ref 39–50)
HGB BLD-MCNC: 8 G/DL — LOW (ref 13–17)
INR BLD: 1.27 RATIO — HIGH (ref 0.85–1.16)
INR BLD: 1.27 RATIO — HIGH (ref 0.85–1.16)
MAGNESIUM SERPL-MCNC: 2.8 MG/DL — HIGH (ref 1.6–2.6)
MAGNESIUM SERPL-MCNC: 3 MG/DL — HIGH (ref 1.6–2.6)
MCHC RBC-ENTMCNC: 27.1 PG — SIGNIFICANT CHANGE UP (ref 27–34)
MCHC RBC-ENTMCNC: 31.3 G/DL — LOW (ref 32–36)
MCV RBC AUTO: 86.8 FL — SIGNIFICANT CHANGE UP (ref 80–100)
NRBC # BLD AUTO: 0.02 K/UL — HIGH (ref 0–0)
NRBC # FLD: 0.02 K/UL — HIGH (ref 0–0)
NRBC BLD AUTO-RTO: 0 /100 WBCS — SIGNIFICANT CHANGE UP (ref 0–0)
PHOSPHATE SERPL-MCNC: 2.8 MG/DL — SIGNIFICANT CHANGE UP (ref 2.5–4.5)
PHOSPHATE SERPL-MCNC: 3.5 MG/DL — SIGNIFICANT CHANGE UP (ref 2.5–4.5)
PLATELET # BLD AUTO: 137 K/UL — LOW (ref 150–400)
PMV BLD: 12.2 FL — SIGNIFICANT CHANGE UP (ref 7–13)
POTASSIUM SERPL-MCNC: 3.9 MMOL/L — SIGNIFICANT CHANGE UP (ref 3.5–5.3)
POTASSIUM SERPL-MCNC: 4 MMOL/L — SIGNIFICANT CHANGE UP (ref 3.5–5.3)
POTASSIUM SERPL-SCNC: 3.9 MMOL/L — SIGNIFICANT CHANGE UP (ref 3.5–5.3)
POTASSIUM SERPL-SCNC: 4 MMOL/L — SIGNIFICANT CHANGE UP (ref 3.5–5.3)
PROCALCITONIN SERPL-MCNC: 2.47 NG/ML — HIGH (ref 0.02–0.1)
PROLACTIN SERPL-MCNC: 6.8 NG/ML — SIGNIFICANT CHANGE UP (ref 4.1–18.4)
PROT SERPL-MCNC: 6.1 G/DL — SIGNIFICANT CHANGE UP (ref 6–8.3)
PROTHROM AB SERPL-ACNC: 14.4 SEC — HIGH (ref 9.9–13.4)
PROTHROM AB SERPL-ACNC: 14.4 SEC — HIGH (ref 9.9–13.4)
RBC # BLD: 2.95 M/UL — LOW (ref 4.2–5.8)
RBC # FLD: 19 % — HIGH (ref 10.3–14.5)
SODIUM SERPL-SCNC: 135 MMOL/L — SIGNIFICANT CHANGE UP (ref 135–145)
SODIUM SERPL-SCNC: 137 MMOL/L — SIGNIFICANT CHANGE UP (ref 135–145)
WBC # BLD: 18.76 K/UL — HIGH (ref 3.8–10.5)
WBC # FLD AUTO: 18.76 K/UL — HIGH (ref 3.8–10.5)

## 2025-06-29 PROCEDURE — 71045 X-RAY EXAM CHEST 1 VIEW: CPT | Mod: 26

## 2025-06-29 PROCEDURE — 99291 CRITICAL CARE FIRST HOUR: CPT

## 2025-06-29 PROCEDURE — 99292 CRITICAL CARE ADDL 30 MIN: CPT

## 2025-06-29 PROCEDURE — 93750 INTERROGATION VAD IN PERSON: CPT

## 2025-06-29 RX ORDER — INSULIN LISPRO 100 U/ML
INJECTION, SOLUTION INTRAVENOUS; SUBCUTANEOUS
Refills: 0 | Status: DISCONTINUED | OUTPATIENT
Start: 2025-06-29 | End: 2025-07-02

## 2025-06-29 RX ORDER — HEPARIN SODIUM 1000 [USP'U]/ML
1000 INJECTION INTRAVENOUS; SUBCUTANEOUS
Qty: 25000 | Refills: 0 | Status: DISCONTINUED | OUTPATIENT
Start: 2025-06-29 | End: 2025-07-01

## 2025-06-29 RX ORDER — EPOETIN ALFA 10000 [IU]/ML
10000 SOLUTION INTRAVENOUS; SUBCUTANEOUS
Refills: 0 | Status: DISCONTINUED | OUTPATIENT
Start: 2025-06-29 | End: 2025-07-02

## 2025-06-29 RX ADMIN — DOBUTAMINE 7.15 MICROGRAM(S)/KG/MIN: 250 INJECTION INTRAVENOUS at 07:19

## 2025-06-29 RX ADMIN — Medication 5 MILLIGRAM(S): at 21:47

## 2025-06-29 RX ADMIN — Medication 1 TABLET(S): at 11:56

## 2025-06-29 RX ADMIN — DOBUTAMINE 4.76 MICROGRAM(S)/KG/MIN: 250 INJECTION INTRAVENOUS at 19:14

## 2025-06-29 RX ADMIN — OXYCODONE HYDROCHLORIDE 5 MILLIGRAM(S): 30 TABLET ORAL at 02:33

## 2025-06-29 RX ADMIN — Medication 1 APPLICATION(S): at 05:06

## 2025-06-29 RX ADMIN — Medication 50 MILLIGRAM(S): at 21:46

## 2025-06-29 RX ADMIN — INSULIN LISPRO 5: 100 INJECTION, SOLUTION INTRAVENOUS; SUBCUTANEOUS at 16:26

## 2025-06-29 RX ADMIN — INSULIN LISPRO 9 UNIT(S): 100 INJECTION, SOLUTION INTRAVENOUS; SUBCUTANEOUS at 11:57

## 2025-06-29 RX ADMIN — INSULIN LISPRO 5: 100 INJECTION, SOLUTION INTRAVENOUS; SUBCUTANEOUS at 21:46

## 2025-06-29 RX ADMIN — Medication 40 MILLIGRAM(S): at 07:18

## 2025-06-29 RX ADMIN — INSULIN LISPRO 9 UNIT(S): 100 INJECTION, SOLUTION INTRAVENOUS; SUBCUTANEOUS at 08:36

## 2025-06-29 RX ADMIN — Medication 500 MICROGRAM(S): at 23:04

## 2025-06-29 RX ADMIN — POLYETHYLENE GLYCOL 3350 17 GRAM(S): 17 POWDER, FOR SOLUTION ORAL at 17:31

## 2025-06-29 RX ADMIN — OXYCODONE HYDROCHLORIDE 5 MILLIGRAM(S): 30 TABLET ORAL at 02:03

## 2025-06-29 RX ADMIN — Medication 2 TABLET(S): at 21:46

## 2025-06-29 RX ADMIN — POLYETHYLENE GLYCOL 3350 17 GRAM(S): 17 POWDER, FOR SOLUTION ORAL at 05:09

## 2025-06-29 RX ADMIN — AMIODARONE HYDROCHLORIDE 200 MILLIGRAM(S): 50 INJECTION, SOLUTION INTRAVENOUS at 05:06

## 2025-06-29 RX ADMIN — HEPARIN SODIUM 10 UNIT(S)/HR: 1000 INJECTION INTRAVENOUS; SUBCUTANEOUS at 07:18

## 2025-06-29 RX ADMIN — ATORVASTATIN CALCIUM 80 MILLIGRAM(S): 80 TABLET, FILM COATED ORAL at 21:47

## 2025-06-29 RX ADMIN — Medication 500 MICROGRAM(S): at 17:18

## 2025-06-29 RX ADMIN — Medication 81 MILLIGRAM(S): at 11:56

## 2025-06-29 RX ADMIN — Medication 500 MICROGRAM(S): at 11:31

## 2025-06-29 RX ADMIN — Medication 500 MICROGRAM(S): at 05:08

## 2025-06-29 RX ADMIN — HEPARIN SODIUM 9 UNIT(S)/HR: 1000 INJECTION INTRAVENOUS; SUBCUTANEOUS at 19:14

## 2025-06-29 RX ADMIN — INSULIN LISPRO 9 UNIT(S): 100 INJECTION, SOLUTION INTRAVENOUS; SUBCUTANEOUS at 16:26

## 2025-06-29 RX ADMIN — INSULIN GLARGINE-YFGN 28 UNIT(S): 100 INJECTION, SOLUTION SUBCUTANEOUS at 21:46

## 2025-06-29 RX ADMIN — INSULIN LISPRO 10: 100 INJECTION, SOLUTION INTRAVENOUS; SUBCUTANEOUS at 11:57

## 2025-06-29 RX ADMIN — CEFEPIME 100 MILLIGRAM(S): 2 INJECTION, POWDER, FOR SOLUTION INTRAVENOUS at 05:07

## 2025-06-29 NOTE — PROGRESS NOTE ADULT - SUBJECTIVE AND OBJECTIVE BOX
Patient seen and examined at the bedside.    Remained critically ill on continuous ICU monitoring.      Brief Summary:  71 yo M PMH of HTN, HLD, DM2, CAD (total  4 coronary stents, last one PCI in 08/2024), CHF with EF 40-45%, severe MR  S/p  Mitral valve replacement, CABG x3 and RVAD placement on 6/17/2025     24 Hour events:        OBJECTIVE:  Vital Signs Last 24 Hrs  T(C): 37.2 (29 Jun 2025 04:00), Max: 37.2 (28 Jun 2025 08:00)  T(F): 98.9 (29 Jun 2025 04:00), Max: 99 (28 Jun 2025 08:00)  HR: 93 (29 Jun 2025 06:05) (65 - 98)  BP: 145/65 (29 Jun 2025 06:00) (115/56 - 147/66)  BP(mean): 93 (29 Jun 2025 06:00) (65 - 93)  RR: 21 (29 Jun 2025 06:00) (11 - 30)  SpO2: 96% (29 Jun 2025 06:05) (93% - 100%)    Parameters below as of 29 Jun 2025 06:05  Patient On (Oxygen Delivery Method): nasal cannula              Physical Exam:   General: awake   Neurology: Following commands - Ambulating well.  Respiratory: Bilateral breath sounds  CV: Sinus   RVAD pulmonary 17F, Venous R femoral 25F  Abdominal: Soft, Nontender  Extremities: Warm, well-perfused, AVF on RUE       -------------------------------------------------------------------------------------------------------------------------------    Labs:                        8.0    18.76 )-----------( 137      ( 29 Jun 2025 00:26 )             25.6     06-29    137  |  98  |  32[H]  ----------------------------<  178[H]  3.9   |  24  |  4.14[H]    Ca    9.7      29 Jun 2025 00:25  Phos  2.8     06-29  Mg     2.8     06-29    TPro  6.1  /  Alb  3.3  /  TBili  0.3  /  DBili  x   /  AST  22  /  ALT  23  /  AlkPhos  124[H]  06-29    LIVER FUNCTIONS - ( 29 Jun 2025 00:25 )  Alb: 3.3 g/dL / Pro: 6.1 g/dL / ALK PHOS: 124 U/L / ALT: 23 U/L / AST: 22 U/L / GGT: x           PT/INR - ( 29 Jun 2025 00:26 )   PT: 14.4 sec;   INR: 1.27 ratio         PTT - ( 29 Jun 2025 00:26 )  PTT:61.4 sec  ABG - ( 29 Jun 2025 00:00 )  pH, Arterial: 7.43  pH, Blood: x     /  pCO2: 42    /  pO2: 136   / HCO3: 28    / Base Excess: 3.3   /  SaO2: 98.8      ------------------------------------------------------------------------------------------------------------------------------  Assessment:  71 yo M with cardiogenic shock on RVAD ,DM2, CAD (total  4 coronary stents, last one PCI in 08/2024 ), CHF with EF 40-45%, severe MR  Now s/p Mitral valve replacement, CABG x3 and RVAD placement on 6/17/2025     Right heart failure s/p RVAD insertion 6/22/25   Cardiogenic shock  Acute postop pulmonary insufficiency  Hemoptysis  Acute blood loss anemia  Thrombocytopenia  ESRD  Metabolic acidosis.  Hyperglycemia      Plan:   ***Neuro***  Maintain day/night cycle to prevent ICU delirium   Postoperative acute pain control with Tylenol and prns  Melatonin & Trazadone nightly    ***Cardiovascular***  At high risk for hemodynamic instability and cardiac arrhythmias.  RV failure, s/p CABG, s/p MV repair  RVAD 2300RPM, 2.33L  Remains on Dobutamine for inotropic support - wean as tolerated   Nipride infusion for BP management  Trend central venous saturation and lactate.  Off vasopressors with MAP > 65 mm Hg  ASA/Statin daily   PO Amiodarone load.    ***Pulmonary***  Postop acute pulmonary insufficiency - NC  Hemoptysis - IP evaluation/bronch with clot removal     ***GI***  Tolerating diet.  Protonix for GI prophylaxis.  Bowel regimen.   Trend LFTs.    ***Renal***  ESRD off CRRT - iHD yesterday 6/28 with 1u PRBC  Goal negative balance.  Metabolic acidosis - Bicarb tabs   Nephrology following    ***ID***  6/23 BAL - GNR/Moderate Serratia - Cefepime 6/24-  ID following    ***Endocrine***  Insulin sliding scale & Lantus per protocol for Hyperglycemia     ***Hematology***  Acute blood loss anemia and thrombocytopenia  No active transfusion indication  Heparin infusion for PTT 40-50    ***Skin/Musculoskeletal***  Walking with physical therapy  OOB in chair         Care plan discussed with the ICU care team.   Patient remains critical, at risk for life threatening decompensation.    I have spent 30 minutes providing critical care management to this patient.    By signing my name below, I, Scot Pelaez, attest that this documentation has been prepared under the direction and in the presence of Dylon Gonzalez MD.  Electronically signed: Scot Pelaez.    I, Dylon Gonzalez MD,  personally performed the services described in this documentation. all medical record entries made by the scribe were at my direction and in my presence. I have reviewed the chart and agree that the record reflects my personal performance and is accurate and complete  Electronically signed: Dylon Gonzalez MD.   Patient seen and examined at the bedside.    Remained critically ill on continuous ICU monitoring.      Brief Summary:  69 yo M PMH of HTN, HLD, DM2, CAD (total  4 coronary stents, last one PCI in 08/2024), CHF with EF 40-45%, severe MR  S/p  Mitral valve replacement, CABG x3 and RVAD placement on 6/17/2025     24 Hour events:  tolerating slow dobutamine wean   tolerated iHD yesterday with 1u PRBC for -1L   continues on cefepime, vancomycin added for leukocytosis       OBJECTIVE:  Vital Signs Last 24 Hrs  T(C): 37.2 (29 Jun 2025 04:00), Max: 37.2 (28 Jun 2025 08:00)  T(F): 98.9 (29 Jun 2025 04:00), Max: 99 (28 Jun 2025 08:00)  HR: 93 (29 Jun 2025 06:05) (65 - 98)  BP: 145/65 (29 Jun 2025 06:00) (115/56 - 147/66)  BP(mean): 93 (29 Jun 2025 06:00) (65 - 93)  RR: 21 (29 Jun 2025 06:00) (11 - 30)  SpO2: 96% (29 Jun 2025 06:05) (93% - 100%)    Parameters below as of 29 Jun 2025 06:05  Patient On (Oxygen Delivery Method): nasal cannula              Physical Exam:   General: awake   Neurology: Following commands - Ambulating well.  Respiratory: Bilateral breath sounds  CV: Sinus   RVAD pulmonary 17F, Venous R femoral 25F  Abdominal: Soft, Nontender  Extremities: Warm, well-perfused, AVF on RUE       -------------------------------------------------------------------------------------------------------------------------------    Labs:                        8.0    18.76 )-----------( 137      ( 29 Jun 2025 00:26 )             25.6     06-29    137  |  98  |  32[H]  ----------------------------<  178[H]  3.9   |  24  |  4.14[H]    Ca    9.7      29 Jun 2025 00:25  Phos  2.8     06-29  Mg     2.8     06-29    TPro  6.1  /  Alb  3.3  /  TBili  0.3  /  DBili  x   /  AST  22  /  ALT  23  /  AlkPhos  124[H]  06-29    LIVER FUNCTIONS - ( 29 Jun 2025 00:25 )  Alb: 3.3 g/dL / Pro: 6.1 g/dL / ALK PHOS: 124 U/L / ALT: 23 U/L / AST: 22 U/L / GGT: x           PT/INR - ( 29 Jun 2025 00:26 )   PT: 14.4 sec;   INR: 1.27 ratio         PTT - ( 29 Jun 2025 00:26 )  PTT:61.4 sec  ABG - ( 29 Jun 2025 00:00 )  pH, Arterial: 7.43  pH, Blood: x     /  pCO2: 42    /  pO2: 136   / HCO3: 28    / Base Excess: 3.3   /  SaO2: 98.8      ------------------------------------------------------------------------------------------------------------------------------  Assessment:  69 yo M with cardiogenic shock on RVAD ,DM2, CAD (total  4 coronary stents, last one PCI in 08/2024 ), CHF with EF 40-45%, severe MR  Now s/p Mitral valve replacement, CABG x3 and RVAD placement on 6/17/2025     Right heart failure s/p RVAD insertion 6/22/25   Cardiogenic shock  Acute postop pulmonary insufficiency  Hemoptysis  Acute blood loss anemia  Thrombocytopenia  ESRD  Metabolic acidosis.  Hyperglycemia      Plan:   ***Neuro***  Maintain day/night cycle to prevent ICU delirium   Postoperative acute pain control with Tylenol and prns  Melatonin & Trazadone nightly    ***Cardiovascular***  At high risk for hemodynamic instability and cardiac arrhythmias.  RV failure, s/p CABG, s/p MV repair  RVAD 2300RPM, 2.33L  Remains on Dobutamine for inotropic support - slow wean  Trend lactate, hemodynamics and physical exam to facilitate weaning   Off vasopressors with MAP > 65 mm Hg  ASA/Statin daily   PO Amiodarone load.    ***Pulmonary***  Postop acute pulmonary insufficiency - NC  Hemoptysis - IP evaluation/bronch with clot removal     ***GI***  Tolerating diet.  Protonix for GI prophylaxis.  Bowel regimen.   Trend LFTs.    ***Renal***  ESRD tolerating iHD, last session yesterday 6/28 with 1u PRBC  Nephrology following    ***ID***  6/23 BAL - GNR/Moderate Serratia - Cefepime 6/24-  Given 1 dose of vancomycin empiric last night for leukocytosis   Trend procal: 6/27 4.15  ID following    ***Endocrine***  Insulin sliding scale & Lantus per protocol for Hyperglycemia     ***Hematology***  Acute blood loss anemia and thrombocytopenia  No active transfusion indication  Heparin infusion for PTT 40-50    ***Skin/Musculoskeletal***  Walking with physical therapy  OOB in chair     Care plan discussed with the ICU care team.   Patient remains critical, at risk for life threatening decompensation.    I have spent 55 minutes providing critical care management to this patient.    By signing my name below, I, Scot Pelaez, attest that this documentation has been prepared under the direction and in the presence of Dylon Gonzalez MD.  Electronically signed: Scot Pelaez.    I, Dylon Gonzalez MD,  personally performed the services described in this documentation. all medical record entries made by the scribe were at my direction and in my presence. I have reviewed the chart and agree that the record reflects my personal performance and is accurate and complete  Electronically signed: Dylon Gonzalez MD.

## 2025-06-29 NOTE — PROGRESS NOTE ADULT - SUBJECTIVE AND OBJECTIVE BOX
Patient seen and examined at the bedside.    Remained critically ill on continuous ICU monitoring.    OBJECTIVE:  Vital Signs Last 24 Hrs  T(C): 37 (29 Jun 2025 16:00), Max: 37.2 (28 Jun 2025 20:00)  T(F): 98.6 (29 Jun 2025 16:00), Max: 99 (28 Jun 2025 20:00)  HR: 97 (29 Jun 2025 16:00) (71 - 98)  BP: 114/57 (29 Jun 2025 16:00) (101/51 - 147/66)  BP(mean): 82 (29 Jun 2025 16:00) (65 - 93)  RR: 28 (29 Jun 2025 16:00) (14 - 31)  SpO2: 93% (29 Jun 2025 16:00) (93% - 100%)    Parameters below as of 29 Jun 2025 16:00  Patient On (Oxygen Delivery Method): nasal cannula  O2 Flow (L/min): 2  O2 Concentration (%): 28      Physical Exam:  General: multiple lines and gtts in place   Neurology: Awake, alert  Eyes: bilateral pupils equal and reactive   ENT/Neck: Neck supple, trachea midline, No JVD   Respiratory: Clear bilaterally   CV: S1S2, no murmurs        [x] Sternal dressing          [x] Sinus rhythm [x] TPM VVI - 40 backup   Abdominal: Soft, NT, ND +BS   Extremities: 1-2+ pedal edema noted, + peripheral pulses   Skin: No Rashes, Hematoma, Ecchymosis   RIJ Cannula, R femoral cannula all C/D/I                Assessment:  71 yo M with cardiogenic shock on RVAD ,DM2, CAD (total  4 coronary stents, last one PCI in 08/2024 ), CHF with EF 40-45%, severe MR  Now s/p Mitral valve replacement, CABG x3 and RVAD placement on 6/17/2025     Right heart failure s/p RVAD insertion 6/22/25   Cardiogenic shock  Acute postop pulmonary insufficiency  Hemoptysis  Acute blood loss anemia  Thrombocytopenia  ESRD  Hyperglycemia    Plan:   ***Neuro***  [x] Nonfocal  Post operative neuro assessment   Pain management with Gabapentin, Oxycodone and prns   Trazadone and Melatonin at bedtime    ***Cardiovascular***  Invasive hemodynamic monitoring, assess perfusion indices  SR 80-97/ MAP 45-65 / Hct 25.6% / Lactate 1.2  RV failure, s/p CABG, s/p MV repair  [x] Dobutamine 2 mcgs/kG/Min - slow wean  [x] RVAD 2300 RPMs, flow of 2.23L, oxygenator removed 6/26   [x] Amiodarone for AF control  [x] AC Therapy with Heparin gtt Xa goal 0.2-0.3 - at goal   [x] ASA [x] Statin   Serial EKG and cardiac enzymes     ***Pulmonary***  [x] NC 2 L  Encourage incentive spirometry, continue pulse ox monitoring, follow ABGs, continue Duonebs  Continue on Ipra nebs    ***GI***  [x] CC Diet   [x] Protonix for stress ulcer ppx  Bowel regimen with Miralax and Senna     ***Renal***  GFR 11 / [x] ESRD  HD last session today 6/28 - 1kg removed   Metabolic acidosis - Bicarb tabs   Continue to monitor I/Os, BUN/Creatinine.   Replete lytes PRN  Retacrit for renal support       ***ID***  Hypothermic 6/26  Elevated procalcitonin, trend tonight    F/u all cultures - all negative to date  Continue cefepime for serratia     ***Endocrine***  [x] Stress Hyperglycemia: HbA1c 6.8%                - [x] ISS [x] Lantus and premeal insulin              - Need tight glycemic control to prevent wound infection.        Patient requires continuous monitoring with bedside rhythm monitoring, pulse oximetry monitoring, and continuous central venous and arterial pressure monitoring; and intermittent blood gas analysis. Care plan discussed with the ICU care team.   Patient remained critical, at risk for life threatening decompensation.    I have spent 50 minutes providing critical care management to this patient.    By signing my name below, I, Yamile Rico, attest that this documentation has been prepared under the direction and in the presence of YVROSE Rose   Electronically signed: Maria Luisa Wayne, 06-29-25 @ 17:10    I, Emiliano Hart, personally performed the services described in this documentation. all medical record entries made by the jose [email or web address] were at my direction and in my presence. I have reviewed the chart and agree that the record reflects my personal performance and is accurate and complete  Electronically signed: YVROSE Rose     Patient seen and examined at the bedside.    Remained critically ill on continuous ICU monitoring.    OBJECTIVE:  Vital Signs Last 24 Hrs  T(C): 37 (29 Jun 2025 16:00), Max: 37.2 (28 Jun 2025 20:00)  T(F): 98.6 (29 Jun 2025 16:00), Max: 99 (28 Jun 2025 20:00)  HR: 97 (29 Jun 2025 16:00) (71 - 98)  BP: 114/57 (29 Jun 2025 16:00) (101/51 - 147/66)  BP(mean): 82 (29 Jun 2025 16:00) (65 - 93)  RR: 28 (29 Jun 2025 16:00) (14 - 31)  SpO2: 93% (29 Jun 2025 16:00) (93% - 100%)    Parameters below as of 29 Jun 2025 16:00  Patient On (Oxygen Delivery Method): nasal cannula  O2 Flow (L/min): 2  O2 Concentration (%): 28    Physical Exam:  General: multiple lines and gtts in place   Neurology: Awake, alert  Eyes: bilateral pupils equal and reactive   ENT/Neck: Neck supple, trachea midline, No JVD   Respiratory: Clear bilaterally   CV: S1S2, no murmurs        [x] Sternal dressing          [x] Sinus rhythm [x] TPM VVI - 40 backup   Abdominal: Soft, NT, ND +BS   Extremities: 1-2+ pedal edema noted, + peripheral pulses   Skin: No Rashes, Hematoma, Ecchymosis   RIJ Cannula, R femoral cannula all C/D/I                Assessment:  71 yo M with cardiogenic shock on RVAD ,DM2, CAD (total  4 coronary stents, last one PCI in 08/2024 ), CHF with EF 40-45%, severe MR  Now s/p Mitral valve replacement, CABG x3 and RVAD placement on 6/17/2025     Right heart failure s/p RVAD insertion 6/22/25   Cardiogenic shock  Acute postop pulmonary insufficiency  Hemoptysis  Acute blood loss anemia  Thrombocytopenia  ESRD  Hyperglycemia    Plan:   ***Neuro***  [x] Nonfocal  Post operative neuro assessment   Pain management with Gabapentin, Oxycodone and prns   Trazadone and Melatonin at bedtime    ***Cardiovascular***  Invasive hemodynamic monitoring, assess perfusion indices  SR 80-97/ MAP 45-65 / Hct 25.6% / Lactate 1.2  RV failure, s/p CABG, s/p MV repair  [x] Dobutamine 1 mcgs/kG/Min - slow wean  [x] RVAD 2300 RPMs, flow of 2.23L, oxygenator removed 6/26, ?plan for RVAD decannulation in AM  [x] Amiodarone for AF control  [x] AC Therapy with Heparin gtt Xa goal 0.2-0.35 - at goal   [x] ASA [x] Statin     ***Pulmonary***  [x] NC 2 L  Encourage incentive spirometry, continue pulse ox monitoring, follow ABGs, continue Duonebs  Continue on Ipra nebs    ***GI***  [x] CC Diet, NPO after MN for ?RVAD decannulation  [x] Protonix for stress ulcer ppx  Bowel regimen with Miralax and Senna     ***Renal***  GFR 11 / [x] ESRD  HD last session today 6/28 - 1kg removed   Metabolic acidosis - Bicarb tabs   Continue to monitor I/Os, BUN/Creatinine.   Replete lytes PRN  Retacrit for renal support     ***ID***  Hypothermic 6/26  Elevated procalcitonin, downtrending (6/27 4.15 --> 6/29 2.47)  F/u all cultures - all negative to date  Continue cefepime for serratia, vanco post HD empirically    ***Endocrine***  [x] Stress Hyperglycemia: HbA1c 6.8%                - [x] ISS [x] Lantus and premeal insulin              - Need tight glycemic control to prevent wound infection.    Patient requires continuous monitoring with bedside rhythm monitoring, pulse oximetry monitoring, and continuous central venous and arterial pressure monitoring; and intermittent blood gas analysis. Care plan discussed with the ICU care team.   Patient remained critical, at risk for life threatening decompensation.    I have spent 50 minutes providing critical care management to this patient.    By signing my name below, I, Yamile Rico, attest that this documentation has been prepared under the direction and in the presence of YVROSE Rose   Electronically signed: Maria Luisa Wayne, 06-29-25 @ 17:10    I, Emiliano Hart, personally performed the services described in this documentation. all medical record entries made by the scribe were at my direction and in my presence. I have reviewed the chart and agree that the record reflects my personal performance and is accurate and complete  Electronically signed: YVROSE Rose

## 2025-06-29 NOTE — PROGRESS NOTE ADULT - SUBJECTIVE AND OBJECTIVE BOX
No pain, no sob  Tolerated 1L UF with HD yesterday, as ordered  On Heparin and Dobutamine gtts    VITAL:  T(C): , Max: 37.2 (06-28-25 @ 08:00)  T(F): , Max: 99 (06-28-25 @ 08:00)  HR: 94 (06-29-25 @ 07:00)  BP: 147/65 (06-29-25 @ 07:00)  BP(mean): 93 (06-29-25 @ 07:00)  RR: 25 (06-29-25 @ 07:00)  SpO2: 100% (06-29-25 @ 07:00)      PHYSICAL EXAM:  Constitutional: alert, NAD  HEENT: NCAT, DMM  Neck:  No JVD  Respiratory: coarse BS b/l  Cardiovascular: irreg s1s2  Gastrointestinal: BS+, soft, NT/ND  Extremities: No peripheral edema  : No echeverria  Skin: No rashes  Access: RUE AVF (+)thrill      LABS:                        8.0    18.76 )-----------( 137      ( 29 Jun 2025 00:26 )             25.6     Na(137)/K(3.9)/Cl(98)/HCO3(24)/BUN(32)/Cr(4.14)Glu(178)/Ca(9.7)/Mg(2.8)/PO4(2.8)    06-29 @ 00:25  Na(136)/K(4.2)/Cl(102)/HCO3(18)/BUN(34)/Cr(4.06)Glu(130)/Ca(9.9)/Mg(3.2)/PO4(3.3)    06-28 @ 00:32  Na(135)/K(4.0)/Cl(102)/HCO3(20)/BUN(31)/Cr(3.55)Glu(220)/Ca(9.5)/Mg(3.2)/PO4(3.1)    06-27 @ 16:55  Na(136)/K(3.9)/Cl(103)/HCO3(22)/BUN(21)/Cr(2.43)Glu(127)/Ca(9.5)/Mg(2.6)/PO4(2.5)    06-27 @ 00:31      IMPRESSION: 70M w/ HTN, DM2, CAD, and ESRD-HD, s/p CABGx3/MV repair/RVAD 6/17/25    (1)Renal - ESRD - HD MWF - now off CRRT and back to standard HD; s/p HD yesterday, CRRT discontinued 6/27  (2)Lytes - acceptable  (3)Anemia - on TIW Retacrit; s/p PRBCs with HD yesterday  (4)CV - cardiogenic shock - dependent on RVAD + inotropes.       RECOMMEND:  (1)Next HD tomorrow; can attempt net 1.5L UF   (2)Retacrit as ordered  (3)Continue meds for GFR<10/HD:          Christiano Marie MD  [facility]  Office/on call physician: [phone]  Cell (7a-7p): [phone]       No pain, no sob  Tolerated 1L UF with HD yesterday, as ordered  On Heparin and Dobutamine gtts    VITAL:  T(C): , Max: 37.2 (06-28-25 @ 08:00)  T(F): , Max: 99 (06-28-25 @ 08:00)  HR: 94 (06-29-25 @ 07:00)  BP: 147/65 (06-29-25 @ 07:00)  BP(mean): 93 (06-29-25 @ 07:00)  RR: 25 (06-29-25 @ 07:00)  SpO2: 100% (06-29-25 @ 07:00)      PHYSICAL EXAM:  Constitutional: alert, NAD  HEENT: NCAT, DMM  Neck:  No JVD  Respiratory: coarse BS b/l  Cardiovascular: irreg s1s2  Gastrointestinal: BS+, soft, NT/ND  Extremities: No peripheral edema  : No echeverria  Skin: No rashes  Access: RUE AVF (+)thrill      LABS:                        8.0    18.76 )-----------( 137      ( 29 Jun 2025 00:26 )             25.6     Na(137)/K(3.9)/Cl(98)/HCO3(24)/BUN(32)/Cr(4.14)Glu(178)/Ca(9.7)/Mg(2.8)/PO4(2.8)    06-29 @ 00:25  Na(136)/K(4.2)/Cl(102)/HCO3(18)/BUN(34)/Cr(4.06)Glu(130)/Ca(9.9)/Mg(3.2)/PO4(3.3)    06-28 @ 00:32  Na(135)/K(4.0)/Cl(102)/HCO3(20)/BUN(31)/Cr(3.55)Glu(220)/Ca(9.5)/Mg(3.2)/PO4(3.1)    06-27 @ 16:55  Na(136)/K(3.9)/Cl(103)/HCO3(22)/BUN(21)/Cr(2.43)Glu(127)/Ca(9.5)/Mg(2.6)/PO4(2.5)    06-27 @ 00:31      IMPRESSION: 70M w/ HTN, DM2, CAD, and ESRD-HD, s/p CABGx3/MV repair/RVAD 6/17/25    (1)Renal - ESRD - HD MWF - now off CRRT and back to standard HD; s/p HD yesterday, CRRT discontinued 6/27  (2)Lytes - acceptable  (3)Anemia - on TIW Retacrit; s/p PRBCs with HD yesterday  (4)CV - cardiogenic shock - dependent on RVAD + inotropes - potentially for RVAD removal tomorrow       RECOMMEND:  (1)Next HD early tomorrow; can attempt net 1.5L UF   (2)Retacrit as ordered  (3)Continue meds for GFR<10/HD:  (4)Potential RVAD removal tomorrow after HD, per CTS        Christiano Marie MD  [facility]  Office/on call physician: [phone]  Cell (7a-7p): [phone]

## 2025-06-30 LAB
ALBUMIN SERPL ELPH-MCNC: 3.1 G/DL — LOW (ref 3.3–5)
ALP SERPL-CCNC: 115 U/L — SIGNIFICANT CHANGE UP (ref 40–120)
ALT FLD-CCNC: 24 U/L — SIGNIFICANT CHANGE UP (ref 10–45)
ANION GAP SERPL CALC-SCNC: 13 MMOL/L — SIGNIFICANT CHANGE UP (ref 5–17)
APTT BLD: 45.4 SEC — HIGH (ref 26.1–36.8)
APTT BLD: 54.2 SEC — HIGH (ref 26.1–36.8)
AST SERPL-CCNC: 25 U/L — SIGNIFICANT CHANGE UP (ref 10–40)
BASE EXCESS BLDV CALC-SCNC: 5.3 MMOL/L — HIGH (ref -2–3)
BASOPHILS # BLD AUTO: 0.07 K/UL — SIGNIFICANT CHANGE UP (ref 0–0.2)
BASOPHILS NFR BLD AUTO: 0.3 % — SIGNIFICANT CHANGE UP (ref 0–2)
BILIRUB SERPL-MCNC: 0.3 MG/DL — SIGNIFICANT CHANGE UP (ref 0.2–1.2)
BUN SERPL-MCNC: 53 MG/DL — HIGH (ref 7–23)
CALCIUM SERPL-MCNC: 10.6 MG/DL — HIGH (ref 8.4–10.5)
CHLORIDE SERPL-SCNC: 98 MMOL/L — SIGNIFICANT CHANGE UP (ref 96–108)
CO2 BLDV-SCNC: 32 MMOL/L — HIGH (ref 22–26)
CO2 SERPL-SCNC: 24 MMOL/L — SIGNIFICANT CHANGE UP (ref 22–31)
CREAT SERPL-MCNC: 6.16 MG/DL — HIGH (ref 0.5–1.3)
EGFR: 9 ML/MIN/1.73M2 — LOW
EGFR: 9 ML/MIN/1.73M2 — LOW
EOSINOPHIL # BLD AUTO: 0.27 K/UL — SIGNIFICANT CHANGE UP (ref 0–0.5)
EOSINOPHIL NFR BLD AUTO: 1.3 % — SIGNIFICANT CHANGE UP (ref 0–6)
GAS PNL BLDA: SIGNIFICANT CHANGE UP
GAS PNL BLDA: SIGNIFICANT CHANGE UP
GAS PNL BLDV: SIGNIFICANT CHANGE UP
GAS PNL BLDV: SIGNIFICANT CHANGE UP
GLUCOSE BLDC GLUCOMTR-MCNC: 146 MG/DL — HIGH (ref 70–99)
GLUCOSE BLDC GLUCOMTR-MCNC: 171 MG/DL — HIGH (ref 70–99)
GLUCOSE BLDC GLUCOMTR-MCNC: 211 MG/DL — HIGH (ref 70–99)
GLUCOSE BLDC GLUCOMTR-MCNC: 229 MG/DL — HIGH (ref 70–99)
GLUCOSE SERPL-MCNC: 163 MG/DL — HIGH (ref 70–99)
HAPTOGLOB SERPL-MCNC: 177 MG/DL — SIGNIFICANT CHANGE UP (ref 34–200)
HBV SURFACE AB SER-ACNC: 6.8 MIU/ML — LOW
HBV SURFACE AG SER-ACNC: SIGNIFICANT CHANGE UP
HCO3 BLDV-SCNC: 30 MMOL/L — HIGH (ref 22–29)
HCT VFR BLD CALC: 24.1 % — LOW (ref 39–50)
HCV AB S/CO SERPL IA: 0.06 S/CO — SIGNIFICANT CHANGE UP
HCV AB SERPL-IMP: SIGNIFICANT CHANGE UP
HGB BLD-MCNC: 7.6 G/DL — LOW (ref 13–17)
HOROWITZ INDEX BLDV+IHG-RTO: 28 — SIGNIFICANT CHANGE UP
IMM GRANULOCYTES # BLD AUTO: 0.57 K/UL — HIGH (ref 0–0.07)
IMM GRANULOCYTES NFR BLD AUTO: 2.8 % — HIGH (ref 0–0.9)
LYMPHOCYTES # BLD AUTO: 1.43 K/UL — SIGNIFICANT CHANGE UP (ref 1–3.3)
LYMPHOCYTES NFR BLD AUTO: 7.1 % — LOW (ref 13–44)
MAGNESIUM SERPL-MCNC: 3 MG/DL — HIGH (ref 1.6–2.6)
MCHC RBC-ENTMCNC: 27.1 PG — SIGNIFICANT CHANGE UP (ref 27–34)
MCHC RBC-ENTMCNC: 31.5 G/DL — LOW (ref 32–36)
MCV RBC AUTO: 86.1 FL — SIGNIFICANT CHANGE UP (ref 80–100)
MONOCYTES # BLD AUTO: 1.67 K/UL — HIGH (ref 0–0.9)
MONOCYTES NFR BLD AUTO: 8.2 % — SIGNIFICANT CHANGE UP (ref 2–14)
MRSA PCR RESULT.: SIGNIFICANT CHANGE UP
NEUTROPHILS # BLD AUTO: 16.26 K/UL — HIGH (ref 1.8–7.4)
NEUTROPHILS NFR BLD AUTO: 80.3 % — HIGH (ref 43–77)
NRBC # BLD AUTO: 0.02 K/UL — HIGH (ref 0–0)
NRBC # FLD: 0.02 K/UL — HIGH (ref 0–0)
NRBC BLD AUTO-RTO: 0 /100 WBCS — SIGNIFICANT CHANGE UP (ref 0–0)
PCO2 BLDV: 46 MMHG — SIGNIFICANT CHANGE UP (ref 42–55)
PH BLDV: 7.43 — SIGNIFICANT CHANGE UP (ref 7.32–7.43)
PHOSPHATE SERPL-MCNC: 3.6 MG/DL — SIGNIFICANT CHANGE UP (ref 2.5–4.5)
PLATELET # BLD AUTO: 133 K/UL — LOW (ref 150–400)
PMV BLD: 12.1 FL — SIGNIFICANT CHANGE UP (ref 7–13)
PO2 BLDV: 42 MMHG — SIGNIFICANT CHANGE UP (ref 25–45)
POTASSIUM SERPL-MCNC: 4.4 MMOL/L — SIGNIFICANT CHANGE UP (ref 3.5–5.3)
POTASSIUM SERPL-SCNC: 4.4 MMOL/L — SIGNIFICANT CHANGE UP (ref 3.5–5.3)
PROT SERPL-MCNC: 5.9 G/DL — LOW (ref 6–8.3)
RBC # BLD: 2.8 M/UL — LOW (ref 4.2–5.8)
RBC # FLD: 18.9 % — HIGH (ref 10.3–14.5)
S AUREUS DNA NOSE QL NAA+PROBE: SIGNIFICANT CHANGE UP
SAO2 % BLDV: 72.3 % — SIGNIFICANT CHANGE UP (ref 67–88)
SODIUM SERPL-SCNC: 135 MMOL/L — SIGNIFICANT CHANGE UP (ref 135–145)
UFH PPP CHRO-ACNC: 0.21 IU/ML — LOW (ref 0.3–0.7)
VANCOMYCIN TROUGH SERPL-MCNC: 8.7 UG/ML — LOW (ref 10–20)
WBC # BLD: 20.27 K/UL — HIGH (ref 3.8–10.5)
WBC # FLD AUTO: 20.27 K/UL — HIGH (ref 3.8–10.5)

## 2025-06-30 PROCEDURE — G0545: CPT

## 2025-06-30 PROCEDURE — 36556 INSERT NON-TUNNEL CV CATH: CPT | Mod: LT

## 2025-06-30 PROCEDURE — 99232 SBSQ HOSP IP/OBS MODERATE 35: CPT

## 2025-06-30 PROCEDURE — 99291 CRITICAL CARE FIRST HOUR: CPT | Mod: 25

## 2025-06-30 PROCEDURE — 71045 X-RAY EXAM CHEST 1 VIEW: CPT | Mod: 26,76

## 2025-06-30 RX ORDER — HYDROMORPHONE/SOD CHLOR,ISO/PF 2 MG/10 ML
0.5 SYRINGE (ML) INJECTION ONCE
Refills: 0 | Status: DISCONTINUED | OUTPATIENT
Start: 2025-06-30 | End: 2025-06-30

## 2025-06-30 RX ORDER — VANCOMYCIN HCL IN 5 % DEXTROSE 1.5G/250ML
750 PLASTIC BAG, INJECTION (ML) INTRAVENOUS ONCE
Refills: 0 | Status: COMPLETED | OUTPATIENT
Start: 2025-06-30 | End: 2025-06-30

## 2025-06-30 RX ADMIN — INSULIN LISPRO 9 UNIT(S): 100 INJECTION, SOLUTION INTRAVENOUS; SUBCUTANEOUS at 11:53

## 2025-06-30 RX ADMIN — Medication 500 MICROGRAM(S): at 23:16

## 2025-06-30 RX ADMIN — Medication 50 MILLIGRAM(S): at 21:06

## 2025-06-30 RX ADMIN — POLYETHYLENE GLYCOL 3350 17 GRAM(S): 17 POWDER, FOR SOLUTION ORAL at 18:13

## 2025-06-30 RX ADMIN — INSULIN LISPRO 5: 100 INJECTION, SOLUTION INTRAVENOUS; SUBCUTANEOUS at 15:36

## 2025-06-30 RX ADMIN — AMIODARONE HYDROCHLORIDE 200 MILLIGRAM(S): 50 INJECTION, SOLUTION INTRAVENOUS at 05:58

## 2025-06-30 RX ADMIN — Medication 0.5 MILLIGRAM(S): at 13:44

## 2025-06-30 RX ADMIN — INSULIN GLARGINE-YFGN 28 UNIT(S): 100 INJECTION, SOLUTION SUBCUTANEOUS at 21:08

## 2025-06-30 RX ADMIN — DOBUTAMINE 4.76 MICROGRAM(S)/KG/MIN: 250 INJECTION INTRAVENOUS at 08:10

## 2025-06-30 RX ADMIN — INSULIN LISPRO 10: 100 INJECTION, SOLUTION INTRAVENOUS; SUBCUTANEOUS at 21:06

## 2025-06-30 RX ADMIN — CEFEPIME 100 MILLIGRAM(S): 2 INJECTION, POWDER, FOR SOLUTION INTRAVENOUS at 05:04

## 2025-06-30 RX ADMIN — Medication 500 MICROGRAM(S): at 05:06

## 2025-06-30 RX ADMIN — POLYETHYLENE GLYCOL 3350 17 GRAM(S): 17 POWDER, FOR SOLUTION ORAL at 05:56

## 2025-06-30 RX ADMIN — Medication 500 MICROGRAM(S): at 18:18

## 2025-06-30 RX ADMIN — DOBUTAMINE 4.76 MICROGRAM(S)/KG/MIN: 250 INJECTION INTRAVENOUS at 20:58

## 2025-06-30 RX ADMIN — Medication 5 MILLIGRAM(S): at 21:06

## 2025-06-30 RX ADMIN — Medication 500 MICROGRAM(S): at 11:31

## 2025-06-30 RX ADMIN — Medication 1 APPLICATION(S): at 06:03

## 2025-06-30 RX ADMIN — Medication 81 MILLIGRAM(S): at 11:41

## 2025-06-30 RX ADMIN — INSULIN LISPRO 9 UNIT(S): 100 INJECTION, SOLUTION INTRAVENOUS; SUBCUTANEOUS at 15:36

## 2025-06-30 RX ADMIN — Medication 0.5 MILLIGRAM(S): at 14:00

## 2025-06-30 RX ADMIN — ATORVASTATIN CALCIUM 80 MILLIGRAM(S): 80 TABLET, FILM COATED ORAL at 21:06

## 2025-06-30 RX ADMIN — Medication 1 TABLET(S): at 11:41

## 2025-06-30 RX ADMIN — Medication 40 MILLIGRAM(S): at 06:02

## 2025-06-30 RX ADMIN — Medication 250 MILLIGRAM(S): at 18:16

## 2025-06-30 RX ADMIN — HEPARIN SODIUM 9 UNIT(S)/HR: 1000 INJECTION INTRAVENOUS; SUBCUTANEOUS at 20:58

## 2025-06-30 RX ADMIN — EPOETIN ALFA 10000 UNIT(S): 10000 SOLUTION INTRAVENOUS; SUBCUTANEOUS at 09:35

## 2025-06-30 RX ADMIN — HEPARIN SODIUM 9 UNIT(S)/HR: 1000 INJECTION INTRAVENOUS; SUBCUTANEOUS at 08:10

## 2025-06-30 RX ADMIN — INSULIN LISPRO 2: 100 INJECTION, SOLUTION INTRAVENOUS; SUBCUTANEOUS at 11:54

## 2025-06-30 RX ADMIN — Medication 2 TABLET(S): at 21:06

## 2025-06-30 NOTE — PROGRESS NOTE ADULT - SUBJECTIVE AND OBJECTIVE BOX
INFECTIOUS DISEASES FOLLOW UP-- Elza Lopez  [phone]    This is a follow up note for this  70yMale with  Atherosclerosis of native coronary artery without angina pectoris    Nonrheumatic mitral annular calcification        ROS:  CONSTITUTIONAL:  No fever, good appetite  CARDIOVASCULAR:  No chest pain or palpitations  RESPIRATORY:  No dyspnea  GASTROINTESTINAL:  No nausea, vomiting, diarrhea, or abdominal pain  GENITOURINARY:  No dysuria  NEUROLOGIC:  No headache,     Allergies    No Known Allergies    Intolerances        ANTIBIOTICS/RELEVANT:  antimicrobials  cefepime   IVPB 1000 milliGRAM(s) IV Intermittent every 24 hours    immunologic:  epoetin douglas-epbx (RETACRIT) Injectable 42081 Unit(s) IV Push <User Schedule>    OTHER:  acetaminophen     Tablet .. 650 milliGRAM(s) Oral every 6 hours PRN  aMIOdarone    Tablet 200 milliGRAM(s) Oral daily  artificial  tears Solution 1 Drop(s) Both EYES every 1 hour PRN  aspirin enteric coated 81 milliGRAM(s) Oral daily  atorvastatin 80 milliGRAM(s) Oral at bedtime  bisacodyl Suppository 10 milliGRAM(s) Rectal once  chlorhexidine 4% Liquid 1 Application(s) Topical daily  dextrose 5%. 1000 milliLiter(s) IV Continuous <Continuous>  dextrose 50% Injectable 50 milliLiter(s) IV Push every 15 minutes  dextrose 50% Injectable 25 milliLiter(s) IV Push every 15 minutes  dextrose Oral Gel 15 Gram(s) Oral once PRN  DOBUTamine Infusion 2 MICROgram(s)/kG/Min IV Continuous <Continuous>  glucagon  Injectable 1 milliGRAM(s) IntraMuscular once  heparin  Infusion 1000 Unit(s)/Hr IV Continuous <Continuous>  insulin glargine Injectable (LANTUS) 28 Unit(s) SubCutaneous at bedtime  insulin lispro (ADMELOG) corrective regimen sliding scale   SubCutaneous Before meals and at bedtime  insulin lispro Injectable (ADMELOG) 9 Unit(s) SubCutaneous three times a day before meals  ipratropium    for Nebulization 500 MICROGram(s) Nebulizer every 6 hours  melatonin 5 milliGRAM(s) Oral at bedtime  multivitamin/minerals 1 Tablet(s) Oral daily  pantoprazole    Tablet 40 milliGRAM(s) Oral before breakfast  polyethylene glycol 3350 17 Gram(s) Oral every 12 hours  senna 2 Tablet(s) Oral at bedtime  sodium chloride 0.9%. 1000 milliLiter(s) IV Continuous <Continuous>  traZODone 50 milliGRAM(s) Oral at bedtime      Objective:  Vital Signs Last 24 Hrs  T(C): 36.7 (30 Jun 2025 16:00), Max: 37 (30 Jun 2025 00:00)  T(F): 98.1 (30 Jun 2025 16:00), Max: 98.6 (30 Jun 2025 00:00)  HR: 81 (30 Jun 2025 19:00) (67 - 95)  BP: 98/48 (30 Jun 2025 18:00) (97/54 - 144/64)  BP(mean): 68 (30 Jun 2025 18:00) (68 - 92)  RR: 21 (30 Jun 2025 19:00) (11 - 25)  SpO2: 95% (30 Jun 2025 19:00) (93% - 100%)    Parameters below as of 30 Jun 2025 19:00  Patient On (Oxygen Delivery Method): nasal cannula  O2 Flow (L/min): 2      PHYSICAL EXAM:  Constitutional:no acute distress  Eyes:FLOWER, EOMI  Ear/Nose/Throat: no oral lesions, 	  Respiratory: clear BL  Cardiovascular: S1S2  Gastrointestinal:soft, (+) BS, no tenderness  Extremities:no e/e/c  No Lymphadenopathy  IV sites not inflammed.    LABS:                        7.6    20.27 )-----------( 133      ( 30 Jun 2025 00:49 )             24.1     06-30    135  |  98  |  53[H]  ----------------------------<  163[H]  4.4   |  24  |  6.16[H]    Ca    10.6[H]      30 Jun 2025 00:52  Phos  3.6     06-30  Mg     3.0     06-30    TPro  5.9[L]  /  Alb  3.1[L]  /  TBili  0.3  /  DBili  x   /  AST  25  /  ALT  24  /  AlkPhos  115  06-30    PT/INR - ( 29 Jun 2025 08:01 )   PT: 14.4 sec;   INR: 1.27 ratio         PTT - ( 30 Jun 2025 09:02 )  PTT:54.2 sec  Urinalysis Basic - ( 30 Jun 2025 00:52 )    Color: x / Appearance: x / SG: x / pH: x  Gluc: 163 mg/dL / Ketone: x  / Bili: x / Urobili: x   Blood: x / Protein: x / Nitrite: x   Leuk Esterase: x / RBC: x / WBC x   Sq Epi: x / Non Sq Epi: x / Bacteria: x        MICROBIOLOGY:            RECENT CULTURES:  06-27 @ 03:30  Blood Blood  --  --  --    No growth at 72 Hours  --  06-27 @ 02:45  Blood Blood  --  --  --    No growth at 72 Hours  --      RADIOLOGY & ADDITIONAL STUDIES:    < from: Xray Chest 1 View- PORTABLE-Urgent (Xray Chest 1 View- PORTABLE-Urgent .) (06.30.25 @ 15:48) >  Frontal expiratory view of the chest shows the heart to be similar in   size. Right jugular ECMO catheter reaches the upper main pulmonary   artery. Transfemoral ECMO catheter reaches the right atrium. Mitral valve   ring, left atrial appendage clip and sternal wires are present.    The lungs show left base atelectasis with small left effusion and there   is no evidence of pneumothorax nor right pleural effusion.    Chest one view 6/30/2025 2:31 AM  Compared to the prior study, the left base is clearer.    Chest one view 6/30/2025 3:21 PM  Compared to the prior study, left jugular central line reachesthe   superior vena cava. There is less pulmonary congestion.    IMPRESSION:  Decreasing congestion. Lines as noted.      < end of copied text >   INFECTIOUS DISEASES FOLLOW UP-- Elza Lopez  [phone]    This is a follow up note for this  70yMale with  Atherosclerosis of native coronary artery without angina pectoris    Nonrheumatic mitral annular calcification        ROS:  CONSTITUTIONAL: intubated, sedated  non interactive    Allergies    No Known Allergies    Intolerances        ANTIBIOTICS/RELEVANT:  antimicrobials  cefepime   IVPB 1000 milliGRAM(s) IV Intermittent every 24 hours    immunologic:  epoetin douglas-epbx (RETACRIT) Injectable 50709 Unit(s) IV Push <User Schedule>    OTHER:  acetaminophen     Tablet .. 650 milliGRAM(s) Oral every 6 hours PRN  aMIOdarone    Tablet 200 milliGRAM(s) Oral daily  artificial  tears Solution 1 Drop(s) Both EYES every 1 hour PRN  aspirin enteric coated 81 milliGRAM(s) Oral daily  atorvastatin 80 milliGRAM(s) Oral at bedtime  bisacodyl Suppository 10 milliGRAM(s) Rectal once  chlorhexidine 4% Liquid 1 Application(s) Topical daily  dextrose 5%. 1000 milliLiter(s) IV Continuous <Continuous>  dextrose 50% Injectable 50 milliLiter(s) IV Push every 15 minutes  dextrose 50% Injectable 25 milliLiter(s) IV Push every 15 minutes  dextrose Oral Gel 15 Gram(s) Oral once PRN  DOBUTamine Infusion 2 MICROgram(s)/kG/Min IV Continuous <Continuous>  glucagon  Injectable 1 milliGRAM(s) IntraMuscular once  heparin  Infusion 1000 Unit(s)/Hr IV Continuous <Continuous>  insulin glargine Injectable (LANTUS) 28 Unit(s) SubCutaneous at bedtime  insulin lispro (ADMELOG) corrective regimen sliding scale   SubCutaneous Before meals and at bedtime  insulin lispro Injectable (ADMELOG) 9 Unit(s) SubCutaneous three times a day before meals  ipratropium    for Nebulization 500 MICROGram(s) Nebulizer every 6 hours  melatonin 5 milliGRAM(s) Oral at bedtime  multivitamin/minerals 1 Tablet(s) Oral daily  pantoprazole    Tablet 40 milliGRAM(s) Oral before breakfast  polyethylene glycol 3350 17 Gram(s) Oral every 12 hours  senna 2 Tablet(s) Oral at bedtime  sodium chloride 0.9%. 1000 milliLiter(s) IV Continuous <Continuous>  traZODone 50 milliGRAM(s) Oral at bedtime      Objective:  Vital Signs Last 24 Hrs  T(C): 36.7 (30 Jun 2025 16:00), Max: 37 (30 Jun 2025 00:00)  T(F): 98.1 (30 Jun 2025 16:00), Max: 98.6 (30 Jun 2025 00:00)  HR: 81 (30 Jun 2025 19:00) (67 - 95)  BP: 98/48 (30 Jun 2025 18:00) (97/54 - 144/64)  BP(mean): 68 (30 Jun 2025 18:00) (68 - 92)  RR: 21 (30 Jun 2025 19:00) (11 - 25)  SpO2: 95% (30 Jun 2025 19:00) (93% - 100%)    Parameters below as of 30 Jun 2025 19:00  Patient On (Oxygen Delivery Method): nasal cannula  O2 Flow (L/min): 2      PHYSICAL EXAM:  Constitutional:non interactive  Eyes:FLOWER, EOMI  Ear/Nose/Throat: no oral lesions, thick secretions	  Respiratory: clear BL  Cardiovascular: S1S2  Gastrointestinal:soft, (+) BS, no tenderness  Extremities:no e/e/c  No Lymphadenopathy  IV sites not inflammed.    LABS:                        7.6    20.27 )-----------( 133      ( 30 Jun 2025 00:49 )             24.1     06-30    135  |  98  |  53[H]  ----------------------------<  163[H]  4.4   |  24  |  6.16[H]    Ca    10.6[H]      30 Jun 2025 00:52  Phos  3.6     06-30  Mg     3.0     06-30    TPro  5.9[L]  /  Alb  3.1[L]  /  TBili  0.3  /  DBili  x   /  AST  25  /  ALT  24  /  AlkPhos  115  06-30    PT/INR - ( 29 Jun 2025 08:01 )   PT: 14.4 sec;   INR: 1.27 ratio         PTT - ( 30 Jun 2025 09:02 )  PTT:54.2 sec  Urinalysis Basic - ( 30 Jun 2025 00:52 )    Color: x / Appearance: x / SG: x / pH: x  Gluc: 163 mg/dL / Ketone: x  / Bili: x / Urobili: x   Blood: x / Protein: x / Nitrite: x   Leuk Esterase: x / RBC: x / WBC x   Sq Epi: x / Non Sq Epi: x / Bacteria: x        MICROBIOLOGY:            RECENT CULTURES:  06-27 @ 03:30  Blood Blood  --  --  --    No growth at 72 Hours  --  06-27 @ 02:45  Blood Blood  --  --  --    No growth at 72 Hours  --      RADIOLOGY & ADDITIONAL STUDIES:    < from: Xray Chest 1 View- PORTABLE-Urgent (Xray Chest 1 View- PORTABLE-Urgent .) (06.30.25 @ 15:48) >  Frontal expiratory view of the chest shows the heart to be similar in   size. Right jugular ECMO catheter reaches the upper main pulmonary   artery. Transfemoral ECMO catheter reaches the right atrium. Mitral valve   ring, left atrial appendage clip and sternal wires are present.    The lungs show left base atelectasis with small left effusion and there   is no evidence of pneumothorax nor right pleural effusion.    Chest one view 6/30/2025 2:31 AM  Compared to the prior study, the left base is clearer.    Chest one view 6/30/2025 3:21 PM  Compared to the prior study, left jugular central line reaches the   superior vena cava. There is less pulmonary congestion.    IMPRESSION:  Decreasing congestion. Lines as noted.      < end of copied text >

## 2025-06-30 NOTE — PROGRESS NOTE ADULT - ASSESSMENT
70 year old male PMH of HTN, HLD, DM2, CAD (total  4 coronary stents, last one PCI in 08/2024 &  S/p status post MI treated with PCI x3 stents in 2022 & 08/2023)on Asprin 81 mg, Chronic back pain, ESRD on  HD 3x/week via Right brachial AV fistula ([id number][facility] [place]/ also s/p angioplasty of the AVfistula -intact in 12/2024/ & had revision of AV fistula in  05/2024 with Dr. Henrik Morocho), Listed for renal transplant in [place] and [place] ( he has a living donor available), CHF with EF 40-45%,  pneumonia 10/2024, severe MR/ recent cath 6/3 w/ multivessel CAD in stent stenosis planned for Mitral valve replacement, CABG x3    On 6/17/25 s/p Mitral valve replacement, CABG x3 and RVAD placement  On 6/22 s/p insertion of percutaneous RVAD and removal of PA cannula    MRSA/MSSA Nasal PCR (6/10) Negative  Bronchoscopy (6/23) Moderate Serratia marcescens  Serum Procalcitonin (6/27) 4.15    CXR (6/27) Interval removal left-sided chest tube without pneumothorax. Bibasilar atelectasis.    Hypothermic after oxygenator removed. Lower suspicion for new infectious process but cultures sent and will be covered with Vancomycin through tomorrow's HD session.     Antibiotic Course:  PPx Cefuroxime: 6/18 > 6/24  Cefepime: 6/24 >   Vancomycin: 6/27    #Positive Bronchoscopy Culture, Hypothermia, Leukocytosis, Abnormal Lab Test (elevated procalcitonin)  --s/p 1g Dose of Vancomycin 6/27. Check level prior to HD tomorrow   --Continue Cefepime 1g IV Q24H (to complete 7 day course for the Serratia)  --Continue to follow CBC with diff  --Continue to follow temperature curve  --Follow up on preliminary blood cultures    #Pre-Renal Transplant Evaluation  COVID19 Saul Antibody Positive  HAV IgG Negative  HBVs Ab Negative  HBVsAg Negative  HBVc Ab Negative  HCV Ab Negative  HSV 1 IgG Positive  HSV 2 IgG Negative  EBV IgG Positive  CMV IgG Positive  VZV IgG Positive  Measles IgG Positive  Mumps IgG Positive  Rubella IgG Positive  Quantiferon Gold Negative  HIV Ag/Ab by CMIA  Syphilis Screen Negative  Toxoplasma IgG Negative  Strongyloides Ab Negative  Trypanosoma cruzi Ab Negative    #Encounter to Vaccinate Patient  COVID19: Would benefit from COVID19 4994-2335 Vaccine Dose  Influenza: Will require with next season  Pneumococcal: Would benefit from pneumococcal 21 vaccine (CAPVAXIVE)  HAV: Would benefit from Havrix  HBV: Would benefit from Heplisav  MMR: Immune, will not require further vaccination  Varicella: Immune, will not require further vaccination  Shingles: Will require Shingrix  Tdap: Will require Tdap      Grupo Lopez MD  Can be called via Teams  After 5pm/weekends [phone]

## 2025-06-30 NOTE — PROGRESS NOTE ADULT - ASSESSMENT
70M w/ HTN, DM2, CAD, and ESRD-HD, s/p CABGx3/MV repair/RVAD 6/17/25    (1)Renal - ESRD - HD MWF - standard HD; CRRT discontinued 6/27  (2)Lytes - Hypercalcemia   (3)Anemia - on TIW Retacrit; s/p PRBCs with HD yesterday  (4)CV - cardiogenic shock - dependent on RVAD + inotropes -     RECOMMEND:  (1)Next HD early today;  can attempt net 1.5L UF   (2)Retacrit as ordered and may need blood xfusion   (3)Continue meds for GFR<10/HD:  (4)Potential RVAD removal?    Seen at HD     [facility]   [phone]

## 2025-06-30 NOTE — PROGRESS NOTE ADULT - SUBJECTIVE AND OBJECTIVE BOX
Patient seen and examined at the bedside.    Remains critically ill on continuous ICU monitoring.      Brief Summary:  69 yo M PMH of HTN, HLD, DM2, CAD (total  4 coronary stents, last one PCI in 08/2024), CHF with EF 40-45%, severe MR  S/p Mitral valve replacement, CABG x3 and RVAD placement on 6/17/2025     24 Hour events:  Remains on low dose Dobutamine.  No new complaints this morning.      Objective:  ICU Vital Signs Last 24 Hrs  T(C): 36.7 (30 Jun 2025 16:00), Max: 37 (30 Jun 2025 00:00)  T(F): 98.1 (30 Jun 2025 16:00), Max: 98.6 (30 Jun 2025 00:00)  HR: 81 (30 Jun 2025 19:00) (67 - 95)  BP: 98/48 (30 Jun 2025 18:00) (97/54 - 144/64)  BP(mean): 68 (30 Jun 2025 18:00) (68 - 92)  ABP: 137/27 (30 Jun 2025 19:00) (103/35 - 170/38)  ABP(mean): 47 (30 Jun 2025 19:00) (41 - 63)  RR: 21 (30 Jun 2025 19:00) (11 - 25)  SpO2: 95% (30 Jun 2025 19:00) (93% - 100%)    O2 Parameters below as of 30 Jun 2025 19:00  Patient On (Oxygen Delivery Method): nasal cannula  O2 Flow (L/min): 2               Physical Exam:   General: Awake, alert, pleasant   Neurology: Following commands - working well with PT  Respiratory: Bilateral breath sounds  CV: Sinus   RVAD pulmonary 17F, Venous R femoral 25F  Abdominal: Soft, Nontender  Extremities: Warm, well-perfused, AVF on RUE       -------------------------------------------------------------------------------------------------------------------------------    Labs:                                   7.6    20.27 )-----------( 133      ( 30 Jun 2025 00:49 )             24.1     PT/INR - ( 29 Jun 2025 08:01 )   PT: 14.4 sec;   INR: 1.27 ratio        PTT - ( 30 Jun 2025 09:02 )  PTT:54.2 sec    135    |  98     |  53     ----------------------------<  163        ( 30 Jun 2025 00:52 )  4.4     |  24     |  6.16     Ca    10.6       ( 30 Jun 2025 00:52 )  Phos  3.6       ( 30 Jun 2025 00:52 )  Mg     3.0       ( 30 Jun 2025 00:52 )    TPro  5.9    /  Alb  3.1    /  TBili  0.3    /  DBili  x      /  AST  25     /  ALT  24     /  AlkPhos  115    ( 30 Jun 2025 00:52 )    LIVER FUNCTIONS - ( 30 Jun 2025 00:52 )  Alb: 3.1 g/dL / Pro: 5.9 g/dL / ALK PHOS: 115 U/L / ALT: 24 U/L / AST: 25 U/L / GGT: x           ABG - ( 30 Jun 2025 18:00 )  pH, Arterial: 7.48  pH, Blood: x     /  pCO2: 38    /  pO2: 154   / HCO3: 28    / Base Excess: 4.5   /  SaO2: 99.5        ------------------------------------------------------------------------------------------------------------------------------  Assessment:  69 yo M with cardiogenic shock on RVAD ,DM2, CAD (total  4 coronary stents, last one PCI in 08/2024 ), CHF with EF 40-45%, severe MR  Now s/p Mitral valve replacement, CABG x3 and RVAD placement on 6/17/2025       RV dysfunction  Acute postop pulmonary insufficiency  Acute blood loss anemia  Thrombocytopenia  ESRD  Hyperglycemia      Plan:   ***Neuro***  Maintain day/night cycle to prevent ICU delirium   Postoperative acute pain control with Tylenol and prns  Melatonin & Trazadone nightly    ***Cardiovascular***  RV dysfunction, s/p CABG, s/p MV repair  RVAD - decreased to 2 liters of flow.  Remains on low dose Dobutamine for inotropic support - slow wean  Plan for decannulation tomorrow.  New central line placed.  ASA/Statin daily   PO Amiodarone load.    ***Pulmonary***  Postop acute pulmonary insufficiency  Deep breathing and coughing exercises  Wean oxygen as able.    ***GI***  Tolerating diet.  Protonix for GI prophylaxis.  Bowel regimen.   Trend LFTs.    ***Renal***  ESRD tolerating iHD - plan for HD today.    ***ID***  6/23 BAL - GNR/Moderate Serratia - Cefepime 6/24/25  Remains on Vanco also - plan to continue through decannulation.  Follow up all cultures.    ***Endocrine***  Hyperglycemia - Lantus, premeal, and sliding scale Insulin.    ***Hematology***  Acute blood loss anemia and thrombocytopenia  Transfused 1 unit prbcs with HD.  Heparin infusion for PTT 40-50, Anti Xa > 0.2/    ***Skin/Musculoskeletal***  Walking with physical therapy  OOB in chair     Care plan discussed with the ICU care team.   Patient remains critical, at risk for life threatening decompensation.    I have spent 58 minutes providing critical care management to this patient.       I have reviewed the chart and agree that the record reflects my personal performance and is accurate and complete.    -Lacey Nair MD

## 2025-06-30 NOTE — PROGRESS NOTE ADULT - SUBJECTIVE AND OBJECTIVE BOX
NE[facility]ROLOGY[facility] [phone]        Patient seen and examined in bed.  He was the same  About start HD     ROS-+weakness + fatigue; all other ros were reviewed and were negative         MEDICATIONS  (STANDING):  aMIOdarone    Tablet 200 milliGRAM(s) Oral daily  aspirin enteric coated 81 milliGRAM(s) Oral daily  atorvastatin 80 milliGRAM(s) Oral at bedtime  bisacodyl Suppository 10 milliGRAM(s) Rectal once  cefepime   IVPB 1000 milliGRAM(s) IV Intermittent every 24 hours  chlorhexidine 4% Liquid 1 Application(s) Topical daily  dextrose 5%. 1000 milliLiter(s) (100 mL/Hr) IV Continuous <Continuous>  dextrose 50% Injectable 25 milliLiter(s) IV Push every 15 minutes  dextrose 50% Injectable 50 milliLiter(s) IV Push every 15 minutes  DOBUTamine Infusion 2 MICROgram(s)/kG/Min (4.76 mL/Hr) IV Continuous <Continuous>  epoetin douglas-epbx (RETACRIT) Injectable 60645 Unit(s) IV Push <User Schedule>  glucagon  Injectable 1 milliGRAM(s) IntraMuscular once  heparin  Infusion 1000 Unit(s)/Hr (9 mL/Hr) IV Continuous <Continuous>  insulin glargine Injectable (LANTUS) 28 Unit(s) SubCutaneous at bedtime  insulin lispro (ADMELOG) corrective regimen sliding scale   SubCutaneous Before meals and at bedtime  insulin lispro Injectable (ADMELOG) 9 Unit(s) SubCutaneous three times a day before meals  ipratropium    for Nebulization 500 MICROGram(s) Nebulizer every 6 hours  melatonin 5 milliGRAM(s) Oral at bedtime  multivitamin/minerals 1 Tablet(s) Oral daily  pantoprazole    Tablet 40 milliGRAM(s) Oral before breakfast  polyethylene glycol 3350 17 Gram(s) Oral every 12 hours  senna 2 Tablet(s) Oral at bedtime  sodium chloride 0.9%. 1000 milliLiter(s) (10 mL/Hr) IV Continuous <Continuous>  traZODone 50 milliGRAM(s) Oral at bedtime      VITAL:  T(C): , Max: 37 (06-29-25 @ 16:00)  T(F): , Max: 98.6 (06-29-25 @ 16:00)  HR: 80 (06-30-25 @ 08:00)  BP: 139/64 (06-30-25 @ 08:00)  BP(mean): 92 (06-30-25 @ 08:00)  RR: 14 (06-30-25 @ 08:00)  SpO2: 98% (06-30-25 @ 08:00)  Wt(kg): --    I and O's:    06-29 @ 07:01  -  06-30 @ 07:00  --------------------------------------------------------  IN: 1292.4 mL / OUT: 0 mL / NET: 1292.4 mL    06-30 @ 07:01  -  06-30 @ 09:19  --------------------------------------------------------  IN: 22.8 mL / OUT: 0 mL / NET: 22.8 mL          PHYSICAL EXAM:    Constitutional: NAD  Neck:  No JVD  Respiratory: CTAB/L  Cardiovascular: S1 and S2  Gastrointestinal: BS+, soft, NT/ND  Extremities: No peripheral edema  Neurological: A/O x 3, no focal deficits  Psychiatric: Normal mood, normal affect  : No Ag  Skin: No rashes  Access: avf    LABS:                        7.6    20.27 )-----------( 133      ( 30 Jun 2025 00:49 )             24.1     06-30    135  |  98  |  53[H]  ----------------------------<  163[H]  4.4   |  24  |  6.16[H]    Ca    10.6[H]      30 Jun 2025 00:52  Phos  3.6     06-30  Mg     3.0     06-30    TPro  5.9[L]  /  Alb  3.1[L]  /  TBili  0.3  /  DBili  x   /  AST  25  /  ALT  24  /  AlkPhos  115  06-30          Urine Studies:  Urinalysis Basic - ( 30 Jun 2025 00:52 )    Color: x / Appearance: x / SG: x / pH: x  Gluc: 163 mg/dL / Ketone: x  / Bili: x / Urobili: x   Blood: x / Protein: x / Nitrite: x   Leuk Esterase: x / RBC: x / WBC x   Sq Epi: x / Non Sq Epi: x / Bacteria: x            RADIOLOGY & ADDITIONAL STUDIES:        < from: Xray Chest 1 View- PORTABLE-Routine (Xray Chest 1 View- PORTABLE-Routine in AM.) (06.28.25 @ 03:30) >    ACC: [id number] EXAM:  XR CHEST PORTABLE ROUTINE 1V   ORDERED BY: YNES VARGAS     PROCEDURE DATE:  06/28/2025          INTERPRETATION:  HISTORY: Admitting Dxs: I34.81 NONRHEUMATIC MITRAL   (VALVE) ANNULUS CALCIFICATION / I25.10 ATHSCL HEART DISEASE OF NATIVE   CORONARY ARTERY W/O ANG PCTRS;  cts;  TECHNIQUE: Portable frontal view of the chest, 1 view.  COMPARISON: June 27, 2025.  FINDINGS/  IMPRESSION:  [place] catheter enters the right IJ and terminates in the main   pulmonary artery segment. Client catheter terminates in the right atrial   region.  HEART:  Enlarged. Mitral valve surgery.  LUNGS: Bibasilar atelectasis, small left effusion. Mild interstitial   edema. Aeration is similar to prior.  BONES: sternotomy wires    --- End of Report ---            BORIS FRANCISCO MD; Attending Interventional Radiologist  This document has been el    < end of copied text >

## 2025-07-01 LAB
ALBUMIN SERPL ELPH-MCNC: 3.4 G/DL — SIGNIFICANT CHANGE UP (ref 3.3–5)
ALP SERPL-CCNC: 118 U/L — SIGNIFICANT CHANGE UP (ref 40–120)
ALT FLD-CCNC: 22 U/L — SIGNIFICANT CHANGE UP (ref 10–45)
ANION GAP SERPL CALC-SCNC: 14 MMOL/L — SIGNIFICANT CHANGE UP (ref 5–17)
APTT BLD: 47 SEC — HIGH (ref 26.1–36.8)
AST SERPL-CCNC: 23 U/L — SIGNIFICANT CHANGE UP (ref 10–40)
BASE EXCESS BLDA CALC-SCNC: -3 MMOL/L — LOW (ref -2–3)
BASE EXCESS BLDA CALC-SCNC: -4 MMOL/L — LOW (ref -2–3)
BASE EXCESS BLDA CALC-SCNC: -4 MMOL/L — LOW (ref -2–3)
BASE EXCESS BLDA CALC-SCNC: -5 MMOL/L — LOW (ref -2–3)
BASE EXCESS BLDA CALC-SCNC: -5 MMOL/L — LOW (ref -2–3)
BASE EXCESS BLDV CALC-SCNC: 3.2 MMOL/L — HIGH (ref -2–3)
BILIRUB SERPL-MCNC: 0.3 MG/DL — SIGNIFICANT CHANGE UP (ref 0.2–1.2)
BLD GP AB SCN SERPL QL: NEGATIVE — SIGNIFICANT CHANGE UP
BUN SERPL-MCNC: 44 MG/DL — HIGH (ref 7–23)
CA-I BLDA-SCNC: 1.21 MMOL/L — SIGNIFICANT CHANGE UP (ref 1.15–1.33)
CA-I BLDA-SCNC: 1.21 MMOL/L — SIGNIFICANT CHANGE UP (ref 1.15–1.33)
CA-I BLDA-SCNC: 1.31 MMOL/L — SIGNIFICANT CHANGE UP (ref 1.15–1.33)
CA-I BLDA-SCNC: 1.31 MMOL/L — SIGNIFICANT CHANGE UP (ref 1.15–1.33)
CA-I BLDA-SCNC: 1.32 MMOL/L — SIGNIFICANT CHANGE UP (ref 1.15–1.33)
CALCIUM SERPL-MCNC: 10.5 MG/DL — SIGNIFICANT CHANGE UP (ref 8.4–10.5)
CHLORIDE BLDA-SCNC: 104 MMOL/L — SIGNIFICANT CHANGE UP (ref 96–108)
CHLORIDE SERPL-SCNC: 95 MMOL/L — LOW (ref 96–108)
CO2 BLDA-SCNC: 21 MMOL/L — LOW (ref 22–30)
CO2 BLDA-SCNC: 21 MMOL/L — LOW (ref 22–30)
CO2 BLDA-SCNC: 22 MMOL/L — SIGNIFICANT CHANGE UP (ref 22–30)
CO2 BLDA-SCNC: 22 MMOL/L — SIGNIFICANT CHANGE UP (ref 22–30)
CO2 BLDA-SCNC: 23 MMOL/L — SIGNIFICANT CHANGE UP (ref 22–30)
CO2 BLDV-SCNC: 30 MMOL/L — HIGH (ref 22–26)
CO2 SERPL-SCNC: 24 MMOL/L — SIGNIFICANT CHANGE UP (ref 22–31)
COHGB MFR BLDA: 1.5 % — SIGNIFICANT CHANGE UP
COHGB MFR BLDA: 1.5 % — SIGNIFICANT CHANGE UP
COHGB MFR BLDA: 1.6 % — SIGNIFICANT CHANGE UP
COHGB MFR BLDA: 1.6 % — SIGNIFICANT CHANGE UP
COHGB MFR BLDA: 1.7 % — SIGNIFICANT CHANGE UP
CREAT SERPL-MCNC: 5.62 MG/DL — HIGH (ref 0.5–1.3)
EGFR: 10 ML/MIN/1.73M2 — LOW
EGFR: 10 ML/MIN/1.73M2 — LOW
GAS PNL BLDA: SIGNIFICANT CHANGE UP
GAS PNL BLDA: SIGNIFICANT CHANGE UP
GAS PNL BLDV: SIGNIFICANT CHANGE UP
GAS PNL BLDV: SIGNIFICANT CHANGE UP
GLUCOSE BLDA-MCNC: 159 MG/DL — HIGH (ref 70–99)
GLUCOSE BLDA-MCNC: 176 MG/DL — HIGH (ref 70–99)
GLUCOSE BLDA-MCNC: 176 MG/DL — HIGH (ref 70–99)
GLUCOSE BLDA-MCNC: 185 MG/DL — HIGH (ref 70–99)
GLUCOSE BLDA-MCNC: 185 MG/DL — HIGH (ref 70–99)
GLUCOSE BLDC GLUCOMTR-MCNC: 151 MG/DL — HIGH (ref 70–99)
GLUCOSE BLDC GLUCOMTR-MCNC: 166 MG/DL — HIGH (ref 70–99)
GLUCOSE BLDC GLUCOMTR-MCNC: 185 MG/DL — HIGH (ref 70–99)
GLUCOSE BLDC GLUCOMTR-MCNC: 191 MG/DL — HIGH (ref 70–99)
GLUCOSE SERPL-MCNC: 198 MG/DL — HIGH (ref 70–99)
HAPTOGLOB SERPL-MCNC: 189 MG/DL — SIGNIFICANT CHANGE UP (ref 34–200)
HCO3 BLDA-SCNC: 20 MMOL/L — LOW (ref 21–28)
HCO3 BLDA-SCNC: 20 MMOL/L — LOW (ref 21–28)
HCO3 BLDA-SCNC: 21 MMOL/L — SIGNIFICANT CHANGE UP (ref 21–28)
HCO3 BLDA-SCNC: 21 MMOL/L — SIGNIFICANT CHANGE UP (ref 21–28)
HCO3 BLDA-SCNC: 22 MMOL/L — SIGNIFICANT CHANGE UP (ref 21–28)
HCO3 BLDV-SCNC: 28 MMOL/L — SIGNIFICANT CHANGE UP (ref 22–29)
HCT VFR BLD CALC: 26.8 % — LOW (ref 39–50)
HCT VFR BLDA CALC: 23 % — LOW (ref 39–51)
HCT VFR BLDA CALC: 23 % — LOW (ref 39–51)
HCT VFR BLDA CALC: 25 % — LOW (ref 39–51)
HGB BLD-MCNC: 8.5 G/DL — LOW (ref 13–17)
HGB BLDA-MCNC: 7.8 G/DL — LOW (ref 12.6–17.4)
HGB BLDA-MCNC: 7.8 G/DL — LOW (ref 12.6–17.4)
HGB BLDA-MCNC: 8.3 G/DL — LOW (ref 12.6–17.4)
HOROWITZ INDEX BLDV+IHG-RTO: 28 — SIGNIFICANT CHANGE UP
LACTATE BLDA-MCNC: 0.9 MMOL/L — SIGNIFICANT CHANGE UP (ref 0.5–2)
LACTATE BLDA-MCNC: 1.1 MMOL/L — SIGNIFICANT CHANGE UP (ref 0.5–2)
LACTATE BLDA-MCNC: 1.1 MMOL/L — SIGNIFICANT CHANGE UP (ref 0.5–2)
LACTATE BLDA-MCNC: 1.2 MMOL/L — SIGNIFICANT CHANGE UP (ref 0.5–2)
LACTATE BLDA-MCNC: 1.2 MMOL/L — SIGNIFICANT CHANGE UP (ref 0.5–2)
LDH SERPL L TO P-CCNC: 329 U/L — HIGH (ref 50–242)
MAGNESIUM SERPL-MCNC: 2.7 MG/DL — HIGH (ref 1.6–2.6)
MCHC RBC-ENTMCNC: 27.4 PG — SIGNIFICANT CHANGE UP (ref 27–34)
MCHC RBC-ENTMCNC: 31.7 G/DL — LOW (ref 32–36)
MCV RBC AUTO: 86.5 FL — SIGNIFICANT CHANGE UP (ref 80–100)
METHGB MFR BLDA: 0 % — SIGNIFICANT CHANGE UP
METHGB MFR BLDA: 0 % — SIGNIFICANT CHANGE UP
METHGB MFR BLDA: 0.4 % — SIGNIFICANT CHANGE UP
METHGB MFR BLDA: 0.4 % — SIGNIFICANT CHANGE UP
METHGB MFR BLDA: 0.5 % — SIGNIFICANT CHANGE UP
NRBC # BLD AUTO: 0 K/UL — SIGNIFICANT CHANGE UP (ref 0–0)
NRBC # FLD: 0 K/UL — SIGNIFICANT CHANGE UP (ref 0–0)
NRBC BLD AUTO-RTO: 0 /100 WBCS — SIGNIFICANT CHANGE UP (ref 0–0)
OXYHGB MFR BLDA: 97.8 % — HIGH (ref 90–95)
OXYHGB MFR BLDA: 98.1 % — HIGH (ref 90–95)
OXYHGB MFR BLDA: 98.1 % — HIGH (ref 90–95)
OXYHGB MFR BLDA: 98.4 % — HIGH (ref 90–95)
OXYHGB MFR BLDA: 98.4 % — HIGH (ref 90–95)
PCO2 BLDA: 37 MMHG — SIGNIFICANT CHANGE UP (ref 35–48)
PCO2 BLDA: 37 MMHG — SIGNIFICANT CHANGE UP (ref 35–48)
PCO2 BLDA: 38 MMHG — SIGNIFICANT CHANGE UP (ref 35–48)
PCO2 BLDA: 38 MMHG — SIGNIFICANT CHANGE UP (ref 35–48)
PCO2 BLDA: 40 MMHG — SIGNIFICANT CHANGE UP (ref 35–48)
PCO2 BLDV: 45 MMHG — SIGNIFICANT CHANGE UP (ref 42–55)
PH BLDA: 7.34 — LOW (ref 7.35–7.45)
PH BLDA: 7.34 — LOW (ref 7.35–7.45)
PH BLDA: 7.35 — SIGNIFICANT CHANGE UP (ref 7.35–7.45)
PH BLDV: 7.41 — SIGNIFICANT CHANGE UP (ref 7.32–7.43)
PHOSPHATE SERPL-MCNC: 4.1 MG/DL — SIGNIFICANT CHANGE UP (ref 2.5–4.5)
PLATELET # BLD AUTO: 122 K/UL — LOW (ref 150–400)
PMV BLD: 12.4 FL — SIGNIFICANT CHANGE UP (ref 7–13)
PO2 BLDA: 383 MMHG — HIGH (ref 83–108)
PO2 BLDA: 383 MMHG — HIGH (ref 83–108)
PO2 BLDA: 400 MMHG — HIGH (ref 83–108)
PO2 BLDA: 400 MMHG — HIGH (ref 83–108)
PO2 BLDA: 435 MMHG — HIGH (ref 83–108)
PO2 BLDV: 44 MMHG — SIGNIFICANT CHANGE UP (ref 25–45)
POTASSIUM BLDA-SCNC: 4.6 MMOL/L — SIGNIFICANT CHANGE UP (ref 3.5–5.1)
POTASSIUM BLDA-SCNC: 4.8 MMOL/L — SIGNIFICANT CHANGE UP (ref 3.5–5.1)
POTASSIUM BLDA-SCNC: 4.8 MMOL/L — SIGNIFICANT CHANGE UP (ref 3.5–5.1)
POTASSIUM BLDA-SCNC: 4.9 MMOL/L — SIGNIFICANT CHANGE UP (ref 3.5–5.1)
POTASSIUM BLDA-SCNC: 4.9 MMOL/L — SIGNIFICANT CHANGE UP (ref 3.5–5.1)
POTASSIUM SERPL-MCNC: 4.1 MMOL/L — SIGNIFICANT CHANGE UP (ref 3.5–5.3)
POTASSIUM SERPL-SCNC: 4.1 MMOL/L — SIGNIFICANT CHANGE UP (ref 3.5–5.3)
PROT SERPL-MCNC: 6.3 G/DL — SIGNIFICANT CHANGE UP (ref 6–8.3)
RBC # BLD: 3.1 M/UL — LOW (ref 4.2–5.8)
RBC # FLD: 18.6 % — HIGH (ref 10.3–14.5)
RH IG SCN BLD-IMP: POSITIVE — SIGNIFICANT CHANGE UP
SAO2 % BLDA: 100 % — HIGH (ref 94–98)
SAO2 % BLDV: 78.1 % — SIGNIFICANT CHANGE UP (ref 67–88)
SODIUM BLDA-SCNC: 133 MMOL/L — LOW (ref 136–145)
SODIUM BLDA-SCNC: 134 MMOL/L — LOW (ref 136–145)
SODIUM BLDA-SCNC: 134 MMOL/L — LOW (ref 136–145)
SODIUM SERPL-SCNC: 133 MMOL/L — LOW (ref 135–145)
WBC # BLD: 17.86 K/UL — HIGH (ref 3.8–10.5)
WBC # FLD AUTO: 17.86 K/UL — HIGH (ref 3.8–10.5)

## 2025-07-01 PROCEDURE — G0545: CPT

## 2025-07-01 PROCEDURE — 99291 CRITICAL CARE FIRST HOUR: CPT

## 2025-07-01 PROCEDURE — 99233 SBSQ HOSP IP/OBS HIGH 50: CPT

## 2025-07-01 PROCEDURE — 99292 CRITICAL CARE ADDL 30 MIN: CPT

## 2025-07-01 PROCEDURE — 71045 X-RAY EXAM CHEST 1 VIEW: CPT | Mod: 26

## 2025-07-01 RX ORDER — HYDROMORPHONE/SOD CHLOR,ISO/PF 2 MG/10 ML
0.25 SYRINGE (ML) INJECTION ONCE
Refills: 0 | Status: DISCONTINUED | OUTPATIENT
Start: 2025-07-01 | End: 2025-07-01

## 2025-07-01 RX ORDER — EPOETIN ALFA 10000 [IU]/ML
14000 SOLUTION INTRAVENOUS; SUBCUTANEOUS ONCE
Refills: 0 | Status: COMPLETED | OUTPATIENT
Start: 2025-07-02 | End: 2025-07-02

## 2025-07-01 RX ORDER — HEPARIN SODIUM 1000 [USP'U]/ML
5000 INJECTION INTRAVENOUS; SUBCUTANEOUS EVERY 8 HOURS
Refills: 0 | Status: DISCONTINUED | OUTPATIENT
Start: 2025-07-02 | End: 2025-07-11

## 2025-07-01 RX ADMIN — AMIODARONE HYDROCHLORIDE 200 MILLIGRAM(S): 50 INJECTION, SOLUTION INTRAVENOUS at 05:06

## 2025-07-01 RX ADMIN — DOBUTAMINE 4.76 MICROGRAM(S)/KG/MIN: 250 INJECTION INTRAVENOUS at 07:25

## 2025-07-01 RX ADMIN — Medication 81 MILLIGRAM(S): at 14:49

## 2025-07-01 RX ADMIN — ATORVASTATIN CALCIUM 80 MILLIGRAM(S): 80 TABLET, FILM COATED ORAL at 21:27

## 2025-07-01 RX ADMIN — POLYETHYLENE GLYCOL 3350 17 GRAM(S): 17 POWDER, FOR SOLUTION ORAL at 16:45

## 2025-07-01 RX ADMIN — Medication 500 MICROGRAM(S): at 05:13

## 2025-07-01 RX ADMIN — HEPARIN SODIUM 9 UNIT(S)/HR: 1000 INJECTION INTRAVENOUS; SUBCUTANEOUS at 07:25

## 2025-07-01 RX ADMIN — INSULIN LISPRO 5: 100 INJECTION, SOLUTION INTRAVENOUS; SUBCUTANEOUS at 16:44

## 2025-07-01 RX ADMIN — Medication 2 TABLET(S): at 21:27

## 2025-07-01 RX ADMIN — INSULIN LISPRO 5: 100 INJECTION, SOLUTION INTRAVENOUS; SUBCUTANEOUS at 07:24

## 2025-07-01 RX ADMIN — Medication 1 APPLICATION(S): at 05:08

## 2025-07-01 RX ADMIN — Medication 0.25 MILLIGRAM(S): at 10:55

## 2025-07-01 RX ADMIN — INSULIN GLARGINE-YFGN 28 UNIT(S): 100 INJECTION, SOLUTION SUBCUTANEOUS at 21:40

## 2025-07-01 RX ADMIN — Medication 5 MILLIGRAM(S): at 21:27

## 2025-07-01 RX ADMIN — INSULIN LISPRO 5: 100 INJECTION, SOLUTION INTRAVENOUS; SUBCUTANEOUS at 21:37

## 2025-07-01 RX ADMIN — Medication 1 TABLET(S): at 14:50

## 2025-07-01 RX ADMIN — INSULIN LISPRO 9 UNIT(S): 100 INJECTION, SOLUTION INTRAVENOUS; SUBCUTANEOUS at 16:44

## 2025-07-01 RX ADMIN — INSULIN LISPRO 9 UNIT(S): 100 INJECTION, SOLUTION INTRAVENOUS; SUBCUTANEOUS at 07:24

## 2025-07-01 RX ADMIN — INSULIN LISPRO 5: 100 INJECTION, SOLUTION INTRAVENOUS; SUBCUTANEOUS at 11:02

## 2025-07-01 RX ADMIN — Medication 10 MILLILITER(S): at 07:25

## 2025-07-01 RX ADMIN — INSULIN LISPRO 9 UNIT(S): 100 INJECTION, SOLUTION INTRAVENOUS; SUBCUTANEOUS at 11:02

## 2025-07-01 RX ADMIN — CEFEPIME 100 MILLIGRAM(S): 2 INJECTION, POWDER, FOR SOLUTION INTRAVENOUS at 05:06

## 2025-07-01 RX ADMIN — Medication 50 MILLIGRAM(S): at 21:27

## 2025-07-01 RX ADMIN — Medication 40 MILLIGRAM(S): at 05:08

## 2025-07-01 RX ADMIN — CEFEPIME 100 MILLIGRAM(S): 2 INJECTION, POWDER, FOR SOLUTION INTRAVENOUS at 21:27

## 2025-07-01 RX ADMIN — Medication 500 MICROGRAM(S): at 23:06

## 2025-07-01 NOTE — PROVIDER CONTACT NOTE (OTHER) - SITUATION
during ambulation, noticed blood dripping to right leg . Rt groin dsg saturated with blood. Manual pressure applied at all times while wheeling patient back to rrom pt

## 2025-07-01 NOTE — PROGRESS NOTE ADULT - SUBJECTIVE AND OBJECTIVE BOX
INFECTIOUS DISEASES FOLLOW UP-- Elza Lopez  [phone]    This is a follow up note for this  70yMale with  Atherosclerosis of native coronary artery without angina pectoris    Nonrheumatic mitral annular calcification    underwent decannulation      ROS:  CONSTITUTIONAL:  No fever, good appetite  CARDIOVASCULAR:  No chest pain or palpitations  RESPIRATORY:  No dyspnea  GASTROINTESTINAL:  No nausea, vomiting, diarrhea, or abdominal pain  GENITOURINARY:  No dysuria  NEUROLOGIC:  No headache,     Allergies    No Known Allergies    Intolerances        ANTIBIOTICS/RELEVANT:  antimicrobials  cefepime   IVPB 1000 milliGRAM(s) IV Intermittent at bedtime    immunologic:  epoetin douglas-epbx (RETACRIT) Injectable 13825 Unit(s) IV Push <User Schedule>    OTHER:  acetaminophen     Tablet .. 650 milliGRAM(s) Oral every 6 hours PRN  aMIOdarone    Tablet 200 milliGRAM(s) Oral daily  artificial  tears Solution 1 Drop(s) Both EYES every 1 hour PRN  aspirin enteric coated 81 milliGRAM(s) Oral daily  atorvastatin 80 milliGRAM(s) Oral at bedtime  bisacodyl Suppository 10 milliGRAM(s) Rectal once  chlorhexidine 4% Liquid 1 Application(s) Topical daily  dextrose 5%. 1000 milliLiter(s) IV Continuous <Continuous>  dextrose 50% Injectable 50 milliLiter(s) IV Push every 15 minutes  dextrose 50% Injectable 25 milliLiter(s) IV Push every 15 minutes  dextrose Oral Gel 15 Gram(s) Oral once PRN  DOBUTamine Infusion 2 MICROgram(s)/kG/Min IV Continuous <Continuous>  glucagon  Injectable 1 milliGRAM(s) IntraMuscular once  heparin  Infusion 1000 Unit(s)/Hr IV Continuous <Continuous>  insulin glargine Injectable (LANTUS) 28 Unit(s) SubCutaneous at bedtime  insulin lispro (ADMELOG) corrective regimen sliding scale   SubCutaneous Before meals and at bedtime  insulin lispro Injectable (ADMELOG) 9 Unit(s) SubCutaneous three times a day before meals  ipratropium    for Nebulization 500 MICROGram(s) Nebulizer every 6 hours  melatonin 5 milliGRAM(s) Oral at bedtime  multivitamin/minerals 1 Tablet(s) Oral daily  pantoprazole    Tablet 40 milliGRAM(s) Oral before breakfast  polyethylene glycol 3350 17 Gram(s) Oral every 12 hours  senna 2 Tablet(s) Oral at bedtime  sodium chloride 0.9%. 1000 milliLiter(s) IV Continuous <Continuous>  traZODone 50 milliGRAM(s) Oral at bedtime      Objective:  Vital Signs Last 24 Hrs  T(C): 37.1 (01 Jul 2025 16:00), Max: 37.1 (01 Jul 2025 00:00)  T(F): 98.7 (01 Jul 2025 16:00), Max: 98.8 (01 Jul 2025 08:00)  HR: 85 (01 Jul 2025 19:00) (81 - 97)  BP: 136/61 (01 Jul 2025 18:00) (100/51 - 139/60)  BP(mean): 88 (01 Jul 2025 18:00) (72 - 90)  RR: 14 (01 Jul 2025 19:00) (7 - 27)  SpO2: 99% (01 Jul 2025 19:00) (93% - 100%)    Parameters below as of 01 Jul 2025 19:00  Patient On (Oxygen Delivery Method): nasal cannula  O2 Flow (L/min): 2  O2 Concentration (%): 28    PHYSICAL EXAM:  Constitutional:no acute distress  Eyes:FLOWER, EOMI  Ear/Nose/Throat: no oral lesions, nasal oxygen	  Respiratory: clear BL  Cardiovascular: S1S2  Gastrointestinal:soft, (+) BS, no tenderness  Extremities:no e/e/oefwhjbypdacu4e site some oozing under dressing  No Lymphadenopathy  IV sites not inflammed.    LABS:                        8.5    17.86 )-----------( 122      ( 01 Jul 2025 00:27 )             26.8     07-01    133[L]  |  95[L]  |  44[H]  ----------------------------<  198[H]  4.1   |  24  |  5.62[H]    Ca    10.5      01 Jul 2025 00:27  Phos  4.1     07-01  Mg     2.7     07-01    TPro  6.3  /  Alb  3.4  /  TBili  0.3  /  DBili  x   /  AST  23  /  ALT  22  /  AlkPhos  118  07-01    PTT - ( 01 Jul 2025 00:27 )  PTT:47.0 sec  Urinalysis Basic - ( 01 Jul 2025 00:27 )    Color: x / Appearance: x / SG: x / pH: x  Gluc: 198 mg/dL / Ketone: x  / Bili: x / Urobili: x   Blood: x / Protein: x / Nitrite: x   Leuk Esterase: x / RBC: x / WBC x   Sq Epi: x / Non Sq Epi: x / Bacteria: x        MICROBIOLOGY:            RECENT CULTURES:  06-30 @ 14:40  Blood Blood-Venous  --  --  --    No growth at 24 hours  --  06-27 @ 03:30  Blood Blood  --  --  --    No growth at 4 days  --  06-27 @ 02:45  Blood Blood  --  --  --    No growth at 4 days  --      RADIOLOGY & ADDITIONAL STUDIES:    < from: Xray Chest 1 View- PORTABLE-Urgent (Xray Chest 1 View- PORTABLE-Urgent .) (06.30.25 @ 15:48) >  COMPARISON STUDY: 6/28/2025    Frontal expiratory view of the chest shows the heart to be similar in   size. Right jugular ECMO catheter reaches the upper main pulmonary   artery. Transfemoral ECMO catheter reaches the right atrium. Mitral valve   ring, left atrial appendage clip and sternal wires are present.    The lungs show left base atelectasis with small left effusion and there   is no evidence of pneumothorax nor right pleural effusion.    Chest one view 6/30/2025 2:31 AM  Compared to the prior study, the left base is clearer.    Chest one view 6/30/2025 3:21 PM  Compared to the prior study, left jugular central line reachesthe   superior vena cava. There is less pulmonary congestion.    IMPRESSION:  Decreasing congestion. Lines as noted.    < end of copied text >

## 2025-07-01 NOTE — PROGRESS NOTE ADULT - SUBJECTIVE AND OBJECTIVE BOX
NEPHROLOGY[facility] [phone]        Patient seen and examined in bed.  Afebrile at present   RVAD    gtt         MEDICATIONS  (STANDING):  aMIOdarone    Tablet 200 milliGRAM(s) Oral daily  aspirin enteric coated 81 milliGRAM(s) Oral daily  atorvastatin 80 milliGRAM(s) Oral at bedtime  bisacodyl Suppository 10 milliGRAM(s) Rectal once  cefepime   IVPB 1000 milliGRAM(s) IV Intermittent at bedtime  chlorhexidine 4% Liquid 1 Application(s) Topical daily  dextrose 5%. 1000 milliLiter(s) (100 mL/Hr) IV Continuous <Continuous>  dextrose 50% Injectable 50 milliLiter(s) IV Push every 15 minutes  dextrose 50% Injectable 25 milliLiter(s) IV Push every 15 minutes  DOBUTamine Infusion 2 MICROgram(s)/kG/Min (4.76 mL/Hr) IV Continuous <Continuous>  epoetin douglas-epbx (RETACRIT) Injectable 21420 Unit(s) IV Push <User Schedule>  glucagon  Injectable 1 milliGRAM(s) IntraMuscular once  heparin  Infusion 1000 Unit(s)/Hr (9 mL/Hr) IV Continuous <Continuous>  insulin glargine Injectable (LANTUS) 28 Unit(s) SubCutaneous at bedtime  insulin lispro (ADMELOG) corrective regimen sliding scale   SubCutaneous Before meals and at bedtime  insulin lispro Injectable (ADMELOG) 9 Unit(s) SubCutaneous three times a day before meals  ipratropium    for Nebulization 500 MICROGram(s) Nebulizer every 6 hours  melatonin 5 milliGRAM(s) Oral at bedtime  multivitamin/minerals 1 Tablet(s) Oral daily  pantoprazole    Tablet 40 milliGRAM(s) Oral before breakfast  polyethylene glycol 3350 17 Gram(s) Oral every 12 hours  senna 2 Tablet(s) Oral at bedtime  sodium chloride 0.9%. 1000 milliLiter(s) (10 mL/Hr) IV Continuous <Continuous>  traZODone 50 milliGRAM(s) Oral at bedtime      VITAL:  T(C): , Max: 37.1 (25 @ 00:00)  T(F): , Max: 98.8 (25 @ 08:00)  HR: 88 (25 @ 09:30)  BP: 117/56 (25 @ 09:00)  BP(mean): 80 (25 @ 09:00)  RR: 19 (25 @ 09:30)  SpO2: 98% (25 @ 09:00)  Wt(kg): --    I and O's:     @ :  -   @ 07:00  --------------------------------------------------------  IN: 1510.6 mL / OUT: 1500 mL / NET: 10.6 mL     @ :  -   @ 09:53  --------------------------------------------------------  IN: 160.2 mL / OUT: 0 mL / NET: 160.2 mL          PHYSICAL EXAM:    Constitutional: NAD  Neck:  No JVD  Respiratory: CTAB/L  Cardiovascular: S1 and S2  Gastrointestinal: BS+, soft, NT/ND  Extremities: No peripheral edema  Neurological: A/O x 3, no focal deficits  Psychiatric: Normal mood, normal affect  : No Ag  Skin: No rashes  Access: [facility]    LABS:                        8.5    17.86 )-----------( 122      ( 2025 00:27 )             26.8         133[L]  |  95[L]  |  44[H]  ----------------------------<  198[H]  4.1   |  24  |  5.62[H]    Ca    10.5      2025 00:27  Phos  4.1     07  Mg     2.7         TPro  6.3  /  Alb  3.4  /  TBili  0.3  /  DBili  x   /  AST  23  /  ALT  22  /  AlkPhos  118            Urine Studies:  Urinalysis Basic - ( 2025 00:27 )    Color: x / Appearance: x / SG: x / pH: x  Gluc: 198 mg/dL / Ketone: x  / Bili: x / Urobili: x   Blood: x / Protein: x / Nitrite: x   Leuk Esterase: x / RBC: x / WBC x   Sq Epi: x / Non Sq Epi: x / Bacteria: x            RADIOLOGY & ADDITIONAL STUDIES:

## 2025-07-01 NOTE — PROGRESS NOTE ADULT - ASSESSMENT
70 year old male PMH of HTN, HLD, DM2, CAD (total  4 coronary stents, last one PCI in 08/2024 &  S/p status post MI treated with PCI x3 stents in 2022 & 08/2023)on Asprin 81 mg, Chronic back pain, ESRD on  HD 3x/week via Right brachial AV fistula ([facility]dsubgs[facility]-Plsbg[facility], Nephrology- Dr.Paul Munguia-[facility], in [place]/ also s/p angioplasty of the AVfistula -intact in 12/2024/ & had revision of AV fistula in  05/2024 with Dr. Henrik Morocho), Listed for renal transplant in [place] and [place] ( he has a living donor available), CHF with EF 40-45%,  pneumonia 10/2024, severe MR/ recent cath 6/3 w/ multivessel CAD in stent stenosis planned for Mitral valve replacement, CABG x3    On 6/17/25 s/p Mitral valve replacement, CABG x3 and RVAD placement  On 6/22 s/p insertion of percutaneous RVAD and removal of PA cannula    MRSA/MSSA Nasal PCR (6/10) Negative  Bronchoscopy (6/23) Moderate Serratia marcescens  Serum Procalcitonin (6/27) 4.15    CXR (6/27) Interval removal left-sided chest tube without pneumothorax. Bibasilar atelectasis.        Antibiotic Course:  PPx Cefuroxime: 6/18 > 6/24  Cefepime: 6/24 >   Vancomycin: 6/27    #Positive Bronchoscopy Culture, Hypothermia, Leukocytosis, Abnormal Lab Test (elevated procalcitonin)  --s/p 1g Dose of Vancomycin 6/27. Check level prior to HD tomorrow   --Can discontinue the Cefepime ( completed 7 day course for the Serratia)  --Continue to follow CBC with diff  --Continue to follow temperature curve  --No new p[ositive cultures    #Pre-Renal Transplant Evaluation  COVID19 Saul Antibody Positive  HAV IgG Negative  HBVs Ab Negative  HBVsAg Negative  HBVc Ab Negative  HCV Ab Negative  HSV 1 IgG Positive  HSV 2 IgG Negative  EBV IgG Positive  CMV IgG Positive  VZV IgG Positive  Measles IgG Positive  Mumps IgG Positive  Rubella IgG Positive  Quantiferon Gold Negative  HIV Ag/Ab by CMIA  Syphilis Screen Negative  Toxoplasma IgG Negative  Strongyloides Ab Negative  Trypanosoma cruzi Ab Negative    #Encounter to Vaccinate Patient  COVID19: Would benefit from COVID19 [id number] Vaccine Dose  Influenza: Will require with next season  Pneumococcal: Would benefit from pneumococcal 21 vaccine (CAPVAXIVE)  HAV: Would benefit from Havrix  HBV: Would benefit from Heplisav  MMR: Immune, will not require further vaccination  Varicella: Immune, will not require further vaccination  Shingles: Will recommendShingrix  Tdap: Will recommend Tdap      Grupo Lopez MD  Can be called via Teams  After 5pm/weekends [phone]

## 2025-07-01 NOTE — CHART NOTE - NSCHARTNOTEFT_GEN_A_CORE
NUTRITION FOLLOW UP NOTE    PATIENT SEEN FOR: nutrition follow up.     SOURCE: [x] Patient  [x] Current Medical Record  [x] RN  [] Family/support person at bedside  [] Patient unavailable/inappropriate  [x] Other: interdisciplinary team rounds     CHART REVIEWED/EVENTS NOTED.  [] No changes to nutrition care plan to note  [x] Nutrition Status:  - S/p Mitral valve replacement, CABG x3 and RVAD placement on 2025; plan for decannulation today per rounds   - Hx ESRD on HD, CVVHD discontinued , pt now on intermittent HD     DIET ORDER:   Diet, Regular:   Consistent Carbohydrate {No Snacks} (CSTCHO)  No Concentrated Potassium  No Concentrated Phosphorus  Supplement Feeding Modality:  Oral  Nepro Cans or Servings Per Day:  2       Frequency:  Daily (25 @ 16:09)    CURRENT DIET ORDER IS:  [] Appropriate:  [] Inadequate:  [x] Other: See recommendations below.     NUTRITION INTAKE/PROVISION:  [x] PO: Pt reports fair appetite/PO intake, reports some difficulty chewing at times, per RN tolerates soft foods better (ex. unable to chew lamb chops). Drinking both Nepro shakes daily.   [] Enteral Nutrition:  [] Parenteral Nutrition:    ANTHROPOMETRICS:  Drug Dosing Weight  Height (cm): 180.3 (2025 22:03)  Weight (kg): 79.4 (2025 22:03)  BMI (kg/m2): 24.4 (2025 22:03)  BSA (m2): 1.99 (2025 22:03)    Weights:     Daily Weight in k.6 (-), Weight in k.4 (), Weight in k.9 (), Weight in k.9 (), Weight in k.1 (-), Weight in k.7 (-), Weight in k.7 (-)  - Weight fluctuations noted, likely secondary to fluid shifts, will continue to monitor/trend as able.     Nutrition focused physical exam: mild muscle loss to temples, clavicles, thigh and calf. Mild/moderate orbital fat loss.     MEDICATIONS:  MEDICATIONS  (STANDING):  bisacodyl Suppository 10 milliGRAM(s) Rectal once  cefepime   IVPB 1000 milliGRAM(s) IV Intermittent at bedtime  DOBUTamine Infusion 2 MICROgram(s)/kG/Min (4.76 mL/Hr) IV Continuous <Continuous>  epoetin douglas-epbx (RETACRIT) Injectable 64743 Unit(s) IV Push <User Schedule>  glucagon  Injectable 1 milliGRAM(s) IntraMuscular once  heparin  Infusion 1000 Unit(s)/Hr (9 mL/Hr) IV Continuous <Continuous>  insulin glargine Injectable (LANTUS) 28 Unit(s) SubCutaneous at bedtime  insulin lispro (ADMELOG) corrective regimen sliding scale   SubCutaneous Before meals and at bedtime  insulin lispro Injectable (ADMELOG) 9 Unit(s) SubCutaneous three times a day before meals  melatonin 5 milliGRAM(s) Oral at bedtime  multivitamin/minerals 1 Tablet(s) Oral daily  pantoprazole    Tablet 40 milliGRAM(s) Oral before breakfast  polyethylene glycol 3350 17 Gram(s) Oral every 12 hours  senna 2 Tablet(s) Oral at bedtime  sodium chloride 0.9%. 1000 milliLiter(s) (10 mL/Hr) IV Continuous <Continuous>  traZODone 50 milliGRAM(s) Oral at bedtime    NUTRITIONALLY PERTINENT LABS:   Na133 mmol/L[L] Glu 198 mg/dL[H] K+ 4.1 mmol/L Cr  5.62 mg/dL[H] BUN 44 mg/dL[H]  Phos 4.1 mg/dL  Alb 3.4 g/dL ALT 22 U/L AST 23 U/L Alkaline Phosphatase 118 U/L  06-10-25 @ 12:25 a1c 6.8    A1C with Estimated Average Glucose Result: 6.8 % (06-10-25 @ 12:25)  A1C with Estimated Average Glucose Result: 5.8 % (25 @ 09:03)    Finger Sticks:  POCT Blood Glucose.: 191 mg/dL (2025 06:41)  POCT Blood Glucose.: 229 mg/dL (2025 21:00)  POCT Blood Glucose.: 171 mg/dL (2025 15:34)  POCT Blood Glucose.: 146 mg/dL (2025 11:48)    NUTRITIONALLY PERTINENT MEDICATIONS/LABS:  [x] Reviewed  [x] Relevant notes on medications/labs:  - BG management with Lantus, sliding scale insulin   - Dobutamine gtt    EDEMA:  [x] Reviewed  [] Relevant notes:    GI/ I&O:  [x] Reviewed  [x] Relevant notes: Last BM .   [] Other:    SKIN:   [x] No pressure injuries documented, per nursing flowsheet  [] Pressure injury previously noted  [] Change in pressure injury documentation:  [x] Other: Midline sternal incision     ESTIMATED NEEDS:  [x] No change:  [] Updated:  Energy: 2144-2541kcal/day (27-32 kcal/kg)  Protein: 119-135  g/day (1.5-1.7 g/kg)  Fluid:   ml/day or [x] defer to team  Based on: dosing weight 79.4kg    NUTRITION DIAGNOSIS:  [x] Prior Dx: Increased Nutrient Needs (Protein/Energy)   [x] New Dx: Mild acute malnutrition related to likely inability to meet increased physiological demand for kcal/protein via PO diet as evidenced by mild muscle and fat losses.     EDUCATION:  [x] Yes: Provided recommendations to optimize PO and protein intake, to start with protein, and sips of supplement throughout the day; reviewed foods with protein and menu order procedures in hospital. Pt states his wife orders his meals for him.  [] Not appropriate/warranted    NUTRITION CARE PLAN:  1. Continue consistent carbohydrate, no concentrated K+/Phos. Discussed with team, will add easy to chew texture restriction  2. Increase to Nepro 3x daily (1,275 kcals, 57 g protein).   3. Continue multivitamin.  4: Provide encouragement with PO intake, menu selections, and assistance with meals as needed.     [] Achieved - Continue current nutrition intervention(s)  [] Current medical condition precludes nutrition intervention at this time.    MONITORING AND EVALUATION:   RD remains available upon request and will follow up per protocol.    Varsha Moyer MS, RD, CDN, CNSC  Available on [facility] TEAMS

## 2025-07-01 NOTE — PROGRESS NOTE ADULT - ASSESSMENT
70M w/ HTN, DM2, CAD, and ESRD-HD, s/p CABGx3/MV repair/RVAD 6/17/25    (1)Renal - ESRD - HD MWF - standard HD; CRRT discontinued 6/27  (2)Lytes - Hypercalcemia   (3)Anemia - on TIW Retacrit; s/p PRBCs with HD yesterday  (4)CV - cardiogenic shock - dependent on RVAD + inotropes -     RECOMMEND:  (1)Next HD in am and lower calcium cath and  can attempt net 1.5L UF   (2)Retacrit as ordered and may need blood xfusion   (3)Continue meds for GFR<10/HD:  (4)Potential RVAD removal?         [facility]ayed [facility]   [phone]        70M w/ HTN, DM2, CAD, and ESRD-HD, s/p CABGx3/MV repair/RVAD 6/17/25    (1)Renal - ESRD - HD MWF - standard HD; CRRT discontinued 6/27  (2)Lytes - Hypercalcemia   (3)Anemia - on TIW Retacrit; s/p PRBCs with HD yesterday  (4)CV - cardiogenic shock - dependent on RVAD + inotropes -     RECOMMEND:  (1)Next HD in am and lower calcium cath and can attempt net 1.5L UF   (2)Retacrit as ordered and may need blood xfusion   (3)Continue meds for GFR<10/HD:  (4)Potential RVAD removal today          Sayed [facility]   [facility]   [phone]

## 2025-07-01 NOTE — PROVIDER CONTACT NOTE (OTHER) - ACTION/TREATMENT ORDERED:
Sandbag applied by PA post dsg baylee. NO advesrse reaction to any medication or treatment given. hematoma noted

## 2025-07-01 NOTE — PROGRESS NOTE ADULT - SUBJECTIVE AND OBJECTIVE BOX
Patient seen and examined at the bedside.    Remained critically ill on continuous ICU monitoring.    OBJECTIVE:  Vital Signs Last 24 Hrs  T(C): 37.1 (01 Jul 2025 20:00), Max: 37.1 (01 Jul 2025 00:00)  T(F): 98.7 (01 Jul 2025 20:00), Max: 98.8 (01 Jul 2025 08:00)  HR: 88 (01 Jul 2025 20:00) (81 - 97)  BP: 101/51 (01 Jul 2025 19:00) (100/51 - 139/60)  BP(mean): 71 (01 Jul 2025 19:00) (71 - 90)  RR: 14 (01 Jul 2025 19:00) (7 - 27)  SpO2: 96% (01 Jul 2025 20:00) (93% - 100%)    Parameters below as of 01 Jul 2025 20:00  Patient On (Oxygen Delivery Method): nasal cannula  O2 Flow (L/min): 2    Physical Exam:  General: NAD  Neurology: Nonfocal  Eyes: Bilateral pupils equal and reactive  ENT/Neck: Neck supple, trachea midline, No JVD  Respiratory: Clear bilaterally  CV: S1S2, no murmurs        [x] Sternal dressing        [x] Sinus rhythm, [x] Temporary pacing - VVI 40  Abdominal: Soft, NT, ND +BS  Extremities: 1-2+ pedal edema noted, + peripheral pulses  Skin: No Rashes, Hematoma, Ecchymosis    Assessment:  69 yo M with cardiogenic shock on RVAD ,DM2, CAD (total  4 coronary stents, last one PCI in 08/2024 ), CHF with EF 40-45%, severe MR  Now s/p Mitral valve replacement, CABG x3 and RVAD placement on 6/17/2025     RV dysfunction  Acute postop pulmonary insufficiency  Acute blood loss anemia  Thrombocytopenia  ESRD  Hyperglycemia    Plan:  ***Neuro***  [x] Nonfocal  Postoperative acute pain control with Tylenol  Post operative neuro assessment  Melatonin and Trazodone nightly    ***Cardiovascular***  Invasive hemodynamic monitoring, assess perfusion indices  SR (81 - 97) / CVP (-21 - 15) / MAP (29 - 84) / Hct 26.8% / Lactate 0.7  [x] Dobutamine 2 mcg(s)/kG/Min  [x] d/c'ed RVAD today 7/1  Reassessment of hemodynamics post resuscitation  PO Amiodarone load for Afib prophylaxis   [x] ASA [x] Statin  Serial EKG and cardiac enzymes    ***Pulmonary***  [x] NC 2 L  Encourage incentive spirometry, continue pulse ox monitoring, follow ABGs, continue nebulizers    ***GI***  [x] Regular diet  [x] Protonix  Bowel regimen with Miralax and Senna    ***Renal***  [x] ESRD on HD - plan for HD tmr  Continue to monitor I/Os, BUN/Creatinine.  Replete lytes PRN    ***ID***  6/23 BAL - GNR/Moderate Serratia - Cefepime 6/24/25 - plan for 7 day course.  Monitor for fevers and leukocytosis    ***Endocrine***  [x] DM2 : HbA1c 6.8 %             - [x] Premeal [x] ISS [x] Lantus             - Need tight glycemic control to prevent wound infection.        Patient requires continuous monitoring with bedside rhythm monitoring, pulse oximetry monitoring, and continuous central venous and arterial pressure monitoring; and intermittent blood gas analysis. Care plan discussed with the ICU care team.  Patient remained critical, at risk for life threatening decompensation.    I have spent 47 minutes providing critical care management to this patient.    By signing my name below, I, Robert Wilson, attest that this documentation has been prepared under the direction and in the presence of YVROSE Gómez.  Electronically signed: Maria Luisa Collado, 07-01-25 @ 21:10    I, Lizbet Farias, personally performed the services described in this documentation. All medical record entries made by the jose [email or web address] were at my direction and in my presence. I have reviewed the chart and agree that the record reflects my personal performance and is accurate and complete.  Electronically signed: YVROSE Gómez. Patient seen and examined at the bedside.    Remained critically ill on continuous ICU monitoring.    OBJECTIVE:  Vital Signs Last 24 Hrs  T(C): 37.1 (01 Jul 2025 20:00), Max: 37.1 (01 Jul 2025 00:00)  T(F): 98.7 (01 Jul 2025 20:00), Max: 98.8 (01 Jul 2025 08:00)  HR: 88 (01 Jul 2025 20:00) (81 - 97)  BP: 101/51 (01 Jul 2025 19:00) (100/51 - 139/60)  BP(mean): 71 (01 Jul 2025 19:00) (71 - 90)  RR: 14 (01 Jul 2025 19:00) (7 - 27)  SpO2: 96% (01 Jul 2025 20:00) (93% - 100%)    Parameters below as of 01 Jul 2025 20:00  Patient On (Oxygen Delivery Method): nasal cannula  O2 Flow (L/min): 2    Physical Exam:  General: NAD, resting comfortably in bed  Neurology: Nonfocal, PHAM x4  Eyes: Bilateral pupils equal and reactive  ENT/Neck: Neck supple, trachea midline, No JVD, RIJ dressing C/D/I  Respiratory: Clear bilaterally  CV: S1S2, no murmurs        [x] Sternal dressing        [x] Sinus rhythm, [x] Temporary pacing - VVI 40 backup  Abdominal: Soft, NT, ND +BS  Extremities: 1+ pedal edema noted, + peripheral pulses, groin dressing C/D/I  Skin: No Rashes, Hematoma, Ecchymosis    Assessment:  71 yo M with cardiogenic shock on RVAD ,DM2, CAD (total  4 coronary stents, last one PCI in 08/2024 ), CHF with EF 40-45%, severe MR  Now s/p Mitral valve replacement, CABG x3 and RVAD placement on 6/17/2025     RV dysfunction  Acute postop pulmonary insufficiency  Acute blood loss anemia  Thrombocytopenia  ESRD  Hyperglycemia  RVAD decannulation 7/1    Plan:  ***Neuro***  [x] Nonfocal  Postoperative acute pain control with Tylenol  Post operative neuro assessment  Melatonin and Trazodone nightly    ***Cardiovascular***  Invasive hemodynamic monitoring, assess perfusion indices  SR (81 - 97) / CVP (-21 - 15) / MAP (29 - 84) / Hct 26.8% / Lactate 0.7  RVAD removal today at bedside, pt tolerated proceudre well  [x] Dobutamine 2 mcg(s)/kG/Min > weaned to 1  [x] d/c'ed RVAD today 7/1  Reassessment of hemodynamics post resuscitation  PO Amiodarone load for dysrhythmias   [x] ASA [x] Statin    ***Pulmonary***  [x] NC 2 L  Encourage incentive spirometry, continue pulse ox monitoring, follow ABGs, continue nebulizers  Encourage walking    ***GI***  [x] Regular diet  [x] Protonix  Bowel regimen with Miralax and Senna    ***Renal***  [x] ESRD on HD - plan for HD tmr  Continue to monitor I/Os, BUN/Creatinine.  Replete lytes PRN  s/p HD 1.5L    ***ID***  6/23 BAL - GNR/Moderate Serratia - Cefepime 6/24/25 - plan for 7 day course.  Monitor for fevers and leukocytosis    ***Endocrine***  [x] DM2 : HbA1c 6.8 %             - [x] Premeal [x] ISS [x] Lantus             - Need tight glycemic control to prevent wound infection.        Patient requires continuous monitoring with bedside rhythm monitoring, pulse oximetry monitoring, and continuous central venous and arterial pressure monitoring; and intermittent blood gas analysis. Care plan discussed with the ICU care team.  Patient remained critical, at risk for life threatening decompensation.    I have spent 47 minutes providing critical care management to this patient.    By signing my name below, I, Robert Wilson, attest that this documentation has been prepared under the direction and in the presence of YVROSE Gómez.  Electronically signed: Maria Luisa Collado, 07-01-25 @ 21:10    I, Lizbet Farias, personally performed the services described in this documentation. All medical record entries made by the josee duardoibe were at my direction and in my presence. I have reviewed the chart and agree that the record reflects my personal performance and is accurate and complete.  Electronically signed: YVROSE Gómez.

## 2025-07-01 NOTE — PROGRESS NOTE ADULT - SUBJECTIVE AND OBJECTIVE BOX
Patient seen and examined at the bedside.    Remains critically ill on continuous ICU monitoring.      Brief Summary:  71 yo M PMH of HTN, HLD, DM2, CAD (total  4 coronary stents, last one PCI in 08/2024), CHF with EF 40-45%, severe MR  S/p Mitral valve replacement, CABG x3 and RVAD placement on 6/17/2025     24 Hour events:  Remains on low dose Dobutamine.  Tolerated HD yesterday - transfused 1 unit prbcs.  Heparin held this morning for decannulation.      Objective:  ICU Vital Signs Last 24 Hrs  T(C): 37.1 (01 Jul 2025 08:00), Max: 37.1 (01 Jul 2025 00:00)  T(F): 98.8 (01 Jul 2025 08:00), Max: 98.8 (01 Jul 2025 08:00)  HR: 88 (01 Jul 2025 09:30) (81 - 97)  BP: 117/56 (01 Jul 2025 09:00) (97/54 - 144/64)  BP(mean): 80 (01 Jul 2025 09:00) (68 - 92)  ABP: 130/29 (01 Jul 2025 09:30) (103/35 - 170/38)  ABP(mean): 49 (01 Jul 2025 09:30) (42 - 71)  RR: 19 (01 Jul 2025 09:30) (7 - 27)  SpO2: 98% (01 Jul 2025 09:30) (93% - 100%)    O2 Parameters below as of 01 Jul 2025 07:00  Patient On (Oxygen Delivery Method): nasal cannula  O2 Flow (L/min): 2  O2 Concentration (%): 28          Physical Exam:   General: Awake, alert, pleasant   Neurology: Following commands - mobilizing well.  Respiratory: Bilateral breath sounds  CV: Sinus   RVAD pulmonary 17F, Venous R femoral 25F  Abdominal: Soft, Nontender  Extremities: Warm, well-perfused, AVF on RUE       -------------------------------------------------------------------------------------------------------------------------------    Labs:                                   8.5    17.86 )-----------( 122      ( 01 Jul 2025 00:27 )             26.8     PTT - ( 01 Jul 2025 00:27 )  PTT:47.0 sec      133    |  95     |  44     ----------------------------<  198        ( 01 Jul 2025 00:27 )  4.1     |  24     |  5.62     Ca    10.5       ( 01 Jul 2025 00:27 )  Phos  4.1       ( 01 Jul 2025 00:27 )  Mg     2.7       ( 01 Jul 2025 00:27 )    TPro  6.3    /  Alb  3.4    /  TBili  0.3    /  DBili  x      /  AST  23     /  ALT  22     /  AlkPhos  118    ( 01 Jul 2025 00:27 )    LIVER FUNCTIONS - ( 01 Jul 2025 00:27 )  Alb: 3.4 g/dL / Pro: 6.3 g/dL / ALK PHOS: 118 U/L / ALT: 22 U/L / AST: 23 U/L / GGT: x           ABG - ( 30 Jun 2025 23:50 )  pH, Arterial: 7.46  pH, Blood: x     /  pCO2: 40    /  pO2: 116   / HCO3: 28    / Base Excess: 4.2   /  SaO2: 99.6        ------------------------------------------------------------------------------------------------------------------------------  Assessment:  71 yo M with cardiogenic shock on RVAD ,DM2, CAD (total  4 coronary stents, last one PCI in 08/2024 ), CHF with EF 40-45%, severe MR  Now s/p Mitral valve replacement, CABG x3 and RVAD placement on 6/17/2025       RV dysfunction  Acute postop pulmonary insufficiency  Acute blood loss anemia  Thrombocytopenia  ESRD  Hyperglycemia      Plan:   ***Neuro***  Maintain day/night cycle to prevent ICU delirium   Postoperative acute pain control with Tylenol and prns  Melatonin & Trazadone nightly    ***Cardiovascular***  RV dysfunction, s/p CABG, s/p MV repair  RVAD - decreased to 2 liters of flow.  Remains on low dose Dobutamine for inotropic support - slow wean  Plan for decannulation tomorrow.  New central line placed.  ASA/Statin daily   PO Amiodarone load.    ***Pulmonary***  Postop acute pulmonary insufficiency  Deep breathing and coughing exercises  Wean oxygen as able.    ***GI***  Tolerating diet.  Protonix for GI prophylaxis.  Bowel regimen.   Trend LFTs.    ***Renal***  ESRD tolerating iHD - plan for HD today.    ***ID***  6/23 BAL - GNR/Moderate Serratia - Cefepime 6/24/25  Remains on Vanco also - plan to continue through decannulation.  Follow up all cultures.    ***Endocrine***  Hyperglycemia - Lantus, premeal, and sliding scale Insulin.    ***Hematology***  Acute blood loss anemia and thrombocytopenia  Transfused 1 unit prbcs with HD.  Heparin infusion for PTT 40-50, Anti Xa > 0.2/    ***Skin/Musculoskeletal***  Walking with physical therapy  OOB in chair     Care plan discussed with the ICU care team.   Patient remains critical, at risk for life threatening decompensation.    I have spent 58 minutes providing critical care management to this patient.       I have reviewed the chart and agree that the record reflects my personal performance and is accurate and complete.    -Lacey Nair MD   Patient seen and examined at the bedside.    Remains critically ill on continuous ICU monitoring.      Brief Summary:  69 yo M PMH of HTN, HLD, DM2, CAD (total  4 coronary stents, last one PCI in 08/2024), CHF with EF 40-45%, severe MR  S/p Mitral valve replacement, CABG x3 and RVAD placement on 6/17/2025     24 Hour events:  Remains on low dose Dobutamine.  Tolerated HD yesterday - transfused 1 unit prbcs.  Heparin held this morning for decannulation.      Objective:  ICU Vital Signs Last 24 Hrs  T(C): 37.1 (01 Jul 2025 08:00), Max: 37.1 (01 Jul 2025 00:00)  T(F): 98.8 (01 Jul 2025 08:00), Max: 98.8 (01 Jul 2025 08:00)  HR: 88 (01 Jul 2025 09:30) (81 - 97)  BP: 117/56 (01 Jul 2025 09:00) (97/54 - 144/64)  BP(mean): 80 (01 Jul 2025 09:00) (68 - 92)  ABP: 130/29 (01 Jul 2025 09:30) (103/35 - 170/38)  ABP(mean): 49 (01 Jul 2025 09:30) (42 - 71)  RR: 19 (01 Jul 2025 09:30) (7 - 27)  SpO2: 98% (01 Jul 2025 09:30) (93% - 100%)    O2 Parameters below as of 01 Jul 2025 07:00  Patient On (Oxygen Delivery Method): nasal cannula  O2 Flow (L/min): 2  O2 Concentration (%): 28          Physical Exam:   General: Awake, alert, pleasant   Neurology: Following commands - mobilizing well.  Respiratory: Bilateral breath sounds  CV: Sinus   RVAD pulmonary 17F, Venous R femoral 25F  Abdominal: Soft, Nontender  Extremities: Warm, well-perfused, AVF on RUE       -------------------------------------------------------------------------------------------------------------------------------    Labs:                                   8.5    17.86 )-----------( 122      ( 01 Jul 2025 00:27 )             26.8     PTT - ( 01 Jul 2025 00:27 )  PTT:47.0 sec      133    |  95     |  44     ----------------------------<  198        ( 01 Jul 2025 00:27 )  4.1     |  24     |  5.62     Ca    10.5       ( 01 Jul 2025 00:27 )  Phos  4.1       ( 01 Jul 2025 00:27 )  Mg     2.7       ( 01 Jul 2025 00:27 )    TPro  6.3    /  Alb  3.4    /  TBili  0.3    /  DBili  x      /  AST  23     /  ALT  22     /  AlkPhos  118    ( 01 Jul 2025 00:27 )    LIVER FUNCTIONS - ( 01 Jul 2025 00:27 )  Alb: 3.4 g/dL / Pro: 6.3 g/dL / ALK PHOS: 118 U/L / ALT: 22 U/L / AST: 23 U/L / GGT: x           ABG - ( 30 Jun 2025 23:50 )  pH, Arterial: 7.46  pH, Blood: x     /  pCO2: 40    /  pO2: 116   / HCO3: 28    / Base Excess: 4.2   /  SaO2: 99.6        ------------------------------------------------------------------------------------------------------------------------------  Assessment:  69 yo M with cardiogenic shock on RVAD ,DM2, CAD (total  4 coronary stents, last one PCI in 08/2024 ), CHF with EF 40-45%, severe MR  Now s/p Mitral valve replacement, CABG x3 and RVAD placement on 6/17/2025     RV dysfunction  Acute postop pulmonary insufficiency  Acute blood loss anemia  Thrombocytopenia  ESRD  Hyperglycemia      Plan:   ***Neuro***  Maintain day/night cycle to prevent ICU delirium   Postoperative acute pain control with Tylenol and prns  Melatonin & Trazadone nightly    ***Cardiovascular***  RV dysfunction, s/p CABG, s/p MV repair  RVAD - plan for removal today.  Remains on low dose Dobutamine for inotropic support - slow wean  ASA/Statin daily   PO Amiodarone load.    ***Pulmonary***  Postop acute pulmonary insufficiency  Deep breathing and coughing exercises  Wean oxygen as able.    ***GI***  Tolerating diet.  Protonix for GI prophylaxis.  Bowel regimen.   Trend LFTs.    ***Renal***  ESRD tolerating iHD - plan for HD tomorrow.    ***ID***  6/23 BAL - GNR/Moderate Serratia - Cefepime 6/24/25 - plan for 7 day course.  Remains on Vanco also - plan to continue through decannulation.  Follow up all cultures.    ***Endocrine***  Hyperglycemia - Lantus, premeal, and sliding scale Insulin.    ***Hematology***  Acute blood loss anemia and thrombocytopenia  No transfusion indication currently, will trend.  SQ Heparin for VTE prophylaxis.    ***Skin/Musculoskeletal***  Walking with physical therapy  OOB in chair     Care plan discussed with the ICU care team.   Patient remains critical, at risk for life threatening decompensation.    I have spent 55 minutes providing critical care management to this patient.    -Lacey Nair MD

## 2025-07-02 LAB
ALBUMIN SERPL ELPH-MCNC: 3.2 G/DL — LOW (ref 3.3–5)
ALP SERPL-CCNC: 111 U/L — SIGNIFICANT CHANGE UP (ref 40–120)
ALT FLD-CCNC: 24 U/L — SIGNIFICANT CHANGE UP (ref 10–45)
ANION GAP SERPL CALC-SCNC: 15 MMOL/L — SIGNIFICANT CHANGE UP (ref 5–17)
ANISOCYTOSIS BLD QL: SLIGHT — SIGNIFICANT CHANGE UP
AST SERPL-CCNC: 21 U/L — SIGNIFICANT CHANGE UP (ref 10–40)
BASOPHILS # BLD AUTO: 0.06 K/UL — SIGNIFICANT CHANGE UP (ref 0–0.2)
BASOPHILS # BLD MANUAL: 0 K/UL — SIGNIFICANT CHANGE UP (ref 0–0.2)
BASOPHILS NFR BLD AUTO: 0.3 % — SIGNIFICANT CHANGE UP (ref 0–2)
BASOPHILS NFR BLD MANUAL: 0 % — SIGNIFICANT CHANGE UP (ref 0–2)
BILIRUB SERPL-MCNC: 0.3 MG/DL — SIGNIFICANT CHANGE UP (ref 0.2–1.2)
BUN SERPL-MCNC: 60 MG/DL — HIGH (ref 7–23)
CALCIUM SERPL-MCNC: 10.8 MG/DL — HIGH (ref 8.4–10.5)
CHLORIDE SERPL-SCNC: 97 MMOL/L — SIGNIFICANT CHANGE UP (ref 96–108)
CO2 SERPL-SCNC: 24 MMOL/L — SIGNIFICANT CHANGE UP (ref 22–31)
CREAT SERPL-MCNC: 7.7 MG/DL — HIGH (ref 0.5–1.3)
CULTURE RESULTS: SIGNIFICANT CHANGE UP
CULTURE RESULTS: SIGNIFICANT CHANGE UP
EGFR: 7 ML/MIN/1.73M2 — LOW
EGFR: 7 ML/MIN/1.73M2 — LOW
EOSINOPHIL # BLD AUTO: 0.26 K/UL — SIGNIFICANT CHANGE UP (ref 0–0.5)
EOSINOPHIL # BLD MANUAL: 0.16 K/UL — SIGNIFICANT CHANGE UP (ref 0–0.5)
EOSINOPHIL NFR BLD AUTO: 1.5 % — SIGNIFICANT CHANGE UP (ref 0–6)
EOSINOPHIL NFR BLD MANUAL: 0.9 % — SIGNIFICANT CHANGE UP (ref 0–6)
GAS PNL BLDA: SIGNIFICANT CHANGE UP
GAS PNL BLDV: SIGNIFICANT CHANGE UP
GLUCOSE BLDC GLUCOMTR-MCNC: 135 MG/DL — HIGH (ref 70–99)
GLUCOSE BLDC GLUCOMTR-MCNC: 136 MG/DL — HIGH (ref 70–99)
GLUCOSE BLDC GLUCOMTR-MCNC: 155 MG/DL — HIGH (ref 70–99)
GLUCOSE BLDC GLUCOMTR-MCNC: 214 MG/DL — HIGH (ref 70–99)
GLUCOSE BLDC GLUCOMTR-MCNC: 255 MG/DL — HIGH (ref 70–99)
GLUCOSE BLDC GLUCOMTR-MCNC: 293 MG/DL — HIGH (ref 70–99)
GLUCOSE SERPL-MCNC: 163 MG/DL — HIGH (ref 70–99)
HAPTOGLOB SERPL-MCNC: 184 MG/DL — SIGNIFICANT CHANGE UP (ref 34–200)
HCT VFR BLD CALC: 26.3 % — LOW (ref 39–50)
HGB BLD-MCNC: 8.3 G/DL — LOW (ref 13–17)
IMM GRANULOCYTES # BLD AUTO: 0.42 K/UL — HIGH (ref 0–0.07)
IMM GRANULOCYTES NFR BLD AUTO: 2.4 % — HIGH (ref 0–0.9)
LDH SERPL L TO P-CCNC: 289 U/L — HIGH (ref 50–242)
LYMPHOCYTES # BLD AUTO: 1.21 K/UL — SIGNIFICANT CHANGE UP (ref 1–3.3)
LYMPHOCYTES # BLD MANUAL: 0.75 K/UL — LOW (ref 1–3.3)
LYMPHOCYTES NFR BLD AUTO: 6.9 % — LOW (ref 13–44)
LYMPHOCYTES NFR BLD MANUAL: 4.3 % — LOW (ref 13–44)
MAGNESIUM SERPL-MCNC: 3 MG/DL — HIGH (ref 1.6–2.6)
MANUAL MYELOCYTE #: 0.16 K/UL — HIGH (ref 0–0)
MCHC RBC-ENTMCNC: 27.5 PG — SIGNIFICANT CHANGE UP (ref 27–34)
MCHC RBC-ENTMCNC: 31.6 G/DL — LOW (ref 32–36)
MCV RBC AUTO: 87.1 FL — SIGNIFICANT CHANGE UP (ref 80–100)
MONOCYTES # BLD AUTO: 2.07 K/UL — HIGH (ref 0–0.9)
MONOCYTES # BLD MANUAL: 0.91 K/UL — HIGH (ref 0–0.9)
MONOCYTES NFR BLD AUTO: 11.8 % — SIGNIFICANT CHANGE UP (ref 2–14)
MONOCYTES NFR BLD MANUAL: 5.2 % — SIGNIFICANT CHANGE UP (ref 2–14)
MYELOCYTES NFR BLD: 0.9 % — HIGH (ref 0–0)
NEUTROPHILS # BLD AUTO: 13.47 K/UL — HIGH (ref 1.8–7.4)
NEUTROPHILS # BLD MANUAL: 15.51 K/UL — HIGH (ref 1.8–7.4)
NEUTROPHILS NFR BLD AUTO: 77.1 % — HIGH (ref 43–77)
NEUTROPHILS NFR BLD MANUAL: 88.7 % — HIGH (ref 43–77)
NRBC # BLD AUTO: 0 K/UL — SIGNIFICANT CHANGE UP (ref 0–0)
NRBC # FLD: 0 K/UL — SIGNIFICANT CHANGE UP (ref 0–0)
NRBC BLD AUTO-RTO: 0 /100 WBCS — SIGNIFICANT CHANGE UP (ref 0–0)
PHOSPHATE SERPL-MCNC: 4.7 MG/DL — HIGH (ref 2.5–4.5)
PLAT MORPH BLD: NORMAL — SIGNIFICANT CHANGE UP
PLATELET # BLD AUTO: 157 K/UL — SIGNIFICANT CHANGE UP (ref 150–400)
PMV BLD: 12.7 FL — SIGNIFICANT CHANGE UP (ref 7–13)
POIKILOCYTOSIS BLD QL AUTO: SLIGHT — SIGNIFICANT CHANGE UP
POLYCHROMASIA BLD QL SMEAR: SLIGHT — SIGNIFICANT CHANGE UP
POTASSIUM SERPL-MCNC: 4.2 MMOL/L — SIGNIFICANT CHANGE UP (ref 3.5–5.3)
POTASSIUM SERPL-SCNC: 4.2 MMOL/L — SIGNIFICANT CHANGE UP (ref 3.5–5.3)
PROT SERPL-MCNC: 6 G/DL — SIGNIFICANT CHANGE UP (ref 6–8.3)
RBC # BLD: 3.02 M/UL — LOW (ref 4.2–5.8)
RBC # FLD: 18.6 % — HIGH (ref 10.3–14.5)
RBC BLD AUTO: SIGNIFICANT CHANGE UP
SODIUM SERPL-SCNC: 136 MMOL/L — SIGNIFICANT CHANGE UP (ref 135–145)
SPECIMEN SOURCE: SIGNIFICANT CHANGE UP
SPECIMEN SOURCE: SIGNIFICANT CHANGE UP
WBC # BLD: 17.49 K/UL — HIGH (ref 3.8–10.5)
WBC # FLD AUTO: 17.49 K/UL — HIGH (ref 3.8–10.5)

## 2025-07-02 PROCEDURE — 99292 CRITICAL CARE ADDL 30 MIN: CPT

## 2025-07-02 PROCEDURE — 71045 X-RAY EXAM CHEST 1 VIEW: CPT | Mod: 26

## 2025-07-02 PROCEDURE — 99291 CRITICAL CARE FIRST HOUR: CPT

## 2025-07-02 RX ORDER — INSULIN LISPRO 100 U/ML
10 INJECTION, SOLUTION INTRAVENOUS; SUBCUTANEOUS
Refills: 0 | Status: DISCONTINUED | OUTPATIENT
Start: 2025-07-02 | End: 2025-07-04

## 2025-07-02 RX ORDER — INSULIN LISPRO 100 U/ML
INJECTION, SOLUTION INTRAVENOUS; SUBCUTANEOUS
Refills: 0 | Status: DISCONTINUED | OUTPATIENT
Start: 2025-07-02 | End: 2025-07-11

## 2025-07-02 RX ORDER — INSULIN LISPRO 100 U/ML
INJECTION, SOLUTION INTRAVENOUS; SUBCUTANEOUS AT BEDTIME
Refills: 0 | Status: DISCONTINUED | OUTPATIENT
Start: 2025-07-02 | End: 2025-07-11

## 2025-07-02 RX ORDER — INSULIN GLARGINE-YFGN 100 [IU]/ML
24 INJECTION, SOLUTION SUBCUTANEOUS AT BEDTIME
Refills: 0 | Status: DISCONTINUED | OUTPATIENT
Start: 2025-07-02 | End: 2025-07-04

## 2025-07-02 RX ADMIN — Medication 40 MILLIGRAM(S): at 08:28

## 2025-07-02 RX ADMIN — HEPARIN SODIUM 5000 UNIT(S): 1000 INJECTION INTRAVENOUS; SUBCUTANEOUS at 21:09

## 2025-07-02 RX ADMIN — Medication 500 MICROGRAM(S): at 17:48

## 2025-07-02 RX ADMIN — INSULIN LISPRO 12: 100 INJECTION, SOLUTION INTRAVENOUS; SUBCUTANEOUS at 12:31

## 2025-07-02 RX ADMIN — INSULIN GLARGINE-YFGN 24 UNIT(S): 100 INJECTION, SOLUTION SUBCUTANEOUS at 21:10

## 2025-07-02 RX ADMIN — INSULIN LISPRO 10 UNIT(S): 100 INJECTION, SOLUTION INTRAVENOUS; SUBCUTANEOUS at 19:43

## 2025-07-02 RX ADMIN — HEPARIN SODIUM 5000 UNIT(S): 1000 INJECTION INTRAVENOUS; SUBCUTANEOUS at 05:21

## 2025-07-02 RX ADMIN — HEPARIN SODIUM 5000 UNIT(S): 1000 INJECTION INTRAVENOUS; SUBCUTANEOUS at 14:11

## 2025-07-02 RX ADMIN — CEFEPIME 100 MILLIGRAM(S): 2 INJECTION, POWDER, FOR SOLUTION INTRAVENOUS at 21:09

## 2025-07-02 RX ADMIN — Medication 2 TABLET(S): at 21:09

## 2025-07-02 RX ADMIN — INSULIN LISPRO 9 UNIT(S): 100 INJECTION, SOLUTION INTRAVENOUS; SUBCUTANEOUS at 08:23

## 2025-07-02 RX ADMIN — Medication 5 MILLIGRAM(S): at 21:09

## 2025-07-02 RX ADMIN — Medication 1 APPLICATION(S): at 05:22

## 2025-07-02 RX ADMIN — AMIODARONE HYDROCHLORIDE 200 MILLIGRAM(S): 50 INJECTION, SOLUTION INTRAVENOUS at 05:21

## 2025-07-02 RX ADMIN — DOBUTAMINE 2.38 MICROGRAM(S)/KG/MIN: 250 INJECTION INTRAVENOUS at 08:25

## 2025-07-02 RX ADMIN — Medication 500 MICROGRAM(S): at 05:09

## 2025-07-02 RX ADMIN — Medication 50 MILLIGRAM(S): at 21:09

## 2025-07-02 RX ADMIN — Medication 500 MICROGRAM(S): at 23:05

## 2025-07-02 RX ADMIN — POLYETHYLENE GLYCOL 3350 17 GRAM(S): 17 POWDER, FOR SOLUTION ORAL at 05:23

## 2025-07-02 RX ADMIN — Medication 500 MICROGRAM(S): at 11:38

## 2025-07-02 RX ADMIN — ATORVASTATIN CALCIUM 80 MILLIGRAM(S): 80 TABLET, FILM COATED ORAL at 21:09

## 2025-07-02 RX ADMIN — Medication 1 TABLET(S): at 12:34

## 2025-07-02 RX ADMIN — Medication 81 MILLIGRAM(S): at 12:32

## 2025-07-02 RX ADMIN — EPOETIN ALFA 14000 UNIT(S): 10000 SOLUTION INTRAVENOUS; SUBCUTANEOUS at 16:37

## 2025-07-02 RX ADMIN — INSULIN LISPRO 2: 100 INJECTION, SOLUTION INTRAVENOUS; SUBCUTANEOUS at 08:29

## 2025-07-02 RX ADMIN — INSULIN LISPRO 9 UNIT(S): 100 INJECTION, SOLUTION INTRAVENOUS; SUBCUTANEOUS at 12:30

## 2025-07-02 NOTE — CONSULT NOTE ADULT - SUBJECTIVE AND OBJECTIVE BOX
HPI:  70 year old male PMH of HTN, HLD, DM2, CAD (total  4 coronary stents, last one PCI in 08/2024 &  S/p status post MI treated with PCI x3 stents in 2022 & 08/2023)on Asprin 81 mg, Chronic back pain, ESRD on  HD 3x/week via Right brachial AV fistula (Hlxztc-Ztxbutwzc-Ddqtis, Nephrology- Dr.Paul Munguia-[facility], in [place]/ also s/p angioplasty of the AVfistula -intact in 12/2024/ & had revision of AV fistula in  05/2024 with Dr. Henrik Morocho), Listed for renal transplant in [place] and [place] ( he has a living donor available), CHF with EF 40-45%,  pneumonia 10/2024, severe MR/ recent cath 6/3 w/ multivessel CAD in stent stenosis planned for Mitral valve replacement, CABG x3 on 6/17/2025 w/ Dr. Mckeon.          Patient has history of diabetes, A1C A1C with Estimated Average Glucose Result: 6.8 %   Endo was consulted for glycemic control.      PAST MEDICAL & SURGICAL HISTORY:  HTN (hypertension)      HLD (hyperlipidemia)      CAD (coronary artery disease)      Former smoker      ESRD on dialysis      Gout      Moderate aortic insufficiency      Severe mitral regurgitation      DM2 (diabetes mellitus, type 2)      Right cataract      MI (myocardial infarction)      Stented coronary artery      2019 novel coronavirus disease (COVID-19)      Pancreatic cyst      AV fistula      History of cardiac cath      H/O colonoscopy      Stented coronary artery          FAMILY HISTORY:  FH: type 2 diabetes (Father, Mother, Sibling)    FH: HTN (hypertension) (Father, Mother, Sibling)    FH: renal failure (Sibling)        Social History:            HOME MEDICATIONS:  Home Medications:  allopurinol 100 mg oral tablet: 1 tab(s) orally once a day (17 Jun 2025 06:51)  amLODIPine 5 mg oral tablet: 1 tab(s) orally once a day (17 Jun 2025 06:51)  Aspirin Enteric Coated 81 mg oral delayed release tablet: 1 tab(s) orally once a day (17 Jun 2025 06:51)  atorvastatin 80 mg oral tablet: 1 tab(s) orally once a day (17 Jun 2025 06:51)  calcium acetate 667 mg oral tablet: 3 cap(s) orally 3 times a day (17 Jun 2025 06:51)  Coreg 25 mg oral tablet: 1 tab(s) orally 2 times a day (17 Jun 2025 06:51)  ezetimibe 10 mg oral tablet: 1 tab(s) orally once a day (17 Jun 2025 06:51)  ferrous sulfate 325 mg (65 mg elemental iron) oral tablet: 1 tab(s) orally once a day (17 Jun 2025 06:51)  NovoLOG FlexPen 100 units/mL injectable solution: injectable 3 times a day (with meals) 7-10 units sliding scale with meals (17 Jun 2025 06:51)  tamsulosin 0.4 mg oral capsule: 1 cap(s) orally once a day (at bedtime) (17 Jun 2025 06:51)  Toujeo Max SoloStar Prefilled Pen 300 units/mL subcutaneous solution: 10 unit(s) subcutaneous once a day (at bedtime) (17 Jun 2025 06:51)            MEDICATIONS  (STANDING):  aMIOdarone    Tablet 200 milliGRAM(s) Oral daily  aspirin enteric coated 81 milliGRAM(s) Oral daily  atorvastatin 80 milliGRAM(s) Oral at bedtime  bisacodyl Suppository 10 milliGRAM(s) Rectal once  cefepime   IVPB 1000 milliGRAM(s) IV Intermittent at bedtime  chlorhexidine 4% Liquid 1 Application(s) Topical daily  dextrose 5%. 1000 milliLiter(s) (100 mL/Hr) IV Continuous <Continuous>  dextrose 50% Injectable 50 milliLiter(s) IV Push every 15 minutes  dextrose 50% Injectable 25 milliLiter(s) IV Push every 15 minutes  epoetin douglas-epbx (RETACRIT) Injectable 31721 Unit(s) IV Push once  glucagon  Injectable 1 milliGRAM(s) IntraMuscular once  heparin   Injectable 5000 Unit(s) SubCutaneous every 8 hours  insulin glargine Injectable (LANTUS) 24 Unit(s) SubCutaneous at bedtime  insulin lispro Injectable (ADMELOG) 10 Unit(s) SubCutaneous three times a day before meals  ipratropium    for Nebulization 500 MICROGram(s) Nebulizer every 6 hours  melatonin 5 milliGRAM(s) Oral at bedtime  multivitamin/minerals 1 Tablet(s) Oral daily  pantoprazole    Tablet 40 milliGRAM(s) Oral before breakfast  polyethylene glycol 3350 17 Gram(s) Oral every 12 hours  senna 2 Tablet(s) Oral at bedtime  sodium chloride 0.9%. 1000 milliLiter(s) (10 mL/Hr) IV Continuous <Continuous>  traZODone 50 milliGRAM(s) Oral at bedtime    MEDICATIONS  (PRN):  acetaminophen     Tablet .. 650 milliGRAM(s) Oral every 6 hours PRN Mild Pain (1 - 3)  artificial  tears Solution 1 Drop(s) Both EYES every 1 hour PRN Dry Eyes  dextrose Oral Gel 15 Gram(s) Oral once PRN Blood Glucose LESS THAN 70 milliGRAM(s)/deciliter      Allergies    No Known Allergies    Intolerances        Review of Systems:  Neuro: No HA, no dizziness  Cardiovascular: No chest pain, no palpitations  Respiratory: no SOB, no cough  GI: No nausea, vomiting, abdominal pain  MSK: Denies joint/muscle pain      ALL OTHER SYSTEMS REVIEWED AND NEGATIVE      PHYSICAL EXAM:  VITALS: T(C): 36.9 (07-02-25 @ 12:00)  T(F): 98.5 (07-02-25 @ 12:00), Max: 98.8 (07-02-25 @ 00:00)  HR: 91 (07-02-25 @ 15:00) (82 - 98)  BP: 117/58 (07-02-25 @ 15:00) (99/55 - 136/61)  RR:  (13 - 46)  SpO2:  (90% - 100%)  Wt(kg): --  GENERAL: NAD, well-groomed, well-developed  NEURO:  alert and oriented  RESPIRATORY: Clear to auscultation bilaterally; No rales, rhonchi, wheezing  CARDIOVASCULAR: Si S2  GI: Soft, non distended, normal bowel sounds  MUSCULOSKELETAL: Moves all extremities equally       POCT Blood Glucose.: 293 mg/dL (07-02-25 @ 14:03)  POCT Blood Glucose.: 255 mg/dL (07-02-25 @ 12:07)  POCT Blood Glucose.: 136 mg/dL (07-02-25 @ 08:21)  POCT Blood Glucose.: 185 mg/dL (07-01-25 @ 21:32)  POCT Blood Glucose.: 166 mg/dL (07-01-25 @ 16:29)  POCT Blood Glucose.: 151 mg/dL (07-01-25 @ 10:47)  POCT Blood Glucose.: 191 mg/dL (07-01-25 @ 06:41)  POCT Blood Glucose.: 229 mg/dL (06-30-25 @ 21:00)  POCT Blood Glucose.: 171 mg/dL (06-30-25 @ 15:34)  POCT Blood Glucose.: 146 mg/dL (06-30-25 @ 11:48)  POCT Blood Glucose.: 211 mg/dL (06-30-25 @ 06:47)  POCT Blood Glucose.: 161 mg/dL (06-29-25 @ 21:44)  POCT Blood Glucose.: 195 mg/dL (06-29-25 @ 16:24)                            8.3    17.49 )-----------( 157      ( 02 Jul 2025 00:32 )             26.3       07-02    136  |  97  |  60[H]  ----------------------------<  163[H]  4.2   |  24  |  7.70[H]    eGFR: 7[L]    Ca    10.8[H]      07-02  Mg     3.0     07-02  Phos  4.7     07-02    TPro  6.0  /  Alb  3.2[L]  /  TBili  0.3  /  DBili  x   /  AST  21  /  ALT  24  /  AlkPhos  111  07-02      Thyroid Function Tests:  06-22 @ 06:59 TSH 2.52 FreeT4 1.3 T3 55 Anti TPO -- Anti Thyroglobulin Ab -- TSI --    Diet, Regular:   Consistent Carbohydrate No Snacks (CSTCHO)  Easy to Chew (EASYTOCHEW)  No Concentrated Potassium  No Concentrated Phosphorus  Supplement Feeding Modality:  Oral  Nepro Cans or Servings Per Day:  3       Frequency:  Daily (07-01-25 @ 11:43) [Active]          A1C with Estimated Average Glucose Result: 6.8 % (06-10-25 @ 12:25)  A1C with Estimated Average Glucose Result: 5.8 % (04-03-25 @ 09:03)                   HPI:  70 year old male PMH of HTN, HLD, DM2, CAD (total  4 coronary stents, last one PCI in 08/2024 &  S/p status post MI treated with PCI x3 stents in 2022 & 08/2023)on Asprin 81 mg, Chronic back pain, ESRD on  HD 3x/week via Right brachial AV fistula (Geqfsb-Xzjvtfugn-Bhenfe, Nephrology- Dr.Paul Munguia-[facility], in [place]/ also s/p angioplasty of the AVfistula -intact in 12/2024/ & had revision of AV fistula in  05/2024 with Dr. Henrik Morocho), Listed for renal transplant in [place] and [place] ( he has a living donor available), CHF with EF 40-45%,  pneumonia 10/2024, severe MR/ recent cath 6/3 w/ multivessel CAD in stent stenosis planned for Mitral valve replacement, CABG x3 on 6/17/2025 w/ Dr. Mckeon.          Patient has history of diabetes, A1C A1C with Estimated Average Glucose Result: 6.8 %   Endo was consulted for glycemic control.      PAST MEDICAL & SURGICAL HISTORY:  HTN (hypertension)      HLD (hyperlipidemia)      CAD (coronary artery disease)      Former smoker      ESRD on dialysis      Gout      Moderate aortic insufficiency      Severe mitral regurgitation      DM2 (diabetes mellitus, type 2)      Right cataract      MI (myocardial infarction)      Stented coronary artery      2019 novel coronavirus disease (COVID-19)      Pancreatic cyst      AV fistula      History of cardiac cath      H/O colonoscopy      Stented coronary artery          FAMILY HISTORY:  FH: type 2 diabetes (Father, Mother, Sibling)    FH: HTN (hypertension) (Father, Mother, Sibling)    FH: renal failure (Sibling)        Social History:            HOME MEDICATIONS:  Home Medications:  allopurinol 100 mg oral tablet: 1 tab(s) orally once a day (17 Jun 2025 06:51)  amLODIPine 5 mg oral tablet: 1 tab(s) orally once a day (17 Jun 2025 06:51)  Aspirin Enteric Coated 81 mg oral delayed release tablet: 1 tab(s) orally once a day (17 Jun 2025 06:51)  atorvastatin 80 mg oral tablet: 1 tab(s) orally once a day (17 Jun 2025 06:51)  calcium acetate 667 mg oral tablet: 3 cap(s) orally 3 times a day (17 Jun 2025 06:51)  Coreg 25 mg oral tablet: 1 tab(s) orally 2 times a day (17 Jun 2025 06:51)  ezetimibe 10 mg oral tablet: 1 tab(s) orally once a day (17 Jun 2025 06:51)  ferrous sulfate 325 mg (65 mg elemental iron) oral tablet: 1 tab(s) orally once a day (17 Jun 2025 06:51)  NovoLOG FlexPen 100 units/mL injectable solution: injectable 3 times a day (with meals) 7-10 units sliding scale with meals (17 Jun 2025 06:51)  tamsulosin 0.4 mg oral capsule: 1 cap(s) orally once a day (at bedtime) (17 Jun 2025 06:51)  Toujeo Max SoloStar Prefilled Pen 300 units/mL subcutaneous solution: 10 unit(s) subcutaneous once a day (at bedtime) (17 Jun 2025 06:51)            MEDICATIONS  (STANDING):  aMIOdarone    Tablet 200 milliGRAM(s) Oral daily  aspirin enteric coated 81 milliGRAM(s) Oral daily  atorvastatin 80 milliGRAM(s) Oral at bedtime  bisacodyl Suppository 10 milliGRAM(s) Rectal once  cefepime   IVPB 1000 milliGRAM(s) IV Intermittent at bedtime  chlorhexidine 4% Liquid 1 Application(s) Topical daily  dextrose 5%. 1000 milliLiter(s) (100 mL/Hr) IV Continuous <Continuous>  dextrose 50% Injectable 50 milliLiter(s) IV Push every 15 minutes  dextrose 50% Injectable 25 milliLiter(s) IV Push every 15 minutes  epoetin douglas-epbx (RETACRIT) Injectable 27610 Unit(s) IV Push once  glucagon  Injectable 1 milliGRAM(s) IntraMuscular once  heparin   Injectable 5000 Unit(s) SubCutaneous every 8 hours  insulin glargine Injectable (LANTUS) 24 Unit(s) SubCutaneous at bedtime  insulin lispro Injectable (ADMELOG) 10 Unit(s) SubCutaneous three times a day before meals  ipratropium    for Nebulization 500 MICROGram(s) Nebulizer every 6 hours  melatonin 5 milliGRAM(s) Oral at bedtime  multivitamin/minerals 1 Tablet(s) Oral daily  pantoprazole    Tablet 40 milliGRAM(s) Oral before breakfast  polyethylene glycol 3350 17 Gram(s) Oral every 12 hours  senna 2 Tablet(s) Oral at bedtime  sodium chloride 0.9%. 1000 milliLiter(s) (10 mL/Hr) IV Continuous <Continuous>  traZODone 50 milliGRAM(s) Oral at bedtime    MEDICATIONS  (PRN):  acetaminophen     Tablet .. 650 milliGRAM(s) Oral every 6 hours PRN Mild Pain (1 - 3)  artificial  tears Solution 1 Drop(s) Both EYES every 1 hour PRN Dry Eyes  dextrose Oral Gel 15 Gram(s) Oral once PRN Blood Glucose LESS THAN 70 milliGRAM(s)/deciliter      Allergies    No Known Allergies    Intolerances        Review of Systems:  Neuro: No HA, no dizziness  Cardiovascular: No chest pain, no palpitations  Respiratory: no SOB, no cough  GI: No nausea, vomiting, abdominal pain  MSK: Denies joint/muscle pain      ALL OTHER SYSTEMS REVIEWED AND NEGATIVE      PHYSICAL EXAM:  VITALS: T(C): 36.9 (07-02-25 @ 12:00)  T(F): 98.5 (07-02-25 @ 12:00), Max: 98.8 (07-02-25 @ 00:00)  HR: 91 (07-02-25 @ 15:00) (82 - 98)  BP: 117/58 (07-02-25 @ 15:00) (99/55 - 136/61)  RR:  (13 - 46)  SpO2:  (90% - 100%)  Wt(kg): --  GENERAL: NAD, well-groomed, well-developed  NEURO:  alert and oriented  RESPIRATORY: Clear to auscultation bilaterally; No rales, rhonchi, wheezing  CARDIOVASCULAR: Si S2  GI: Soft, non distended, normal bowel sounds  MUSCULOSKELETAL: Moves all extremities equally       POCT Blood Glucose.: 293 mg/dL (07-02-25 @ 14:03)  POCT Blood Glucose.: 255 mg/dL (07-02-25 @ 12:07)  POCT Blood Glucose.: 136 mg/dL (07-02-25 @ 08:21)  POCT Blood Glucose.: 185 mg/dL (07-01-25 @ 21:32)  POCT Blood Glucose.: 166 mg/dL (07-01-25 @ 16:29)  POCT Blood Glucose.: 151 mg/dL (07-01-25 @ 10:47)  POCT Blood Glucose.: 191 mg/dL (07-01-25 @ 06:41)  POCT Blood Glucose.: 229 mg/dL (06-30-25 @ 21:00)  POCT Blood Glucose.: 171 mg/dL (06-30-25 @ 15:34)  POCT Blood Glucose.: 146 mg/dL (06-30-25 @ 11:48)  POCT Blood Glucose.: 211 mg/dL (06-30-25 @ 06:47)  POCT Blood Glucose.: 161 mg/dL (06-29-25 @ 21:44)  POCT Blood Glucose.: 195 mg/dL (06-29-25 @ 16:24)                            8.3    17.49 )-----------( 157      ( 02 Jul 2025 00:32 )             26.3       07-02    136  |  97  |  60[H]  ----------------------------<  163[H]  4.2   |  24  |  7.70[H]    eGFR: 7[L]    Ca    10.8[H]      07-02  Mg     3.0     07-02  Phos  4.7     07-02    TPro  6.0  /  Alb  3.2[L]  /  TBili  0.3  /  DBili  x   /  AST  21  /  ALT  24  /  AlkPhos  111  07-02      Thyroid Function Tests:  06-22 @ 06:59 TSH 2.52 FreeT4 1.3 T3 55 Anti TPO -- Anti Thyroglobulin Ab -- TSI --    Diet, Regular:   Consistent Carbohydrate No Snacks (CSTCHO)  Easy to Chew (EASYTOCHEW)  No Concentrated Potassium  No Concentrated Phosphorus  Supplement Feeding Modality:  Oral  Nepro Cans or Servings Per Day:  3       Frequency:  Daily (07-01-25 @ 11:43) [Active]          A1C with Estimated Average Glucose Result: 6.8 % (06-10-25 @ 12:25)  A1C with Estimated Average Glucose Result: 5.8 % (04-03-25 @ 09:03)

## 2025-07-02 NOTE — CONSULT NOTE ADULT - PROBLEM SELECTOR RECOMMENDATION 9
Will increase Lantus to 24  u at bedtime.  Will increase Admelog to 10 u before each meal and continue Admelog correction scale coverage. Will continue monitoring FS and Follow up.  Monitor for insulin stacking

## 2025-07-02 NOTE — CONSULT NOTE ADULT - NS ATTEND AMEND GEN_ALL_CORE FT
agree with supportive care
Chart, labs, vitals, radiology reviewed. Above H&P reviewed and edited where appropriate. Agree with history and physical exam. Agree with assessment and plan. I reviewed the overnight course of events and discussed the care with the patient/ family. All the decisions in assessment and plan are made by me.

## 2025-07-02 NOTE — PROGRESS NOTE ADULT - ASSESSMENT
70M w/ HTN, DM2, CAD, and ESRD-HD, s/p CABGx3/MV repair/RVAD 6/17/25    (1)Renal - ESRD - HD MWF - standard HD; CRRT discontinued 6/27  (2)Lytes - Hypercalcemia   (3)Anemia - on TIW Retacrit;   (4)CV - SP cardiogenic shock  and now compensated and removal of  RVAD/inotropes     RECOMMEND:  (1)Renal - Next HD today and and lower calcium cath and can attempt net 1.5L UF   (2)Anemia - Increase Retacrit to help with anemia   (3)CVS- - Defer to team re Lopressor:  (4) Physical therapy         [employer]   [phone]

## 2025-07-02 NOTE — CONSULT NOTE ADULT - ASSESSMENT
70 year old male PMH of HTN, HLD, DM2, CAD SP  Mitral valve replacement, CABG x3 and RVAD placement      Assessment  DMT2: 70y Male with DM T2 with hyperglycemia, A1C 6.8%, was on  insulin at home, now on basal bolus insulin with coverage, blood sugars running high postop   CAD: on medications, stable, monitored. sp CABG  HTN: on antihypertensive medications, monitored, asymptomatic.  ESRD: On hemodialysis, Monitor labs/BMP    Discussed plan and management wit Dr Francisco Javier Mcintyre MD  Cell: [phone]  Office: [phone]             70 year old male PMH of HTN, HLD, DM2, CAD SP  Mitral valve replacement, CABG x3 and RVAD placement    Assessment  DMT2: 70y Male with DM T2 with hyperglycemia, A1C 6.8%, was on  insulin at home, now on basal bolus insulin with coverage, blood sugars running high postop   CAD: on medications, stable, monitored. sp CABG  HTN: on antihypertensive medications, monitored, asymptomatic.  ESRD: On hemodialysis, Monitor labs/BMP    Discussed plan and management wit Dr Francisco Javier Mcintyre MD  Cell: [phone]  Office: [phone]

## 2025-07-02 NOTE — PROGRESS NOTE ADULT - SUBJECTIVE AND OBJECTIVE BOX
Patient seen and examined at the bedside.    Remained critically ill on continuous ICU monitoring.    OBJECTIVE:  Vital Signs Last 24 Hrs  T(C): 36.7 (02 Jul 2025 20:00), Max: 37.2 (02 Jul 2025 19:05)  T(F): 98 (02 Jul 2025 20:00), Max: 98.9 (02 Jul 2025 19:05)  HR: 94 (02 Jul 2025 20:00) (82 - 98)  BP: 120/60 (02 Jul 2025 20:00) (99/55 - 128/58)  BP(mean): 86 (02 Jul 2025 20:00) (74 - 86)  RR: 22 (02 Jul 2025 20:00) (13 - 46)  SpO2: 94% (02 Jul 2025 20:00) (90% - 100%)    Parameters below as of 02 Jul 2025 20:00  Patient On (Oxygen Delivery Method): nasal cannula  O2 Flow (L/min): 2    Physical Exam:  General: NAD, resting comfortably in bed  Neurology: Nonfocal, PHAM x4  Eyes: Bilateral pupils equal and reactive  ENT/Neck: Neck supple, trachea midline, No JVD, RIJ dressing C/D/I  Respiratory: Clear bilaterally  CV: S1S2, no murmurs        [x] Sternal dressing        [x] Sinus rhythm with frequent PVCs, [x] Temporary pacing - VVI 40 backup  Abdominal: Soft, NT, ND +BS  Extremities: 1-2+ pedal edema noted, + peripheral pulses  Skin: No Rashes, Hematoma, Ecchymosis    Assessment:  71 yo M with cardiogenic shock on RVAD ,DM2, CAD (total  4 coronary stents, last one PCI in 08/2024 ), CHF with EF 40-45%, severe MR  Now s/p Mitral valve replacement, CABG x3 and RVAD placement on 6/17/2025   RVAD decannulated on 7/1/25.    RV dysfunction  Acute postop pulmonary insufficiency  Acute blood loss anemia  ESRD  Hyperglycemia    Plan:  ***Neuro***  [x] Nonfocal  Postoperative acute pain control with Tylenol  Post operative neuro assessment  Melatonin and Trazodone nightly    ***Cardiovascular***  Invasive hemodynamic monitoring, assess perfusion indices  SR (82 - 98) / CVP (3 - 274) / MAP (42 - 275) / Hct 26.3% / Lactate 0.9  [x] d/c'ed RVAD 7/1  Reassessment of hemodynamics post resuscitation  PO Amiodarone load for dysrhythmias   [x] ASA [x] Statin  [x] VTE prophylaxis with Heparin SQ    ***Pulmonary***  [x] NC 2 L  Encourage incentive spirometry, continue pulse ox monitoring, follow ABGs, continue nebulizers  Encourage walking    ***GI***  [x] Regular diet  [x] Protonix  Bowel regimen with Miralax and Senna    ***Renal***  [x] ESRD on HD - HD today for net -1.5L  Continue to monitor I/Os, BUN/Creatinine.  Replete lytes PRN    ***ID***  6/23 BAL - GNR/Moderate Serratia - Cefepime 6/24/25 - plan for 7 day course.  Monitor for fevers and leukocytosis    ***Endocrine***  [x] DM2 : HbA1c 6.8 %             - [x] Premeal [x] ISS [x] Lantus             - Need tight glycemic control to prevent wound infection.        Patient requires continuous monitoring with bedside rhythm monitoring, pulse oximetry monitoring, and continuous central venous and arterial pressure monitoring; and intermittent blood gas analysis. Care plan discussed with the ICU care team.  Patient remained critical, at risk for life threatening decompensation.    I have spent 55 minutes providing critical care management to this patient.    By signing my name below, I, Robert Wilson, attest that this documentation has been prepared under the direction and in the presence of YVROSE Gómez.  Electronically signed: Aamir Collado, 07-02-25 @ 20:41    I, Lizbet Farias, personally performed the services described in this documentation. All medical record entries made by the aamir were at my direction and in my presence. I have reviewed the chart and agree that the record reflects my personal performance and is accurate and complete.  Electronically signed: YVROSE Gómez. Patient seen and examined at the bedside.    Remained critically ill on continuous ICU monitoring.    OBJECTIVE:  Vital Signs Last 24 Hrs  T(C): 36.7 (02 Jul 2025 20:00), Max: 37.2 (02 Jul 2025 19:05)  T(F): 98 (02 Jul 2025 20:00), Max: 98.9 (02 Jul 2025 19:05)  HR: 94 (02 Jul 2025 20:00) (82 - 98)  BP: 120/60 (02 Jul 2025 20:00) (99/55 - 128/58)  BP(mean): 86 (02 Jul 2025 20:00) (74 - 86)  RR: 22 (02 Jul 2025 20:00) (13 - 46)  SpO2: 94% (02 Jul 2025 20:00) (90% - 100%)    Parameters below as of 02 Jul 2025 20:00  Patient On (Oxygen Delivery Method): nasal cannula  O2 Flow (L/min): 2    Physical Exam:  General: NAD, resting comfortably in bed  Neurology: Nonfocal, PHAM x4, answers questions appropriately  Eyes: Bilateral pupils equal and reactive  ENT/Neck: Neck supple, trachea midline, No JVD, RIJ dressing C/D/I  Respiratory: Clear bilaterally, good inspiratory effort  CV: S1S2, no murmurs        [x] Sternal dressing        [x] Sinus rhythm with frequent PVCs, brief bigeminy while on HD, post HD greatly improved [x] Temporary pacing - VVI 40 backup  Abdominal: Soft, NT, ND +BS  Extremities: trace pedal edema noted, + peripheral pulses  Skin: No Rashes, Hematoma, Ecchymosis    Assessment:  69 yo M with cardiogenic shock on RVAD ,DM2, CAD (total  4 coronary stents, last one PCI in 08/2024 ), CHF with EF 40-45%, severe MR  Now s/p Mitral valve replacement, CABG x3 and RVAD placement on 6/17/2025   RVAD decannulated on 7/1/25.    RV dysfunction  Acute postop pulmonary insufficiency  Acute blood loss anemia  ESRD  Hyperglycemia    Plan:  ***Neuro***  [x] Nonfocal  Postoperative acute pain control with Tylenol  Post operative neuro assessment  Melatonin and Trazodone nightly    ***Cardiovascular***  Invasive hemodynamic monitoring, assess perfusion indices  SR (82 - 98) / CVP (3 - 274) / MAP (42 - 275) / Hct 26.3% / Lactate 0.9  [x] d/c'ed RVAD 7/1  Reassessment of hemodynamics post resuscitation  PO Amiodarone load for dysrhythmias   [x] ASA [x] Statin  [x] VTE prophylaxis with Heparin SQ  Monitor rhythm  Consider overdrive pacing for ectopy suppression, currently rhythm has improved post dialysis    ***Pulmonary***  [x] NC 2 L  Encourage incentive spirometry, continue pulse ox monitoring, follow ABGs, continue nebulizers  Encourage walking  Wean NC as tolerated    ***GI***  [x] Regular diet  [x] Protonix  Bowel regimen with Miralax and Senna    ***Renal***  [x] ESRD on HD - HD today for net -1.5L  Continue to monitor I/Os, BUN/Creatinine.  Replete lytes PRN    ***ID***  6/23 BAL - GNR/Moderate Serratia - Cefepime 6/24/25 - last day of abx 7/4  Monitor for fevers and leukocytosis    ***Endocrine***  [x] DM2 : HbA1c 6.8 %             - [x] Premeal [x] ISS [x] Lantus             - Need tight glycemic control to prevent wound infection.        Patient requires continuous monitoring with bedside rhythm monitoring, pulse oximetry monitoring, and continuous central venous and arterial pressure monitoring; and intermittent blood gas analysis. Care plan discussed with the ICU care team.  Patient remained critical, at risk for life threatening decompensation.    I have spent 55 minutes providing critical care management to this patient.    By signing my name below, I, Robert Wilson, attest that this documentation has been prepared under the direction and in the presence of YVROSE Gómez.  Electronically signed: Maria Luisa Collado, 07-02-25 @ 20:41    I, Lizbet Farias, personally performed the services described in this documentation. All medical record entries made by the jose [email or web address] were at my direction and in my presence. I have reviewed the chart and agree that the record reflects my personal performance and is accurate and complete.  Electronically signed: YVROSE Gómez.

## 2025-07-02 NOTE — PROGRESS NOTE ADULT - SUBJECTIVE AND OBJECTIVE BOX
Patient seen and examined at the bedside.    Remains critically ill on continuous ICU monitoring.      Brief Summary:  71 yo M PMH of HTN, HLD, DM2, CAD (total  4 coronary stents, last one PCI in 08/2024), CHF with EF 40-45%, severe MR  S/p Mitral valve replacement, CABG x3 and RVAD placement on 6/17/2025     24 Hour events:  RVAD decannulated yesterday.  Remains on low dose Dobutamine.      Objective:  ICU Vital Signs Last 24 Hrs  T(C): 36.7 (02 Jul 2025 08:00), Max: 37.1 (01 Jul 2025 12:00)  T(F): 98 (02 Jul 2025 08:00), Max: 98.8 (01 Jul 2025 12:00)  HR: 88 (02 Jul 2025 08:00) (81 - 96)  BP: 120/58 (02 Jul 2025 08:00) (100/51 - 136/61)  BP(mean): 83 (02 Jul 2025 08:00) (71 - 88)  ABP: 134/35 (02 Jul 2025 08:00) (100/26 - 179/55)  ABP(mean): 59 (02 Jul 2025 08:00) (29 - 84)  RR: 20 (02 Jul 2025 08:00) (12 - 46)  SpO2: 97% (02 Jul 2025 08:00) (91% - 100%)    O2 Parameters below as of 02 Jul 2025 08:00  Patient On (Oxygen Delivery Method): nasal cannula  O2 Flow (L/min): 2         Physical Exam:   General: Awake, alert, pleasant   Neurology: Following commands - mobilizing well.  Respiratory: Bilateral breath sounds  CV: Sinus   Abdominal: Soft, Nontender  Extremities: Warm, well-perfused, AVF on RUE       -------------------------------------------------------------------------------------------------------------------------------    Labs:                                   8.3    17.49 )-----------( 157      ( 02 Jul 2025 00:32 )             26.3     PTT - ( 01 Jul 2025 00:27 )  PTT:47.0 sec, PTT - ( 30 Jun 2025 21:10 )  PTT:45.4 sec, PTT - ( 30 Jun 2025 09:02 )  PTT:54.2 sec    136    |  97     |  60     ----------------------------<  163        ( 02 Jul 2025 00:32 )  4.2     |  24     |  7.70     Ca    10.8       ( 02 Jul 2025 00:32 )  Phos  4.7       ( 02 Jul 2025 00:32 )  Mg     3.0       ( 02 Jul 2025 00:32 )    TPro  6.0    /  Alb  3.2    /  TBili  0.3    /  DBili  x      /  AST  21     /  ALT  24     /  AlkPhos  111    ( 02 Jul 2025 00:32 )    LIVER FUNCTIONS - ( 02 Jul 2025 00:32 )  Alb: 3.2 g/dL / Pro: 6.0 g/dL / ALK PHOS: 111 U/L / ALT: 24 U/L / AST: 21 U/L / GGT: x           ABG - ( 02 Jul 2025 00:13 )  pH, Arterial: 7.40  pH, Blood: x     /  pCO2: 43    /  pO2: 144   / HCO3: 27    / Base Excess: 1.6   /  SaO2: 98.5          ------------------------------------------------------------------------------------------------------------------------------  Assessment:  71 yo M with cardiogenic shock on RVAD ,DM2, CAD (total  4 coronary stents, last one PCI in 08/2024 ), CHF with EF 40-45%, severe MR  Now s/p Mitral valve replacement, CABG x3 and RVAD placement on 6/17/2025   RVAD decannulated on 7/1/25.    RV dysfunction  Acute postop pulmonary insufficiency  Acute blood loss anemia  ESRD  Hyperglycemia      Plan:   ***Neuro***  Maintain day/night cycle to prevent ICU delirium   Postoperative acute pain control with Tylenol and prns  Melatonin & Trazadone nightly    ***Cardiovascular***  RV dysfunction, s/p CABG, s/p MV repair  Remains on low dose Dobutamine for inotropic support - wean to off.  ASA/Statin daily   PO Amiodarone daily s/p load.    ***Pulmonary***  Postop acute pulmonary insufficiency  Deep breathing and coughing exercises  Wean oxygen as able.    ***GI***  Tolerating diet.  Protonix for GI prophylaxis.  Bowel regimen.   Trend LFTs.    ***Renal***  ESRD tolerating iHD - HD today.    ***ID***  6/23 BAL - GNR/Moderate Serratia - Cefepime 6/24/25 - plan for 7 day course.  To complete today.  Follow up all cultures.    ***Endocrine***  Hyperglycemia - Lantus, premeal, and sliding scale Insulin.    ***Hematology***  Acute blood loss anemia and thrombocytopenia  No transfusion indication currently, will trend.  SQ Heparin for VTE prophylaxis.    ***Skin/Musculoskeletal***  Walking with physical therapy  OOB in chair       Care plan discussed with the ICU care team.   Patient remains critical, at risk for life threatening decompensation.    I have spent 50 minutes providing critical care management to this patient.    -Lacey Nair MD   Patient seen and examined at the bedside.    Remains critically ill on continuous ICU monitoring.      Brief Summary:  71 yo M PMH of HTN, HLD, DM2, CAD (total  4 coronary stents, last one PCI in 08/2024), CHF with EF 40-45%, severe MR  S/p Mitral valve replacement, CABG x3 and RVAD placement on 6/17/2025     24 Hour events:  RVAD decannulated yesterday.  Remains on low dose Dobutamine.      Objective:  ICU Vital Signs Last 24 Hrs  T(C): 36.7 (02 Jul 2025 08:00), Max: 37.1 (01 Jul 2025 12:00)  T(F): 98 (02 Jul 2025 08:00), Max: 98.8 (01 Jul 2025 12:00)  HR: 88 (02 Jul 2025 08:00) (81 - 96)  BP: 120/58 (02 Jul 2025 08:00) (100/51 - 136/61)  BP(mean): 83 (02 Jul 2025 08:00) (71 - 88)  ABP: 134/35 (02 Jul 2025 08:00) (100/26 - 179/55)  ABP(mean): 59 (02 Jul 2025 08:00) (29 - 84)  RR: 20 (02 Jul 2025 08:00) (12 - 46)  SpO2: 97% (02 Jul 2025 08:00) (91% - 100%)    O2 Parameters below as of 02 Jul 2025 08:00  Patient On (Oxygen Delivery Method): nasal cannula  O2 Flow (L/min): 2         Physical Exam:   General: Awake, alert, pleasant   Neurology: Following commands - mobilizing well.  Respiratory: Bilateral breath sounds  CV: Sinus   Abdominal: Soft, Nontender  Extremities: Warm, well-perfused, AVF on RUE       -------------------------------------------------------------------------------------------------------------------------------    Labs:                                   8.3    17.49 )-----------( 157      ( 02 Jul 2025 00:32 )             26.3     PTT - ( 01 Jul 2025 00:27 )  PTT:47.0 sec, PTT - ( 30 Jun 2025 21:10 )  PTT:45.4 sec, PTT - ( 30 Jun 2025 09:02 )  PTT:54.2 sec    136    |  97     |  60     ----------------------------<  163        ( 02 Jul 2025 00:32 )  4.2     |  24     |  7.70     Ca    10.8       ( 02 Jul 2025 00:32 )  Phos  4.7       ( 02 Jul 2025 00:32 )  Mg     3.0       ( 02 Jul 2025 00:32 )    TPro  6.0    /  Alb  3.2    /  TBili  0.3    /  DBili  x      /  AST  21     /  ALT  24     /  AlkPhos  111    ( 02 Jul 2025 00:32 )    LIVER FUNCTIONS - ( 02 Jul 2025 00:32 )  Alb: 3.2 g/dL / Pro: 6.0 g/dL / ALK PHOS: 111 U/L / ALT: 24 U/L / AST: 21 U/L / GGT: x           ABG - ( 02 Jul 2025 00:13 )  pH, Arterial: 7.40  pH, Blood: x     /  pCO2: 43    /  pO2: 144   / HCO3: 27    / Base Excess: 1.6   /  SaO2: 98.5          ------------------------------------------------------------------------------------------------------------------------------  Assessment:  71 yo M with cardiogenic shock on RVAD ,DM2, CAD (total  4 coronary stents, last one PCI in 08/2024 ), CHF with EF 40-45%, severe MR  Now s/p Mitral valve replacement, CABG x3 and RVAD placement on 6/17/2025   RVAD decannulated on 7/1/25.    RV dysfunction  Acute postop pulmonary insufficiency  Acute blood loss anemia  ESRD  Hyperglycemia      Plan:   ***Neuro***  Maintain day/night cycle to prevent ICU delirium   Postoperative acute pain control with Tylenol and prns  Melatonin & Trazadone nightly    ***Cardiovascular***  RV dysfunction, s/p CABG, s/p MV repair  Remains on low dose Dobutamine for inotropic support - wean to off.  ASA/Statin daily   PO Amiodarone daily s/p load.    ***Pulmonary***  Postop acute pulmonary insufficiency  Deep breathing and coughing exercises  Wean oxygen as able.    ***GI***  Tolerating diet.  Protonix for GI prophylaxis.  Bowel regimen.   Trend LFTs.    ***Renal***  ESRD tolerating iHD - HD today.    ***ID***  6/23 BAL - Serratia - Cefepime 6/24/25 - 7 day course to complete today.  Follow up all cultures.    ***Endocrine***  Hyperglycemia - Lantus, premeal, and sliding scale Insulin.    ***Hematology***  Acute blood loss anemia and thrombocytopenia  No transfusion indication currently, will trend.  SQ Heparin for VTE prophylaxis.    ***Skin/Musculoskeletal***  Walking with physical therapy      Care plan discussed with the ICU care team.   Patient remains critical, at risk for life threatening decompensation.    I have spent 50 minutes providing critical care management to this patient.    -Lacey Nair MD   Patient seen and examined at the bedside.    Remains critically ill on continuous ICU monitoring.      Brief Summary:  69 yo M PMH of HTN, HLD, DM2, CAD (total  4 coronary stents, last one PCI in 08/2024), CHF with EF 40-45%, severe MR  S/p Mitral valve replacement, CABG x3 and RVAD placement on 6/17/2025     24 Hour events:  RVAD decannulated yesterday.  Remains on low dose Dobutamine.      Objective:  ICU Vital Signs Last 24 Hrs  T(C): 36.7 (02 Jul 2025 08:00), Max: 37.1 (01 Jul 2025 12:00)  T(F): 98 (02 Jul 2025 08:00), Max: 98.8 (01 Jul 2025 12:00)  HR: 88 (02 Jul 2025 08:00) (81 - 96)  BP: 120/58 (02 Jul 2025 08:00) (100/51 - 136/61)  BP(mean): 83 (02 Jul 2025 08:00) (71 - 88)  ABP: 134/35 (02 Jul 2025 08:00) (100/26 - 179/55)  ABP(mean): 59 (02 Jul 2025 08:00) (29 - 84)  RR: 20 (02 Jul 2025 08:00) (12 - 46)  SpO2: 97% (02 Jul 2025 08:00) (91% - 100%)    O2 Parameters below as of 02 Jul 2025 08:00  Patient On (Oxygen Delivery Method): nasal cannula  O2 Flow (L/min): 2         Physical Exam:   General: Awake, alert, pleasant   Neurology: Following commands - mobilizing well.  Respiratory: Bilateral breath sounds  CV: Sinus   Abdominal: Soft, Nontender  Extremities: Warm, well-perfused, AVF on RUE       -------------------------------------------------------------------------------------------------------------------------------    Labs:                                   8.3    17.49 )-----------( 157      ( 02 Jul 2025 00:32 )             26.3     PTT - ( 01 Jul 2025 00:27 )  PTT:47.0 sec, PTT - ( 30 Jun 2025 21:10 )  PTT:45.4 sec, PTT - ( 30 Jun 2025 09:02 )  PTT:54.2 sec    136    |  97     |  60     ----------------------------<  163        ( 02 Jul 2025 00:32 )  4.2     |  24     |  7.70     Ca    10.8       ( 02 Jul 2025 00:32 )  Phos  4.7       ( 02 Jul 2025 00:32 )  Mg     3.0       ( 02 Jul 2025 00:32 )    TPro  6.0    /  Alb  3.2    /  TBili  0.3    /  DBili  x      /  AST  21     /  ALT  24     /  AlkPhos  111    ( 02 Jul 2025 00:32 )    LIVER FUNCTIONS - ( 02 Jul 2025 00:32 )  Alb: 3.2 g/dL / Pro: 6.0 g/dL / ALK PHOS: 111 U/L / ALT: 24 U/L / AST: 21 U/L / GGT: x           ABG - ( 02 Jul 2025 00:13 )  pH, Arterial: 7.40  pH, Blood: x     /  pCO2: 43    /  pO2: 144   / HCO3: 27    / Base Excess: 1.6   /  SaO2: 98.5          ------------------------------------------------------------------------------------------------------------------------------  Assessment:  69 yo M with cardiogenic shock on RVAD ,DM2, CAD (total  4 coronary stents, last one PCI in 08/2024 ), CHF with EF 40-45%, severe MR  Now s/p Mitral valve replacement, CABG x3 and RVAD placement on 6/17/2025   RVAD decannulated on 7/1/25.    RV dysfunction  Acute postop pulmonary insufficiency  Acute blood loss anemia  ESRD  Hyperglycemia      Plan:   ***Neuro***  Maintain day/night cycle to prevent ICU delirium   Postoperative acute pain control with Tylenol and prns  Melatonin & Trazadone nightly    ***Cardiovascular***  RV dysfunction, s/p CABG, s/p MV repair  Remains on low dose Dobutamine for inotropic support - wean to off.  ASA/Statin daily   PO Amiodarone daily s/p load.    ***Pulmonary***  Postop acute pulmonary insufficiency  Deep breathing and coughing exercises  Wean oxygen as able.    ***GI***  Tolerating diet.  Protonix for GI prophylaxis.  Bowel regimen.   Trend LFTs.    ***Renal***  ESRD tolerating iHD - HD today.    ***ID***  6/23 BAL - Serratia - Cefepime 6/24/25 - 7 day course.  Follow up all cultures.    ***Endocrine***  Hyperglycemia - Lantus, premeal, and sliding scale Insulin.    ***Hematology***  Acute blood loss anemia and thrombocytopenia  No transfusion indication currently, will trend.  SQ Heparin for VTE prophylaxis.    ***Skin/Musculoskeletal***  Walking with physical therapy      Care plan discussed with the ICU care team.   Patient remains critical, at risk for life threatening decompensation.    I have spent 50 minutes providing critical care management to this patient.    -Lacey Nair MD

## 2025-07-02 NOTE — PROGRESS NOTE ADULT - SUBJECTIVE AND OBJECTIVE BOX
NEPHROLOGY-[facility] [phone]        Patient seen and examined in bed.  He was the same   RVAD is out   No pressors     ROS-+weakness + fatigue; all other ros were reviewed and were negative         MEDICATIONS  (STANDING):  aMIOdarone    Tablet 200 milliGRAM(s) Oral daily  aspirin enteric coated 81 milliGRAM(s) Oral daily  atorvastatin 80 milliGRAM(s) Oral at bedtime  bisacodyl Suppository 10 milliGRAM(s) Rectal once  cefepime   IVPB 1000 milliGRAM(s) IV Intermittent at bedtime  chlorhexidine 4% Liquid 1 Application(s) Topical daily  dextrose 5%. 1000 milliLiter(s) (100 mL/Hr) IV Continuous <Continuous>  dextrose 50% Injectable 50 milliLiter(s) IV Push every 15 minutes  dextrose 50% Injectable 25 milliLiter(s) IV Push every 15 minutes  epoetin douglas-epbx (RETACRIT) Injectable 88477 Unit(s) IV Push <User Schedule>  epoetin douglas-epbx (RETACRIT) Injectable 49598 Unit(s) IV Push once  glucagon  Injectable 1 milliGRAM(s) IntraMuscular once  heparin   Injectable 5000 Unit(s) SubCutaneous every 8 hours  insulin glargine Injectable (LANTUS) 28 Unit(s) SubCutaneous at bedtime  insulin lispro (ADMELOG) corrective regimen sliding scale   SubCutaneous Before meals and at bedtime  insulin lispro Injectable (ADMELOG) 9 Unit(s) SubCutaneous three times a day before meals  ipratropium    for Nebulization 500 MICROGram(s) Nebulizer every 6 hours  melatonin 5 milliGRAM(s) Oral at bedtime  multivitamin/minerals 1 Tablet(s) Oral daily  pantoprazole    Tablet 40 milliGRAM(s) Oral before breakfast  polyethylene glycol 3350 17 Gram(s) Oral every 12 hours  senna 2 Tablet(s) Oral at bedtime  sodium chloride 0.9%. 1000 milliLiter(s) (10 mL/Hr) IV Continuous <Continuous>  traZODone 50 milliGRAM(s) Oral at bedtime      VITAL:  T(C): , Max: 37.1 (07-01-25 @ 12:00)  T(F): , Max: 98.8 (07-01-25 @ 12:00)  HR: 90 (07-02-25 @ 09:00)  BP: 121/56 (07-02-25 @ 09:00)  BP(mean): 81 (07-02-25 @ 09:00)  RR: 17 (07-02-25 @ 09:00)  SpO2: 94% (07-02-25 @ 09:00)  Wt(kg): --    I and O's:    07-01 @ 07:01  -  07-02 @ 07:00  --------------------------------------------------------  IN: 1045.8 mL / OUT: 0 mL / NET: 1045.8 mL    07-02 @ 07:01  -  07-02 @ 09:14  --------------------------------------------------------  IN: 7.4 mL / OUT: 0 mL / NET: 7.4 mL          PHYSICAL EXAM:    Constitutional: NAD  Neck:  No JVD  Respiratory: CTAB/L  Cardiovascular: S1 and S2  Gastrointestinal: BS+, soft, NT/ND  Extremities: No peripheral edema  Neurological: A/O x 3, no focal deficits  Psychiatric: Normal mood, normal affect  : No Ag  Skin: No rashes  Access: [facility]     LABS:                        8.3    17.49 )-----------( 157      ( 02 Jul 2025 00:32 )             26.3     07-02    136  |  97  |  60[H]  ----------------------------<  163[H]  4.2   |  24  |  7.70[H]    Ca    10.8[H]      02 Jul 2025 00:32  Phos  4.7     07-02  Mg     3.0     07-02    TPro  6.0  /  Alb  3.2[L]  /  TBili  0.3  /  DBili  x   /  AST  21  /  ALT  24  /  AlkPhos  111  07-02          Urine Studies:  Urinalysis Basic - ( 02 Jul 2025 00:32 )    Color: x / Appearance: x / SG: x / pH: x  Gluc: 163 mg/dL / Ketone: x  / Bili: x / Urobili: x   Blood: x / Protein: x / Nitrite: x   Leuk Esterase: x / RBC: x / WBC x   Sq Epi: x / Non Sq Epi: x / Bacteria: x            RADIOLOGY & ADDITIONAL STUDIES:      < from: Xray Chest 1 View- PORTABLE-Urgent (Xray Chest 1 View- PORTABLE-Urgent .) (06.30.25 @ 15:48) >    ACC: [id number] EXAM:  XR CHEST PORTABLE URGENT 1V   ORDERED BY: SHAYLA OSMAN     ACC: [id number] EXAM:  XR CHEST PORTABLE ROUTINE 1V   ORDERED BY: GLENIS DOVE     ACC: [id number] EXAM:  XR CHEST PORTABLE ROUTINE 1V   ORDERED BY: SUSHILA CHRISTINA    PROCEDURE DATE:  06/29/2025          INTERPRETATION:  Chest one view 6/29/2025 1:49 AM    HISTORY: Postop    COMPARISON STUDY: 6/28/2025    Frontal expiratory view of the chest shows the heart to be similar in   size. Right jugular ECMO catheter reaches the upper main pulmonary   artery. Transfemoral ECMO catheter reaches the right atrium. Mitral valve   ring, left atrial appendage clip and sternal wires are present.    The lungs show left base atelectasis with small left effusion and there   is no evidence of pneumothorax nor right pleural effusion.    Chest one view 6/30/2025 2:31 AM  Compared to the prior study, the left base is clearer.    Chest one view 6/30/2025 3:21 PM  Compared to the prior study, left jugular central line reachesthe   superior vena cava. There is less pulmonary congestion.    IMPRESSION:  Decreasing congestion. Lines as noted.        Thank you for the courtesy of this referral.    --- End of Report ---    < end of copied text >

## 2025-07-03 DIAGNOSIS — Z95.1 PRESENCE OF AORTOCORONARY BYPASS GRAFT: ICD-10-CM

## 2025-07-03 DIAGNOSIS — Z95.2 PRESENCE OF PROSTHETIC HEART VALVE: ICD-10-CM

## 2025-07-03 LAB
A1C WITH ESTIMATED AVERAGE GLUCOSE RESULT: 6.1 % — HIGH (ref 4–5.6)
ALBUMIN SERPL ELPH-MCNC: 3.1 G/DL — LOW (ref 3.3–5)
ALP SERPL-CCNC: 125 U/L — HIGH (ref 40–120)
ALT FLD-CCNC: 25 U/L — SIGNIFICANT CHANGE UP (ref 10–45)
ANION GAP SERPL CALC-SCNC: 13 MMOL/L — SIGNIFICANT CHANGE UP (ref 5–17)
AST SERPL-CCNC: 26 U/L — SIGNIFICANT CHANGE UP (ref 10–40)
BASOPHILS # BLD AUTO: 0.06 K/UL — SIGNIFICANT CHANGE UP (ref 0–0.2)
BASOPHILS NFR BLD AUTO: 0.4 % — SIGNIFICANT CHANGE UP (ref 0–2)
BILIRUB SERPL-MCNC: 0.3 MG/DL — SIGNIFICANT CHANGE UP (ref 0.2–1.2)
BUN SERPL-MCNC: 41 MG/DL — HIGH (ref 7–23)
CALCIUM SERPL-MCNC: 10.4 MG/DL — SIGNIFICANT CHANGE UP (ref 8.4–10.5)
CHLORIDE SERPL-SCNC: 96 MMOL/L — SIGNIFICANT CHANGE UP (ref 96–108)
CO2 SERPL-SCNC: 25 MMOL/L — SIGNIFICANT CHANGE UP (ref 22–31)
CREAT SERPL-MCNC: 5.87 MG/DL — HIGH (ref 0.5–1.3)
EGFR: 10 ML/MIN/1.73M2 — LOW
EGFR: 10 ML/MIN/1.73M2 — LOW
EOSINOPHIL # BLD AUTO: 0.15 K/UL — SIGNIFICANT CHANGE UP (ref 0–0.5)
EOSINOPHIL NFR BLD AUTO: 0.9 % — SIGNIFICANT CHANGE UP (ref 0–6)
ESTIMATED AVERAGE GLUCOSE: 128 MG/DL — HIGH (ref 68–114)
GLUCOSE BLDC GLUCOMTR-MCNC: 111 MG/DL — HIGH (ref 70–99)
GLUCOSE BLDC GLUCOMTR-MCNC: 118 MG/DL — HIGH (ref 70–99)
GLUCOSE BLDC GLUCOMTR-MCNC: 147 MG/DL — HIGH (ref 70–99)
GLUCOSE BLDC GLUCOMTR-MCNC: 157 MG/DL — HIGH (ref 70–99)
GLUCOSE BLDC GLUCOMTR-MCNC: 202 MG/DL — HIGH (ref 70–99)
GLUCOSE SERPL-MCNC: 145 MG/DL — HIGH (ref 70–99)
HCT VFR BLD CALC: 27.1 % — LOW (ref 39–50)
HGB BLD-MCNC: 8.5 G/DL — LOW (ref 13–17)
IMM GRANULOCYTES # BLD AUTO: 0.24 K/UL — HIGH (ref 0–0.07)
IMM GRANULOCYTES NFR BLD AUTO: 1.5 % — HIGH (ref 0–0.9)
LYMPHOCYTES # BLD AUTO: 1 K/UL — SIGNIFICANT CHANGE UP (ref 1–3.3)
LYMPHOCYTES NFR BLD AUTO: 6.1 % — LOW (ref 13–44)
MAGNESIUM SERPL-MCNC: 2.6 MG/DL — SIGNIFICANT CHANGE UP (ref 1.6–2.6)
MCHC RBC-ENTMCNC: 27.2 PG — SIGNIFICANT CHANGE UP (ref 27–34)
MCHC RBC-ENTMCNC: 31.4 G/DL — LOW (ref 32–36)
MCV RBC AUTO: 86.9 FL — SIGNIFICANT CHANGE UP (ref 80–100)
MONOCYTES # BLD AUTO: 1.8 K/UL — HIGH (ref 0–0.9)
MONOCYTES NFR BLD AUTO: 11 % — SIGNIFICANT CHANGE UP (ref 2–14)
NEUTROPHILS # BLD AUTO: 13.06 K/UL — HIGH (ref 1.8–7.4)
NEUTROPHILS NFR BLD AUTO: 80.1 % — HIGH (ref 43–77)
NRBC # BLD AUTO: 0 K/UL — SIGNIFICANT CHANGE UP (ref 0–0)
NRBC # FLD: 0 K/UL — SIGNIFICANT CHANGE UP (ref 0–0)
NRBC BLD AUTO-RTO: 0 /100 WBCS — SIGNIFICANT CHANGE UP (ref 0–0)
PHOSPHATE SERPL-MCNC: 3.7 MG/DL — SIGNIFICANT CHANGE UP (ref 2.5–4.5)
PLATELET # BLD AUTO: 181 K/UL — SIGNIFICANT CHANGE UP (ref 150–400)
PMV BLD: 11.9 FL — SIGNIFICANT CHANGE UP (ref 7–13)
POTASSIUM SERPL-MCNC: 4.1 MMOL/L — SIGNIFICANT CHANGE UP (ref 3.5–5.3)
POTASSIUM SERPL-SCNC: 4.1 MMOL/L — SIGNIFICANT CHANGE UP (ref 3.5–5.3)
PROT SERPL-MCNC: 6.2 G/DL — SIGNIFICANT CHANGE UP (ref 6–8.3)
RBC # BLD: 3.12 M/UL — LOW (ref 4.2–5.8)
RBC # FLD: 18.6 % — HIGH (ref 10.3–14.5)
SODIUM SERPL-SCNC: 134 MMOL/L — LOW (ref 135–145)
WBC # BLD: 16.31 K/UL — HIGH (ref 3.8–10.5)
WBC # FLD AUTO: 16.31 K/UL — HIGH (ref 3.8–10.5)

## 2025-07-03 PROCEDURE — 85396 CLOTTING ASSAY WHOLE BLOOD: CPT

## 2025-07-03 PROCEDURE — 87340 HEPATITIS B SURFACE AG IA: CPT

## 2025-07-03 PROCEDURE — 97535 SELF CARE MNGMENT TRAINING: CPT

## 2025-07-03 PROCEDURE — 99233 SBSQ HOSP IP/OBS HIGH 50: CPT

## 2025-07-03 PROCEDURE — 85018 HEMOGLOBIN: CPT

## 2025-07-03 PROCEDURE — 71045 X-RAY EXAM CHEST 1 VIEW: CPT | Mod: 26

## 2025-07-03 PROCEDURE — 97166 OT EVAL MOD COMPLEX 45 MIN: CPT

## 2025-07-03 PROCEDURE — 83735 ASSAY OF MAGNESIUM: CPT

## 2025-07-03 PROCEDURE — 82803 BLOOD GASES ANY COMBINATION: CPT

## 2025-07-03 PROCEDURE — 84146 ASSAY OF PROLACTIN: CPT

## 2025-07-03 PROCEDURE — 93306 TTE W/DOPPLER COMPLETE: CPT

## 2025-07-03 PROCEDURE — 82962 GLUCOSE BLOOD TEST: CPT

## 2025-07-03 PROCEDURE — 87186 SC STD MICRODIL/AGAR DIL: CPT

## 2025-07-03 PROCEDURE — 88300 SURGICAL PATH GROSS: CPT

## 2025-07-03 PROCEDURE — 86901 BLOOD TYPING SEROLOGIC RH(D): CPT

## 2025-07-03 PROCEDURE — 84132 ASSAY OF SERUM POTASSIUM: CPT

## 2025-07-03 PROCEDURE — 86706 HEP B SURFACE ANTIBODY: CPT

## 2025-07-03 PROCEDURE — C1889: CPT

## 2025-07-03 PROCEDURE — 84436 ASSAY OF TOTAL THYROXINE: CPT

## 2025-07-03 PROCEDURE — 82435 ASSAY OF BLOOD CHLORIDE: CPT

## 2025-07-03 PROCEDURE — C1894: CPT

## 2025-07-03 PROCEDURE — 86900 BLOOD TYPING SEROLOGIC ABO: CPT

## 2025-07-03 PROCEDURE — C1729: CPT

## 2025-07-03 PROCEDURE — G0545: CPT

## 2025-07-03 PROCEDURE — 85014 HEMATOCRIT: CPT

## 2025-07-03 PROCEDURE — 83615 LACTATE (LD) (LDH) ENZYME: CPT

## 2025-07-03 PROCEDURE — 84100 ASSAY OF PHOSPHORUS: CPT

## 2025-07-03 PROCEDURE — 84295 ASSAY OF SERUM SODIUM: CPT

## 2025-07-03 PROCEDURE — 93005 ELECTROCARDIOGRAM TRACING: CPT

## 2025-07-03 PROCEDURE — 85027 COMPLETE CBC AUTOMATED: CPT

## 2025-07-03 PROCEDURE — 99232 SBSQ HOSP IP/OBS MODERATE 35: CPT

## 2025-07-03 PROCEDURE — C1769: CPT

## 2025-07-03 PROCEDURE — 93320 DOPPLER ECHO COMPLETE: CPT

## 2025-07-03 PROCEDURE — 87077 CULTURE AEROBIC IDENTIFY: CPT

## 2025-07-03 PROCEDURE — 85025 COMPLETE CBC W/AUTO DIFF WBC: CPT

## 2025-07-03 PROCEDURE — 87070 CULTURE OTHR SPECIMN AEROBIC: CPT

## 2025-07-03 PROCEDURE — 82947 ASSAY GLUCOSE BLOOD QUANT: CPT

## 2025-07-03 PROCEDURE — 97164 PT RE-EVAL EST PLAN CARE: CPT

## 2025-07-03 PROCEDURE — 87640 STAPH A DNA AMP PROBE: CPT

## 2025-07-03 PROCEDURE — 86803 HEPATITIS C AB TEST: CPT

## 2025-07-03 PROCEDURE — 84145 PROCALCITONIN (PCT): CPT

## 2025-07-03 PROCEDURE — 82550 ASSAY OF CK (CPK): CPT

## 2025-07-03 PROCEDURE — 80053 COMPREHEN METABOLIC PANEL: CPT

## 2025-07-03 PROCEDURE — 85610 PROTHROMBIN TIME: CPT

## 2025-07-03 PROCEDURE — 87040 BLOOD CULTURE FOR BACTERIA: CPT

## 2025-07-03 PROCEDURE — P9016: CPT

## 2025-07-03 PROCEDURE — 83010 ASSAY OF HAPTOGLOBIN QUANT: CPT

## 2025-07-03 PROCEDURE — 97530 THERAPEUTIC ACTIVITIES: CPT

## 2025-07-03 PROCEDURE — 94002 VENT MGMT INPAT INIT DAY: CPT

## 2025-07-03 PROCEDURE — 84480 ASSAY TRIIODOTHYRONINE (T3): CPT

## 2025-07-03 PROCEDURE — 94640 AIRWAY INHALATION TREATMENT: CPT

## 2025-07-03 PROCEDURE — 87205 SMEAR GRAM STAIN: CPT

## 2025-07-03 PROCEDURE — C1751: CPT

## 2025-07-03 PROCEDURE — 80202 ASSAY OF VANCOMYCIN: CPT

## 2025-07-03 PROCEDURE — 97116 GAIT TRAINING THERAPY: CPT

## 2025-07-03 PROCEDURE — P9100: CPT

## 2025-07-03 PROCEDURE — 87641 MR-STAPH DNA AMP PROBE: CPT

## 2025-07-03 PROCEDURE — 86891 AUTOLOGOUS BLOOD OP SALVAGE: CPT

## 2025-07-03 PROCEDURE — 83036 HEMOGLOBIN GLYCOSYLATED A1C: CPT

## 2025-07-03 PROCEDURE — 94003 VENT MGMT INPAT SUBQ DAY: CPT

## 2025-07-03 PROCEDURE — 84484 ASSAY OF TROPONIN QUANT: CPT

## 2025-07-03 PROCEDURE — 80048 BASIC METABOLIC PNL TOTAL CA: CPT

## 2025-07-03 PROCEDURE — C9399: CPT

## 2025-07-03 PROCEDURE — P9045: CPT

## 2025-07-03 PROCEDURE — 85520 HEPARIN ASSAY: CPT

## 2025-07-03 PROCEDURE — 85384 FIBRINOGEN ACTIVITY: CPT

## 2025-07-03 PROCEDURE — P9037: CPT

## 2025-07-03 PROCEDURE — 71045 X-RAY EXAM CHEST 1 VIEW: CPT

## 2025-07-03 PROCEDURE — C1887: CPT

## 2025-07-03 PROCEDURE — 86923 COMPATIBILITY TEST ELECTRIC: CPT

## 2025-07-03 PROCEDURE — 82553 CREATINE MB FRACTION: CPT

## 2025-07-03 PROCEDURE — 82330 ASSAY OF CALCIUM: CPT

## 2025-07-03 PROCEDURE — 76000 FLUOROSCOPY <1 HR PHYS/QHP: CPT

## 2025-07-03 PROCEDURE — 97163 PT EVAL HIGH COMPLEX 45 MIN: CPT

## 2025-07-03 PROCEDURE — 84439 ASSAY OF FREE THYROXINE: CPT

## 2025-07-03 PROCEDURE — 84443 ASSAY THYROID STIM HORMONE: CPT

## 2025-07-03 PROCEDURE — 83605 ASSAY OF LACTIC ACID: CPT

## 2025-07-03 PROCEDURE — P9047: CPT

## 2025-07-03 PROCEDURE — 97110 THERAPEUTIC EXERCISES: CPT

## 2025-07-03 PROCEDURE — 36415 COLL VENOUS BLD VENIPUNCTURE: CPT

## 2025-07-03 PROCEDURE — 85730 THROMBOPLASTIN TIME PARTIAL: CPT

## 2025-07-03 PROCEDURE — 86850 RBC ANTIBODY SCREEN: CPT

## 2025-07-03 PROCEDURE — 93325 DOPPLER ECHO COLOR FLOW MAPG: CPT

## 2025-07-03 RX ORDER — EPOETIN ALFA 10000 [IU]/ML
10000 SOLUTION INTRAVENOUS; SUBCUTANEOUS ONCE
Refills: 0 | Status: COMPLETED | OUTPATIENT
Start: 2025-07-04 | End: 2025-07-04

## 2025-07-03 RX ORDER — BISACODYL 5 MG
10 TABLET, DELAYED RELEASE (ENTERIC COATED) ORAL ONCE
Refills: 0 | Status: COMPLETED | OUTPATIENT
Start: 2025-07-03 | End: 2025-07-03

## 2025-07-03 RX ORDER — METOPROLOL SUCCINATE 50 MG/1
12.5 TABLET, EXTENDED RELEASE ORAL
Refills: 0 | Status: DISCONTINUED | OUTPATIENT
Start: 2025-07-03 | End: 2025-07-11

## 2025-07-03 RX ORDER — POLYETHYLENE GLYCOL 3350 17 G/17G
17 POWDER, FOR SOLUTION ORAL ONCE
Refills: 0 | Status: COMPLETED | OUTPATIENT
Start: 2025-07-03 | End: 2025-07-03

## 2025-07-03 RX ADMIN — Medication 81 MILLIGRAM(S): at 11:54

## 2025-07-03 RX ADMIN — Medication 3 MILLILITER(S): at 21:38

## 2025-07-03 RX ADMIN — AMIODARONE HYDROCHLORIDE 200 MILLIGRAM(S): 50 INJECTION, SOLUTION INTRAVENOUS at 05:14

## 2025-07-03 RX ADMIN — Medication 1 TABLET(S): at 11:54

## 2025-07-03 RX ADMIN — INSULIN GLARGINE-YFGN 24 UNIT(S): 100 INJECTION, SOLUTION SUBCUTANEOUS at 22:23

## 2025-07-03 RX ADMIN — Medication 500 MICROGRAM(S): at 22:33

## 2025-07-03 RX ADMIN — METOPROLOL SUCCINATE 12.5 MILLIGRAM(S): 50 TABLET, EXTENDED RELEASE ORAL at 10:04

## 2025-07-03 RX ADMIN — Medication 500 MICROGRAM(S): at 11:59

## 2025-07-03 RX ADMIN — ATORVASTATIN CALCIUM 80 MILLIGRAM(S): 80 TABLET, FILM COATED ORAL at 21:49

## 2025-07-03 RX ADMIN — INSULIN LISPRO 10 UNIT(S): 100 INJECTION, SOLUTION INTRAVENOUS; SUBCUTANEOUS at 08:10

## 2025-07-03 RX ADMIN — Medication 2 TABLET(S): at 21:49

## 2025-07-03 RX ADMIN — INSULIN LISPRO 1: 100 INJECTION, SOLUTION INTRAVENOUS; SUBCUTANEOUS at 11:55

## 2025-07-03 RX ADMIN — Medication 10 MILLIGRAM(S): at 10:00

## 2025-07-03 RX ADMIN — Medication 40 MILLIGRAM(S): at 08:10

## 2025-07-03 RX ADMIN — INSULIN LISPRO 2: 100 INJECTION, SOLUTION INTRAVENOUS; SUBCUTANEOUS at 16:46

## 2025-07-03 RX ADMIN — POLYETHYLENE GLYCOL 3350 17 GRAM(S): 17 POWDER, FOR SOLUTION ORAL at 10:01

## 2025-07-03 RX ADMIN — METOPROLOL SUCCINATE 12.5 MILLIGRAM(S): 50 TABLET, EXTENDED RELEASE ORAL at 17:03

## 2025-07-03 RX ADMIN — POLYETHYLENE GLYCOL 3350 17 GRAM(S): 17 POWDER, FOR SOLUTION ORAL at 05:14

## 2025-07-03 RX ADMIN — HEPARIN SODIUM 5000 UNIT(S): 1000 INJECTION INTRAVENOUS; SUBCUTANEOUS at 05:14

## 2025-07-03 RX ADMIN — CEFEPIME 100 MILLIGRAM(S): 2 INJECTION, POWDER, FOR SOLUTION INTRAVENOUS at 20:00

## 2025-07-03 RX ADMIN — Medication 5 MILLIGRAM(S): at 21:49

## 2025-07-03 RX ADMIN — Medication 50 MILLIGRAM(S): at 21:49

## 2025-07-03 RX ADMIN — HEPARIN SODIUM 5000 UNIT(S): 1000 INJECTION INTRAVENOUS; SUBCUTANEOUS at 13:56

## 2025-07-03 RX ADMIN — Medication 500 MICROGRAM(S): at 17:03

## 2025-07-03 RX ADMIN — HEPARIN SODIUM 5000 UNIT(S): 1000 INJECTION INTRAVENOUS; SUBCUTANEOUS at 21:50

## 2025-07-03 RX ADMIN — Medication 500 MICROGRAM(S): at 05:10

## 2025-07-03 RX ADMIN — INSULIN LISPRO 10 UNIT(S): 100 INJECTION, SOLUTION INTRAVENOUS; SUBCUTANEOUS at 16:46

## 2025-07-03 RX ADMIN — INSULIN LISPRO 10 UNIT(S): 100 INJECTION, SOLUTION INTRAVENOUS; SUBCUTANEOUS at 11:55

## 2025-07-03 NOTE — PROGRESS NOTE ADULT - ASSESSMENT
70 year old male PMH of HTN, HLD, DM2, CAD (total  4 coronary stents, last one PCI in 08/2024 &  S/p status post MI treated with PCI x3 stents in 2022 & 08/2023)on Asprin 81 mg, Chronic back pain, ESRD on  HD 3x/week via Right brachial AV fistula (Meixfz-Yjxfmicqf-Vvoefa, Nephrology- Dr.Paul Munguia-[facility], in [place]/ also s/p angioplasty of the AVfistula -intact in 12/2024/ & had revision of AV fistula in  05/2024 with Dr. Henrik Morocho), Listed for renal transplant in [place] and [place] ( he has a living donor available), CHF with EF 40-45%,  pneumonia 10/2024, severe MR/ recent cath 6/3 w/ multivessel CAD in stent stenosis planned for Mitral valve replacement, CABG x3    On 6/17/25 s/p Mitral valve replacement, CABG x3 and RVAD placement  On 6/22 s/p insertion of percutaneous RVAD and removal of PA cannula    MRSA/MSSA Nasal PCR (6/10) Negative  Bronchoscopy (6/23) Moderate Serratia marcescens  Serum Procalcitonin (6/27) 4.15    CXR (6/27) Interval removal left-sided chest tube without pneumothorax. Bibasilar atelectasis.        Antibiotic Course:  PPx Cefuroxime: 6/18 > 6/24  Cefepime: 6/24 >   Vancomycin: 6/27    #Positive Bronchoscopy Culture, Hypothermia, Leukocytosis, Abnormal Lab Test (elevated procalcitonin)  --s/p 1g Dose of Vancomycin 6/27. Check level prior to HD tomorrow   --Can discontinue the Cefepime ( completed 7 day course for the Serratia)  --Continue to follow CBC with diff  --Continue to follow temperature curve  --No new p[ositive cultures    #Pre-Renal Transplant Evaluation  COVID19 Saul Antibody Positive  HAV IgG Negative  HBVs Ab Negative  HBVsAg Negative  HBVc Ab Negative  HCV Ab Negative  HSV 1 IgG Positive  HSV 2 IgG Negative  EBV IgG Positive  CMV IgG Positive  VZV IgG Positive  Measles IgG Positive  Mumps IgG Positive  Rubella IgG Positive  Quantiferon Gold Negative  HIV Ag/Ab by CMIA  Syphilis Screen Negative  Toxoplasma IgG Negative  Strongyloides Ab Negative  Trypanosoma cruzi Ab Negative    #Encounter to Vaccinate Patient  COVID19: Would benefit from COVID19 [id number] Vaccine Dose  Influenza: Will require with next season  Pneumococcal: Would benefit from pneumococcal 21 vaccine (CAPVAXIVE)  HAV: Would benefit from Havrix  HBV: Would benefit from Heplisav  MMR: Immune, will not require further vaccination  Varicella: Immune, will not require further vaccination  Shingles: Will recommendShingrix  Tdap: Will recommend Tdap      Grupo Lopez MD  Can be called via Teams  After 5pm/weekends [phone]   70 year old male PMH of HTN, HLD, DM2, CAD (total  4 coronary stents, last one PCI in 08/2024 &  S/p status post MI treated with PCI x3 stents in 2022 & 08/2023)on Asprin 81 mg, Chronic back pain, ESRD on  HD 3x/week via Right brachial AV fistula (Fgjihe-Ywonisxam-Iknjdx, Nephrology- Dr.Paul Munguia-[facility], in [place]/ also s/p angioplasty of the AVfistula -intact in 12/2024/ & had revision of AV fistula in  05/2024 with Dr. Henrik Morocho), Listed for renal transplant in [place] and [place] ( he has a living donor available), CHF with EF 40-45%,  pneumonia 10/2024, severe MR/ recent cath 6/3 w/ multivessel CAD in stent stenosis planned for Mitral valve replacement, CABG x3    On 6/17/25 s/p Mitral valve replacement, CABG x3 and RVAD placement  On 6/22 s/p insertion of percutaneous RVAD and removal of PA cannula    MRSA/MSSA Nasal PCR (6/10) Negative  Bronchoscopy (6/23) Moderate Serratia marcescens  Serum Procalcitonin (6/27) 4.15    CXR (6/27) Interval removal left-sided chest tube without pneumothorax. Bibasilar atelectasis.        Antibiotic Course:  PPx Cefuroxime: 6/18 > 6/24  Cefepime: 6/24 >   Vancomycin: 6/27    #Positive Bronchoscopy Culture, Hypothermia, Leukocytosis, Abnormal Lab Test (elevated procalcitonin)  --Can discontinue the Cefepime ( completed 7 day course for the Serratia)  --Continue to follow CBC with diff  --Continue to follow temperature curve  --No new p[ositive cultures    #Pre-Renal Transplant Evaluation  COVID19 Saul Antibody Positive  HAV IgG Negative  HBVs Ab Negative  HBVsAg Negative  HBVc Ab Negative  HCV Ab Negative  HSV 1 IgG Positive  HSV 2 IgG Negative  EBV IgG Positive  CMV IgG Positive  VZV IgG Positive  Measles IgG Positive  Mumps IgG Positive  Rubella IgG Positive  Quantiferon Gold Negative  HIV Ag/Ab by CMIA  Syphilis Screen Negative  Toxoplasma IgG Negative  Strongyloides Ab Negative  Trypanosoma cruzi Ab Negative    #Encounter to Vaccinate Patient  COVID19: Would benefit from COVID19 [id number] Vaccine Dose  Influenza: Will require with next season  Pneumococcal: Would benefit from pneumococcal 21 vaccine (CAPVAXIVE)  HAV: Would benefit from Havrix  HBV: Would benefit from Heplisav  MMR: Immune, will not require further vaccination  Varicella: Immune, will not require further vaccination  Shingles: Will recommendShingrix  Tdap: Will recommend Tdap      Grupo Lopez MD  Can be called via Teams  After 5pm/weekends [phone]

## 2025-07-03 NOTE — PROGRESS NOTE ADULT - ASSESSMENT
70M w/ HTN, DM2, CAD, and ESRD-HD, s/p CABGx3/MV repair/RVAD 6/17/25    (1)Renal - ESRD - HD MWF - standard HD; CRRT discontinued 6/27  (2)Lytes - Hypercalcemia   (3)Anemia - on TIW Retacrit;   (4)CV - SP cardiogenic shock  and now compensated and removal of  RVAD/inotropes     RECOMMEND:  (1)Renal - Next HD in am and and lower calcium cath and can attempt net 1.8L UF (effusions noted)   (2)Anemia - Increase Retacrit to help with anemia   (3)CVS- - Defer to team re Lopressor:  (4) Physical therapy    (5)ID- IV abx        Sayed [facility]   HOSTING [facility]   [phone]

## 2025-07-03 NOTE — PROGRESS NOTE ADULT - SUBJECTIVE AND OBJECTIVE BOX
Patient seen and examined at the bedside.        Brief Summary:  71 yo M PMH of HTN, HLD, DM2, CAD (total  4 coronary stents, last one PCI in 08/2024), CHF with EF 40-45%, severe MR  S/p Mitral valve replacement, CABG x3 and RVAD placement on 6/17/2025     24 Hour events:  Dobutamine weaned to off.  Ambulated well.  HD yesterday.      Objective:  ICU Vital Signs Last 24 Hrs  T(C): 36.9 (03 Jul 2025 08:00), Max: 37.2 (02 Jul 2025 19:05)  T(F): 98.4 (03 Jul 2025 08:00), Max: 98.9 (02 Jul 2025 19:05)  HR: 88 (03 Jul 2025 07:00) (87 - 98)  BP: 103/55 (03 Jul 2025 06:00) (99/55 - 128/58)  BP(mean): 76 (03 Jul 2025 06:00) (74 - 86)  ABP: 139/33 (03 Jul 2025 07:00) (111/37 - 275/275)  ABP(mean): 57 (03 Jul 2025 07:00) (44 - 275)  RR: 17 (03 Jul 2025 07:00) (13 - 23)  SpO2: 98% (03 Jul 2025 07:00) (90% - 100%)    O2 Parameters below as of 03 Jul 2025 08:00  Patient On (Oxygen Delivery Method): nasal cannula  O2 Flow (L/min): 2      Physical Exam:   General: Awake, alert, pleasant, sitting in chair  Neurology: Following commands - mobilizing well.  Respiratory: Bilateral breath sounds  CV: Sinus   Abdominal: Soft, Nontender  Extremities: Warm, well-perfused, AVF on RUE       -------------------------------------------------------------------------------------------------------------------------------    Labs:                                   8.5    16.31 )-----------( 181      ( 03 Jul 2025 00:26 )             27.1     134    |  96     |  41     ----------------------------<  145        ( 03 Jul 2025 00:26 )  4.1     |  25     |  5.87     Ca    10.4       ( 03 Jul 2025 00:26 )  Phos  3.7       ( 03 Jul 2025 00:26 )  Mg     2.6       ( 03 Jul 2025 00:26 )    TPro  6.2    /  Alb  3.1    /  TBili  0.3    /  DBili  x      /  AST  26     /  ALT  25     /  AlkPhos  125    ( 03 Jul 2025 00:26 )    LIVER FUNCTIONS - ( 03 Jul 2025 00:26 )  Alb: 3.1 g/dL / Pro: 6.2 g/dL / ALK PHOS: 125 U/L / ALT: 25 U/L / AST: 26 U/L / GGT: x           ABG - ( 02 Jul 2025 23:44 )  pH, Arterial: 7.45  pH, Blood: x     /  pCO2: 41    /  pO2: 161   / HCO3: 28    / Base Excess: 4.1   /  SaO2: 100.0     ------------------------------------------------------------------------------------------------------------------------------  Assessment:  71 yo M with cardiogenic shock on RVAD ,DM2, CAD (total  4 coronary stents, last one PCI in 08/2024 ), CHF with EF 40-45%, severe MR  Now s/p Mitral valve replacement, CABG x3 and RVAD placement on 6/17/2025   RVAD decannulated on 7/1/25.      Acute postop pulmonary insufficiency  Acute blood loss anemia  ESRD  Mild protein calorie malnutrition  Hyperglycemia      Plan:   ***Neuro***  Maintain day/night cycle to prevent ICU delirium   Postoperative acute pain control with Tylenol and prns  Melatonin & Trazadone nightly    ***Cardiovascular***  RV dysfunction, s/p CABG, s/p MV repair  ASA/Statin daily   PO Amiodarone daily s/p load.  Consider beta blocker for heart rate control - will discuss with Dr. Mckeon    ***Pulmonary***  Postop acute pulmonary insufficiency  Deep breathing and coughing exercises  Wean oxygen as able.    ***GI***  Tolerating diet.  Protonix for GI prophylaxis.  Bowel regimen.   Trend LFTs.    ***Renal***  ESRD tolerating iHD    ***ID***  BAL - Serratia - Cefepime - 7 day course (completed 7/4/25)  Follow up all cultures.    ***Endocrine***  Hyperglycemia - Lantus, premeal, and sliding scale Insulin.    ***Hematology***  Acute blood loss anemia and thrombocytopenia  No transfusion indication currently, will trend.  SQ Heparin for VTE prophylaxis.    ***Skin/Musculoskeletal***  Walking with physical therapy      Care plan discussed with the ICU care team.     -Lacey Nair MD

## 2025-07-03 NOTE — PROGRESS NOTE ADULT - SUBJECTIVE AND OBJECTIVE BOX
NEPHROLOGY-[facility] [phone]        Patient seen and examined in bed.  He was the same         MEDICATIONS  (STANDING):  aMIOdarone    Tablet 200 milliGRAM(s) Oral daily  aspirin enteric coated 81 milliGRAM(s) Oral daily  atorvastatin 80 milliGRAM(s) Oral at bedtime  bisacodyl Suppository 10 milliGRAM(s) Rectal once  bisacodyl Suppository 10 milliGRAM(s) Rectal once  cefepime   IVPB 1000 milliGRAM(s) IV Intermittent at bedtime  chlorhexidine 4% Liquid 1 Application(s) Topical daily  dextrose 5%. 1000 milliLiter(s) (100 mL/Hr) IV Continuous <Continuous>  dextrose 50% Injectable 50 milliLiter(s) IV Push every 15 minutes  dextrose 50% Injectable 25 milliLiter(s) IV Push every 15 minutes  glucagon  Injectable 1 milliGRAM(s) IntraMuscular once  heparin   Injectable 5000 Unit(s) SubCutaneous every 8 hours  insulin glargine Injectable (LANTUS) 24 Unit(s) SubCutaneous at bedtime  insulin lispro (ADMELOG) corrective regimen sliding scale   SubCutaneous three times a day before meals  insulin lispro (ADMELOG) corrective regimen sliding scale   SubCutaneous at bedtime  insulin lispro Injectable (ADMELOG) 10 Unit(s) SubCutaneous three times a day before meals  ipratropium    for Nebulization 500 MICROGram(s) Nebulizer every 6 hours  melatonin 5 milliGRAM(s) Oral at bedtime  multivitamin/minerals 1 Tablet(s) Oral daily  pantoprazole    Tablet 40 milliGRAM(s) Oral before breakfast  polyethylene glycol 3350 17 Gram(s) Oral once  polyethylene glycol 3350 17 Gram(s) Oral every 12 hours  senna 2 Tablet(s) Oral at bedtime  sodium chloride 0.9%. 1000 milliLiter(s) (10 mL/Hr) IV Continuous <Continuous>  traZODone 50 milliGRAM(s) Oral at bedtime      VITAL:  T(C): , Max: 37.2 (07-02-25 @ 19:05)  T(F): , Max: 98.9 (07-02-25 @ 19:05)  HR: 88 (07-03-25 @ 07:00)  BP: 103/55 (07-03-25 @ 06:00)  BP(mean): 76 (07-03-25 @ 06:00)  RR: 17 (07-03-25 @ 07:00)  SpO2: 98% (07-03-25 @ 07:00)  Wt(kg): --    I and O's:    07-02 @ 07:01  -  07-03 @ 07:00  --------------------------------------------------------  IN: 1047.4 mL / OUT: 1500 mL / NET: -452.6 mL        Weight (kg): 84.2 (07-03 @ 00:07)    PHYSICAL EXAM:    Constitutional: NAD  Neck:  No JVD  Respiratory: CTAB/L  Cardiovascular: S1 and S2  Gastrointestinal: BS+, soft, NT/ND  Extremities: No peripheral edema  Neurological: A/O x 3, no focal deficits  Psychiatric: Normal mood, normal affect  : No Ag  Skin: No rashes  Access: Not applicable    LABS:                        8.5    16.31 )-----------( 181      ( 03 Jul 2025 00:26 )             27.1     07-03    134[L]  |  96  |  41[H]  ----------------------------<  145[H]  4.1   |  25  |  5.87[H]    Ca    10.4      03 Jul 2025 00:26  Phos  3.7     07-03  Mg     2.6     07-03    TPro  6.2  /  Alb  3.1[L]  /  TBili  0.3  /  DBili  x   /  AST  26  /  ALT  25  /  AlkPhos  125[H]  07-03          Urine Studies:  Urinalysis Basic - ( 03 Jul 2025 00:26 )    Color: x / Appearance: x / SG: x / pH: x  Gluc: 145 mg/dL / Ketone: x  / Bili: x / Urobili: x   Blood: x / Protein: x / Nitrite: x   Leuk Esterase: x / RBC: x / WBC x   Sq Epi: x / Non Sq Epi: x / Bacteria: x            RADIOLOGY & ADDITIONAL STUDIES:          < from: Xray Chest 1 View- PORTABLE-Routine (Xray Chest 1 View- PORTABLE-Routine in AM.) (07.02.25 @ 04:22) >    ACC: [id number] EXAM:  XR CHEST PORTABLE ROUTINE 1V   ORDERED BY: BERNARDO AYALA     PROCEDURE DATE:  07/02/2025          INTERPRETATION:  DATE OF STUDY: 7/2/25    PRIOR: 7/1/25    CLINICAL INDICATION: Postop    TECHNIQUE: AP radiograph of the chest.    FINDINGS:  Left IJ central venous catheter unchanged.  S/P removal of ECMO catheters.  MItral valve ring, left atrial appendage clips and sternal wires again   seen.  The heart is similar in size.  Stable bibasilar atelectasis.  Similar mild pulmonary congestion. No pneumothorax.    IMPRESSION:  No significant interval  change in bilateral pleural-parenchymal pattern.    --- End of Report ---            < end of copied text >

## 2025-07-03 NOTE — PROGRESS NOTE ADULT - ASSESSMENT
70 year old male PMH of HTN, HLD, DM2, CAD SP  Mitral valve replacement, CABG x3 and RVAD placement    Assessment  DMT2: 70y Male with DM T2 with hyperglycemia, A1C 6.8%, was on  insulin at home, now on basal bolus insulin with coverage, blood sugars running high postop   CAD: on medications, stable, monitored. sp CABG  HTN: on antihypertensive medications, monitored, asymptomatic.  ESRD: On hemodialysis, Monitor labs/BMP    Discussed plan and management wit Dr Francisco Javier Mcintyre MD  Cell: [phone]  Office: [phone]             70 year old male PMH of HTN, HLD, DM2, CAD SP  Mitral valve replacement, CABG x3 and RVAD placement      Assessment  DMT2: 70y Male with DM T2 with hyperglycemia, A1C 6.8%, was on  insulin at home, now on basal bolus insulin with coverage, blood sugars running high postop   CAD: on medications, stable, monitored. sp CABG  HTN: on antihypertensive medications, monitored, asymptomatic.  ESRD: On hemodialysis, Monitor labs/BMP    Discussed plan and management wit Dr Francisco Javier Mcintyre MD  Cell: [phone]  Office: [phone]

## 2025-07-03 NOTE — PROGRESS NOTE ADULT - ASSESSMENT
69 yo M with cardiogenic shock on RVAD ,DM2, CAD (total  4 coronary stents, last one PCI in 2024 ), CHF with EF 40-45%, severe MR     s/p Mitral valve replacement, CABG x3 and RVAD placement   1 unit prbc    RVAD replaced and removal of PA cannula   bronch with IP and extubated - bronch BAL + Serratia    epi weaned off    overnight hypothermic, elevated procal, blood cx (negative), added Vanco   CRRT D/C'D   Interval removal left-sided chest tube without pneumothorax. Bibasilar atelectasis   [facility] TLC   BC negative    RVAD removal     weaned; iHD  7/3 transferred to [address] [place] [address]. Continues on cefepime until , 7 day course (off vanco)    Disposition: Acute Rehab

## 2025-07-03 NOTE — PROGRESS NOTE ADULT - SUBJECTIVE AND OBJECTIVE BOX
Chief complaint  Patient is a 70y old  Male who presents with a chief complaint of cardiac failure (01 Jul 2025 18:46)         Labs and Fingersticks  CAPILLARY BLOOD GLUCOSE  157 (03 Jul 2025 11:00)  118 (03 Jul 2025 07:00)  155 (02 Jul 2025 21:00)  135 (02 Jul 2025 19:00)  214 (02 Jul 2025 16:00)      POCT Blood Glucose.: 157 mg/dL (03 Jul 2025 11:47)  POCT Blood Glucose.: 111 mg/dL (03 Jul 2025 08:06)  POCT Blood Glucose.: 118 mg/dL (03 Jul 2025 06:42)  POCT Blood Glucose.: 155 mg/dL (02 Jul 2025 21:05)  POCT Blood Glucose.: 135 mg/dL (02 Jul 2025 19:08)  POCT Blood Glucose.: 214 mg/dL (02 Jul 2025 16:22)      Anion Gap: 13 (07-03 @ 00:26)  Anion Gap: 15 (07-02 @ 00:32)      Calcium: 10.4 (07-03 @ 00:26)  Calcium: 10.8 *H* (07-02 @ 00:32)  Albumin: 3.1 *L* (07-03 @ 00:26)  Albumin: 3.2 *L* (07-02 @ 00:32)    Alanine Aminotransferase (ALT/SGPT): 25 (07-03 @ 00:26)  Alanine Aminotransferase (ALT/SGPT): 24 (07-02 @ 00:32)  Alkaline Phosphatase: 125 *H* (07-03 @ 00:26)  Alkaline Phosphatase: 111 (07-02 @ 00:32)  Aspartate Aminotransferase (AST/SGOT): 26 (07-03 @ 00:26)  Aspartate Aminotransferase (AST/SGOT): 21 (07-02 @ 00:32)        07-03    134[L]  |  96  |  41[H]  ----------------------------<  145[H]  4.1   |  25  |  5.87[H]    Ca    10.4      03 Jul 2025 00:26  Phos  3.7     07-03  Mg     2.6     07-03    TPro  6.2  /  Alb  3.1[L]  /  TBili  0.3  /  DBili  x   /  AST  26  /  ALT  25  /  AlkPhos  125[H]  07-03                        8.5    16.31 )-----------( 181      ( 03 Jul 2025 00:26 )             27.1     Medications  MEDICATIONS  (STANDING):  aMIOdarone    Tablet 200 milliGRAM(s) Oral daily  aspirin enteric coated 81 milliGRAM(s) Oral daily  atorvastatin 80 milliGRAM(s) Oral at bedtime  bisacodyl Suppository 10 milliGRAM(s) Rectal once  cefepime   IVPB 1000 milliGRAM(s) IV Intermittent at bedtime  chlorhexidine 4% Liquid 1 Application(s) Topical daily  dextrose 5%. 1000 milliLiter(s) (100 mL/Hr) IV Continuous <Continuous>  dextrose 50% Injectable 50 milliLiter(s) IV Push every 15 minutes  dextrose 50% Injectable 25 milliLiter(s) IV Push every 15 minutes  glucagon  Injectable 1 milliGRAM(s) IntraMuscular once  heparin   Injectable 5000 Unit(s) SubCutaneous every 8 hours  insulin glargine Injectable (LANTUS) 24 Unit(s) SubCutaneous at bedtime  insulin lispro (ADMELOG) corrective regimen sliding scale   SubCutaneous three times a day before meals  insulin lispro (ADMELOG) corrective regimen sliding scale   SubCutaneous at bedtime  insulin lispro Injectable (ADMELOG) 10 Unit(s) SubCutaneous three times a day before meals  ipratropium    for Nebulization 500 MICROGram(s) Nebulizer every 6 hours  melatonin 5 milliGRAM(s) Oral at bedtime  metoprolol tartrate 12.5 milliGRAM(s) Oral two times a day  multivitamin/minerals 1 Tablet(s) Oral daily  pantoprazole    Tablet 40 milliGRAM(s) Oral before breakfast  polyethylene glycol 3350 17 Gram(s) Oral every 12 hours  senna 2 Tablet(s) Oral at bedtime  sodium chloride 0.9%. 1000 milliLiter(s) (10 mL/Hr) IV Continuous <Continuous>  traZODone 50 milliGRAM(s) Oral at bedtime      Physical Exam  General: Patient comfortable in bed   Vital Signs Last 12 Hrs  T(F): 98.6 (07-03-25 @ 12:00), Max: 98.6 (07-03-25 @ 12:00)  HR: 86 (07-03-25 @ 14:00) (84 - 100)  BP: 109/55 (07-03-25 @ 14:00) (102/54 - 121/57)  BP(mean): 79 (07-03-25 @ 14:00) (75 - 81)  RR: 20 (07-03-25 @ 12:00) (14 - 25)  SpO2: 98% (07-03-25 @ 14:00) (92% - 100%)    CVS: S1S2   Respiratory: No wheezing, no crepitations  GI: Abdomen soft, bowel sounds positive  Musculoskeletal:  moves all extremities         Chief complaint  Patient is a 70y old  Male who presents with a chief complaint of cardiac failure (01 Jul 2025 18:46)     Labs and Fingersticks  CAPILLARY BLOOD GLUCOSE  157 (03 Jul 2025 11:00)  118 (03 Jul 2025 07:00)  155 (02 Jul 2025 21:00)  135 (02 Jul 2025 19:00)  214 (02 Jul 2025 16:00)      POCT Blood Glucose.: 157 mg/dL (03 Jul 2025 11:47)  POCT Blood Glucose.: 111 mg/dL (03 Jul 2025 08:06)  POCT Blood Glucose.: 118 mg/dL (03 Jul 2025 06:42)  POCT Blood Glucose.: 155 mg/dL (02 Jul 2025 21:05)  POCT Blood Glucose.: 135 mg/dL (02 Jul 2025 19:08)  POCT Blood Glucose.: 214 mg/dL (02 Jul 2025 16:22)      Anion Gap: 13 (07-03 @ 00:26)  Anion Gap: 15 (07-02 @ 00:32)      Calcium: 10.4 (07-03 @ 00:26)  Calcium: 10.8 *H* (07-02 @ 00:32)  Albumin: 3.1 *L* (07-03 @ 00:26)  Albumin: 3.2 *L* (07-02 @ 00:32)    Alanine Aminotransferase (ALT/SGPT): 25 (07-03 @ 00:26)  Alanine Aminotransferase (ALT/SGPT): 24 (07-02 @ 00:32)  Alkaline Phosphatase: 125 *H* (07-03 @ 00:26)  Alkaline Phosphatase: 111 (07-02 @ 00:32)  Aspartate Aminotransferase (AST/SGOT): 26 (07-03 @ 00:26)  Aspartate Aminotransferase (AST/SGOT): 21 (07-02 @ 00:32)        07-03    134[L]  |  96  |  41[H]  ----------------------------<  145[H]  4.1   |  25  |  5.87[H]    Ca    10.4      03 Jul 2025 00:26  Phos  3.7     07-03  Mg     2.6     07-03    TPro  6.2  /  Alb  3.1[L]  /  TBili  0.3  /  DBili  x   /  AST  26  /  ALT  25  /  AlkPhos  125[H]  07-03                        8.5    16.31 )-----------( 181      ( 03 Jul 2025 00:26 )             27.1     Medications  MEDICATIONS  (STANDING):  aMIOdarone    Tablet 200 milliGRAM(s) Oral daily  aspirin enteric coated 81 milliGRAM(s) Oral daily  atorvastatin 80 milliGRAM(s) Oral at bedtime  bisacodyl Suppository 10 milliGRAM(s) Rectal once  cefepime   IVPB 1000 milliGRAM(s) IV Intermittent at bedtime  chlorhexidine 4% Liquid 1 Application(s) Topical daily  dextrose 5%. 1000 milliLiter(s) (100 mL/Hr) IV Continuous <Continuous>  dextrose 50% Injectable 50 milliLiter(s) IV Push every 15 minutes  dextrose 50% Injectable 25 milliLiter(s) IV Push every 15 minutes  glucagon  Injectable 1 milliGRAM(s) IntraMuscular once  heparin   Injectable 5000 Unit(s) SubCutaneous every 8 hours  insulin glargine Injectable (LANTUS) 24 Unit(s) SubCutaneous at bedtime  insulin lispro (ADMELOG) corrective regimen sliding scale   SubCutaneous three times a day before meals  insulin lispro (ADMELOG) corrective regimen sliding scale   SubCutaneous at bedtime  insulin lispro Injectable (ADMELOG) 10 Unit(s) SubCutaneous three times a day before meals  ipratropium    for Nebulization 500 MICROGram(s) Nebulizer every 6 hours  melatonin 5 milliGRAM(s) Oral at bedtime  metoprolol tartrate 12.5 milliGRAM(s) Oral two times a day  multivitamin/minerals 1 Tablet(s) Oral daily  pantoprazole    Tablet 40 milliGRAM(s) Oral before breakfast  polyethylene glycol 3350 17 Gram(s) Oral every 12 hours  senna 2 Tablet(s) Oral at bedtime  sodium chloride 0.9%. 1000 milliLiter(s) (10 mL/Hr) IV Continuous <Continuous>  traZODone 50 milliGRAM(s) Oral at bedtime      Physical Exam  General: Patient comfortable in bed   Vital Signs Last 12 Hrs  T(F): 98.6 (07-03-25 @ 12:00), Max: 98.6 (07-03-25 @ 12:00)  HR: 86 (07-03-25 @ 14:00) (84 - 100)  BP: 109/55 (07-03-25 @ 14:00) (102/54 - 121/57)  BP(mean): 79 (07-03-25 @ 14:00) (75 - 81)  RR: 20 (07-03-25 @ 12:00) (14 - 25)  SpO2: 98% (07-03-25 @ 14:00) (92% - 100%)    CVS: S1S2   Respiratory: No wheezing, no crepitations  GI: Abdomen soft, bowel sounds positive  Musculoskeletal:  moves all extremities

## 2025-07-03 NOTE — PROGRESS NOTE ADULT - SUBJECTIVE AND OBJECTIVE BOX
VITAL SIGNS    Subjective: "im okay" Denies CP, palpitation, SOB, BETANCOURT, HA, dizziness, N/V/D, fever or chills.  No acute event noted overnight.    Telemetry:      Vital Signs Last 24 Hrs  T(C): 37 (25 @ 12:00), Max: 37.2 (25 @ 19:05)  T(F): 98.6 (25 @ 12:00), Max: 98.9 (25 @ 19:05)  HR: 86 (25 @ 14:00) (84 - 100)  BP: 109/55 (25 @ 14:00) (102/54 - 128/58)  RR: 20 (25 @ 12:00) (13 - 25)  SpO2: 98% (25 @ 14:00) (92% - 100%)                @ 07:01  -   @ 07:00  --------------------------------------------------------  IN: 1047.4 mL / OUT: 1500 mL / NET: -452.6 mL     @ 07:01  -   @ 15:52  --------------------------------------------------------  IN: 430 mL / OUT: 0 mL / NET: 430 mL        LABS      134[L]  |  96  |  41[H]  ----------------------------<  145[H]  4.1   |  25  |  5.87[H]    Ca    10.4      2025 00:26  Phos  3.7       Mg     2.6         TPro  6.2  /  Alb  3.1[L]  /  TBili  0.3  /  DBili  x   /  AST  26  /  ALT  25  /  AlkPhos  125[H]                                   8.5    16.31 )-----------( 181      ( 2025 00:26 )             27.1                  Daily     Daily Weight in k.2 (2025 00:00)      CAPILLARY BLOOD GLUCOSE  157 (2025 11:00)  118 (2025 07:00)  155 (2025 21:00)  135 (2025 19:00)  214 (2025 16:00)      POCT Blood Glucose.: 157 mg/dL (2025 11:47)  POCT Blood Glucose.: 111 mg/dL (2025 08:06)  POCT Blood Glucose.: 118 mg/dL (2025 06:42)  POCT Blood Glucose.: 155 mg/dL (2025 21:05)  POCT Blood Glucose.: 135 mg/dL (2025 19:08)  POCT Blood Glucose.: 214 mg/dL (2025 16:22)          Drains:     MS         [  ] Drainage:                 L Pleural  [  ]  Drainage:                R Pleural  [  ]  Drainage:    Pacing Wires        [ x ]   Settings:      2a 2v                             Isolated  [ x ]    Coumadin    [ ] YES          [  ]      NO         Reason:           PHYSICAL EXAM      Neurology: alert and oriented x 3, nonfocal, no gross deficits    CV: +s1, s2. no heart murmurs heard     Sternal Wound: MSI -->CDI, sternum stable    Lungs: CTA B/L     Abdomen: soft, nontender, nondistended, positive bowel sounds, (+) Flatus; (+) BM     :  Voiding               Extremities:  B/L LE (+) edema; negative calf tenderness; (+) 2 DP palpable        +right neck and right groin suture c/d/i     acetaminophen     Tablet .. 650 milliGRAM(s) Oral every 6 hours PRN  aMIOdarone    Tablet 200 milliGRAM(s) Oral daily  artificial  tears Solution 1 Drop(s) Both EYES every 1 hour PRN  aspirin enteric coated 81 milliGRAM(s) Oral daily  atorvastatin 80 milliGRAM(s) Oral at bedtime  bisacodyl Suppository 10 milliGRAM(s) Rectal once  cefepime   IVPB 1000 milliGRAM(s) IV Intermittent at bedtime  chlorhexidine 4% Liquid 1 Application(s) Topical daily  dextrose 5%. 1000 milliLiter(s) IV Continuous <Continuous>  dextrose 50% Injectable 50 milliLiter(s) IV Push every 15 minutes  dextrose 50% Injectable 25 milliLiter(s) IV Push every 15 minutes  dextrose Oral Gel 15 Gram(s) Oral once PRN  glucagon  Injectable 1 milliGRAM(s) IntraMuscular once  heparin   Injectable 5000 Unit(s) SubCutaneous every 8 hours  insulin glargine Injectable (LANTUS) 24 Unit(s) SubCutaneous at bedtime  insulin lispro (ADMELOG) corrective regimen sliding scale   SubCutaneous three times a day before meals  insulin lispro (ADMELOG) corrective regimen sliding scale   SubCutaneous at bedtime  insulin lispro Injectable (ADMELOG) 10 Unit(s) SubCutaneous three times a day before meals  ipratropium    for Nebulization 500 MICROGram(s) Nebulizer every 6 hours  melatonin 5 milliGRAM(s) Oral at bedtime  metoprolol tartrate 12.5 milliGRAM(s) Oral two times a day  multivitamin/minerals 1 Tablet(s) Oral daily  pantoprazole    Tablet 40 milliGRAM(s) Oral before breakfast  polyethylene glycol 3350 17 Gram(s) Oral every 12 hours  senna 2 Tablet(s) Oral at bedtime  sodium chloride 0.9% lock flush 3 milliLiter(s) IV Push every 8 hours  sodium chloride 0.9%. 1000 milliLiter(s) IV Continuous <Continuous>  traZODone 50 milliGRAM(s) Oral at bedtime                 Physical Therapy Rec:   Home  [  ]   Home w/ PT  [  ]  Rehab  [ X ]    Discussed with Cardiothoracic Team at AM rounds.

## 2025-07-03 NOTE — PROGRESS NOTE ADULT - SUBJECTIVE AND OBJECTIVE BOX
INFECTIOUS DISEASES FOLLOW UP-- Elza Lopez  [phone]    This is a follow up note for this  70yMale with  Atherosclerosis of native coronary artery without angina pectoris    Nonrheumatic mitral annular calcification        ROS:  CONSTITUTIONAL:  No fever, good appetite  CARDIOVASCULAR:  No chest pain or palpitations  RESPIRATORY:  No dyspnea  GASTROINTESTINAL:  No nausea, vomiting, diarrhea, or abdominal pain  GENITOURINARY:  No dysuria  NEUROLOGIC:  No headache,     Allergies    No Known Allergies    Intolerances        ANTIBIOTICS/RELEVANT:  antimicrobials  cefepime   IVPB 1000 milliGRAM(s) IV Intermittent at bedtime    immunologic:    OTHER:  acetaminophen     Tablet .. 650 milliGRAM(s) Oral every 6 hours PRN  aMIOdarone    Tablet 200 milliGRAM(s) Oral daily  artificial  tears Solution 1 Drop(s) Both EYES every 1 hour PRN  aspirin enteric coated 81 milliGRAM(s) Oral daily  atorvastatin 80 milliGRAM(s) Oral at bedtime  bisacodyl Suppository 10 milliGRAM(s) Rectal once  chlorhexidine 4% Liquid 1 Application(s) Topical daily  dextrose 5%. 1000 milliLiter(s) IV Continuous <Continuous>  dextrose 50% Injectable 50 milliLiter(s) IV Push every 15 minutes  dextrose 50% Injectable 25 milliLiter(s) IV Push every 15 minutes  dextrose Oral Gel 15 Gram(s) Oral once PRN  glucagon  Injectable 1 milliGRAM(s) IntraMuscular once  heparin   Injectable 5000 Unit(s) SubCutaneous every 8 hours  insulin glargine Injectable (LANTUS) 24 Unit(s) SubCutaneous at bedtime  insulin lispro (ADMELOG) corrective regimen sliding scale   SubCutaneous three times a day before meals  insulin lispro (ADMELOG) corrective regimen sliding scale   SubCutaneous at bedtime  insulin lispro Injectable (ADMELOG) 10 Unit(s) SubCutaneous three times a day before meals  ipratropium    for Nebulization 500 MICROGram(s) Nebulizer every 6 hours  melatonin 5 milliGRAM(s) Oral at bedtime  metoprolol tartrate 12.5 milliGRAM(s) Oral two times a day  multivitamin/minerals 1 Tablet(s) Oral daily  pantoprazole    Tablet 40 milliGRAM(s) Oral before breakfast  polyethylene glycol 3350 17 Gram(s) Oral every 12 hours  senna 2 Tablet(s) Oral at bedtime  sodium chloride 0.9% lock flush 3 milliLiter(s) IV Push every 8 hours  sodium chloride 0.9%. 1000 milliLiter(s) IV Continuous <Continuous>  traZODone 50 milliGRAM(s) Oral at bedtime      Objective:  Vital Signs Last 24 Hrs  T(C): 36.7 (03 Jul 2025 19:22), Max: 37 (03 Jul 2025 12:00)  T(F): 98.1 (03 Jul 2025 19:22), Max: 98.6 (03 Jul 2025 12:00)  HR: 81 (03 Jul 2025 19:22) (81 - 100)  BP: 112/61 (03 Jul 2025 19:22) (102/54 - 121/57)  BP(mean): 79 (03 Jul 2025 14:00) (75 - 81)  RR: 18 (03 Jul 2025 19:22) (14 - 25)  SpO2: 96% (03 Jul 2025 19:22) (92% - 100%)    Parameters below as of 03 Jul 2025 19:22  Patient On (Oxygen Delivery Method): nasal cannula        PHYSICAL EXAM:  Constitutional:no acute distress  Eyes:FLOWER, EOMI  Ear/Nose/Throat: no oral lesions, 	  Respiratory: clear BL  Cardiovascular: S1S2 sternotomy dressing cDI  Gastrointestinal:soft, (+) BS, no tenderness  Extremities:no e/e/c left old cannula site  rUE AVF  No Lymphadenopathy  IV sites not inflammed.    LABS:                        8.5    16.31 )-----------( 181      ( 03 Jul 2025 00:26 )             27.1     07-03    134[L]  |  96  |  41[H]  ----------------------------<  145[H]  4.1   |  25  |  5.87[H]    Ca    10.4      03 Jul 2025 00:26  Phos  3.7     07-03  Mg     2.6     07-03    TPro  6.2  /  Alb  3.1[L]  /  TBili  0.3  /  DBili  x   /  AST  26  /  ALT  25  /  AlkPhos  125[H]  07-03      Urinalysis Basic - ( 03 Jul 2025 00:26 )    Color: x / Appearance: x / SG: x / pH: x  Gluc: 145 mg/dL / Ketone: x  / Bili: x / Urobili: x   Blood: x / Protein: x / Nitrite: x   Leuk Esterase: x / RBC: x / WBC x   Sq Epi: x / Non Sq Epi: x / Bacteria: x        MICROBIOLOGY:            RECENT CULTURES:  06-30 @ 14:40  Blood Blood-Venous  --  --  --    No growth at 72 Hours  --  06-27 @ 03:30  Blood Blood  --  --  --    No growth at 5 days  --  06-27 @ 02:45  Blood Blood  --  --  --    No growth at 5 days  --      RADIOLOGY & ADDITIONAL STUDIES:    < from: Xray Chest 1 View- PORTABLE-Routine (Xray Chest 1 View- PORTABLE-Routine in AM.) (07.02.25 @ 04:22) >  IMPRESSION:  No significant interval  change in bilateral pleural-parenchymal pattern.    < end of copied text >   INFECTIOUS DISEASES FOLLOW UP-- Elza Lopez  [phone]    This is a follow up note for this  70yMale with  Atherosclerosis of native coronary artery without angina pectoris    Nonrheumatic mitral annular calcification    s/p valve surgery    ROS:  CONSTITUTIONAL:  No fever, good appetite  CARDIOVASCULAR:  No chest pain or palpitations  RESPIRATORY:  No dyspnea  GASTROINTESTINAL:  No nausea, vomiting, diarrhea, or abdominal pain  GENITOURINARY:  No dysuria  NEUROLOGIC:  No headache,     Allergies    No Known Allergies    Intolerances        ANTIBIOTICS/RELEVANT:  antimicrobials  cefepime   IVPB 1000 milliGRAM(s) IV Intermittent at bedtime    immunologic:    OTHER:  acetaminophen     Tablet .. 650 milliGRAM(s) Oral every 6 hours PRN  aMIOdarone    Tablet 200 milliGRAM(s) Oral daily  artificial  tears Solution 1 Drop(s) Both EYES every 1 hour PRN  aspirin enteric coated 81 milliGRAM(s) Oral daily  atorvastatin 80 milliGRAM(s) Oral at bedtime  bisacodyl Suppository 10 milliGRAM(s) Rectal once  chlorhexidine 4% Liquid 1 Application(s) Topical daily  dextrose 5%. 1000 milliLiter(s) IV Continuous <Continuous>  dextrose 50% Injectable 50 milliLiter(s) IV Push every 15 minutes  dextrose 50% Injectable 25 milliLiter(s) IV Push every 15 minutes  dextrose Oral Gel 15 Gram(s) Oral once PRN  glucagon  Injectable 1 milliGRAM(s) IntraMuscular once  heparin   Injectable 5000 Unit(s) SubCutaneous every 8 hours  insulin glargine Injectable (LANTUS) 24 Unit(s) SubCutaneous at bedtime  insulin lispro (ADMELOG) corrective regimen sliding scale   SubCutaneous three times a day before meals  insulin lispro (ADMELOG) corrective regimen sliding scale   SubCutaneous at bedtime  insulin lispro Injectable (ADMELOG) 10 Unit(s) SubCutaneous three times a day before meals  ipratropium    for Nebulization 500 MICROGram(s) Nebulizer every 6 hours  melatonin 5 milliGRAM(s) Oral at bedtime  metoprolol tartrate 12.5 milliGRAM(s) Oral two times a day  multivitamin/minerals 1 Tablet(s) Oral daily  pantoprazole    Tablet 40 milliGRAM(s) Oral before breakfast  polyethylene glycol 3350 17 Gram(s) Oral every 12 hours  senna 2 Tablet(s) Oral at bedtime  sodium chloride 0.9% lock flush 3 milliLiter(s) IV Push every 8 hours  sodium chloride 0.9%. 1000 milliLiter(s) IV Continuous <Continuous>  traZODone 50 milliGRAM(s) Oral at bedtime      Objective:  Vital Signs Last 24 Hrs  T(C): 36.7 (03 Jul 2025 19:22), Max: 37 (03 Jul 2025 12:00)  T(F): 98.1 (03 Jul 2025 19:22), Max: 98.6 (03 Jul 2025 12:00)  HR: 81 (03 Jul 2025 19:22) (81 - 100)  BP: 112/61 (03 Jul 2025 19:22) (102/54 - 121/57)  BP(mean): 79 (03 Jul 2025 14:00) (75 - 81)  RR: 18 (03 Jul 2025 19:22) (14 - 25)  SpO2: 96% (03 Jul 2025 19:22) (92% - 100%)    Parameters below as of 03 Jul 2025 19:22  Patient On (Oxygen Delivery Method): nasal cannula        PHYSICAL EXAM:  Constitutional:no acute distress  Eyes:FLOWER, EOMI  Ear/Nose/Throat: no oral lesions, 	  Respiratory: clear BL  Cardiovascular: S1S2 sternotomy dressing cDI  Gastrointestinal:soft, (+) BS, no tenderness  Extremities:no e/e/c left old cannula site  rUE AVF  No Lymphadenopathy  IV sites not inflammed.    LABS:                        8.5    16.31 )-----------( 181      ( 03 Jul 2025 00:26 )             27.1     07-03    134[L]  |  96  |  41[H]  ----------------------------<  145[H]  4.1   |  25  |  5.87[H]    Ca    10.4      03 Jul 2025 00:26  Phos  3.7     07-03  Mg     2.6     07-03    TPro  6.2  /  Alb  3.1[L]  /  TBili  0.3  /  DBili  x   /  AST  26  /  ALT  25  /  AlkPhos  125[H]  07-03      Urinalysis Basic - ( 03 Jul 2025 00:26 )    Color: x / Appearance: x / SG: x / pH: x  Gluc: 145 mg/dL / Ketone: x  / Bili: x / Urobili: x   Blood: x / Protein: x / Nitrite: x   Leuk Esterase: x / RBC: x / WBC x   Sq Epi: x / Non Sq Epi: x / Bacteria: x        MICROBIOLOGY:            RECENT CULTURES:  06-30 @ 14:40  Blood Blood-Venous  --  --  --    No growth at 72 Hours  --  06-27 @ 03:30  Blood Blood  --  --  --    No growth at 5 days  --  06-27 @ 02:45  Blood Blood  --  --  --    No growth at 5 days  --      RADIOLOGY & ADDITIONAL STUDIES:    < from: Xray Chest 1 View- PORTABLE-Routine (Xray Chest 1 View- PORTABLE-Routine in AM.) (07.02.25 @ 04:22) >  IMPRESSION:  No significant interval  change in bilateral pleural-parenchymal pattern.    < end of copied text >

## 2025-07-03 NOTE — PROGRESS NOTE ADULT - PROBLEM SELECTOR PLAN 1
Continue with  mg PO Daily.   Continue with Lopressor mg 12.5 BID  PO.   Continue with Lipitor mg 80 PO HS    Off diuretics   Increase activity as tolerated.   Encourage Chest PT / Pulmonary toileting and Incentive spirometry every 1 hour x 10 while awake.   Continue with Protonix 40 mg PO daily and Heparin 5000 units SQ daily for PUD and DVT prophylaxis.   Sternal precautions and wound care  Shower on POD #5.   D/C plan home once medically cleared   Plan of care discussed with attending

## 2025-07-04 LAB
ALBUMIN SERPL ELPH-MCNC: 2.8 G/DL — LOW (ref 3.3–5)
ALP SERPL-CCNC: 124 U/L — HIGH (ref 40–120)
ALT FLD-CCNC: 26 U/L — SIGNIFICANT CHANGE UP (ref 10–45)
ANION GAP SERPL CALC-SCNC: 19 MMOL/L — HIGH (ref 5–17)
AST SERPL-CCNC: 20 U/L — SIGNIFICANT CHANGE UP (ref 10–40)
BASOPHILS # BLD AUTO: 0.08 K/UL — SIGNIFICANT CHANGE UP (ref 0–0.2)
BASOPHILS NFR BLD AUTO: 0.6 % — SIGNIFICANT CHANGE UP (ref 0–2)
BILIRUB SERPL-MCNC: 0.3 MG/DL — SIGNIFICANT CHANGE UP (ref 0.2–1.2)
BUN SERPL-MCNC: 66 MG/DL — HIGH (ref 7–23)
CALCIUM SERPL-MCNC: 10.7 MG/DL — HIGH (ref 8.4–10.5)
CHLORIDE SERPL-SCNC: 93 MMOL/L — LOW (ref 96–108)
CO2 SERPL-SCNC: 21 MMOL/L — LOW (ref 22–31)
CREAT SERPL-MCNC: 8.51 MG/DL — HIGH (ref 0.5–1.3)
EGFR: 6 ML/MIN/1.73M2 — LOW
EGFR: 6 ML/MIN/1.73M2 — LOW
EOSINOPHIL # BLD AUTO: 0.19 K/UL — SIGNIFICANT CHANGE UP (ref 0–0.5)
EOSINOPHIL NFR BLD AUTO: 1.3 % — SIGNIFICANT CHANGE UP (ref 0–6)
GLUCOSE BLDC GLUCOMTR-MCNC: 159 MG/DL — HIGH (ref 70–99)
GLUCOSE BLDC GLUCOMTR-MCNC: 205 MG/DL — HIGH (ref 70–99)
GLUCOSE BLDC GLUCOMTR-MCNC: 219 MG/DL — HIGH (ref 70–99)
GLUCOSE BLDC GLUCOMTR-MCNC: 224 MG/DL — HIGH (ref 70–99)
GLUCOSE SERPL-MCNC: 191 MG/DL — HIGH (ref 70–99)
HAPTOGLOB SERPL-MCNC: 207 MG/DL — HIGH (ref 34–200)
HCT VFR BLD CALC: 25.6 % — LOW (ref 39–50)
HGB BLD-MCNC: 7.9 G/DL — LOW (ref 13–17)
IMM GRANULOCYTES # BLD AUTO: 0.17 K/UL — HIGH (ref 0–0.07)
IMM GRANULOCYTES NFR BLD AUTO: 1.2 % — HIGH (ref 0–0.9)
LDH SERPL L TO P-CCNC: 228 U/L — SIGNIFICANT CHANGE UP (ref 50–242)
LYMPHOCYTES # BLD AUTO: 1.3 K/UL — SIGNIFICANT CHANGE UP (ref 1–3.3)
LYMPHOCYTES NFR BLD AUTO: 9.2 % — LOW (ref 13–44)
MAGNESIUM SERPL-MCNC: 2.7 MG/DL — HIGH (ref 1.6–2.6)
MCHC RBC-ENTMCNC: 27 PG — SIGNIFICANT CHANGE UP (ref 27–34)
MCHC RBC-ENTMCNC: 30.9 G/DL — LOW (ref 32–36)
MCV RBC AUTO: 87.4 FL — SIGNIFICANT CHANGE UP (ref 80–100)
MONOCYTES # BLD AUTO: 1.85 K/UL — HIGH (ref 0–0.9)
MONOCYTES NFR BLD AUTO: 13 % — SIGNIFICANT CHANGE UP (ref 2–14)
NEUTROPHILS # BLD AUTO: 10.61 K/UL — HIGH (ref 1.8–7.4)
NEUTROPHILS NFR BLD AUTO: 74.7 % — SIGNIFICANT CHANGE UP (ref 43–77)
NRBC # BLD AUTO: 0 K/UL — SIGNIFICANT CHANGE UP (ref 0–0)
NRBC # FLD: 0 K/UL — SIGNIFICANT CHANGE UP (ref 0–0)
NRBC BLD AUTO-RTO: 0 /100 WBCS — SIGNIFICANT CHANGE UP (ref 0–0)
PHOSPHATE SERPL-MCNC: 4.8 MG/DL — HIGH (ref 2.5–4.5)
PLATELET # BLD AUTO: 209 K/UL — SIGNIFICANT CHANGE UP (ref 150–400)
PMV BLD: 11.4 FL — SIGNIFICANT CHANGE UP (ref 7–13)
POTASSIUM SERPL-MCNC: 4.7 MMOL/L — SIGNIFICANT CHANGE UP (ref 3.5–5.3)
POTASSIUM SERPL-SCNC: 4.7 MMOL/L — SIGNIFICANT CHANGE UP (ref 3.5–5.3)
PROT SERPL-MCNC: 6.1 G/DL — SIGNIFICANT CHANGE UP (ref 6–8.3)
RBC # BLD: 2.93 M/UL — LOW (ref 4.2–5.8)
RBC # FLD: 18.4 % — HIGH (ref 10.3–14.5)
SODIUM SERPL-SCNC: 133 MMOL/L — LOW (ref 135–145)
WBC # BLD: 14.2 K/UL — HIGH (ref 3.8–10.5)
WBC # FLD AUTO: 14.2 K/UL — HIGH (ref 3.8–10.5)

## 2025-07-04 PROCEDURE — 82330 ASSAY OF CALCIUM: CPT

## 2025-07-04 PROCEDURE — P9016: CPT

## 2025-07-04 PROCEDURE — 87640 STAPH A DNA AMP PROBE: CPT

## 2025-07-04 PROCEDURE — 85014 HEMATOCRIT: CPT

## 2025-07-04 PROCEDURE — 87641 MR-STAPH DNA AMP PROBE: CPT

## 2025-07-04 PROCEDURE — 85730 THROMBOPLASTIN TIME PARTIAL: CPT

## 2025-07-04 PROCEDURE — 86901 BLOOD TYPING SEROLOGIC RH(D): CPT

## 2025-07-04 PROCEDURE — 87040 BLOOD CULTURE FOR BACTERIA: CPT

## 2025-07-04 PROCEDURE — C1751: CPT

## 2025-07-04 PROCEDURE — 84132 ASSAY OF SERUM POTASSIUM: CPT

## 2025-07-04 PROCEDURE — 85018 HEMOGLOBIN: CPT

## 2025-07-04 PROCEDURE — 82550 ASSAY OF CK (CPK): CPT

## 2025-07-04 PROCEDURE — 83615 LACTATE (LD) (LDH) ENZYME: CPT

## 2025-07-04 PROCEDURE — 94640 AIRWAY INHALATION TREATMENT: CPT

## 2025-07-04 PROCEDURE — 82803 BLOOD GASES ANY COMBINATION: CPT

## 2025-07-04 PROCEDURE — 93306 TTE W/DOPPLER COMPLETE: CPT

## 2025-07-04 PROCEDURE — P9045: CPT

## 2025-07-04 PROCEDURE — 71045 X-RAY EXAM CHEST 1 VIEW: CPT

## 2025-07-04 PROCEDURE — 86803 HEPATITIS C AB TEST: CPT

## 2025-07-04 PROCEDURE — 80202 ASSAY OF VANCOMYCIN: CPT

## 2025-07-04 PROCEDURE — 85520 HEPARIN ASSAY: CPT

## 2025-07-04 PROCEDURE — 93320 DOPPLER ECHO COMPLETE: CPT

## 2025-07-04 PROCEDURE — 86706 HEP B SURFACE ANTIBODY: CPT

## 2025-07-04 PROCEDURE — 88300 SURGICAL PATH GROSS: CPT

## 2025-07-04 PROCEDURE — 93005 ELECTROCARDIOGRAM TRACING: CPT

## 2025-07-04 PROCEDURE — 84439 ASSAY OF FREE THYROXINE: CPT

## 2025-07-04 PROCEDURE — 82947 ASSAY GLUCOSE BLOOD QUANT: CPT

## 2025-07-04 PROCEDURE — 86850 RBC ANTIBODY SCREEN: CPT

## 2025-07-04 PROCEDURE — 83010 ASSAY OF HAPTOGLOBIN QUANT: CPT

## 2025-07-04 PROCEDURE — C1894: CPT

## 2025-07-04 PROCEDURE — C1729: CPT

## 2025-07-04 PROCEDURE — 87077 CULTURE AEROBIC IDENTIFY: CPT

## 2025-07-04 PROCEDURE — 36415 COLL VENOUS BLD VENIPUNCTURE: CPT

## 2025-07-04 PROCEDURE — 85610 PROTHROMBIN TIME: CPT

## 2025-07-04 PROCEDURE — 82435 ASSAY OF BLOOD CHLORIDE: CPT

## 2025-07-04 PROCEDURE — P9047: CPT

## 2025-07-04 PROCEDURE — 97163 PT EVAL HIGH COMPLEX 45 MIN: CPT

## 2025-07-04 PROCEDURE — 97116 GAIT TRAINING THERAPY: CPT

## 2025-07-04 PROCEDURE — 84145 PROCALCITONIN (PCT): CPT

## 2025-07-04 PROCEDURE — 97110 THERAPEUTIC EXERCISES: CPT

## 2025-07-04 PROCEDURE — 85396 CLOTTING ASSAY WHOLE BLOOD: CPT

## 2025-07-04 PROCEDURE — 87205 SMEAR GRAM STAIN: CPT

## 2025-07-04 PROCEDURE — 82553 CREATINE MB FRACTION: CPT

## 2025-07-04 PROCEDURE — 87340 HEPATITIS B SURFACE AG IA: CPT

## 2025-07-04 PROCEDURE — 97166 OT EVAL MOD COMPLEX 45 MIN: CPT

## 2025-07-04 PROCEDURE — 84484 ASSAY OF TROPONIN QUANT: CPT

## 2025-07-04 PROCEDURE — 83605 ASSAY OF LACTIC ACID: CPT

## 2025-07-04 PROCEDURE — 84480 ASSAY TRIIODOTHYRONINE (T3): CPT

## 2025-07-04 PROCEDURE — 86891 AUTOLOGOUS BLOOD OP SALVAGE: CPT

## 2025-07-04 PROCEDURE — 97164 PT RE-EVAL EST PLAN CARE: CPT

## 2025-07-04 PROCEDURE — C1887: CPT

## 2025-07-04 PROCEDURE — 86900 BLOOD TYPING SEROLOGIC ABO: CPT

## 2025-07-04 PROCEDURE — P9100: CPT

## 2025-07-04 PROCEDURE — 97535 SELF CARE MNGMENT TRAINING: CPT

## 2025-07-04 PROCEDURE — 80053 COMPREHEN METABOLIC PANEL: CPT

## 2025-07-04 PROCEDURE — 84146 ASSAY OF PROLACTIN: CPT

## 2025-07-04 PROCEDURE — 97530 THERAPEUTIC ACTIVITIES: CPT

## 2025-07-04 PROCEDURE — C1889: CPT

## 2025-07-04 PROCEDURE — 83036 HEMOGLOBIN GLYCOSYLATED A1C: CPT

## 2025-07-04 PROCEDURE — P9037: CPT

## 2025-07-04 PROCEDURE — 86923 COMPATIBILITY TEST ELECTRIC: CPT

## 2025-07-04 PROCEDURE — 87070 CULTURE OTHR SPECIMN AEROBIC: CPT

## 2025-07-04 PROCEDURE — 83735 ASSAY OF MAGNESIUM: CPT

## 2025-07-04 PROCEDURE — 85025 COMPLETE CBC W/AUTO DIFF WBC: CPT

## 2025-07-04 PROCEDURE — 87186 SC STD MICRODIL/AGAR DIL: CPT

## 2025-07-04 PROCEDURE — 84295 ASSAY OF SERUM SODIUM: CPT

## 2025-07-04 PROCEDURE — 93325 DOPPLER ECHO COLOR FLOW MAPG: CPT

## 2025-07-04 PROCEDURE — 80048 BASIC METABOLIC PNL TOTAL CA: CPT

## 2025-07-04 PROCEDURE — 94003 VENT MGMT INPAT SUBQ DAY: CPT

## 2025-07-04 PROCEDURE — 84100 ASSAY OF PHOSPHORUS: CPT

## 2025-07-04 PROCEDURE — 85027 COMPLETE CBC AUTOMATED: CPT

## 2025-07-04 PROCEDURE — 84436 ASSAY OF TOTAL THYROXINE: CPT

## 2025-07-04 PROCEDURE — 85384 FIBRINOGEN ACTIVITY: CPT

## 2025-07-04 PROCEDURE — 76000 FLUOROSCOPY <1 HR PHYS/QHP: CPT

## 2025-07-04 PROCEDURE — 84443 ASSAY THYROID STIM HORMONE: CPT

## 2025-07-04 PROCEDURE — 94002 VENT MGMT INPAT INIT DAY: CPT

## 2025-07-04 PROCEDURE — C9399: CPT

## 2025-07-04 PROCEDURE — 82962 GLUCOSE BLOOD TEST: CPT

## 2025-07-04 PROCEDURE — C1769: CPT

## 2025-07-04 PROCEDURE — 71045 X-RAY EXAM CHEST 1 VIEW: CPT | Mod: 26

## 2025-07-04 RX ORDER — INSULIN GLARGINE-YFGN 100 [IU]/ML
28 INJECTION, SOLUTION SUBCUTANEOUS AT BEDTIME
Refills: 0 | Status: DISCONTINUED | OUTPATIENT
Start: 2025-07-04 | End: 2025-07-05

## 2025-07-04 RX ORDER — ACETAMINOPHEN 500 MG/5ML
650 LIQUID (ML) ORAL EVERY 6 HOURS
Refills: 0 | Status: DISCONTINUED | OUTPATIENT
Start: 2025-07-04 | End: 2025-07-11

## 2025-07-04 RX ORDER — INSULIN LISPRO 100 U/ML
11 INJECTION, SOLUTION INTRAVENOUS; SUBCUTANEOUS
Refills: 0 | Status: DISCONTINUED | OUTPATIENT
Start: 2025-07-04 | End: 2025-07-05

## 2025-07-04 RX ADMIN — INSULIN LISPRO 2: 100 INJECTION, SOLUTION INTRAVENOUS; SUBCUTANEOUS at 07:41

## 2025-07-04 RX ADMIN — Medication 500 MICROGRAM(S): at 23:30

## 2025-07-04 RX ADMIN — Medication 3 MILLILITER(S): at 05:35

## 2025-07-04 RX ADMIN — Medication 1 TABLET(S): at 11:11

## 2025-07-04 RX ADMIN — INSULIN LISPRO 1: 100 INJECTION, SOLUTION INTRAVENOUS; SUBCUTANEOUS at 16:48

## 2025-07-04 RX ADMIN — METOPROLOL SUCCINATE 12.5 MILLIGRAM(S): 50 TABLET, EXTENDED RELEASE ORAL at 21:29

## 2025-07-04 RX ADMIN — HEPARIN SODIUM 5000 UNIT(S): 1000 INJECTION INTRAVENOUS; SUBCUTANEOUS at 13:28

## 2025-07-04 RX ADMIN — Medication 500 MICROGRAM(S): at 05:27

## 2025-07-04 RX ADMIN — Medication 3 MILLILITER(S): at 22:30

## 2025-07-04 RX ADMIN — Medication 3 MILLILITER(S): at 13:25

## 2025-07-04 RX ADMIN — ATORVASTATIN CALCIUM 80 MILLIGRAM(S): 80 TABLET, FILM COATED ORAL at 21:29

## 2025-07-04 RX ADMIN — Medication 40 MILLIGRAM(S): at 05:27

## 2025-07-04 RX ADMIN — HEPARIN SODIUM 5000 UNIT(S): 1000 INJECTION INTRAVENOUS; SUBCUTANEOUS at 05:27

## 2025-07-04 RX ADMIN — Medication 2 TABLET(S): at 21:30

## 2025-07-04 RX ADMIN — INSULIN GLARGINE-YFGN 28 UNIT(S): 100 INJECTION, SOLUTION SUBCUTANEOUS at 21:28

## 2025-07-04 RX ADMIN — EPOETIN ALFA 10000 UNIT(S): 10000 SOLUTION INTRAVENOUS; SUBCUTANEOUS at 15:40

## 2025-07-04 RX ADMIN — Medication 500 MICROGRAM(S): at 17:47

## 2025-07-04 RX ADMIN — Medication 50 MILLIGRAM(S): at 21:30

## 2025-07-04 RX ADMIN — INSULIN LISPRO 2: 100 INJECTION, SOLUTION INTRAVENOUS; SUBCUTANEOUS at 11:20

## 2025-07-04 RX ADMIN — Medication 81 MILLIGRAM(S): at 11:11

## 2025-07-04 RX ADMIN — METOPROLOL SUCCINATE 12.5 MILLIGRAM(S): 50 TABLET, EXTENDED RELEASE ORAL at 05:27

## 2025-07-04 RX ADMIN — Medication 5 MILLIGRAM(S): at 21:30

## 2025-07-04 RX ADMIN — Medication 500 MICROGRAM(S): at 11:10

## 2025-07-04 RX ADMIN — AMIODARONE HYDROCHLORIDE 200 MILLIGRAM(S): 50 INJECTION, SOLUTION INTRAVENOUS at 05:27

## 2025-07-04 RX ADMIN — CEFEPIME 100 MILLIGRAM(S): 2 INJECTION, POWDER, FOR SOLUTION INTRAVENOUS at 21:29

## 2025-07-04 RX ADMIN — INSULIN LISPRO 11 UNIT(S): 100 INJECTION, SOLUTION INTRAVENOUS; SUBCUTANEOUS at 07:43

## 2025-07-04 RX ADMIN — INSULIN LISPRO 11 UNIT(S): 100 INJECTION, SOLUTION INTRAVENOUS; SUBCUTANEOUS at 11:21

## 2025-07-04 RX ADMIN — HEPARIN SODIUM 5000 UNIT(S): 1000 INJECTION INTRAVENOUS; SUBCUTANEOUS at 21:29

## 2025-07-04 NOTE — PROGRESS NOTE ADULT - SUBJECTIVE AND OBJECTIVE BOX
Chief complaint    Patient is a 70y old  Male who presents with a chief complaint of CHF (03 Jul 2025 20:19)   Review of systems  Patient appears comfortable.    Labs and Fingersticks  CAPILLARY BLOOD GLUCOSE  157 (03 Jul 2025 11:00)      POCT Blood Glucose.: 147 mg/dL (03 Jul 2025 21:42)  POCT Blood Glucose.: 202 mg/dL (03 Jul 2025 16:44)  POCT Blood Glucose.: 157 mg/dL (03 Jul 2025 11:47)  POCT Blood Glucose.: 111 mg/dL (03 Jul 2025 08:06)      Anion Gap: 19 *H* (07-04 @ 05:45)  Anion Gap: 13 (07-03 @ 00:26)      Calcium: 10.7 *H* (07-04 @ 05:45)  Calcium: 10.4 (07-03 @ 00:26)  Albumin: 2.8 *L* (07-04 @ 05:45)  Albumin: 3.1 *L* (07-03 @ 00:26)    Alanine Aminotransferase (ALT/SGPT): 26 (07-04 @ 05:45)  Alanine Aminotransferase (ALT/SGPT): 25 (07-03 @ 00:26)  Alkaline Phosphatase: 124 *H* (07-04 @ 05:45)  Alkaline Phosphatase: 125 *H* (07-03 @ 00:26)  Aspartate Aminotransferase (AST/SGOT): 20 (07-04 @ 05:45)  Aspartate Aminotransferase (AST/SGOT): 26 (07-03 @ 00:26)        07-04    133[L]  |  93[L]  |  66[H]  ----------------------------<  191[H]  4.7   |  21[L]  |  8.51[H]    Ca    10.7[H]      04 Jul 2025 05:45  Phos  4.8     07-04  Mg     2.7     07-04    TPro  6.1  /  Alb  2.8[L]  /  TBili  0.3  /  DBili  x   /  AST  20  /  ALT  26  /  AlkPhos  124[H]  07-04                        7.9    14.20 )-----------( 209      ( 04 Jul 2025 05:45 )             25.6     Medications  MEDICATIONS  (STANDING):  aMIOdarone    Tablet 200 milliGRAM(s) Oral daily  aspirin enteric coated 81 milliGRAM(s) Oral daily  atorvastatin 80 milliGRAM(s) Oral at bedtime  bisacodyl Suppository 10 milliGRAM(s) Rectal once  cefepime   IVPB 1000 milliGRAM(s) IV Intermittent at bedtime  chlorhexidine 4% Liquid 1 Application(s) Topical daily  dextrose 5%. 1000 milliLiter(s) (100 mL/Hr) IV Continuous <Continuous>  dextrose 50% Injectable 50 milliLiter(s) IV Push every 15 minutes  dextrose 50% Injectable 25 milliLiter(s) IV Push every 15 minutes  epoetin douglas-epbx (RETACRIT) Injectable 33375 Unit(s) IV Push once  glucagon  Injectable 1 milliGRAM(s) IntraMuscular once  heparin   Injectable 5000 Unit(s) SubCutaneous every 8 hours  insulin glargine Injectable (LANTUS) 28 Unit(s) SubCutaneous at bedtime  insulin lispro (ADMELOG) corrective regimen sliding scale   SubCutaneous three times a day before meals  insulin lispro (ADMELOG) corrective regimen sliding scale   SubCutaneous at bedtime  insulin lispro Injectable (ADMELOG) 11 Unit(s) SubCutaneous three times a day before meals  ipratropium    for Nebulization 500 MICROGram(s) Nebulizer every 6 hours  melatonin 5 milliGRAM(s) Oral at bedtime  metoprolol tartrate 12.5 milliGRAM(s) Oral two times a day  multivitamin/minerals 1 Tablet(s) Oral daily  pantoprazole    Tablet 40 milliGRAM(s) Oral before breakfast  polyethylene glycol 3350 17 Gram(s) Oral every 12 hours  senna 2 Tablet(s) Oral at bedtime  sodium chloride 0.9% lock flush 3 milliLiter(s) IV Push every 8 hours  sodium chloride 0.9%. 1000 milliLiter(s) (10 mL/Hr) IV Continuous <Continuous>  traZODone 50 milliGRAM(s) Oral at bedtime      Physical Exam  General: Patient appears comfortable.  Vital Signs Last 12 Hrs  T(F): 97.7 (07-04-25 @ 05:21), Max: 97.7 (07-04-25 @ 05:21)  HR: 91 (07-04-25 @ 05:21) (91 - 91)  BP: 123/62 (07-04-25 @ 05:21) (123/62 - 123/62)  BP(mean): --  RR: 18 (07-04-25 @ 05:21) (18 - 18)  SpO2: 94% (07-04-25 @ 05:21) (94% - 94%)  Neck: No palpable thyroid nodules.  CVS: S1S2, No murmurs  Respiratory: No wheezing, no crepitations  GI: Abdomen soft, non tender.    Diagnostics        Radiology:

## 2025-07-04 NOTE — PROGRESS NOTE ADULT - ASSESSMENT
70 year old male PMH of HTN, HLD, DM2, CAD SP  Mitral valve replacement, CABG x3 and RVAD placement      Assessment  DMT2: 70y Male with DM T2 with hyperglycemia, A1C 6.8%, was on  insulin at home, now on basal bolus insulin with coverage, blood sugars still running high postop   CAD: on medications, stable, monitored. sp CABG  HTN: on antihypertensive medications, monitored, asymptomatic.  ESRD: On hemodialysis, Monitor labs/BMP    Ulysses Mcintyre MD  Cell: [phone]  Office: [phone]

## 2025-07-04 NOTE — PROGRESS NOTE ADULT - ASSESSMENT
70M w/ HTN, DM2, CAD, and ESRD-HD, s/p CABGx3/MV repair/RVAD 6/17/25    (1)Renal - ESRD - HD MWF - standard HD; CRRT discontinued 6/27  (2)Lytes - Hypercalcemia   (3)Anemia - on TIW Retacrit;   (4)CV - SP cardiogenic shock  and now compensated and removal of  RVAD/inotropes     RECOMMEND:  (1)Renal - Next HD today and lower calcium cath and can attempt net 1.8L UF (effusions noted)   (2)Anemia - Increase Retacrit to help with anemia   (3)CVS- -   Lopressor started   (4) Physical therapy    (5)ID- IV abx        [facility]   [facility]   [phone]        70M w/ HTN, DM2, CAD, and ESRD-HD, s/p CABGx3/MV repair/RVAD 6/17/25    (1)Renal - ESRD - HD MWF - standard HD; CRRT discontinued 6/27;  Hyponatremia   (2)Lytes - Hypercalcemia   (3)Anemia - on TIW Retacrit;   (4)CV - SP cardiogenic shock  and now compensated and removal of  RVAD/inotropes     RECOMMEND:  (1)Renal - Next HD today and lower calcium cath and can attempt net 1.8L UF (effusions noted)   (2)Anemia - Increase Retacrit to help with anemia   (3)CVS- -   Lopressor started   (4) Physical therapy    (5)ID- IV abx        Sayed [facility]   [phone]

## 2025-07-04 NOTE — PROGRESS NOTE ADULT - ASSESSMENT
71 yo M with cardiogenic shock on RVAD ,DM2, CAD (total  4 coronary stents, last one PCI in 2024 ), CHF with EF 40-45%, severe MR     s/p Mitral valve replacement, CABG x3 and RVAD placement   1 unit prbc    RVAD replaced and removal of PA cannula   bronch with IP and extubated - bronch BAL + Serratia    epi weaned off    overnight hypothermic, elevated procal, blood cx (negative), added Vanco   CRRT D/C'D   Interval removal left-sided chest tube without pneumothorax. Bibasilar atelectasis   [facility] TLC   BC negative    RVAD removal     weaned; iHD  7/3 transferred to [address] [place] [address]. Continues on cefepime until , 7 day course (off vanco)     VSS, Left chest was ultrasounded by PA there is an effusion will reassess tomorrrow after HD to see if we should place a tube, OOB to chair did not walk today  Disposition: Acute Rehab

## 2025-07-04 NOTE — PROGRESS NOTE ADULT - SUBJECTIVE AND OBJECTIVE BOX
Subjective: Pt states "Hello " denies any CP, SOB, N/V and palpitations. No acute events overnight.     VITAL SIGNS    Telemetry:  SR  Vital Signs Last 24 Hrs  T(C): 36.7 (25 @ 10:55), Max: 36.8 (25 @ 16:03)  T(F): 98 (25 @ 10:55), Max: 98.2 (25 @ 16:03)  HR: 92 (25 @ 10:55) (81 - 92)  BP: 139/62 (25 @ 10:55) (112/61 - 139/62)  RR: 18 (25 @ 10:55) (18 - 18)  SpO2: 97% (25 @ 10:55) (93% - 97%)             @ 07:01  -   @ 07:00  --------------------------------------------------------  IN: 430 mL / OUT: 0 mL / NET: 430 mL     @ 07:01  -   @ 15:27  --------------------------------------------------------  IN: 600 mL / OUT: 0 mL / NET: 600 mL          Daily Weight in k.8 (2025 08:17)          Bilirubin Total: 0.3 mg/dL ( @ 05:45)    CAPILLARY BLOOD GLUCOSE      POCT Blood Glucose.: 205 mg/dL (2025 11:12)  POCT Blood Glucose.: 224 mg/dL (2025 07:32)  POCT Blood Glucose.: 147 mg/dL (2025 21:42)  POCT Blood Glucose.: 202 mg/dL (2025 16:44)          MEDICATIONS  acetaminophen     Tablet .. 650 milliGRAM(s) Oral every 6 hours PRN  acetaminophen     Tablet .. 650 milliGRAM(s) Oral every 6 hours PRN  aMIOdarone    Tablet 200 milliGRAM(s) Oral daily  artificial  tears Solution 1 Drop(s) Both EYES every 1 hour PRN  aspirin enteric coated 81 milliGRAM(s) Oral daily  atorvastatin 80 milliGRAM(s) Oral at bedtime  bisacodyl Suppository 10 milliGRAM(s) Rectal once  cefepime   IVPB 1000 milliGRAM(s) IV Intermittent at bedtime  chlorhexidine 4% Liquid 1 Application(s) Topical daily  dextrose 5%. 1000 milliLiter(s) IV Continuous <Continuous>  dextrose 50% Injectable 50 milliLiter(s) IV Push every 15 minutes  dextrose 50% Injectable 25 milliLiter(s) IV Push every 15 minutes  dextrose Oral Gel 15 Gram(s) Oral once PRN  epoetin douglas-epbx (RETACRIT) Injectable 66231 Unit(s) IV Push once  glucagon  Injectable 1 milliGRAM(s) IntraMuscular once  heparin   Injectable 5000 Unit(s) SubCutaneous every 8 hours  insulin glargine Injectable (LANTUS) 28 Unit(s) SubCutaneous at bedtime  insulin lispro (ADMELOG) corrective regimen sliding scale   SubCutaneous three times a day before meals  insulin lispro (ADMELOG) corrective regimen sliding scale   SubCutaneous at bedtime  insulin lispro Injectable (ADMELOG) 11 Unit(s) SubCutaneous three times a day before meals  ipratropium    for Nebulization 500 MICROGram(s) Nebulizer every 6 hours  melatonin 5 milliGRAM(s) Oral at bedtime  metoprolol tartrate 12.5 milliGRAM(s) Oral two times a day  multivitamin/minerals 1 Tablet(s) Oral daily  pantoprazole    Tablet 40 milliGRAM(s) Oral before breakfast  polyethylene glycol 3350 17 Gram(s) Oral every 12 hours  senna 2 Tablet(s) Oral at bedtime  sodium chloride 0.9% lock flush 3 milliLiter(s) IV Push every 8 hours  sodium chloride 0.9%. 1000 milliLiter(s) IV Continuous <Continuous>  traZODone 50 milliGRAM(s) Oral at bedtime        PHYSICAL EXAM    Neurology: A&Ox3, nonfocal, no gross deficits  CV : RRR+S1S2  Sternal Wound :  MSI CDI , Stable  Lungs: Respirations non-labored, B/L BS  Abdomen: Soft, NT/ND, +BSx4Q, last BM on  7/3 (-)N/V/D  : HD M/W/F,   Extremities: 1+ B/L LE edema, negative calf tenderness, +PP , left SVG incision      LABS      133[L]  |  93[L]  |  66[H]  ----------------------------<  191[H]  4.7   |  21[L]  |  8.51[H]    Ca    10.7[H]      2025 05:45  Phos  4.8       Mg     2.7         TPro  6.1  /  Alb  2.8[L]  /  TBili  0.3  /  DBili  x   /  AST  20  /  ALT  26  /  AlkPhos  124[H]                                   7.9    14.20 )-----------( 209      ( 2025 05:45 )             25.6                 PAST MEDICAL & SURGICAL HISTORY:  HTN (hypertension)      HLD (hyperlipidemia)      CAD (coronary artery disease)      Former smoker      ESRD on dialysis      Gout      Moderate aortic insufficiency      Severe mitral regurgitation      DM2 (diabetes mellitus, type 2)      Right cataract      MI (myocardial infarction)      Stented coronary artery      2019 novel coronavirus disease (COVID-19)      Pancreatic cyst      AV fistula      History of cardiac cath      H/O colonoscopy      Stented coronary artery           Physical Therapy Rec:   Home  [  ]   Home w/ PT  [  ]  Rehab  [ X ]    Discussed with CT Surgery attending.

## 2025-07-04 NOTE — PROGRESS NOTE ADULT - SUBJECTIVE AND OBJECTIVE BOX
[facility] [phone]        Patient seen and examined in bed.  He was the same         MEDICATIONS  (STANDING):  aMIOdarone    Tablet 200 milliGRAM(s) Oral daily  aspirin enteric coated 81 milliGRAM(s) Oral daily  atorvastatin 80 milliGRAM(s) Oral at bedtime  bisacodyl Suppository 10 milliGRAM(s) Rectal once  cefepime   IVPB 1000 milliGRAM(s) IV Intermittent at bedtime  chlorhexidine 4% Liquid 1 Application(s) Topical daily  dextrose 5%. 1000 milliLiter(s) (100 mL/Hr) IV Continuous <Continuous>  dextrose 50% Injectable 50 milliLiter(s) IV Push every 15 minutes  dextrose 50% Injectable 25 milliLiter(s) IV Push every 15 minutes  epoetin douglas-epbx (RETACRIT) Injectable 85889 Unit(s) IV Push once  glucagon  Injectable 1 milliGRAM(s) IntraMuscular once  heparin   Injectable 5000 Unit(s) SubCutaneous every 8 hours  insulin glargine Injectable (LANTUS) 28 Unit(s) SubCutaneous at bedtime  insulin lispro (ADMELOG) corrective regimen sliding scale   SubCutaneous three times a day before meals  insulin lispro (ADMELOG) corrective regimen sliding scale   SubCutaneous at bedtime  insulin lispro Injectable (ADMELOG) 11 Unit(s) SubCutaneous three times a day before meals  ipratropium    for Nebulization 500 MICROGram(s) Nebulizer every 6 hours  melatonin 5 milliGRAM(s) Oral at bedtime  metoprolol tartrate 12.5 milliGRAM(s) Oral two times a day  multivitamin/minerals 1 Tablet(s) Oral daily  pantoprazole    Tablet 40 milliGRAM(s) Oral before breakfast  polyethylene glycol 3350 17 Gram(s) Oral every 12 hours  senna 2 Tablet(s) Oral at bedtime  sodium chloride 0.9% lock flush 3 milliLiter(s) IV Push every 8 hours  sodium chloride 0.9%. 1000 milliLiter(s) (10 mL/Hr) IV Continuous <Continuous>  traZODone 50 milliGRAM(s) Oral at bedtime      VITAL:  T(C): , Max: 37 (07-03-25 @ 12:00)  T(F): , Max: 98.6 (07-03-25 @ 12:00)  HR: 84 (07-04-25 @ 07:32)  BP: 123/62 (07-04-25 @ 05:21)  BP(mean): 79 (07-03-25 @ 14:00)  RR: 18 (07-04-25 @ 05:21)  SpO2: 94% (07-04-25 @ 05:21)  Wt(kg): --    I and O's:    07-03 @ 07:01  -  07-04 @ 07:00  --------------------------------------------------------  IN: 430 mL / OUT: 0 mL / NET: 430 mL    07-04 @ 07:01  -  07-04 @ 09:22  --------------------------------------------------------  IN: 360 mL / OUT: 0 mL / NET: 360 mL          PHYSICAL EXAM:    Constitutional: NAD  Neck:  No JVD  Respiratory: CTAB/L  Cardiovascular: S1 and S2  Gastrointestinal: BS+, soft, NT/ND  Extremities: No peripheral edema  Neurological: A/O x 3, no focal deficits  Psychiatric: Normal mood, normal affect  : No Ag  Skin: No rashes  Access: [facility]    LABS:                        7.9    14.20 )-----------( 209      ( 04 Jul 2025 05:45 )             25.6     07-04    133[L]  |  93[L]  |  66[H]  ----------------------------<  191[H]  4.7   |  21[L]  |  8.51[H]    Ca    10.7[H]      04 Jul 2025 05:45  Phos  4.8     07-04  Mg     2.7     07-04    TPro  6.1  /  Alb  2.8[L]  /  TBili  0.3  /  DBili  x   /  AST  20  /  ALT  26  /  AlkPhos  124[H]  07-04          Urine Studies:  Urinalysis Basic - ( 04 Jul 2025 05:45 )    Color: x / Appearance: x / SG: x / pH: x  Gluc: 191 mg/dL / Ketone: x  / Bili: x / Urobili: x   Blood: x / Protein: x / Nitrite: x   Leuk Esterase: x / RBC: x / WBC x   Sq Epi: x / Non Sq Epi: x / Bacteria: x            RADIOLOGY & ADDITIONAL STUDIES:

## 2025-07-04 NOTE — PROGRESS NOTE ADULT - PROBLEM SELECTOR PLAN 1
Will increase Lantus to 28 units at bed time.  Will increase Admelog to 11 units before each meal in addition to Admelog correction scale coverage.  Patient counseled for compliance with consistent low carb diet and physical activity as tolerated.  .

## 2025-07-05 LAB
ALBUMIN SERPL ELPH-MCNC: 2.8 G/DL — LOW (ref 3.3–5)
ALP SERPL-CCNC: 156 U/L — HIGH (ref 40–120)
ALT FLD-CCNC: 38 U/L — SIGNIFICANT CHANGE UP (ref 10–45)
ANION GAP SERPL CALC-SCNC: 16 MMOL/L — SIGNIFICANT CHANGE UP (ref 5–17)
ANISOCYTOSIS BLD QL: SLIGHT — SIGNIFICANT CHANGE UP
AST SERPL-CCNC: 37 U/L — SIGNIFICANT CHANGE UP (ref 10–40)
BASOPHILS # BLD AUTO: 0.06 K/UL — SIGNIFICANT CHANGE UP (ref 0–0.2)
BASOPHILS # BLD MANUAL: 0 K/UL — SIGNIFICANT CHANGE UP (ref 0–0.2)
BASOPHILS NFR BLD AUTO: 0.5 % — SIGNIFICANT CHANGE UP (ref 0–2)
BASOPHILS NFR BLD MANUAL: 0 % — SIGNIFICANT CHANGE UP (ref 0–2)
BILIRUB SERPL-MCNC: 0.3 MG/DL — SIGNIFICANT CHANGE UP (ref 0.2–1.2)
BUN SERPL-MCNC: 56 MG/DL — HIGH (ref 7–23)
BURR CELLS BLD QL SMEAR: SLIGHT — SIGNIFICANT CHANGE UP
CALCIUM SERPL-MCNC: 10 MG/DL — SIGNIFICANT CHANGE UP (ref 8.4–10.5)
CALCIUM SERPL-MCNC: 10 MG/DL — SIGNIFICANT CHANGE UP (ref 8.4–10.5)
CHLORIDE SERPL-SCNC: 94 MMOL/L — LOW (ref 96–108)
CO2 SERPL-SCNC: 23 MMOL/L — SIGNIFICANT CHANGE UP (ref 22–31)
CREAT SERPL-MCNC: 6.67 MG/DL — HIGH (ref 0.5–1.3)
CULTURE RESULTS: SIGNIFICANT CHANGE UP
DACRYOCYTES BLD QL SMEAR: SLIGHT — SIGNIFICANT CHANGE UP
EGFR: 8 ML/MIN/1.73M2 — LOW
EGFR: 8 ML/MIN/1.73M2 — LOW
ELLIPTOCYTES BLD QL SMEAR: SLIGHT — SIGNIFICANT CHANGE UP
EOSINOPHIL # BLD AUTO: 0.14 K/UL — SIGNIFICANT CHANGE UP (ref 0–0.5)
EOSINOPHIL # BLD MANUAL: 0.22 K/UL — SIGNIFICANT CHANGE UP (ref 0–0.5)
EOSINOPHIL NFR BLD AUTO: 1.1 % — SIGNIFICANT CHANGE UP (ref 0–6)
EOSINOPHIL NFR BLD MANUAL: 1.7 % — SIGNIFICANT CHANGE UP (ref 0–6)
GIANT PLATELETS BLD QL SMEAR: PRESENT
GLUCOSE BLDC GLUCOMTR-MCNC: 111 MG/DL — HIGH (ref 70–99)
GLUCOSE BLDC GLUCOMTR-MCNC: 160 MG/DL — HIGH (ref 70–99)
GLUCOSE BLDC GLUCOMTR-MCNC: 203 MG/DL — HIGH (ref 70–99)
GLUCOSE BLDC GLUCOMTR-MCNC: 223 MG/DL — HIGH (ref 70–99)
GLUCOSE SERPL-MCNC: 198 MG/DL — HIGH (ref 70–99)
HAPTOGLOB SERPL-MCNC: 214 MG/DL — HIGH (ref 34–200)
HCT VFR BLD CALC: 25.4 % — LOW (ref 39–50)
HGB BLD-MCNC: 7.7 G/DL — LOW (ref 13–17)
IMM GRANULOCYTES # BLD AUTO: 0.16 K/UL — HIGH (ref 0–0.07)
IMM GRANULOCYTES NFR BLD AUTO: 1.2 % — HIGH (ref 0–0.9)
LDH SERPL L TO P-CCNC: 245 U/L — HIGH (ref 50–242)
LYMPHOCYTES # BLD AUTO: 1.02 K/UL — SIGNIFICANT CHANGE UP (ref 1–3.3)
LYMPHOCYTES # BLD MANUAL: 0.57 K/UL — LOW (ref 1–3.3)
LYMPHOCYTES NFR BLD AUTO: 7.7 % — LOW (ref 13–44)
LYMPHOCYTES NFR BLD MANUAL: 4.3 % — LOW (ref 13–44)
MAGNESIUM SERPL-MCNC: 2.5 MG/DL — SIGNIFICANT CHANGE UP (ref 1.6–2.6)
MANUAL MYELOCYTE #: 0.12 K/UL — HIGH (ref 0–0)
MCHC RBC-ENTMCNC: 26.5 PG — LOW (ref 27–34)
MCHC RBC-ENTMCNC: 30.3 G/DL — LOW (ref 32–36)
MCV RBC AUTO: 87.3 FL — SIGNIFICANT CHANGE UP (ref 80–100)
MONOCYTES # BLD AUTO: 1.61 K/UL — HIGH (ref 0–0.9)
MONOCYTES # BLD MANUAL: 0.79 K/UL — SIGNIFICANT CHANGE UP (ref 0–0.9)
MONOCYTES NFR BLD AUTO: 12.2 % — SIGNIFICANT CHANGE UP (ref 2–14)
MONOCYTES NFR BLD MANUAL: 6 % — SIGNIFICANT CHANGE UP (ref 2–14)
MYELOCYTES NFR BLD: 0.9 % — HIGH (ref 0–0)
NEUTROPHILS # BLD AUTO: 10.22 K/UL — HIGH (ref 1.8–7.4)
NEUTROPHILS # BLD MANUAL: 11.51 K/UL — HIGH (ref 1.8–7.4)
NEUTROPHILS NFR BLD AUTO: 77.3 % — HIGH (ref 43–77)
NEUTROPHILS NFR BLD MANUAL: 87.1 % — HIGH (ref 43–77)
NRBC # BLD AUTO: 0 K/UL — SIGNIFICANT CHANGE UP (ref 0–0)
NRBC # FLD: 0 K/UL — SIGNIFICANT CHANGE UP (ref 0–0)
NRBC BLD AUTO-RTO: 0 /100 WBCS — SIGNIFICANT CHANGE UP (ref 0–0)
OVALOCYTES BLD QL SMEAR: SLIGHT — SIGNIFICANT CHANGE UP
PHOSPHATE SERPL-MCNC: 3.3 MG/DL — SIGNIFICANT CHANGE UP (ref 2.5–4.5)
PLAT MORPH BLD: NORMAL — SIGNIFICANT CHANGE UP
PLATELET # BLD AUTO: 233 K/UL — SIGNIFICANT CHANGE UP (ref 150–400)
PMV BLD: 11.8 FL — SIGNIFICANT CHANGE UP (ref 7–13)
POIKILOCYTOSIS BLD QL AUTO: SLIGHT — SIGNIFICANT CHANGE UP
POLYCHROMASIA BLD QL SMEAR: ABNORMAL
POTASSIUM SERPL-MCNC: 4.5 MMOL/L — SIGNIFICANT CHANGE UP (ref 3.5–5.3)
POTASSIUM SERPL-SCNC: 4.5 MMOL/L — SIGNIFICANT CHANGE UP (ref 3.5–5.3)
PROT SERPL-MCNC: 6 G/DL — SIGNIFICANT CHANGE UP (ref 6–8.3)
PTH-INTACT FLD-MCNC: 345 PG/ML — HIGH (ref 15–65)
RBC # BLD: 2.91 M/UL — LOW (ref 4.2–5.8)
RBC # FLD: 18.6 % — HIGH (ref 10.3–14.5)
RBC BLD AUTO: ABNORMAL
SCHISTOCYTES BLD QL AUTO: SLIGHT — SIGNIFICANT CHANGE UP
SODIUM SERPL-SCNC: 133 MMOL/L — LOW (ref 135–145)
SPECIMEN SOURCE: SIGNIFICANT CHANGE UP
TARGETS BLD QL SMEAR: SLIGHT — SIGNIFICANT CHANGE UP
WBC # BLD: 13.21 K/UL — HIGH (ref 3.8–10.5)
WBC # FLD AUTO: 13.21 K/UL — HIGH (ref 3.8–10.5)

## 2025-07-05 PROCEDURE — 71045 X-RAY EXAM CHEST 1 VIEW: CPT | Mod: 26

## 2025-07-05 PROCEDURE — 71045 X-RAY EXAM CHEST 1 VIEW: CPT | Mod: 26,77

## 2025-07-05 RX ORDER — INSULIN GLARGINE-YFGN 100 [IU]/ML
30 INJECTION, SOLUTION SUBCUTANEOUS AT BEDTIME
Refills: 0 | Status: DISCONTINUED | OUTPATIENT
Start: 2025-07-05 | End: 2025-07-07

## 2025-07-05 RX ORDER — INSULIN LISPRO 100 U/ML
12 INJECTION, SOLUTION INTRAVENOUS; SUBCUTANEOUS
Refills: 0 | Status: DISCONTINUED | OUTPATIENT
Start: 2025-07-05 | End: 2025-07-07

## 2025-07-05 RX ADMIN — Medication 81 MILLIGRAM(S): at 13:15

## 2025-07-05 RX ADMIN — INSULIN LISPRO 12 UNIT(S): 100 INJECTION, SOLUTION INTRAVENOUS; SUBCUTANEOUS at 16:41

## 2025-07-05 RX ADMIN — INSULIN LISPRO 2: 100 INJECTION, SOLUTION INTRAVENOUS; SUBCUTANEOUS at 11:34

## 2025-07-05 RX ADMIN — INSULIN LISPRO 2: 100 INJECTION, SOLUTION INTRAVENOUS; SUBCUTANEOUS at 08:15

## 2025-07-05 RX ADMIN — HEPARIN SODIUM 5000 UNIT(S): 1000 INJECTION INTRAVENOUS; SUBCUTANEOUS at 13:15

## 2025-07-05 RX ADMIN — ATORVASTATIN CALCIUM 80 MILLIGRAM(S): 80 TABLET, FILM COATED ORAL at 21:44

## 2025-07-05 RX ADMIN — Medication 1 APPLICATION(S): at 13:17

## 2025-07-05 RX ADMIN — Medication 3 MILLILITER(S): at 21:52

## 2025-07-05 RX ADMIN — HEPARIN SODIUM 5000 UNIT(S): 1000 INJECTION INTRAVENOUS; SUBCUTANEOUS at 21:43

## 2025-07-05 RX ADMIN — INSULIN GLARGINE-YFGN 30 UNIT(S): 100 INJECTION, SOLUTION SUBCUTANEOUS at 21:44

## 2025-07-05 RX ADMIN — INSULIN LISPRO 12 UNIT(S): 100 INJECTION, SOLUTION INTRAVENOUS; SUBCUTANEOUS at 11:35

## 2025-07-05 RX ADMIN — Medication 500 MICROGRAM(S): at 23:27

## 2025-07-05 RX ADMIN — Medication 2 TABLET(S): at 21:44

## 2025-07-05 RX ADMIN — METOPROLOL SUCCINATE 12.5 MILLIGRAM(S): 50 TABLET, EXTENDED RELEASE ORAL at 17:14

## 2025-07-05 RX ADMIN — Medication 50 MILLIGRAM(S): at 21:44

## 2025-07-05 RX ADMIN — Medication 500 MICROGRAM(S): at 05:26

## 2025-07-05 RX ADMIN — HEPARIN SODIUM 5000 UNIT(S): 1000 INJECTION INTRAVENOUS; SUBCUTANEOUS at 05:27

## 2025-07-05 RX ADMIN — Medication 500 MICROGRAM(S): at 13:15

## 2025-07-05 RX ADMIN — Medication 5 MILLIGRAM(S): at 21:44

## 2025-07-05 RX ADMIN — AMIODARONE HYDROCHLORIDE 200 MILLIGRAM(S): 50 INJECTION, SOLUTION INTRAVENOUS at 05:27

## 2025-07-05 RX ADMIN — Medication 3 MILLILITER(S): at 05:23

## 2025-07-05 RX ADMIN — Medication 40 MILLIGRAM(S): at 05:27

## 2025-07-05 RX ADMIN — Medication 1 TABLET(S): at 13:15

## 2025-07-05 RX ADMIN — INSULIN LISPRO 12 UNIT(S): 100 INJECTION, SOLUTION INTRAVENOUS; SUBCUTANEOUS at 08:15

## 2025-07-05 RX ADMIN — METOPROLOL SUCCINATE 12.5 MILLIGRAM(S): 50 TABLET, EXTENDED RELEASE ORAL at 05:27

## 2025-07-05 RX ADMIN — Medication 3 MILLILITER(S): at 13:16

## 2025-07-05 RX ADMIN — Medication 500 MICROGRAM(S): at 17:14

## 2025-07-05 NOTE — PROGRESS NOTE ADULT - SUBJECTIVE AND OBJECTIVE BOX
Chief complaint    Patient is a 70y old  Male who presents with a chief complaint of CHF (03 Jul 2025 20:19)   Review of systems  Patient appears comfortable.    Labs and Fingersticks  CAPILLARY BLOOD GLUCOSE      POCT Blood Glucose.: 219 mg/dL (04 Jul 2025 21:14)  POCT Blood Glucose.: 159 mg/dL (04 Jul 2025 16:33)  POCT Blood Glucose.: 205 mg/dL (04 Jul 2025 11:12)  POCT Blood Glucose.: 224 mg/dL (04 Jul 2025 07:32)      Anion Gap: 19 *H* (07-04 @ 05:45)      Calcium: 10.7 *H* (07-04 @ 05:45)  Albumin: 2.8 *L* (07-04 @ 05:45)    Alanine Aminotransferase (ALT/SGPT): 26 (07-04 @ 05:45)  Alkaline Phosphatase: 124 *H* (07-04 @ 05:45)  Aspartate Aminotransferase (AST/SGOT): 20 (07-04 @ 05:45)        07-04    133[L]  |  93[L]  |  66[H]  ----------------------------<  191[H]  4.7   |  21[L]  |  8.51[H]    Ca    10.7[H]      04 Jul 2025 05:45  Phos  4.8     07-04  Mg     2.7     07-04    TPro  6.1  /  Alb  2.8[L]  /  TBili  0.3  /  DBili  x   /  AST  20  /  ALT  26  /  AlkPhos  124[H]  07-04                        7.7    13.21 )-----------( 233      ( 05 Jul 2025 06:27 )             25.4     Medications  MEDICATIONS  (STANDING):  aMIOdarone    Tablet 200 milliGRAM(s) Oral daily  aspirin enteric coated 81 milliGRAM(s) Oral daily  atorvastatin 80 milliGRAM(s) Oral at bedtime  bisacodyl Suppository 10 milliGRAM(s) Rectal once  chlorhexidine 4% Liquid 1 Application(s) Topical daily  dextrose 5%. 1000 milliLiter(s) (100 mL/Hr) IV Continuous <Continuous>  dextrose 50% Injectable 50 milliLiter(s) IV Push every 15 minutes  dextrose 50% Injectable 25 milliLiter(s) IV Push every 15 minutes  glucagon  Injectable 1 milliGRAM(s) IntraMuscular once  heparin   Injectable 5000 Unit(s) SubCutaneous every 8 hours  insulin glargine Injectable (LANTUS) 30 Unit(s) SubCutaneous at bedtime  insulin lispro (ADMELOG) corrective regimen sliding scale   SubCutaneous three times a day before meals  insulin lispro (ADMELOG) corrective regimen sliding scale   SubCutaneous at bedtime  insulin lispro Injectable (ADMELOG) 12 Unit(s) SubCutaneous three times a day before meals  ipratropium    for Nebulization 500 MICROGram(s) Nebulizer every 6 hours  melatonin 5 milliGRAM(s) Oral at bedtime  metoprolol tartrate 12.5 milliGRAM(s) Oral two times a day  multivitamin/minerals 1 Tablet(s) Oral daily  pantoprazole    Tablet 40 milliGRAM(s) Oral before breakfast  polyethylene glycol 3350 17 Gram(s) Oral every 12 hours  senna 2 Tablet(s) Oral at bedtime  sodium chloride 0.9% lock flush 3 milliLiter(s) IV Push every 8 hours  sodium chloride 0.9%. 1000 milliLiter(s) (10 mL/Hr) IV Continuous <Continuous>  traZODone 50 milliGRAM(s) Oral at bedtime      Physical Exam  General: Patient appears comfortable.  Vital Signs Last 12 Hrs  T(F): 98.4 (07-05-25 @ 04:44), Max: 98.4 (07-05-25 @ 04:44)  HR: 75 (07-05-25 @ 04:44) (75 - 75)  BP: 120/57 (07-05-25 @ 04:44) (120/57 - 120/57)  BP(mean): 78 (07-05-25 @ 04:44) (78 - 78)  RR: 18 (07-05-25 @ 04:44) (18 - 18)  SpO2: 94% (07-05-25 @ 04:44) (94% - 94%)  Neck: No palpable thyroid nodules.  CVS: S1S2, No murmurs  Respiratory: No wheezing, no crepitations  GI: Abdomen soft, non tender.    Diagnostics    Parathyroid Hormone Intact with Calcium, Serum: AM Sched. Collection: 05-Jul-2025 06:00 (07-04 @ 07:32)      Radiology:

## 2025-07-05 NOTE — PROGRESS NOTE ADULT - ASSESSMENT
70 year old male PMH of HTN, HLD, DM2, CAD SP  Mitral valve replacement, CABG x3 and RVAD placement      Assessment  DMT2: 70y Male with DM T2 with hyperglycemia, A1C 6.8%, was on  insulin at home, now on basal bolus insulin with coverage, blood sugars are still running high, eating full meals.  CAD: on medications, stable, monitored. sp CABG  HTN: on antihypertensive medications, monitored, asymptomatic.  ESRD: On hemodialysis, Monitor labs/BMP    Ulysses Mcintyre MD  Cell: [phone]  Office: [phone]

## 2025-07-05 NOTE — PROGRESS NOTE ADULT - PROBLEM SELECTOR PLAN 1
Will increase Lantus to 30 units at bed time.  Will increase Admelog to 12 units before each meal in addition to Admelog correction scale coverage.  Patient counseled for compliance with consistent low carb diet and physical activity as tolerated.  .

## 2025-07-05 NOTE — PROGRESS NOTE ADULT - ASSESSMENT
seen and examined  having lunch earlier oob to chair  s/p HD yesterday with 700ml net fluid loss   on room air  denies any acute complaints  update spouse at bedside    acetaminophen     Tablet .. 650 milliGRAM(s) Oral every 6 hours PRN  acetaminophen     Tablet .. 650 milliGRAM(s) Oral every 6 hours PRN  aMIOdarone    Tablet 200 milliGRAM(s) Oral daily  artificial  tears Solution 1 Drop(s) Both EYES every 1 hour PRN  aspirin enteric coated 81 milliGRAM(s) Oral daily  atorvastatin 80 milliGRAM(s) Oral at bedtime  bisacodyl Suppository 10 milliGRAM(s) Rectal once  chlorhexidine 4% Liquid 1 Application(s) Topical daily  dextrose 5%. 1000 milliLiter(s) IV Continuous <Continuous>  dextrose 50% Injectable 50 milliLiter(s) IV Push every 15 minutes  dextrose 50% Injectable 25 milliLiter(s) IV Push every 15 minutes  dextrose Oral Gel 15 Gram(s) Oral once PRN  glucagon  Injectable 1 milliGRAM(s) IntraMuscular once  heparin   Injectable 5000 Unit(s) SubCutaneous every 8 hours  insulin glargine Injectable (LANTUS) 30 Unit(s) SubCutaneous at bedtime  insulin lispro (ADMELOG) corrective regimen sliding scale   SubCutaneous three times a day before meals  insulin lispro (ADMELOG) corrective regimen sliding scale   SubCutaneous at bedtime  insulin lispro Injectable (ADMELOG) 12 Unit(s) SubCutaneous three times a day before meals  ipratropium    for Nebulization 500 MICROGram(s) Nebulizer every 6 hours  melatonin 5 milliGRAM(s) Oral at bedtime  metoprolol tartrate 12.5 milliGRAM(s) Oral two times a day  multivitamin/minerals 1 Tablet(s) Oral daily  pantoprazole    Tablet 40 milliGRAM(s) Oral before breakfast  polyethylene glycol 3350 17 Gram(s) Oral every 12 hours  senna 2 Tablet(s) Oral at bedtime  sodium chloride 0.9% lock flush 3 milliLiter(s) IV Push every 8 hours  sodium chloride 0.9%. 1000 milliLiter(s) IV Continuous <Continuous>  traZODone 50 milliGRAM(s) Oral at bedtime      VITAL:  T(C): , Max: 36.9 (07-04-25 @ 15:35)  T(F): , Max: 98.4 (07-04-25 @ 15:35)  HR: 83 (07-05-25 @ 10:47)  BP: 104/64 (07-05-25 @ 10:47)  BP(mean): 78 (07-05-25 @ 04:44)  RR: 18 (07-05-25 @ 10:47)  SpO2: 93% (07-05-25 @ 10:47)  Wt(kg): --    07-04-25 @ 07:01  -  07-05-25 @ 07:00  --------------------------------------------------------  IN: 720 mL / OUT: 700 mL / NET: 20 mL    07-05-25 @ 07:01  -  07-05-25 @ 15:09  --------------------------------------------------------  IN: 500 mL / OUT: 0 mL / NET: 500 mL        PHYSICAL EXAM:  Constitutional: NAD  Neck:  No JVD  Respiratory: CTAB/L  Cardiovascular: S1 and S2  Gastrointestinal: BS+, soft, NT/ND  Extremities: No peripheral edema  Neurological: A/O x 3, no focal deficits  Psychiatric: Normal mood, normal affect  : No Ag  Skin: No rashes  Access: avf        LABS:                          7.7    13.21 )-----------( 233 ( 05 Jul 2025 06:27 )             25.4     Na(133)/K(4.5)/Cl(94)/HCO3(23)/BUN(56)/Cr(6.67)Glu(198)/Ca(10.0)/Mg(2.5)/PO4(3.3)    07-05 @ 06:28  Na(133)/K(4.7)/Cl(93)/HCO3(21)/BUN(66)/Cr(8.51)Glu(191)/Ca(10.7)/Mg(2.7)/PO4(4.8)    07-04 @ 05:45  Na(134)/K(4.1)/Cl(96)/HCO3(25)/BUN(41)/Cr(5.87)Glu(145)/Ca(10.4)/Mg(2.6)/PO4(3.7)    07-03 @ 00:26    Urinalysis Basic - ( 05 Jul 2025 06:28 )    Color: x / Appearance: x / SG: x / pH: x  Gluc: 198 mg/dL / Ketone: x  / Bili: x / Urobili: x   Blood: x / Protein: x / Nitrite: x   Leuk Esterase: x / RBC: x / WBC x   Sq Epi: x / Non Sq Epi: x / Bacteria: x            ASSESSMENT:  70M w/ HTN, DM2, CAD, and ESRD-HD, s/p CABGx3/MV repair/RVAD 6/17/25    (1)Renal - ESRD - HD MWF - s/p HD with 700ml  fluid loss 7/4/25,  CRRT discontinued 6/27;  Hyponatremia stable s/p HD  (2)Hypercalcemia-improving   (3)Anemia - on TIW Retacrit;   (4)CV - SP cardiogenic shock  and now compensated and removal of  RVAD/inotropes     RECOMMEND:  (1)Renal - Next HD Monday and continue  lower calcium bath  (2)Anemia - Increase Retacrit to help with anemia   (3)CVS- - on   Lopressor  (4) Physical therapy    (5)ID- IV abx       Jeronimo Posey NP-BC  [employer]  [phone]

## 2025-07-05 NOTE — PROGRESS NOTE ADULT - SUBJECTIVE AND OBJECTIVE BOX
Subjective: Pt states "Hi " denies any CP, SOB, N/V and palpitations. No acute events overnight.     VITAL SIGNS    Telemetry:  ST  Vital Signs Last 24 Hrs  T(C): 36.8 (25 @ 10:47), Max: 36.9 (25 @ 04:44)  T(F): 98.2 (25 @ 10:47), Max: 98.4 (25 @ 04:44)  HR: 83 (25 @ 10:47) (75 - 83)  BP: 104/64 (25 @ 10:47) (104/64 - 124/48)  RR: 18 (25 @ 10:47) (16 - 18)  SpO2: 93% (25 @ 10:47) (93% - 95%)             @ 07:01  -   @ 07:00  --------------------------------------------------------  IN: 720 mL / OUT: 700 mL / NET: 20 mL     @ 07:01  -   @ 16:08  --------------------------------------------------------  IN: 500 mL / OUT: 0 mL / NET: 500 mL       Daily     Daily Weight in k.7 (2025 07:00)          Bilirubin Total: 0.3 mg/dL ( @ 06:28)    CAPILLARY BLOOD GLUCOSE      POCT Blood Glucose.: 203 mg/dL (2025 11:24)  POCT Blood Glucose.: 223 mg/dL (2025 07:34)  POCT Blood Glucose.: 219 mg/dL (2025 21:14)  POCT Blood Glucose.: 159 mg/dL (2025 16:33)          MEDICATIONS  acetaminophen     Tablet .. 650 milliGRAM(s) Oral every 6 hours PRN  acetaminophen     Tablet .. 650 milliGRAM(s) Oral every 6 hours PRN  aMIOdarone    Tablet 200 milliGRAM(s) Oral daily  artificial  tears Solution 1 Drop(s) Both EYES every 1 hour PRN  aspirin enteric coated 81 milliGRAM(s) Oral daily  atorvastatin 80 milliGRAM(s) Oral at bedtime  bisacodyl Suppository 10 milliGRAM(s) Rectal once  chlorhexidine 4% Liquid 1 Application(s) Topical daily  dextrose 5%. 1000 milliLiter(s) IV Continuous <Continuous>  dextrose 50% Injectable 50 milliLiter(s) IV Push every 15 minutes  dextrose 50% Injectable 25 milliLiter(s) IV Push every 15 minutes  dextrose Oral Gel 15 Gram(s) Oral once PRN  glucagon  Injectable 1 milliGRAM(s) IntraMuscular once  heparin   Injectable 5000 Unit(s) SubCutaneous every 8 hours  insulin glargine Injectable (LANTUS) 30 Unit(s) SubCutaneous at bedtime  insulin lispro (ADMELOG) corrective regimen sliding scale   SubCutaneous three times a day before meals  insulin lispro (ADMELOG) corrective regimen sliding scale   SubCutaneous at bedtime  insulin lispro Injectable (ADMELOG) 12 Unit(s) SubCutaneous three times a day before meals  ipratropium    for Nebulization 500 MICROGram(s) Nebulizer every 6 hours  melatonin 5 milliGRAM(s) Oral at bedtime  metoprolol tartrate 12.5 milliGRAM(s) Oral two times a day  multivitamin/minerals 1 Tablet(s) Oral daily  pantoprazole    Tablet 40 milliGRAM(s) Oral before breakfast  polyethylene glycol 3350 17 Gram(s) Oral every 12 hours  senna 2 Tablet(s) Oral at bedtime  sodium chloride 0.9% lock flush 3 milliLiter(s) IV Push every 8 hours  sodium chloride 0.9%. 1000 milliLiter(s) IV Continuous <Continuous>  traZODone 50 milliGRAM(s) Oral at bedtime        PHYSICAL EXAM    Neurology: A&Ox3, nonfocal, no gross deficits  CV : RRR+S1S2  Sternal Wound :  MSI CDI , Stable  Lungs: Respirations non-labored, B/L BS  Abdomen: Soft, NT/ND, +BSx4Q, last BM on  (-)N/V/D  : HD last run yesterday  Extremities: B/L LE edema, negative calf tenderness, +PP , Left SVG incision      LABS      133[L]  |  94[L]  |  56[H]  ----------------------------<  198[H]  4.5   |  23  |  6.67[H]    Ca    10.0      2025 06:28  Phos  3.3     07-  Mg     2.5     07-    TPro  6.0  /  Alb  2.8[L]  /  TBili  0.3  /  DBili  x   /  AST  37  /  ALT  38  /  AlkPhos  156[H]                                   7.7    13.21 )-----------( 233      ( 2025 06:27 )             25.4                 PAST MEDICAL & SURGICAL HISTORY:  HTN (hypertension)      HLD (hyperlipidemia)      CAD (coronary artery disease)      Former smoker      ESRD on dialysis      Gout      Moderate aortic insufficiency      Severe mitral regurgitation      DM2 (diabetes mellitus, type 2)      Right cataract      MI (myocardial infarction)      Stented coronary artery      2019 novel coronavirus disease (COVID-19)      Pancreatic cyst      AV fistula      History of cardiac cath      H/O colonoscopy      Stented coronary artery           Physical Therapy Rec:   Home  [  ]   Home w/ PT  [  ]  Rehab  [ X ]    Discussed with CT Surgery attending.

## 2025-07-05 NOTE — PROGRESS NOTE ADULT - ASSESSMENT
71 yo M with cardiogenic shock on RVAD ,DM2, CAD (total  4 coronary stents, last one PCI in 2024 ), CHF with EF 40-45%, severe MR     s/p Mitral valve replacement, CABG x3 and RVAD placement   1 unit prbc    RVAD replaced and removal of PA cannula   bronch with IP and extubated - bronch BAL + Serratia    epi weaned off    overnight hypothermic, elevated procal, blood cx (negative), added Vanco   CRRT D/C'D   Interval removal left-sided chest tube without pneumothorax. Bibasilar atelectasis   [facility]J TLC   BC negative    RVAD removal     weaned; iHD  7/3 transferred to [address] [place] [address]. Continues on cefepime until , 7 day course (off vanco)     VSS, Left chest was ultrasounded by PA there is an effusion will reassess tomorrrow after HD to see if we should place a tube, OOB to chair did not walk today   VSS, LPCT placed and 900cc serosanguinous drainage. Off O2 lying flat  Disposition: Acute Rehab

## 2025-07-06 LAB
ANION GAP SERPL CALC-SCNC: 17 MMOL/L — SIGNIFICANT CHANGE UP (ref 5–17)
BASOPHILS # BLD AUTO: 0.05 K/UL — SIGNIFICANT CHANGE UP (ref 0–0.2)
BASOPHILS NFR BLD AUTO: 0.4 % — SIGNIFICANT CHANGE UP (ref 0–2)
BUN SERPL-MCNC: 69 MG/DL — HIGH (ref 7–23)
CALCIUM SERPL-MCNC: 11 MG/DL — HIGH (ref 8.4–10.5)
CHLORIDE SERPL-SCNC: 93 MMOL/L — LOW (ref 96–108)
CO2 SERPL-SCNC: 22 MMOL/L — SIGNIFICANT CHANGE UP (ref 22–31)
CREAT SERPL-MCNC: 7.85 MG/DL — HIGH (ref 0.5–1.3)
EGFR: 7 ML/MIN/1.73M2 — LOW
EGFR: 7 ML/MIN/1.73M2 — LOW
EOSINOPHIL # BLD AUTO: 0.16 K/UL — SIGNIFICANT CHANGE UP (ref 0–0.5)
EOSINOPHIL NFR BLD AUTO: 1.3 % — SIGNIFICANT CHANGE UP (ref 0–6)
GLUCOSE BLDC GLUCOMTR-MCNC: 132 MG/DL — HIGH (ref 70–99)
GLUCOSE BLDC GLUCOMTR-MCNC: 132 MG/DL — HIGH (ref 70–99)
GLUCOSE BLDC GLUCOMTR-MCNC: 152 MG/DL — HIGH (ref 70–99)
GLUCOSE BLDC GLUCOMTR-MCNC: 200 MG/DL — HIGH (ref 70–99)
GLUCOSE BLDC GLUCOMTR-MCNC: 99 MG/DL — SIGNIFICANT CHANGE UP (ref 70–99)
GLUCOSE SERPL-MCNC: 117 MG/DL — HIGH (ref 70–99)
HAPTOGLOB SERPL-MCNC: 225 MG/DL — HIGH (ref 34–200)
HCT VFR BLD CALC: 27.2 % — LOW (ref 39–50)
HGB BLD-MCNC: 8.3 G/DL — LOW (ref 13–17)
IMM GRANULOCYTES # BLD AUTO: 0.1 K/UL — HIGH (ref 0–0.07)
IMM GRANULOCYTES NFR BLD AUTO: 0.8 % — SIGNIFICANT CHANGE UP (ref 0–0.9)
LDH SERPL L TO P-CCNC: 223 U/L — SIGNIFICANT CHANGE UP (ref 50–242)
LYMPHOCYTES # BLD AUTO: 1.07 K/UL — SIGNIFICANT CHANGE UP (ref 1–3.3)
LYMPHOCYTES NFR BLD AUTO: 8.6 % — LOW (ref 13–44)
MAGNESIUM SERPL-MCNC: 2.7 MG/DL — HIGH (ref 1.6–2.6)
MCHC RBC-ENTMCNC: 26.4 PG — LOW (ref 27–34)
MCHC RBC-ENTMCNC: 30.5 G/DL — LOW (ref 32–36)
MCV RBC AUTO: 86.6 FL — SIGNIFICANT CHANGE UP (ref 80–100)
MONOCYTES # BLD AUTO: 1.18 K/UL — HIGH (ref 0–0.9)
MONOCYTES NFR BLD AUTO: 9.5 % — SIGNIFICANT CHANGE UP (ref 2–14)
NEUTROPHILS # BLD AUTO: 9.82 K/UL — HIGH (ref 1.8–7.4)
NEUTROPHILS NFR BLD AUTO: 79.4 % — HIGH (ref 43–77)
NRBC # BLD AUTO: 0 K/UL — SIGNIFICANT CHANGE UP (ref 0–0)
NRBC # FLD: 0 K/UL — SIGNIFICANT CHANGE UP (ref 0–0)
NRBC BLD AUTO-RTO: 0 /100 WBCS — SIGNIFICANT CHANGE UP (ref 0–0)
PHOSPHATE SERPL-MCNC: 4.2 MG/DL — SIGNIFICANT CHANGE UP (ref 2.5–4.5)
PLATELET # BLD AUTO: 251 K/UL — SIGNIFICANT CHANGE UP (ref 150–400)
PMV BLD: 11.1 FL — SIGNIFICANT CHANGE UP (ref 7–13)
POTASSIUM SERPL-MCNC: 4.8 MMOL/L — SIGNIFICANT CHANGE UP (ref 3.5–5.3)
POTASSIUM SERPL-SCNC: 4.8 MMOL/L — SIGNIFICANT CHANGE UP (ref 3.5–5.3)
RBC # BLD: 3.14 M/UL — LOW (ref 4.2–5.8)
RBC # FLD: 18.7 % — HIGH (ref 10.3–14.5)
SODIUM SERPL-SCNC: 132 MMOL/L — LOW (ref 135–145)
WBC # BLD: 12.38 K/UL — HIGH (ref 3.8–10.5)
WBC # FLD AUTO: 12.38 K/UL — HIGH (ref 3.8–10.5)

## 2025-07-06 RX ADMIN — Medication 3 MILLILITER(S): at 15:10

## 2025-07-06 RX ADMIN — INSULIN GLARGINE-YFGN 30 UNIT(S): 100 INJECTION, SOLUTION SUBCUTANEOUS at 21:47

## 2025-07-06 RX ADMIN — Medication 1 APPLICATION(S): at 09:50

## 2025-07-06 RX ADMIN — ATORVASTATIN CALCIUM 80 MILLIGRAM(S): 80 TABLET, FILM COATED ORAL at 21:47

## 2025-07-06 RX ADMIN — Medication 5 MILLIGRAM(S): at 21:48

## 2025-07-06 RX ADMIN — HEPARIN SODIUM 5000 UNIT(S): 1000 INJECTION INTRAVENOUS; SUBCUTANEOUS at 06:09

## 2025-07-06 RX ADMIN — INSULIN LISPRO 12 UNIT(S): 100 INJECTION, SOLUTION INTRAVENOUS; SUBCUTANEOUS at 17:41

## 2025-07-06 RX ADMIN — Medication 3 MILLILITER(S): at 06:22

## 2025-07-06 RX ADMIN — Medication 50 MILLIGRAM(S): at 21:48

## 2025-07-06 RX ADMIN — AMIODARONE HYDROCHLORIDE 200 MILLIGRAM(S): 50 INJECTION, SOLUTION INTRAVENOUS at 06:09

## 2025-07-06 RX ADMIN — Medication 40 MILLIGRAM(S): at 06:09

## 2025-07-06 RX ADMIN — INSULIN LISPRO 1: 100 INJECTION, SOLUTION INTRAVENOUS; SUBCUTANEOUS at 17:42

## 2025-07-06 RX ADMIN — INSULIN LISPRO 1: 100 INJECTION, SOLUTION INTRAVENOUS; SUBCUTANEOUS at 12:17

## 2025-07-06 RX ADMIN — POLYETHYLENE GLYCOL 3350 17 GRAM(S): 17 POWDER, FOR SOLUTION ORAL at 06:10

## 2025-07-06 RX ADMIN — Medication 2 TABLET(S): at 21:48

## 2025-07-06 RX ADMIN — INSULIN LISPRO 12 UNIT(S): 100 INJECTION, SOLUTION INTRAVENOUS; SUBCUTANEOUS at 12:17

## 2025-07-06 RX ADMIN — HEPARIN SODIUM 5000 UNIT(S): 1000 INJECTION INTRAVENOUS; SUBCUTANEOUS at 15:16

## 2025-07-06 RX ADMIN — Medication 500 MICROGRAM(S): at 06:10

## 2025-07-06 RX ADMIN — Medication 500 MICROGRAM(S): at 12:29

## 2025-07-06 RX ADMIN — Medication 650 MILLIGRAM(S): at 08:37

## 2025-07-06 RX ADMIN — Medication 1 TABLET(S): at 12:29

## 2025-07-06 RX ADMIN — METOPROLOL SUCCINATE 12.5 MILLIGRAM(S): 50 TABLET, EXTENDED RELEASE ORAL at 06:09

## 2025-07-06 RX ADMIN — INSULIN LISPRO 12 UNIT(S): 100 INJECTION, SOLUTION INTRAVENOUS; SUBCUTANEOUS at 08:36

## 2025-07-06 RX ADMIN — Medication 3 MILLILITER(S): at 21:31

## 2025-07-06 RX ADMIN — Medication 500 MICROGRAM(S): at 17:43

## 2025-07-06 RX ADMIN — Medication 81 MILLIGRAM(S): at 12:29

## 2025-07-06 RX ADMIN — METOPROLOL SUCCINATE 12.5 MILLIGRAM(S): 50 TABLET, EXTENDED RELEASE ORAL at 17:42

## 2025-07-06 RX ADMIN — HEPARIN SODIUM 5000 UNIT(S): 1000 INJECTION INTRAVENOUS; SUBCUTANEOUS at 21:47

## 2025-07-06 RX ADMIN — Medication 650 MILLIGRAM(S): at 09:07

## 2025-07-06 NOTE — PROGRESS NOTE ADULT - PROBLEM SELECTOR PLAN 1
Continue with  mg PO Daily.   Continue with Lopressor mg 12.5 BID  PO.   Continue with Lipitor mg 80 PO HS    Off diuretics   Increase activity as tolerated.   Encourage Chest PT / Pulmonary toileting and Incentive spirometry every 1 hour x 10 while awake.   Continue with Protonix 40 mg PO daily and Heparin 5000 units SQ daily for PUD and DVT prophylaxis.   Sternal precautions and wound care  Shower on POD #5.   D/C plan home once medically cleared   LPCT to [facility] will transition to waterseal today  Plan of care discussed with attending

## 2025-07-06 NOTE — PROGRESS NOTE ADULT - ASSESSMENT
70 year old male PMH of HTN, HLD, DM2, CAD SP  Mitral valve replacement, CABG x3 and RVAD placement      Assessment  DMT2: 70y Male with DM T2 with hyperglycemia, A1C 6.8%, was on  insulin at home, now on basal bolus insulin with coverage, blood sugars are improving.   CAD: on medications, stable, monitored. sp CABG  HTN: on antihypertensive medications, monitored, asymptomatic.  ESRD: On hemodialysis, Monitor labs/BMP      Discussed plan and management with Dr Francisco Javier Baeza NP - TEAMS  Ulysses Mcintyre MD  Cell: [phone]  Office: [phone]            70 year old male PMH of HTN, HLD, DM2, CAD SP  Mitral valve replacement, CABG x3 and RVAD placement    Assessment  DMT2: 70y Male with DM T2 with hyperglycemia, A1C 6.8%, was on  insulin at home, now on basal bolus insulin with coverage, blood sugars are improving.   CAD: on medications, stable, monitored. sp CABG  HTN: on antihypertensive medications, monitored, asymptomatic.  ESRD: On hemodialysis, Monitor labs/BMP      Discussed plan and management with Dr Francisco Javier Baeza NP - TEAMS  Ulysses Mcintyre MD  Cell: [phone]  Office: [phone]

## 2025-07-06 NOTE — PROGRESS NOTE ADULT - SUBJECTIVE AND OBJECTIVE BOX
Subjective: Pt states "Hi " denies any CP, SOB, N/V and palpitations. No acute events overnight.     VITAL SIGNS    Telemetry:  SR  Vital Signs Last 24 Hrs  T(C): 36.4 (07-06-25 @ 04:45), Max: 36.7 (07-05-25 @ 19:00)  T(F): 97.5 (07-06-25 @ 04:45), Max: 98.1 (07-05-25 @ 19:00)  HR: 87 (07-06-25 @ 04:45) (83 - 87)  BP: 118/66 (07-06-25 @ 04:45) (99/61 - 121/63)  RR: 18 (07-06-25 @ 04:45) (18 - 18)  SpO2: 95% (07-06-25 @ 04:45) (93% - 95%)            07-05 @ 07:01  -  07-06 @ 07:00  --------------------------------------------------------  IN: 740 mL / OUT: 1030 mL / NET: -290 mL    07-06 @ 07:01  -  07-06 @ 10:58  --------------------------------------------------------  IN: 400 mL / OUT: 0 mL / NET: 400 mL         CAPILLARY BLOOD GLUCOSE      POCT Blood Glucose.: 132 mg/dL (06 Jul 2025 07:48)  POCT Blood Glucose.: 132 mg/dL (06 Jul 2025 06:19)  POCT Blood Glucose.: 160 mg/dL (05 Jul 2025 21:33)  POCT Blood Glucose.: 111 mg/dL (05 Jul 2025 16:23)  POCT Blood Glucose.: 203 mg/dL (05 Jul 2025 11:24)          MEDICATIONS  acetaminophen     Tablet .. 650 milliGRAM(s) Oral every 6 hours PRN  acetaminophen     Tablet .. 650 milliGRAM(s) Oral every 6 hours PRN  aMIOdarone    Tablet 200 milliGRAM(s) Oral daily  artificial  tears Solution 1 Drop(s) Both EYES every 1 hour PRN  aspirin enteric coated 81 milliGRAM(s) Oral daily  atorvastatin 80 milliGRAM(s) Oral at bedtime  bisacodyl Suppository 10 milliGRAM(s) Rectal once  chlorhexidine 4% Liquid 1 Application(s) Topical daily  dextrose 5%. 1000 milliLiter(s) IV Continuous <Continuous>  dextrose 50% Injectable 50 milliLiter(s) IV Push every 15 minutes  dextrose 50% Injectable 25 milliLiter(s) IV Push every 15 minutes  dextrose Oral Gel 15 Gram(s) Oral once PRN  glucagon  Injectable 1 milliGRAM(s) IntraMuscular once  heparin   Injectable 5000 Unit(s) SubCutaneous every 8 hours  insulin glargine Injectable (LANTUS) 30 Unit(s) SubCutaneous at bedtime  insulin lispro (ADMELOG) corrective regimen sliding scale   SubCutaneous three times a day before meals  insulin lispro (ADMELOG) corrective regimen sliding scale   SubCutaneous at bedtime  insulin lispro Injectable (ADMELOG) 12 Unit(s) SubCutaneous three times a day before meals  ipratropium    for Nebulization 500 MICROGram(s) Nebulizer every 6 hours  melatonin 5 milliGRAM(s) Oral at bedtime  metoprolol tartrate 12.5 milliGRAM(s) Oral two times a day  multivitamin/minerals 1 Tablet(s) Oral daily  pantoprazole    Tablet 40 milliGRAM(s) Oral before breakfast  polyethylene glycol 3350 17 Gram(s) Oral every 12 hours  senna 2 Tablet(s) Oral at bedtime  sodium chloride 0.9% lock flush 3 milliLiter(s) IV Push every 8 hours  sodium chloride 0.9%. 1000 milliLiter(s) IV Continuous <Continuous>  traZODone 50 milliGRAM(s) Oral at bedtime        PHYSICAL EXAM    Neurology: A&Ox3, nonfocal, no gross deficits  CV : RRR+S1S2  Sternal Wound :  MSI CDI , Stable, EPW  Lungs: Respirations non-labored, B/L BS  Abdomen: Soft, NT/ND, +BSx4Q, last BM on 7/5  (-)N/V/D  : Voiding without difficulty  Extremities: No B/L LE edema, negative calf tenderness, +PP , left SVG incision      LABS  07-06    132[L]  |  93[L]  |  69[H]  ----------------------------<  117[H]  4.8   |  22  |  7.85[H]    Ca    11.0[H]      06 Jul 2025 06:05  Phos  4.2     07-06  Mg     2.7     07-06    TPro  6.0  /  Alb  2.8[L]  /  TBili  0.3  /  DBili  x   /  AST  37  /  ALT  38  /  AlkPhos  156[H]  07-05                                 8.3    12.38 )-----------( 251      ( 06 Jul 2025 06:08 )             27.2                 PAST MEDICAL & SURGICAL HISTORY:  HTN (hypertension)      HLD (hyperlipidemia)      CAD (coronary artery disease)      Former smoker      ESRD on dialysis      Gout      Moderate aortic insufficiency      Severe mitral regurgitation      DM2 (diabetes mellitus, type 2)      Right cataract      MI (myocardial infarction)      Stented coronary artery      2019 novel coronavirus disease (COVID-19)      Pancreatic cyst      AV fistula      History of cardiac cath      H/O colonoscopy      Stented coronary artery           Physical Therapy Rec:   Home  [  ]   Home w/ PT  [  ]  Rehab  [ X  ]    Discussed with CT Surgery attending.

## 2025-07-06 NOTE — PROGRESS NOTE ADULT - PROBLEM SELECTOR PLAN 1
Will increase Lantus to 30 units at bed time.  Will increase Admelog to 12 units before each meal in addition to Admelog correction scale coverage.  Patient counseled for compliance with consistent low carb diet and physical activity as tolerated.

## 2025-07-06 NOTE — PROGRESS NOTE ADULT - ASSESSMENT
on room air  afebrile  nad  subsequent HD Monday 7/7/25     acetaminophen     Tablet .. 650 milliGRAM(s) Oral every 6 hours PRN  acetaminophen     Tablet .. 650 milliGRAM(s) Oral every 6 hours PRN  aMIOdarone    Tablet 200 milliGRAM(s) Oral daily  artificial  tears Solution 1 Drop(s) Both EYES every 1 hour PRN  aspirin enteric coated 81 milliGRAM(s) Oral daily  atorvastatin 80 milliGRAM(s) Oral at bedtime  bisacodyl Suppository 10 milliGRAM(s) Rectal once  chlorhexidine 4% Liquid 1 Application(s) Topical daily  dextrose 5%. 1000 milliLiter(s) IV Continuous <Continuous>  dextrose 50% Injectable 50 milliLiter(s) IV Push every 15 minutes  dextrose 50% Injectable 25 milliLiter(s) IV Push every 15 minutes  dextrose Oral Gel 15 Gram(s) Oral once PRN  glucagon  Injectable 1 milliGRAM(s) IntraMuscular once  heparin   Injectable 5000 Unit(s) SubCutaneous every 8 hours  insulin glargine Injectable (LANTUS) 30 Unit(s) SubCutaneous at bedtime  insulin lispro (ADMELOG) corrective regimen sliding scale   SubCutaneous three times a day before meals  insulin lispro (ADMELOG) corrective regimen sliding scale   SubCutaneous at bedtime  insulin lispro Injectable (ADMELOG) 12 Unit(s) SubCutaneous three times a day before meals  ipratropium    for Nebulization 500 MICROGram(s) Nebulizer every 6 hours  melatonin 5 milliGRAM(s) Oral at bedtime  metoprolol tartrate 12.5 milliGRAM(s) Oral two times a day  multivitamin/minerals 1 Tablet(s) Oral daily  pantoprazole    Tablet 40 milliGRAM(s) Oral before breakfast  polyethylene glycol 3350 17 Gram(s) Oral every 12 hours  senna 2 Tablet(s) Oral at bedtime  sodium chloride 0.9% lock flush 3 milliLiter(s) IV Push every 8 hours  sodium chloride 0.9%. 1000 milliLiter(s) IV Continuous <Continuous>  traZODone 50 milliGRAM(s) Oral at bedtime      VITAL:  T(C): , Max: 36.7 (07-05-25 @ 19:00)  T(F): , Max: 98.1 (07-05-25 @ 19:00)  HR: 87 (07-06-25 @ 04:45)  BP: 118/66 (07-06-25 @ 04:45)  BP(mean): 83 (07-06-25 @ 04:45)  RR: 18 (07-06-25 @ 04:45)  SpO2: 95% (07-06-25 @ 04:45)  Wt(kg): --    07-05-25 @ 07:01  -  07-06-25 @ 07:00  --------------------------------------------------------  IN: 740 mL / OUT: 1030 mL / NET: -290 mL    07-06-25 @ 07:01  -  07-06-25 @ 11:47  --------------------------------------------------------  IN: 400 mL / OUT: 0 mL / NET: 400 mL        PHYSICAL EXAM:  Constitutional: NAD  Neck:  No JVD  Respiratory: CTAB/L  Cardiovascular: S1 and S2  Gastrointestinal: BS+, soft, NT/ND  Extremities: No peripheral edema  Neurological: A/O x 3, no focal deficits  Psychiatric: Normal mood, normal affect  : No Ag  Skin: No rashes  Access: avf  LABS:                          8.3    12.38 )-----------( 251      ( 06 Jul 2025 06:08 )             27.2     Na(132)/K(4.8)/Cl(93)/HCO3(22)/BUN(69)/Cr(7.85)Glu(117)/Ca(11.0)/Mg(2.7)/PO4(4.2)    07-06 @ 06:05  Na(133)/K(4.5)/Cl(94)/HCO3(23)/BUN(56)/Cr(6.67)Glu(198)/Ca(10.0)/Mg(2.5)/PO4(3.3)    07-05 @ 06:28  Na(133)/K(4.7)/Cl(93)/HCO3(21)/BUN(66)/Cr(8.51)Glu(191)/Ca(10.7)/Mg(2.7)/PO4(4.8)    07-04 @ 05:45    Urinalysis Basic - ( 06 Jul 2025 06:05 )    Color: x / Appearance: x / SG: x / pH: x  Gluc: 117 mg/dL / Ketone: x  / Bili: x / Urobili: x   Blood: x / Protein: x / Nitrite: x   Leuk Esterase: x / RBC: x / WBC x   Sq Epi: x / Non Sq Epi: x / Bacteria: x                ASSESSMENT:  70M w/ HTN, DM2, CAD, and ESRD-HD, s/p CABGx3/MV repair/RVAD 6/17/25    (1)Renal - ESRD - HD MWF - s/p HD with 700ml  fluid loss 7/4/25,  CRRT discontinued 6/27;  Hyponatremia lower should improve with HD , next HD Monday 7/7/27  (2)Hypercalcemia- lower calcium bath with HD  (3)Anemia -  hgb improving on TIW Retacrit;   (4)CV - SP cardiogenic shock  and now compensated and removal of  RVAD/inotropes     RECOMMEND:  (1)Renal - Next HD Monday and continue  lower calcium bath  (2)Anemia - Increase Retacrit to help with anemia   (3)CVS- BP acceptable at present  (4) Physical therapy    (5)ID- IV abx       Jeronimo Posey NP-BC  [employer]  [phone]        70M w/ HTN, DM2, CAD, and ESRD-HD, s/p CABGx3/MV repair/RVAD 6/17/25    (1)Renal - ESRD - HD MWF - s/p HD with 700ml  fluid loss 7/4/25,  CRRT discontinued 6/27;  Hyponatremia lower should improve with HD , next HD Monday 7/7/27  (2)Hypercalcemia- lower calcium bath with HD;  Hyperkalemia   (3)Anemia -  hgb improving on TIW Retacrit;   (4)CV - SP cardiogenic shock  and now compensated and removal of  RVAD/inotropes     RECOMMEND:  (1)Renal - Next HD today and continue  lower calcium bath  (2)Anemia - Increase Retacrit to help with anemia   (3)CVS- BP acceptable at present  (4) Physical therapy        [facility]   [employer]   [id number]

## 2025-07-06 NOTE — PROGRESS NOTE ADULT - SUBJECTIVE AND OBJECTIVE BOX
[facility] [phone]        Patient seen and examined in bed.  He was the same         MEDICATIONS  (STANDING):  aMIOdarone    Tablet 200 milliGRAM(s) Oral daily  aspirin enteric coated 81 milliGRAM(s) Oral daily  atorvastatin 80 milliGRAM(s) Oral at bedtime  bisacodyl Suppository 10 milliGRAM(s) Rectal once  chlorhexidine 4% Liquid 1 Application(s) Topical daily  dextrose 5%. 1000 milliLiter(s) (100 mL/Hr) IV Continuous <Continuous>  dextrose 50% Injectable 50 milliLiter(s) IV Push every 15 minutes  dextrose 50% Injectable 25 milliLiter(s) IV Push every 15 minutes  epoetin douglas-epbx (RETACRIT) Injectable 30369 Unit(s) IV Push once  glucagon  Injectable 1 milliGRAM(s) IntraMuscular once  heparin   Injectable 5000 Unit(s) SubCutaneous every 8 hours  insulin glargine Injectable (LANTUS) 30 Unit(s) SubCutaneous at bedtime  insulin lispro (ADMELOG) corrective regimen sliding scale   SubCutaneous three times a day before meals  insulin lispro (ADMELOG) corrective regimen sliding scale   SubCutaneous at bedtime  insulin lispro Injectable (ADMELOG) 12 Unit(s) SubCutaneous three times a day before meals  ipratropium    for Nebulization 500 MICROGram(s) Nebulizer every 6 hours  melatonin 5 milliGRAM(s) Oral at bedtime  metoprolol tartrate 12.5 milliGRAM(s) Oral two times a day  multivitamin/minerals 1 Tablet(s) Oral daily  pantoprazole    Tablet 40 milliGRAM(s) Oral before breakfast  polyethylene glycol 3350 17 Gram(s) Oral every 12 hours  senna 2 Tablet(s) Oral at bedtime  sodium chloride 0.9% lock flush 3 milliLiter(s) IV Push every 8 hours  sodium chloride 0.9%. 1000 milliLiter(s) (10 mL/Hr) IV Continuous <Continuous>  traZODone 50 milliGRAM(s) Oral at bedtime      VITAL:  T(C): , Max: 36.8 (07-06-25 @ 19:27)  T(F): , Max: 98.2 (07-06-25 @ 19:27)  HR: 86 (07-07-25 @ 05:50)  BP: 122/63 (07-07-25 @ 05:50)  BP(mean): 88 (07-06-25 @ 19:27)  RR: 18 (07-07-25 @ 05:50)  SpO2: 93% (07-07-25 @ 05:50)  Wt(kg): --    I and O's:    07-06 @ 07:01  -  07-07 @ 07:00  --------------------------------------------------------  IN: 880 mL / OUT: 20 mL / NET: 860 mL    07-07 @ 07:01  -  07-07 @ 09:58  --------------------------------------------------------  IN: 500 mL / OUT: 0 mL / NET: 500 mL          PHYSICAL EXAM:    Constitutional: NAD  Neck:  No JVD  Respiratory: CTAB/L  Cardiovascular: S1 and S2  Gastrointestinal: BS+, soft, NT/ND  Extremities: No peripheral edema  Neurological: A/O x 3, no focal deficits  Psychiatric: Normal mood, normal affect  : No Ag  Skin: No rashes  Access: [facility]    LABS:                        8.7    11.64 )-----------( 287      ( 07 Jul 2025 06:54 )             28.4     07-07    133[L]  |  92[L]  |  83[H]  ----------------------------<  65[L]  5.3   |  22  |  9.78[H]    Ca    11.0[H]      07 Jul 2025 06:56  Phos  4.9     07-07  Mg     3.0     07-07    TPro  6.0  /  Alb  3.0[L]  /  TBili  0.3  /  DBili  x   /  AST  28  /  ALT  38  /  AlkPhos  128[H]  07-07          Urine Studies:  Urinalysis Basic - ( 07 Jul 2025 06:56 )    Color: x / Appearance: x / SG: x / pH: x  Gluc: 65 mg/dL / Ketone: x  / Bili: x / Urobili: x   Blood: x / Protein: x / Nitrite: x   Leuk Esterase: x / RBC: x / WBC x   Sq Epi: x / Non Sq Epi: x / Bacteria: x            RADIOLOGY & ADDITIONAL STUDIES:

## 2025-07-06 NOTE — PROGRESS NOTE ADULT - ASSESSMENT
71 yo M with cardiogenic shock on RVAD ,DM2, CAD (total  4 coronary stents, last one PCI in 2024 ), CHF with EF 40-45%, severe MR     s/p Mitral valve replacement, CABG x3 and RVAD placement   1 unit prbc    RVAD replaced and removal of PA cannula   bronch with IP and extubated - bronch BAL + Serratia    epi weaned off    overnight hypothermic, elevated procal, blood cx (negative), added Vanco   CRRT D/C'D   Interval removal left-sided chest tube without pneumothorax. Bibasilar atelectasis   [facility]J TLC   BC negative    RVAD removal     weaned; iHD  7/3 transferred to [address] [place] [address]. Continues on cefepime until , 7 day course (off vanco)     VSS, Left chest was ultrasounded by PA there is an effusion will reassess tomorrrow after HD to see if we should place a tube, OOB to chair did not walk today   VSS, LPCT placed and 900cc serosanguinous drainage. Off O2 lying flat   VSS, CT to LCWS will place on waterseal today,Off O2, needs to ambulate  Disposition: Acute Rehab

## 2025-07-07 LAB
ALBUMIN SERPL ELPH-MCNC: 3 G/DL — LOW (ref 3.3–5)
ALP SERPL-CCNC: 128 U/L — HIGH (ref 40–120)
ALT FLD-CCNC: 38 U/L — SIGNIFICANT CHANGE UP (ref 10–45)
ANION GAP SERPL CALC-SCNC: 19 MMOL/L — HIGH (ref 5–17)
AST SERPL-CCNC: 28 U/L — SIGNIFICANT CHANGE UP (ref 10–40)
BASOPHILS # BLD AUTO: 0.08 K/UL — SIGNIFICANT CHANGE UP (ref 0–0.2)
BASOPHILS NFR BLD AUTO: 0.7 % — SIGNIFICANT CHANGE UP (ref 0–2)
BILIRUB SERPL-MCNC: 0.3 MG/DL — SIGNIFICANT CHANGE UP (ref 0.2–1.2)
BUN SERPL-MCNC: 83 MG/DL — HIGH (ref 7–23)
CALCIUM SERPL-MCNC: 11 MG/DL — HIGH (ref 8.4–10.5)
CHLORIDE SERPL-SCNC: 92 MMOL/L — LOW (ref 96–108)
CO2 SERPL-SCNC: 22 MMOL/L — SIGNIFICANT CHANGE UP (ref 22–31)
CREAT SERPL-MCNC: 9.78 MG/DL — HIGH (ref 0.5–1.3)
EGFR: 5 ML/MIN/1.73M2 — LOW
EGFR: 5 ML/MIN/1.73M2 — LOW
EOSINOPHIL # BLD AUTO: 0.19 K/UL — SIGNIFICANT CHANGE UP (ref 0–0.5)
EOSINOPHIL NFR BLD AUTO: 1.6 % — SIGNIFICANT CHANGE UP (ref 0–6)
GLUCOSE BLDC GLUCOMTR-MCNC: 237 MG/DL — HIGH (ref 70–99)
GLUCOSE BLDC GLUCOMTR-MCNC: 69 MG/DL — LOW (ref 70–99)
GLUCOSE BLDC GLUCOMTR-MCNC: 77 MG/DL — SIGNIFICANT CHANGE UP (ref 70–99)
GLUCOSE BLDC GLUCOMTR-MCNC: 78 MG/DL — SIGNIFICANT CHANGE UP (ref 70–99)
GLUCOSE SERPL-MCNC: 65 MG/DL — LOW (ref 70–99)
HAPTOGLOB SERPL-MCNC: 216 MG/DL — HIGH (ref 34–200)
HCT VFR BLD CALC: 28.4 % — LOW (ref 39–50)
HGB BLD-MCNC: 8.7 G/DL — LOW (ref 13–17)
IMM GRANULOCYTES # BLD AUTO: 0.1 K/UL — HIGH (ref 0–0.07)
IMM GRANULOCYTES NFR BLD AUTO: 0.9 % — SIGNIFICANT CHANGE UP (ref 0–0.9)
LDH SERPL L TO P-CCNC: 206 U/L — SIGNIFICANT CHANGE UP (ref 50–242)
LYMPHOCYTES # BLD AUTO: 1.16 K/UL — SIGNIFICANT CHANGE UP (ref 1–3.3)
LYMPHOCYTES NFR BLD AUTO: 10 % — LOW (ref 13–44)
MAGNESIUM SERPL-MCNC: 3 MG/DL — HIGH (ref 1.6–2.6)
MCHC RBC-ENTMCNC: 26.5 PG — LOW (ref 27–34)
MCHC RBC-ENTMCNC: 30.6 G/DL — LOW (ref 32–36)
MCV RBC AUTO: 86.6 FL — SIGNIFICANT CHANGE UP (ref 80–100)
MONOCYTES # BLD AUTO: 1.15 K/UL — HIGH (ref 0–0.9)
MONOCYTES NFR BLD AUTO: 9.9 % — SIGNIFICANT CHANGE UP (ref 2–14)
NEUTROPHILS # BLD AUTO: 8.96 K/UL — HIGH (ref 1.8–7.4)
NEUTROPHILS NFR BLD AUTO: 76.9 % — SIGNIFICANT CHANGE UP (ref 43–77)
NRBC # BLD AUTO: 0 K/UL — SIGNIFICANT CHANGE UP (ref 0–0)
NRBC # FLD: 0 K/UL — SIGNIFICANT CHANGE UP (ref 0–0)
NRBC BLD AUTO-RTO: 0 /100 WBCS — SIGNIFICANT CHANGE UP (ref 0–0)
PHOSPHATE SERPL-MCNC: 4.9 MG/DL — HIGH (ref 2.5–4.5)
PLATELET # BLD AUTO: 287 K/UL — SIGNIFICANT CHANGE UP (ref 150–400)
PMV BLD: 11.6 FL — SIGNIFICANT CHANGE UP (ref 7–13)
POTASSIUM SERPL-MCNC: 5.3 MMOL/L — SIGNIFICANT CHANGE UP (ref 3.5–5.3)
POTASSIUM SERPL-SCNC: 5.3 MMOL/L — SIGNIFICANT CHANGE UP (ref 3.5–5.3)
PROT SERPL-MCNC: 6 G/DL — SIGNIFICANT CHANGE UP (ref 6–8.3)
RBC # BLD: 3.28 M/UL — LOW (ref 4.2–5.8)
RBC # FLD: 18.7 % — HIGH (ref 10.3–14.5)
SODIUM SERPL-SCNC: 133 MMOL/L — LOW (ref 135–145)
WBC # BLD: 11.64 K/UL — HIGH (ref 3.8–10.5)
WBC # FLD AUTO: 11.64 K/UL — HIGH (ref 3.8–10.5)

## 2025-07-07 PROCEDURE — 71045 X-RAY EXAM CHEST 1 VIEW: CPT | Mod: 26

## 2025-07-07 PROCEDURE — 93010 ELECTROCARDIOGRAM REPORT: CPT

## 2025-07-07 PROCEDURE — 99233 SBSQ HOSP IP/OBS HIGH 50: CPT

## 2025-07-07 RX ORDER — EPOETIN ALFA 10000 [IU]/ML
10000 SOLUTION INTRAVENOUS; SUBCUTANEOUS ONCE
Refills: 0 | Status: COMPLETED | OUTPATIENT
Start: 2025-07-07 | End: 2025-07-07

## 2025-07-07 RX ORDER — INSULIN LISPRO 100 U/ML
8 INJECTION, SOLUTION INTRAVENOUS; SUBCUTANEOUS
Refills: 0 | Status: DISCONTINUED | OUTPATIENT
Start: 2025-07-07 | End: 2025-07-07

## 2025-07-07 RX ORDER — INSULIN LISPRO 100 U/ML
6 INJECTION, SOLUTION INTRAVENOUS; SUBCUTANEOUS
Refills: 0 | Status: DISCONTINUED | OUTPATIENT
Start: 2025-07-07 | End: 2025-07-09

## 2025-07-07 RX ORDER — INSULIN GLARGINE-YFGN 100 [IU]/ML
24 INJECTION, SOLUTION SUBCUTANEOUS AT BEDTIME
Refills: 0 | Status: DISCONTINUED | OUTPATIENT
Start: 2025-07-07 | End: 2025-07-09

## 2025-07-07 RX ADMIN — Medication 3 MILLILITER(S): at 21:49

## 2025-07-07 RX ADMIN — Medication 50 MILLIGRAM(S): at 21:54

## 2025-07-07 RX ADMIN — Medication 5 MILLIGRAM(S): at 21:54

## 2025-07-07 RX ADMIN — ATORVASTATIN CALCIUM 80 MILLIGRAM(S): 80 TABLET, FILM COATED ORAL at 21:54

## 2025-07-07 RX ADMIN — HEPARIN SODIUM 5000 UNIT(S): 1000 INJECTION INTRAVENOUS; SUBCUTANEOUS at 21:54

## 2025-07-07 RX ADMIN — METOPROLOL SUCCINATE 12.5 MILLIGRAM(S): 50 TABLET, EXTENDED RELEASE ORAL at 05:42

## 2025-07-07 RX ADMIN — Medication 650 MILLIGRAM(S): at 07:58

## 2025-07-07 RX ADMIN — Medication 40 MILLIGRAM(S): at 05:42

## 2025-07-07 RX ADMIN — Medication 1 TABLET(S): at 11:13

## 2025-07-07 RX ADMIN — Medication 650 MILLIGRAM(S): at 08:30

## 2025-07-07 RX ADMIN — HEPARIN SODIUM 5000 UNIT(S): 1000 INJECTION INTRAVENOUS; SUBCUTANEOUS at 05:42

## 2025-07-07 RX ADMIN — METOPROLOL SUCCINATE 12.5 MILLIGRAM(S): 50 TABLET, EXTENDED RELEASE ORAL at 16:48

## 2025-07-07 RX ADMIN — Medication 500 MICROGRAM(S): at 18:33

## 2025-07-07 RX ADMIN — AMIODARONE HYDROCHLORIDE 200 MILLIGRAM(S): 50 INJECTION, SOLUTION INTRAVENOUS at 05:42

## 2025-07-07 RX ADMIN — Medication 3 MILLILITER(S): at 13:08

## 2025-07-07 RX ADMIN — EPOETIN ALFA 10000 UNIT(S): 10000 SOLUTION INTRAVENOUS; SUBCUTANEOUS at 11:34

## 2025-07-07 RX ADMIN — Medication 81 MILLIGRAM(S): at 11:13

## 2025-07-07 RX ADMIN — Medication 3 MILLILITER(S): at 07:06

## 2025-07-07 RX ADMIN — INSULIN GLARGINE-YFGN 24 UNIT(S): 100 INJECTION, SOLUTION SUBCUTANEOUS at 21:54

## 2025-07-07 RX ADMIN — INSULIN LISPRO 12 UNIT(S): 100 INJECTION, SOLUTION INTRAVENOUS; SUBCUTANEOUS at 07:59

## 2025-07-07 NOTE — PROGRESS NOTE ADULT - ASSESSMENT
70 year-old with prior high risk PCI, now with disease progression and severe mitral regurgitation, referred for CABG and MVR.  Patient is status post CABG, MVR, and RVAD placement on 6/17/2025 with Crow Mckeon MD.  RVAD weaned and removed.  Patient now hemodynamically stable without pressor or inotrope support.  Tolerating low dose beta-blocker.  Uptitrate as tolerated.     Continue excellent care per Cardiothoracic Surgery service.  Discharge planning per primary team.

## 2025-07-07 NOTE — PROGRESS NOTE ADULT - ASSESSMENT
70 year old male PMH of HTN, HLD, DM2, CAD SP  Mitral valve replacement, CABG x3 and RVAD placement    Assessment  DMT2: 70y Male with DM T2 with hyperglycemia, A1C 6.8%, was on  insulin at home, now on basal bolus insulin with coverage, blood sugars are improving.   CAD: on medications, stable, monitored. sp CABG  HTN: on antihypertensive medications, monitored, asymptomatic.  ESRD: On hemodialysis, Monitor labs/BMP      Discussed plan and management with Dr Francisco Javier Baeza NP - TEAMS  Ulysses Mcintyre MD  Cell: [phone]  Office: [phone]            70 year old male PMH of HTN, HLD, DM2, CAD SP  Mitral valve replacement, CABG x3 and RVAD placement    Assessment    DMT2: 70y Male with DM T2 with hyperglycemia, A1C 6.8%, was on  insulin at home, now on basal bolus insulin with coverage, blood sugars are improving.   CAD: on medications, stable, monitored. sp CABG  HTN: on antihypertensive medications, monitored, asymptomatic.  ESRD: On hemodialysis, Monitor labs/BMP      Discussed plan and management with Dr Francisco Javier Baeza NP - TEAMS  Ulysses Mcintyre MD  Cell: [phone]  Office: [phone]

## 2025-07-07 NOTE — PROGRESS NOTE ADULT - SUBJECTIVE AND OBJECTIVE BOX
HPI:  Patient seen and examined at bedside on 2 Marie.    Review Of Systems:           Respiratory: No shortness of breath, cough, or wheezing  Cardiovascular: No chest pain or palpitations  10 point review of systems is otherwise negative except as mentioned above        Medications:  acetaminophen     Tablet .. 650 milliGRAM(s) Oral every 6 hours PRN  acetaminophen     Tablet .. 650 milliGRAM(s) Oral every 6 hours PRN  aMIOdarone    Tablet 200 milliGRAM(s) Oral daily  artificial  tears Solution 1 Drop(s) Both EYES every 1 hour PRN  aspirin enteric coated 81 milliGRAM(s) Oral daily  atorvastatin 80 milliGRAM(s) Oral at bedtime  bisacodyl Suppository 10 milliGRAM(s) Rectal once  chlorhexidine 4% Liquid 1 Application(s) Topical daily  dextrose 5%. 1000 milliLiter(s) IV Continuous <Continuous>  dextrose 50% Injectable 50 milliLiter(s) IV Push every 15 minutes  dextrose 50% Injectable 25 milliLiter(s) IV Push every 15 minutes  dextrose Oral Gel 15 Gram(s) Oral once PRN  glucagon  Injectable 1 milliGRAM(s) IntraMuscular once  heparin   Injectable 5000 Unit(s) SubCutaneous every 8 hours  insulin glargine Injectable (LANTUS) 24 Unit(s) SubCutaneous at bedtime  insulin lispro (ADMELOG) corrective regimen sliding scale   SubCutaneous three times a day before meals  insulin lispro (ADMELOG) corrective regimen sliding scale   SubCutaneous at bedtime  insulin lispro Injectable (ADMELOG) 6 Unit(s) SubCutaneous three times a day before meals  ipratropium    for Nebulization 500 MICROGram(s) Nebulizer every 6 hours  melatonin 5 milliGRAM(s) Oral at bedtime  metoprolol tartrate 12.5 milliGRAM(s) Oral two times a day  multivitamin/minerals 1 Tablet(s) Oral daily  pantoprazole    Tablet 40 milliGRAM(s) Oral before breakfast  polyethylene glycol 3350 17 Gram(s) Oral every 12 hours  senna 2 Tablet(s) Oral at bedtime  sodium chloride 0.9% lock flush 3 milliLiter(s) IV Push every 8 hours  sodium chloride 0.9%. 1000 milliLiter(s) IV Continuous <Continuous>  traZODone 50 milliGRAM(s) Oral at bedtime    PAST MEDICAL & SURGICAL HISTORY:  HTN (hypertension)      HLD (hyperlipidemia)      CAD (coronary artery disease)      Former smoker      ESRD on dialysis      Gout      Moderate aortic insufficiency      Severe mitral regurgitation      DM2 (diabetes mellitus, type 2)      Right cataract      MI (myocardial infarction)      Stented coronary artery      2019 novel coronavirus disease (COVID-19)      Pancreatic cyst      AV fistula      History of cardiac cath      H/O colonoscopy      Stented coronary artery        Vitals:  T(C): 36.7 (25 @ 19:58), Max: 36.7 (25 @ 19:58)  HR: 84 (25 @ 19:58) (79 - 86)  BP: 122/569 (25 @ 19:58) (112/55 - 122/569)  BP(mean): 78 (25 @ 19:58) (78 - 78)  RR: 18 (25 @ 19:58) (18 - 18)  SpO2: 93% (25 @ 19:58) (93% - 97%)  Wt(kg): --  Daily     Daily Weight in k.8 (2025 07:50)  I&O's Summary    2025 07:01  -  2025 07:00  --------------------------------------------------------  IN: 880 mL / OUT: 20 mL / NET: 860 mL    2025 07:01  -  2025 22:26  --------------------------------------------------------  IN: 620 mL / OUT: 1800 mL / NET: -1180 mL        Physical Exam:  Appearance: No acute distress; well appearing  Eyes: PERRL, EOMI, pink conjunctiva  HENT: Normal oral mucosa  Cardiovascular: RRR, S1, S2, no murmurs, rubs, or gallops; no edema; no JVD  Respiratory: Clear to auscultation bilaterally  Gastrointestinal: soft, non-tender, non-distended with normal bowel sounds  Musculoskeletal: No clubbing; no joint deformity   Neurologic: Non-focal  Lymphatic: No lymphadenopathy  Psychiatry: AAOx3, mood & affect appropriate  Skin: No rashes, ecchymoses, or cyanosis                          8.7    11.64 )-----------( 287      ( 2025 06:54 )             28.4         133[L]  |  92[L]  |  83[H]  ----------------------------<  65[L]  5.3   |  22  |  9.78[H]    Ca    11.0[H]      2025 06:56  Phos  4.9     -  Mg     3.0         TPro  6.0  /  Alb  3.0[L]  /  TBili  0.3  /  DBili  x   /  AST  28  /  ALT  38  /  AlkPhos  128[H]        Cardiovascular Diagnostic Testing:  ECG:    Echo:  < from: TTE W or WO Ultrasound Enhancing Agent (25 @ 07:51) >  _______________________________________________________________________________________     CONCLUSIONS:      1. Technically difficult image quality.   2. Vert limited visualization, unable to evaluate valves accurately.   3. This is an ECMO (wean/surveillence/decannulation) study.   4. ECMO is visualized in IVC   5. Left ventricular cavity is normal in size. There is poor visualization of the endocardial borders to determine the presence of wall motion abnormalities.   6. Left ventricular endocardium is not well visualized; however, the left ventricular systolic function appears severely reduced.   7. The right ventricle is not well visualized. Normal right ventricular cavity size and reduced right ventricular systolic function.   8. Trace pericardial effusion.    ________________________________________________________________________________________    < end of copied text >          Stress Testing:    Cath:  < from: Cardiac Catheterization (25 @ 08:05) >  Diagnostic Findings:     Coronary Angiography   The coronary circulation is right dominant.      LM   Left main artery: There was dampening upon engagement of the ostium of  the left main coronary artery.. There is a 50 %  stenosis in the ostium portion of the segment.      LAD   Proximal left anterior descending: There is a 20 % in-stent  restenosis. First diagonal: There is an 80 % stenosis in the ostium  portion of the segment. Mid left anterior descending: There is a 35 %  stenosis. Distal left anterior descending: There is a 45 %  stenosis.      CX   Proximal circumflex: There is a 90 % in-stent restenosis. Mid  circumflex: There is a 60 % stenosis.    RCA   Proximal right coronary artery: There is a 99 % stenosis in the ostium  portion of the segment. Mid right coronary artery: There  is a 90 % stenosis. Mid right coronary artery: There is a 100 %  stenosis.    < end of copied text >      Interpretation of Telemetry:

## 2025-07-07 NOTE — PROGRESS NOTE ADULT - ASSESSMENT
71 yo M with cardiogenic shock on RVAD ,DM2, CAD (total  4 coronary stents, last one PCI in 2024 ), CHF with EF 40-45%, severe MR     s/p Mitral valve replacement, CABG x3 and RVAD placement   1 unit prbc    RVAD replaced and removal of PA cannula   bronch with IP and extubated - bronch BAL + Serratia    epi weaned off    overnight hypothermic, elevated procal, blood cx (negative), added Vanco   CRRT D/C'D   Interval removal left-sided chest tube without pneumothorax. Bibasilar atelectasis   [facility]J TLC   BC negative    RVAD removal     weaned; iHD  7/3 transferred to [address] [place] [address]. Continues on cefepime until , 7 day course (off vanco)     VSS, Left chest was ultrasounded by PA there is an effusion will reassess tomorrrow after HD to see if we should place a tube, OOB to chair did not walk today   VSS, LPCT placed and 900cc serosanguinous drainage. Off O2 lying flat   VSS, CT to LCWS will place on waterseal today,Off O2, needs to ambulate   VSS CT D/C'd this am, ambulating in halls w/ walker and 1 assist, HD today   Disposition: Acute Rehab

## 2025-07-07 NOTE — PROGRESS NOTE ADULT - PROBLEM SELECTOR PLAN 1
Continue with  mg PO Daily.   Continue with Lopressor mg 12.5 BID  PO.   Continue with Lipitor mg 80 PO HS    Off diuretics   Increase activity as tolerated.   Encourage Chest PT / Pulmonary toileting and Incentive spirometry every 1 hour x 10 while awake.   Continue with Protonix 40 mg PO daily and Heparin 5000 units SQ daily for PUD and DVT prophylaxis.   Sternal precautions and wound care  Shower on POD #5.   D/C plan Acute Rehab once medically cleared   LPCT to [facility] will transition to [facility] today  Plan of care discussed with attending

## 2025-07-07 NOTE — PROGRESS NOTE ADULT - PROBLEM SELECTOR PLAN 1
Will decrease Lantus to 24 units at bed time.  Will decrease Admelog to 8 units before each meal in addition to Admelog correction scale coverage.  Patient counseled for compliance with consistent low carb diet and physical activity as tolerated.

## 2025-07-07 NOTE — PROGRESS NOTE ADULT - SUBJECTIVE AND OBJECTIVE BOX
Chief complaint  Patient is a 70y old  Male who presents with a chief complaint of CHF (03 Jul 2025 20:19)         Labs and Fingersticks  CAPILLARY BLOOD GLUCOSE      POCT Blood Glucose.: 77 mg/dL (07 Jul 2025 07:55)  POCT Blood Glucose.: 99 mg/dL (06 Jul 2025 21:25)  POCT Blood Glucose.: 200 mg/dL (06 Jul 2025 16:45)      Anion Gap: 19 *H* (07-07 @ 06:56)  Anion Gap: 17 (07-06 @ 06:05)      Calcium: 11.0 *H* (07-07 @ 06:56)  Calcium: 11.0 *H* (07-06 @ 06:05)  Albumin: 3.0 *L* (07-07 @ 06:56)    Alanine Aminotransferase (ALT/SGPT): 38 (07-07 @ 06:56)  Alkaline Phosphatase: 128 *H* (07-07 @ 06:56)  Aspartate Aminotransferase (AST/SGOT): 28 (07-07 @ 06:56)        07-07    133[L]  |  92[L]  |  83[H]  ----------------------------<  65[L]  5.3   |  22  |  9.78[H]    Ca    11.0[H]      07 Jul 2025 06:56  Phos  4.9     07-07  Mg     3.0     07-07    TPro  6.0  /  Alb  3.0[L]  /  TBili  0.3  /  DBili  x   /  AST  28  /  ALT  38  /  AlkPhos  128[H]  07-07                        8.7    11.64 )-----------( 287      ( 07 Jul 2025 06:54 )             28.4     Medications  MEDICATIONS  (STANDING):  aMIOdarone    Tablet 200 milliGRAM(s) Oral daily  aspirin enteric coated 81 milliGRAM(s) Oral daily  atorvastatin 80 milliGRAM(s) Oral at bedtime  bisacodyl Suppository 10 milliGRAM(s) Rectal once  chlorhexidine 4% Liquid 1 Application(s) Topical daily  dextrose 5%. 1000 milliLiter(s) (100 mL/Hr) IV Continuous <Continuous>  dextrose 50% Injectable 50 milliLiter(s) IV Push every 15 minutes  dextrose 50% Injectable 25 milliLiter(s) IV Push every 15 minutes  glucagon  Injectable 1 milliGRAM(s) IntraMuscular once  heparin   Injectable 5000 Unit(s) SubCutaneous every 8 hours  insulin glargine Injectable (LANTUS) 24 Unit(s) SubCutaneous at bedtime  insulin lispro (ADMELOG) corrective regimen sliding scale   SubCutaneous three times a day before meals  insulin lispro (ADMELOG) corrective regimen sliding scale   SubCutaneous at bedtime  insulin lispro Injectable (ADMELOG) 8 Unit(s) SubCutaneous three times a day before meals  ipratropium    for Nebulization 500 MICROGram(s) Nebulizer every 6 hours  melatonin 5 milliGRAM(s) Oral at bedtime  metoprolol tartrate 12.5 milliGRAM(s) Oral two times a day  multivitamin/minerals 1 Tablet(s) Oral daily  pantoprazole    Tablet 40 milliGRAM(s) Oral before breakfast  polyethylene glycol 3350 17 Gram(s) Oral every 12 hours  senna 2 Tablet(s) Oral at bedtime  sodium chloride 0.9% lock flush 3 milliLiter(s) IV Push every 8 hours  sodium chloride 0.9%. 1000 milliLiter(s) (10 mL/Hr) IV Continuous <Continuous>  traZODone 50 milliGRAM(s) Oral at bedtime      Physical Exam  General: Patient comfortable in bed   Vital Signs Last 12 Hrs  T(F): 97.7 (07-07-25 @ 11:15), Max: 97.7 (07-07-25 @ 05:50)  HR: 79 (07-07-25 @ 11:15) (79 - 86)  BP: 119/59 (07-07-25 @ 11:15) (119/59 - 122/63)  BP(mean): --  RR: 18 (07-07-25 @ 11:15) (18 - 18)  SpO2: 95% (07-07-25 @ 11:15) (93% - 95%)    CVS: S1S2   Respiratory: No wheezing, no crepitations  GI: Abdomen soft, bowel sounds positive  Musculoskeletal:  moves all extremities         Chief complaint  Patient is a 70y old  Male who presents with a chief complaint of CHF (03 Jul 2025 20:19)    Labs and Fingersticks  CAPILLARY BLOOD GLUCOSE      POCT Blood Glucose.: 77 mg/dL (07 Jul 2025 07:55)  POCT Blood Glucose.: 99 mg/dL (06 Jul 2025 21:25)  POCT Blood Glucose.: 200 mg/dL (06 Jul 2025 16:45)      Anion Gap: 19 *H* (07-07 @ 06:56)  Anion Gap: 17 (07-06 @ 06:05)      Calcium: 11.0 *H* (07-07 @ 06:56)  Calcium: 11.0 *H* (07-06 @ 06:05)  Albumin: 3.0 *L* (07-07 @ 06:56)    Alanine Aminotransferase (ALT/SGPT): 38 (07-07 @ 06:56)  Alkaline Phosphatase: 128 *H* (07-07 @ 06:56)  Aspartate Aminotransferase (AST/SGOT): 28 (07-07 @ 06:56)        07-07    133[L]  |  92[L]  |  83[H]  ----------------------------<  65[L]  5.3   |  22  |  9.78[H]    Ca    11.0[H]      07 Jul 2025 06:56  Phos  4.9     07-07  Mg     3.0     07-07    TPro  6.0  /  Alb  3.0[L]  /  TBili  0.3  /  DBili  x   /  AST  28  /  ALT  38  /  AlkPhos  128[H]  07-07                        8.7    11.64 )-----------( 287      ( 07 Jul 2025 06:54 )             28.4     Medications  MEDICATIONS  (STANDING):  aMIOdarone    Tablet 200 milliGRAM(s) Oral daily  aspirin enteric coated 81 milliGRAM(s) Oral daily  atorvastatin 80 milliGRAM(s) Oral at bedtime  bisacodyl Suppository 10 milliGRAM(s) Rectal once  chlorhexidine 4% Liquid 1 Application(s) Topical daily  dextrose 5%. 1000 milliLiter(s) (100 mL/Hr) IV Continuous <Continuous>  dextrose 50% Injectable 50 milliLiter(s) IV Push every 15 minutes  dextrose 50% Injectable 25 milliLiter(s) IV Push every 15 minutes  glucagon  Injectable 1 milliGRAM(s) IntraMuscular once  heparin   Injectable 5000 Unit(s) SubCutaneous every 8 hours  insulin glargine Injectable (LANTUS) 24 Unit(s) SubCutaneous at bedtime  insulin lispro (ADMELOG) corrective regimen sliding scale   SubCutaneous three times a day before meals  insulin lispro (ADMELOG) corrective regimen sliding scale   SubCutaneous at bedtime  insulin lispro Injectable (ADMELOG) 8 Unit(s) SubCutaneous three times a day before meals  ipratropium    for Nebulization 500 MICROGram(s) Nebulizer every 6 hours  melatonin 5 milliGRAM(s) Oral at bedtime  metoprolol tartrate 12.5 milliGRAM(s) Oral two times a day  multivitamin/minerals 1 Tablet(s) Oral daily  pantoprazole    Tablet 40 milliGRAM(s) Oral before breakfast  polyethylene glycol 3350 17 Gram(s) Oral every 12 hours  senna 2 Tablet(s) Oral at bedtime  sodium chloride 0.9% lock flush 3 milliLiter(s) IV Push every 8 hours  sodium chloride 0.9%. 1000 milliLiter(s) (10 mL/Hr) IV Continuous <Continuous>  traZODone 50 milliGRAM(s) Oral at bedtime      Physical Exam  General: Patient comfortable in bed   Vital Signs Last 12 Hrs  T(F): 97.7 (07-07-25 @ 11:15), Max: 97.7 (07-07-25 @ 05:50)  HR: 79 (07-07-25 @ 11:15) (79 - 86)  BP: 119/59 (07-07-25 @ 11:15) (119/59 - 122/63)  BP(mean): --  RR: 18 (07-07-25 @ 11:15) (18 - 18)  SpO2: 95% (07-07-25 @ 11:15) (93% - 95%)    CVS: S1S2   Respiratory: No wheezing, no crepitations  GI: Abdomen soft, bowel sounds positive  Musculoskeletal:  moves all extremities

## 2025-07-07 NOTE — PROGRESS NOTE ADULT - SUBJECTIVE AND OBJECTIVE BOX
Subjective: Pt states "Is my CT coming out? " denies any CP, SOB, N/V and palpitations. No acute events overnight.     VITAL SIGNS    Telemetry:  SR  Vital Signs Last 24 Hrs  T(C): 36.5 (25 @ 11:15), Max: 36.8 (25 @ 19:27)  T(F): 97.7 (25 @ 11:15), Max: 98.2 (25 @ 19:27)  HR: 79 (25 @ 11:15) (79 - 91)  BP: 119/59 (25 @ 11:15) (119/59 - 126/69)  RR: 18 (25 @ 11:15) (18 - 18)  SpO2: 95% (25 @ 11:15) (92% - 95%)             @ 07:01  -   @ 07:00  --------------------------------------------------------  IN: 880 mL / OUT: 20 mL / NET: 860 mL     @ 07:01  -   @ 12:00  --------------------------------------------------------  IN: 500 mL / OUT: 0 mL / NET: 500 mL       Daily     Daily Weight in k.8 (2025 07:50)    Drug Dosing Weight      Bilirubin Total: 0.3 mg/dL ( @ 06:56)    CAPILLARY BLOOD GLUCOSE      POCT Blood Glucose.: 77 mg/dL (2025 07:55)  POCT Blood Glucose.: 99 mg/dL (2025 21:25)  POCT Blood Glucose.: 200 mg/dL (2025 16:45)          MEDICATIONS  acetaminophen     Tablet .. 650 milliGRAM(s) Oral every 6 hours PRN  acetaminophen     Tablet .. 650 milliGRAM(s) Oral every 6 hours PRN  aMIOdarone    Tablet 200 milliGRAM(s) Oral daily  artificial  tears Solution 1 Drop(s) Both EYES every 1 hour PRN  aspirin enteric coated 81 milliGRAM(s) Oral daily  atorvastatin 80 milliGRAM(s) Oral at bedtime  bisacodyl Suppository 10 milliGRAM(s) Rectal once  chlorhexidine 4% Liquid 1 Application(s) Topical daily  dextrose 5%. 1000 milliLiter(s) IV Continuous <Continuous>  dextrose 50% Injectable 50 milliLiter(s) IV Push every 15 minutes  dextrose 50% Injectable 25 milliLiter(s) IV Push every 15 minutes  dextrose Oral Gel 15 Gram(s) Oral once PRN  glucagon  Injectable 1 milliGRAM(s) IntraMuscular once  heparin   Injectable 5000 Unit(s) SubCutaneous every 8 hours  insulin glargine Injectable (LANTUS) 30 Unit(s) SubCutaneous at bedtime  insulin lispro (ADMELOG) corrective regimen sliding scale   SubCutaneous three times a day before meals  insulin lispro (ADMELOG) corrective regimen sliding scale   SubCutaneous at bedtime  insulin lispro Injectable (ADMELOG) 12 Unit(s) SubCutaneous three times a day before meals  ipratropium    for Nebulization 500 MICROGram(s) Nebulizer every 6 hours  melatonin 5 milliGRAM(s) Oral at bedtime  metoprolol tartrate 12.5 milliGRAM(s) Oral two times a day  multivitamin/minerals 1 Tablet(s) Oral daily  pantoprazole    Tablet 40 milliGRAM(s) Oral before breakfast  polyethylene glycol 3350 17 Gram(s) Oral every 12 hours  senna 2 Tablet(s) Oral at bedtime  sodium chloride 0.9% lock flush 3 milliLiter(s) IV Push every 8 hours  sodium chloride 0.9%. 1000 milliLiter(s) IV Continuous <Continuous>  traZODone 50 milliGRAM(s) Oral at bedtime        PHYSICAL EXAM    Neurology: A&Ox3, nonfocal, no gross deficits  CV : RRR+S1S2  Sternal Wound :  MSI CDI , Stable  Lungs: Respirations non-labored, B/L BS  Abdomen: Soft, NT/ND, +BSx4Q, last BM on  (-)N/V/D  : Voiding without difficulty, bladder non-distended Ag         [  ]  Extremities: B/L LE edema, negative calf tenderness, +PP , Left SVG incision      LABS  07-07    133[L]  |  92[L]  |  83[H]  ----------------------------<  65[L]  5.3   |  22  |  9.78[H]    Ca    11.0[H]      2025 06:56  Phos  4.9     07-  Mg     3.0     07-07    TPro  6.0  /  Alb  3.0[L]  /  TBili  0.3  /  DBili  x   /  AST  28  /  ALT  38  /  AlkPhos  128[H]  07-07                                 8.7    11.64 )-----------( 287      ( 2025 06:54 )             28.4                 PAST MEDICAL & SURGICAL HISTORY:  HTN (hypertension)      HLD (hyperlipidemia)      CAD (coronary artery disease)      Former smoker      ESRD on dialysis      Gout      Moderate aortic insufficiency      Severe mitral regurgitation      DM2 (diabetes mellitus, type 2)      Right cataract      MI (myocardial infarction)      Stented coronary artery      2019 novel coronavirus disease (COVID-19)      Pancreatic cyst      AV fistula      History of cardiac cath      H/O colonoscopy      Stented coronary artery           Physical Therapy Rec:   Home  [  ]   Home w/ PT  [  ]  Rehab  [ X  ]    Discussed with CT Surgery attending.

## 2025-07-08 LAB
ALBUMIN SERPL ELPH-MCNC: 3 G/DL — LOW (ref 3.3–5)
ALP SERPL-CCNC: 124 U/L — HIGH (ref 40–120)
ALT FLD-CCNC: 37 U/L — SIGNIFICANT CHANGE UP (ref 10–45)
ANION GAP SERPL CALC-SCNC: 16 MMOL/L — SIGNIFICANT CHANGE UP (ref 5–17)
AST SERPL-CCNC: 26 U/L — SIGNIFICANT CHANGE UP (ref 10–40)
BASOPHILS # BLD AUTO: 0.07 K/UL — SIGNIFICANT CHANGE UP (ref 0–0.2)
BASOPHILS NFR BLD AUTO: 0.7 % — SIGNIFICANT CHANGE UP (ref 0–2)
BILIRUB SERPL-MCNC: 0.3 MG/DL — SIGNIFICANT CHANGE UP (ref 0.2–1.2)
BUN SERPL-MCNC: 49 MG/DL — HIGH (ref 7–23)
CALCIUM SERPL-MCNC: 9.9 MG/DL — SIGNIFICANT CHANGE UP (ref 8.4–10.5)
CHLORIDE SERPL-SCNC: 93 MMOL/L — LOW (ref 96–108)
CO2 SERPL-SCNC: 25 MMOL/L — SIGNIFICANT CHANGE UP (ref 22–31)
CREAT SERPL-MCNC: 7.15 MG/DL — HIGH (ref 0.5–1.3)
EGFR: 8 ML/MIN/1.73M2 — LOW
EGFR: 8 ML/MIN/1.73M2 — LOW
EOSINOPHIL # BLD AUTO: 0.18 K/UL — SIGNIFICANT CHANGE UP (ref 0–0.5)
EOSINOPHIL NFR BLD AUTO: 1.8 % — SIGNIFICANT CHANGE UP (ref 0–6)
GLUCOSE BLDC GLUCOMTR-MCNC: 106 MG/DL — HIGH (ref 70–99)
GLUCOSE BLDC GLUCOMTR-MCNC: 111 MG/DL — HIGH (ref 70–99)
GLUCOSE BLDC GLUCOMTR-MCNC: 162 MG/DL — HIGH (ref 70–99)
GLUCOSE BLDC GLUCOMTR-MCNC: 77 MG/DL — SIGNIFICANT CHANGE UP (ref 70–99)
GLUCOSE SERPL-MCNC: 102 MG/DL — HIGH (ref 70–99)
HAPTOGLOB SERPL-MCNC: 208 MG/DL — HIGH (ref 34–200)
HCT VFR BLD CALC: 27.2 % — LOW (ref 39–50)
HGB BLD-MCNC: 8.3 G/DL — LOW (ref 13–17)
IMM GRANULOCYTES # BLD AUTO: 0.08 K/UL — HIGH (ref 0–0.07)
IMM GRANULOCYTES NFR BLD AUTO: 0.8 % — SIGNIFICANT CHANGE UP (ref 0–0.9)
LDH SERPL L TO P-CCNC: 215 U/L — SIGNIFICANT CHANGE UP (ref 50–242)
LYMPHOCYTES # BLD AUTO: 1.02 K/UL — SIGNIFICANT CHANGE UP (ref 1–3.3)
LYMPHOCYTES NFR BLD AUTO: 10.4 % — LOW (ref 13–44)
MAGNESIUM SERPL-MCNC: 2.6 MG/DL — SIGNIFICANT CHANGE UP (ref 1.6–2.6)
MCHC RBC-ENTMCNC: 26.8 PG — LOW (ref 27–34)
MCHC RBC-ENTMCNC: 30.5 G/DL — LOW (ref 32–36)
MCV RBC AUTO: 87.7 FL — SIGNIFICANT CHANGE UP (ref 80–100)
MONOCYTES # BLD AUTO: 1.17 K/UL — HIGH (ref 0–0.9)
MONOCYTES NFR BLD AUTO: 12 % — SIGNIFICANT CHANGE UP (ref 2–14)
NEUTROPHILS # BLD AUTO: 7.27 K/UL — SIGNIFICANT CHANGE UP (ref 1.8–7.4)
NEUTROPHILS NFR BLD AUTO: 74.3 % — SIGNIFICANT CHANGE UP (ref 43–77)
NRBC # BLD AUTO: 0 K/UL — SIGNIFICANT CHANGE UP (ref 0–0)
NRBC # FLD: 0 K/UL — SIGNIFICANT CHANGE UP (ref 0–0)
NRBC BLD AUTO-RTO: 0 /100 WBCS — SIGNIFICANT CHANGE UP (ref 0–0)
PHOSPHATE SERPL-MCNC: 4.2 MG/DL — SIGNIFICANT CHANGE UP (ref 2.5–4.5)
PLATELET # BLD AUTO: 267 K/UL — SIGNIFICANT CHANGE UP (ref 150–400)
PMV BLD: 10.6 FL — SIGNIFICANT CHANGE UP (ref 7–13)
POTASSIUM SERPL-MCNC: 4.4 MMOL/L — SIGNIFICANT CHANGE UP (ref 3.5–5.3)
POTASSIUM SERPL-SCNC: 4.4 MMOL/L — SIGNIFICANT CHANGE UP (ref 3.5–5.3)
PROT SERPL-MCNC: 5.9 G/DL — LOW (ref 6–8.3)
RBC # BLD: 3.1 M/UL — LOW (ref 4.2–5.8)
RBC # FLD: 19.1 % — HIGH (ref 10.3–14.5)
SODIUM SERPL-SCNC: 134 MMOL/L — LOW (ref 135–145)
WBC # BLD: 9.79 K/UL — SIGNIFICANT CHANGE UP (ref 3.8–10.5)
WBC # FLD AUTO: 9.79 K/UL — SIGNIFICANT CHANGE UP (ref 3.8–10.5)

## 2025-07-08 PROCEDURE — 71045 X-RAY EXAM CHEST 1 VIEW: CPT | Mod: 26

## 2025-07-08 PROCEDURE — 99222 1ST HOSP IP/OBS MODERATE 55: CPT

## 2025-07-08 RX ORDER — CLOPIDOGREL BISULFATE 75 MG/1
75 TABLET, FILM COATED ORAL DAILY
Refills: 0 | Status: DISCONTINUED | OUTPATIENT
Start: 2025-07-08 | End: 2025-07-11

## 2025-07-08 RX ORDER — EPOETIN ALFA 10000 [IU]/ML
10000 SOLUTION INTRAVENOUS; SUBCUTANEOUS ONCE
Refills: 0 | Status: COMPLETED | OUTPATIENT
Start: 2025-07-09 | End: 2025-07-09

## 2025-07-08 RX ADMIN — Medication 81 MILLIGRAM(S): at 12:05

## 2025-07-08 RX ADMIN — CLOPIDOGREL BISULFATE 75 MILLIGRAM(S): 75 TABLET, FILM COATED ORAL at 12:05

## 2025-07-08 RX ADMIN — Medication 650 MILLIGRAM(S): at 09:45

## 2025-07-08 RX ADMIN — ATORVASTATIN CALCIUM 80 MILLIGRAM(S): 80 TABLET, FILM COATED ORAL at 21:42

## 2025-07-08 RX ADMIN — AMIODARONE HYDROCHLORIDE 200 MILLIGRAM(S): 50 INJECTION, SOLUTION INTRAVENOUS at 06:06

## 2025-07-08 RX ADMIN — Medication 1 TABLET(S): at 12:08

## 2025-07-08 RX ADMIN — Medication 2 TABLET(S): at 02:50

## 2025-07-08 RX ADMIN — INSULIN LISPRO 6 UNIT(S): 100 INJECTION, SOLUTION INTRAVENOUS; SUBCUTANEOUS at 08:10

## 2025-07-08 RX ADMIN — Medication 3 MILLILITER(S): at 21:22

## 2025-07-08 RX ADMIN — Medication 40 MILLIGRAM(S): at 06:06

## 2025-07-08 RX ADMIN — HEPARIN SODIUM 5000 UNIT(S): 1000 INJECTION INTRAVENOUS; SUBCUTANEOUS at 06:06

## 2025-07-08 RX ADMIN — INSULIN LISPRO 1: 100 INJECTION, SOLUTION INTRAVENOUS; SUBCUTANEOUS at 17:21

## 2025-07-08 RX ADMIN — Medication 50 MILLIGRAM(S): at 21:42

## 2025-07-08 RX ADMIN — Medication 500 MICROGRAM(S): at 18:42

## 2025-07-08 RX ADMIN — Medication 3 MILLILITER(S): at 13:19

## 2025-07-08 RX ADMIN — HEPARIN SODIUM 5000 UNIT(S): 1000 INJECTION INTRAVENOUS; SUBCUTANEOUS at 21:41

## 2025-07-08 RX ADMIN — INSULIN LISPRO 6 UNIT(S): 100 INJECTION, SOLUTION INTRAVENOUS; SUBCUTANEOUS at 12:05

## 2025-07-08 RX ADMIN — INSULIN LISPRO 6 UNIT(S): 100 INJECTION, SOLUTION INTRAVENOUS; SUBCUTANEOUS at 17:21

## 2025-07-08 RX ADMIN — Medication 650 MILLIGRAM(S): at 10:30

## 2025-07-08 RX ADMIN — POLYETHYLENE GLYCOL 3350 17 GRAM(S): 17 POWDER, FOR SOLUTION ORAL at 17:37

## 2025-07-08 RX ADMIN — Medication 5 MILLIGRAM(S): at 21:42

## 2025-07-08 RX ADMIN — Medication 500 MICROGRAM(S): at 12:12

## 2025-07-08 RX ADMIN — METOPROLOL SUCCINATE 12.5 MILLIGRAM(S): 50 TABLET, EXTENDED RELEASE ORAL at 17:36

## 2025-07-08 RX ADMIN — HEPARIN SODIUM 5000 UNIT(S): 1000 INJECTION INTRAVENOUS; SUBCUTANEOUS at 13:26

## 2025-07-08 RX ADMIN — METOPROLOL SUCCINATE 12.5 MILLIGRAM(S): 50 TABLET, EXTENDED RELEASE ORAL at 06:06

## 2025-07-08 RX ADMIN — Medication 3 MILLILITER(S): at 06:02

## 2025-07-08 NOTE — CONSULT NOTE ADULT - CONSULT REASON
dm2
airway bleeding
Sepsis
Ascencion, Increased PVC
Rehabilitation consult
Coronary artery disease and valvular heart disease
DIalysis

## 2025-07-08 NOTE — CONSULT NOTE ADULT - PROVIDER SPECIALTY LIST ADULT
Electrophysiology
Rehab Medicine
Transplant ID
Intervent Pulmonology
Nephrology
Cardiology
Endocrinology

## 2025-07-08 NOTE — PROGRESS NOTE ADULT - PROBLEM SELECTOR PLAN 1
Will decrease Lantus to 24 units at bed time.  Will decrease Admelog to 6 units before each meal in addition to Admelog correction scale coverage.  Patient counseled for compliance with consistent low carb diet and physical activity as tolerated.

## 2025-07-08 NOTE — PROGRESS NOTE ADULT - ASSESSMENT
70 year old male PMH of HTN, HLD, DM2, CAD SP  Mitral valve replacement, CABG x3 and RVAD placement    Assessment    DMT2: 70y Male with DM T2 with hyperglycemia, A1C 6.8%, was on  insulin at home, now on basal bolus insulin with coverage, blood sugars are improving.   CAD: on medications, stable, monitored. sp CABG  HTN: on antihypertensive medications, monitored, asymptomatic.  ESRD: On hemodialysis, Monitor labs/BMP      Discussed plan and management with Dr Francisco Javier Baeza NP - TEAMS  Ulysses Mcintyre MD  Cell: [phone]  Office: [phone]            70 year old male PMH of HTN, HLD, DM2, CAD SP  Mitral valve replacement, CABG x3 and RVAD placement    Assessment      DMT2: 70y Male with DM T2 with hyperglycemia, A1C 6.8%, was on  insulin at home, now on basal bolus insulin with coverage, blood sugars are improving.   CAD: on medications, stable, monitored. sp CABG  HTN: on antihypertensive medications, monitored, asymptomatic.  ESRD: On hemodialysis, Monitor labs/BMP      Discussed plan and management with Dr Francisco Javier Baeza NP - TEAMS  Ulysses Mcintyre MD  Cell: [phone]  Office: [phone]

## 2025-07-08 NOTE — PROGRESS NOTE ADULT - PROBLEM SELECTOR PLAN 1
Continue with  mg PO Daily.   Started Plavix 75mg qD  Continue with Lopressor mg 12.5 BID  PO.   Continue with Lipitor mg 80 PO HS    Off diuretics   Increase activity as tolerated.   Encourage Chest PT / Pulmonary toileting and Incentive spirometry every 1 hour x 10 while awake.   Continue with Protonix 40 mg PO daily and Heparin 5000 units SQ daily for PUD and DVT prophylaxis.   Sternal precautions and wound care  D/C plan Acute Rehab once medically cleared   Plan of care discussed with attending dr. barron

## 2025-07-08 NOTE — PROGRESS NOTE ADULT - SUBJECTIVE AND OBJECTIVE BOX
NEPHROLOGY-[facility] [phone]        Patient seen and examined in the chair.  He was the same and offered no complaints         MEDICATIONS  (STANDING):  aMIOdarone    Tablet 200 milliGRAM(s) Oral daily  aspirin enteric coated 81 milliGRAM(s) Oral daily  atorvastatin 80 milliGRAM(s) Oral at bedtime  bisacodyl Suppository 10 milliGRAM(s) Rectal once  chlorhexidine 4% Liquid 1 Application(s) Topical daily  clopidogrel Tablet 75 milliGRAM(s) Oral daily  dextrose 5%. 1000 milliLiter(s) (100 mL/Hr) IV Continuous <Continuous>  dextrose 50% Injectable 50 milliLiter(s) IV Push every 15 minutes  dextrose 50% Injectable 25 milliLiter(s) IV Push every 15 minutes  glucagon  Injectable 1 milliGRAM(s) IntraMuscular once  heparin   Injectable 5000 Unit(s) SubCutaneous every 8 hours  insulin glargine Injectable (LANTUS) 24 Unit(s) SubCutaneous at bedtime  insulin lispro (ADMELOG) corrective regimen sliding scale   SubCutaneous three times a day before meals  insulin lispro (ADMELOG) corrective regimen sliding scale   SubCutaneous at bedtime  insulin lispro Injectable (ADMELOG) 6 Unit(s) SubCutaneous three times a day before meals  ipratropium    for Nebulization 500 MICROGram(s) Nebulizer every 6 hours  melatonin 5 milliGRAM(s) Oral at bedtime  metoprolol tartrate 12.5 milliGRAM(s) Oral two times a day  multivitamin/minerals 1 Tablet(s) Oral daily  pantoprazole    Tablet 40 milliGRAM(s) Oral before breakfast  polyethylene glycol 3350 17 Gram(s) Oral every 12 hours  senna 2 Tablet(s) Oral at bedtime  sodium chloride 0.9% lock flush 3 milliLiter(s) IV Push every 8 hours  sodium chloride 0.9%. 1000 milliLiter(s) (10 mL/Hr) IV Continuous <Continuous>  traZODone 50 milliGRAM(s) Oral at bedtime      VITAL:  T(C): , Max: 36.7 (07-07-25 @ 19:58)  T(F): , Max: 98.1 (07-07-25 @ 19:58)  HR: 89 (07-08-25 @ 11:10)  BP: 118/67 (07-08-25 @ 11:10)  BP(mean): 84 (07-08-25 @ 11:10)  RR: 18 (07-08-25 @ 11:10)  SpO2: 96% (07-08-25 @ 11:10)  Wt(kg): --    I and O's:    07-07 @ 07:01  -  07-08 @ 07:00  --------------------------------------------------------  IN: 620 mL / OUT: 1800 mL / NET: -1180 mL    07-08 @ 07:01  -  07-08 @ 12:05  --------------------------------------------------------  IN: 340 mL / OUT: 0 mL / NET: 340 mL          PHYSICAL EXAM:    Constitutional: NAD  Neck:  No JVD  Respiratory: CTAB/L  Cardiovascular: S1 and S2  Gastrointestinal: BS+, soft, NT/ND  Extremities: No peripheral edema  Neurological: A/O x 3, no focal deficits  Psychiatric: Normal mood, normal affect  : No Ag  Skin: No rashes  Access: Not applicable    LABS:                        8.3    9.79  )-----------( 267      ( 08 Jul 2025 06:18 )             27.2     07-08    134[L]  |  93[L]  |  49[H]  ----------------------------<  102[H]  4.4   |  25  |  7.15[H]    Ca    9.9      08 Jul 2025 06:18  Phos  4.2     07-08  Mg     2.6     07-08    TPro  5.9[L]  /  Alb  3.0[L]  /  TBili  0.3  /  DBili  x   /  AST  26  /  ALT  37  /  AlkPhos  124[H]  07-08          Urine Studies:  Urinalysis Basic - ( 08 Jul 2025 06:18 )    Color: x / Appearance: x / SG: x / pH: x  Gluc: 102 mg/dL / Ketone: x  / Bili: x / Urobili: x   Blood: x / Protein: x / Nitrite: x   Leuk Esterase: x / RBC: x / WBC x   Sq Epi: x / Non Sq Epi: x / Bacteria: x            RADIOLOGY & ADDITIONAL STUDIES:

## 2025-07-08 NOTE — PROGRESS NOTE ADULT - ASSESSMENT
71 yo M with cardiogenic shock on RVAD ,DM2, CAD (total  4 coronary stents, last one PCI in 2024 ), CHF with EF 40-45%, severe MR     s/p Mitral valve replacement, CABG x3 and RVAD placement   1 unit prbc    RVAD replaced and removal of PA cannula   bronch with IP and extubated - bronch BAL + Serratia    epi weaned off    overnight hypothermic, elevated procal, blood cx (negative), added Vanco   CRRT D/C'D   Interval removal left-sided chest tube without pneumothorax. Bibasilar atelectasis   [facility] TLC   BC negative    RVAD removal     weaned; iHD  7/3 transferred to [address] [place] [address]. Continues on cefepime until , 7 day course (off vanco)     VSS, Left chest was ultrasounded by PA there is an effusion will reassess tomorrrow after HD to see if we should place a tube, OOB to chair did not walk today   VSS, LPCT placed and 900cc serosanguinous drainage. Off O2 lying flat   VSS, CT to [facility] will place on waterseal today,Off O2, needs to ambulate   VSS CT D/C'd this am, ambulating in halls w/ walker and 1 assist, HD today    VSS. for HD tomorrow. nephrology following. CM following for rehab placement. PW to be cut on dc per Dr. Mckeon. Started on Plavix per d/w Dr. Mckeon.   Disposition: Acute Rehab

## 2025-07-08 NOTE — PROGRESS NOTE ADULT - SUBJECTIVE AND OBJECTIVE BOX
SUBJECTIVE:  "Hi." Denies acute chest pain, palpitations, or shortness of breath    TELEMETRY:  SR    Vital Signs Last 24 Hrs  T(C): 36.6 (25 @ 05:09), Max: 36.7 (25 @ 19:58)  T(F): 97.9 (25 @ 05:09), Max: 98.1 (25 @ 19:58)  HR: 89 (25 @ 05:09) (79 - 89)  BP: 116/60 (25 @ 05:09) (112/55 - 122/69)  RR: 18 (25 @ 05:09) (18 - 18)  SpO2: 96% (25 @ 05:09) (93% - 97%)           INPUT/OUTPUT:   @ 07:01  -   @ 07:00  --------------------------------------------------------  IN: 620 mL / OUT: 1800 mL / NET: -1180 mL      LABS:    134[L]  |  93[L]  |  49[H]  ----------------------------<  102[H]  4.4   |  25  |  7.15[H]  Ca    9.9      2025 06:18  Phos  4.2       Mg     2.6       TPro  5.9[L]  /  Alb  3.0[L]  /  TBili  0.3  /  DBili  x   /  AST  26  /  ALT  37  /  AlkPhos  124[H]  08                      8.3    9.79  )-----------( 267      ( 2025 06:18 )             27.2            Daily Weight in k.2 (2025 07:45)      CAPILLARY BLOOD GLUCOSE  POCT Blood Glucose.: 106 mg/dL (2025 07:45)  POCT Blood Glucose.: 237 mg/dL (2025 21:22)  POCT Blood Glucose.: 78 mg/dL (2025 17:27)  POCT Blood Glucose.: 69 mg/dL (2025 16:45)        PHYSICAL EXAM:  GEN: no acute distress  HEENT: NCAT, neck supple and nontender  CVS: s1, s2  STERNAL WOUND: MSI --> c/d/i; stable  RESP: non-labored respirations with no use of accessory muscles  EXT: +edema, denies calf tenderness, +pp  NEURO: aaox3      ACTIVE MEDICATIONS:  acetaminophen     Tablet .. 650 milliGRAM(s) Oral every 6 hours PRN  acetaminophen     Tablet .. 650 milliGRAM(s) Oral every 6 hours PRN  aMIOdarone    Tablet 200 milliGRAM(s) Oral daily  artificial  tears Solution 1 Drop(s) Both EYES every 1 hour PRN  aspirin enteric coated 81 milliGRAM(s) Oral daily  atorvastatin 80 milliGRAM(s) Oral at bedtime  bisacodyl Suppository 10 milliGRAM(s) Rectal once  chlorhexidine 4% Liquid 1 Application(s) Topical daily  clopidogrel Tablet 75 milliGRAM(s) Oral daily  dextrose 5%. 1000 milliLiter(s) IV Continuous <Continuous>  dextrose 50% Injectable 50 milliLiter(s) IV Push every 15 minutes  dextrose 50% Injectable 25 milliLiter(s) IV Push every 15 minutes  dextrose Oral Gel 15 Gram(s) Oral once PRN  glucagon  Injectable 1 milliGRAM(s) IntraMuscular once  heparin   Injectable 5000 Unit(s) SubCutaneous every 8 hours  insulin glargine Injectable (LANTUS) 24 Unit(s) SubCutaneous at bedtime  insulin lispro (ADMELOG) corrective regimen sliding scale   SubCutaneous three times a day before meals  insulin lispro (ADMELOG) corrective regimen sliding scale   SubCutaneous at bedtime  insulin lispro Injectable (ADMELOG) 6 Unit(s) SubCutaneous three times a day before meals  ipratropium    for Nebulization 500 MICROGram(s) Nebulizer every 6 hours  melatonin 5 milliGRAM(s) Oral at bedtime  metoprolol tartrate 12.5 milliGRAM(s) Oral two times a day  multivitamin/minerals 1 Tablet(s) Oral daily  pantoprazole    Tablet 40 milliGRAM(s) Oral before breakfast  polyethylene glycol 3350 17 Gram(s) Oral every 12 hours  senna 2 Tablet(s) Oral at bedtime  sodium chloride 0.9% lock flush 3 milliLiter(s) IV Push every 8 hours  sodium chloride 0.9%. 1000 milliLiter(s) IV Continuous <Continuous>  traZODone 50 milliGRAM(s) Oral at bedtime    Case discussed in detail with Cardiothoracic Team and Attending Dr. Mckeon. Plan below as per discussion.

## 2025-07-08 NOTE — PROGRESS NOTE ADULT - SUBJECTIVE AND OBJECTIVE BOX
Chief complaint  Patient is a 70y old  Male who presents with a chief complaint of CABG and MVR (07 Jul 2025 09:25)         Labs and Fingersticks  CAPILLARY BLOOD GLUCOSE      POCT Blood Glucose.: 111 mg/dL (08 Jul 2025 11:36)  POCT Blood Glucose.: 106 mg/dL (08 Jul 2025 07:45)  POCT Blood Glucose.: 237 mg/dL (07 Jul 2025 21:22)  POCT Blood Glucose.: 78 mg/dL (07 Jul 2025 17:27)  POCT Blood Glucose.: 69 mg/dL (07 Jul 2025 16:45)      Anion Gap: 16 (07-08 @ 06:18)  Anion Gap: 19 *H* (07-07 @ 06:56)      Calcium: 9.9 (07-08 @ 06:18)  Calcium: 11.0 *H* (07-07 @ 06:56)  Albumin: 3.0 *L* (07-08 @ 06:18)  Albumin: 3.0 *L* (07-07 @ 06:56)    Alanine Aminotransferase (ALT/SGPT): 37 (07-08 @ 06:18)  Alanine Aminotransferase (ALT/SGPT): 38 (07-07 @ 06:56)  Alkaline Phosphatase: 124 *H* (07-08 @ 06:18)  Alkaline Phosphatase: 128 *H* (07-07 @ 06:56)  Aspartate Aminotransferase (AST/SGOT): 26 (07-08 @ 06:18)  Aspartate Aminotransferase (AST/SGOT): 28 (07-07 @ 06:56)        07-08    134[L]  |  93[L]  |  49[H]  ----------------------------<  102[H]  4.4   |  25  |  7.15[H]    Ca    9.9      08 Jul 2025 06:18  Phos  4.2     07-08  Mg     2.6     07-08    TPro  5.9[L]  /  Alb  3.0[L]  /  TBili  0.3  /  DBili  x   /  AST  26  /  ALT  37  /  AlkPhos  124[H]  07-08                        8.3    9.79  )-----------( 267      ( 08 Jul 2025 06:18 )             27.2     Medications  MEDICATIONS  (STANDING):  aMIOdarone    Tablet 200 milliGRAM(s) Oral daily  aspirin enteric coated 81 milliGRAM(s) Oral daily  atorvastatin 80 milliGRAM(s) Oral at bedtime  bisacodyl Suppository 10 milliGRAM(s) Rectal once  chlorhexidine 4% Liquid 1 Application(s) Topical daily  clopidogrel Tablet 75 milliGRAM(s) Oral daily  dextrose 5%. 1000 milliLiter(s) (100 mL/Hr) IV Continuous <Continuous>  dextrose 50% Injectable 50 milliLiter(s) IV Push every 15 minutes  dextrose 50% Injectable 25 milliLiter(s) IV Push every 15 minutes  glucagon  Injectable 1 milliGRAM(s) IntraMuscular once  heparin   Injectable 5000 Unit(s) SubCutaneous every 8 hours  insulin glargine Injectable (LANTUS) 24 Unit(s) SubCutaneous at bedtime  insulin lispro (ADMELOG) corrective regimen sliding scale   SubCutaneous three times a day before meals  insulin lispro (ADMELOG) corrective regimen sliding scale   SubCutaneous at bedtime  insulin lispro Injectable (ADMELOG) 6 Unit(s) SubCutaneous three times a day before meals  ipratropium    for Nebulization 500 MICROGram(s) Nebulizer every 6 hours  melatonin 5 milliGRAM(s) Oral at bedtime  metoprolol tartrate 12.5 milliGRAM(s) Oral two times a day  multivitamin/minerals 1 Tablet(s) Oral daily  pantoprazole    Tablet 40 milliGRAM(s) Oral before breakfast  polyethylene glycol 3350 17 Gram(s) Oral every 12 hours  senna 2 Tablet(s) Oral at bedtime  sodium chloride 0.9% lock flush 3 milliLiter(s) IV Push every 8 hours  sodium chloride 0.9%. 1000 milliLiter(s) (10 mL/Hr) IV Continuous <Continuous>  traZODone 50 milliGRAM(s) Oral at bedtime      Physical Exam  General: Patient comfortable in bed   Vital Signs Last 12 Hrs  T(F): 98.1 (07-08-25 @ 11:10), Max: 98.1 (07-08-25 @ 11:10)  HR: 84 (07-08-25 @ 12:15) (84 - 89)  BP: 118/75 (07-08-25 @ 12:15) (116/60 - 118/75)  BP(mean): 84 (07-08-25 @ 11:10) (79 - 84)  RR: 18 (07-08-25 @ 12:15) (18 - 18)  SpO2: 97% (07-08-25 @ 12:15) (96% - 97%)    CVS: S1S2   Respiratory: No wheezing, no crepitations  GI: Abdomen soft, bowel sounds positive  Musculoskeletal:  moves all extremities         Chief complaint  Patient is a 70y old  Male who presents with a chief complaint of CABG and MVR (07 Jul 2025 09:25)     Labs and Fingersticks  CAPILLARY BLOOD GLUCOSE      POCT Blood Glucose.: 111 mg/dL (08 Jul 2025 11:36)  POCT Blood Glucose.: 106 mg/dL (08 Jul 2025 07:45)  POCT Blood Glucose.: 237 mg/dL (07 Jul 2025 21:22)  POCT Blood Glucose.: 78 mg/dL (07 Jul 2025 17:27)  POCT Blood Glucose.: 69 mg/dL (07 Jul 2025 16:45)      Anion Gap: 16 (07-08 @ 06:18)  Anion Gap: 19 *H* (07-07 @ 06:56)      Calcium: 9.9 (07-08 @ 06:18)  Calcium: 11.0 *H* (07-07 @ 06:56)  Albumin: 3.0 *L* (07-08 @ 06:18)  Albumin: 3.0 *L* (07-07 @ 06:56)    Alanine Aminotransferase (ALT/SGPT): 37 (07-08 @ 06:18)  Alanine Aminotransferase (ALT/SGPT): 38 (07-07 @ 06:56)  Alkaline Phosphatase: 124 *H* (07-08 @ 06:18)  Alkaline Phosphatase: 128 *H* (07-07 @ 06:56)  Aspartate Aminotransferase (AST/SGOT): 26 (07-08 @ 06:18)  Aspartate Aminotransferase (AST/SGOT): 28 (07-07 @ 06:56)        07-08    134[L]  |  93[L]  |  49[H]  ----------------------------<  102[H]  4.4   |  25  |  7.15[H]    Ca    9.9      08 Jul 2025 06:18  Phos  4.2     07-08  Mg     2.6     07-08    TPro  5.9[L]  /  Alb  3.0[L]  /  TBili  0.3  /  DBili  x   /  AST  26  /  ALT  37  /  AlkPhos  124[H]  07-08                        8.3    9.79  )-----------( 267      ( 08 Jul 2025 06:18 )             27.2     Medications  MEDICATIONS  (STANDING):  aMIOdarone    Tablet 200 milliGRAM(s) Oral daily  aspirin enteric coated 81 milliGRAM(s) Oral daily  atorvastatin 80 milliGRAM(s) Oral at bedtime  bisacodyl Suppository 10 milliGRAM(s) Rectal once  chlorhexidine 4% Liquid 1 Application(s) Topical daily  clopidogrel Tablet 75 milliGRAM(s) Oral daily  dextrose 5%. 1000 milliLiter(s) (100 mL/Hr) IV Continuous <Continuous>  dextrose 50% Injectable 50 milliLiter(s) IV Push every 15 minutes  dextrose 50% Injectable 25 milliLiter(s) IV Push every 15 minutes  glucagon  Injectable 1 milliGRAM(s) IntraMuscular once  heparin   Injectable 5000 Unit(s) SubCutaneous every 8 hours  insulin glargine Injectable (LANTUS) 24 Unit(s) SubCutaneous at bedtime  insulin lispro (ADMELOG) corrective regimen sliding scale   SubCutaneous three times a day before meals  insulin lispro (ADMELOG) corrective regimen sliding scale   SubCutaneous at bedtime  insulin lispro Injectable (ADMELOG) 6 Unit(s) SubCutaneous three times a day before meals  ipratropium    for Nebulization 500 MICROGram(s) Nebulizer every 6 hours  melatonin 5 milliGRAM(s) Oral at bedtime  metoprolol tartrate 12.5 milliGRAM(s) Oral two times a day  multivitamin/minerals 1 Tablet(s) Oral daily  pantoprazole    Tablet 40 milliGRAM(s) Oral before breakfast  polyethylene glycol 3350 17 Gram(s) Oral every 12 hours  senna 2 Tablet(s) Oral at bedtime  sodium chloride 0.9% lock flush 3 milliLiter(s) IV Push every 8 hours  sodium chloride 0.9%. 1000 milliLiter(s) (10 mL/Hr) IV Continuous <Continuous>  traZODone 50 milliGRAM(s) Oral at bedtime      Physical Exam  General: Patient comfortable in bed   Vital Signs Last 12 Hrs  T(F): 98.1 (07-08-25 @ 11:10), Max: 98.1 (07-08-25 @ 11:10)  HR: 84 (07-08-25 @ 12:15) (84 - 89)  BP: 118/75 (07-08-25 @ 12:15) (116/60 - 118/75)  BP(mean): 84 (07-08-25 @ 11:10) (79 - 84)  RR: 18 (07-08-25 @ 12:15) (18 - 18)  SpO2: 97% (07-08-25 @ 12:15) (96% - 97%)    CVS: S1S2   Respiratory: No wheezing, no crepitations  GI: Abdomen soft, bowel sounds positive  Musculoskeletal:  moves all extremities

## 2025-07-08 NOTE — PROGRESS NOTE ADULT - ASSESSMENT
70M w/ HTN, DM2, CAD, and ESRD-HD, s/p CABGx3/MV repair/RVAD 6/17/25    (1)Renal - ESRD - HD MWF - s/p HD with 700ml  fluid loss 7/4/25,  CRRT discontinued 6/27;  Hyponatremia lower should improve with HD , next HD Monday 7/7/27  (2)Hypercalcemia- lower calcium bath with HD;  Hyperkalemia   (3)Anemia -  hgb improving on TIW Retacrit;   (4)CV - SP cardiogenic shock  and now compensated and removal of  RVAD/inotropes     RECOMMEND:  (1)Renal - Next HD in am and continue  lower calcium bath  (2)Anemia - Increase Retacrit to help with anemia   (3)CVS- BP acceptable at present;  Asa 81mg po qd   (4) Physical therapy        Sayed [employer]   [phone]

## 2025-07-08 NOTE — CONSULT NOTE ADULT - SUBJECTIVE AND OBJECTIVE BOX
Patient is a 70y old  Male who presents with a chief complaint of CABG and MVR (07 Jul 2025 09:25)    HPI:  70 year old male PMH of HTN, HLD, DM2, CAD (total  4 coronary stents, last one PCI in 08/2024 &  S/p status post MI treated with PCI x3 stents in 2022 & 08/2023)on Asprin 81 mg, Chronic back pain, ESRD on  HD 3x/week via Right brachial AV fistula ([facility], Nephrology- Dr.Paul Munguia-[facility], in [place]/ also s/p angioplasty of the AVfistula -intact in 12/2024/ & had revision of AV fistula in  05/2024 with Dr. Henrik Morocho), Listed for renal transplant in [place] and [place] ( he has a living donor available), CHF with EF 40-45%,  pneumonia 10/2024, severe MR/ recent cath 6/3 w/ multivessel CAD in stent stenosis planned for Mitral valve replacement, CABG x3 on 6/17/2025 w/ Dr. Mckeon.      ***HD monday/ wed/Friday---surgery on 6/17 Tuesday  ****cardiac catheterization on 6/3/25 which revealed revealed Left main artery: There was dampening upon engagement of the ostium of the left main coronary artery. There is a 50 % stenosis in the ostium portion of the segment. Proximal left anterior descending: There is a 20 % in-stent restenosis. First diagonal: There is an 80 % stenosis in the ostium portion of the segment. Mid left anterior descending: There is a 35 % stenosis. Distal left anterior descending: There is a 45 % stenosis. Proximal circumflex: There is a 90 % in-stent restenosis. Mid circumflex: There is a 60 % stenosis. Proximal right coronary artery: There is a 99 % stenosis in the ostium portion of the segment. Mid right coronary artery: There is a 90 % stenosis. Mid right coronary artery: There is a 100 % stenosis.   (10 Pj 2025 11:14)      70yM was admitted on 06-17, s/p MVR, CABG x 3, RVAD, removed 7/1, seen earlier today, wife at bedside. Patient complained of sternal pain.       REVIEW OF SYSTEMS  Denies chest pain, SOB, N/V, F/C, abdominal pain     VITALS  T(C): 36.7 (07-08-25 @ 11:10), Max: 36.7 (07-07-25 @ 19:58)  HR: 84 (07-08-25 @ 12:15) (84 - 89)  BP: 118/75 (07-08-25 @ 12:15) (116/60 - 122/69)  RR: 18 (07-08-25 @ 12:15) (18 - 18)  SpO2: 97% (07-08-25 @ 12:15) (93% - 97%)  Wt(kg): --    PAST MEDICAL & SURGICAL HISTORY  HTN (hypertension)    HLD (hyperlipidemia)    CAD (coronary artery disease)    Diabetes mellitus    Former smoker    ESRD on dialysis    Gout    Moderate aortic insufficiency    Severe mitral regurgitation    Right cataract    MI (myocardial infarction)    Stented coronary artery    2019 novel coronavirus disease (COVID-19)    Pancreatic cyst    AV fistula    History of cardiac cath    S/P cataract surgery, right    H/O colonoscopy      FUNCTIONAL HISTORY  Lives with wife, 2 steps to enter, retired   Independent AMB and ADLs PTA     CURRENT FUNCTIONAL STATUS  PT  7/3  transfers CG with RW  gait CG/min assist with RW x 75 feet  narrow base of support, scissoring      OT 7/5  transfers CG with RW   grooming CG     RECENT LABS/IMAGING  CBC Full  -  ( 08 Jul 2025 06:18 )  WBC Count : 9.79 K/uL  RBC Count : 3.10 M/uL  Hemoglobin : 8.3 g/dL  Hematocrit : 27.2 %  Platelet Count - Automated : 267 K/uL  Mean Cell Volume : 87.7 fl  Mean Cell Hemoglobin : 26.8 pg  Mean Cell Hemoglobin Concentration : 30.5 g/dL  Auto Neutrophil # : 7.27 K/uL  Auto Lymphocyte # : 1.02 K/uL  Auto Monocyte # : 1.17 K/uL  Auto Eosinophil # : 0.18 K/uL  Auto Basophil # : 0.07 K/uL  Auto Neutrophil % : 74.3 %  Auto Lymphocyte % : 10.4 %  Auto Monocyte % : 12.0 %  Auto Eosinophil % : 1.8 %  Auto Basophil % : 0.7 %    07-08    134[L]  |  93[L]  |  49[H]  ----------------------------<  102[H]  4.4   |  25  |  7.15[H]    Ca    9.9      08 Jul 2025 06:18  Phos  4.2     07-08  Mg     2.6     07-08    TPro  5.9[L]  /  Alb  3.0[L]  /  TBili  0.3  /  DBili  x   /  AST  26  /  ALT  37  /  AlkPhos  124[H]  07-08    Urinalysis Basic - ( 08 Jul 2025 06:18 )    Color: x / Appearance: x / SG: x / pH: x  Gluc: 102 mg/dL / Ketone: x  / Bili: x / Urobili: x   Blood: x / Protein: x / Nitrite: x   Leuk Esterase: x / RBC: x / WBC x   Sq Epi: x / Non Sq Epi: x / Bacteria: x        ALLERGIES  No Known Allergies      MEDICATIONS   acetaminophen     Tablet .. 650 milliGRAM(s) Oral every 6 hours PRN  acetaminophen     Tablet .. 650 milliGRAM(s) Oral every 6 hours PRN  aMIOdarone    Tablet 200 milliGRAM(s) Oral daily  artificial  tears Solution 1 Drop(s) Both EYES every 1 hour PRN  aspirin enteric coated 81 milliGRAM(s) Oral daily  atorvastatin 80 milliGRAM(s) Oral at bedtime  bisacodyl Suppository 10 milliGRAM(s) Rectal once  chlorhexidine 4% Liquid 1 Application(s) Topical daily  clopidogrel Tablet 75 milliGRAM(s) Oral daily  dextrose 5%. 1000 milliLiter(s) IV Continuous <Continuous>  dextrose 50% Injectable 50 milliLiter(s) IV Push every 15 minutes  dextrose 50% Injectable 25 milliLiter(s) IV Push every 15 minutes  dextrose Oral Gel 15 Gram(s) Oral once PRN  glucagon  Injectable 1 milliGRAM(s) IntraMuscular once  heparin   Injectable 5000 Unit(s) SubCutaneous every 8 hours  insulin glargine Injectable (LANTUS) 24 Unit(s) SubCutaneous at bedtime  insulin lispro (ADMELOG) corrective regimen sliding scale   SubCutaneous three times a day before meals  insulin lispro (ADMELOG) corrective regimen sliding scale   SubCutaneous at bedtime  insulin lispro Injectable (ADMELOG) 6 Unit(s) SubCutaneous three times a day before meals  ipratropium    for Nebulization 500 MICROGram(s) Nebulizer every 6 hours  melatonin 5 milliGRAM(s) Oral at bedtime  metoprolol tartrate 12.5 milliGRAM(s) Oral two times a day  multivitamin/minerals 1 Tablet(s) Oral daily  pantoprazole    Tablet 40 milliGRAM(s) Oral before breakfast  polyethylene glycol 3350 17 Gram(s) Oral every 12 hours  senna 2 Tablet(s) Oral at bedtime  sodium chloride 0.9% lock flush 3 milliLiter(s) IV Push every 8 hours  sodium chloride 0.9%. 1000 milliLiter(s) IV Continuous <Continuous>  traZODone 50 milliGRAM(s) Oral at bedtime      ----------------------------------------------------------------------------------------  PHYSICAL EXAM  Constitutional - NAD, Comfortable, in chair   sternal wound  Chest - Breathing comfortably on room air   Cardiovascular - S1S2   Extremities - b/l LE edema   Neurologic Exam -     follows commands                 Cognitive - Awake, Alert, AAO to self, place, date, year, situation     Communication - Fluent, No dysarthria       Motor -  moves all ext      Sensory - Intact to LT     Psychiatric - Mood stable, Affect WNL  ----------------------------------------------------------------------------------------  ASSESSMENT/PLAN  70yMale h/o ESRD on HD, CAD, DM with functional deficits after CABG, MVR, cardiogenic shock    Pain - Tylenol  DVT PPX - SCDs heparin   Rehab -    patient requires CG with mobility, seen by PT today for follow up   continue bedside therapy while admitted to prevent secondary complications of immobility, bed mobility, transfer training, progressive ambulation, equipment evaluation, ADLs   OOB throughout the day with staff, OOB to chair with meals/3 hours daily        Recommend ACUTE inpatient rehabilitation for the functional deficits consisting of 3 hours of multidisciplinary intense therapy/day x 5 days/week x 1-2 weeks depending on progress at rehabilitation facility, 24 hour RN/daily PMR physician for comorbid medical management.      Patient will be able to participate in and benefit from intense rehabilitation therapies for 3 hours a day x 5 days/week to maximize independence.     Rehab recommendations are dependent on functional progress and participation with bedside therapy       Will continue to follow for ongoing rehab needs and recommendations.       55 minutes spent, reviewing hospital course, therapy notes, relevant imaging, exam, education about inpatient rehabilitation, documentation, and discussion with  and rehabilitation team

## 2025-07-08 NOTE — CONSULT NOTE ADULT - CONSULT REQUESTED DATE/TIME
08-Jul-2025 15:13
18-Jun-2025
19-Jun-2025 12:00
20-Jun-2025 14:00
02-Jul-2025 15:00
22-Jun-2025 16:27
27-Jun-2025 12:11

## 2025-07-09 LAB
ALBUMIN SERPL ELPH-MCNC: 3 G/DL — LOW (ref 3.3–5)
ALP SERPL-CCNC: 139 U/L — HIGH (ref 40–120)
ALT FLD-CCNC: 46 U/L — HIGH (ref 10–45)
ANION GAP SERPL CALC-SCNC: 17 MMOL/L — SIGNIFICANT CHANGE UP (ref 5–17)
AST SERPL-CCNC: 33 U/L — SIGNIFICANT CHANGE UP (ref 10–40)
BILIRUB SERPL-MCNC: 0.3 MG/DL — SIGNIFICANT CHANGE UP (ref 0.2–1.2)
BUN SERPL-MCNC: 60 MG/DL — HIGH (ref 7–23)
CALCIUM SERPL-MCNC: 10.3 MG/DL — SIGNIFICANT CHANGE UP (ref 8.4–10.5)
CHLORIDE SERPL-SCNC: 92 MMOL/L — LOW (ref 96–108)
CO2 SERPL-SCNC: 23 MMOL/L — SIGNIFICANT CHANGE UP (ref 22–31)
CREAT SERPL-MCNC: 8.53 MG/DL — HIGH (ref 0.5–1.3)
EGFR: 6 ML/MIN/1.73M2 — LOW
EGFR: 6 ML/MIN/1.73M2 — LOW
GLUCOSE BLDC GLUCOMTR-MCNC: 102 MG/DL — HIGH (ref 70–99)
GLUCOSE BLDC GLUCOMTR-MCNC: 112 MG/DL — HIGH (ref 70–99)
GLUCOSE BLDC GLUCOMTR-MCNC: 131 MG/DL — HIGH (ref 70–99)
GLUCOSE BLDC GLUCOMTR-MCNC: 178 MG/DL — HIGH (ref 70–99)
GLUCOSE BLDC GLUCOMTR-MCNC: 65 MG/DL — LOW (ref 70–99)
GLUCOSE BLDC GLUCOMTR-MCNC: 70 MG/DL — SIGNIFICANT CHANGE UP (ref 70–99)
GLUCOSE BLDC GLUCOMTR-MCNC: 89 MG/DL — SIGNIFICANT CHANGE UP (ref 70–99)
GLUCOSE BLDC GLUCOMTR-MCNC: 90 MG/DL — SIGNIFICANT CHANGE UP (ref 70–99)
GLUCOSE BLDC GLUCOMTR-MCNC: 90 MG/DL — SIGNIFICANT CHANGE UP (ref 70–99)
GLUCOSE SERPL-MCNC: 48 MG/DL — CRITICAL LOW (ref 70–99)
HCT VFR BLD CALC: 28.5 % — LOW (ref 39–50)
HGB BLD-MCNC: 8.9 G/DL — LOW (ref 13–17)
MAGNESIUM SERPL-MCNC: 2.7 MG/DL — HIGH (ref 1.6–2.6)
MCHC RBC-ENTMCNC: 27.2 PG — SIGNIFICANT CHANGE UP (ref 27–34)
MCHC RBC-ENTMCNC: 31.2 G/DL — LOW (ref 32–36)
MCV RBC AUTO: 87.2 FL — SIGNIFICANT CHANGE UP (ref 80–100)
NRBC # BLD AUTO: 0 K/UL — SIGNIFICANT CHANGE UP (ref 0–0)
NRBC # FLD: 0 K/UL — SIGNIFICANT CHANGE UP (ref 0–0)
NRBC BLD AUTO-RTO: 0 /100 WBCS — SIGNIFICANT CHANGE UP (ref 0–0)
PHOSPHATE SERPL-MCNC: 4.7 MG/DL — HIGH (ref 2.5–4.5)
PLATELET # BLD AUTO: 300 K/UL — SIGNIFICANT CHANGE UP (ref 150–400)
PMV BLD: 11.4 FL — SIGNIFICANT CHANGE UP (ref 7–13)
POTASSIUM SERPL-MCNC: 4.7 MMOL/L — SIGNIFICANT CHANGE UP (ref 3.5–5.3)
POTASSIUM SERPL-SCNC: 4.7 MMOL/L — SIGNIFICANT CHANGE UP (ref 3.5–5.3)
PROT SERPL-MCNC: 6.2 G/DL — SIGNIFICANT CHANGE UP (ref 6–8.3)
RBC # BLD: 3.27 M/UL — LOW (ref 4.2–5.8)
RBC # FLD: 18.7 % — HIGH (ref 10.3–14.5)
SODIUM SERPL-SCNC: 132 MMOL/L — LOW (ref 135–145)
WBC # BLD: 12.85 K/UL — HIGH (ref 3.8–10.5)
WBC # FLD AUTO: 12.85 K/UL — HIGH (ref 3.8–10.5)

## 2025-07-09 RX ORDER — INSULIN GLARGINE-YFGN 100 [IU]/ML
12 INJECTION, SOLUTION SUBCUTANEOUS ONCE
Refills: 0 | Status: COMPLETED | OUTPATIENT
Start: 2025-07-09 | End: 2025-07-09

## 2025-07-09 RX ORDER — INSULIN GLARGINE-YFGN 100 [IU]/ML
15 INJECTION, SOLUTION SUBCUTANEOUS AT BEDTIME
Refills: 0 | Status: DISCONTINUED | OUTPATIENT
Start: 2025-07-09 | End: 2025-07-10

## 2025-07-09 RX ORDER — INSULIN LISPRO 100 U/ML
4 INJECTION, SOLUTION INTRAVENOUS; SUBCUTANEOUS
Refills: 0 | Status: DISCONTINUED | OUTPATIENT
Start: 2025-07-09 | End: 2025-07-10

## 2025-07-09 RX ADMIN — Medication 3 MILLILITER(S): at 05:04

## 2025-07-09 RX ADMIN — METOPROLOL SUCCINATE 12.5 MILLIGRAM(S): 50 TABLET, EXTENDED RELEASE ORAL at 18:10

## 2025-07-09 RX ADMIN — Medication 5 MILLIGRAM(S): at 21:33

## 2025-07-09 RX ADMIN — HEPARIN SODIUM 5000 UNIT(S): 1000 INJECTION INTRAVENOUS; SUBCUTANEOUS at 05:12

## 2025-07-09 RX ADMIN — Medication 1 TABLET(S): at 11:30

## 2025-07-09 RX ADMIN — Medication 81 MILLIGRAM(S): at 11:29

## 2025-07-09 RX ADMIN — INSULIN GLARGINE-YFGN 15 UNIT(S): 100 INJECTION, SOLUTION SUBCUTANEOUS at 21:33

## 2025-07-09 RX ADMIN — EPOETIN ALFA 10000 UNIT(S): 10000 SOLUTION INTRAVENOUS; SUBCUTANEOUS at 15:23

## 2025-07-09 RX ADMIN — Medication 500 MICROGRAM(S): at 18:10

## 2025-07-09 RX ADMIN — HEPARIN SODIUM 5000 UNIT(S): 1000 INJECTION INTRAVENOUS; SUBCUTANEOUS at 21:34

## 2025-07-09 RX ADMIN — INSULIN LISPRO 1: 100 INJECTION, SOLUTION INTRAVENOUS; SUBCUTANEOUS at 12:08

## 2025-07-09 RX ADMIN — Medication 650 MILLIGRAM(S): at 12:00

## 2025-07-09 RX ADMIN — Medication 50 MILLIGRAM(S): at 21:33

## 2025-07-09 RX ADMIN — Medication 40 MILLIGRAM(S): at 05:12

## 2025-07-09 RX ADMIN — METOPROLOL SUCCINATE 12.5 MILLIGRAM(S): 50 TABLET, EXTENDED RELEASE ORAL at 05:12

## 2025-07-09 RX ADMIN — AMIODARONE HYDROCHLORIDE 200 MILLIGRAM(S): 50 INJECTION, SOLUTION INTRAVENOUS at 05:13

## 2025-07-09 RX ADMIN — POLYETHYLENE GLYCOL 3350 17 GRAM(S): 17 POWDER, FOR SOLUTION ORAL at 18:10

## 2025-07-09 RX ADMIN — CLOPIDOGREL BISULFATE 75 MILLIGRAM(S): 75 TABLET, FILM COATED ORAL at 11:29

## 2025-07-09 RX ADMIN — INSULIN GLARGINE-YFGN 12 UNIT(S): 100 INJECTION, SOLUTION SUBCUTANEOUS at 00:39

## 2025-07-09 RX ADMIN — Medication 650 MILLIGRAM(S): at 11:45

## 2025-07-09 RX ADMIN — Medication 500 MICROGRAM(S): at 11:45

## 2025-07-09 RX ADMIN — Medication 3 MILLILITER(S): at 21:02

## 2025-07-09 RX ADMIN — HEPARIN SODIUM 5000 UNIT(S): 1000 INJECTION INTRAVENOUS; SUBCUTANEOUS at 18:10

## 2025-07-09 RX ADMIN — Medication 3 MILLILITER(S): at 18:30

## 2025-07-09 RX ADMIN — ATORVASTATIN CALCIUM 80 MILLIGRAM(S): 80 TABLET, FILM COATED ORAL at 21:35

## 2025-07-09 NOTE — PROVIDER CONTACT NOTE (HYPOGLYCEMIA EVENT) - NS PROVIDER CONTACT BACKGROUND-HYPO
Age: 70y    Gender: Male    POCT Blood Glucose:  90 mg/dL (07-09-25 @ 08:28)  90 mg/dL (07-09-25 @ 08:08)  70 mg/dL (07-09-25 @ 07:42)  65 mg/dL (07-09-25 @ 07:41)  89 mg/dL (07-08-25 @ 23:58)  77 mg/dL (07-08-25 @ 21:36)  162 mg/dL (07-08-25 @ 16:36)  111 mg/dL (07-08-25 @ 11:36)      eMAR:  atorvastatin   80 milliGRAM(s) Oral (07-08-25 @ 21:42)    insulin glargine Injectable (LANTUS)   12 Unit(s) SubCutaneous (07-09-25 @ 00:39)    insulin lispro (ADMELOG) corrective regimen sliding scale   1 Unit(s) SubCutaneous (07-08-25 @ 17:21)    insulin lispro Injectable (ADMELOG)   6 Unit(s) SubCutaneous (07-08-25 @ 17:21)   6 Unit(s) SubCutaneous (07-08-25 @ 12:05)

## 2025-07-09 NOTE — PROGRESS NOTE ADULT - PROBLEM SELECTOR PLAN 1
Will decrease Lantus to 15units at bed time.  Will decrease Admelog to 4  units before each meal in addition to Admelog correction scale coverage.  Patient counseled for compliance with consistent low carb diet and physical activity as tolerated.

## 2025-07-09 NOTE — PROGRESS NOTE ADULT - ASSESSMENT
70 year old male PMH of HTN, HLD, DM2, CAD SP  Mitral valve replacement, CABG x3 and RVAD placement    Assessment  DMT2: 70y Male with DM T2 with hyperglycemia, A1C 6.8%, was on  insulin at home, now on basal bolus insulin with coverage, blood sugars are trending down, had hypoglycemia.   CAD: on medications, stable, monitored. sp CABG  HTN: on antihypertensive medications, monitored, asymptomatic.  ESRD: On hemodialysis, Monitor labs/BMP      Discussed plan and management with Dr Francisco Javier Baeza NP - TEAMS  Ulysses Mcintyre MD  Cell: [phone]  Office: [phone]            70 year old male PMH of HTN, HLD, DM2, CAD SP  Mitral valve replacement, CABG x3 and RVAD placement      Assessment  DMT2: 70y Male with DM T2 with hyperglycemia, A1C 6.8%, was on  insulin at home, now on basal bolus insulin with coverage, blood sugars are trending down, had hypoglycemia.   CAD: on medications, stable, monitored. sp CABG  HTN: on antihypertensive medications, monitored, asymptomatic.  ESRD: On hemodialysis, Monitor labs/BMP      Discussed plan and management with Dr Francisco Javier Baeza NP - TEAMS  Ulysses Mcintyre MD  Cell: [phone]  Office: [phone]

## 2025-07-09 NOTE — PROGRESS NOTE ADULT - SUBJECTIVE AND OBJECTIVE BOX
SUBJECTIVE: "Hi. I am ok"     TELEMETRY:  SR     Vital Signs Last 24 Hrs  T(C): 36.7 (25 @ 11:12), Max: 36.8 (25 @ 19:01)  T(F): 98.1 (25 @ 11:12), Max: 98.2 (25 @ 19:01)  HR: 86 (25 @ 11:12) (83 - 93)  BP: 136/67 (25 @ 11:12) (107/63 - 136/67)  RR: 18 (25 @ 11:12) (18 - 18)  SpO2: 93% (25 @ 11:12) (92% - 93%)           INPUT/OUTPUT:   @ 07:01  -   @ 07:00  --------------------------------------------------------  IN: 920 mL / OUT: 0 mL / NET: 920 mL      LABS:    132[L]  |  92[L]  |  60[H]  ----------------------------<  48[LL]  4.7   |  23  |  8.53[H]  Ca    10.3      2025 06:45  Phos  4.7       Mg     2.7       TPro  6.2  /  Alb  3.0[L]  /  TBili  0.3  /  DBili  x   /  AST  33  /  ALT  46[H]  /  AlkPhos  139[H]               8.9    12.85 )-----------( 300      ( 2025 06:48 )             28.5            Daily Weight in k.9 (2025 07:45)      CAPILLARY BLOOD GLUCOSE  POCT Blood Glucose.: 178 mg/dL (2025 11:22)  POCT Blood Glucose.: 112 mg/dL (2025 08:51)  POCT Blood Glucose.: 90 mg/dL (2025 08:28)  POCT Blood Glucose.: 90 mg/dL (2025 08:08)  POCT Blood Glucose.: 70 mg/dL (2025 07:42)  POCT Blood Glucose.: 65 mg/dL (2025 07:41)  POCT Blood Glucose.: 89 mg/dL (2025 23:58)  POCT Blood Glucose.: 77 mg/dL (2025 21:36)  POCT Blood Glucose.: 162 mg/dL (2025 16:36)      PHYSICAL EXAM:  GEN: no acute distress  HEENT: NCAT, neck supple and nontender  CVS: s1, s2  STERNAL WOUND: MSI --> c/d/i; stable  RESP: non-labored respirations with no use of accessory muscles  EXT: +edema, denies calf tenderness, +pp  NEURO: aaox3      ACTIVE MEDICATIONS:  acetaminophen     Tablet .. 650 milliGRAM(s) Oral every 6 hours PRN  acetaminophen     Tablet .. 650 milliGRAM(s) Oral every 6 hours PRN  aMIOdarone    Tablet 200 milliGRAM(s) Oral daily  artificial  tears Solution 1 Drop(s) Both EYES every 1 hour PRN  aspirin enteric coated 81 milliGRAM(s) Oral daily  atorvastatin 80 milliGRAM(s) Oral at bedtime  bisacodyl Suppository 10 milliGRAM(s) Rectal once  chlorhexidine 4% Liquid 1 Application(s) Topical daily  clopidogrel Tablet 75 milliGRAM(s) Oral daily  dextrose 5%. 1000 milliLiter(s) IV Continuous <Continuous>  dextrose 50% Injectable 50 milliLiter(s) IV Push every 15 minutes  dextrose 50% Injectable 25 milliLiter(s) IV Push every 15 minutes  dextrose Oral Gel 15 Gram(s) Oral once PRN  epoetin douglas-epbx (RETACRIT) Injectable 44268 Unit(s) IV Push once  glucagon  Injectable 1 milliGRAM(s) IntraMuscular once  heparin   Injectable 5000 Unit(s) SubCutaneous every 8 hours  insulin glargine Injectable (LANTUS) 24 Unit(s) SubCutaneous at bedtime  insulin lispro (ADMELOG) corrective regimen sliding scale   SubCutaneous three times a day before meals  insulin lispro (ADMELOG) corrective regimen sliding scale   SubCutaneous at bedtime  insulin lispro Injectable (ADMELOG) 6 Unit(s) SubCutaneous three times a day before meals  ipratropium    for Nebulization 500 MICROGram(s) Nebulizer every 6 hours  melatonin 5 milliGRAM(s) Oral at bedtime  metoprolol tartrate 12.5 milliGRAM(s) Oral two times a day  multivitamin/minerals 1 Tablet(s) Oral daily  pantoprazole    Tablet 40 milliGRAM(s) Oral before breakfast  polyethylene glycol 3350 17 Gram(s) Oral every 12 hours  senna 2 Tablet(s) Oral at bedtime  sodium chloride 0.9% lock flush 3 milliLiter(s) IV Push every 8 hours  sodium chloride 0.9%. 1000 milliLiter(s) IV Continuous <Continuous>  traZODone 50 milliGRAM(s) Oral at bedtime      Case discussed in detail with Cardiothoracic Team and Attending Dr. Mckeon. Plan below as per discussion.

## 2025-07-09 NOTE — PROVIDER CONTACT NOTE (CRITICAL VALUE NOTIFICATION) - DATE AND TIME:
09-Jul-2025 08:36 Detail Level: Detailed 09-Jul-2025 08:30 Size Of Lesion In Cm (Optional): 0 Morphology Per Location (Optional): recent large scc treated with Mohs and radiation left parietal scalp by Dr. Grossman.  Has f/u with Dr. Grossman.  Primary dermatologist was Dr. Acosta.  Has significant actinic damage and multiple areas concerning for scc on his scalp.  will clear some ak's with LN2 today, biopsy concerning lesions and plan on PDT to try to keep scalp as clear as possible.  Will need checks every 2 months while clearing scalp.

## 2025-07-09 NOTE — PROGRESS NOTE ADULT - SUBJECTIVE AND OBJECTIVE BOX
Chief complaint  Patient is a 70y old  Male who presents with a chief complaint of CAD (08 Jul 2025 15:12)         Labs and Fingersticks  CAPILLARY BLOOD GLUCOSE      POCT Blood Glucose.: 178 mg/dL (09 Jul 2025 11:22)  POCT Blood Glucose.: 112 mg/dL (09 Jul 2025 08:51)  POCT Blood Glucose.: 90 mg/dL (09 Jul 2025 08:28)  POCT Blood Glucose.: 90 mg/dL (09 Jul 2025 08:08)  POCT Blood Glucose.: 70 mg/dL (09 Jul 2025 07:42)  POCT Blood Glucose.: 65 mg/dL (09 Jul 2025 07:41)  POCT Blood Glucose.: 89 mg/dL (08 Jul 2025 23:58)  POCT Blood Glucose.: 77 mg/dL (08 Jul 2025 21:36)  POCT Blood Glucose.: 162 mg/dL (08 Jul 2025 16:36)      Anion Gap: 17 (07-09 @ 06:45)  Anion Gap: 16 (07-08 @ 06:18)      Calcium: 10.3 (07-09 @ 06:45)  Calcium: 9.9 (07-08 @ 06:18)  Albumin: 3.0 *L* (07-09 @ 06:45)  Albumin: 3.0 *L* (07-08 @ 06:18)    Alanine Aminotransferase (ALT/SGPT): 46 *H* (07-09 @ 06:45)  Alanine Aminotransferase (ALT/SGPT): 37 (07-08 @ 06:18)  Alkaline Phosphatase: 139 *H* (07-09 @ 06:45)  Alkaline Phosphatase: 124 *H* (07-08 @ 06:18)  Aspartate Aminotransferase (AST/SGOT): 33 (07-09 @ 06:45)  Aspartate Aminotransferase (AST/SGOT): 26 (07-08 @ 06:18)        07-09    132[L]  |  92[L]  |  60[H]  ----------------------------<  48[LL]  4.7   |  23  |  8.53[H]    Ca    10.3      09 Jul 2025 06:45  Phos  4.7     07-09  Mg     2.7     07-09    TPro  6.2  /  Alb  3.0[L]  /  TBili  0.3  /  DBili  x   /  AST  33  /  ALT  46[H]  /  AlkPhos  139[H]  07-09                        8.9    12.85 )-----------( 300      ( 09 Jul 2025 06:48 )             28.5     Medications  MEDICATIONS  (STANDING):  aMIOdarone    Tablet 200 milliGRAM(s) Oral daily  aspirin enteric coated 81 milliGRAM(s) Oral daily  atorvastatin 80 milliGRAM(s) Oral at bedtime  bisacodyl Suppository 10 milliGRAM(s) Rectal once  chlorhexidine 4% Liquid 1 Application(s) Topical daily  clopidogrel Tablet 75 milliGRAM(s) Oral daily  dextrose 5%. 1000 milliLiter(s) (100 mL/Hr) IV Continuous <Continuous>  dextrose 50% Injectable 50 milliLiter(s) IV Push every 15 minutes  dextrose 50% Injectable 25 milliLiter(s) IV Push every 15 minutes  epoetin douglas-epbx (RETACRIT) Injectable 36676 Unit(s) IV Push once  glucagon  Injectable 1 milliGRAM(s) IntraMuscular once  heparin   Injectable 5000 Unit(s) SubCutaneous every 8 hours  insulin glargine Injectable (LANTUS) 15 Unit(s) SubCutaneous at bedtime  insulin lispro (ADMELOG) corrective regimen sliding scale   SubCutaneous three times a day before meals  insulin lispro (ADMELOG) corrective regimen sliding scale   SubCutaneous at bedtime  insulin lispro Injectable (ADMELOG) 4 Unit(s) SubCutaneous three times a day before meals  ipratropium    for Nebulization 500 MICROGram(s) Nebulizer every 6 hours  melatonin 5 milliGRAM(s) Oral at bedtime  metoprolol tartrate 12.5 milliGRAM(s) Oral two times a day  multivitamin/minerals 1 Tablet(s) Oral daily  pantoprazole    Tablet 40 milliGRAM(s) Oral before breakfast  polyethylene glycol 3350 17 Gram(s) Oral every 12 hours  senna 2 Tablet(s) Oral at bedtime  sodium chloride 0.9% lock flush 3 milliLiter(s) IV Push every 8 hours  sodium chloride 0.9%. 1000 milliLiter(s) (10 mL/Hr) IV Continuous <Continuous>  traZODone 50 milliGRAM(s) Oral at bedtime      Physical Exam  General: Patient comfortable in bed   Vital Signs Last 12 Hrs  T(F): 98.1 (07-09-25 @ 11:12), Max: 98.1 (07-09-25 @ 04:48)  HR: 86 (07-09-25 @ 11:12) (86 - 93)  BP: 136/67 (07-09-25 @ 11:12) (128/67 - 136/67)  BP(mean): 90 (07-09-25 @ 11:12) (87 - 90)  RR: 18 (07-09-25 @ 11:12) (18 - 18)  SpO2: 93% (07-09-25 @ 11:12) (92% - 93%)    CVS: S1S2   Respiratory: No wheezing, no crepitations  GI: Abdomen soft, bowel sounds positive  Musculoskeletal:  moves all extremities         Chief complaint  Patient is a 70y old  Male who presents with a chief complaint of CAD (08 Jul 2025 15:12)     Labs and Fingersticks  CAPILLARY BLOOD GLUCOSE      POCT Blood Glucose.: 178 mg/dL (09 Jul 2025 11:22)  POCT Blood Glucose.: 112 mg/dL (09 Jul 2025 08:51)  POCT Blood Glucose.: 90 mg/dL (09 Jul 2025 08:28)  POCT Blood Glucose.: 90 mg/dL (09 Jul 2025 08:08)  POCT Blood Glucose.: 70 mg/dL (09 Jul 2025 07:42)  POCT Blood Glucose.: 65 mg/dL (09 Jul 2025 07:41)  POCT Blood Glucose.: 89 mg/dL (08 Jul 2025 23:58)  POCT Blood Glucose.: 77 mg/dL (08 Jul 2025 21:36)  POCT Blood Glucose.: 162 mg/dL (08 Jul 2025 16:36)      Anion Gap: 17 (07-09 @ 06:45)  Anion Gap: 16 (07-08 @ 06:18)      Calcium: 10.3 (07-09 @ 06:45)  Calcium: 9.9 (07-08 @ 06:18)  Albumin: 3.0 *L* (07-09 @ 06:45)  Albumin: 3.0 *L* (07-08 @ 06:18)    Alanine Aminotransferase (ALT/SGPT): 46 *H* (07-09 @ 06:45)  Alanine Aminotransferase (ALT/SGPT): 37 (07-08 @ 06:18)  Alkaline Phosphatase: 139 *H* (07-09 @ 06:45)  Alkaline Phosphatase: 124 *H* (07-08 @ 06:18)  Aspartate Aminotransferase (AST/SGOT): 33 (07-09 @ 06:45)  Aspartate Aminotransferase (AST/SGOT): 26 (07-08 @ 06:18)        07-09    132[L]  |  92[L]  |  60[H]  ----------------------------<  48[LL]  4.7   |  23  |  8.53[H]    Ca    10.3      09 Jul 2025 06:45  Phos  4.7     07-09  Mg     2.7     07-09    TPro  6.2  /  Alb  3.0[L]  /  TBili  0.3  /  DBili  x   /  AST  33  /  ALT  46[H]  /  AlkPhos  139[H]  07-09                        8.9    12.85 )-----------( 300      ( 09 Jul 2025 06:48 )             28.5     Medications  MEDICATIONS  (STANDING):  aMIOdarone    Tablet 200 milliGRAM(s) Oral daily  aspirin enteric coated 81 milliGRAM(s) Oral daily  atorvastatin 80 milliGRAM(s) Oral at bedtime  bisacodyl Suppository 10 milliGRAM(s) Rectal once  chlorhexidine 4% Liquid 1 Application(s) Topical daily  clopidogrel Tablet 75 milliGRAM(s) Oral daily  dextrose 5%. 1000 milliLiter(s) (100 mL/Hr) IV Continuous <Continuous>  dextrose 50% Injectable 50 milliLiter(s) IV Push every 15 minutes  dextrose 50% Injectable 25 milliLiter(s) IV Push every 15 minutes  epoetin douglas-epbx (RETACRIT) Injectable 22933 Unit(s) IV Push once  glucagon  Injectable 1 milliGRAM(s) IntraMuscular once  heparin   Injectable 5000 Unit(s) SubCutaneous every 8 hours  insulin glargine Injectable (LANTUS) 15 Unit(s) SubCutaneous at bedtime  insulin lispro (ADMELOG) corrective regimen sliding scale   SubCutaneous three times a day before meals  insulin lispro (ADMELOG) corrective regimen sliding scale   SubCutaneous at bedtime  insulin lispro Injectable (ADMELOG) 4 Unit(s) SubCutaneous three times a day before meals  ipratropium    for Nebulization 500 MICROGram(s) Nebulizer every 6 hours  melatonin 5 milliGRAM(s) Oral at bedtime  metoprolol tartrate 12.5 milliGRAM(s) Oral two times a day  multivitamin/minerals 1 Tablet(s) Oral daily  pantoprazole    Tablet 40 milliGRAM(s) Oral before breakfast  polyethylene glycol 3350 17 Gram(s) Oral every 12 hours  senna 2 Tablet(s) Oral at bedtime  sodium chloride 0.9% lock flush 3 milliLiter(s) IV Push every 8 hours  sodium chloride 0.9%. 1000 milliLiter(s) (10 mL/Hr) IV Continuous <Continuous>  traZODone 50 milliGRAM(s) Oral at bedtime      Physical Exam  General: Patient comfortable in bed   Vital Signs Last 12 Hrs  T(F): 98.1 (07-09-25 @ 11:12), Max: 98.1 (07-09-25 @ 04:48)  HR: 86 (07-09-25 @ 11:12) (86 - 93)  BP: 136/67 (07-09-25 @ 11:12) (128/67 - 136/67)  BP(mean): 90 (07-09-25 @ 11:12) (87 - 90)  RR: 18 (07-09-25 @ 11:12) (18 - 18)  SpO2: 93% (07-09-25 @ 11:12) (92% - 93%)    CVS: S1S2   Respiratory: No wheezing, no crepitations  GI: Abdomen soft, bowel sounds positive  Musculoskeletal:  moves all extremities

## 2025-07-09 NOTE — PROGRESS NOTE ADULT - ASSESSMENT
71 yo M with cardiogenic shock on RVAD ,DM2, CAD (total  4 coronary stents, last one PCI in 2024 ), CHF with EF 40-45%, severe MR     s/p Mitral valve replacement, CABG x3 and RVAD placement   1 unit prbc    RVAD replaced and removal of PA cannula   bronch with IP and extubated - bronch BAL + Serratia    epi weaned off    overnight hypothermic, elevated procal, blood cx (negative), added Vanco   CRRT D/C'D   Interval removal left-sided chest tube without pneumothorax. Bibasilar atelectasis   [facility] TLC   BC negative    RVAD removal     weaned; iHD  7/3 transferred to [address] [place] [address]. Continues on cefepime until , 7 day course (off vanco)     VSS, Left chest was ultrasounded by PA there is an effusion will reassess tomorrrow after HD to see if we should place a tube, OOB to chair did not walk today   VSS, LPCT placed and 900cc serosanguinous drainage. Off O2 lying flat   VSS, CT to [facility] will place on waterseal today,Off O2, needs to ambulate   VSS CT D/C'd this am, ambulating in halls w/ walker and 1 assist, HD today    VSS. for HD tomorrow. nephrology following. CM following for rehab placement. PW to be cut on dc per Dr. Mckeon. Started on Plavix per d/w Dr. Mckeon.    vss. Glucose on BMP 48. Found to be hypoglycemic on FS check this AM. Pt at bedside denies any dizziness/lightheadedness/LOC. AAOX3. Reports didn't eat full dinner last night. Endocrine following, regimen adjustment per Endocrine. CM following, awaiting accepting bed. HD today.  Disposition: Acute Rehab

## 2025-07-09 NOTE — PROGRESS NOTE ADULT - ASSESSMENT
70M w/ HTN, DM2, CAD, and ESRD-HD, s/p CABGx3/MV repair/RVAD 6/17/25    (1)Renal - ESRD - HD MWF - s/p HD with 700ml  fluid loss 7/4/25,  CRRT discontinued 6/27;  Hyponatremia lower should improve with HD , next HD Monday 7/7/27  (2)Hypercalcemia- lower calcium bath with HD;  Hyperkalemia   (3)Anemia -  hgb improving on TIW Retacrit;   (4)CV - SP cardiogenic shock  and now compensated and removal of  RVAD/inotropes     RECOMMEND:  (1)Renal -HD this am and continue  lower calcium bath  (2)Anemia - Increase Retacrit to help with anemia   (3)CVS- BP acceptable at present;  Asa 81mg po qd;  Lopressor started   (4) Physical therapy        Sayed [facility]   BeehiveID   [id number]

## 2025-07-09 NOTE — ASU PATIENT PROFILE, ADULT - HISTORY OF COVID-19 VACCINATION
Outreach attempts to coordinate scheduling on the patient's Service to Spine order in workqueue [id number] requested on 7/4/2025 have been conducted.    Please note the referral is valid 1 year, it is just removed from the active workqueue      Patient declined care stating she has no insurance.    Please contact Rosey if further coordination is needed.   
Yes

## 2025-07-09 NOTE — PROGRESS NOTE ADULT - SUBJECTIVE AND OBJECTIVE BOX
NEPHROLOGY-[facility] [phone]        Patient seen and examined in bed.  He was the same         MEDICATIONS  (STANDING):  aMIOdarone    Tablet 200 milliGRAM(s) Oral daily  aspirin enteric coated 81 milliGRAM(s) Oral daily  atorvastatin 80 milliGRAM(s) Oral at bedtime  bisacodyl Suppository 10 milliGRAM(s) Rectal once  chlorhexidine 4% Liquid 1 Application(s) Topical daily  clopidogrel Tablet 75 milliGRAM(s) Oral daily  dextrose 5%. 1000 milliLiter(s) (100 mL/Hr) IV Continuous <Continuous>  dextrose 50% Injectable 50 milliLiter(s) IV Push every 15 minutes  dextrose 50% Injectable 25 milliLiter(s) IV Push every 15 minutes  epoetin douglas-epbx (RETACRIT) Injectable 71994 Unit(s) IV Push once  glucagon  Injectable 1 milliGRAM(s) IntraMuscular once  heparin   Injectable 5000 Unit(s) SubCutaneous every 8 hours  insulin glargine Injectable (LANTUS) 24 Unit(s) SubCutaneous at bedtime  insulin lispro (ADMELOG) corrective regimen sliding scale   SubCutaneous three times a day before meals  insulin lispro (ADMELOG) corrective regimen sliding scale   SubCutaneous at bedtime  insulin lispro Injectable (ADMELOG) 6 Unit(s) SubCutaneous three times a day before meals  ipratropium    for Nebulization 500 MICROGram(s) Nebulizer every 6 hours  melatonin 5 milliGRAM(s) Oral at bedtime  metoprolol tartrate 12.5 milliGRAM(s) Oral two times a day  multivitamin/minerals 1 Tablet(s) Oral daily  pantoprazole    Tablet 40 milliGRAM(s) Oral before breakfast  polyethylene glycol 3350 17 Gram(s) Oral every 12 hours  senna 2 Tablet(s) Oral at bedtime  sodium chloride 0.9% lock flush 3 milliLiter(s) IV Push every 8 hours  sodium chloride 0.9%. 1000 milliLiter(s) (10 mL/Hr) IV Continuous <Continuous>  traZODone 50 milliGRAM(s) Oral at bedtime      VITAL:  T(C): , Max: 36.8 (07-08-25 @ 19:01)  T(F): , Max: 98.2 (07-08-25 @ 19:01)  HR: 93 (07-09-25 @ 04:48)  BP: 128/67 (07-09-25 @ 04:48)  BP(mean): 87 (07-09-25 @ 04:48)  RR: 18 (07-09-25 @ 04:48)  SpO2: 92% (07-09-25 @ 04:48)  Wt(kg): --    I and O's:    07-08 @ 07:01  -  07-09 @ 07:00  --------------------------------------------------------  IN: 920 mL / OUT: 0 mL / NET: 920 mL          PHYSICAL EXAM:    Constitutional: NAD  Neck:  No JVD  Respiratory: CTAB/L  Cardiovascular: S1 and S2  Gastrointestinal: BS+, soft, NT/ND  Extremities: No peripheral edema  Neurological: A/O x 3, no focal deficits  Psychiatric: Normal mood, normal affect  : No Ag  Skin: No rashes  Access: avf    LABS:                        8.9    12.85 )-----------( 300      ( 09 Jul 2025 06:48 )             28.5     07-09    132[L]  |  92[L]  |  60[H]  ----------------------------<  48[LL]  4.7   |  23  |  8.53[H]    Ca    10.3      09 Jul 2025 06:45  Phos  4.7     07-09  Mg     2.7     07-09    TPro  6.2  /  Alb  3.0[L]  /  TBili  0.3  /  DBili  x   /  AST  33  /  ALT  46[H]  /  AlkPhos  139[H]  07-09          Urine Studies:  Urinalysis Basic - ( 09 Jul 2025 06:45 )    Color: x / Appearance: x / SG: x / pH: x  Gluc: 48 mg/dL / Ketone: x  / Bili: x / Urobili: x   Blood: x / Protein: x / Nitrite: x   Leuk Esterase: x / RBC: x / WBC x   Sq Epi: x / Non Sq Epi: x / Bacteria: x            RADIOLOGY & ADDITIONAL STUDIES:      < from: Xray Chest 1 View- PORTABLE-Urgent (Xray Chest 1 View- PORTABLE-Urgent .) (07.07.25 @ 12:43) >    ACC: [id number] EXAM:  XR CHEST PORTABLE URGENT 1V   ORDERED BY: TONY HERNANDEZ     PROCEDURE DATE:  07/07/2025          INTERPRETATION:  EXAMINATION: XR CHEST URGENT    CLINICAL INDICATION: r/o pneumo    TECHNIQUE: Single frontal, portable viewof the chest was obtained.    COMPARISON: Chest x-ray 7/7/2025    FINDINGS:  Status post left chest tube removal.  The heart size is normal in size. Left atrial appendage clip.  Mild pulmonary vascular congestion. Bibasilar atelectasis.  There is no pleural effusion. There is no pneumothorax.  No acute bony abnormality.    IMPRESSION:  No pneumothorax status post left chest tube removal.    --- End of Report ---        < end of copied text >

## 2025-07-10 DIAGNOSIS — Z98.890 OTHER SPECIFIED POSTPROCEDURAL STATES: ICD-10-CM

## 2025-07-10 LAB
ALBUMIN SERPL ELPH-MCNC: 3.1 G/DL — LOW (ref 3.3–5)
ALP SERPL-CCNC: 123 U/L — HIGH (ref 40–120)
ALT FLD-CCNC: 38 U/L — SIGNIFICANT CHANGE UP (ref 10–45)
ANION GAP SERPL CALC-SCNC: 16 MMOL/L — SIGNIFICANT CHANGE UP (ref 5–17)
AST SERPL-CCNC: 23 U/L — SIGNIFICANT CHANGE UP (ref 10–40)
BASOPHILS # BLD AUTO: 0.06 K/UL — SIGNIFICANT CHANGE UP (ref 0–0.2)
BASOPHILS NFR BLD AUTO: 0.7 % — SIGNIFICANT CHANGE UP (ref 0–2)
BILIRUB SERPL-MCNC: 0.4 MG/DL — SIGNIFICANT CHANGE UP (ref 0.2–1.2)
BUN SERPL-MCNC: 38 MG/DL — HIGH (ref 7–23)
CALCIUM SERPL-MCNC: 9.8 MG/DL — SIGNIFICANT CHANGE UP (ref 8.4–10.5)
CHLORIDE SERPL-SCNC: 93 MMOL/L — LOW (ref 96–108)
CO2 SERPL-SCNC: 26 MMOL/L — SIGNIFICANT CHANGE UP (ref 22–31)
CREAT SERPL-MCNC: 6.55 MG/DL — HIGH (ref 0.5–1.3)
EGFR: 8 ML/MIN/1.73M2 — LOW
EGFR: 8 ML/MIN/1.73M2 — LOW
EOSINOPHIL # BLD AUTO: 0.16 K/UL — SIGNIFICANT CHANGE UP (ref 0–0.5)
EOSINOPHIL NFR BLD AUTO: 1.8 % — SIGNIFICANT CHANGE UP (ref 0–6)
GLUCOSE BLDC GLUCOMTR-MCNC: 169 MG/DL — HIGH (ref 70–99)
GLUCOSE BLDC GLUCOMTR-MCNC: 184 MG/DL — HIGH (ref 70–99)
GLUCOSE BLDC GLUCOMTR-MCNC: 77 MG/DL — SIGNIFICANT CHANGE UP (ref 70–99)
GLUCOSE BLDC GLUCOMTR-MCNC: 96 MG/DL — SIGNIFICANT CHANGE UP (ref 70–99)
GLUCOSE SERPL-MCNC: 74 MG/DL — SIGNIFICANT CHANGE UP (ref 70–99)
HAPTOGLOB SERPL-MCNC: 200 MG/DL — SIGNIFICANT CHANGE UP (ref 34–200)
HAV IGM SER-ACNC: SIGNIFICANT CHANGE UP
HBV CORE AB SER-ACNC: SIGNIFICANT CHANGE UP
HBV CORE IGM SER-ACNC: SIGNIFICANT CHANGE UP
HBV SURFACE AB SER-ACNC: ABNORMAL
HBV SURFACE AG SER-ACNC: SIGNIFICANT CHANGE UP
HCT VFR BLD CALC: 29.1 % — LOW (ref 39–50)
HCV AB S/CO SERPL IA: 0.27 S/CO — SIGNIFICANT CHANGE UP (ref 0–0.79)
HCV AB SERPL-IMP: SIGNIFICANT CHANGE UP
HGB BLD-MCNC: 8.7 G/DL — LOW (ref 13–17)
IMM GRANULOCYTES # BLD AUTO: 0.04 K/UL — SIGNIFICANT CHANGE UP (ref 0–0.07)
IMM GRANULOCYTES NFR BLD AUTO: 0.4 % — SIGNIFICANT CHANGE UP (ref 0–0.9)
LDH SERPL L TO P-CCNC: 215 U/L — SIGNIFICANT CHANGE UP (ref 50–242)
LYMPHOCYTES # BLD AUTO: 1 K/UL — SIGNIFICANT CHANGE UP (ref 1–3.3)
LYMPHOCYTES NFR BLD AUTO: 11.1 % — LOW (ref 13–44)
MAGNESIUM SERPL-MCNC: 2.5 MG/DL — SIGNIFICANT CHANGE UP (ref 1.6–2.6)
MCHC RBC-ENTMCNC: 26 PG — LOW (ref 27–34)
MCHC RBC-ENTMCNC: 29.9 G/DL — LOW (ref 32–36)
MCV RBC AUTO: 87.1 FL — SIGNIFICANT CHANGE UP (ref 80–100)
MONOCYTES # BLD AUTO: 1.05 K/UL — HIGH (ref 0–0.9)
MONOCYTES NFR BLD AUTO: 11.7 % — SIGNIFICANT CHANGE UP (ref 2–14)
NEUTROPHILS # BLD AUTO: 6.67 K/UL — SIGNIFICANT CHANGE UP (ref 1.8–7.4)
NEUTROPHILS NFR BLD AUTO: 74.3 % — SIGNIFICANT CHANGE UP (ref 43–77)
NRBC # BLD AUTO: 0 K/UL — SIGNIFICANT CHANGE UP (ref 0–0)
NRBC # FLD: 0 K/UL — SIGNIFICANT CHANGE UP (ref 0–0)
NRBC BLD AUTO-RTO: 0 /100 WBCS — SIGNIFICANT CHANGE UP (ref 0–0)
PHOSPHATE SERPL-MCNC: 4.2 MG/DL — SIGNIFICANT CHANGE UP (ref 2.5–4.5)
PLATELET # BLD AUTO: 290 K/UL — SIGNIFICANT CHANGE UP (ref 150–400)
PMV BLD: 10.6 FL — SIGNIFICANT CHANGE UP (ref 7–13)
POTASSIUM SERPL-MCNC: 4.3 MMOL/L — SIGNIFICANT CHANGE UP (ref 3.5–5.3)
POTASSIUM SERPL-SCNC: 4.3 MMOL/L — SIGNIFICANT CHANGE UP (ref 3.5–5.3)
PROT SERPL-MCNC: 6.1 G/DL — SIGNIFICANT CHANGE UP (ref 6–8.3)
RBC # BLD: 3.34 M/UL — LOW (ref 4.2–5.8)
RBC # FLD: 18.7 % — HIGH (ref 10.3–14.5)
SODIUM SERPL-SCNC: 135 MMOL/L — SIGNIFICANT CHANGE UP (ref 135–145)
WBC # BLD: 8.98 K/UL — SIGNIFICANT CHANGE UP (ref 3.8–10.5)
WBC # FLD AUTO: 8.98 K/UL — SIGNIFICANT CHANGE UP (ref 3.8–10.5)

## 2025-07-10 PROCEDURE — 99232 SBSQ HOSP IP/OBS MODERATE 35: CPT

## 2025-07-10 RX ORDER — INSULIN GLARGINE-YFGN 100 [IU]/ML
8 INJECTION, SOLUTION SUBCUTANEOUS AT BEDTIME
Refills: 0 | Status: DISCONTINUED | OUTPATIENT
Start: 2025-07-10 | End: 2025-07-11

## 2025-07-10 RX ADMIN — CLOPIDOGREL BISULFATE 75 MILLIGRAM(S): 75 TABLET, FILM COATED ORAL at 12:04

## 2025-07-10 RX ADMIN — HEPARIN SODIUM 5000 UNIT(S): 1000 INJECTION INTRAVENOUS; SUBCUTANEOUS at 05:25

## 2025-07-10 RX ADMIN — INSULIN LISPRO 4 UNIT(S): 100 INJECTION, SOLUTION INTRAVENOUS; SUBCUTANEOUS at 08:03

## 2025-07-10 RX ADMIN — METOPROLOL SUCCINATE 12.5 MILLIGRAM(S): 50 TABLET, EXTENDED RELEASE ORAL at 05:24

## 2025-07-10 RX ADMIN — INSULIN LISPRO 1: 100 INJECTION, SOLUTION INTRAVENOUS; SUBCUTANEOUS at 17:26

## 2025-07-10 RX ADMIN — HEPARIN SODIUM 5000 UNIT(S): 1000 INJECTION INTRAVENOUS; SUBCUTANEOUS at 21:45

## 2025-07-10 RX ADMIN — INSULIN GLARGINE-YFGN 8 UNIT(S): 100 INJECTION, SOLUTION SUBCUTANEOUS at 21:45

## 2025-07-10 RX ADMIN — Medication 500 MICROGRAM(S): at 17:30

## 2025-07-10 RX ADMIN — Medication 50 MILLIGRAM(S): at 21:44

## 2025-07-10 RX ADMIN — AMIODARONE HYDROCHLORIDE 200 MILLIGRAM(S): 50 INJECTION, SOLUTION INTRAVENOUS at 05:25

## 2025-07-10 RX ADMIN — Medication 500 MICROGRAM(S): at 13:04

## 2025-07-10 RX ADMIN — Medication 3 MILLILITER(S): at 13:21

## 2025-07-10 RX ADMIN — ATORVASTATIN CALCIUM 80 MILLIGRAM(S): 80 TABLET, FILM COATED ORAL at 21:44

## 2025-07-10 RX ADMIN — Medication 3 MILLILITER(S): at 21:40

## 2025-07-10 RX ADMIN — HEPARIN SODIUM 5000 UNIT(S): 1000 INJECTION INTRAVENOUS; SUBCUTANEOUS at 13:04

## 2025-07-10 RX ADMIN — Medication 3 MILLILITER(S): at 06:13

## 2025-07-10 RX ADMIN — Medication 40 MILLIGRAM(S): at 05:24

## 2025-07-10 RX ADMIN — METOPROLOL SUCCINATE 12.5 MILLIGRAM(S): 50 TABLET, EXTENDED RELEASE ORAL at 17:30

## 2025-07-10 RX ADMIN — Medication 81 MILLIGRAM(S): at 12:04

## 2025-07-10 RX ADMIN — INSULIN LISPRO 4 UNIT(S): 100 INJECTION, SOLUTION INTRAVENOUS; SUBCUTANEOUS at 12:04

## 2025-07-10 RX ADMIN — Medication 1 TABLET(S): at 12:05

## 2025-07-10 RX ADMIN — Medication 5 MILLIGRAM(S): at 21:45

## 2025-07-10 NOTE — PROGRESS NOTE ADULT - REASON FOR ADMISSION
CABG and MVR
cardiac events
CABG
s/p MVR, RVAD
s/p CABG, MVR, RVAD
CABG, MVR
CHF
cardiac failure
CAD / Mitral Valve disease

## 2025-07-10 NOTE — PROGRESS NOTE ADULT - SUBJECTIVE AND OBJECTIVE BOX
Chief complaint  Patient is a 70y old  Male who presents with a chief complaint of CABG (10 Jul 2025 11:06)         Labs and Fingersticks  CAPILLARY BLOOD GLUCOSE      POCT Blood Glucose.: 96 mg/dL (10 Jul 2025 11:19)  POCT Blood Glucose.: 77 mg/dL (10 Jul 2025 07:36)  POCT Blood Glucose.: 102 mg/dL (09 Jul 2025 21:13)  POCT Blood Glucose.: 131 mg/dL (09 Jul 2025 16:51)      Anion Gap: 16 (07-10 @ 05:30)  Anion Gap: 17 (07-09 @ 06:45)      Calcium: 9.8 (07-10 @ 05:30)  Calcium: 10.3 (07-09 @ 06:45)  Albumin: 3.1 *L* (07-10 @ 05:30)  Albumin: 3.0 *L* (07-09 @ 06:45)    Alanine Aminotransferase (ALT/SGPT): 38 (07-10 @ 05:30)  Alanine Aminotransferase (ALT/SGPT): 46 *H* (07-09 @ 06:45)  Alkaline Phosphatase: 123 *H* (07-10 @ 05:30)  Alkaline Phosphatase: 139 *H* (07-09 @ 06:45)  Aspartate Aminotransferase (AST/SGOT): 23 (07-10 @ 05:30)  Aspartate Aminotransferase (AST/SGOT): 33 (07-09 @ 06:45)        07-10    135  |  93[L]  |  38[H]  ----------------------------<  74  4.3   |  26  |  6.55[H]    Ca    9.8      10 Jul 2025 05:30  Phos  4.2     07-10  Mg     2.5     07-10    TPro  6.1  /  Alb  3.1[L]  /  TBili  0.4  /  DBili  x   /  AST  23  /  ALT  38  /  AlkPhos  123[H]  07-10                        8.7    8.98  )-----------( 290      ( 10 Jul 2025 05:31 )             29.1     Medications  MEDICATIONS  (STANDING):  aMIOdarone    Tablet 200 milliGRAM(s) Oral daily  aspirin enteric coated 81 milliGRAM(s) Oral daily  atorvastatin 80 milliGRAM(s) Oral at bedtime  bisacodyl Suppository 10 milliGRAM(s) Rectal once  chlorhexidine 4% Liquid 1 Application(s) Topical daily  clopidogrel Tablet 75 milliGRAM(s) Oral daily  dextrose 5%. 1000 milliLiter(s) (100 mL/Hr) IV Continuous <Continuous>  dextrose 50% Injectable 50 milliLiter(s) IV Push every 15 minutes  dextrose 50% Injectable 25 milliLiter(s) IV Push every 15 minutes  glucagon  Injectable 1 milliGRAM(s) IntraMuscular once  heparin   Injectable 5000 Unit(s) SubCutaneous every 8 hours  insulin lispro (ADMELOG) corrective regimen sliding scale   SubCutaneous three times a day before meals  insulin lispro (ADMELOG) corrective regimen sliding scale   SubCutaneous at bedtime  insulin lispro Injectable (ADMELOG) 4 Unit(s) SubCutaneous three times a day before meals  ipratropium    for Nebulization 500 MICROGram(s) Nebulizer every 6 hours  melatonin 5 milliGRAM(s) Oral at bedtime  metoprolol tartrate 12.5 milliGRAM(s) Oral two times a day  multivitamin/minerals 1 Tablet(s) Oral daily  pantoprazole    Tablet 40 milliGRAM(s) Oral before breakfast  polyethylene glycol 3350 17 Gram(s) Oral every 12 hours  senna 2 Tablet(s) Oral at bedtime  sodium chloride 0.9% lock flush 3 milliLiter(s) IV Push every 8 hours  sodium chloride 0.9%. 1000 milliLiter(s) (10 mL/Hr) IV Continuous <Continuous>  traZODone 50 milliGRAM(s) Oral at bedtime      Physical Exam  General: Patient comfortable in bed   Vital Signs Last 12 Hrs  T(F): 97.5 (07-10-25 @ 10:52), Max: 97.7 (07-10-25 @ 04:48)  HR: 85 (07-10-25 @ 10:52) (85 - 93)  BP: 122/62 (07-10-25 @ 10:52) (120/65 - 122/62)  BP(mean): 82 (07-10-25 @ 10:52) (82 - 83)  RR: 18 (07-10-25 @ 10:52) (18 - 18)  SpO2: 97% (07-10-25 @ 10:52) (95% - 97%)    CVS: S1S2   Respiratory: No wheezing, no crepitations  GI: Abdomen soft, bowel sounds positive  Musculoskeletal:  moves all extremities  : Voiding

## 2025-07-10 NOTE — PROGRESS NOTE ADULT - PROBLEM SELECTOR PLAN 1
Will decrease Lantus to 8 units at bed time.  Patient counseled for compliance with consistent low carb diet and physical activity as tolerated.

## 2025-07-10 NOTE — PROGRESS NOTE ADULT - ASSESSMENT
70 year old male PMH of HTN, HLD, DM2, CAD SP  Mitral valve replacement, CABG x3 and RVAD placement      Assessment  DMT2: 70y Male with DM T2 with hyperglycemia, A1C 6.8%, was on  insulin at home, now on basal bolus insulin with coverage, blood sugars are trending down, had hypoglycemia.   CAD: on medications, stable, monitored. sp CABG  HTN: on antihypertensive medications, monitored, asymptomatic.  ESRD: On hemodialysis, Monitor labs/BMP      Discussed plan and management with Dr Francisco Javier Baeza NP - TEAMS  Ulysses Mcintyre MD  Cell: [phone]  Office: [phone]

## 2025-07-10 NOTE — PROGRESS NOTE ADULT - SUBJECTIVE AND OBJECTIVE BOX
NEPHROLOGY-[facility] [phone]        Patient seen and examined in bed.  He was the same         MEDICATIONS  (STANDING):  aMIOdarone    Tablet 200 milliGRAM(s) Oral daily  aspirin enteric coated 81 milliGRAM(s) Oral daily  atorvastatin 80 milliGRAM(s) Oral at bedtime  bisacodyl Suppository 10 milliGRAM(s) Rectal once  chlorhexidine 4% Liquid 1 Application(s) Topical daily  clopidogrel Tablet 75 milliGRAM(s) Oral daily  dextrose 5%. 1000 milliLiter(s) (100 mL/Hr) IV Continuous <Continuous>  dextrose 50% Injectable 50 milliLiter(s) IV Push every 15 minutes  dextrose 50% Injectable 25 milliLiter(s) IV Push every 15 minutes  glucagon  Injectable 1 milliGRAM(s) IntraMuscular once  heparin   Injectable 5000 Unit(s) SubCutaneous every 8 hours  insulin glargine Injectable (LANTUS) 15 Unit(s) SubCutaneous at bedtime  insulin lispro (ADMELOG) corrective regimen sliding scale   SubCutaneous three times a day before meals  insulin lispro (ADMELOG) corrective regimen sliding scale   SubCutaneous at bedtime  insulin lispro Injectable (ADMELOG) 4 Unit(s) SubCutaneous three times a day before meals  ipratropium    for Nebulization 500 MICROGram(s) Nebulizer every 6 hours  melatonin 5 milliGRAM(s) Oral at bedtime  metoprolol tartrate 12.5 milliGRAM(s) Oral two times a day  multivitamin/minerals 1 Tablet(s) Oral daily  pantoprazole    Tablet 40 milliGRAM(s) Oral before breakfast  polyethylene glycol 3350 17 Gram(s) Oral every 12 hours  senna 2 Tablet(s) Oral at bedtime  sodium chloride 0.9% lock flush 3 milliLiter(s) IV Push every 8 hours  sodium chloride 0.9%. 1000 milliLiter(s) (10 mL/Hr) IV Continuous <Continuous>  traZODone 50 milliGRAM(s) Oral at bedtime      VITAL:  T(C): , Max: 36.7 (07-09-25 @ 11:12)  T(F): , Max: 98.1 (07-09-25 @ 11:12)  HR: 93 (07-10-25 @ 04:48)  BP: 120/65 (07-10-25 @ 04:48)  BP(mean): 83 (07-10-25 @ 04:48)  RR: 18 (07-10-25 @ 04:48)  SpO2: 95% (07-10-25 @ 04:48)  Wt(kg): --    I and O's:    07-09 @ 07:01  -  07-10 @ 07:00  --------------------------------------------------------  IN: 700 mL / OUT: 1800 mL / NET: -1100 mL          PHYSICAL EXAM:    Constitutional: NAD  Neck:  No JVD  Respiratory: CTAB/L  Cardiovascular: S1 and S2  Gastrointestinal: BS+, soft, NT/ND  Extremities: No peripheral edema  Neurological: A/O x 3, no focal deficits  Psychiatric: Normal mood, normal affect  : No Ag  Skin: No rashes  Access: avf    LABS:                        8.7    8.98  )-----------( 290      ( 10 Jul 2025 05:31 )             29.1     07-10    135  |  93[L]  |  38[H]  ----------------------------<  74  4.3   |  26  |  6.55[H]    Ca    9.8      10 Jul 2025 05:30  Phos  4.2     07-10  Mg     2.5     07-10    TPro  6.1  /  Alb  3.1[L]  /  TBili  0.4  /  DBili  x   /  AST  23  /  ALT  38  /  AlkPhos  123[H]  07-10          Urine Studies:  Urinalysis Basic - ( 10 Jul 2025 05:30 )    Color: x / Appearance: x / SG: x / pH: x  Gluc: 74 mg/dL / Ketone: x  / Bili: x / Urobili: x   Blood: x / Protein: x / Nitrite: x   Leuk Esterase: x / RBC: x / WBC x   Sq Epi: x / Non Sq Epi: x / Bacteria: x            RADIOLOGY & ADDITIONAL STUDIES:

## 2025-07-10 NOTE — CHART NOTE - NSCHARTNOTEFT_GEN_A_CORE
NUTRITION FOLLOW UP NOTE    PATIENT SEEN FOR: Malnutrition PO follow up     SOURCE: [x] Patient  [x] Current Medical Record  [] RN  [x] Family/support person at bedside: wife  [] Patient unavailable/inappropriate  [] Other:    CHART REVIEWED/EVENTS NOTED.  [] No changes to nutrition care plan to note  [x] Nutrition Status:  - S/p Mitral valve replacement, CABG x3 and RVAD placement on 2025; plan for decannulation today per rounds   - Hx ESRD on HD, CRRT discontinued , patient on intermittent hemodialysis   - Endo: hx of type 2 diabetes, endocrinology following     DIET ORDER:   Diet, Regular:   Consistent Carbohydrate {No Snacks} (CSTCHO)  Easy to Chew (EASYTOCHEW)  No Concentrated Potassium  No Concentrated Phosphorus  Supplement Feeding Modality:  Oral  Nepro Cans or Servings Per Day:  3       Frequency:  Daily (25)    CURRENT DIET ORDER IS:  [] Appropriate:  [] Inadequate:  [x] Other: see recommendations below.     NUTRITION INTAKE/PROVISION:  [x] PO: Pt visited at the bedside. Per discussion with family, patient noted with slightly improved appetite/PO intake in-house, still consuming <75% of his meals secondary to food preferences. Menu provided, encourage use of daily menus. Honor dietary preferences as expressed as able. Pt drinks Nepro 2-3x daily. Denies chewing/swallowing difficulties at this time. No other GI distress reported. Last bowel movement x 1 this AM per wife. Pt/family made aware RD remains available and will follow up for any additional questions/concerns and per protocol.   [] Enteral Nutrition:  [] Parenteral Nutrition:    ANTHROPOMETRICS:  Drug Dosing Weight  Height (cm): 180.3 (2025 22:03)  Weight (kg): 84.2 (2025 00:07)  BMI (kg/m2): 25.9 (2025 00:07)  BSA (m2): 2.04 (2025 00:07)    **Weights: Daily Weight in k.8 (07-10), Weight in k.2 (), Weight in k (), Weight in k.9 (), Weight in k.2 (), Weight in k.8 (), Weight in k (07-06)   Weight fluctuations likely in setting of fluid shifts and/or scale discrepancies. RD will continue to monitor weight trends as available/able.     MEDICATIONS:  MEDICATIONS  (STANDING):  aMIOdarone    Tablet 200 milliGRAM(s) Oral daily  aspirin enteric coated 81 milliGRAM(s) Oral daily  atorvastatin 80 milliGRAM(s) Oral at bedtime  bisacodyl Suppository 10 milliGRAM(s) Rectal once  chlorhexidine 4% Liquid 1 Application(s) Topical daily  clopidogrel Tablet 75 milliGRAM(s) Oral daily  dextrose 5%. 1000 milliLiter(s) (100 mL/Hr) IV Continuous <Continuous>  dextrose 50% Injectable 50 milliLiter(s) IV Push every 15 minutes  dextrose 50% Injectable 25 milliLiter(s) IV Push every 15 minutes  glucagon  Injectable 1 milliGRAM(s) IntraMuscular once  heparin   Injectable 5000 Unit(s) SubCutaneous every 8 hours  insulin lispro (ADMELOG) corrective regimen sliding scale   SubCutaneous three times a day before meals  insulin lispro (ADMELOG) corrective regimen sliding scale   SubCutaneous at bedtime  insulin lispro Injectable (ADMELOG) 4 Unit(s) SubCutaneous three times a day before meals  ipratropium    for Nebulization 500 MICROGram(s) Nebulizer every 6 hours  melatonin 5 milliGRAM(s) Oral at bedtime  metoprolol tartrate 12.5 milliGRAM(s) Oral two times a day  multivitamin/minerals 1 Tablet(s) Oral daily  pantoprazole    Tablet 40 milliGRAM(s) Oral before breakfast  polyethylene glycol 3350 17 Gram(s) Oral every 12 hours  senna 2 Tablet(s) Oral at bedtime  sodium chloride 0.9% lock flush 3 milliLiter(s) IV Push every 8 hours  sodium chloride 0.9%. 1000 milliLiter(s) (10 mL/Hr) IV Continuous <Continuous>  traZODone 50 milliGRAM(s) Oral at bedtime    MEDICATIONS  (PRN):  acetaminophen     Tablet .. 650 milliGRAM(s) Oral every 6 hours PRN Mild Pain (1 - 3)  acetaminophen     Tablet .. 650 milliGRAM(s) Oral every 6 hours PRN Mild Pain (1 - 3)  artificial  tears Solution 1 Drop(s) Both EYES every 1 hour PRN Dry Eyes  dextrose Oral Gel 15 Gram(s) Oral once PRN Blood Glucose LESS THAN 70 milliGRAM(s)/deciliter    NUTRITIONALLY PERTINENT LABS:  07-10 Na135 mmol/L Glu 74 mg/dL K+ 4.3 mmol/L Cr  6.55 mg/dL[H] BUN 38 mg/dL[H] 07-10 Phos 4.2 mg/dL 07-10 Alb 3.1 g/dL[L]07-10 ALT 38 U/L AST 23 U/L Alkaline Phosphatase 123 U/L[H]  25 @ 17:20 a1c 6.1    A1C with Estimated Average Glucose Result: 6.1 % (25 @ 17:20)  A1C with Estimated Average Glucose Result: 6.8 % (06-10-25 @ 12:25)  A1C with Estimated Average Glucose Result: 5.8 % (25 @ 09:03)    Finger Sticks:  POCT Blood Glucose.: 96 mg/dL (07-10 @ 11:19)  POCT Blood Glucose.: 77 mg/dL (07-10 @ 07:36)  POCT Blood Glucose.: 102 mg/dL ( @ 21:13)  POCT Blood Glucose.: 131 mg/dL ( @ 16:51)    NUTRITIONALLY PERTINENT MEDICATIONS/LABS:  [x] Reviewed  [x] Relevant notes on medications/labs:  - Endo: Finger stick x 24hrs 77-131mg/dL, ordered for insulin for glycemic control   - Multivitamin/mineral supplementation: ordered for daily multivitamin     EDEMA:  [x] Reviewed  [x] Relevant notes: +2 bilateral feet, +2 bilateral legs edema per RN flowsheet.    GI/ I&O:  [x] Reviewed  [x] Relevant notes: GI: ordered for protonix, dulcolax, miralax, senna   [] Other:    SKIN:   [x] No pressure injuries documented, per nursing flowsheet  [] Pressure injury previously noted  [] Change in pressure injury documentation:  [x] Other: Midline sternal incision     ESTIMATED NEEDS:  [x] No change:  [] Updated:  Energy: 2144-2541kcal/day (27-32 kcal/kg)  Protein: 119-135  g/day (1.5-1.7 g/kg)  Fluid:   ml/day or [x] defer to team  Based on: dosing weight 79.4kg    NUTRITION DIAGNOSIS:  [x] Prior Dx: 1) Increased Nutrient Needs (Protein/Energy); 2)Mild acute malnutrition  [] New Dx:    EDUCATION:  [x] Yes: To patient and family,  explain easy to chew diet. Emphasized the importance of adequate kcal and protein intake; recommend to optimize nutritional intake in case of decreased appetite; recommended small frequent meals by ordering nutrient-dense snacks and leaving non-perishable food away from tray for later consumption during the day or between meals; to start with protein, and sips of supplement throughout the day; reviewed foods with protein and menu order procedures in hospital.   [] Not appropriate/warranted    NUTRITION CARE PLAN:  1. Diet: Continue Easy to chew diet with consistent carbohydrate, no concentrated K+/Phos as ordered  2. Supplements: continue Nepro 3x daily (1,275 kcals, 57 g protein) to optimize PO intake  3. Multivitamin/mineral supplementation: continue daily multivitamin pending no medical contraindications  4. Continue to monitor PO intake, weight trend, electrolytes, blood glucose, labs, BMs in-house     [] Achieved - Continue current nutrition intervention(s)  [] Current medical condition precludes nutrition intervention at this time.    MONITORING AND EVALUATION:   RD remains available upon request and will follow up per protocol.    Name  Ace Du, JIN, CDN / TEAMS Available on [facility] TEAMS NUTRITION FOLLOW UP NOTE    PATIENT SEEN FOR: Malnutrition PO follow up     SOURCE: [x] Patient  [x] Current Medical Record  [] RN  [x] Family/support person at bedside: wife  [] Patient unavailable/inappropriate  [] Other:    CHART REVIEWED/EVENTS NOTED.  [] No changes to nutrition care plan to note  [x] Nutrition Status:  - S/p Mitral valve replacement, CABG x3 and RVAD placement on 2025  - Hx ESRD on HD, CRRT discontinued , patient on intermittent hemodialysis   - Endo: hx of type 2 diabetes, endocrinology following     DIET ORDER:   Diet, Regular:   Consistent Carbohydrate {No Snacks} (CSTCHO)  Easy to Chew (EASYTOCHEW)  No Concentrated Potassium  No Concentrated Phosphorus  Supplement Feeding Modality:  Oral  Nepro Cans or Servings Per Day:  3       Frequency:  Daily (25)    CURRENT DIET ORDER IS:  [] Appropriate:  [] Inadequate:  [x] Other: see recommendations below.     NUTRITION INTAKE/PROVISION:  [x] PO: Pt visited at the bedside. Per discussion with family, patient noted with slightly improved appetite/PO intake in-house, still consuming <75% of his meals secondary to food preferences. Menu provided, encourage use of daily menus. Honor dietary preferences as expressed as able. Pt drinks Nepro 2-3x daily. Denies chewing/swallowing difficulties at this time. No other GI distress reported. Last bowel movement x 1 this AM per wife. Pt/family made aware RD remains available and will follow up for any additional questions/concerns and per protocol.   [] Enteral Nutrition:  [] Parenteral Nutrition:    ANTHROPOMETRICS:  Drug Dosing Weight  Height (cm): 180.3 (2025 22:03)  Weight (kg): 84.2 (2025 00:07)  BMI (kg/m2): 25.9 (2025 00:07)  BSA (m2): 2.04 (2025 00:07)    **Weights: Daily Weight in k.8 (-10), Weight in k.2 (-), Weight in k (), Weight in k.9 (), Weight in k.2 (-), Weight in k.8 (), Weight in k ()   Weight fluctuations likely in setting of fluid shifts and/or scale discrepancies. RD will continue to monitor weight trends as available/able.     MEDICATIONS:  MEDICATIONS  (STANDING):  aMIOdarone    Tablet 200 milliGRAM(s) Oral daily  aspirin enteric coated 81 milliGRAM(s) Oral daily  atorvastatin 80 milliGRAM(s) Oral at bedtime  bisacodyl Suppository 10 milliGRAM(s) Rectal once  chlorhexidine 4% Liquid 1 Application(s) Topical daily  clopidogrel Tablet 75 milliGRAM(s) Oral daily  dextrose 5%. 1000 milliLiter(s) (100 mL/Hr) IV Continuous <Continuous>  dextrose 50% Injectable 50 milliLiter(s) IV Push every 15 minutes  dextrose 50% Injectable 25 milliLiter(s) IV Push every 15 minutes  glucagon  Injectable 1 milliGRAM(s) IntraMuscular once  heparin   Injectable 5000 Unit(s) SubCutaneous every 8 hours  insulin lispro (ADMELOG) corrective regimen sliding scale   SubCutaneous three times a day before meals  insulin lispro (ADMELOG) corrective regimen sliding scale   SubCutaneous at bedtime  insulin lispro Injectable (ADMELOG) 4 Unit(s) SubCutaneous three times a day before meals  ipratropium    for Nebulization 500 MICROGram(s) Nebulizer every 6 hours  melatonin 5 milliGRAM(s) Oral at bedtime  metoprolol tartrate 12.5 milliGRAM(s) Oral two times a day  multivitamin/minerals 1 Tablet(s) Oral daily  pantoprazole    Tablet 40 milliGRAM(s) Oral before breakfast  polyethylene glycol 3350 17 Gram(s) Oral every 12 hours  senna 2 Tablet(s) Oral at bedtime  sodium chloride 0.9% lock flush 3 milliLiter(s) IV Push every 8 hours  sodium chloride 0.9%. 1000 milliLiter(s) (10 mL/Hr) IV Continuous <Continuous>  traZODone 50 milliGRAM(s) Oral at bedtime    MEDICATIONS  (PRN):  acetaminophen     Tablet .. 650 milliGRAM(s) Oral every 6 hours PRN Mild Pain (1 - 3)  acetaminophen     Tablet .. 650 milliGRAM(s) Oral every 6 hours PRN Mild Pain (1 - 3)  artificial  tears Solution 1 Drop(s) Both EYES every 1 hour PRN Dry Eyes  dextrose Oral Gel 15 Gram(s) Oral once PRN Blood Glucose LESS THAN 70 milliGRAM(s)/deciliter    NUTRITIONALLY PERTINENT LABS:  07-10 Na135 mmol/L Glu 74 mg/dL K+ 4.3 mmol/L Cr  6.55 mg/dL[H] BUN 38 mg/dL[H] 07-10 Phos 4.2 mg/dL 07-10 Alb 3.1 g/dL[L]07-10 ALT 38 U/L AST 23 U/L Alkaline Phosphatase 123 U/L[H]  25 @ 17:20 a1c 6.1    A1C with Estimated Average Glucose Result: 6.1 % (25 @ 17:20)  A1C with Estimated Average Glucose Result: 6.8 % (06-10-25 @ 12:25)  A1C with Estimated Average Glucose Result: 5.8 % (25 @ 09:03)    Finger Sticks:  POCT Blood Glucose.: 96 mg/dL (07-10 @ 11:19)  POCT Blood Glucose.: 77 mg/dL (07-10 @ 07:36)  POCT Blood Glucose.: 102 mg/dL ( @ 21:13)  POCT Blood Glucose.: 131 mg/dL ( @ 16:51)    NUTRITIONALLY PERTINENT MEDICATIONS/LABS:  [x] Reviewed  [x] Relevant notes on medications/labs:  - Endo: Finger stick x 24hrs 77-131mg/dL, ordered for insulin for glycemic control   - Multivitamin/mineral supplementation: ordered for daily multivitamin     EDEMA:  [x] Reviewed  [x] Relevant notes: +2 bilateral feet, +2 bilateral legs edema per RN flowsheet.    GI/ I&O:  [x] Reviewed  [x] Relevant notes: GI: ordered for protonix, dulcolax, miralax, senna   [] Other:    SKIN:   [x] No pressure injuries documented, per nursing flowsheet  [] Pressure injury previously noted  [] Change in pressure injury documentation:  [x] Other: Midline sternal incision     ESTIMATED NEEDS:  [x] No change:  [] Updated:  Energy: 2144-2541kcal/day (27-32 kcal/kg)  Protein: 119-135  g/day (1.5-1.7 g/kg)  Fluid:   ml/day or [x] defer to team  Based on: dosing weight 79.4kg    NUTRITION DIAGNOSIS:  [x] Prior Dx: 1) Increased Nutrient Needs (Protein/Energy); 2)Mild acute malnutrition  [] New Dx:    EDUCATION:  [x] Yes: To patient and family,  explain easy to chew diet. Emphasized the importance of adequate kcal and protein intake; recommend to optimize nutritional intake in case of decreased appetite; recommended small frequent meals by ordering nutrient-dense snacks and leaving non-perishable food away from tray for later consumption during the day or between meals; to start with protein, and sips of supplement throughout the day; reviewed foods with protein and menu order procedures in hospital.   [] Not appropriate/warranted    NUTRITION CARE PLAN:  1. Diet: Continue Easy to chew diet with consistent carbohydrate, no concentrated K+/Phos as ordered  2. Supplements: continue Nepro 3x daily (1,275 kcals, 57 g protein) to optimize PO intake  3. Multivitamin/mineral supplementation: continue daily multivitamin pending no medical contraindications  4. Continue to monitor PO intake, weight trend, electrolytes, blood glucose, labs, BMs in-house     [] Achieved - Continue current nutrition intervention(s)  [] Current medical condition precludes nutrition intervention at this time.    MONITORING AND EVALUATION:   RD remains available upon request and will follow up per protocol.    Name  Ace Du RDN, CDN / TEAMS Available on [facility] TEAMS

## 2025-07-10 NOTE — PROGRESS NOTE ADULT - SUBJECTIVE AND OBJECTIVE BOX
VITAL SIGNS    Subjective: "I'm feeling ok" Denies CP, palpitation, SOB, BETANCOURT, HA, dizziness, N/V/D, fever or chills.  No acute event noted overnight.     Telemetry: NSR 80-90     Vital Signs Last 24 Hrs  T(C): 36.4 (07-10-25 @ 10:52), Max: 36.7 (25 @ 20:53)  T(F): 97.5 (07-10-25 @ 10:52), Max: 98.1 (25 @ 20:53)  HR: 85 (07-10-25 @ 10:52) (85 - 93)  BP: 122/62 (07-10-25 @ 10:52) (116/64 - 122/62)  RR: 18 (07-10-25 @ 10:52) (18 - 18)  SpO2: 97% (07-10-25 @ 10:52) (95% - 98%)            @ 07:  -  07-10 @ 07:00  --------------------------------------------------------  IN: 700 mL / OUT: 1800 mL / NET: -1100 mL    07-10 @ 07:01  -  07-10 @ 17:11  --------------------------------------------------------  IN: 580 mL / OUT: 0 mL / NET: 580 mL    LABS  07-10    135  |  93[L]  |  38[H]  ----------------------------<  74  4.3   |  26  |  6.55[H]    Ca    9.8      10 Jul 2025 05:30  Phos  4.2     07-10  Mg     2.5     07-10    TPro  6.1  /  Alb  3.1[L]  /  TBili  0.4  /  DBili  x   /  AST  23  /  ALT  38  /  AlkPhos  123[H]  07-10                                 8.7    8.98  )-----------( 290      ( 10 Jul 2025 05:31 )             29.1            Daily     Daily Weight in k.8 (10 Jul 2025 07:20)    CAPILLARY BLOOD GLUCOSE    POCT Blood Glucose.: 184 mg/dL (10 Jul 2025 16:28)  POCT Blood Glucose.: 96 mg/dL (10 Jul 2025 11:19)  POCT Blood Glucose.: 77 mg/dL (10 Jul 2025 07:36)  POCT Blood Glucose.: 102 mg/dL (2025 21:13)        PHYSICAL EXAM    Neurology: alert and oriented x 3, nonfocal, no gross deficits    CV: (+) S1 and S2, No murmurs, rubs, gallops or clicks     Sternal Wound: MSI -->with prineo dressing ZUHAIR -->CDI, sternum stable; PW x 4 --> Isolated     Lungs: CTA B/L     Abdomen: soft, nontender, nondistended, positive bowel sounds, (+) Flatus; (+) BM     : Bladder non tender / Non distended             Extremities:  B/L LE (+) trace edema; negative calf tenderness; (+) 2 DP palpable; RUE AV fistula (+) Bruit / (+) thrill     acetaminophen  Tablet .. 650 milliGRAM(s) Oral every 6 hours PRN  acetaminophen Tablet .. 650 milliGRAM(s) Oral every 6 hours PRN  aMIOdarone Tablet 200 milliGRAM(s) Oral daily  artificial  tears Solution 1 Drop(s) Both EYES every 1 hour PRN  aspirin enteric coated 81 milliGRAM(s) Oral daily  atorvastatin 80 milliGRAM(s) Oral at bedtime  bisacodyl Suppository 10 milliGRAM(s) Rectal once  chlorhexidine 4% Liquid 1 Application(s) Topical daily  clopidogrel Tablet 75 milliGRAM(s) Oral daily  dextrose 5%. 1000 milliLiter(s) IV Continuous <Continuous>  dextrose 50% Injectable 50 milliLiter(s) IV Push every 15 minutes  dextrose 50% Injectable 25 milliLiter(s) IV Push every 15 minutes  dextrose Oral Gel 15 Gram(s) Oral once PRN  glucagon  Injectable 1 milliGRAM(s) IntraMuscular once  heparin Injectable 5000 Unit(s) SubCutaneous every 8 hours  insulin glargine Injectable (LANTUS) 8 Unit(s) SubCutaneous at bedtime  insulin lispro (ADMELOG) corrective regimen sliding scale   SubCutaneous three times a day before meals  insulin lispro (ADMELOG) corrective regimen sliding scale   SubCutaneous at bedtime  ipratropium  for Nebulization 500 MICROGram(s) Nebulizer every 6 hours  melatonin 5 milliGRAM(s) Oral at bedtime  metoprolol tartrate 12.5 milliGRAM(s) Oral two times a day  multivitamin/minerals 1 Tablet(s) Oral daily  pantoprazole  Tablet 40 milliGRAM(s) Oral before breakfast  polyethylene glycol 3350 17 Gram(s) Oral every 12 hours  senna 2 Tablet(s) Oral at bedtime  sodium chloride 0.9% lock flush 3 milliLiter(s) IV Push every 8 hours  sodium chloride 0.9%. 1000 milliLiter(s) IV Continuous <Continuous>  traZODone 50 milliGRAM(s) Oral at bedtime    Physical Therapy Rec:   Home  [ X ]   Home w/ PT  [  ]  Rehab  [  ]    Discussed with Cardiothoracic Team at AM rounds.

## 2025-07-10 NOTE — PROGRESS NOTE ADULT - SUBJECTIVE AND OBJECTIVE BOX
Patient seen for rehab follow up  CC:  CABG    no new complaints  denies pain  participating with therapy         REVIEW OF SYSTEMS  Constitutional - No fever,  No fatigue  HEENT - No vertigo, No neck pain  Neurological - No headaches, No memory loss  Psychiatric - No depression, No anxiety    FUNCTIONAL PROGRESS  PT 7/8  transfers CG/min assist with RW   gait min assist with RW x 75 feet   standing rest     OT 7/9  transfers min assist with Rw   dressing  min assist     VITALS  T(C): 36.5 (07-10-25 @ 04:48), Max: 36.7 (07-09-25 @ 11:12)  HR: 93 (07-10-25 @ 04:48) (82 - 93)  BP: 120/65 (07-10-25 @ 04:48) (116/64 - 139/56)  RR: 18 (07-10-25 @ 04:48) (17 - 18)  SpO2: 95% (07-10-25 @ 04:48) (93% - 98%)  Wt(kg): --    MEDICATIONS   acetaminophen     Tablet .. 650 milliGRAM(s) every 6 hours PRN  acetaminophen     Tablet .. 650 milliGRAM(s) every 6 hours PRN  aMIOdarone    Tablet 200 milliGRAM(s) daily  artificial  tears Solution 1 Drop(s) every 1 hour PRN  aspirin enteric coated 81 milliGRAM(s) daily  atorvastatin 80 milliGRAM(s) at bedtime  bisacodyl Suppository 10 milliGRAM(s) once  chlorhexidine 4% Liquid 1 Application(s) daily  clopidogrel Tablet 75 milliGRAM(s) daily  dextrose 5%. 1000 milliLiter(s) <Continuous>  dextrose 50% Injectable 50 milliLiter(s) every 15 minutes  dextrose 50% Injectable 25 milliLiter(s) every 15 minutes  dextrose Oral Gel 15 Gram(s) once PRN  glucagon  Injectable 1 milliGRAM(s) once  heparin   Injectable 5000 Unit(s) every 8 hours  insulin lispro (ADMELOG) corrective regimen sliding scale   three times a day before meals  insulin lispro (ADMELOG) corrective regimen sliding scale   at bedtime  insulin lispro Injectable (ADMELOG) 4 Unit(s) three times a day before meals  ipratropium    for Nebulization 500 MICROGram(s) every 6 hours  melatonin 5 milliGRAM(s) at bedtime  metoprolol tartrate 12.5 milliGRAM(s) two times a day  multivitamin/minerals 1 Tablet(s) daily  pantoprazole    Tablet 40 milliGRAM(s) before breakfast  polyethylene glycol 3350 17 Gram(s) every 12 hours  senna 2 Tablet(s) at bedtime  sodium chloride 0.9% lock flush 3 milliLiter(s) every 8 hours  sodium chloride 0.9%. 1000 milliLiter(s) <Continuous>  traZODone 50 milliGRAM(s) at bedtime      RECENT LABS - Reviewed                        8.7    8.98  )-----------( 290      ( 10 Jul 2025 05:31 )             29.1     07-10    135  |  93[L]  |  38[H]  ----------------------------<  74  4.3   |  26  |  6.55[H]    Ca    9.8      10 Jul 2025 05:30  Phos  4.2     07-10  Mg     2.5     07-10    TPro  6.1  /  Alb  3.1[L]  /  TBili  0.4  /  DBili  x   /  AST  23  /  ALT  38  /  AlkPhos  123[H]  07-10      Urinalysis Basic - ( 10 Jul 2025 05:30 )    Color: x / Appearance: x / SG: x / pH: x  Gluc: 74 mg/dL / Ketone: x  / Bili: x / Urobili: x   Blood: x / Protein: x / Nitrite: x   Leuk Esterase: x / RBC: x / WBC x   Sq Epi: x / Non Sq Epi: x / Bacteria: x      ---------------------------------------------------------------------------------  PHYSICAL EXAM  Constitutional - NAD, Comfortable, in chair   sternal wound  Chest - Breathing comfortably on room air   Cardiovascular - S1S2   Extremities - b/l LE edema   Neurologic Exam -     follows commands                 Cognitive - Awake, Alert, AAO to self, place, date, year, situation     Communication - Fluent, No dysarthria       Motor -  moves all ext      Sensory - Intact to LT     Psychiatric - Mood stable, Affect WNL  ----------------------------------------------------------------------------------------  ASSESSMENT/PLAN  70yMale h/o ESRD on HD, CAD, DM with functional deficits after CABG, MVR, cardiogenic shock  off antibiotics, cultures negative   Pain - Tylenol  DVT PPX - SCDs heparin   Rehab -    patient requires CG with mobility, min assist with ADLs    continue bedside therapy while admitted to prevent secondary complications of immobility, bed mobility, transfer training, progressive ambulation, equipment evaluation, ADLs   OOB throughout the day with staff, OOB to chair with meals/3 hours daily        Recommend ACUTE inpatient rehabilitation for the functional deficits consisting of 3 hours of multidisciplinary intense therapy/day x 5 days/week x 1-2 weeks depending on progress at rehabilitation facility, 24 hour RN/daily PMR physician for comorbid medical management.      Patient will be able to participate in and benefit from intense rehabilitation therapies for 3 hours a day x 5 days/week to maximize independence.     Rehab recommendations are dependent on functional progress and participation with bedside therapy       Will continue to follow for ongoing rehab needs and recommendations.               35 minutes spent on total encounter  with chart review of PT/OT/SLP notes, exam, imaging, counseling and education on inpatient rehabilitation, coordination of care with rehab team and

## 2025-07-10 NOTE — PROGRESS NOTE ADULT - ASSESSMENT
69 yo M with cardiogenic shock on RVAD ,DM2, CAD (total  4 coronary stents, last one PCI in 2024 ), CHF with EF 40-45%, severe MR     s/p CABG X 3L (LIMA-LAD | SVG-OM | SVG-Diag), MV repair (32mm Physio Flex ring), LAAC with AtriClip   Post op Course:    Transfused with 1 unit PRBC.     RVAD replaced and removal of PA cannula   bronch with IP and extubated - bronch BAL + Serratia    epi weaned off    overnight hypothermic, elevated procal, blood cx (negative), added Vanco   CRRT D/C'D   Interval removal left-sided chest tube without pneumothorax. Bibasilar atelectasis   LIJ TLC   BC negative    RVAD removal     weaned; iHD  7/3 transferred to [address] [place] [address]. Continues on cefepime until , 7 day course (off vanco)     VSS, Left chest was ultrasound by PA there is an effusion will reassess tomorrow after HD to see if we should place a tube, OOB to chair did not walk today   VSS, LPCT placed and 900cc serosanguinous drainage. Off O2 lying flat   VSS, CT to LCWS will place on water-seal today, Off O2, needs to ambulate   VSS CT D/C'd this am, ambulating in halls w/ walker and 1 assist, HD today    VSS. for HD tomorrow. Nephrology following. CM following for rehab placement. PW to be cut on dc per Dr. Mckeon. Started on Plavix per d/w Dr. Mckeon.    vss. Glucose on BMP 48. Found to be hypoglycemic on FS check this AM. Pt at bedside denies any dizziness/lightheadedness/LOC. AAOX3. Reports didn't eat full dinner last night. Endocrine following, regimen adjustment per Endocrine. CM following, awaiting accepting bed. HD today.  7/10 VSS; Continue with current medication regimen.  Maintain PW --> isolated to be cut prior to discharge. [facility]   Disposition: Acute Rehab

## 2025-07-10 NOTE — PROGRESS NOTE ADULT - ASSESSMENT
70M w/ HTN, DM2, CAD, and ESRD-HD, s/p CABGx3/MV repair/RVAD 6/17/25    (1)Renal - ESRD - HD MWF - s/p HD with 700ml  fluid loss 7/4/25,  CRRT discontinued 6/27;  Hyponatremia lower should improve with HD , next HD Monday 7/7/27  (2)Electrolytes stable   (3)Anemia -  hgb improving on TIW Retacrit;   (4)CV - SP cardiogenic shock  and now compensated      RECOMMEND:  (1)Renal -HD in am and continue  lower calcium bath  (2)Anemia - Increase Retacrit to help with anemia   (3)CVS- BP acceptable at present;  Asa 81mg po qd;  Lopressor started   (4)Acute rehab and outpt nephrologist prefers [facility]      [facility]   [phone]

## 2025-07-10 NOTE — PROGRESS NOTE ADULT - PROBLEM SELECTOR PLAN 1
Continue with  mg PO Daily.   Continue Plavix 75mg PO daily   Continue with Lopressor mg 12.5 PO BID   Continue with Lipitor mg 80 PO HS  Maintain PW --> isolated to be cut prior to discharge    Off diuretics   Increase activity as tolerated.   Encourage Chest PT / Pulmonary toileting and Incentive spirometry every 1 hour x 10 while awake.   Continue with Protonix 40 mg PO daily and Heparin 5000 units SQ daily for PUD and DVT prophylaxis.   Sternal precautions and wound care  D/C plan Acute Rehab pending insurance auth   Plan of care discussed with attending dr. barron

## 2025-07-11 ENCOUNTER — TRANSCRIPTION ENCOUNTER (OUTPATIENT)
Age: 70
End: 2025-07-11

## 2025-07-11 VITALS
RESPIRATION RATE: 17 BRPM | SYSTOLIC BLOOD PRESSURE: 129 MMHG | DIASTOLIC BLOOD PRESSURE: 61 MMHG | HEART RATE: 85 BPM | TEMPERATURE: 98 F | WEIGHT: 179.24 LBS | OXYGEN SATURATION: 94 %

## 2025-07-11 LAB
ANION GAP SERPL CALC-SCNC: 17 MMOL/L — SIGNIFICANT CHANGE UP (ref 5–17)
BUN SERPL-MCNC: 51 MG/DL — HIGH (ref 7–23)
CALCIUM SERPL-MCNC: 10.1 MG/DL — SIGNIFICANT CHANGE UP (ref 8.4–10.5)
CHLORIDE SERPL-SCNC: 92 MMOL/L — LOW (ref 96–108)
CO2 SERPL-SCNC: 26 MMOL/L — SIGNIFICANT CHANGE UP (ref 22–31)
CREAT SERPL-MCNC: 8.37 MG/DL — HIGH (ref 0.5–1.3)
EGFR: 6 ML/MIN/1.73M2 — LOW
EGFR: 6 ML/MIN/1.73M2 — LOW
GLUCOSE BLDC GLUCOMTR-MCNC: 117 MG/DL — HIGH (ref 70–99)
GLUCOSE BLDC GLUCOMTR-MCNC: 120 MG/DL — HIGH (ref 70–99)
GLUCOSE BLDC GLUCOMTR-MCNC: 210 MG/DL — HIGH (ref 70–99)
GLUCOSE SERPL-MCNC: 106 MG/DL — HIGH (ref 70–99)
HCT VFR BLD CALC: 29.4 % — LOW (ref 39–50)
HGB BLD-MCNC: 8.9 G/DL — LOW (ref 13–17)
MCHC RBC-ENTMCNC: 26.4 PG — LOW (ref 27–34)
MCHC RBC-ENTMCNC: 30.3 G/DL — LOW (ref 32–36)
MCV RBC AUTO: 87.2 FL — SIGNIFICANT CHANGE UP (ref 80–100)
NRBC # BLD AUTO: 0 K/UL — SIGNIFICANT CHANGE UP (ref 0–0)
NRBC # FLD: 0 K/UL — SIGNIFICANT CHANGE UP (ref 0–0)
NRBC BLD AUTO-RTO: 0 /100 WBCS — SIGNIFICANT CHANGE UP (ref 0–0)
PLATELET # BLD AUTO: 293 K/UL — SIGNIFICANT CHANGE UP (ref 150–400)
PMV BLD: 11 FL — SIGNIFICANT CHANGE UP (ref 7–13)
POTASSIUM SERPL-MCNC: 4.8 MMOL/L — SIGNIFICANT CHANGE UP (ref 3.5–5.3)
POTASSIUM SERPL-SCNC: 4.8 MMOL/L — SIGNIFICANT CHANGE UP (ref 3.5–5.3)
RBC # BLD: 3.37 M/UL — LOW (ref 4.2–5.8)
RBC # FLD: 18.5 % — HIGH (ref 10.3–14.5)
SODIUM SERPL-SCNC: 135 MMOL/L — SIGNIFICANT CHANGE UP (ref 135–145)
WBC # BLD: 10.17 K/UL — SIGNIFICANT CHANGE UP (ref 3.8–10.5)
WBC # FLD AUTO: 10.17 K/UL — SIGNIFICANT CHANGE UP (ref 3.8–10.5)

## 2025-07-11 PROCEDURE — 76000 FLUOROSCOPY <1 HR PHYS/QHP: CPT

## 2025-07-11 PROCEDURE — 83970 ASSAY OF PARATHORMONE: CPT

## 2025-07-11 PROCEDURE — 97166 OT EVAL MOD COMPLEX 45 MIN: CPT

## 2025-07-11 PROCEDURE — C1769: CPT

## 2025-07-11 PROCEDURE — C9399: CPT

## 2025-07-11 PROCEDURE — 97116 GAIT TRAINING THERAPY: CPT

## 2025-07-11 PROCEDURE — C1887: CPT

## 2025-07-11 PROCEDURE — 84484 ASSAY OF TROPONIN QUANT: CPT

## 2025-07-11 PROCEDURE — 86891 AUTOLOGOUS BLOOD OP SALVAGE: CPT

## 2025-07-11 PROCEDURE — 80048 BASIC METABOLIC PNL TOTAL CA: CPT

## 2025-07-11 PROCEDURE — C1729: CPT

## 2025-07-11 PROCEDURE — 86901 BLOOD TYPING SEROLOGIC RH(D): CPT

## 2025-07-11 PROCEDURE — 87640 STAPH A DNA AMP PROBE: CPT

## 2025-07-11 PROCEDURE — 83010 ASSAY OF HAPTOGLOBIN QUANT: CPT

## 2025-07-11 PROCEDURE — 84132 ASSAY OF SERUM POTASSIUM: CPT

## 2025-07-11 PROCEDURE — P9100: CPT

## 2025-07-11 PROCEDURE — 93320 DOPPLER ECHO COMPLETE: CPT

## 2025-07-11 PROCEDURE — P9047: CPT

## 2025-07-11 PROCEDURE — 97110 THERAPEUTIC EXERCISES: CPT

## 2025-07-11 PROCEDURE — 82962 GLUCOSE BLOOD TEST: CPT

## 2025-07-11 PROCEDURE — 83735 ASSAY OF MAGNESIUM: CPT

## 2025-07-11 PROCEDURE — 84146 ASSAY OF PROLACTIN: CPT

## 2025-07-11 PROCEDURE — 80074 ACUTE HEPATITIS PANEL: CPT

## 2025-07-11 PROCEDURE — 86923 COMPATIBILITY TEST ELECTRIC: CPT

## 2025-07-11 PROCEDURE — 97163 PT EVAL HIGH COMPLEX 45 MIN: CPT

## 2025-07-11 PROCEDURE — 94003 VENT MGMT INPAT SUBQ DAY: CPT

## 2025-07-11 PROCEDURE — 82435 ASSAY OF BLOOD CHLORIDE: CPT

## 2025-07-11 PROCEDURE — 82803 BLOOD GASES ANY COMBINATION: CPT

## 2025-07-11 PROCEDURE — P9045: CPT

## 2025-07-11 PROCEDURE — 85384 FIBRINOGEN ACTIVITY: CPT

## 2025-07-11 PROCEDURE — 85396 CLOTTING ASSAY WHOLE BLOOD: CPT

## 2025-07-11 PROCEDURE — 94002 VENT MGMT INPAT INIT DAY: CPT

## 2025-07-11 PROCEDURE — 87040 BLOOD CULTURE FOR BACTERIA: CPT

## 2025-07-11 PROCEDURE — 85014 HEMATOCRIT: CPT

## 2025-07-11 PROCEDURE — 87186 SC STD MICRODIL/AGAR DIL: CPT

## 2025-07-11 PROCEDURE — 85018 HEMOGLOBIN: CPT

## 2025-07-11 PROCEDURE — 93325 DOPPLER ECHO COLOR FLOW MAPG: CPT

## 2025-07-11 PROCEDURE — C1751: CPT

## 2025-07-11 PROCEDURE — 86803 HEPATITIS C AB TEST: CPT

## 2025-07-11 PROCEDURE — 86706 HEP B SURFACE ANTIBODY: CPT

## 2025-07-11 PROCEDURE — C1894: CPT

## 2025-07-11 PROCEDURE — 84100 ASSAY OF PHOSPHORUS: CPT

## 2025-07-11 PROCEDURE — 93005 ELECTROCARDIOGRAM TRACING: CPT

## 2025-07-11 PROCEDURE — 87205 SMEAR GRAM STAIN: CPT

## 2025-07-11 PROCEDURE — 97164 PT RE-EVAL EST PLAN CARE: CPT

## 2025-07-11 PROCEDURE — 93306 TTE W/DOPPLER COMPLETE: CPT

## 2025-07-11 PROCEDURE — 88300 SURGICAL PATH GROSS: CPT

## 2025-07-11 PROCEDURE — 87070 CULTURE OTHR SPECIMN AEROBIC: CPT

## 2025-07-11 PROCEDURE — 86900 BLOOD TYPING SEROLOGIC ABO: CPT

## 2025-07-11 PROCEDURE — 83615 LACTATE (LD) (LDH) ENZYME: CPT

## 2025-07-11 PROCEDURE — 97530 THERAPEUTIC ACTIVITIES: CPT

## 2025-07-11 PROCEDURE — 36415 COLL VENOUS BLD VENIPUNCTURE: CPT

## 2025-07-11 PROCEDURE — C1889: CPT

## 2025-07-11 PROCEDURE — 83605 ASSAY OF LACTIC ACID: CPT

## 2025-07-11 PROCEDURE — 84145 PROCALCITONIN (PCT): CPT

## 2025-07-11 PROCEDURE — 87641 MR-STAPH DNA AMP PROBE: CPT

## 2025-07-11 PROCEDURE — 97535 SELF CARE MNGMENT TRAINING: CPT

## 2025-07-11 PROCEDURE — 80053 COMPREHEN METABOLIC PANEL: CPT

## 2025-07-11 PROCEDURE — 71045 X-RAY EXAM CHEST 1 VIEW: CPT

## 2025-07-11 PROCEDURE — 85610 PROTHROMBIN TIME: CPT

## 2025-07-11 PROCEDURE — 87077 CULTURE AEROBIC IDENTIFY: CPT

## 2025-07-11 PROCEDURE — P9037: CPT

## 2025-07-11 PROCEDURE — 84443 ASSAY THYROID STIM HORMONE: CPT

## 2025-07-11 PROCEDURE — 84480 ASSAY TRIIODOTHYRONINE (T3): CPT

## 2025-07-11 PROCEDURE — 82330 ASSAY OF CALCIUM: CPT

## 2025-07-11 PROCEDURE — 86850 RBC ANTIBODY SCREEN: CPT

## 2025-07-11 PROCEDURE — 82553 CREATINE MB FRACTION: CPT

## 2025-07-11 PROCEDURE — 84439 ASSAY OF FREE THYROXINE: CPT

## 2025-07-11 PROCEDURE — 82550 ASSAY OF CK (CPK): CPT

## 2025-07-11 PROCEDURE — 84436 ASSAY OF TOTAL THYROXINE: CPT

## 2025-07-11 PROCEDURE — 99261: CPT

## 2025-07-11 PROCEDURE — 85730 THROMBOPLASTIN TIME PARTIAL: CPT

## 2025-07-11 PROCEDURE — 84295 ASSAY OF SERUM SODIUM: CPT

## 2025-07-11 PROCEDURE — 87340 HEPATITIS B SURFACE AG IA: CPT

## 2025-07-11 PROCEDURE — 82947 ASSAY GLUCOSE BLOOD QUANT: CPT

## 2025-07-11 PROCEDURE — 82310 ASSAY OF CALCIUM: CPT

## 2025-07-11 PROCEDURE — 86704 HEP B CORE ANTIBODY TOTAL: CPT

## 2025-07-11 PROCEDURE — 85027 COMPLETE CBC AUTOMATED: CPT

## 2025-07-11 PROCEDURE — 85025 COMPLETE CBC W/AUTO DIFF WBC: CPT

## 2025-07-11 PROCEDURE — 83036 HEMOGLOBIN GLYCOSYLATED A1C: CPT

## 2025-07-11 PROCEDURE — 85520 HEPARIN ASSAY: CPT

## 2025-07-11 PROCEDURE — 93010 ELECTROCARDIOGRAM REPORT: CPT

## 2025-07-11 PROCEDURE — P9016: CPT

## 2025-07-11 PROCEDURE — 94640 AIRWAY INHALATION TREATMENT: CPT

## 2025-07-11 PROCEDURE — 80202 ASSAY OF VANCOMYCIN: CPT

## 2025-07-11 PROCEDURE — 36430 TRANSFUSION BLD/BLD COMPNT: CPT

## 2025-07-11 PROCEDURE — 71045 X-RAY EXAM CHEST 1 VIEW: CPT | Mod: 26

## 2025-07-11 RX ORDER — INSULIN LISPRO 100 U/ML
4 INJECTION, SOLUTION INTRAVENOUS; SUBCUTANEOUS
Refills: 0 | Status: DISCONTINUED | OUTPATIENT
Start: 2025-07-11 | End: 2025-07-11

## 2025-07-11 RX ORDER — CYST/ALA/Q10/PHOS.SER/DHA/BROC 100-20-50
1 POWDER (GRAM) ORAL
Qty: 0 | Refills: 0 | DISCHARGE
Start: 2025-07-11

## 2025-07-11 RX ORDER — LANOLIN/MINERAL OIL/PETROLATUM
1 OINTMENT (GRAM) OPHTHALMIC (EYE)
Qty: 0 | Refills: 0 | DISCHARGE
Start: 2025-07-11

## 2025-07-11 RX ORDER — INSULIN LISPRO 100 U/ML
4 INJECTION, SOLUTION INTRAVENOUS; SUBCUTANEOUS
Qty: 0 | Refills: 0 | DISCHARGE
Start: 2025-07-11

## 2025-07-11 RX ORDER — CLOPIDOGREL BISULFATE 75 MG/1
1 TABLET, FILM COATED ORAL
Qty: 0 | Refills: 0 | DISCHARGE
Start: 2025-07-11

## 2025-07-11 RX ORDER — ACETAMINOPHEN 500 MG/5ML
2 LIQUID (ML) ORAL
Qty: 0 | Refills: 0 | DISCHARGE
Start: 2025-07-11

## 2025-07-11 RX ORDER — POLYETHYLENE GLYCOL 3350 17 G/17G
17 POWDER, FOR SOLUTION ORAL
Qty: 0 | Refills: 0 | DISCHARGE
Start: 2025-07-11

## 2025-07-11 RX ORDER — SENNA 187 MG
2 TABLET ORAL
Qty: 0 | Refills: 0 | DISCHARGE
Start: 2025-07-11

## 2025-07-11 RX ORDER — EPOETIN ALFA 10000 [IU]/ML
10000 SOLUTION INTRAVENOUS; SUBCUTANEOUS ONCE
Refills: 0 | Status: COMPLETED | OUTPATIENT
Start: 2025-07-11 | End: 2025-07-11

## 2025-07-11 RX ORDER — AMIODARONE HYDROCHLORIDE 50 MG/ML
1 INJECTION, SOLUTION INTRAVENOUS
Qty: 0 | Refills: 0 | DISCHARGE
Start: 2025-07-11

## 2025-07-11 RX ORDER — METOPROLOL SUCCINATE 50 MG/1
1 TABLET, EXTENDED RELEASE ORAL
Qty: 0 | Refills: 0 | DISCHARGE

## 2025-07-11 RX ORDER — INSULIN GLARGINE-YFGN 100 [IU]/ML
8 INJECTION, SOLUTION SUBCUTANEOUS
Qty: 0 | Refills: 0 | DISCHARGE
Start: 2025-07-11

## 2025-07-11 RX ORDER — TRAZODONE HCL 100 MG
1 TABLET ORAL
Qty: 0 | Refills: 0 | DISCHARGE
Start: 2025-07-11

## 2025-07-11 RX ADMIN — Medication 500 MICROGRAM(S): at 17:07

## 2025-07-11 RX ADMIN — Medication 500 MICROGRAM(S): at 11:03

## 2025-07-11 RX ADMIN — HEPARIN SODIUM 5000 UNIT(S): 1000 INJECTION INTRAVENOUS; SUBCUTANEOUS at 06:04

## 2025-07-11 RX ADMIN — Medication 81 MILLIGRAM(S): at 11:03

## 2025-07-11 RX ADMIN — Medication 3 MILLILITER(S): at 05:22

## 2025-07-11 RX ADMIN — METOPROLOL SUCCINATE 12.5 MILLIGRAM(S): 50 TABLET, EXTENDED RELEASE ORAL at 17:07

## 2025-07-11 RX ADMIN — Medication 40 MILLIGRAM(S): at 06:04

## 2025-07-11 RX ADMIN — Medication 3 MILLILITER(S): at 14:00

## 2025-07-11 RX ADMIN — Medication 1 APPLICATION(S): at 11:07

## 2025-07-11 RX ADMIN — CLOPIDOGREL BISULFATE 75 MILLIGRAM(S): 75 TABLET, FILM COATED ORAL at 11:03

## 2025-07-11 RX ADMIN — INSULIN LISPRO 2: 100 INJECTION, SOLUTION INTRAVENOUS; SUBCUTANEOUS at 12:25

## 2025-07-11 RX ADMIN — INSULIN LISPRO 4 UNIT(S): 100 INJECTION, SOLUTION INTRAVENOUS; SUBCUTANEOUS at 16:43

## 2025-07-11 RX ADMIN — Medication 1 TABLET(S): at 11:03

## 2025-07-11 RX ADMIN — AMIODARONE HYDROCHLORIDE 200 MILLIGRAM(S): 50 INJECTION, SOLUTION INTRAVENOUS at 06:04

## 2025-07-11 RX ADMIN — EPOETIN ALFA 10000 UNIT(S): 10000 SOLUTION INTRAVENOUS; SUBCUTANEOUS at 15:09

## 2025-07-11 RX ADMIN — METOPROLOL SUCCINATE 12.5 MILLIGRAM(S): 50 TABLET, EXTENDED RELEASE ORAL at 06:04

## 2025-07-11 RX ADMIN — HEPARIN SODIUM 5000 UNIT(S): 1000 INJECTION INTRAVENOUS; SUBCUTANEOUS at 13:10

## 2025-07-11 NOTE — PROGRESS NOTE ADULT - PROBLEM SELECTOR PLAN 1
Will decrease Lantus to 8 units at bed time.  Patient counseled for compliance with consistent low carb diet and physical activity as tolerated. Will decrease Lantus to 8 units at bed time.  Admelog 4 units TID with meals.   Patient counseled for compliance with consistent low carb diet and physical activity as tolerated.  Will continue monitor FS log, will monitor blood sugar trends, will follow up

## 2025-07-11 NOTE — PROGRESS NOTE ADULT - NUTRITIONAL ASSESSMENT
This patient has been assessed with a concern for Malnutrition and has been determined to have a diagnosis/diagnoses of Mild protein-calorie malnutrition.    This patient is being managed with:   Diet Regular-  Consistent Carbohydrate {No Snacks} (CSTCHO)  Easy to Chew (EASYTOCHEW)  No Concentrated Potassium  No Concentrated Phosphorus  Supplement Feeding Modality:  Oral  Nepro Cans or Servings Per Day:  3       Frequency:  Daily  Entered: Jul 1 2025 11:43AM  

## 2025-07-11 NOTE — DISCHARGE NOTE PROVIDER - NSDCFUADDINST_GEN_ALL_CORE_FT
shower daily  weigh yourself daily  continue current prescriptions as ordered  increase activity as tolerated   no added salt; low fat; low cholesterol, low salt diet.   follow up with Cardiologist in 1-2 weeks. Call to schedule appointment.  follow up with cardiac surgeon, call [phone] when discharged from rehab to preschedule appointment with Dr. Mckeon.   oob to chair for all meals   ambulate three times a day   labs two times a week monday and thursday

## 2025-07-11 NOTE — PROGRESS NOTE ADULT - ASSESSMENT
70M w/ HTN, DM2, CAD, and ESRD-HD, s/p CABGx3/MV repair/RVAD 6/17/25    (1)Renal - ESRD - HD MWF - s/p HD with 700ml  fluid loss 7/4/25,  CRRT discontinued 6/27;  Hyponatremia lower should improve with HD , next HD Monday 7/7/27  (2)Electrolytes stable   (3)Anemia -  hgb improving on TIW Retacrit;   (4)CV - SP cardiogenic shock  and now compensated      RECOMMEND:  (1)Renal -HD today and continue  lower calcium bath  (2)Anemia - Increase Retacrit to help with anemia   (3)CVS- BP acceptable at present;  Asa 81mg po qd;  Lopressor started   (4)Acute rehab and outpt nephrologist prefers [facility]      Sayed [facility]   [phone]       70M w/ HTN, DM2, CAD, and ESRD-HD, s/p CABGx3/MV repair/RVAD 6/17/25    (1)Renal - ESRD - HD MWF - s/p HD with 700ml  fluid loss 7/4/25,  CRRT discontinued 6/27;     (2)Electrolytes stable   (3)Anemia -  hgb improving on TIW Retacrit;   (4)CV - SP cardiogenic shock  and now compensated      RECOMMEND:  (1)Renal -HD today and continue  lower calcium bath  (2)Anemia - Increase Retacrit to help with anemia   (3)CVS- BP acceptable at present;  Asa 81mg po qd;  Lopressor started   (4)Acute rehab and outpt nephrologist prefers [facility]      [facility]ayed [facility]   [phone]

## 2025-07-11 NOTE — PROGRESS NOTE ADULT - PROBLEM SELECTOR PLAN 5
6/22  bronch BAL + Serratia   --Can discontinue the Cefepime on 7/4   --Continue to follow CBC with diff  --Continue to follow temperature curve  --No new positive cultures  Transplant ID following
6/22  bronch BAL + Serratia   --Can discontinue the Cefepimeon 7/4   --Continue to follow CBC with diff  --Continue to follow temperature curve  --No new positive cultures  Transplant ID following
6/22  bronch BAL + Serratia   --Can discontinue the Cefepime on 7/4   --Continue to follow CBC with diff  --Continue to follow temperature curve  --No new positive cultures  Transplant ID following

## 2025-07-11 NOTE — PROGRESS NOTE ADULT - PROBLEM SELECTOR PROBLEM 3
DM2 (diabetes mellitus, type 2)
ESRD on dialysis
DM2 (diabetes mellitus, type 2)
ESRD on dialysis
DM2 (diabetes mellitus, type 2)
ESRD on dialysis
ESRD on dialysis
DM2 (diabetes mellitus, type 2)
ESRD on dialysis
DM2 (diabetes mellitus, type 2)
ESRD on dialysis
DM2 (diabetes mellitus, type 2)
ESRD on dialysis
DM2 (diabetes mellitus, type 2)
left a message with Talia.

## 2025-07-11 NOTE — DISCHARGE NOTE NURSING/CASE MANAGEMENT/SOCIAL WORK - PATIENT PORTAL LINK FT
You can access the FollowMyHealth Patient Portal offered by [facility] by registering at the following website: [email or web address]. By joining [facility] FollowMyHealth portal, you will also be able to view your health information using other applications (apps) compatible with our system.

## 2025-07-11 NOTE — PROGRESS NOTE ADULT - PROBLEM SELECTOR PLAN 3
Monitor labs and lytes, on renal medications. On HD as scheduled, renal follow up.
a1c 6.1   accuchecks b4 meals and HS  corrective sliding scale   Lantus 24 HS   admelog 10 units b4 meals   Endo following
a1c 6.1   accuchecks b4 meals and HS  corrective sliding scale   Lantus 24 HS   admelog 10 units b4 meals   Endo following
Monitor labs and lytes, on renal medications. On HD as scheduled, renal follow up.
Monitor labs and lytes, on renal medications. On HD as scheduled, renal follow up.
a1c 6.1   accuchecks b4 meals and HS  corrective sliding scale   Lantus 24 HS   admelog 10 units b4 meals   Endo following
Monitor labs and lytes, on renal medications. On HD as scheduled, renal follow up.
a1c 6.1   accuchecks b4 meals and HS  on insulin regimen inpatient per endocrine  Endo following
Monitor labs and lytes, on renal medications. On HD as scheduled, renal follow up.
a1c 6.1   accuchecks b4 meals and HS  corrective sliding scale   Lantus 24 HS   admelog 10 units b4 meals   Endo following
a1c 6.1   accuchecks b4 meals and HS  on insulin regimen inpatient per endocrine  Endo following
a1c 6.1   accuchecks b4 meals and HS  corrective sliding scale   Lantus 24 HS   admelog 10 units b4 meals   Endo following
a1c 6.1   accuchecks b4 meals and HS  on insulin regimen inpatient per endocrine  Endo following
a1c 6.1   accuchecks b4 meals and HS  on insulin regimen inpatient per endocrine  Endo following

## 2025-07-11 NOTE — DISCHARGE NOTE NURSING/CASE MANAGEMENT/SOCIAL WORK - NSDCPEFALRISK_GEN_ALL_CORE
For information on Fall & Injury Prevention, visit: [email or web address] OR  [email or web address] OR  https://www.cdc.gov/steadi/patient.html

## 2025-07-11 NOTE — DISCHARGE NOTE PROVIDER - NSDCFUADDAPPT_GEN_ALL_CORE_FT
shower daily  weigh yourself daily  continue current prescriptions as ordered  increase activity as tolerated   no added salt; low fat; low cholesterol, low salt diet.   follow up with Cardiologist in 1-2 weeks. Call to schedule appointment.  follow up with cardiac surgeon, call [phone] when discharged from rehab to preschedule appointment with Dr. Mckeon.   oob to chair for all meals   ambulate three times a day   labs two times a week monday and thursday    shower daily  weigh yourself daily  continue current prescriptions as ordered  increase activity as tolerated   no added salt; low fat; low cholesterol, low salt diet.   follow up with Cardiologist in 1-2 weeks. Call to schedule appointment.  follow up with cardiac surgeon, call [phone] when discharged from rehab to preschedule appointment with Dr. Mckeon.   oob to chair for all meals   ambulate three times a day   labs two times a week monday and thursday       Prior to outreaching the patient, it was visible that the patient has secured a follow up appointment which was not scheduled by our team. Patient was previously scheduled for an appointment on 07/28/25 2:00pm at [address] [place] [address].    Prior to outreaching the patient, it was visible that the patient has secured a follow up appointment which was not scheduled by our team. Patient was previously scheduled for an appointment on 07/31/25 2:00pm at [address] [place] [id number] with Dr. Marcio Rain.  Appointment was scheduled by our team on the patient's behalf through the provider's office. Patient was scheduled for an appointment on 01/29/25 2:30pm at [address] [place] [address], with Dr. Ricky Mcmanus.

## 2025-07-11 NOTE — PROGRESS NOTE ADULT - PROBLEM SELECTOR PROBLEM 2
CAD (coronary artery disease)
S/P mitral valve replacement
CAD (coronary artery disease)
S/P mitral valve replacement
CAD (coronary artery disease)
S/P mitral valve replacement
S/P mitral valve replacement
S/P MVR (mitral valve repair)
S/P mitral valve replacement
CAD (coronary artery disease)
CAD (coronary artery disease)
S/P mitral valve replacement
S/P mitral valve replacement
S/P MVR (mitral valve repair)

## 2025-07-11 NOTE — PROGRESS NOTE ADULT - PROBLEM SELECTOR PLAN 1
Continue with ASA 81 mg PO Daily.   Continue Plavix 75mg PO daily   Continue with Lopressor mg 12.5 PO BID   Continue with Lipitor mg 80 PO HS  Maintain PW --> isolated to be cut prior to discharge    Off diuretics   Increase activity as tolerated.   Encourage Chest PT / Pulmonary toileting and Incentive spirometry every 1 hour x 10 while awake.   Continue with Protonix 40 mg PO daily and Heparin 5000 units SQ daily for PUD and DVT prophylaxis.   Sternal precautions and wound care  D/C plan Acute Rehab pending insurance auth   Plan of care discussed with attending dr. barron

## 2025-07-11 NOTE — PROGRESS NOTE ADULT - SUBJECTIVE AND OBJECTIVE BOX
Subjective: Good morning.  No events overnight.  Pt. denies any CP/SOB.      Telemetry:  SB/SR 50 - 90     Vital Signs Last 24 Hrs  T(C): 36.6 (25 @ 05:34), Max: 36.6 (25 @ 05:34)  T(F): 97.9 (25 @ 05:34), Max: 97.9 (25 @ 05:34)  HR: 95 (25 05:34) (85 - 95)  BP: 135/71 (25 @ 05:34) (119/64 - 135/71)  RR: 18 (25 @ 05:34) (18 - 18)  SpO2: 93% (25 @ 05:34) (93% - 97%)               07-10 @ 07:01  -   @ 07:00  --------------------------------------------------------  IN: 580 mL / OUT: 0 mL / NET: 580 mL        LABS      135  |  92[L]  |  51[H]  ----------------------------<  106[H]  4.8   |  26  |  8.37[H]    Ca    10.1      2025 06:22  Phos  4.2     07-10  Mg     2.5     07-10    TPro  6.1  /  Alb  3.1[L]  /  TBili  0.4  /  DBili  x   /  AST  23  /  ALT  38  /  AlkPhos  123[H]  07-10                                 8.9    10.17 )-----------( 293      ( 2025 06:22 )             29.4                Daily     Daily Weight in k.1 (2025 08:15)      CAPILLARY BLOOD GLUCOSE  POCT Blood Glucose.: 117 mg/dL (2025 07:40)  POCT Blood Glucose.: 169 mg/dL (10 Jul 2025 21:34)  POCT Blood Glucose.: 184 mg/dL (10 Jul 2025 16:28)  POCT Blood Glucose.: 96 mg/dL (10 Jul 2025 11:19)          Drains:     MS         [  ] Drainage:                 L Pleural  [  ]  Drainage:                R Pleural  [  ]  Drainage:    Pacing Wires        [  ]   Settings:                    Isolated  [ X ]    Coumadin    [ ] YES          [ X ]      NO         Reason:           PHYSICAL EXAM  Neurology: alert and oriented x 3, nonfocal, no gross deficits  CV: s1/s2,  Sternal Wound: MSI -->CDI, sternum stable  Lungs: CTA B/L, wound on L posterior approximated, no drainage  Abdomen: soft, nontender, nondistended, positive bowel sounds, (+) Flatus; (+) BM   :  Voiding             Extremities:  B/L LE (0) edema; negative calf tenderness; (+) 2 DP palpable      + R AVF +bruit/thrill   +right neck and groin suture          acetaminophen     Tablet .. 650 milliGRAM(s) Oral every 6 hours PRN  acetaminophen     Tablet .. 650 milliGRAM(s) Oral every 6 hours PRN  aMIOdarone    Tablet 200 milliGRAM(s) Oral daily  artificial  tears Solution 1 Drop(s) Both EYES every 1 hour PRN  aspirin enteric coated 81 milliGRAM(s) Oral daily  atorvastatin 80 milliGRAM(s) Oral at bedtime  bisacodyl Suppository 10 milliGRAM(s) Rectal once  chlorhexidine 4% Liquid 1 Application(s) Topical daily  clopidogrel Tablet 75 milliGRAM(s) Oral daily  dextrose 5%. 1000 milliLiter(s) IV Continuous <Continuous>  dextrose 50% Injectable 50 milliLiter(s) IV Push every 15 minutes  dextrose 50% Injectable 25 milliLiter(s) IV Push every 15 minutes  dextrose Oral Gel 15 Gram(s) Oral once PRN  glucagon  Injectable 1 milliGRAM(s) IntraMuscular once  heparin   Injectable 5000 Unit(s) SubCutaneous every 8 hours  insulin glargine Injectable (LANTUS) 8 Unit(s) SubCutaneous at bedtime  insulin lispro (ADMELOG) corrective regimen sliding scale   SubCutaneous three times a day before meals  insulin lispro (ADMELOG) corrective regimen sliding scale   SubCutaneous at bedtime  ipratropium    for Nebulization 500 MICROGram(s) Nebulizer every 6 hours  melatonin 5 milliGRAM(s) Oral at bedtime  metoprolol tartrate 12.5 milliGRAM(s) Oral two times a day  multivitamin/minerals 1 Tablet(s) Oral daily  pantoprazole    Tablet 40 milliGRAM(s) Oral before breakfast  polyethylene glycol 3350 17 Gram(s) Oral every 12 hours  senna 2 Tablet(s) Oral at bedtime  sodium chloride 0.9% lock flush 3 milliLiter(s) IV Push every 8 hours  sodium chloride 0.9%. 1000 milliLiter(s) IV Continuous <Continuous>  traZODone 50 milliGRAM(s) Oral at bedtime    Physical Therapy Rec:   Home  [  ]   Home w/ PT  [  ]  Rehab  [ X ]    Discussed with Cardiothoracic Team at AM rounds.

## 2025-07-11 NOTE — PROGRESS NOTE ADULT - PROVIDER SPECIALTY LIST ADULT
CT Surgery
CTU
Cardiology
Critical Care
Nephrology
CT Surgery
CT Surgery
CTU
CTU
Critical Care
Nephrology
Transplant ID
Transplant ID
Endocrinology
Nephrology
Nephrology
Rehab Medicine
Transplant ID
CT Surgery
Endocrinology
Endocrinology
Cardiology
Endocrinology
CT Surgery
Endocrinology
Endocrinology
CT Surgery
CT Surgery
Endocrinology
CT Surgery
CT Surgery

## 2025-07-11 NOTE — PROGRESS NOTE ADULT - PROBLEM SELECTOR PLAN 2
Continue with  mg PO Daily.   Continue with Lopressor mg 12.5 BID  PO.   Continue with Lipitor mg 80 PO HS    Off diuretics   Increase activity as tolerated.   Encourage Chest PT / Pulmonary toileting and Incentive spirometry every 1 hour x 10 while awake.   Continue with Protonix 40 mg PO daily and Heparin 5000 units SQ daily for PUD and DVT prophylaxis.   Sternal precautions and wound care  Shower on POD #5.
Continue with  mg PO Daily.   Continue with Lopressor mg 12.5 BID  PO.   Continue with Lipitor mg 80 PO HS    Off diuretics   Increase activity as tolerated.   Encourage Chest PT / Pulmonary toileting and Incentive spirometry every 1 hour x 10 while awake.   Continue with Protonix 40 mg PO daily and Heparin 5000 units SQ daily for PUD and DVT prophylaxis.   Sternal precautions and wound care  Shower on POD #5.  [facility]
Continue with  mg PO Daily.   Started Plavix 75mg qD  Continue with Lopressor mg 12.5 BID  PO.   Continue with Lipitor mg 80 PO HS    Off diuretics   Increase activity as tolerated.   Encourage Chest PT / Pulmonary toileting and Incentive spirometry every 1 hour x 10 while awake.   Continue with Protonix 40 mg PO daily and Heparin 5000 units SQ daily for PUD and DVT prophylaxis.   Sternal precautions and wound care  D/C plan Acute Rehab once medically cleared   Plan of care discussed with attending dr. barron
Continue with  mg PO Daily.   Continue with Lopressor mg 12.5 BID  PO.   Continue with Lipitor mg 80 PO HS    Off diuretics   Increase activity as tolerated.   Encourage Chest PT / Pulmonary toileting and Incentive spirometry every 1 hour x 10 while awake.   Continue with Protonix 40 mg PO daily and Heparin 5000 units SQ daily for PUD and DVT prophylaxis.   Sternal precautions and wound care  Shower on POD #5.
On medications,  no chest pain, stable, monitored and followed up by primary cardiothoracic team/cardiology team.
Continue with ASA 81 mg PO Daily.   Continue with Plavix 75mg qD  Continue with Lopressor mg 12.5 BID  PO.   Continue with Lipitor mg 80 PO HS   Maintain PW --> isolated to be cut prior to discharge   Off diuretics   Increase activity as tolerated.   Encourage Chest PT / Pulmonary toileting and Incentive spirometry every 1 hour x 10 while awake.   Continue with Protonix 40 mg PO daily and Heparin 5000 units SQ daily for PUD and DVT prophylaxis.   Sternal precautions and wound care  D/C plan Acute Rehab pending insurance auth   Plan of care discussed with attending dr. barron
Continue with  mg PO Daily.   Continue with Plavix 75mg qD  Continue with Lopressor mg 12.5 BID  PO.   Continue with Lipitor mg 80 PO HS   Maintain PW --> isolated to be cut prior to discharge   Off diuretics   Increase activity as tolerated.   Encourage Chest PT / Pulmonary toileting and Incentive spirometry every 1 hour x 10 while awake.   Continue with Protonix 40 mg PO daily and Heparin 5000 units SQ daily for PUD and DVT prophylaxis.   Sternal precautions and wound care  D/C plan Acute Rehab pending insurance auth   Plan of care discussed with attending dr. barron
Continue with  mg PO Daily.   Started Plavix 75mg qD  Continue with Lopressor mg 12.5 BID  PO.   Continue with Lipitor mg 80 PO HS    Off diuretics   Increase activity as tolerated.   Encourage Chest PT / Pulmonary toileting and Incentive spirometry every 1 hour x 10 while awake.   Continue with Protonix 40 mg PO daily and Heparin 5000 units SQ daily for PUD and DVT prophylaxis.   Sternal precautions and wound care  D/C plan Acute Rehab once medically cleared   Plan of care discussed with attending dr. barron
On medications,  no chest pain, stable, monitored and followed up by primary cardiothoracic team/cardiology team.
Continue with  mg PO Daily.   Continue with Lopressor mg 12.5 BID  PO.   Continue with Lipitor mg 80 PO HS    Off diuretics   Increase activity as tolerated.   Encourage Chest PT / Pulmonary toileting and Incentive spirometry every 1 hour x 10 while awake.   Continue with Protonix 40 mg PO daily and Heparin 5000 units SQ daily for PUD and DVT prophylaxis.   Sternal precautions and wound care  Shower on POD #5.
Continue with  mg PO Daily.   Continue with Lopressor mg 12.5 BID  PO.   Continue with Lipitor mg 80 PO HS    Off diuretics   Increase activity as tolerated.   Encourage Chest PT / Pulmonary toileting and Incentive spirometry every 1 hour x 10 while awake.   Continue with Protonix 40 mg PO daily and Heparin 5000 units SQ daily for PUD and DVT prophylaxis.   Sternal precautions and wound care  Shower on POD #5.  [facility]

## 2025-07-11 NOTE — DISCHARGE NOTE PROVIDER - HOSPITAL COURSE
s/p CABG X 3L (LIMA-LAD | SVG-OM | SVG-Diag), MV repair (32mm Physio Flex ring), LAAC with AtriClip   Post op Course:    Transfused with 1 unit PRBC.     RVAD replaced and removal of PA cannula   bronch with IP and extubated - bronch BAL + Serratia    epi weaned off    overnight hypothermic, elevated procal, blood cx (negative), added Vanco   CRRT D/C'D   Interval removal left-sided chest tube without pneumothorax. Bibasilar atelectasis   LIJ TLC   BC negative    RVAD removal     weaned; iHD  7/3 transferred to [address] [place] [address]. Continues on cefepime until , 7 day course (off vanco)     VSS, Left chest was ultrasound by PA there is an effusion will reassess tomorrow after HD to see if we should place a tube, OOB to chair did not walk today   VSS, LPCT placed and 900cc serosanguinous drainage. Off O2 lying flat   VSS, CT to LCWS will place on water-seal today, Off O2, needs to ambulate   VSS CT D/C'd this am, ambulating in halls w/ walker and 1 assist, HD today    VSS. for HD tomorrow. Nephrology following. CM following for rehab placement. PW to be cut on dc per Dr. Mckeon. Started on Plavix per d/w Dr. Mckeon.    vss. Glucose on BMP 48. Found to be hypoglycemic on FS check this AM. Pt at bedside denies any dizziness/lightheadedness/LOC. AAOX3. Reports didn't eat full dinner last night. Endocrine following, regimen adjustment per Endocrine. CM following, awaiting accepting bed. HD today.  7/10 VSS; Continue with current medication regimen.  Maintain PW --> isolated to be cut prior to discharge. Pending for insurance auth for [facility].    VSS; Continue with current medication regimen. will cut isolated wires prior to discharge.  iHD today.   Discharge to [facility]. Call [phone] when patient is discharged from rehab to schedule appointment with Dr. Mckeon.

## 2025-07-11 NOTE — PROGRESS NOTE ADULT - PROBLEM SELECTOR PLAN 4
6/27 CRRT D/C'D   7/2 iHD   HD MWF via r AVF   Nephrology following
6/27 CRRT D/C'D   HD MWF via r AVF   Nephrology following
6/27 CRRT D/C'D   7/2 iHD   HD MWF via r AVF   Nephrology following

## 2025-07-11 NOTE — DISCHARGE NOTE PROVIDER - CARE PROVIDER_API CALL
Crow Mckeon (III)  Thoracic and Cardiac Surgery  [address] [place] [address]  Phone: [phone]  Fax: [phone]  Follow Up Time:     Ulysses Mcintyre)  Endocrinology Diabetes and Metabolism  [address] [place] [address]  Phone: [phone]  Fax: [phone]  Follow Up Time:

## 2025-07-11 NOTE — PROGRESS NOTE ADULT - SUBJECTIVE AND OBJECTIVE BOX
Chief complaint  Patient is a 70y old  Male who presents with a chief complaint of CAD / Mitral Valve disease (10 Jul 2025 17:11)         Labs and Fingersticks  CAPILLARY BLOOD GLUCOSE      POCT Blood Glucose.: 117 mg/dL (11 Jul 2025 07:40)  POCT Blood Glucose.: 169 mg/dL (10 Jul 2025 21:34)  POCT Blood Glucose.: 184 mg/dL (10 Jul 2025 16:28)  POCT Blood Glucose.: 96 mg/dL (10 Jul 2025 11:19)      Anion Gap: 17 (07-11 @ 06:22)  Anion Gap: 16 (07-10 @ 05:30)      Calcium: 10.1 (07-11 @ 06:22)  Calcium: 9.8 (07-10 @ 05:30)  Albumin: 3.1 *L* (07-10 @ 05:30)    Alanine Aminotransferase (ALT/SGPT): 38 (07-10 @ 05:30)  Alkaline Phosphatase: 123 *H* (07-10 @ 05:30)  Aspartate Aminotransferase (AST/SGOT): 23 (07-10 @ 05:30)        07-11    135  |  92[L]  |  51[H]  ----------------------------<  106[H]  4.8   |  26  |  8.37[H]    Ca    10.1      11 Jul 2025 06:22  Phos  4.2     07-10  Mg     2.5     07-10    TPro  6.1  /  Alb  3.1[L]  /  TBili  0.4  /  DBili  x   /  AST  23  /  ALT  38  /  AlkPhos  123[H]  07-10                        8.9    10.17 )-----------( 293      ( 11 Jul 2025 06:22 )             29.4     Medications  MEDICATIONS  (STANDING):  aMIOdarone    Tablet 200 milliGRAM(s) Oral daily  aspirin enteric coated 81 milliGRAM(s) Oral daily  atorvastatin 80 milliGRAM(s) Oral at bedtime  bisacodyl Suppository 10 milliGRAM(s) Rectal once  chlorhexidine 4% Liquid 1 Application(s) Topical daily  clopidogrel Tablet 75 milliGRAM(s) Oral daily  dextrose 5%. 1000 milliLiter(s) (100 mL/Hr) IV Continuous <Continuous>  dextrose 50% Injectable 50 milliLiter(s) IV Push every 15 minutes  dextrose 50% Injectable 25 milliLiter(s) IV Push every 15 minutes  glucagon  Injectable 1 milliGRAM(s) IntraMuscular once  heparin   Injectable 5000 Unit(s) SubCutaneous every 8 hours  insulin glargine Injectable (LANTUS) 8 Unit(s) SubCutaneous at bedtime  insulin lispro (ADMELOG) corrective regimen sliding scale   SubCutaneous three times a day before meals  insulin lispro (ADMELOG) corrective regimen sliding scale   SubCutaneous at bedtime  ipratropium    for Nebulization 500 MICROGram(s) Nebulizer every 6 hours  melatonin 5 milliGRAM(s) Oral at bedtime  metoprolol tartrate 12.5 milliGRAM(s) Oral two times a day  multivitamin/minerals 1 Tablet(s) Oral daily  pantoprazole    Tablet 40 milliGRAM(s) Oral before breakfast  polyethylene glycol 3350 17 Gram(s) Oral every 12 hours  senna 2 Tablet(s) Oral at bedtime  sodium chloride 0.9% lock flush 3 milliLiter(s) IV Push every 8 hours  sodium chloride 0.9%. 1000 milliLiter(s) (10 mL/Hr) IV Continuous <Continuous>  traZODone 50 milliGRAM(s) Oral at bedtime      Physical Exam  General: Patient comfortable in bed  Vital Signs Last 12 Hrs  T(F): 97.9 (07-11-25 @ 05:34), Max: 97.9 (07-11-25 @ 05:34)  HR: 95 (07-11-25 @ 05:34) (95 - 95)  BP: 135/71 (07-11-25 @ 05:34) (135/71 - 135/71)  BP(mean): 92 (07-11-25 @ 05:34) (92 - 92)  RR: 18 (07-11-25 @ 05:34) (18 - 18)  SpO2: 93% (07-11-25 @ 05:34) (93% - 93%)    CVS: S1S2   Respiratory: No wheezing, no crepitations  GI: Abdomen soft, bowel sounds positive  Musculoskeletal:  moves all extremities  : Voiding         Chief complaint  Patient is a 70y old  Male who presents with a chief complaint of CAD / Mitral Valve disease (10 Jul 2025 17:11)     Labs and Fingersticks  CAPILLARY BLOOD GLUCOSE      POCT Blood Glucose.: 117 mg/dL (11 Jul 2025 07:40)  POCT Blood Glucose.: 169 mg/dL (10 Jul 2025 21:34)  POCT Blood Glucose.: 184 mg/dL (10 Jul 2025 16:28)  POCT Blood Glucose.: 96 mg/dL (10 Jul 2025 11:19)      Anion Gap: 17 (07-11 @ 06:22)  Anion Gap: 16 (07-10 @ 05:30)      Calcium: 10.1 (07-11 @ 06:22)  Calcium: 9.8 (07-10 @ 05:30)  Albumin: 3.1 *L* (07-10 @ 05:30)    Alanine Aminotransferase (ALT/SGPT): 38 (07-10 @ 05:30)  Alkaline Phosphatase: 123 *H* (07-10 @ 05:30)  Aspartate Aminotransferase (AST/SGOT): 23 (07-10 @ 05:30)        07-11    135  |  92[L]  |  51[H]  ----------------------------<  106[H]  4.8   |  26  |  8.37[H]    Ca    10.1      11 Jul 2025 06:22  Phos  4.2     07-10  Mg     2.5     07-10    TPro  6.1  /  Alb  3.1[L]  /  TBili  0.4  /  DBili  x   /  AST  23  /  ALT  38  /  AlkPhos  123[H]  07-10                        8.9    10.17 )-----------( 293      ( 11 Jul 2025 06:22 )             29.4     Medications  MEDICATIONS  (STANDING):  aMIOdarone    Tablet 200 milliGRAM(s) Oral daily  aspirin enteric coated 81 milliGRAM(s) Oral daily  atorvastatin 80 milliGRAM(s) Oral at bedtime  bisacodyl Suppository 10 milliGRAM(s) Rectal once  chlorhexidine 4% Liquid 1 Application(s) Topical daily  clopidogrel Tablet 75 milliGRAM(s) Oral daily  dextrose 5%. 1000 milliLiter(s) (100 mL/Hr) IV Continuous <Continuous>  dextrose 50% Injectable 50 milliLiter(s) IV Push every 15 minutes  dextrose 50% Injectable 25 milliLiter(s) IV Push every 15 minutes  glucagon  Injectable 1 milliGRAM(s) IntraMuscular once  heparin   Injectable 5000 Unit(s) SubCutaneous every 8 hours  insulin glargine Injectable (LANTUS) 8 Unit(s) SubCutaneous at bedtime  insulin lispro (ADMELOG) corrective regimen sliding scale   SubCutaneous three times a day before meals  insulin lispro (ADMELOG) corrective regimen sliding scale   SubCutaneous at bedtime  ipratropium    for Nebulization 500 MICROGram(s) Nebulizer every 6 hours  melatonin 5 milliGRAM(s) Oral at bedtime  metoprolol tartrate 12.5 milliGRAM(s) Oral two times a day  multivitamin/minerals 1 Tablet(s) Oral daily  pantoprazole    Tablet 40 milliGRAM(s) Oral before breakfast  polyethylene glycol 3350 17 Gram(s) Oral every 12 hours  senna 2 Tablet(s) Oral at bedtime  sodium chloride 0.9% lock flush 3 milliLiter(s) IV Push every 8 hours  sodium chloride 0.9%. 1000 milliLiter(s) (10 mL/Hr) IV Continuous <Continuous>  traZODone 50 milliGRAM(s) Oral at bedtime      Physical Exam  General: Patient comfortable in bed  Vital Signs Last 12 Hrs  T(F): 97.9 (07-11-25 @ 05:34), Max: 97.9 (07-11-25 @ 05:34)  HR: 95 (07-11-25 @ 05:34) (95 - 95)  BP: 135/71 (07-11-25 @ 05:34) (135/71 - 135/71)  BP(mean): 92 (07-11-25 @ 05:34) (92 - 92)  RR: 18 (07-11-25 @ 05:34) (18 - 18)  SpO2: 93% (07-11-25 @ 05:34) (93% - 93%)    CVS: S1S2   Respiratory: No wheezing, no crepitations  GI: Abdomen soft, bowel sounds positive  Musculoskeletal:  moves all extremities  : Voiding

## 2025-07-11 NOTE — DISCHARGE NOTE PROVIDER - NSDCMRMEDTOKEN_GEN_ALL_CORE_FT
acetaminophen 325 mg oral tablet: 2 tab(s) orally every 6 hours As needed Mild Pain (1 - 3)  Admelog 100 units/mL injectable solution: 4 unit(s) subcutaneous 3 times a day 4 unit of admelog before meals  amiodarone 200 mg oral tablet: 1 tab(s) orally once a day  Aspirin Enteric Coated 81 mg oral delayed release tablet: 1 tab(s) orally once a day  atorvastatin 80 mg oral tablet: 1 tab(s) orally once a day  calcium acetate 667 mg oral tablet: 3 cap(s) orally 3 times a day  clopidogrel 75 mg oral tablet: 1 tab(s) orally once a day  insulin glargine 100 units/mL subcutaneous solution: 8 unit(s) subcutaneous once a day (at bedtime)  ipratropium 500 mcg/2.5 mL inhalation solution: 2.5 milliliter(s) inhaled every 6 hours  Multiple Vitamins with Minerals oral tablet: 1 tab(s) orally once a day  ocular lubricant preservative-free ophthalmic solution: 1 drop(s) to each affected eye every hour As needed Dry Eyes  pantoprazole 40 mg oral delayed release tablet: 1 tab(s) orally once a day (before a meal)  polyethylene glycol 3350 oral powder for reconstitution: 17 gram(s) orally every 12 hours hold for loose stool  senna leaf extract oral tablet: 2 tab(s) orally once a day (at bedtime) hold for loose stool  Toprol-XL 25 mg oral tablet, extended release: 1 tab(s) orally once a day  traZODone 50 mg oral tablet: 1 tab(s) orally once a day (at bedtime)

## 2025-07-11 NOTE — PROGRESS NOTE ADULT - PROBLEM SELECTOR PROBLEM 1
DM2 (diabetes mellitus, type 2)
S/P CABG x 3
S/P CABG x 3
DM2 (diabetes mellitus, type 2)
S/P CABG x 3
S/P CABG x 3
DM2 (diabetes mellitus, type 2)
S/P CABG x 3
DM2 (diabetes mellitus, type 2)
S/P CABG x 3

## 2025-07-11 NOTE — DISCHARGE NOTE PROVIDER - DETAILS OF MALNUTRITION DIAGNOSIS/DIAGNOSES
This patient has been assessed with a concern for Malnutrition and was treated during this hospitalization for the following Nutrition diagnosis/diagnoses:     -  07/01/2025: Mild protein-calorie malnutrition

## 2025-07-11 NOTE — DISCHARGE NOTE NURSING/CASE MANAGEMENT/SOCIAL WORK - FINANCIAL ASSISTANCE
[facility] provides services at a reduced cost to those who are determined to be eligible through [facility]’s financial assistance program. Information regarding [facility]’s financial assistance program can be found by going to [email or web address] or by calling [phone].

## 2025-07-11 NOTE — PROGRESS NOTE ADULT - ASSESSMENT
70 year old male PMH of HTN, HLD, DM2, CAD SP  Mitral valve replacement, CABG x3 and RVAD placement      Assessment  DMT2: 70y Male with DM T2 with hyperglycemia, A1C 6.8%, was on  insulin at home, now on basal bolus insulin with coverage, blood sugars are trending down, now within stable ranges.  CAD: on medications, stable, monitored. sp CABG  HTN: on antihypertensive medications, monitored, asymptomatic.  ESRD: On hemodialysis, Monitor labs/BMP      Discussed plan and management with Dr Francisco Javier Baeza NP - TEAMS  Ulysses Mcintyre MD  Cell: [phone]  Office: [phone]            70 year old male PMH of HTN, HLD, DM2, CAD SP  Mitral valve replacement, CABG x3 and RVAD placement      Assessment  DMT2: 70y Male with DM T2 with hyperglycemia, A1C 6.8%, was on  insulin at home, now on basal bolus insulin with coverage, blood sugars are fluctuating pt has inconsistent PO intake.   CAD: on medications, stable, monitored. sp CABG  HTN: on antihypertensive medications, monitored, asymptomatic.  ESRD: On hemodialysis, Monitor labs/BMP      Discussed plan and management with Dr Francisco Javier Baeza NP - TEAMS  Ulysses Mcintyre MD  Cell: [phone]  Office: [phone]            70 year old male PMH of HTN, HLD, DM2, CAD SP  Mitral valve replacement, CABG x3 and RVAD placement        Assessment  DMT2: 70y Male with DM T2 with hyperglycemia, A1C 6.8%, was on  insulin at home, now on basal bolus insulin with coverage, blood sugars are fluctuating pt has inconsistent PO intake.   CAD: on medications, stable, monitored. sp CABG  HTN: on antihypertensive medications, monitored, asymptomatic.  ESRD: On hemodialysis, Monitor labs/BMP      Discussed plan and management with Dr Francisco Javier Baeza NP - TEAMS  Ulysses Mcintyre MD  Cell: [phone]  Office: [phone]

## 2025-07-11 NOTE — DISCHARGE NOTE PROVIDER - NSDCFUSCHEDAPPT_GEN_ALL_CORE_FT
Mandy Shukla  [facility] PST-Presurgtest  Scheduled Appointment: 07/15/2025    Mandy Shukla  [facility]  [address]  Scheduled Appointment: 07/22/2025    Mandy Shukla  [facility] END-Endoscopy  Scheduled Appointment: 07/22/2025     Marcio Rain  [facility]  CARDIOLOGY [address] [facility]   Scheduled Appointment: 07/31/2025    Crow Mckeon  [facility]  CTSURG [address]  Scheduled Appointment: 08/06/2025    Thalia Mcintyre  [facility]  FAMILYMED [address]  Scheduled Appointment: 08/19/2025    Syed Garcia  [facility]  PULMMED [address]  Scheduled Appointment: 08/21/2025    Mandy Shukla  [facility]  [address]  Scheduled Appointment: 09/23/2025    Daniel Doran  [facility]  NEPHRO [address]  Scheduled Appointment: 10/21/2025

## 2025-07-11 NOTE — PROGRESS NOTE ADULT - ASSESSMENT
71 yo M with cardiogenic shock on RVAD ,DM2, CAD (total  4 coronary stents, last one PCI in 2024 ), CHF with EF 40-45%, severe MR     s/p CABG X 3L (LIMA-LAD | SVG-OM | SVG-Diag), MV repair (32mm Physio Flex ring), LAAC with AtriClip   Post op Course:    Transfused with 1 unit PRBC.     RVAD replaced and removal of PA cannula   bronch with IP and extubated - bronch BAL + Serratia    epi weaned off    overnight hypothermic, elevated procal, blood cx (negative), added Vanco   CRRT D/C'D   Interval removal left-sided chest tube without pneumothorax. Bibasilar atelectasis   [facility] TLC   BC negative    RVAD removal     weaned; iHD  7/3 transferred to [address] [place] [address]. Continues on cefepime until , 7 day course (off vanco)     VSS, Left chest was ultrasound by PA there is an effusion will reassess tomorrow after HD to see if we should place a tube, OOB to chair did not walk today   VSS, LPCT placed and 900cc serosanguinous drainage. Off O2 lying flat   VSS, CT to LCWS will place on water-seal today, Off O2, needs to ambulate   VSS CT D/C'd this am, ambulating in halls w/ walker and 1 assist, HD today    VSS. for HD tomorrow. Nephrology following. CM following for rehab placement. PW to be cut on dc per Dr. Mckeon. Started on Plavix per d/w Dr. Mckeon.    vss. Glucose on BMP 48. Found to be hypoglycemic on FS check this AM. Pt at bedside denies any dizziness/lightheadedness/LOC. AAOX3. Reports didn't eat full dinner last night. Endocrine following, regimen adjustment per Endocrine. CM following, awaiting accepting bed. HD today.  7/10 VSS; Continue with current medication regimen.  Maintain PW --> isolated to be cut prior to discharge. Pending for insurance [facility] for [facility].    VSS; Continue with current medication regimen. pending auth for [facility], will cut isolated wires prior to discharge.  iHD today.   Disposition: Acute Rehab

## 2025-07-11 NOTE — PROGRESS NOTE ADULT - SUBJECTIVE AND OBJECTIVE BOX
NEPHROLOGY-[facility] [phone]        Patient seen and examined in bed.   He was the same         MEDICATIONS  (STANDING):  aMIOdarone    Tablet 200 milliGRAM(s) Oral daily  aspirin enteric coated 81 milliGRAM(s) Oral daily  atorvastatin 80 milliGRAM(s) Oral at bedtime  bisacodyl Suppository 10 milliGRAM(s) Rectal once  chlorhexidine 4% Liquid 1 Application(s) Topical daily  clopidogrel Tablet 75 milliGRAM(s) Oral daily  dextrose 5%. 1000 milliLiter(s) (100 mL/Hr) IV Continuous <Continuous>  dextrose 50% Injectable 50 milliLiter(s) IV Push every 15 minutes  dextrose 50% Injectable 25 milliLiter(s) IV Push every 15 minutes  glucagon  Injectable 1 milliGRAM(s) IntraMuscular once  heparin   Injectable 5000 Unit(s) SubCutaneous every 8 hours  insulin glargine Injectable (LANTUS) 8 Unit(s) SubCutaneous at bedtime  insulin lispro (ADMELOG) corrective regimen sliding scale   SubCutaneous three times a day before meals  insulin lispro (ADMELOG) corrective regimen sliding scale   SubCutaneous at bedtime  ipratropium    for Nebulization 500 MICROGram(s) Nebulizer every 6 hours  melatonin 5 milliGRAM(s) Oral at bedtime  metoprolol tartrate 12.5 milliGRAM(s) Oral two times a day  multivitamin/minerals 1 Tablet(s) Oral daily  pantoprazole    Tablet 40 milliGRAM(s) Oral before breakfast  polyethylene glycol 3350 17 Gram(s) Oral every 12 hours  senna 2 Tablet(s) Oral at bedtime  sodium chloride 0.9% lock flush 3 milliLiter(s) IV Push every 8 hours  sodium chloride 0.9%. 1000 milliLiter(s) (10 mL/Hr) IV Continuous <Continuous>  traZODone 50 milliGRAM(s) Oral at bedtime      VITAL:  T(C): , Max: 36.6 (07-11-25 @ 05:34)  T(F): , Max: 97.9 (07-11-25 @ 05:34)  HR: 95 (07-11-25 @ 05:34)  BP: 135/71 (07-11-25 @ 05:34)  BP(mean): 92 (07-11-25 @ 05:34)  RR: 18 (07-11-25 @ 05:34)  SpO2: 93% (07-11-25 @ 05:34)  Wt(kg): --    I and O's:    07-10 @ 07:01  -  07-11 @ 07:00  --------------------------------------------------------  IN: 580 mL / OUT: 0 mL / NET: 580 mL          PHYSICAL EXAM:    Constitutional: NAD  Neck:  No JVD  Respiratory: CTAB/L  Cardiovascular: S1 and S2  Gastrointestinal: BS+, soft, NT/ND  Extremities: No peripheral edema  Neurological: A/O x 3, no focal deficits  Psychiatric: Normal mood, normal affect  : No Ag  Skin: No rashes  Access: [facility]    LABS:                        8.9    10.17 )-----------( 293      ( 11 Jul 2025 06:22 )             29.4     07-11    135  |  92[L]  |  51[H]  ----------------------------<  106[H]  4.8   |  26  |  8.37[H]    Ca    10.1      11 Jul 2025 06:22  Phos  4.2     07-10  Mg     2.5     07-10    TPro  6.1  /  Alb  3.1[L]  /  TBili  0.4  /  DBili  x   /  AST  23  /  ALT  38  /  AlkPhos  123[H]  07-10          Urine Studies:  Urinalysis Basic - ( 11 Jul 2025 06:22 )    Color: x / Appearance: x / SG: x / pH: x  Gluc: 106 mg/dL / Ketone: x  / Bili: x / Urobili: x   Blood: x / Protein: x / Nitrite: x   Leuk Esterase: x / RBC: x / WBC x   Sq Epi: x / Non Sq Epi: x / Bacteria: x            RADIOLOGY & ADDITIONAL STUDIES:

## 2025-07-15 ENCOUNTER — TRANSCRIPTION ENCOUNTER (OUTPATIENT)
Age: 70
End: 2025-07-15

## 2025-07-16 RX ORDER — POLYETHYLENE GLYCOL 3350 AND ELECTROLYTES WITH LEMON FLAVOR 236; 22.74; 6.74; 5.86; 2.97 G/4L; G/4L; G/4L; G/4L; G/4L
236 POWDER, FOR SOLUTION ORAL
Qty: 1 | Refills: 0 | Status: ACTIVE | COMMUNITY
Start: 2025-07-16 | End: 1900-01-01

## 2025-07-22 ENCOUNTER — APPOINTMENT (OUTPATIENT)
Dept: GASTROENTEROLOGY | Facility: HOSPITAL | Age: 70
End: 2025-07-22

## 2025-07-25 ENCOUNTER — TRANSCRIPTION ENCOUNTER (OUTPATIENT)
Age: 70
End: 2025-07-25

## 2025-07-28 ENCOUNTER — APPOINTMENT (OUTPATIENT)
Dept: CARDIOTHORACIC SURGERY | Facility: CLINIC | Age: 70
End: 2025-07-28
Payer: MEDICARE

## 2025-07-28 ENCOUNTER — TRANSCRIPTION ENCOUNTER (OUTPATIENT)
Age: 70
End: 2025-07-28

## 2025-07-28 VITALS
SYSTOLIC BLOOD PRESSURE: 145 MMHG | BODY MASS INDEX: 24.38 KG/M2 | OXYGEN SATURATION: 97 % | RESPIRATION RATE: 16 BRPM | HEIGHT: 71 IN | HEART RATE: 90 BPM | WEIGHT: 174.17 LBS | TEMPERATURE: 98.7 F | DIASTOLIC BLOOD PRESSURE: 67 MMHG

## 2025-07-28 PROCEDURE — 99024 POSTOP FOLLOW-UP VISIT: CPT

## 2025-07-28 RX ORDER — CHLORHEXIDINE GLUCONATE 4 %
5 LIQUID (ML) TOPICAL
Qty: 30 | Refills: 0 | Status: ACTIVE | COMMUNITY
Start: 2025-07-28

## 2025-07-28 RX ORDER — CLOPIDOGREL BISULFATE 75 MG/1
75 TABLET, FILM COATED ORAL DAILY
Qty: 1 | Refills: 3 | Status: ACTIVE | COMMUNITY
Start: 2025-07-28

## 2025-07-29 ENCOUNTER — TRANSCRIPTION ENCOUNTER (OUTPATIENT)
Age: 70
End: 2025-07-29

## 2025-07-31 ENCOUNTER — APPOINTMENT (OUTPATIENT)
Dept: CARDIOLOGY | Facility: CLINIC | Age: 70
End: 2025-07-31
Payer: MEDICARE

## 2025-07-31 ENCOUNTER — NON-APPOINTMENT (OUTPATIENT)
Age: 70
End: 2025-07-31

## 2025-07-31 VITALS
HEART RATE: 100 BPM | DIASTOLIC BLOOD PRESSURE: 74 MMHG | OXYGEN SATURATION: 96 % | BODY MASS INDEX: 25.76 KG/M2 | SYSTOLIC BLOOD PRESSURE: 148 MMHG | HEIGHT: 71 IN | WEIGHT: 184 LBS

## 2025-07-31 DIAGNOSIS — I25.10 ATHEROSCLEROTIC HEART DISEASE OF NATIVE CORONARY ARTERY W/OUT ANGINA PECTORIS: ICD-10-CM

## 2025-07-31 DIAGNOSIS — Z92.89 PERSONAL HISTORY OF OTHER MEDICAL TREATMENT: ICD-10-CM

## 2025-07-31 PROCEDURE — G2211 COMPLEX E/M VISIT ADD ON: CPT

## 2025-07-31 PROCEDURE — 93000 ELECTROCARDIOGRAM COMPLETE: CPT

## 2025-07-31 PROCEDURE — 99215 OFFICE O/P EST HI 40 MIN: CPT

## 2025-07-31 RX ORDER — METOPROLOL SUCCINATE 25 MG/1
25 TABLET, EXTENDED RELEASE ORAL DAILY
Qty: 30 | Refills: 0 | Status: DISCONTINUED | COMMUNITY
Start: 2025-07-28 | End: 2025-07-31

## 2025-07-31 RX ORDER — CARVEDILOL 12.5 MG/1
12.5 TABLET, FILM COATED ORAL TWICE DAILY
Qty: 180 | Refills: 3 | Status: ACTIVE | COMMUNITY
Start: 2025-07-31 | End: 1900-01-01

## 2025-08-06 ENCOUNTER — APPOINTMENT (OUTPATIENT)
Dept: CARDIOTHORACIC SURGERY | Facility: CLINIC | Age: 70
End: 2025-08-06
Payer: MEDICARE

## 2025-08-06 VITALS
OXYGEN SATURATION: 98 % | HEART RATE: 84 BPM | RESPIRATION RATE: 16 BRPM | TEMPERATURE: 98.5 F | WEIGHT: 175.27 LBS | SYSTOLIC BLOOD PRESSURE: 145 MMHG | HEIGHT: 71 IN | DIASTOLIC BLOOD PRESSURE: 73 MMHG | BODY MASS INDEX: 24.54 KG/M2

## 2025-08-06 PROBLEM — Z92.89 HISTORY OF RECENT HOSPITALIZATION: Status: ACTIVE | Noted: 2025-08-06

## 2025-08-06 PROCEDURE — 99024 POSTOP FOLLOW-UP VISIT: CPT

## 2025-08-06 RX ORDER — PANTOPRAZOLE 40 MG/1
40 TABLET, DELAYED RELEASE ORAL DAILY
Qty: 30 | Refills: 0 | Status: ACTIVE | COMMUNITY
Start: 2025-08-06

## 2025-08-06 RX ORDER — DOCUSATE SODIUM 100 MG/1
100 CAPSULE, LIQUID FILLED ORAL TWICE DAILY
Refills: 0 | Status: ACTIVE | COMMUNITY
Start: 2025-08-06

## 2025-08-06 RX ORDER — SENNOSIDES 8.6 MG/1
8.6 TABLET ORAL
Qty: 60 | Refills: 0 | Status: ACTIVE | COMMUNITY
Start: 2025-08-06

## 2025-08-06 RX ORDER — AMIODARONE HYDROCHLORIDE 200 MG/1
200 TABLET ORAL DAILY
Qty: 14 | Refills: 0 | Status: COMPLETED | COMMUNITY
Start: 2025-07-28 | End: 2025-08-06

## 2025-08-06 RX ORDER — LINAGLIPTIN 5 MG/1
5 TABLET, FILM COATED ORAL DAILY
Refills: 0 | Status: ACTIVE | COMMUNITY
Start: 2025-08-06

## 2025-08-14 ENCOUNTER — APPOINTMENT (OUTPATIENT)
Dept: CARDIOLOGY | Facility: CLINIC | Age: 70
End: 2025-08-14

## 2025-08-14 VITALS
DIASTOLIC BLOOD PRESSURE: 70 MMHG | SYSTOLIC BLOOD PRESSURE: 118 MMHG | OXYGEN SATURATION: 93 % | BODY MASS INDEX: 23.71 KG/M2 | WEIGHT: 170 LBS | HEART RATE: 85 BPM

## 2025-08-14 DIAGNOSIS — I10 ESSENTIAL (PRIMARY) HYPERTENSION: ICD-10-CM

## 2025-08-14 DIAGNOSIS — Z01.818 ENCOUNTER FOR OTHER PREPROCEDURAL EXAMINATION: ICD-10-CM

## 2025-08-14 DIAGNOSIS — I50.20 UNSPECIFIED SYSTOLIC (CONGESTIVE) HEART FAILURE: ICD-10-CM

## 2025-08-14 DIAGNOSIS — N18.6 END STAGE RENAL DISEASE: ICD-10-CM

## 2025-08-14 PROBLEM — Z98.890 S/P MVR (MITRAL VALVE REPAIR): Status: ACTIVE | Noted: 2025-07-28

## 2025-08-14 PROBLEM — Z95.1 S/P CABG X 3: Status: ACTIVE | Noted: 2025-07-28

## 2025-08-14 PROCEDURE — 99215 OFFICE O/P EST HI 40 MIN: CPT

## 2025-08-14 PROCEDURE — 93000 ELECTROCARDIOGRAM COMPLETE: CPT | Mod: NC

## 2025-08-14 PROCEDURE — G2211 COMPLEX E/M VISIT ADD ON: CPT

## 2025-08-14 RX ORDER — TRAZODONE HYDROCHLORIDE 50 MG/1
50 TABLET ORAL
Qty: 30 | Refills: 0 | Status: DISCONTINUED | COMMUNITY
Start: 2025-08-06 | End: 2025-08-14

## 2025-08-19 ENCOUNTER — APPOINTMENT (OUTPATIENT)
Dept: FAMILY MEDICINE | Facility: CLINIC | Age: 70
End: 2025-08-19
Payer: MEDICARE

## 2025-08-19 VITALS
HEART RATE: 86 BPM | SYSTOLIC BLOOD PRESSURE: 148 MMHG | OXYGEN SATURATION: 96 % | TEMPERATURE: 98.1 F | BODY MASS INDEX: 24.54 KG/M2 | DIASTOLIC BLOOD PRESSURE: 78 MMHG | RESPIRATION RATE: 16 BRPM | HEIGHT: 71 IN | WEIGHT: 175.26 LBS

## 2025-08-19 DIAGNOSIS — E11.9 TYPE 2 DIABETES MELLITUS W/OUT COMPLICATIONS: ICD-10-CM

## 2025-08-19 DIAGNOSIS — Z09 ENCOUNTER FOR FOLLOW-UP EXAMINATION AFTER COMPLETED TREATMENT FOR CONDITIONS OTHER THAN MALIGNANT NEOPLASM: ICD-10-CM

## 2025-08-19 DIAGNOSIS — R06.00 DYSPNEA, UNSPECIFIED: ICD-10-CM

## 2025-08-19 PROCEDURE — G0439: CPT

## 2025-08-19 RX ORDER — SEVELAMER CARBONATE 800 MG/1
800 TABLET, FILM COATED ORAL EVERY 8 HOURS
Refills: 0 | Status: ACTIVE | COMMUNITY
Start: 2025-08-19

## 2025-08-20 ENCOUNTER — APPOINTMENT (OUTPATIENT)
Dept: CARDIOTHORACIC SURGERY | Facility: CLINIC | Age: 70
End: 2025-08-20
Payer: MEDICARE

## 2025-08-20 VITALS
HEART RATE: 87 BPM | RESPIRATION RATE: 16 BRPM | HEIGHT: 71 IN | TEMPERATURE: 98.4 F | BODY MASS INDEX: 24.07 KG/M2 | OXYGEN SATURATION: 97 % | DIASTOLIC BLOOD PRESSURE: 68 MMHG | WEIGHT: 171.96 LBS | SYSTOLIC BLOOD PRESSURE: 130 MMHG

## 2025-08-20 DIAGNOSIS — Z98.890 OTHER SPECIFIED POSTPROCEDURAL STATES: ICD-10-CM

## 2025-08-20 DIAGNOSIS — Z95.1 PRESENCE OF AORTOCORONARY BYPASS GRAFT: ICD-10-CM

## 2025-08-20 PROCEDURE — 99024 POSTOP FOLLOW-UP VISIT: CPT

## 2025-09-25 PROBLEM — R05.2 SUBACUTE COUGH: Status: ACTIVE | Noted: 2025-09-25

## (undated) DEVICE — NDL COUNTER FOAM AND MAGNET 40-70

## (undated) DEVICE — SUT PLEDGET PRE PUNCH 4.8 X 9.5 X 1.5 MM

## (undated) DEVICE — SPECIMEN CONTAINER 100ML

## (undated) DEVICE — VESSEL LOOP EXTRA MAXI-BLUE 0.200" X 22"

## (undated) DEVICE — TOURNIQUET SET 12FR (1 RED, 1 BLUE, 1 SNARE) 7"

## (undated) DEVICE — Device

## (undated) DEVICE — VESSEL LOOP MAXI-BLUE 0.120" X 16"

## (undated) DEVICE — BLADE SCALPEL SAFETYLOCK #11

## (undated) DEVICE — ELCTR BOVIE TIP BLADE INSULATED 4" EDGE

## (undated) DEVICE — DRSG COBAN 6"

## (undated) DEVICE — SUT ETHIBOND EXCEL 3-0 30" V-5 PLDGT 5 GREEN 5 WHITE

## (undated) DEVICE — SYR LUER LOK 20CC

## (undated) DEVICE — GOWN TRIMAX LG

## (undated) DEVICE — TUBING INSUFFLATION LAP FILTER 10FT

## (undated) DEVICE — STAPLER SKIN VISI-STAT 35 WIDE

## (undated) DEVICE — SYNOVIS VASCULAR PROBE 1.5MM 15CM

## (undated) DEVICE — DRAPE LIGHT HANDLE COVER (BLUE)

## (undated) DEVICE — SUT PROLENE 4-0 36" SH

## (undated) DEVICE — SUT PROLENE 4-0 36" RB-1

## (undated) DEVICE — DRSG DERMABOND PRINEO 60CM

## (undated) DEVICE — ELCTR BOVIE TIP CLEANER SCRATCH PAD

## (undated) DEVICE — LAP PAD 18 X 18"

## (undated) DEVICE — GOWN XL

## (undated) DEVICE — SUT PROLENE 7-0 4-24" BV-1

## (undated) DEVICE — DRSG OPSITE 13.75 X 4"

## (undated) DEVICE — DRAPE INSTRUMENT POUCH 6.75" X 11"

## (undated) DEVICE — VISITEC 4X4

## (undated) DEVICE — CABLE TIE BAR LOK 7.5"

## (undated) DEVICE — SUCTION CATH ARGYLE WHISTLE TIP 14FR STRAIGHT PACKED

## (undated) DEVICE — SOL IRR POUR H2O 250ML

## (undated) DEVICE — BLADE SCALPEL SAFETYLOCK #15

## (undated) DEVICE — SUT BOOT STANDARD (ASSORTED) 5 PAIR

## (undated) DEVICE — CATH IV SAFE INSYTE 18G X 1.16" (GREEN)

## (undated) DEVICE — CATH IV SAFE INSYTE 24G X 3/4" (YELLOW)

## (undated) DEVICE — GLV 7.5 PROTEXIS (WHITE)

## (undated) DEVICE — VESSEL LOOP MAXI-RED  0.120" X 16"

## (undated) DEVICE — SUT SOFSILK 4-0 18" TIES

## (undated) DEVICE — POSITIONER CARDIAC BUMP

## (undated) DEVICE — CLAMP BULLDOG MINI STRAIGHT (GREEN) DISP

## (undated) DEVICE — GOWN SLEEVES

## (undated) DEVICE — SUT SOFSILK 2-0 18" TIES

## (undated) DEVICE — VENODYNE/SCD SLEEVE CALF MEDIUM

## (undated) DEVICE — DRAPE 3/4 SHEET W REINFORCEMENT 56X77"

## (undated) DEVICE — SUT POLYSORB 3-0 30" V-20 UNDYED

## (undated) DEVICE — URETERAL CATH RED RUBBER 18FR (RED)

## (undated) DEVICE — SUT PROLENE 7-0 24" BV175-6

## (undated) DEVICE — SUT SOFSILK 0 30" V-20

## (undated) DEVICE — PACK UNIVERSAL CARDIAC

## (undated) DEVICE — SUT BLUNT SZ 5

## (undated) DEVICE — ULTRASOUND TRANSDUCER COVER

## (undated) DEVICE — PACK CARDIAC YELLOW

## (undated) DEVICE — DRAPE TOWEL BLUE 17" X 24"

## (undated) DEVICE — SUT ETHIBOND 2-0 4-30" RB-1 GREEN

## (undated) DEVICE — PREP DURAPREP 26CC

## (undated) DEVICE — CONNECTOR "Y" 1/2 X 3/8 X 3/8"

## (undated) DEVICE — SUT SILK 3-0 18" TIES

## (undated) DEVICE — SUT SOFSILK 2 60" TIES

## (undated) DEVICE — FOLEY TRAY 16FR 5CC LF LUBRISIL ADVANCE TEMP CLOSED

## (undated) DEVICE — BULLDOG SPRING CLIP LATIS/LATIS 6MM 1/2 FORCE (BLUE)

## (undated) DEVICE — ELCTR BOVIE TIP BLADE INSULATED 2.75" EDGE

## (undated) DEVICE — CONNECTOR STRAIGHT 3/8 X 3/8"

## (undated) DEVICE — SUMP INTRACARDIAC 20FR 1/4" ADULT

## (undated) DEVICE — SUCTION YANKAUER NO CONTROL VENT

## (undated) DEVICE — SUT TICRON 2-0 36" CV-316 DA

## (undated) DEVICE — SUT POLYSORB 2-0 30" GS-26

## (undated) DEVICE — CATH IV SAFE BC 20G X 1.16" (PINK)

## (undated) DEVICE — VASOVIEW HEMOPRO 2

## (undated) DEVICE — SUT ETHIBOND 2-0 36" SH

## (undated) DEVICE — SUT SURGICAL STEEL 6 30" BP-1

## (undated) DEVICE — WARMING BLANKET LOWER ADULT

## (undated) DEVICE — PACK AV FISTULA

## (undated) DEVICE — VASCULAR DILATOR KIT 8,12,16,20, 24FR

## (undated) DEVICE — POSITIONER FOAM EGG CRATE ULNAR 2PCS (PINK)

## (undated) DEVICE — STOPCOCK 4-WAY W SWIVEL MALE LUER LOCK NON VENTED RED CAP

## (undated) DEVICE — ELCTR BOVIE TIP BLADE MEGADYNE E-Z CLEAN 2.5" (SHORT)

## (undated) DEVICE — MULTIPLE PERFUSION SET FEMALE 1 INLET LEG W 4 LEGS 15" (BLUE/RED)

## (undated) DEVICE — PACING CABLE (BROWN) A/V TEMP SCREW DOWN 12FT

## (undated) DEVICE — DRSG TEGADERM 6"X8"

## (undated) DEVICE — GOWN XXXL

## (undated) DEVICE — SUT STAINLESS STEEL 5 18" SCC

## (undated) DEVICE — TUBING SUCTION 20FT

## (undated) DEVICE — BLOWER MISTER AXIUS WITH IV SET

## (undated) DEVICE — SET PERF CARDIOPLEGIA 4 ARM STRL

## (undated) DEVICE — VESSEL LOOP MINI-BLUE 0.075" X 16"

## (undated) DEVICE — DRSG ACE BANDAGE 6"

## (undated) DEVICE — CONNECTOR "Y" 1/2 X 1/2 X 3/8"

## (undated) DEVICE — GUIDE SELECTION F/ATRICLIP LAA SYS

## (undated) DEVICE — SAW BLADE STRYKER STERNUM 31MM X 6.27 X .79

## (undated) DEVICE — BLADE SCALPEL SAFETYLOCK #10

## (undated) DEVICE — BULLDOG SPRING CLIP 6MM SOFT/SOFT

## (undated) DEVICE — DRSG TEGADERM 6 X 8"

## (undated) DEVICE — SOL INJ NS 0.9% 500ML 1-PORT

## (undated) DEVICE — SOL NORMOSOL-R PH7.4 1000ML

## (undated) DEVICE — SOL IRR POUR NS 0.9% 1000ML

## (undated) DEVICE — DRSG OPSITE 2.5 X 2"

## (undated) DEVICE — AORTIC PUNCH 4.0MM LONG LENGTH HANDLE

## (undated) DEVICE — PERFUSION PK LAFA CA/1

## (undated) DEVICE — VENTING ADAPTER "Y" (RED/BLUE) 7.5"

## (undated) DEVICE — DRAIN CHANNEL 32FR ROUND HUBLESS FULL FLUTED

## (undated) DEVICE — SUT BIOSYN 4-0 18" P-12

## (undated) DEVICE — CLAMP BULLDOG MIDI STRAIGHT (YELLOW) DISP

## (undated) DEVICE — DRAIN RESERVOIR FOR JACKSON PRATT 100CC CARDINAL

## (undated) DEVICE — SUT DOUBLE 6 WIRE STERNAL

## (undated) DEVICE — ELCTR BOVIE TIP BLADE MEGADYNE E-Z CLEAN 6.5" (LONG)

## (undated) DEVICE — DRSG STERISTRIPS 0.5 X 4"

## (undated) DEVICE — SUT PROLENE 3-0 36" SH

## (undated) DEVICE — DRAIN CHANNEL 19FR ROUND FULL FLUTED

## (undated) DEVICE — SPONGE PEANUT AUTO COUNT

## (undated) DEVICE — CHEST DRAIN PLEUR-EVAC DRY/WET ADULT-PEDS SINGLE (QUICK)

## (undated) DEVICE — WARMING BLANKET DUO-THERM HYPER/HYPOTHERM ADULT

## (undated) DEVICE — MEDICATION LABELS W MARKER

## (undated) DEVICE — AORTIC PUNCH 4.0MM STANDARD HANDLE

## (undated) DEVICE — CLAMP BULLDOG MIDI 45 DEGREE (GREEN) DISP

## (undated) DEVICE — AORTIC PUNCH 5MM STANDARD HANDLE

## (undated) DEVICE — SUT PROLENE 6-0 4-30" BV-1

## (undated) DEVICE — PREP CHLORAPREP HI-LITE ORANGE 26ML

## (undated) DEVICE — GLV 8 PROTEXIS (WHITE)

## (undated) DEVICE — LIVANOVA SMART PERFUSION TUBING 0.375X3/32" X 6FT

## (undated) DEVICE — FEEDING TUBE NG SUMP 16FR 48"

## (undated) DEVICE — SUT PROLENE 5-0 30" RB-2

## (undated) DEVICE — DRSG COBAN 4"

## (undated) DEVICE — PACING CABLE (BLUE) ATRIAL TEMP SCREW DOWN 12FT

## (undated) DEVICE — TOURNIQUET SET TOURNIKWIK 12FR (4 TUBES, 1 SNARE) 7.5"

## (undated) DEVICE — SOL IRR POUR NS 0.9% 500ML

## (undated) DEVICE — ELCTR STRYKER NEPTUNE SMOKE EVACUATION PENCIL (GREEN)

## (undated) DEVICE — SENSOR MYOCARDIAL TEMP 15MM

## (undated) DEVICE — STRYKER PULSE LAVAGE WITH HIGH FLOW TIP

## (undated) DEVICE — CONNECTOR "Y" 1/4 X 1/4 X 1/4"

## (undated) DEVICE — DRAPE MAYO STAND 30"